# Patient Record
Sex: FEMALE | Race: WHITE | NOT HISPANIC OR LATINO | Employment: UNEMPLOYED | URBAN - METROPOLITAN AREA
[De-identification: names, ages, dates, MRNs, and addresses within clinical notes are randomized per-mention and may not be internally consistent; named-entity substitution may affect disease eponyms.]

---

## 2017-01-14 ENCOUNTER — HOSPITAL ENCOUNTER (EMERGENCY)
Facility: HOSPITAL | Age: 37
Discharge: HOME | End: 2017-01-14
Payer: OTHER GOVERNMENT

## 2017-01-14 VITALS
TEMPERATURE: 98 F | HEART RATE: 60 BPM | HEIGHT: 63 IN | BODY MASS INDEX: 21.26 KG/M2 | WEIGHT: 120 LBS | DIASTOLIC BLOOD PRESSURE: 60 MMHG | SYSTOLIC BLOOD PRESSURE: 92 MMHG | RESPIRATION RATE: 16 BRPM | OXYGEN SATURATION: 100 %

## 2017-01-14 DIAGNOSIS — R10.2 PELVIC PAIN: Primary | ICD-10-CM

## 2017-01-14 LAB
ALBUMIN SERPL BCP-MCNC: 4.2 G/DL
ALP SERPL-CCNC: 38 U/L
ALT SERPL W/O P-5'-P-CCNC: 10 U/L
ANION GAP SERPL CALC-SCNC: 8 MMOL/L
ANISOCYTOSIS BLD QL SMEAR: SLIGHT
AST SERPL-CCNC: 17 U/L
B-HCG UR QL: NEGATIVE
BACTERIA #/AREA URNS HPF: NORMAL /HPF
BASOPHILS # BLD AUTO: 0.04 K/UL
BASOPHILS NFR BLD: 1 %
BILIRUB SERPL-MCNC: 0.7 MG/DL
BILIRUB UR QL STRIP: NEGATIVE
BUN SERPL-MCNC: 9 MG/DL
CALCIUM SERPL-MCNC: 9.1 MG/DL
CHLORIDE SERPL-SCNC: 107 MMOL/L
CLARITY UR: ABNORMAL
CO2 SERPL-SCNC: 23 MMOL/L
COLOR UR: YELLOW
CREAT SERPL-MCNC: 0.8 MG/DL
CTP QC/QA: YES
DIFFERENTIAL METHOD: ABNORMAL
EOSINOPHIL # BLD AUTO: 0.1 K/UL
EOSINOPHIL NFR BLD: 2.1 %
ERYTHROCYTE [DISTWIDTH] IN BLOOD BY AUTOMATED COUNT: 16.8 %
EST. GFR  (AFRICAN AMERICAN): >60 ML/MIN/1.73 M^2
EST. GFR  (NON AFRICAN AMERICAN): >60 ML/MIN/1.73 M^2
GLUCOSE SERPL-MCNC: 83 MG/DL
GLUCOSE UR QL STRIP: NEGATIVE
HCT VFR BLD AUTO: 32.4 %
HGB BLD-MCNC: 10.1 G/DL
HGB UR QL STRIP: NEGATIVE
KETONES UR QL STRIP: NEGATIVE
LEUKOCYTE ESTERASE UR QL STRIP: ABNORMAL
LIPASE SERPL-CCNC: 46 U/L
LYMPHOCYTES # BLD AUTO: 1.1 K/UL
LYMPHOCYTES NFR BLD: 27.8 %
MCH RBC QN AUTO: 23.8 PG
MCHC RBC AUTO-ENTMCNC: 31.2 %
MCV RBC AUTO: 76 FL
MICROSCOPIC COMMENT: NORMAL
MONOCYTES # BLD AUTO: 0.3 K/UL
MONOCYTES NFR BLD: 7.2 %
NEUTROPHILS # BLD AUTO: 2.4 K/UL
NEUTROPHILS NFR BLD: 61.9 %
NITRITE UR QL STRIP: NEGATIVE
PH UR STRIP: 5 [PH] (ref 5–8)
PLATELET # BLD AUTO: 204 K/UL
PMV BLD AUTO: 10.3 FL
POTASSIUM SERPL-SCNC: 4.2 MMOL/L
PROT SERPL-MCNC: 7.1 G/DL
PROT UR QL STRIP: NEGATIVE
RBC # BLD AUTO: 4.25 M/UL
SODIUM SERPL-SCNC: 138 MMOL/L
SP GR UR STRIP: 1.02 (ref 1–1.03)
SQUAMOUS #/AREA URNS HPF: 30 /HPF
URN SPEC COLLECT METH UR: ABNORMAL
UROBILINOGEN UR STRIP-ACNC: NEGATIVE EU/DL
WBC # BLD AUTO: 3.89 K/UL
WBC #/AREA URNS HPF: 4 /HPF (ref 0–5)

## 2017-01-14 PROCEDURE — 80053 COMPREHEN METABOLIC PANEL: CPT

## 2017-01-14 PROCEDURE — 81025 URINE PREGNANCY TEST: CPT | Performed by: PHYSICIAN ASSISTANT

## 2017-01-14 PROCEDURE — 25000003 PHARM REV CODE 250: Performed by: PHYSICIAN ASSISTANT

## 2017-01-14 PROCEDURE — 99283 EMERGENCY DEPT VISIT LOW MDM: CPT

## 2017-01-14 PROCEDURE — 87591 N.GONORRHOEAE DNA AMP PROB: CPT

## 2017-01-14 PROCEDURE — 81000 URINALYSIS NONAUTO W/SCOPE: CPT

## 2017-01-14 PROCEDURE — 83690 ASSAY OF LIPASE: CPT

## 2017-01-14 PROCEDURE — 85025 COMPLETE CBC W/AUTO DIFF WBC: CPT

## 2017-01-14 RX ORDER — AMOXICILLIN 500 MG
2 CAPSULE ORAL DAILY
COMMUNITY
End: 2017-03-29

## 2017-01-14 RX ORDER — IBUPROFEN 600 MG/1
600 TABLET ORAL EVERY 6 HOURS PRN
Qty: 20 TABLET | Refills: 0 | Status: SHIPPED | OUTPATIENT
Start: 2017-01-14 | End: 2017-03-29

## 2017-01-14 RX ORDER — DICYCLOMINE HYDROCHLORIDE 10 MG/1
10 CAPSULE ORAL
Status: COMPLETED | OUTPATIENT
Start: 2017-01-14 | End: 2017-01-14

## 2017-01-14 RX ADMIN — DICYCLOMINE HYDROCHLORIDE 10 MG: 10 CAPSULE ORAL at 09:01

## 2017-01-14 NOTE — ED AVS SNAPSHOT
[facility]  [place] [address]               Lydia Thomas   2017  9:02 AM   ED    Description:  Female : 1980   Department:  [facility]           Your Care was Coordinated By:     Provider Role From To    Lobito Leiva MD Attending Provider 17 --    Dawson Arroyo PA-C Physician Assistant 17 --      Reason for Visit     Abdominal Pain           Diagnoses this Visit        Comments    Pelvic pain    -  Primary       ED Disposition     None           To Do List           Follow-up Information     Go to [facility].    Specialty:  Emergency Medicine    Why:  If symptoms worsen    Contact information:    [place] [address] [phone]        Follow up with Your OB/GYN. Schedule an appointment as soon as possible for a visit in 3 days.    Why:  For further evaluation       These Medications        Disp Refills Start End    ibuprofen (ADVIL,MOTRIN) 600 MG tablet 20 tablet 0 2017     Take 1 tablet (600 mg total) by mouth every 6 (six) hours as needed for Pain. - Oral    Pharmacy: [facility]/pharmacy #[id number] - [place] - [address] Ph #: [phone]         [facility]     [facility] -  Assistance  Registered nurses in the [facility] provide clinical advisement, health education, appointment booking, and other advisory services.  Call for this free service at [phone].             Medications           Message regarding Medications     Verify the changes and/or additions to your medication regime listed below are the same as discussed with your clinician today.  If any of these changes or additions are incorrect, please notify your healthcare provider.        START taking these NEW medications        Refills    ibuprofen (ADVIL,MOTRIN) 600 MG tablet 0    Sig: Take 1 tablet (600 mg total) by mouth  "every 6 (six) hours as needed for Pain.    Class: Print    Route: Oral      These medications were administered today        Dose Freq    dicyclomine capsule 10 mg 10 mg ED 1 Time    Sig: Take 1 capsule (10 mg total) by mouth ED 1 Time.    Class: Normal    Route: Oral    Cosign for Ordering: Accepted by Lobito Leiva MD on 1/14/2017 10:18 AM      STOP taking these medications     acetaminophen (TYLENOL) 325 MG tablet Take 325 mg by mouth every 6 (six) hours as needed for Pain.    ondansetron (ZOFRAN-ODT) 8 MG TbDL Take 1 tablet (8 mg total) by mouth every 12 (twelve) hours as needed.    PNV cmb#21-iron-folic acid (PRENATAL COMPLETE)  mg-mcg Tab Take 1 tablet by mouth once daily.           Verify that the below list of medications is an accurate representation of the medications you are currently taking.  If none reported, the list may be blank. If incorrect, please contact your healthcare provider. Carry this list with you in case of emergency.           Current Medications     fish oil-omega-3 fatty acids 300-1,000 mg capsule Take 2 g by mouth once daily.    ibuprofen (ADVIL,MOTRIN) 600 MG tablet Take 1 tablet (600 mg total) by mouth every 6 (six) hours as needed for Pain.    sumatriptan (IMITREX STATDOSE) 6 mg/0.5 mL kit Inject 6 mg into the skin every 2 (two) hours as needed for Migraine.           Clinical Reference Information           Your Vitals Were     BP Pulse Temp Resp Height Weight    105/68 82 98.2 °F (36.8 °C) 16 5' 2.5" (1.588 m) 54.4 kg (120 lb)    Last Period SpO2 BMI          01/10/2017 100% 21.6 kg/m2        Allergies as of 1/14/2017        Reactions    Benadryl [Diphenhydramine Hcl] Itching    "with fast injection"    Iodine And Iodide Containing Products       Immunizations Administered on Date of Encounter - 1/14/2017     None      ED Micro, Lab, POCT     Start Ordered       Status Ordering Provider    01/14/17 0936 01/14/17 0935  CBC auto differential  STAT      Final result     " 01/14/17 0936 01/14/17 0935  Comprehensive metabolic panel  STAT      Final result     01/14/17 0936 01/14/17 0935  Lipase  STAT      Final result     01/14/17 0934 01/14/17 0935  C. trachomatis/N. gonorrhoeae by AMP DNA Urine  STAT      In process     01/14/17 0925 01/14/17 0925  Urinalysis Clean Catch  STAT      Final result     01/14/17 0925 01/14/17 0925    STAT,   Status:  Canceled      Canceled     01/14/17 0925 01/14/17 0925    STAT,   Status:  Canceled      Canceled     01/14/17 0925 01/14/17 0925  POCT urine pregnancy  Once      Final result     01/14/17 0925 01/14/17 0925  Urinalysis Microscopic  Once      Final result       ED Imaging Orders     None        Discharge Instructions       Please make appointment with OB/GYN for further evaluation, including pelvic exam, as soon as possible.  You may take ibuprofen every 6 hours as needed for pelvic pain.  Please return to the emergency department if you have new or worsening symptoms.    Discharge References/Attachments     PELVIC PAIN, UNKNOWN CAUSE (ENGLISH)      [facility] Sign-Up     Activating your [facility] account is as easy as 1-2-3!     1) Visit [email or web address], select Sign Up Now, enter this activation code and your date of birth, then select Next.  [email or web address]  Expires: 2/5/2017 10:28 PM      2) Create a username and password to use when you visit [facility] in the future and select a security question in case you lose your password and select Next.    3) Enter your e-mail address and click Sign Up!    Additional Information  If you have questions, please e-mail [email or web address] or call [phone] to talk to our [facility] staff. Remember, [facility] is NOT to be used for urgent needs. For medical emergencies, dial 911.          [facility] complies with applicable Federal civil rights laws and does not discriminate on the basis of race, color, national origin, age, disability, or sex.        Language Assistance Services      ATTENTION: Language assistance services are available, free of charge. Please call [phone].      ATENCIÓN: Si habla español, tiene a cordova disposición servicios gratuitos de asistencia lingüística. Llame al [phone].     CHÚ Ý: N?u b?n nói Ti?ng Vi?t, có các d?ch v? h? tr? ngôn ng? mi?n phí dành cho b?n. G?i s? [phone].

## 2017-01-14 NOTE — ED PROVIDER NOTES
"Encounter Date: 1/14/2017    SCRIBE #1 NOTE: I, Bola Delarosa, am scribing for, and in the presence of,  Dawson Arroyo PA-C. I have scribed the following portions of the note - Other sections scribed: HPI/ROS.       History     Chief Complaint   Patient presents with    Abdominal Pain     RUQ X 1 week. Denies N/V/D.      Review of patient's allergies indicates:   Allergen Reactions    Benadryl [diphenhydramine hcl] Itching     "with fast injection"    Iodine and iodide containing products      HPI Comments: CC: Abdominal Pain    HPI: This 36 y.o. female with medical history of anemia and ovarian cyst presents to the ED c/o acute, constant, severe (pain rating 7/10), non radiating, lower abdominal pain. The pt also complains of frequent urinating. The pain started 5 days ago and has been getting worse ever since. The pt has had 2 miscarriages at 1 and 2 months. The pt also stated that she did not lose her virginity until age 34. The pt is not taking any medications for the pain. The pt denies fever, diarrhea, constipation, vaginal discharge, dysuria, vomiting, cough, or rhinorrhea.        The history is provided by the patient and the spouse. No  was used.     Past Medical History   Diagnosis Date    Anemia     Anemia     Asthma      exercise asthma    Migraine headache     Ovarian cyst      No past medical history pertinent negatives.  Past Surgical History   Procedure Laterality Date    Breast lumpectomy Right     Breast lumpectomy       right     Family History   Problem Relation Age of Onset    Asthma Mother     Miscarriages / Stillbirths Mother     Arthritis Father     COPD Father     Asthma Brother     Cancer Maternal Aunt     Cancer Maternal Uncle     Diabetes Maternal Uncle     Hypertension Maternal Uncle     Hearing loss Paternal Aunt     Hearing loss Paternal Uncle     Cancer Maternal Grandmother     Cancer Maternal Grandfather     Hearing loss Paternal Grandmother  "    Hearing loss Paternal Grandfather      Social History   Substance Use Topics    Smoking status: Never Smoker    Smokeless tobacco: Never Used    Alcohol use No     Review of Systems   Constitutional: Negative for fever.   HENT: Negative for rhinorrhea and sore throat.    Respiratory: Negative for cough and shortness of breath.    Cardiovascular: Negative for chest pain.   Gastrointestinal: Positive for abdominal pain (lower left and right). Negative for constipation, diarrhea, nausea and vomiting.   Genitourinary: Positive for difficulty urinating. Negative for dysuria and vaginal discharge.   Musculoskeletal: Negative for back pain.   Skin: Negative for rash.   Neurological: Negative for weakness.       Physical Exam   Initial Vitals   BP Pulse Resp Temp SpO2   01/14/17 0900 01/14/17 0900 01/14/17 0900 01/14/17 0900 01/14/17 0900   105/68 82 16 98.2 °F (36.8 °C) 100 %     Physical Exam    ED Course   Procedures  Labs Reviewed   C. TRACHOMATIS/N. GONORRHOEAE BY AMP DNA   URINALYSIS   VAGINAL SCREEN   POCT URINE PREGNANCY                        Scribe Attestation:   Scribe #1: I performed the above scribed service and the documentation accurately describes the services I performed. I attest to the accuracy of the note.    Attending Attestation:           Physician Attestation for Scribe:  Physician Attestation Statement for Scribe #1: I, Dawson Arroyo PA-C, reviewed documentation, as scribed by Bola Delarosa in my presence, and it is both accurate and complete.                 ED Course     Clinical Impression:   There were no encounter diagnoses.

## 2017-01-14 NOTE — ED PROVIDER NOTES
"Encounter Date: 1/14/2017    SCRIBE #1 NOTE: IBola, am scribing for, and in the presence of,  Dawson Arroyo PA-C. I have scribed the following portions of the note - Other sections scribed: HPI/ROS.   SCRIBE #2 NOTE: ICoreen Emmanuel, am scribing for, and in the presence of,  Dawson Arroyo PA-C. I have scribed the following portions of the note - Other sections scribed: HPI.     History     Chief Complaint   Patient presents with    Abdominal Pain     RUQ X 1 week. Denies N/V/D.      Review of patient's allergies indicates:   Allergen Reactions    Benadryl [diphenhydramine hcl] Itching     "with fast injection"    Iodine and iodide containing products      HPI Comments: CC: Abdominal Pain    HPI: This 36 y.o. female with medical history of anemia and ovarian cyst presents to the ED c/o acute, constant, severe (pain rating 7/10) non radiating lower abdominal pain. The pt also complains of frequent urination. The pain started 5 days ago and has been getting worse ever since. Pt notes that she has never experienced these symptoms before. She reports that she experienced her last menstrual cycle 4 days ago. The pt has had 2 miscarriages in the past. The pt is not taking any medications for the pain. The pt denies fever, diarrhea, constipation, vaginal discharge, vaginal bleeding, dysuria, hematuria, blood in stool, nausea, vomiting, cough, or rhinorrhea.         The history is provided by the patient.     Past Medical History   Diagnosis Date    Anemia     Anemia     Asthma      exercise asthma    Migraine headache     Ovarian cyst      No past medical history pertinent negatives.  Past Surgical History   Procedure Laterality Date    Breast lumpectomy Right     Breast lumpectomy       right     Family History   Problem Relation Age of Onset    Asthma Mother     Miscarriages / Stillbirths Mother     Arthritis Father     COPD Father     Asthma Brother     Cancer Maternal Aunt     Cancer Maternal " Uncle     Diabetes Maternal Uncle     Hypertension Maternal Uncle     Hearing loss Paternal Aunt     Hearing loss Paternal Uncle     Cancer Maternal Grandmother     Cancer Maternal Grandfather     Hearing loss Paternal Grandmother     Hearing loss Paternal Grandfather      Social History   Substance Use Topics    Smoking status: Never Smoker    Smokeless tobacco: Never Used    Alcohol use No     Review of Systems   Constitutional: Negative for fever.   HENT: Negative for rhinorrhea and sore throat.    Respiratory: Negative for shortness of breath.    Cardiovascular: Negative for chest pain.   Gastrointestinal: Positive for abdominal pain (lower left and right). Negative for constipation, diarrhea, nausea and vomiting.   Genitourinary: Positive for difficulty urinating. Negative for dysuria and vaginal discharge.   Musculoskeletal: Negative for back pain.   Skin: Negative for rash.   Neurological: Negative for weakness.       Physical Exam   Initial Vitals   BP Pulse Resp Temp SpO2   01/14/17 0900 01/14/17 0900 01/14/17 0900 01/14/17 0900 01/14/17 0900   105/68 82 16 98.2 °F (36.8 °C) 100 %     Physical Exam    Vitals reviewed.  Constitutional: She appears well-developed and well-nourished. She is not diaphoretic. No distress.   HENT:   Head: Normocephalic and atraumatic.   Right Ear: External ear normal.   Left Ear: External ear normal.   Nose: Nose normal.   Eyes: Conjunctivae are normal. No scleral icterus.   Neck: Normal range of motion. Neck supple.   Cardiovascular: Normal rate, regular rhythm, normal heart sounds and intact distal pulses.   Pulmonary/Chest: Breath sounds normal. No respiratory distress. She has no wheezes. She has no rhonchi. She has no rales. She exhibits no tenderness.   Abdominal: Soft. She exhibits no distension and no mass. There is tenderness (mild left and right pelvic). There is no rebound and no guarding.   Negative Abbott sign.  No tenderness at McBurney's point.  No  peritoneal signs.   Musculoskeletal: Normal range of motion.   Lymphadenopathy:     She has no cervical adenopathy.   Neurological: She is alert and oriented to person, place, and time.   Skin: Skin is warm and dry. No rash noted.         ED Course   Procedures  Labs Reviewed   URINALYSIS - Abnormal; Notable for the following:        Result Value    Appearance, UA Cloudy (*)     Leukocytes, UA Trace (*)     All other components within normal limits   CBC W/ AUTO DIFFERENTIAL - Abnormal; Notable for the following:     WBC 3.89 (*)     Hemoglobin 10.1 (*)     Hematocrit 32.4 (*)     MCV 76 (*)     MCH 23.8 (*)     MCHC 31.2 (*)     RDW 16.8 (*)     All other components within normal limits   COMPREHENSIVE METABOLIC PANEL - Abnormal; Notable for the following:     Alkaline Phosphatase 38 (*)     All other components within normal limits   C. TRACHOMATIS/N. GONORRHOEAE BY AMP DNA   LIPASE   URINALYSIS MICROSCOPIC   POCT URINE PREGNANCY             Medical Decision Making:   History:   Old Medical Records: I decided to obtain old medical records.    This is an emergent evaluation of a 36-year-old female complaining of pelvic pain ×5 days.  She denies fever, nausea vomiting, urinary symptoms, changes in bowel habits, vaginal discharge.  She presents well-appearing in no distress with stable vital signs.  Abdomen soft with mild tenderness to the left and right pelvic regions.  No peritoneal signs.  Labs and urinalysis unremarkable.  Patient refused pelvic exam due to no female provider available and explained she will have pelvic exam at OB/GYN.  Patient explained risks of incomplete workup and advised to seek OB/GYN evaluation as soon as possible.  Patient has history of small ovarian cyst and this may be etiology of her symptomatology today.  Given her presentation and exam findings I do not suspect serious pathology such as torsion, TOA, PID, appendicitis, diverticulitis.  UPT negative.  Ectopic pregnancy unlikely.   Patient discharged with return precautions and prescription for ibuprofen.  Case discussed with Dr. Leiva who agrees with assessment plan.          Scribe Attestation:   Scribe #1: I performed the above scribed service and the documentation accurately describes the services I performed. I attest to the accuracy of the note.    Attending Attestation:     Physician Attestation Statement for NP/PA:   I have conducted a face to face encounter with this patient in addition to the NP/PA, due to Medical Complexity    Other NP/PA Attestation Additions:    History of Present Illness: I discussed with the patient and her partner that she is a superior labs reviewed ER.  I discussed that she had lower abdominal tenderness which required a pelvic exam.  She states her last intercourse was a month ago.  Both she and her partner did not want a pelvic exam without a female provider for which we do not have one at present.  They defer and state they will go to the OB/GYN to have a pelvic exam.         Physician Attestation for Scribe:  Physician Attestation Statement for Scribe #1: I, Dawsno Arroyo PA-C, reviewed documentation, as scribed by Bola Delarosa in my presence, and it is both accurate and complete.   Physician Attestation Statement for Scribe #2: IDawson PA-C, reviewed documentation, as scribed by Coreen Benitez in my presence, and it is both accurate and complete. I also acknowledge and confirm the content of the note done by Cricketibe #1.              ED Course     Clinical Impression:   The encounter diagnosis was Pelvic pain.          Dawson Arroyo PA-C  01/14/17 0780

## 2017-01-14 NOTE — DISCHARGE INSTRUCTIONS
Please make appointment with OB/GYN for further evaluation, including pelvic exam, as soon as possible.  You may take ibuprofen every 6 hours as needed for pelvic pain.  Please return to the emergency department if you have new or worsening symptoms.

## 2017-01-17 LAB
C TRACH DNA SPEC QL NAA+PROBE: NEGATIVE
N GONORRHOEA DNA SPEC QL NAA+PROBE: NEGATIVE

## 2017-03-29 ENCOUNTER — HOSPITAL ENCOUNTER (EMERGENCY)
Facility: HOSPITAL | Age: 37
Discharge: HOME | End: 2017-03-29
Attending: EMERGENCY MEDICINE
Payer: OTHER GOVERNMENT

## 2017-03-29 VITALS
SYSTOLIC BLOOD PRESSURE: 100 MMHG | DIASTOLIC BLOOD PRESSURE: 56 MMHG | HEART RATE: 60 BPM | WEIGHT: 126 LBS | RESPIRATION RATE: 20 BRPM | BODY MASS INDEX: 22.32 KG/M2 | TEMPERATURE: 98 F | OXYGEN SATURATION: 100 % | HEIGHT: 63 IN

## 2017-03-29 DIAGNOSIS — R11.2 NAUSEA AND VOMITING, INTRACTABILITY OF VOMITING NOT SPECIFIED, UNSPECIFIED VOMITING TYPE: Primary | ICD-10-CM

## 2017-03-29 LAB
ALBUMIN SERPL BCP-MCNC: 4.4 G/DL
ALP SERPL-CCNC: 36 U/L
ALT SERPL W/O P-5'-P-CCNC: 10 U/L
ANION GAP SERPL CALC-SCNC: 9 MMOL/L
AST SERPL-CCNC: 16 U/L
B-HCG UR QL: NEGATIVE
BACTERIA #/AREA URNS HPF: ABNORMAL /HPF
BASOPHILS # BLD AUTO: 0.1 K/UL
BASOPHILS NFR BLD: 2.6 %
BILIRUB SERPL-MCNC: 1.3 MG/DL
BILIRUB UR QL STRIP: NEGATIVE
BUN SERPL-MCNC: 5 MG/DL
CALCIUM SERPL-MCNC: 9.5 MG/DL
CHLORIDE SERPL-SCNC: 107 MMOL/L
CLARITY UR: ABNORMAL
CO2 SERPL-SCNC: 26 MMOL/L
COLOR UR: YELLOW
CREAT SERPL-MCNC: 0.8 MG/DL
CTP QC/QA: YES
DIFFERENTIAL METHOD: ABNORMAL
EOSINOPHIL # BLD AUTO: 0.1 K/UL
EOSINOPHIL NFR BLD: 1.6 %
ERYTHROCYTE [DISTWIDTH] IN BLOOD BY AUTOMATED COUNT: 16.2 %
EST. GFR  (AFRICAN AMERICAN): >60 ML/MIN/1.73 M^2
EST. GFR  (NON AFRICAN AMERICAN): >60 ML/MIN/1.73 M^2
GLUCOSE SERPL-MCNC: 93 MG/DL
GLUCOSE UR QL STRIP: NEGATIVE
HCT VFR BLD AUTO: 32.4 %
HGB BLD-MCNC: 10.1 G/DL
HGB UR QL STRIP: NEGATIVE
KETONES UR QL STRIP: NEGATIVE
LEUKOCYTE ESTERASE UR QL STRIP: NEGATIVE
LIPASE SERPL-CCNC: 24 U/L
LYMPHOCYTES # BLD AUTO: 1.3 K/UL
LYMPHOCYTES NFR BLD: 33.3 %
MCH RBC QN AUTO: 24.1 PG
MCHC RBC AUTO-ENTMCNC: 31.2 %
MCV RBC AUTO: 77 FL
MICROSCOPIC COMMENT: ABNORMAL
MONOCYTES # BLD AUTO: 0.4 K/UL
MONOCYTES NFR BLD: 9.6 %
NEUTROPHILS # BLD AUTO: 2.1 K/UL
NEUTROPHILS NFR BLD: 52.9 %
NITRITE UR QL STRIP: NEGATIVE
PH UR STRIP: 8 [PH] (ref 5–8)
PLATELET # BLD AUTO: 238 K/UL
PMV BLD AUTO: 11.4 FL
POTASSIUM SERPL-SCNC: 4 MMOL/L
PROT SERPL-MCNC: 7.7 G/DL
PROT UR QL STRIP: NEGATIVE
RBC # BLD AUTO: 4.19 M/UL
RBC #/AREA URNS HPF: 0 /HPF (ref 0–4)
SODIUM SERPL-SCNC: 142 MMOL/L
SP GR UR STRIP: 1.02 (ref 1–1.03)
SQUAMOUS #/AREA URNS HPF: 35 /HPF
URN SPEC COLLECT METH UR: ABNORMAL
UROBILINOGEN UR STRIP-ACNC: NEGATIVE EU/DL
WBC # BLD AUTO: 3.87 K/UL
WBC #/AREA URNS HPF: 2 /HPF (ref 0–5)

## 2017-03-29 PROCEDURE — 25000003 PHARM REV CODE 250: Performed by: EMERGENCY MEDICINE

## 2017-03-29 PROCEDURE — 81025 URINE PREGNANCY TEST: CPT | Performed by: EMERGENCY MEDICINE

## 2017-03-29 PROCEDURE — 96374 THER/PROPH/DIAG INJ IV PUSH: CPT

## 2017-03-29 PROCEDURE — 96361 HYDRATE IV INFUSION ADD-ON: CPT

## 2017-03-29 PROCEDURE — 63600175 PHARM REV CODE 636 W HCPCS: Performed by: EMERGENCY MEDICINE

## 2017-03-29 PROCEDURE — 83690 ASSAY OF LIPASE: CPT

## 2017-03-29 PROCEDURE — 85025 COMPLETE CBC W/AUTO DIFF WBC: CPT

## 2017-03-29 PROCEDURE — 99283 EMERGENCY DEPT VISIT LOW MDM: CPT | Mod: 25

## 2017-03-29 PROCEDURE — 80053 COMPREHEN METABOLIC PANEL: CPT

## 2017-03-29 PROCEDURE — 81000 URINALYSIS NONAUTO W/SCOPE: CPT

## 2017-03-29 RX ORDER — OMEPRAZOLE 40 MG/1
40 CAPSULE, DELAYED RELEASE ORAL DAILY
Qty: 30 CAPSULE | Refills: 11 | Status: SHIPPED | OUTPATIENT
Start: 2017-03-29 | End: 2020-08-19

## 2017-03-29 RX ORDER — ONDANSETRON 4 MG/1
4 TABLET, FILM COATED ORAL EVERY 8 HOURS PRN
Qty: 12 TABLET | Refills: 0 | OUTPATIENT
Start: 2017-03-29 | End: 2020-08-16

## 2017-03-29 RX ORDER — ONDANSETRON 2 MG/ML
8 INJECTION INTRAMUSCULAR; INTRAVENOUS
Status: COMPLETED | OUTPATIENT
Start: 2017-03-29 | End: 2017-03-29

## 2017-03-29 RX ADMIN — SODIUM CHLORIDE 1000 ML: 0.9 INJECTION, SOLUTION INTRAVENOUS at 12:03

## 2017-03-29 RX ADMIN — ONDANSETRON 8 MG: 2 INJECTION INTRAMUSCULAR; INTRAVENOUS at 12:03

## 2017-03-29 NOTE — DISCHARGE INSTRUCTIONS
"  How to Control Nausea and Vomiting     Taken before meals, medicines can help ease nausea.    Nausea is feeling that you need to throw up. Throwing up occurs when your body forces food that is in your stomach out through your mouth. Nausea and vomiting are symptoms that are caused by many things. They can happen when a condition or disease, medicine, medical treatment, or a poisonous substance affects the area in your brain that controls vomiting. Some conditions or diseases can cause nausea, abdominal pain or cramps, and vomiting. The symptoms can be mild and go away by themselves. Other symptoms can be serious. You will need to see your healthcare provider for these.  Nausea and vomiting are common. They can be caused by many things. These include:  · "Stomach flu" (gastroenteritis)  · Food poisoning  · Stomach pain (gastritis)  · Blockages  They can also be caused by a head injury, an infection in the brain or inside the ear, or migraines. Other common causes of nausea and vomiting include:  · Brain tumor  · Brain bruise  · Motion sickness  · Drugs. These include alcohol, pain medicines such as morphine, and cancer medicines.  · Toxins. These are poisonous things like plants or liquids that are swallowed by accident.  · Advanced types of cancer  · Movement problems (psychogenic problems)  · Extra pressure in the fluid that surrounds the brain and spinal cord (elevated intracranial pressure)     Nausea and vomiting are also common side effects of chemotherapy and radiation therapy. Side effects happen when treatment changes some normal cells as well as cancer cells. In this case, the cells lining your stomach and the part of your brain that controls vomiting are affected. Other more serious causes of vomiting may be hard to find early in the illness.     When to seek medical advice  Call your healthcare provider right away if you have the following:  · Nausea or vomiting that lasts 24 hours or more  · Trouble " keeping fluids down   Medicines can help  Nausea or vomiting can often be prevented or controlled with medicines (antiemetics). Your doctor may give you antiemetics before or after treatment if you are getting chemotherapy or other medical treatments that cause nausea or vomiting.  Eating tips  · If you have medicines to control nausea, take them before meals as directed.  · Avoid fatty or greasy foods while nauseated.  · Eat small meals slowly throughout the day.  · Ask someone to sit with you while you eat to keep you from thinking about feeling nauseated.  · Eat foods at room temperature or colder to avoid strong smells.  · Eat dry foods, such as toast, crackers, or pretzels. Also eat cool, light foods, such as applesauce, and bland foods, such as oatmeal or skinned chicken.   Other ways to feel better  · Get a little fresh air. Take a short walk.  · Talk to a friend, listen to music, or watch TV.  · Take a few deep, slow breaths.  · Eat by candlelight or in surroundings that you find relaxing.  · Use a technique, such as guided imagery, to help you relax. Imagine yourself in a beautiful, restful scene. Or daydream about the place youd most like to be.  Date Last Reviewed: 1/6/2016  © 5109-7695 [employer]. [address] [place] [address], [place] [address]. All rights reserved. This information is not intended as a substitute for professional medical care. Always follow your healthcare professional's instructions.

## 2017-03-29 NOTE — ED PROVIDER NOTES
"Encounter Date: 3/29/2017    SCRIBE #1 NOTE: I, Tawny Slater, am scribing for, and in the presence of,  Liam Elliott MD. I have scribed the following portions of the note - Other sections scribed: HPI and ROS.       History     Chief Complaint   Patient presents with    Abdominal Pain     Pt. c/o vomiting and abdominal pain after eating entire bag of black licorice. Pt. reports she vomited blood and needs to be checked. Pt. appears in NAD.     Vomiting     Review of patient's allergies indicates:   Allergen Reactions    Benadryl [diphenhydramine hcl] Itching     "with fast injection"    Fish containing products     Iodine and iodide containing products      HPI Comments: CC: Abdominal Pain        HPI: This 37 y.o female pt with a medical hx of migraine headache, ovarian cyst, asthma, and anemia presents to the ED with c/o sudden onset of abdominal pain with associated vomiting that began yesterday after eating a bag of black licorice. Pt complains of multiple episodes of emesis with minimal blood in her emesis. She denies diarrhea, fever, chills, sweats, and headache. There are no alleviating factors. No Tx PTA. symptoms are acute and severe(10/10).     The history is provided by the patient. No  was used.     Past Medical History:   Diagnosis Date    Anemia     Anemia     Asthma     exercise asthma    Migraine headache     Ovarian cyst      Past Surgical History:   Procedure Laterality Date    BREAST LUMPECTOMY Right     BREAST LUMPECTOMY      right     Family History   Problem Relation Age of Onset    Asthma Mother     Miscarriages / Stillbirths Mother     Arthritis Father     COPD Father     Asthma Brother     Cancer Maternal Aunt     Cancer Maternal Uncle     Diabetes Maternal Uncle     Hypertension Maternal Uncle     Hearing loss Paternal Aunt     Hearing loss Paternal Uncle     Cancer Maternal Grandmother     Cancer Maternal Grandfather     Hearing loss " Paternal Grandmother     Hearing loss Paternal Grandfather      Social History   Substance Use Topics    Smoking status: Never Smoker    Smokeless tobacco: Never Used    Alcohol use No     Review of Systems   Constitutional: Negative for chills, diaphoresis and fever.   HENT: Negative for sore throat.    Eyes: Negative for pain.   Respiratory: Negative for shortness of breath.    Cardiovascular: Negative for chest pain.   Gastrointestinal: Positive for abdominal pain, nausea and vomiting. Negative for diarrhea.   Genitourinary: Negative for dysuria and frequency.   Musculoskeletal: Negative for back pain.   Skin: Negative for rash.   Neurological: Negative for weakness and headaches.   Hematological: Does not bruise/bleed easily.       Physical Exam   Initial Vitals   BP Pulse Resp Temp SpO2   03/29/17 1116 03/29/17 1116 03/29/17 1116 03/29/17 1116 03/29/17 1116   106/70 96 16 99.7 °F (37.6 °C) 100 %     Physical Exam    Nursing note and vitals reviewed.  Constitutional: She appears well-developed and well-nourished.   HENT:   Head: Normocephalic and atraumatic.   Eyes: EOM are normal. Pupils are equal, round, and reactive to light.   Cardiovascular: Normal rate, regular rhythm, normal heart sounds and intact distal pulses.   Pulmonary/Chest: Breath sounds normal. No respiratory distress. She has no wheezes. She has no rhonchi. She has no rales.   Abdominal: Soft. Bowel sounds are normal. She exhibits no distension. There is no tenderness. There is no rebound and no guarding.   Musculoskeletal: Normal range of motion. She exhibits no edema or tenderness.   Neurological: She is alert and oriented to person, place, and time. She has normal strength.   Skin: Skin is warm and dry.   Psychiatric: She has a normal mood and affect.         ED Course   Procedures  Labs Reviewed   CBC W/ AUTO DIFFERENTIAL - Abnormal; Notable for the following:        Result Value    WBC 3.87 (*)     Hemoglobin 10.1 (*)     Hematocrit  32.4 (*)     MCV 77 (*)     MCH 24.1 (*)     MCHC 31.2 (*)     RDW 16.2 (*)     Basophil% 2.6 (*)     All other components within normal limits   COMPREHENSIVE METABOLIC PANEL - Abnormal; Notable for the following:     BUN, Bld 5 (*)     Total Bilirubin 1.3 (*)     Alkaline Phosphatase 36 (*)     All other components within normal limits   URINALYSIS - Abnormal; Notable for the following:     Appearance, UA Hazy (*)     All other components within normal limits   URINALYSIS MICROSCOPIC - Abnormal; Notable for the following:     Bacteria, UA Moderate (*)     All other components within normal limits   LIPASE   POCT URINE PREGNANCY        MEDICAL DECISION MAKING    This is an emergent evaluation of the patient's symptoms.  Differential diagnoses includes: Pregnancy, enteritis, dehydration, pancreatitis. Room air pulse oximetry interpreted by me as normal. A decision was made to obtain the patient's old medical records.  If they were available, these records were reviewed.  Significant findings include prior evaluation for pelvic pain.  No leukocytosis or evidence of anemia.  Unremarkable metabolic panel.  No evidence of pancreatitis, cholecystitis, or acute infectious process.  Symptoms improved with treatment.  Discharge with PPI and Zofran.  Outpatient follow-up.                 Scribe Attestation:   Scribe #1: I performed the above scribed service and the documentation accurately describes the services I performed. I attest to the accuracy of the note.    Attending Attestation:           Physician Attestation for Scribe:  Physician Attestation Statement for Scribe #1: I, Liam Elliott MD, reviewed documentation, as scribed by Tawny Slater in my presence, and it is both accurate and complete.                 ED Course     Clinical Impression:   The encounter diagnosis was Nausea and vomiting, intractability of vomiting not specified, unspecified vomiting type.          Liam Elliott MD  03/29/17 7972

## 2017-03-29 NOTE — ED AVS SNAPSHOT
[facility]  [address] [place] [address]               Lydia Thomas   3/29/2017 11:42 AM   ED    Description:  Female : 1980   Department:  [facility]           Your Care was Coordinated By:     Provider Role From To    Liam Elliott MD Attending Provider 17 1146 --      Reason for Visit     Abdominal Pain     Vomiting           Diagnoses this Visit        Comments    Nausea and vomiting, intractability of vomiting not specified, unspecified vomiting type    -  Primary       ED Disposition     None           To Do List           Follow-up Information     Follow up with Destiney Hull MD. Schedule an appointment as soon as possible for a visit in 1 week.    Specialty:  Family Medicine    Why:  As needed    Contact information:    [address] [address] [phone]         These Medications        Disp Refills Start End    ondansetron (ZOFRAN) 4 MG tablet 12 tablet 0 3/29/2017     Take 1 tablet (4 mg total) by mouth every 8 (eight) hours as needed. - Oral    Pharmacy: [facility]/pharmacy #[id number] - [place] - [address] [facility] Ph #: [phone]       omeprazole (PRILOSEC) 40 MG capsule 30 capsule 11 3/29/2017 3/29/2018    Take 1 capsule (40 mg total) by mouth once daily. - Oral    Pharmacy: [facility]/pharmacy #[id number] - [place], [place] - [address] [facility] Ph #: [phone]         [facility]     [facility] -  Assistance  Registered nurses in the [facility] provide clinical advisement, health education, appointment booking, and other advisory services.  Call for this free service at [phone].             Medications           Message regarding Medications     Verify the changes and/or additions to your medication regime listed below are the same as discussed with your clinician today.  If any of these changes or additions are incorrect,  "please notify your healthcare provider.        START taking these NEW medications        Refills    ondansetron (ZOFRAN) 4 MG tablet 0    Sig: Take 1 tablet (4 mg total) by mouth every 8 (eight) hours as needed.    Class: Print    Route: Oral    omeprazole (PRILOSEC) 40 MG capsule 11    Sig: Take 1 capsule (40 mg total) by mouth once daily.    Class: Print    Route: Oral      These medications were administered today        Dose Freq    ondansetron injection 8 mg 8 mg ED 1 Time    Sig: Inject 8 mg into the vein ED 1 Time.    Class: Normal    Route: Intravenous    sodium chloride 0.9% bolus 1,000 mL 1,000 mL ED 1 Time    Sig: Inject 1,000 mLs into the vein ED 1 Time.    Class: Normal    Route: Intravenous      STOP taking these medications     sumatriptan (IMITREX STATDOSE) 6 mg/0.5 mL kit Inject 6 mg into the skin every 2 (two) hours as needed for Migraine.    ibuprofen (ADVIL,MOTRIN) 600 MG tablet Take 1 tablet (600 mg total) by mouth every 6 (six) hours as needed for Pain.    fish oil-omega-3 fatty acids 300-1,000 mg capsule Take 2 g by mouth once daily.           Verify that the below list of medications is an accurate representation of the medications you are currently taking.  If none reported, the list may be blank. If incorrect, please contact your healthcare provider. Carry this list with you in case of emergency.           Current Medications     omeprazole (PRILOSEC) 40 MG capsule Take 1 capsule (40 mg total) by mouth once daily.    ondansetron (ZOFRAN) 4 MG tablet Take 1 tablet (4 mg total) by mouth every 8 (eight) hours as needed.           Clinical Reference Information           Your Vitals Were     BP Pulse Temp Resp Height Weight    97/56 (BP Location: Right arm, Patient Position: Sitting, BP Method: Automatic) 55 98.2 °F (36.8 °C) (Oral) 18 5' 3" (1.6 m) 57.2 kg (126 lb)    Last Period SpO2 BMI          03/24/2017 100% 22.32 kg/m2        Allergies as of 3/29/2017        Reactions    Benadryl " "[Diphenhydramine Hcl] Itching    "with fast injection"    Fish Containing Products     Iodine And Iodide Containing Products       Immunizations Administered on Date of Encounter - 3/29/2017     None      ED Micro, Lab, POCT     Start Ordered       Status Ordering Provider    03/29/17 1152 03/29/17 1151  CBC auto differential  STAT      Final result     03/29/17 1152 03/29/17 1151  Comprehensive metabolic panel  STAT      Final result     03/29/17 1152 03/29/17 1151  Lipase  STAT      Final result     03/29/17 1152 03/29/17 1151  Urinalysis  STAT      Final result     03/29/17 1152 03/29/17 1151  POCT urine pregnancy  Once      Edited Result - FINAL     03/29/17 1151 03/29/17 1151  Urinalysis Microscopic  Once      Final result       ED Imaging Orders     None        Discharge Instructions         How to Control Nausea and Vomiting     Taken before meals, medicines can help ease nausea.    Nausea is feeling that you need to throw up. Throwing up occurs when your body forces food that is in your stomach out through your mouth. Nausea and vomiting are symptoms that are caused by many things. They can happen when a condition or disease, medicine, medical treatment, or a poisonous substance affects the area in your brain that controls vomiting. Some conditions or diseases can cause nausea, abdominal pain or cramps, and vomiting. The symptoms can be mild and go away by themselves. Other symptoms can be serious. You will need to see your healthcare provider for these.  Nausea and vomiting are common. They can be caused by many things. These include:  · "Stomach flu" (gastroenteritis)  · Food poisoning  · Stomach pain (gastritis)  · Blockages  They can also be caused by a head injury, an infection in the brain or inside the ear, or migraines. Other common causes of nausea and vomiting include:  · Brain tumor  · Brain bruise  · Motion sickness  · Drugs. These include alcohol, pain medicines such as morphine, and cancer " medicines.  · Toxins. These are poisonous things like plants or liquids that are swallowed by accident.  · Advanced types of cancer  · Movement problems (psychogenic problems)  · Extra pressure in the fluid that surrounds the brain and spinal cord (elevated intracranial pressure)     Nausea and vomiting are also common side effects of chemotherapy and radiation therapy. Side effects happen when treatment changes some normal cells as well as cancer cells. In this case, the cells lining your stomach and the part of your brain that controls vomiting are affected. Other more serious causes of vomiting may be hard to find early in the illness.     When to seek medical advice  Call your healthcare provider right away if you have the following:  · Nausea or vomiting that lasts 24 hours or more  · Trouble keeping fluids down   Medicines can help  Nausea or vomiting can often be prevented or controlled with medicines (antiemetics). Your doctor may give you antiemetics before or after treatment if you are getting chemotherapy or other medical treatments that cause nausea or vomiting.  Eating tips  · If you have medicines to control nausea, take them before meals as directed.  · Avoid fatty or greasy foods while nauseated.  · Eat small meals slowly throughout the day.  · Ask someone to sit with you while you eat to keep you from thinking about feeling nauseated.  · Eat foods at room temperature or colder to avoid strong smells.  · Eat dry foods, such as toast, crackers, or pretzels. Also eat cool, light foods, such as applesauce, and bland foods, such as oatmeal or skinned chicken.   Other ways to feel better  · Get a little fresh air. Take a short walk.  · Talk to a friend, listen to music, or watch TV.  · Take a few deep, slow breaths.  · Eat by candlelight or in surroundings that you find relaxing.  · Use a technique, such as guided imagery, to help you relax. Imagine yourself in a beautiful, restful scene. Or daydream  about the place youd most like to be.  Date Last Reviewed: 1/6/2016  © 2472-3479 [employer], Number 100. [address] [place] [address], [place] [address]. All rights reserved. This information is not intended as a substitute for professional medical care. Always follow your healthcare professional's instructions.           [facility] complies with applicable Federal civil rights laws and does not discriminate on the basis of race, color, national origin, age, disability, or sex.        Language Assistance Services     ATTENTION: Language assistance services are available, free of charge. Please call [phone].      ATENCIÓN: Si habla español, tiene a cordova disposición servicios gratuitos de asistencia lingüística. Llame al [phone].     CHÚ Ý: N?u b?n nói Ti?ng Vi?t, có các d?ch v? h? tr? ngôn ng? mi?n phí dành cho b?n. G?i s? [phone].

## 2017-06-05 ENCOUNTER — HOSPITAL ENCOUNTER (EMERGENCY)
Facility: HOSPITAL | Age: 37
Discharge: HOME | End: 2017-06-05
Attending: EMERGENCY MEDICINE
Payer: OTHER GOVERNMENT

## 2017-06-05 VITALS
WEIGHT: 127 LBS | HEART RATE: 81 BPM | TEMPERATURE: 98 F | BODY MASS INDEX: 23.37 KG/M2 | HEIGHT: 62 IN | RESPIRATION RATE: 18 BRPM | OXYGEN SATURATION: 100 % | DIASTOLIC BLOOD PRESSURE: 56 MMHG | SYSTOLIC BLOOD PRESSURE: 105 MMHG

## 2017-06-05 DIAGNOSIS — L08.9 SKIN INFECTION: ICD-10-CM

## 2017-06-05 DIAGNOSIS — L02.91 CELLULITIS AND ABSCESS OF UNSPECIFIED SITE: Primary | ICD-10-CM

## 2017-06-05 DIAGNOSIS — L03.90 CELLULITIS AND ABSCESS OF UNSPECIFIED SITE: Primary | ICD-10-CM

## 2017-06-05 LAB
B-HCG UR QL: NEGATIVE
CTP QC/QA: YES

## 2017-06-05 PROCEDURE — 81025 URINE PREGNANCY TEST: CPT | Performed by: EMERGENCY MEDICINE

## 2017-06-05 PROCEDURE — 99283 EMERGENCY DEPT VISIT LOW MDM: CPT

## 2017-06-05 PROCEDURE — 25000003 PHARM REV CODE 250: Performed by: NURSE PRACTITIONER

## 2017-06-05 RX ORDER — SULFAMETHOXAZOLE AND TRIMETHOPRIM 800; 160 MG/1; MG/1
1 TABLET ORAL
Status: COMPLETED | OUTPATIENT
Start: 2017-06-05 | End: 2017-06-05

## 2017-06-05 RX ORDER — SULFAMETHOXAZOLE AND TRIMETHOPRIM 800; 160 MG/1; MG/1
1 TABLET ORAL 2 TIMES DAILY
Qty: 14 TABLET | Refills: 0 | Status: SHIPPED | OUTPATIENT
Start: 2017-06-05 | End: 2017-06-05

## 2017-06-05 RX ORDER — SULFAMETHOXAZOLE AND TRIMETHOPRIM 800; 160 MG/1; MG/1
1 TABLET ORAL 2 TIMES DAILY
Qty: 14 TABLET | Refills: 0 | Status: SHIPPED | OUTPATIENT
Start: 2017-06-05 | End: 2017-06-12

## 2017-06-05 RX ADMIN — SULFAMETHOXAZOLE AND TRIMETHOPRIM 1 TABLET: 800; 160 TABLET ORAL at 01:06

## 2017-06-05 NOTE — ED PROVIDER NOTES
"Encounter Date: 6/5/2017    SCRIBE #1 NOTE: I, Amy White, am scribing for, and in the presence of,  DOTTY Lyn. I have scribed the following portions of the note - Other sections scribed: HPI/ROS.       History     Chief Complaint   Patient presents with    Abscess     States something bit her 2 days ago and now she has red, itchy areas to buttocks and L upper thigh. States there has been drainage     Review of patient's allergies indicates:   Allergen Reactions    Iodine and iodide containing products Anaphylaxis    Benadryl [diphenhydramine hcl] Itching     "with fast injection"    Fish containing products      CC: Abscess    HPI: This 37 y.o. female with no pertinent PMHx presents to the ED c/o wounds.  The patient reports being bitten on her foot two days ago.  When she woke the next morning she had an itchy red bite to her left buttock and left upper thigh.  It is still itchy, red and now accompanied by drainage, so she decided to come in for evaluation.  No reported treatment was attempted.  The patient denies fever, emesis or any other associated symptoms.         The history is provided by the patient.     Past Medical History:   Diagnosis Date    Anemia     Anemia     Asthma     exercise asthma    Migraine headache     Ovarian cyst      Past Surgical History:   Procedure Laterality Date    BREAST LUMPECTOMY Right     BREAST LUMPECTOMY      right     Family History   Problem Relation Age of Onset    Asthma Mother     Miscarriages / Stillbirths Mother     Arthritis Father     COPD Father     Asthma Brother     Cancer Maternal Aunt     Cancer Maternal Uncle     Diabetes Maternal Uncle     Hypertension Maternal Uncle     Hearing loss Paternal Aunt     Hearing loss Paternal Uncle     Cancer Maternal Grandmother     Cancer Maternal Grandfather     Hearing loss Paternal Grandmother     Hearing loss Paternal Grandfather      Social History   Substance Use Topics    Smoking " status: Never Smoker    Smokeless tobacco: Never Used    Alcohol use No     Review of Systems   Constitutional: Negative for chills and fever.   Eyes: Negative for visual disturbance.   Respiratory: Negative for shortness of breath.    Cardiovascular: Negative for leg swelling.   Gastrointestinal: Negative for nausea and vomiting.   Skin: Positive for wound. Negative for rash.   Psychiatric/Behavioral: Negative for confusion.       Physical Exam     Initial Vitals [06/05/17 1213]   BP Pulse Resp Temp SpO2   114/78 103 18 98.4 °F (36.9 °C) 99 %     Physical Exam    Nursing note and vitals reviewed.  Constitutional: She appears well-developed and well-nourished. She is not diaphoretic. She is cooperative.  Non-toxic appearance. No distress.   HENT:   Head: Normocephalic and atraumatic.   Right Ear: External ear normal.   Left Ear: External ear normal.   Eyes: Conjunctivae and EOM are normal.   Neck: Normal range of motion. No tracheal deviation present.   Cardiovascular: Normal rate.   Pulses:       Dorsalis pedis pulses are 2+ on the left side.        Posterior tibial pulses are 2+ on the left side.   No lower extremity swelling.   Pulmonary/Chest: Effort normal. No stridor. No tachypnea and no bradypnea. No respiratory distress.   Musculoskeletal: Normal range of motion.   Neurological: She is alert and oriented to person, place, and time. She has normal strength.   Skin: Skin is warm, dry and intact. Capillary refill takes less than 2 seconds. Ecchymosis and abscess noted. No petechiae, no purpura and no rash noted. Rash is not nodular, not pustular, not vesicular and not urticarial. No cyanosis or erythema. Nails show no clubbing.        Exam chaperoned by: Maribel Reyes   Psychiatric: She has a normal mood and affect. Her behavior is normal. Judgment and thought content normal.         ED Course   Procedures  Labs Reviewed   POCT URINE PREGNANCY                   APC / Resident Notes:   This is an evaluation  of a 37 y.o. female that presents to the Emergency Department for 2 wounds to the left leg. Physical Exam shows a non-toxic, afebrile, and well appearing female.  Wound #1: There is a 0.5 cm x 0.5 cm wound to the left posterior buttock with no central area of fluctuance or induration and a small area of surrounding erythema with no active drainage present at this time. Wound #2: There is a 0.25 x 0.25 cm wound to the left posterior medial thigh with no central area of fluctuance or induration and a small area surrounding erythema with no active drainage present at this time.  There is a small area of ecchymosis over the dorsal aspect of the right foot between the first and second toes.  There is no induration, increased warmth, or active drainage present in this area.  Legs are symmetrical in size with no lower extremity swelling.  There is no increased warmth over the aspect of the leg.  Calf compartments are soft.  Vital Signs Are Reassuring.  With the lack of induration, active drainage, or fluctuance, do not feel I&D is warranted at this time.     My overall impression is Abscess and Cellulitis. I considered, but at this time, do not suspect an extensive cellulitis, sepsis, bacteremia to warrant admission, or DVT.    ED Course: Bactrim. D/C Meds: Bactrim. Additional D/C Information: warm compresses 10-15 minutes, 4-5 times a day to help increase wound drainage and decrease swelling. The diagnosis, treatment plan, instructions for follow-up and reevaluation with her PCP or the ED in 2 - 3 days for wound recheck as well as ED return precautions were discussed and understanding was verbalized. All questions or concerns have been addressed. This case was discussed with Dr. Bahena who is in agreement with my assessment and plan. BHARAT Mary, FNP-C          Scribe Attestation:   Scribe #1: I performed the above scribed service and the documentation accurately describes the services I performed. I attest to the  accuracy of the note.    Attending Attestation:     Physician Attestation Statement for NP/PA:   I discussed this assessment and plan of this patient with the NP/PA, but I did not personally examine the patient. The face to face encounter was performed by the NP/PA.    Other NP/PA Attestation Additions:      Medical Decision Makin yo F presents c/o being bitten by an insect to the L foot yesterday. Pt reporting wounds to L buttock and L thigh. On exam, wounds well-appearing with small surrounding erythema. No evidence of abscess, sepsis, necrosis, or other concerning emergent pathology. Agree with above assessment and plan.          Physician Attestation for Scribe:  Physician Attestation Statement for Scribe #1: I, DOTTY Lyn, reviewed documentation, as scribed by Amy White in my presence, and it is both accurate and complete.                 ED Course     Clinical Impression:   The primary encounter diagnosis was Cellulitis and abscess of unspecified site. A diagnosis of Skin infection was also pertinent to this visit.    Disposition:   Disposition: Discharged  Condition: Stable       DOTTY Vee  [phone]

## 2017-06-05 NOTE — DISCHARGE INSTRUCTIONS
Please make sure to maintain good hygiene, if the abscess is in an area you shave then change your razor and do not shave the area until the wound is healed, do not share towels or other personal items, and use warm compresses 10-15 minutes, 4-5 times a day to help increase wound drainage and decrease swelling, and take your antibiotic medication as prescribed.     Please return to the Emergency Department for any new or worsening problems including: worsening of your abscess, increasing redness or redness extending further up your body, or temperature of greater than 100.4F.     Please follow up with your Primary Care Doctor or Return to the ED in 2-3 days for a wound check, or sooner if you wound gets worse. Your packing needs to be removed in 2 - 3 days.     Please take antibiotics as prescribed.  Tylenol or ibuprofen as needed for pain.

## 2017-06-05 NOTE — ED TRIAGE NOTES
Pt reports bite on left foot entire leg began itching, upon waking yesterday noted lump to left buttock and left side, inflamed and pus noted.

## 2017-06-27 ENCOUNTER — HOSPITAL ENCOUNTER (EMERGENCY)
Facility: HOSPITAL | Age: 37
Discharge: LEFT BEFORE DISCHARGE | End: 2017-06-28
Payer: OTHER GOVERNMENT

## 2017-06-27 VITALS
HEIGHT: 62 IN | RESPIRATION RATE: 18 BRPM | BODY MASS INDEX: 24.84 KG/M2 | HEART RATE: 72 BPM | WEIGHT: 135 LBS | TEMPERATURE: 98 F | DIASTOLIC BLOOD PRESSURE: 64 MMHG | OXYGEN SATURATION: 100 % | SYSTOLIC BLOOD PRESSURE: 101 MMHG

## 2017-06-27 LAB
B-HCG UR QL: NEGATIVE
BILIRUB UR QL STRIP: NEGATIVE
CLARITY UR: CLEAR
COLOR UR: COLORLESS
CTP QC/QA: YES
GLUCOSE UR QL STRIP: NEGATIVE
HGB UR QL STRIP: NEGATIVE
KETONES UR QL STRIP: NEGATIVE
LEUKOCYTE ESTERASE UR QL STRIP: NEGATIVE
NITRITE UR QL STRIP: NEGATIVE
PH UR STRIP: 7 [PH] (ref 5–8)
PROT UR QL STRIP: NEGATIVE
SP GR UR STRIP: 1 (ref 1–1.03)
URN SPEC COLLECT METH UR: ABNORMAL
UROBILINOGEN UR STRIP-ACNC: NEGATIVE EU/DL

## 2017-06-27 PROCEDURE — 81003 URINALYSIS AUTO W/O SCOPE: CPT

## 2017-06-27 PROCEDURE — 99900041 HC LEFT WITHOUT BEING SEEN- EMERGENCY

## 2017-06-27 PROCEDURE — 81025 URINE PREGNANCY TEST: CPT | Performed by: EMERGENCY MEDICINE

## 2017-08-10 ENCOUNTER — HOSPITAL ENCOUNTER (EMERGENCY)
Age: 37
Discharge: HOME | End: 2017-08-10
Attending: EMERGENCY MEDICINE
Payer: OTHER GOVERNMENT

## 2017-08-10 ENCOUNTER — APPOINTMENT (OUTPATIENT)
Dept: CT IMAGING | Age: 37
End: 2017-08-10
Attending: NURSE PRACTITIONER
Payer: OTHER GOVERNMENT

## 2017-08-10 ENCOUNTER — APPOINTMENT (OUTPATIENT)
Dept: ULTRASOUND IMAGING | Age: 37
End: 2017-08-10
Attending: NURSE PRACTITIONER
Payer: OTHER GOVERNMENT

## 2017-08-10 VITALS
TEMPERATURE: 98.1 F | SYSTOLIC BLOOD PRESSURE: 110 MMHG | WEIGHT: 135 LBS | DIASTOLIC BLOOD PRESSURE: 63 MMHG | BODY MASS INDEX: 23.92 KG/M2 | OXYGEN SATURATION: 100 % | RESPIRATION RATE: 18 BRPM | HEIGHT: 63 IN | HEART RATE: 86 BPM

## 2017-08-10 DIAGNOSIS — N83.201 RIGHT OVARIAN CYST: Primary | ICD-10-CM

## 2017-08-10 DIAGNOSIS — R11.2 NAUSEA AND VOMITING IN ADULT: ICD-10-CM

## 2017-08-10 LAB
ALBUMIN SERPL BCP-MCNC: 4 G/DL (ref 3.4–5)
ALBUMIN/GLOB SERPL: 1.3 {RATIO} (ref 0.8–1.7)
ALP SERPL-CCNC: 39 U/L (ref 45–117)
ALT SERPL-CCNC: 17 U/L (ref 13–56)
ANION GAP BLD CALC-SCNC: 9 MMOL/L (ref 3–18)
APPEARANCE UR: CLEAR
AST SERPL W P-5'-P-CCNC: 14 U/L (ref 15–37)
BASOPHILS # BLD AUTO: 0.1 K/UL (ref 0–0.06)
BASOPHILS # BLD: 1 % (ref 0–2)
BILIRUB SERPL-MCNC: 1.1 MG/DL (ref 0.2–1)
BILIRUB UR QL: NEGATIVE
BUN SERPL-MCNC: 6 MG/DL (ref 7–18)
BUN/CREAT SERPL: 7 (ref 12–20)
CALCIUM SERPL-MCNC: 9.2 MG/DL (ref 8.5–10.1)
CHLORIDE SERPL-SCNC: 108 MMOL/L (ref 100–108)
CO2 SERPL-SCNC: 22 MMOL/L (ref 21–32)
COLOR UR: YELLOW
CREAT SERPL-MCNC: 0.83 MG/DL (ref 0.6–1.3)
DIFFERENTIAL METHOD BLD: ABNORMAL
EOSINOPHIL # BLD: 0.1 K/UL (ref 0–0.4)
EOSINOPHIL NFR BLD: 1 % (ref 0–5)
ERYTHROCYTE [DISTWIDTH] IN BLOOD BY AUTOMATED COUNT: 15.4 % (ref 11.6–14.5)
GLOBULIN SER CALC-MCNC: 3 G/DL (ref 2–4)
GLUCOSE SERPL-MCNC: 88 MG/DL (ref 74–99)
GLUCOSE UR STRIP.AUTO-MCNC: NEGATIVE MG/DL
HCG SERPL QL: NEGATIVE
HCT VFR BLD AUTO: 33 % (ref 35–45)
HGB BLD-MCNC: 10.4 G/DL (ref 12–16)
HGB UR QL STRIP: NEGATIVE
KETONES UR QL STRIP.AUTO: 15 MG/DL
LEUKOCYTE ESTERASE UR QL STRIP.AUTO: NEGATIVE
LIPASE SERPL-CCNC: 198 U/L (ref 73–393)
LYMPHOCYTES # BLD AUTO: 32 % (ref 21–52)
LYMPHOCYTES # BLD: 1.7 K/UL (ref 0.9–3.6)
MCH RBC QN AUTO: 24.8 PG (ref 24–34)
MCHC RBC AUTO-ENTMCNC: 31.5 G/DL (ref 31–37)
MCV RBC AUTO: 78.8 FL (ref 74–97)
MONOCYTES # BLD: 0.5 K/UL (ref 0.05–1.2)
MONOCYTES NFR BLD AUTO: 10 % (ref 3–10)
NEUTS SEG # BLD: 3 K/UL (ref 1.8–8)
NEUTS SEG NFR BLD AUTO: 56 % (ref 40–73)
NITRITE UR QL STRIP.AUTO: NEGATIVE
PH UR STRIP: 7 [PH] (ref 5–8)
PLATELET # BLD AUTO: 209 K/UL (ref 135–420)
PMV BLD AUTO: 10.5 FL (ref 9.2–11.8)
POTASSIUM SERPL-SCNC: 4 MMOL/L (ref 3.5–5.5)
PROT SERPL-MCNC: 7 G/DL (ref 6.4–8.2)
PROT UR STRIP-MCNC: NEGATIVE MG/DL
RBC # BLD AUTO: 4.19 M/UL (ref 4.2–5.3)
SERVICE CMNT-IMP: NORMAL
SODIUM SERPL-SCNC: 139 MMOL/L (ref 136–145)
SP GR UR REFRACTOMETRY: 1.01 (ref 1–1.03)
UROBILINOGEN UR QL STRIP.AUTO: 0.2 EU/DL (ref 0.2–1)
WBC # BLD AUTO: 5.2 K/UL (ref 4.6–13.2)
WET PREP GENITAL: NORMAL

## 2017-08-10 PROCEDURE — 96374 THER/PROPH/DIAG INJ IV PUSH: CPT

## 2017-08-10 PROCEDURE — 87491 CHLMYD TRACH DNA AMP PROBE: CPT | Performed by: NURSE PRACTITIONER

## 2017-08-10 PROCEDURE — 96375 TX/PRO/DX INJ NEW DRUG ADDON: CPT

## 2017-08-10 PROCEDURE — 83690 ASSAY OF LIPASE: CPT | Performed by: NURSE PRACTITIONER

## 2017-08-10 PROCEDURE — 84703 CHORIONIC GONADOTROPIN ASSAY: CPT | Performed by: NURSE PRACTITIONER

## 2017-08-10 PROCEDURE — 85025 COMPLETE CBC W/AUTO DIFF WBC: CPT | Performed by: NURSE PRACTITIONER

## 2017-08-10 PROCEDURE — 74011250636 HC RX REV CODE- 250/636: Performed by: NURSE PRACTITIONER

## 2017-08-10 PROCEDURE — 81003 URINALYSIS AUTO W/O SCOPE: CPT | Performed by: NURSE PRACTITIONER

## 2017-08-10 PROCEDURE — 76830 TRANSVAGINAL US NON-OB: CPT

## 2017-08-10 PROCEDURE — 80053 COMPREHEN METABOLIC PANEL: CPT | Performed by: NURSE PRACTITIONER

## 2017-08-10 PROCEDURE — 96361 HYDRATE IV INFUSION ADD-ON: CPT

## 2017-08-10 PROCEDURE — 99284 EMERGENCY DEPT VISIT MOD MDM: CPT

## 2017-08-10 PROCEDURE — 87210 SMEAR WET MOUNT SALINE/INK: CPT | Performed by: NURSE PRACTITIONER

## 2017-08-10 PROCEDURE — 74176 CT ABD & PELVIS W/O CONTRAST: CPT

## 2017-08-10 RX ORDER — METOCLOPRAMIDE HYDROCHLORIDE 5 MG/ML
5 INJECTION INTRAMUSCULAR; INTRAVENOUS
Status: COMPLETED | OUTPATIENT
Start: 2017-08-10 | End: 2017-08-10

## 2017-08-10 RX ORDER — IBUPROFEN 600 MG/1
600 TABLET ORAL
Qty: 20 TAB | Refills: 0 | Status: SHIPPED | OUTPATIENT
Start: 2017-08-10 | End: 2017-09-19

## 2017-08-10 RX ORDER — ONDANSETRON 2 MG/ML
4 INJECTION INTRAMUSCULAR; INTRAVENOUS
Status: COMPLETED | OUTPATIENT
Start: 2017-08-10 | End: 2017-08-10

## 2017-08-10 RX ORDER — HYDROCODONE BITARTRATE AND ACETAMINOPHEN 5; 325 MG/1; MG/1
1 TABLET ORAL
Qty: 12 TAB | Refills: 0 | Status: SHIPPED | OUTPATIENT
Start: 2017-08-10 | End: 2017-09-19

## 2017-08-10 RX ORDER — ONDANSETRON 4 MG/1
4 TABLET, ORALLY DISINTEGRATING ORAL
Qty: 10 TAB | Refills: 0 | Status: SHIPPED | OUTPATIENT
Start: 2017-08-10 | End: 2017-09-19

## 2017-08-10 RX ORDER — HYDROMORPHONE HYDROCHLORIDE 1 MG/ML
1 INJECTION, SOLUTION INTRAMUSCULAR; INTRAVENOUS; SUBCUTANEOUS
Status: COMPLETED | OUTPATIENT
Start: 2017-08-10 | End: 2017-08-10

## 2017-08-10 RX ADMIN — SODIUM CHLORIDE 1000 ML: 900 INJECTION, SOLUTION INTRAVENOUS at 14:55

## 2017-08-10 RX ADMIN — ONDANSETRON 4 MG: 2 SOLUTION INTRAMUSCULAR; INTRAVENOUS at 14:52

## 2017-08-10 RX ADMIN — HYDROMORPHONE HYDROCHLORIDE 1 MG: 1 INJECTION, SOLUTION INTRAMUSCULAR; INTRAVENOUS; SUBCUTANEOUS at 13:35

## 2017-08-10 RX ADMIN — METOCLOPRAMIDE 5 MG: 5 INJECTION, SOLUTION INTRAMUSCULAR; INTRAVENOUS at 16:35

## 2017-08-10 NOTE — DISCHARGE INSTRUCTIONS
Nausea and Vomiting: Care Instructions  Your Care Instructions    When you are nauseated, you may feel weak and sweaty and notice a lot of saliva in your mouth. Nausea often leads to vomiting. Most of the time you do not need to worry about nausea and vomiting, but they can be signs of other illnesses. Two common causes of nausea and vomiting are stomach flu and food poisoning. Nausea and vomiting from viral stomach flu will usually start to improve within 24 hours. Nausea and vomiting from food poisoning may last from 12 to 48 hours. The doctor has checked you carefully, but problems can develop later. If you notice any problems or new symptoms, get medical treatment right away. Follow-up care is a key part of your treatment and safety. Be sure to make and go to all appointments, and call your doctor if you are having problems. It's also a good idea to know your test results and keep a list of the medicines you take. How can you care for yourself at home? · To prevent dehydration, drink plenty of fluids, enough so that your urine is light yellow or clear like water. Choose water and other caffeine-free clear liquids until you feel better. If you have kidney, heart, or liver disease and have to limit fluids, talk with your doctor before you increase the amount of fluids you drink. · Rest in bed until you feel better. · When you are able to eat, try clear soups, mild foods, and liquids until all symptoms are gone for 12 to 48 hours. Other good choices include dry toast, crackers, cooked cereal, and gelatin dessert, such as Jell-O. When should you call for help? Call 911 anytime you think you may need emergency care. For example, call if:  · You passed out (lost consciousness). Call your doctor now or seek immediate medical care if:  · You have symptoms of dehydration, such as:  ¨ Dry eyes and a dry mouth. ¨ Passing only a little dark urine.   ¨ Feeling thirstier than usual.  · You have new or worsening belly pain. · You have a new or higher fever. · You vomit blood or what looks like coffee grounds. Watch closely for changes in your health, and be sure to contact your doctor if:  · You have ongoing nausea and vomiting. · Your vomiting is getting worse. · Your vomiting lasts longer than 2 days. · You are not getting better as expected. Where can you learn more? Go to http://gretel-zane.info/. Enter 25 [id number] in the search box to learn more about \"Nausea and Vomiting: Care Instructions. \"  Current as of: March 20, 2017  Content Version: 11.3  © 5462-4089 [employer]. Care instructions adapted under license by [employer] (which disclaims liability or warranty for this information). If you have questions about a medical condition or this instruction, always ask your healthcare professional. [address] any warranty or liability for your use of this information.

## 2017-08-10 NOTE — ED PROVIDER NOTES
HPI Comments: 1:10 PM  30 y.o. female presents to ED C/O RLQ abdominal pain. Patient reports HX of Migraines. Patient reports sudden onset of RLQ abdominal pain at 7am, associated with nausea and 2-3 episodes of vomiting. Pain is sharp and comes in waves. Patient denies vaginal discharge, concern for pregnancy, dysuria, fever. LMP 07/14. Patient is a nonsmoker. Patient denies any other symptoms or complaints. The history is provided by the patient. History limited by: No language barrier. Past Medical History:   Diagnosis Date    Migraines        History reviewed. No pertinent surgical history. History reviewed. No pertinent family history. Social History     Social History    Marital status:      Spouse name: N/A    Number of children: N/A    Years of education: N/A     Occupational History    Not on file. Social History Main Topics    Smoking status: Never Smoker    Smokeless tobacco: Not on file    Alcohol use No    Drug use: No    Sexual activity: Not on file     Other Topics Concern    Not on file     Social History Narrative         ALLERGIES: Iodine and Benadr [diphenhydramine hcl]    Review of Systems   Constitutional: Negative for appetite change and fever. HENT: Negative for congestion, rhinorrhea and sore throat. Respiratory: Negative for cough, shortness of breath and wheezing. Cardiovascular: Negative for chest pain and leg swelling. Gastrointestinal: Positive for abdominal pain, nausea and vomiting. Negative for constipation and diarrhea. Genitourinary: Negative for dysuria. Musculoskeletal: Negative for arthralgias and back pain. Neurological: Negative for dizziness, syncope and headaches. Vitals:    08/10/17 1304   BP: 110/63   Pulse: 86   Resp: 18   Temp: 98.1 °F (36.7 °C)   SpO2: 100%   Weight: 61.2 kg (135 lb)   Height: 5' 2.5\" (1.588 m)            Physical Exam   Constitutional: She is oriented to person, place, and time.  She appears well-developed and well-nourished. Patient crying in fetal position on stretcher    HENT:   Head: Normocephalic and atraumatic. Right Ear: External ear normal.   Left Ear: External ear normal.   Mouth/Throat: Oropharynx is clear and moist.   Cardiovascular: Normal rate, regular rhythm and normal heart sounds. Pulmonary/Chest: Effort normal and breath sounds normal. No respiratory distress. She has no wheezes. She has no rales. Abdominal: Soft. Normal appearance and bowel sounds are normal. There is tenderness in the right lower quadrant. There is guarding and tenderness at McBurney's point. There is no rebound and no CVA tenderness. Genitourinary:   Genitourinary Comments: Please see pelvic exam   Musculoskeletal: Normal range of motion. Neurological: She is alert and oriented to person, place, and time. She exhibits normal muscle tone. Coordination normal.   Skin: Skin is warm and dry. No rash noted. She is not diaphoretic. No erythema. No pallor. Nursing note and vitals reviewed. MDM  Number of Diagnoses or Management Options  Nausea and vomiting in adult:   Right ovarian cyst:   Diagnosis management comments: Differential Diagnosis: appendicitis, ectopic pregnancy, normal pregnancy, TOA, PID, ovarian cyst, endometriosis, endometritis, uterine fibroids, constipation, gastroenteritis, IBS, IBD, celiac disease, diverticulitis, nephrolithiasis, pyelonephritis, cystitis, mesenteric ischemia, mesenteric adenitis, ruptured AAA, SBO, LBO    Plan: CBC, CMP, UA, HCG, CT abd pelvis - high concern for appendicitis  Progress - no evidence of UTI, no significant abnormal findings noted on wet prep, CMP is essentially normal, HCG negative, no elevated WBC. -CT abd pelvis - IMPRESSION:     1. No evidence of renal or ureteral calculi. No hydronephrosis or evidence of  an obstructive uropathy. 2. Normal appendix.  No acute abnormality otherwise.  -due to no abnormal CT abd pelvis findings, will get pelvic ultrasound  -Pelvic ultrasound - Impression:     Complex right ovarian, probable hemorrhagic corpus luteum. No ovarian torsion. 5:29 PM Patient informed of results. Given a copy of results. Patient reports feeling better, denies questions. Patient referred to [facility] for further evaluation. Patient educated to return to the ED for any new or worsening symptoms. Patient tolerating PO, afebrile, improved symptoms, appropriate for discharge home. Questions denied. Amount and/or Complexity of Data Reviewed  Clinical lab tests: reviewed and ordered  Tests in the radiology section of CPT®: ordered and reviewed      ED Course       Pelvic Exam  Date/Time: 8/10/2017 3:01 PM  Performed by: MERT  Chaperone: April EDT. Type of exam performed: bimanual and speculum. External genitalia appearance: normal.    Vaginal exam:  normal.    Cervical exam:  inadequately visualized and no cervical motion tenderness. Specimen(s) collected:  chlamydia, GC and vaginal culture. Bimanual exam:  right adenexal tenderness. Patient tolerance: Patient tolerated the procedure well with no immediate complications                   RESULTS:    US TRANSVAGINAL   Final Result      CT ABD PELV WO CONT   Final Result          Labs Reviewed   METABOLIC PANEL, COMPREHENSIVE - Abnormal; Notable for the following:        Result Value    BUN 6 (*)     BUN/Creatinine ratio 7 (*)     Bilirubin, total 1.1 (*)     AST (SGOT) 14 (*)     Alk. phosphatase 39 (*)     All other components within normal limits   CBC WITH AUTOMATED DIFF - Abnormal; Notable for the following:     RBC 4.19 (*)     HGB 10.4 (*)     HCT 33.0 (*)     RDW 15.4 (*)     ABS.  BASOPHILS 0.1 (*)     All other components within normal limits   URINALYSIS W/ RFLX MICROSCOPIC - Abnormal; Notable for the following:     Ketone 15 (*)     All other components within normal limits   WET PREP   HCG QL SERUM   LIPASE   CHLAMYDIA/NEISSERIA AMPLIFICATION       Recent Results (from the past 12 hour(s))   METABOLIC PANEL, COMPREHENSIVE    Collection Time: 08/10/17  1:06 PM   Result Value Ref Range    Sodium 139 136 - 145 mmol/L    Potassium 4.0 3.5 - 5.5 mmol/L    Chloride 108 100 - 108 mmol/L    CO2 22 21 - 32 mmol/L    Anion gap 9 3.0 - 18 mmol/L    Glucose 88 74 - 99 mg/dL    BUN 6 (L) 7.0 - 18 MG/DL    Creatinine 0.83 0.6 - 1.3 MG/DL    BUN/Creatinine ratio 7 (L) 12 - 20      GFR est AA >60 >60 ml/min/1.73m2    GFR est non-AA >60 >60 ml/min/1.73m2    Calcium 9.2 8.5 - 10.1 MG/DL    Bilirubin, total 1.1 (H) 0.2 - 1.0 MG/DL    ALT (SGPT) 17 13 - 56 U/L    AST (SGOT) 14 (L) 15 - 37 U/L    Alk. phosphatase 39 (L) 45 - 117 U/L    Protein, total 7.0 6.4 - 8.2 g/dL    Albumin 4.0 3.4 - 5.0 g/dL    Globulin 3.0 2.0 - 4.0 g/dL    A-G Ratio 1.3 0.8 - 1.7     HCG QL SERUM    Collection Time: 08/10/17  1:06 PM   Result Value Ref Range    HCG, Ql. NEGATIVE  NEG     LIPASE    Collection Time: 08/10/17  1:06 PM   Result Value Ref Range    Lipase 198 73 - 393 U/L   CBC WITH AUTOMATED DIFF    Collection Time: 08/10/17  1:06 PM   Result Value Ref Range    WBC 5.2 4.6 - 13.2 K/uL    RBC 4.19 (L) 4.20 - 5.30 M/uL    HGB 10.4 (L) 12.0 - 16.0 g/dL    HCT 33.0 (L) 35.0 - 45.0 %    MCV 78.8 74.0 - 97.0 FL    MCH 24.8 24.0 - 34.0 PG    MCHC 31.5 31.0 - 37.0 g/dL    RDW 15.4 (H) 11.6 - 14.5 %    PLATELET 812 994 - 091 K/uL    MPV 10.5 9.2 - 11.8 FL    NEUTROPHILS 56 40 - 73 %    LYMPHOCYTES 32 21 - 52 %    MONOCYTES 10 3 - 10 %    EOSINOPHILS 1 0 - 5 %    BASOPHILS 1 0 - 2 %    ABS. NEUTROPHILS 3.0 1.8 - 8.0 K/UL    ABS. LYMPHOCYTES 1.7 0.9 - 3.6 K/UL    ABS. MONOCYTES 0.5 0.05 - 1.2 K/UL    ABS. EOSINOPHILS 0.1 0.0 - 0.4 K/UL    ABS.  BASOPHILS 0.1 (H) 0.0 - 0.06 K/UL    DF AUTOMATED     WET PREP    Collection Time: 08/10/17  2:32 PM   Result Value Ref Range    Special Requests: NO SPECIAL REQUESTS      Wet prep NO TRICHOMONAS SEEN      Wet prep NO FUNGAL ELEMENTS SEEN      Wet prep RARE  CLUE CELLS PRESENT URINALYSIS W/ RFLX MICROSCOPIC    Collection Time: 08/10/17  2:41 PM   Result Value Ref Range    Color YELLOW      Appearance CLEAR      Specific gravity 1.014 1.005 - 1.030      pH (UA) 7.0 5.0 - 8.0      Protein NEGATIVE  NEG mg/dL    Glucose NEGATIVE  NEG mg/dL    Ketone 15 (A) NEG mg/dL    Bilirubin NEGATIVE  NEG      Blood NEGATIVE  NEG      Urobilinogen 0.2 0.2 - 1.0 EU/dL    Nitrites NEGATIVE  NEG      Leukocyte Esterase NEGATIVE  NEG         PROGRESS NOTE:   1:15 PM   Initial assessment completed. Written by Marcha Severe NP-C    DISCHARGE NOTE:  5:32 PM   Roz Rendon's  results have been reviewed with her. She has been counseled regarding her diagnosis, treatment, and plan. She verbally conveys understanding and agreement of the signs, symptoms, diagnosis, treatment and prognosis and additionally agrees to follow up as discussed. She also agrees with the care-plan and conveys that all of her questions have been answered. I have also provided discharge instructions for her that include: educational information regarding their diagnosis and treatment, and list of reasons why they would want to return to the ED prior to their follow-up appointment, should her condition change. CLINICAL IMPRESSION:    1. Right ovarian cyst    2. Nausea and vomiting in adult        AFTER VISIT PLAN:    Current Discharge Medication List      START taking these medications    Details   HYDROcodone-acetaminophen (NORCO) 5-325 mg per tablet Take 1 Tab by mouth every six (6) hours as needed for Pain. Max Daily Amount: 4 Tabs. Qty: 12 Tab, Refills: 0      ibuprofen (MOTRIN) 600 mg tablet Take 1 Tab by mouth every six (6) hours as needed for Pain. Qty: 20 Tab, Refills: 0      ondansetron (ZOFRAN ODT) 4 mg disintegrating tablet Take 1 Tab by mouth every eight (8) hours as needed for Nausea.   Qty: 10 Tab, Refills: 0              Follow-up Information     Follow up With Details [address] Mone Do MD Schedule an appointment as soon as possible for a visit in 3 days Further evaluation [place] [address]  [phone]      Molly Keith DO Schedule an appointment as soon as possible for a visit in 3 days Further evaluation [place] [address] [place] [address]  [phone]             Written by Jonathan HDZC

## 2017-08-10 NOTE — ED TRIAGE NOTES
Patient c/o RLQ pain that has progressively gotten worse since 7am. Patient denies any N/V or urinary symptoms. Patient on stretcher crying and writhing in pain.

## 2017-08-11 LAB
C TRACH RRNA SPEC QL NAA+PROBE: NEGATIVE
N GONORRHOEA RRNA SPEC QL NAA+PROBE: NEGATIVE
SPECIMEN SOURCE: NORMAL

## 2017-09-19 ENCOUNTER — HOSPITAL ENCOUNTER (EMERGENCY)
Age: 37
Discharge: HOME | End: 2017-09-19
Attending: EMERGENCY MEDICINE
Payer: OTHER GOVERNMENT

## 2017-09-19 VITALS
TEMPERATURE: 98.3 F | DIASTOLIC BLOOD PRESSURE: 63 MMHG | RESPIRATION RATE: 16 BRPM | OXYGEN SATURATION: 98 % | SYSTOLIC BLOOD PRESSURE: 113 MMHG | BODY MASS INDEX: 24.3 KG/M2 | WEIGHT: 135 LBS | HEART RATE: 81 BPM

## 2017-09-19 DIAGNOSIS — R51.9 NONINTRACTABLE HEADACHE, UNSPECIFIED CHRONICITY PATTERN, UNSPECIFIED HEADACHE TYPE: Primary | ICD-10-CM

## 2017-09-19 PROCEDURE — 74011250636 HC RX REV CODE- 250/636: Performed by: EMERGENCY MEDICINE

## 2017-09-19 PROCEDURE — 96375 TX/PRO/DX INJ NEW DRUG ADDON: CPT

## 2017-09-19 PROCEDURE — 99282 EMERGENCY DEPT VISIT SF MDM: CPT

## 2017-09-19 PROCEDURE — 96374 THER/PROPH/DIAG INJ IV PUSH: CPT

## 2017-09-19 RX ORDER — METOCLOPRAMIDE HYDROCHLORIDE 5 MG/ML
10 INJECTION INTRAMUSCULAR; INTRAVENOUS EVERY 6 HOURS
Status: DISCONTINUED | OUTPATIENT
Start: 2017-09-19 | End: 2017-09-19 | Stop reason: HOSPADM

## 2017-09-19 RX ORDER — KETOROLAC TROMETHAMINE 30 MG/ML
30 INJECTION, SOLUTION INTRAMUSCULAR; INTRAVENOUS
Status: COMPLETED | OUTPATIENT
Start: 2017-09-19 | End: 2017-09-19

## 2017-09-19 RX ORDER — ONDANSETRON 4 MG/1
4 TABLET, ORALLY DISINTEGRATING ORAL
Status: DISCONTINUED | OUTPATIENT
Start: 2017-09-19 | End: 2017-09-19

## 2017-09-19 RX ORDER — KETOROLAC TROMETHAMINE 30 MG/ML
60 INJECTION, SOLUTION INTRAMUSCULAR; INTRAVENOUS ONCE
Status: DISCONTINUED | OUTPATIENT
Start: 2017-09-19 | End: 2017-09-19 | Stop reason: SDUPTHER

## 2017-09-19 RX ORDER — METOCLOPRAMIDE 10 MG/1
10 TABLET ORAL
Qty: 12 TAB | Refills: 0 | Status: SHIPPED | OUTPATIENT
Start: 2017-09-19 | End: 2017-09-29

## 2017-09-19 RX ORDER — METOCLOPRAMIDE HYDROCHLORIDE 5 MG/ML
INJECTION INTRAMUSCULAR; INTRAVENOUS
Status: DISCONTINUED
Start: 2017-09-19 | End: 2017-09-19 | Stop reason: HOSPADM

## 2017-09-19 RX ORDER — ONDANSETRON 2 MG/ML
4 INJECTION INTRAMUSCULAR; INTRAVENOUS ONCE
Status: COMPLETED | OUTPATIENT
Start: 2017-09-19 | End: 2017-09-19

## 2017-09-19 RX ORDER — METOCLOPRAMIDE HYDROCHLORIDE 5 MG/ML
10 INJECTION INTRAMUSCULAR; INTRAVENOUS EVERY 6 HOURS
Status: DISCONTINUED | OUTPATIENT
Start: 2017-09-19 | End: 2017-09-19

## 2017-09-19 RX ADMIN — KETOROLAC TROMETHAMINE 30 MG: 30 INJECTION, SOLUTION INTRAMUSCULAR at 12:52

## 2017-09-19 RX ADMIN — METOCLOPRAMIDE 10 MG: 5 INJECTION, SOLUTION INTRAMUSCULAR; INTRAVENOUS at 12:53

## 2017-09-19 RX ADMIN — ONDANSETRON 4 MG: 2 INJECTION INTRAMUSCULAR; INTRAVENOUS at 12:52

## 2017-09-19 NOTE — DISCHARGE INSTRUCTIONS

## 2017-09-19 NOTE — ED PROVIDER NOTES
HPI Comments: 11:45 AM  Roz Rendon is a 30 y.o. female presents to ED c/o constant migraines since 2 days ago. Associated sxs include, facial pain, eye pain, and photophobia. Pain is 10/10 in severity. PMHx include migraines. She has used a sumatriptan shot, which usually improves her migraine, but currently it did not improve pain. She only has 1 sumatriptan shot remaining. Pt notes that Toradol and Reglan improved her migraine before. Fioricet does not improve her migraines. Allergies include benadryl and iodine. LNMP 1 month ago. Pt denies weakness, nausea, vomiting, and any other sxs or complaints. The history is provided by the patient. No  was used. Past Medical History:   Diagnosis Date    Migraines        History reviewed. No pertinent surgical history. History reviewed. No pertinent family history. Social History     Social History    Marital status:      Spouse name: N/A    Number of children: N/A    Years of education: N/A     Occupational History    Not on file. Social History Main Topics    Smoking status: Never Smoker    Smokeless tobacco: Never Used    Alcohol use No    Drug use: No    Sexual activity: Not on file     Other Topics Concern    Not on file     Social History Narrative         ALLERGIES: Iodine and Benadr [diphenhydramine hcl]    Review of Systems   Eyes: Positive for photophobia and pain. Gastrointestinal: Negative for nausea and vomiting. Neurological: Positive for headaches. Negative for weakness. All other systems reviewed and are negative. Vitals:    09/19/17 1116   BP: 113/63   Pulse: 81   Resp: 16   Temp: 98.3 °F (36.8 °C)   SpO2: 98%   Weight: 61.2 kg (135 lb)            Physical Exam   Constitutional: She appears well-developed and well-nourished. No distress. HENT:   Head: Normocephalic and atraumatic. Mouth/Throat: Oropharynx is clear and moist. No oropharyngeal exudate.    Eyes: Pupils are equal, round, and reactive to light. Cardiovascular: Normal rate and regular rhythm. Pulmonary/Chest: Effort normal and breath sounds normal. No stridor. Musculoskeletal: Normal range of motion. Neurological: She is alert. No cranial nerve deficit. She exhibits normal muscle tone. Coordination normal.   Skin: Skin is warm. She is not diaphoretic. Psychiatric: She has a normal mood and affect. Nursing note and vitals reviewed. MDM  Number of Diagnoses or Management Options  Nonintractable headache, unspecified chronicity pattern, unspecified headache type:   Diagnosis management comments: Hx of migraines not followed by anyone - will treat in EMD - no acute neuro findings    Pt sleeping - discharged    ED Course       Procedures    Vitals:  Patient Vitals for the past 12 hrs:   Temp Pulse Resp BP SpO2   09/19/17 1116 98.3 °F (36.8 °C) 81 16 113/63 98 %       Medications Ordered:  Medications   metoclopramide HCl (REGLAN) injection 10 mg (10 mg IntraVENous Given 9/19/17 1253)   metoclopramide HCl (REGLAN) 5 mg/mL injection (not administered)   ondansetron (ZOFRAN) injection 4 mg (4 mg IntraVENous Given 9/19/17 1252)   ketorolac (TORADOL) injection 30 mg (30 mg IntraVENous Given 9/19/17 1252)       Progress notes, consult notes, or additional procedure notes:      Reevaluation of the patient:      Diagnosis:   1. Nonintractable headache, unspecified chronicity pattern, unspecified headache type        Disposition: DC    Follow-up Information     Follow up With Details Comments Contact Info    Your primary care doctor Schedule an appointment as soon as possible for a visit ED visit follow up     [facility] EMERGENCY DEPT Go to As needed, If symptoms worsen [address]  [phone]           Patient's Medications   Start Taking    METOCLOPRAMIDE HCL (REGLAN) 10 MG TABLET    Take 1 Tab by mouth every six (6) hours as needed for Nausea for up to 10 days.    Continue Taking    No medications on file   These Medications have changed    No medications on file   Stop Taking    HYDROCODONE-ACETAMINOPHEN (NORCO) 5-325 MG PER TABLET    Take 1 Tab by mouth every six (6) hours as needed for Pain. Max Daily Amount: 4 Tabs. IBUPROFEN (MOTRIN) 600 MG TABLET    Take 1 Tab by mouth every six (6) hours as needed for Pain. ONDANSETRON (ZOFRAN ODT) 4 MG DISINTEGRATING TABLET    Take 1 Tab by mouth every eight (8) hours as needed for Nausea. Scribe Attestation      Joanna Torres acting as a scribe for and in the presence of Mihai Cisneros MD  September 19, 2017 at 11:41 AM       Provider Attestation:      I personally performed the services described in the documentation, reviewed the documentation, as recorded by the scribe in my presence, and it accurately and completely records my words and actions.  September 19, 2017 at 11:41 AM - Mihai Cisneros MD

## 2017-09-19 NOTE — ED NOTES
Patient stated understanding of discharge instructions. Patient was ambulatory upon discharge. Patient received one prescription.     Patient armband removed and shredded

## 2018-03-11 ENCOUNTER — APPOINTMENT (OUTPATIENT)
Dept: GENERAL RADIOLOGY | Age: 38
End: 2018-03-11
Attending: PHYSICIAN ASSISTANT
Payer: OTHER GOVERNMENT

## 2018-03-11 ENCOUNTER — HOSPITAL ENCOUNTER (EMERGENCY)
Age: 38
Discharge: HOME | End: 2018-03-11
Attending: EMERGENCY MEDICINE
Payer: OTHER GOVERNMENT

## 2018-03-11 VITALS
SYSTOLIC BLOOD PRESSURE: 125 MMHG | OXYGEN SATURATION: 100 % | HEART RATE: 78 BPM | TEMPERATURE: 98 F | RESPIRATION RATE: 20 BRPM | DIASTOLIC BLOOD PRESSURE: 75 MMHG

## 2018-03-11 DIAGNOSIS — R07.9 CHEST PAIN, UNSPECIFIED TYPE: Primary | ICD-10-CM

## 2018-03-11 DIAGNOSIS — R42 LIGHT HEADEDNESS: ICD-10-CM

## 2018-03-11 LAB
ANION GAP SERPL CALC-SCNC: 5 MMOL/L (ref 3–18)
APPEARANCE UR: CLEAR
BASOPHILS # BLD: 0.1 K/UL (ref 0–0.06)
BASOPHILS NFR BLD: 1 % (ref 0–2)
BILIRUB UR QL: NEGATIVE
BUN SERPL-MCNC: 6 MG/DL (ref 7–18)
BUN/CREAT SERPL: 8 (ref 12–20)
CALCIUM SERPL-MCNC: 8.8 MG/DL (ref 8.5–10.1)
CHLORIDE SERPL-SCNC: 106 MMOL/L (ref 100–108)
CK MB CFR SERPL CALC: NORMAL % (ref 0–4)
CK MB SERPL-MCNC: <1 NG/ML (ref 5–25)
CK SERPL-CCNC: 93 U/L (ref 26–192)
CO2 SERPL-SCNC: 29 MMOL/L (ref 21–32)
COLOR UR: NORMAL
CREAT SERPL-MCNC: 0.77 MG/DL (ref 0.6–1.3)
DIFFERENTIAL METHOD BLD: ABNORMAL
EOSINOPHIL # BLD: 0.2 K/UL (ref 0–0.4)
EOSINOPHIL NFR BLD: 4 % (ref 0–5)
ERYTHROCYTE [DISTWIDTH] IN BLOOD BY AUTOMATED COUNT: 15.5 % (ref 11.6–14.5)
GLUCOSE SERPL-MCNC: 86 MG/DL (ref 74–99)
GLUCOSE UR STRIP.AUTO-MCNC: NEGATIVE MG/DL
HCG UR QL: NEGATIVE
HCT VFR BLD AUTO: 33.1 % (ref 35–45)
HGB BLD-MCNC: 10.3 G/DL (ref 12–16)
HGB UR QL STRIP: NEGATIVE
KETONES UR QL STRIP.AUTO: NEGATIVE MG/DL
LEUKOCYTE ESTERASE UR QL STRIP.AUTO: NEGATIVE
LYMPHOCYTES # BLD: 1.9 K/UL (ref 0.9–3.6)
LYMPHOCYTES NFR BLD: 32 % (ref 21–52)
MCH RBC QN AUTO: 24.6 PG (ref 24–34)
MCHC RBC AUTO-ENTMCNC: 31.1 G/DL (ref 31–37)
MCV RBC AUTO: 79.2 FL (ref 74–97)
MONOCYTES # BLD: 0.5 K/UL (ref 0.05–1.2)
MONOCYTES NFR BLD: 8 % (ref 3–10)
NEUTS SEG # BLD: 3.2 K/UL (ref 1.8–8)
NEUTS SEG NFR BLD: 55 % (ref 40–73)
NITRITE UR QL STRIP.AUTO: NEGATIVE
PH UR STRIP: 7 [PH] (ref 5–8)
PLATELET # BLD AUTO: 265 K/UL (ref 135–420)
PMV BLD AUTO: 11.3 FL (ref 9.2–11.8)
POTASSIUM SERPL-SCNC: 3.4 MMOL/L (ref 3.5–5.5)
PROT UR STRIP-MCNC: NEGATIVE MG/DL
RBC # BLD AUTO: 4.18 M/UL (ref 4.2–5.3)
SODIUM SERPL-SCNC: 140 MMOL/L (ref 136–145)
SP GR UR REFRACTOMETRY: 1.01 (ref 1–1.03)
TROPONIN I SERPL-MCNC: <0.02 NG/ML (ref 0–0.04)
UROBILINOGEN UR QL STRIP.AUTO: 0.2 EU/DL (ref 0.2–1)
WBC # BLD AUTO: 5.9 K/UL (ref 4.6–13.2)

## 2018-03-11 PROCEDURE — 71046 X-RAY EXAM CHEST 2 VIEWS: CPT

## 2018-03-11 PROCEDURE — 82553 CREATINE MB FRACTION: CPT

## 2018-03-11 PROCEDURE — 74011250636 HC RX REV CODE- 250/636: Performed by: EMERGENCY MEDICINE

## 2018-03-11 PROCEDURE — 81003 URINALYSIS AUTO W/O SCOPE: CPT | Performed by: EMERGENCY MEDICINE

## 2018-03-11 PROCEDURE — 85025 COMPLETE CBC W/AUTO DIFF WBC: CPT

## 2018-03-11 PROCEDURE — 99284 EMERGENCY DEPT VISIT MOD MDM: CPT

## 2018-03-11 PROCEDURE — 80048 BASIC METABOLIC PNL TOTAL CA: CPT

## 2018-03-11 PROCEDURE — 96361 HYDRATE IV INFUSION ADD-ON: CPT

## 2018-03-11 PROCEDURE — 96360 HYDRATION IV INFUSION INIT: CPT

## 2018-03-11 PROCEDURE — 93005 ELECTROCARDIOGRAM TRACING: CPT

## 2018-03-11 PROCEDURE — 81025 URINE PREGNANCY TEST: CPT | Performed by: EMERGENCY MEDICINE

## 2018-03-11 RX ADMIN — SODIUM CHLORIDE 1000 ML: 900 INJECTION, SOLUTION INTRAVENOUS at 19:33

## 2018-03-11 NOTE — ED PROVIDER NOTES
EMERGENCY DEPARTMENT HISTORY AND PHYSICAL EXAM    7:15 PM      Date: 3/11/2018  Patient Name: Kalia Kent    History of Presenting Illness     Chief Complaint   Patient presents with    Chest Pain         History Provided By: Patient    Chief Complaint: Chest pain  Duration: This morning  Timing:  Constant  Location: Left sided  Quality: Stabbing  Severity: Moderate  Modifying Factors: No exacerbating factors  Associated Symptoms: lightheadedness, SOB      Additional History (Context): 7:16 PM Roz Rendon is a 41 y.o. female with h/o Migraines and Anemia who presents to ED complaining of constant moderate stabbing left sided chest pain associated with lightheadedness and exertional SOB onset this morning. Patient states that yesterday she began feeling very lightheaded that was exacerbated with going from sitting to standing position. She states the lightheadedness occurred every time she stood up. Patient states that she has also been feeling short of breath when she walks even up a small flight of stairs. States that she has had a benign lump removed from her right breast many years ago but denies any other surgeries. Denies N/V/D, Fever, cough, leg swelling, and leg pain. No other concerns or symptoms at this time. PCP: Dominga Shannon MD          Past History     Past Medical History:  Past Medical History:   Diagnosis Date    Migraines        Past Surgical History:  History reviewed. No pertinent surgical history. Family History:  History reviewed. No pertinent family history. Social History:  Social History   Substance Use Topics    Smoking status: Never Smoker    Smokeless tobacco: Never Used    Alcohol use No       Allergies: Allergies   Allergen Reactions    Iodine Anaphylaxis    Shellfish Derived Anaphylaxis    Benadr [Diphenhydramine Hcl] Other (comments)     Redness/flushing         Review of Systems     Review of Systems   Constitutional: Negative for chills and fever.    HENT: Negative for trouble swallowing. Respiratory: Positive for shortness of breath. Negative for cough. Cardiovascular: Positive for chest pain. Negative for leg swelling. Gastrointestinal: Negative for abdominal pain, diarrhea, nausea and vomiting. Genitourinary: Negative for difficulty urinating. Musculoskeletal: Negative for back pain and neck pain. Skin: Negative for wound. Neurological: Positive for light-headedness. Negative for dizziness (no vertigo) and syncope. Psychiatric/Behavioral: Negative for behavioral problems. All other systems reviewed and are negative. Physical Exam     Visit Vitals    BP 93/57 (BP 1 Location: Left arm, BP Patient Position: At rest;Sitting)    Pulse 77    Temp 98 °F (36.7 °C)    Resp 21    SpO2 100%       Physical Exam   Constitutional: She is oriented to person, place, and time. She appears well-developed. No distress. Well-appearing, nad   HENT:   Head: Normocephalic and atraumatic. Eyes: EOM are normal.   Neck: Normal range of motion. Cardiovascular: Normal rate. Pulmonary/Chest: Effort normal and breath sounds normal. No respiratory distress. Abdominal: Soft. There is no tenderness. Musculoskeletal: Normal range of motion. She exhibits no edema. Mechanically stable, no calf tenderness   Neurological: She is alert and oriented to person, place, and time. No focal deficits noted   Skin: Skin is warm. Psychiatric: Her behavior is normal.   Nursing note and vitals reviewed.         Diagnostic Study Results     Labs -  Recent Results (from the past 12 hour(s))   EKG, 12 LEAD, INITIAL    Collection Time: 03/11/18  6:41 PM   Result Value Ref Range    Ventricular Rate 73 BPM    Atrial Rate 73 BPM    P-R Interval 148 ms    QRS Duration 82 ms    Q-T Interval 384 ms    QTC Calculation (Bezet) 423 ms    Calculated P Axis 30 degrees    Calculated R Axis -3 degrees    Calculated T Axis 22 degrees    Diagnosis       Normal sinus rhythm  Normal ECG  No previous ECGs available     CBC WITH AUTOMATED DIFF    Collection Time: 03/11/18  7:00 PM   Result Value Ref Range    WBC 5.9 4.6 - 13.2 K/uL    RBC 4.18 (L) 4.20 - 5.30 M/uL    HGB 10.3 (L) 12.0 - 16.0 g/dL    HCT 33.1 (L) 35.0 - 45.0 %    MCV 79.2 74.0 - 97.0 FL    MCH 24.6 24.0 - 34.0 PG    MCHC 31.1 31.0 - 37.0 g/dL    RDW 15.5 (H) 11.6 - 14.5 %    PLATELET 519 088 - 786 K/uL    MPV 11.3 9.2 - 11.8 FL    NEUTROPHILS 55 40 - 73 %    LYMPHOCYTES 32 21 - 52 %    MONOCYTES 8 3 - 10 %    EOSINOPHILS 4 0 - 5 %    BASOPHILS 1 0 - 2 %    ABS. NEUTROPHILS 3.2 1.8 - 8.0 K/UL    ABS. LYMPHOCYTES 1.9 0.9 - 3.6 K/UL    ABS. MONOCYTES 0.5 0.05 - 1.2 K/UL    ABS. EOSINOPHILS 0.2 0.0 - 0.4 K/UL    ABS.  BASOPHILS 0.1 (H) 0.0 - 0.06 K/UL    DF AUTOMATED     METABOLIC PANEL, BASIC    Collection Time: 03/11/18  7:00 PM   Result Value Ref Range    Sodium 140 136 - 145 mmol/L    Potassium 3.4 (L) 3.5 - 5.5 mmol/L    Chloride 106 100 - 108 mmol/L    CO2 29 21 - 32 mmol/L    Anion gap 5 3.0 - 18 mmol/L    Glucose 86 74 - 99 mg/dL    BUN 6 (L) 7.0 - 18 MG/DL    Creatinine 0.77 0.6 - 1.3 MG/DL    BUN/Creatinine ratio 8 (L) 12 - 20      GFR est AA >60 >60 ml/min/1.73m2    GFR est non-AA >60 >60 ml/min/1.73m2    Calcium 8.8 8.5 - 10.1 MG/DL   CARDIAC PANEL,(CK, CKMB & TROPONIN)    Collection Time: 03/11/18  7:00 PM   Result Value Ref Range    CK 93 26 - 192 U/L    CK - MB <1.0 <3.6 ng/ml    CK-MB Index  0.0 - 4.0 %     CALCULATION NOT PERFORMED WHEN RESULT IS BELOW LINEAR LIMIT    Troponin-I, Qt. <0.02 0.0 - 0.045 NG/ML   HCG URINE, QL    Collection Time: 03/11/18  7:15 PM   Result Value Ref Range    HCG urine, QL NEGATIVE  NEG     URINALYSIS W/ RFLX MICROSCOPIC    Collection Time: 03/11/18  7:30 PM   Result Value Ref Range    Color DARK YELLOW      Appearance CLEAR      Specific gravity 1.008 1.005 - 1.030      pH (UA) 7.0 5.0 - 8.0      Protein NEGATIVE  NEG mg/dL    Glucose NEGATIVE  NEG mg/dL    Ketone NEGATIVE  NEG mg/dL Bilirubin NEGATIVE  NEG      Blood NEGATIVE  NEG      Urobilinogen 0.2 0.2 - 1.0 EU/dL    Nitrites NEGATIVE  NEG      Leukocyte Esterase NEGATIVE  NEG         Radiologic Studies -   XR CHEST PA LAT    (Results Pending)         Medical Decision Making   I am the first provider for this patient. I reviewed the vital signs, available nursing notes, past medical history, past surgical history, family history and social history. Vital Signs-Reviewed the patient's vital signs. Pulse Oximetry Analysis -  100% on room air, normal    Records Reviewed: Nursing Notes and Old Medical Records (Time of Review: 7:15 PM)    Provider Notes (Medical Decision Making):   MDM  Number of Diagnoses or Management Options  Chest pain, unspecified type:   Light headedness:   Diagnosis management comments: 40 yo F with PMHx anemia presents with 1-day h/o intermittent atypical chest pain and some light-headedness. No fevers, no vomiting. Examination unremarkable with ctab and no increased wob. Suspect likely non-specific, will evaluate for acute process. 8:32 PM  Work-up unremarkable, pt doing well, asymptomatic in ED. Heart score 0, PERC negative, do not suspect emergent medical condition clinically. Discussed results and poc for dc home, symptom management, follow-up, return precautions.        Amount and/or Complexity of Data Reviewed  Clinical lab tests: ordered and reviewed  Tests in the radiology section of CPT®: ordered and reviewed (cxr no acute process to my read)  Obtain history from someone other than the patient: yes  Review and summarize past medical records: yes  Independent visualization of images, tracings, or specimens: yes    Patient Progress  Patient progress: stable      Procedures:   EKG  Date/Time: 3/11/2018 7:27 PM  Performed by: Anupam Olmedo by: Fernando      ECG reviewed by ED Physician in the absence of a cardiologist: yes    Previous ECG:     Previous ECG:  Unavailable  Rate: ECG rate:  73    ECG rate assessment: normal    Rhythm:     Rhythm: sinus rhythm    Ectopy:     Ectopy: none    QRS:     QRS axis:  Normal    QRS intervals:  Normal  Conduction:     Conduction: normal    ST segments:     ST segments:  Normal  T waves:     T waves: normal          Diagnosis     Clinical Impression:   1. Chest pain, unspecified type    2. Light headedness        Disposition: DC    Follow-up Information     Follow up With Details Comments Contact Info    [facility] EMERGENCY DEPT Go to As needed, If symptoms worsen [address]  [phone]    PCP Schedule an appointment as soon as possible for a visit in 2 days             Patient's Medications    No medications on file     _______________________________    Attestations:  Scribe Attestation     David Rubio acting as a scribe for and in the presence of Betsy Unger MD      March 11, 2018 at 7:15 PM       Provider Attestation:      I personally performed the services described in the documentation, reviewed the documentation, as recorded by the scribe in my presence, and it accurately and completely records my words and actions.  March 11, 2018 at 7:15 PM - Betsy Unger MD    _______________________________

## 2018-03-12 LAB
ATRIAL RATE: 73 BPM
CALCULATED P AXIS, ECG09: 30 DEGREES
CALCULATED R AXIS, ECG10: -3 DEGREES
CALCULATED T AXIS, ECG11: 22 DEGREES
DIAGNOSIS, 93000: NORMAL
P-R INTERVAL, ECG05: 148 MS
Q-T INTERVAL, ECG07: 384 MS
QRS DURATION, ECG06: 82 MS
QTC CALCULATION (BEZET), ECG08: 423 MS
VENTRICULAR RATE, ECG03: 73 BPM

## 2018-03-12 NOTE — ED NOTES
I have reviewed discharge instructions with the patient. The patient verbalized understanding. Pt in nad ambulatory to ED lobby accompanied by friend, pt agreeable to dc plan.

## 2018-03-12 NOTE — DISCHARGE INSTRUCTIONS
Chest Pain: Care Instructions  Your Care Instructions    There are many things that can cause chest pain. Some are not serious and will get better on their own in a few days. But some kinds of chest pain need more testing and treatment. Your doctor may have recommended a follow-up visit in the next 8 to 12 hours. If you are not getting better, you may need more tests or treatment. Even though your doctor has released you, you still need to watch for any problems. The doctor carefully checked you, but sometimes problems can develop later. If you have new symptoms or if your symptoms do not get better, get medical care right away. If you have worse or different chest pain or pressure that lasts more than 5 minutes or you passed out (lost consciousness), call 911 or seek other emergency help right away. A medical visit is only one step in your treatment. Even if you feel better, you still need to do what your doctor recommends, such as going to all suggested follow-up appointments and taking medicines exactly as directed. This will help you recover and help prevent future problems. How can you care for yourself at home? · Rest until you feel better. · Take your medicine exactly as prescribed. Call your doctor if you think you are having a problem with your medicine. · Do not drive after taking a prescription pain medicine. When should you call for help? Call 911 if:  ? · You passed out (lost consciousness). ? · You have severe difficulty breathing. ? · You have symptoms of a heart attack. These may include:  ¨ Chest pain or pressure, or a strange feeling in your chest.  ¨ Sweating. ¨ Shortness of breath. ¨ Nausea or vomiting. ¨ Pain, pressure, or a strange feeling in your back, neck, jaw, or upper belly or in one or both shoulders or arms. ¨ Lightheadedness or sudden weakness. ¨ A fast or irregular heartbeat.   After you call 911, the  may tell you to chew 1 adult-strength or 2 to 4 low-dose aspirin. Wait for an ambulance. Do not try to drive yourself. ?Call your doctor today if:  ? · You have any trouble breathing. ? · Your chest pain gets worse. ? · You are dizzy or lightheaded, or you feel like you may faint. ? · You are not getting better as expected. ? · You are having new or different chest pain. Where can you learn more? Go to http://gretel-zane.info/. Enter A120 in the search box to learn more about \"Chest Pain: Care Instructions. \"  Current as of: March 20, 2017  Content Version: 11.4  © 0157-1836 [employer]. Care instructions adapted under license by [employer] (which disclaims liability or warranty for this information). If you have questions about a medical condition or this instruction, always ask your healthcare professional. [place] [address] any warranty or liability for your use of this information. Lightheadedness or Faintness: Care Instructions  Your Care Instructions  Lightheadedness is a feeling that you are about to faint or \"pass out. \" You do not feel as if you or your surroundings are moving. It is different from vertigo, which is the feeling that you or things around you are spinning or tilting. Lightheadedness usually goes away or gets better when you lie down. If lightheadedness gets worse, it can lead to a fainting spell. It is common to feel lightheaded from time to time. Lightheadedness usually is not caused by a serious problem. It often is caused by a short-lasting drop in blood pressure and blood flow to your head that occurs when you get up too quickly from a seated or lying position. Follow-up care is a key part of your treatment and safety. Be sure to make and go to all appointments, and call your doctor if you are having problems. It's also a good idea to know your test results and keep a list of the medicines you take. How can you care for yourself at home?   · Lie down for 1 or 2 minutes when you feel lightheaded. After lying down, sit up slowly and remain sitting for 1 to 2 minutes before slowly standing up. · Avoid movements, positions, or activities that have made you lightheaded in the past.  · Get plenty of rest, especially if you have a cold or flu, which can cause lightheadedness. · Make sure you drink plenty of fluids, especially if you have a fever or have been sweating. · Do not drive or put yourself and others in danger while you feel lightheaded. When should you call for help? Call 911 anytime you think you may need emergency care. For example, call if:  ? · You have symptoms of a stroke. These may include:  ¨ Sudden numbness, tingling, weakness, or loss of movement in your face, arm, or leg, especially on only one side of your body. ¨ Sudden vision changes. ¨ Sudden trouble speaking. ¨ Sudden confusion or trouble understanding simple statements. ¨ Sudden problems with walking or balance. ¨ A sudden, severe headache that is different from past headaches. ? · You have symptoms of a heart attack. These may include:  ¨ Chest pain or pressure, or a strange feeling in the chest.  ¨ Sweating. ¨ Shortness of breath. ¨ Nausea or vomiting. ¨ Pain, pressure, or a strange feeling in the back, neck, jaw, or upper belly or in one or both shoulders or arms. ¨ Lightheadedness or sudden weakness. ¨ A fast or irregular heartbeat. After you call 911, the  may tell you to chew 1 adult-strength or 2 to 4 low-dose aspirin. Wait for an ambulance. Do not try to drive yourself. ? Watch closely for changes in your health, and be sure to contact your doctor if:  ? · Your lightheadedness gets worse or does not get better with home care. Where can you learn more? Go to http://gretel-zane.info/. Enter B518 in the search box to learn more about \"Lightheadedness or Faintness: Care Instructions. \"  Current as of: March 20, 2017  Content Version: 11.4  © 0849-1650 [employer]. Care instructions adapted under license by [employer] (which disclaims liability or warranty for this information). If you have questions about a medical condition or this instruction, always ask your healthcare professional. [address] any warranty or liability for your use of this information.

## 2018-05-24 ENCOUNTER — APPOINTMENT (OUTPATIENT)
Dept: CT IMAGING | Age: 38
End: 2018-05-24
Attending: EMERGENCY MEDICINE
Payer: OTHER GOVERNMENT

## 2018-05-24 ENCOUNTER — HOSPITAL ENCOUNTER (EMERGENCY)
Age: 38
Discharge: HOME | End: 2018-05-24
Attending: EMERGENCY MEDICINE
Payer: OTHER GOVERNMENT

## 2018-05-24 VITALS
OXYGEN SATURATION: 100 % | HEART RATE: 66 BPM | SYSTOLIC BLOOD PRESSURE: 111 MMHG | DIASTOLIC BLOOD PRESSURE: 65 MMHG | RESPIRATION RATE: 18 BRPM

## 2018-05-24 DIAGNOSIS — R55 SYNCOPE AND COLLAPSE: Primary | ICD-10-CM

## 2018-05-24 DIAGNOSIS — D64.9 ANEMIA, UNSPECIFIED TYPE: ICD-10-CM

## 2018-05-24 LAB
ALBUMIN SERPL-MCNC: 4.4 G/DL (ref 3.4–5)
ALBUMIN/GLOB SERPL: 1.5 {RATIO} (ref 0.8–1.7)
ALP SERPL-CCNC: 39 U/L (ref 45–117)
ALT SERPL-CCNC: 18 U/L (ref 13–56)
ANION GAP SERPL CALC-SCNC: 18 MMOL/L (ref 3–18)
AST SERPL-CCNC: 16 U/L (ref 15–37)
BASOPHILS # BLD: 0.1 K/UL (ref 0–0.06)
BASOPHILS NFR BLD: 2 % (ref 0–2)
BILIRUB SERPL-MCNC: 0.6 MG/DL (ref 0.2–1)
BUN SERPL-MCNC: 8 MG/DL (ref 7–18)
BUN/CREAT SERPL: 11 (ref 12–20)
CALCIUM SERPL-MCNC: 9.1 MG/DL (ref 8.5–10.1)
CHLORIDE SERPL-SCNC: 102 MMOL/L (ref 100–108)
CK MB CFR SERPL CALC: NORMAL % (ref 0–4)
CK MB SERPL-MCNC: <1 NG/ML (ref 5–25)
CK SERPL-CCNC: 54 U/L (ref 26–192)
CO2 SERPL-SCNC: 23 MMOL/L (ref 21–32)
CREAT SERPL-MCNC: 0.74 MG/DL (ref 0.6–1.3)
DIFFERENTIAL METHOD BLD: ABNORMAL
EOSINOPHIL # BLD: 0.1 K/UL (ref 0–0.4)
EOSINOPHIL NFR BLD: 1 % (ref 0–5)
ERYTHROCYTE [DISTWIDTH] IN BLOOD BY AUTOMATED COUNT: 15.9 % (ref 11.6–14.5)
GLOBULIN SER CALC-MCNC: 2.9 G/DL (ref 2–4)
GLUCOSE SERPL-MCNC: 91 MG/DL (ref 74–99)
HCG SERPL QL: NEGATIVE
HCT VFR BLD AUTO: 34.5 % (ref 35–45)
HGB BLD-MCNC: 11.2 G/DL (ref 12–16)
LYMPHOCYTES # BLD: 1.3 K/UL (ref 0.9–3.6)
LYMPHOCYTES NFR BLD: 26 % (ref 21–52)
MCH RBC QN AUTO: 25 PG (ref 24–34)
MCHC RBC AUTO-ENTMCNC: 32.5 G/DL (ref 31–37)
MCV RBC AUTO: 77 FL (ref 74–97)
MONOCYTES # BLD: 0.4 K/UL (ref 0.05–1.2)
MONOCYTES NFR BLD: 8 % (ref 3–10)
NEUTS SEG # BLD: 3.1 K/UL (ref 1.8–8)
NEUTS SEG NFR BLD: 63 % (ref 40–73)
PLATELET # BLD AUTO: 206 K/UL (ref 135–420)
PMV BLD AUTO: 10.9 FL (ref 9.2–11.8)
POTASSIUM SERPL-SCNC: 4 MMOL/L (ref 3.5–5.5)
PROT SERPL-MCNC: 7.3 G/DL (ref 6.4–8.2)
RBC # BLD AUTO: 4.48 M/UL (ref 4.2–5.3)
SODIUM SERPL-SCNC: 143 MMOL/L (ref 136–145)
TROPONIN I SERPL-MCNC: <0.02 NG/ML (ref 0–0.04)
WBC # BLD AUTO: 5 K/UL (ref 4.6–13.2)

## 2018-05-24 PROCEDURE — 70450 CT HEAD/BRAIN W/O DYE: CPT

## 2018-05-24 PROCEDURE — 96360 HYDRATION IV INFUSION INIT: CPT

## 2018-05-24 PROCEDURE — 99285 EMERGENCY DEPT VISIT HI MDM: CPT

## 2018-05-24 PROCEDURE — 82553 CREATINE MB FRACTION: CPT

## 2018-05-24 PROCEDURE — 72125 CT NECK SPINE W/O DYE: CPT

## 2018-05-24 PROCEDURE — 96361 HYDRATE IV INFUSION ADD-ON: CPT

## 2018-05-24 PROCEDURE — 80053 COMPREHEN METABOLIC PANEL: CPT

## 2018-05-24 PROCEDURE — 72131 CT LUMBAR SPINE W/O DYE: CPT

## 2018-05-24 PROCEDURE — 84703 CHORIONIC GONADOTROPIN ASSAY: CPT

## 2018-05-24 PROCEDURE — 85025 COMPLETE CBC W/AUTO DIFF WBC: CPT

## 2018-05-24 PROCEDURE — 93005 ELECTROCARDIOGRAM TRACING: CPT

## 2018-05-24 PROCEDURE — 74011250636 HC RX REV CODE- 250/636: Performed by: EMERGENCY MEDICINE

## 2018-05-24 RX ADMIN — SODIUM CHLORIDE 1000 ML: 900 INJECTION, SOLUTION INTRAVENOUS at 16:10

## 2018-05-24 NOTE — ED PROVIDER NOTES
EMERGENCY DEPARTMENT HISTORY AND PHYSICAL EXAM    3:28 PM      Date: 5/24/2018  Patient Name: Tena Barksdale    History of Presenting Illness     Chief Complaint   Patient presents with    Syncope         History Provided By: Patient    Chief Complaint: syncope   Timing:  While doing squats PTA  Location: head  Modifying Factors: while doing squats near a coffee table PTA, fasting for Ramadan  Associated Symptoms: neck pain, low back pain      Additional History (Context): Tena Barksdale, a 41 y.o. Female, nonsmoker, non-alcohol drinker with h/o migraines, is presented to the ED via EMS for a syncopal episode that occurred just PTA. Pt reports that while doing squats, she lost consciousness close to a coffee table. She has associated neck pain, lower back pain but no cp or sob. Pt is Christianity and has been fasting since the start of Ramadan but today was the first day that she exercised while fasting. She has had no PO intake since 5AM.     PCP: Dominga Shannon MD    Past History     Past Medical History:  Past Medical History:   Diagnosis Date    Migraines    Anemia     Past Surgical History:  History reviewed. No pertinent surgical history. Family History:  History reviewed. No pertinent family history. Social History:  Social History   Substance Use Topics    Smoking status: Never Smoker    Smokeless tobacco: Never Used    Alcohol use No       Allergies: Allergies   Allergen Reactions    Iodine Anaphylaxis    Shellfish Derived Anaphylaxis    Benadr [Diphenhydramine Hcl] Other (comments)     Redness/flushing         Review of Systems       Review of Systems   Constitutional: Negative for chills and fever. HENT: Negative for ear pain and sore throat. Eyes: Negative for pain and visual disturbance. Respiratory: Negative for cough and shortness of breath. Cardiovascular: Negative for chest pain and palpitations. Gastrointestinal: Negative for abdominal pain, diarrhea, nausea and vomiting. Genitourinary: Negative for flank pain. Musculoskeletal: Positive for back pain (lower) and neck pain. Neurological: Positive for dizziness and syncope. Negative for headaches. Psychiatric/Behavioral: Negative for agitation. The patient is not nervous/anxious. All other systems reviewed and are negative. Physical Exam     Visit Vitals    /65 (BP 1 Location: Right arm, BP Patient Position: At rest)    Pulse 77    Resp 16    SpO2 100%       Physical Exam    General Exam: Patient is well developed and well nourished in no distress. Patient does not appear acutely ill or toxic. Eye Exam: Lids and conjunctiva are normal  ENT Exam: The general head and facial exam is normal other than small superficial abrasion to midscalp. No facial bony tenderness or swelling. No significant adenopathy. C-Spine collar. Pulmonary Exam: No respiratory distress. The respiratory rate is normal.  No stridor. The breath sounds are equal bilaterally. There are no wheezes, rales, or rhonchi noted. Cardiac Exam: The cardiac rate and rhythm are normal.  No significant murmurs, rubs, or gallops. The peripheral pulses are normal.  Abdominal Exam: Abdomen is soft and non-distended. No pulsatile masses. There is no local tenderness. There is no rebound or guarding noted. Skin and Soft Tissue: The skin is warm and dry, without significant abnormality. Good color. Musculoskeletal Exam: There is no clubbing or cyanosis. There is no peripheral edema. Mild C-Spine tenderness to palpation. Mild midline low lumbar tenderness with no palpable step offs. no midline vertebral tenderness. No deformities on palpations of bilateral upper or lower extermeities. Neurologic: Pt is alert and appropriate. Normal speech. CN 2-12 intact. Normal symmetric muscle strength and tone in all extremities. Normal gait. Psychiatric: Normal adult with appropriate demeanor and interpersonal interaction.   Is oriented to person, place, and time. Diagnostic Study Results     Labs -  Recent Results (from the past 12 hour(s))   EKG, 12 LEAD, INITIAL    Collection Time: 05/24/18  3:46 PM   Result Value Ref Range    Ventricular Rate 65 BPM    Atrial Rate 65 BPM    P-R Interval 162 ms    QRS Duration 86 ms    Q-T Interval 396 ms    QTC Calculation (Bezet) 411 ms    Calculated P Axis 18 degrees    Calculated R Axis 7 degrees    Calculated T Axis 11 degrees    Diagnosis       Normal sinus rhythm with sinus arrhythmia  Normal ECG  When compared with ECG of 11-MAR-2018 18:41,  No significant change was found     CBC WITH AUTOMATED DIFF    Collection Time: 05/24/18  4:00 PM   Result Value Ref Range    WBC 5.0 4.6 - 13.2 K/uL    RBC 4.48 4.20 - 5.30 M/uL    HGB 11.2 (L) 12.0 - 16.0 g/dL    HCT 34.5 (L) 35.0 - 45.0 %    MCV 77.0 74.0 - 97.0 FL    MCH 25.0 24.0 - 34.0 PG    MCHC 32.5 31.0 - 37.0 g/dL    RDW 15.9 (H) 11.6 - 14.5 %    PLATELET 506 409 - 093 K/uL    MPV 10.9 9.2 - 11.8 FL    NEUTROPHILS 63 40 - 73 %    LYMPHOCYTES 26 21 - 52 %    MONOCYTES 8 3 - 10 %    EOSINOPHILS 1 0 - 5 %    BASOPHILS 2 0 - 2 %    ABS. NEUTROPHILS 3.1 1.8 - 8.0 K/UL    ABS. LYMPHOCYTES 1.3 0.9 - 3.6 K/UL    ABS. MONOCYTES 0.4 0.05 - 1.2 K/UL    ABS. EOSINOPHILS 0.1 0.0 - 0.4 K/UL    ABS.  BASOPHILS 0.1 (H) 0.0 - 0.06 K/UL    DF AUTOMATED     HCG QL SERUM    Collection Time: 05/24/18  4:00 PM   Result Value Ref Range    HCG, Ql. NEGATIVE  NEG     METABOLIC PANEL, COMPREHENSIVE    Collection Time: 05/24/18  4:00 PM   Result Value Ref Range    Sodium 143 136 - 145 mmol/L    Potassium 4.0 3.5 - 5.5 mmol/L    Chloride 102 100 - 108 mmol/L    CO2 23 21 - 32 mmol/L    Anion gap 18 3.0 - 18 mmol/L    Glucose 91 74 - 99 mg/dL    BUN 8 7.0 - 18 MG/DL    Creatinine 0.74 0.6 - 1.3 MG/DL    BUN/Creatinine ratio 11 (L) 12 - 20      GFR est AA >60 >60 ml/min/1.73m2    GFR est non-AA >60 >60 ml/min/1.73m2    Calcium 9.1 8.5 - 10.1 MG/DL    Bilirubin, total 0.6 0.2 - 1.0 MG/DL    ALT (SGPT) 18 13 - 56 U/L    AST (SGOT) 16 15 - 37 U/L    Alk. phosphatase 39 (L) 45 - 117 U/L    Protein, total 7.3 6.4 - 8.2 g/dL    Albumin 4.4 3.4 - 5.0 g/dL    Globulin 2.9 2.0 - 4.0 g/dL    A-G Ratio 1.5 0.8 - 1.7     CARDIAC PANEL,(CK, CKMB & TROPONIN)    Collection Time: 05/24/18  4:00 PM   Result Value Ref Range    CK 54 26 - 192 U/L    CK - MB <1.0 <3.6 ng/ml    CK-MB Index  0.0 - 4.0 %     CALCULATION NOT PERFORMED WHEN RESULT IS BELOW LINEAR LIMIT    Troponin-I, Qt. <0.02 0.0 - 0.045 NG/ML       Radiologic Studies -   CT SPINE LUMB WO CONT   Final Result      CT SPINE CERV WO CONT   Final Result      CT HEAD WO CONT   Final Result        Ct Head Wo Cont  IMPRESSION: 1. No acute intracranial pathology. Ct Spine Cerv Wo Cont  IMPRESSION: No acute osseous abnormality. There is slight reversal of normal lordosis possibly positional in nature or related to muscle spasm but otherwise normally aligned. Ct Spine Lumb Wo Cont  IMPRESSION: No osseous abnormality identified. Normal alignment. Medical Decision Making   I am the first provider for this patient. I reviewed the vital signs, available nursing notes, past medical history, past surgical history, family history and social history. Vital Signs-Reviewed the patient's vital signs. Temp not documented but reported within normal limits per primary RN    Pulse Oximetry Analysis -  100% on room air (Interpretation) Normal     Cardiac Monitor:  Rate: 77  Rhythm:  Normal Sinus Rhythm     EKG: Interpreted by the EP. Time Interpreted: 15:46   Rate: 65   Rhythm: Normal Sinus Rhythm    Interpretation: No STEMI    Records Reviewed: Nursing Notes and Old Medical Records (Time of Review: 3:28 PM)    ED Course: Progress Notes, Reevaluation, and Consults:    Provider Notes (Medical Decision Making): Pt who is fasting for Jew reasons presenting after syncope while exercising at home. Brought in by EMS on backboard with C collar in place.  Given syncope, c collar left on. CT head, C spine and L spine obtained after exam. No appreciable injuries noted on exam or on CTs. C collar removed and pt cleared for ligamentous injury. Labs reviewed. Pt feeling better after IV fluids. Ambulatory without needing medications. Doubt syncope caused by [facility], ACS, CVA, PE, arrhythmia. Likely related to volume depletion in setting of fasting and orthostasis given activity. Pt reports feeling dizzy anyways when doing squats on days she was not fasting. Advised on not exercising while fasting particularly in doing exercises requiring quick up and down such as squats. Pt has PCP through [facility]. Advised on close follow up.  at bedside. Agreeable with plan for discharge, aware of strict return precautions. 5:36 PM Pt is feeling much better and ready to be discharged. Diagnosis     Clinical Impression:   1. Syncope and collapse    2. Anemia, unspecified type        Disposition: Discharged    Follow-up Information     Follow up With Details Comments [facility] Schedule an appointment as soon as possible for a visit  [address]    [facility] EMERGENCY DEPT  If symptoms worsen [address]  [phone]           Patient's Medications    No medications on file     _______________________________    Attestations:  Scribe Attestation     Rashmi Schreiber acting as a scribe for and in the presence of Chang Tobar MD      May 24, 2018 at 3:28 PM       Provider Attestation:      I personally performed the services described in the documentation, reviewed the documentation, as recorded by the scribe in my presence, and it accurately and completely records my words and actions.  May 24, 2018 at 3:28 PM - Chang Tobar MD    _______________________________

## 2018-05-24 NOTE — DISCHARGE INSTRUCTIONS
DO NOT EXERCISE WHILE FASTING. TODAY PLEASE CONTINUE WITH HYDRATING YOURSELF AND EATING A GOOD MEAL. Anemia: Care Instructions  Your Care Instructions    Anemia is a low level of red blood cells, which carry oxygen throughout your body. Many things can cause anemia. Lack of iron is one of the most common causes. Your body needs iron to make hemoglobin, a substance in red blood cells that carries oxygen from the lungs to your body's cells. Without enough iron, the body produces fewer and smaller red blood cells. As a result, your body's cells do not get enough oxygen, and you feel tired and weak. And you may have trouble concentrating. Bleeding is the most common cause of a lack of iron. You may have heavy menstrual bleeding or bleeding caused by conditions such as ulcers, hemorrhoids, or cancer. Regular use of aspirin or other anti-inflammatory medicines (such as ibuprofen) also can cause bleeding in some people. A lack of iron in your diet also can cause anemia, especially at times when the body needs more iron, such as during pregnancy, infancy, and the teen years. Your doctor may have prescribed iron pills. It may take several months of treatment for your iron levels to return to normal. Your doctor also may suggest that you eat foods that are rich in iron, such as meat and beans. There are many other causes of anemia. It is not always due to a lack of iron. Finding the specific cause of your anemia will help your doctor find the right treatment for you. Follow-up care is a key part of your treatment and safety. Be sure to make and go to all appointments, and call your doctor if you are having problems. It's also a good idea to know your test results and keep a list of the medicines you take. How can you care for yourself at home? · Take your medicines exactly as prescribed. Call your doctor if you think you are having a problem with your medicine.   · If your doctor recommends iron pills, take them as directed:  ¨ Try to take the pills on an empty stomach about 1 hour before or 2 hours after meals. But you may need to take iron with food to avoid an upset stomach. ¨ Do not take antacids or drink milk or caffeine drinks (such as coffee, tea, or cola) at the same time or within 2 hours of the time that you take your iron. They can make it hard for your body to absorb the iron. ¨ Vitamin C (from food or supplements) helps your body absorb iron. Try taking iron pills with a glass of orange juice or some other food that is high in vitamin C, such as citrus fruits. ¨ Iron pills may cause stomach problems, such as heartburn, nausea, diarrhea, constipation, and cramps. Be sure to drink plenty of fluids, and include fruits, vegetables, and fiber in your diet each day. Iron pills often make your bowel movements dark or green. ¨ If you forget to take an iron pill, do not take a double dose of iron the next time you take a pill. ¨ Keep iron pills out of the reach of small children. An overdose of iron can be very dangerous. · Follow your doctor's advice about eating iron-rich foods. These include red meat, shellfish, poultry, eggs, beans, raisins, whole-grain bread, and leafy green vegetables. · Steam vegetables to help them keep their iron content. When should you call for help? Call 911 anytime you think you may need emergency care. For example, call if:  ? · You have symptoms of a heart attack. These may include:  ¨ Chest pain or pressure, or a strange feeling in the chest.  ¨ Sweating. ¨ Shortness of breath. ¨ Nausea or vomiting. ¨ Pain, pressure, or a strange feeling in the back, neck, jaw, or upper belly or in one or both shoulders or arms. ¨ Lightheadedness or sudden weakness. ¨ A fast or irregular heartbeat. After you call 911, the  may tell you to chew 1 adult-strength or 2 to 4 low-dose aspirin. Wait for an ambulance. Do not try to drive yourself. ? · You passed out (lost consciousness). ?Call your doctor now or seek immediate medical care if:  ? · You have new or increased shortness of breath. ? · You are dizzy or lightheaded, or you feel like you may faint. ? · Your fatigue and weakness continue or get worse. ? · You have any abnormal bleeding, such as:  ¨ Nosebleeds. ¨ Vaginal bleeding that is different (heavier, more frequent, at a different time of the month) than what you are used to. ¨ Bloody or black stools, or rectal bleeding. ¨ Bloody or pink urine. ? Watch closely for changes in your health, and be sure to contact your doctor if:  ? · You do not get better as expected. Where can you learn more? Go to http://gretel-zane.info/. Enter R301 in the search box to learn more about \"Anemia: Care Instructions. \"  Current as of: October 13, 2016  Content Version: 11.4  © 0054-1048 [employer]. Care instructions adapted under license by [employer] (which disclaims liability or warranty for this information). If you have questions about a medical condition or this instruction, always ask your healthcare professional. [place] [address] any warranty or liability for your use of this information.

## 2018-05-24 NOTE — ED TRIAGE NOTES
Pt arrived via rescue with c/o neck and back pain s/p syncopal episode today. Pt is fasting d/t Ramadan and decided to workout today. Pt blood sugar 88 per EMS.

## 2018-05-25 LAB
ATRIAL RATE: 65 BPM
CALCULATED P AXIS, ECG09: 18 DEGREES
CALCULATED R AXIS, ECG10: 7 DEGREES
CALCULATED T AXIS, ECG11: 11 DEGREES
DIAGNOSIS, 93000: NORMAL
P-R INTERVAL, ECG05: 162 MS
Q-T INTERVAL, ECG07: 396 MS
QRS DURATION, ECG06: 86 MS
QTC CALCULATION (BEZET), ECG08: 411 MS
VENTRICULAR RATE, ECG03: 65 BPM

## 2018-06-12 ENCOUNTER — HOSPITAL ENCOUNTER (EMERGENCY)
Age: 38
Discharge: HOME | End: 2018-06-12
Attending: EMERGENCY MEDICINE
Payer: OTHER GOVERNMENT

## 2018-06-12 VITALS
RESPIRATION RATE: 18 BRPM | DIASTOLIC BLOOD PRESSURE: 70 MMHG | SYSTOLIC BLOOD PRESSURE: 113 MMHG | WEIGHT: 135 LBS | OXYGEN SATURATION: 100 % | BODY MASS INDEX: 24.3 KG/M2 | HEART RATE: 102 BPM | TEMPERATURE: 98.2 F

## 2018-06-12 DIAGNOSIS — N76.0 BV (BACTERIAL VAGINOSIS): Primary | ICD-10-CM

## 2018-06-12 DIAGNOSIS — B96.89 BV (BACTERIAL VAGINOSIS): Primary | ICD-10-CM

## 2018-06-12 LAB
APPEARANCE UR: CLEAR
BILIRUB UR QL: NEGATIVE
COLOR UR: YELLOW
GLUCOSE UR STRIP.AUTO-MCNC: NEGATIVE MG/DL
HCG UR QL: NEGATIVE
HGB UR QL STRIP: NEGATIVE
KETONES UR QL STRIP.AUTO: NEGATIVE MG/DL
LEUKOCYTE ESTERASE UR QL STRIP.AUTO: NEGATIVE
NITRITE UR QL STRIP.AUTO: NEGATIVE
PH UR STRIP: 7.5 [PH] (ref 5–8)
PROT UR STRIP-MCNC: NEGATIVE MG/DL
SERVICE CMNT-IMP: NORMAL
SP GR UR REFRACTOMETRY: 1.01 (ref 1–1.03)
UROBILINOGEN UR QL STRIP.AUTO: 0.2 EU/DL (ref 0.2–1)
WET PREP GENITAL: NORMAL

## 2018-06-12 PROCEDURE — 81003 URINALYSIS AUTO W/O SCOPE: CPT | Performed by: PHYSICIAN ASSISTANT

## 2018-06-12 PROCEDURE — 87491 CHLMYD TRACH DNA AMP PROBE: CPT | Performed by: PHYSICIAN ASSISTANT

## 2018-06-12 PROCEDURE — 81025 URINE PREGNANCY TEST: CPT | Performed by: PHYSICIAN ASSISTANT

## 2018-06-12 PROCEDURE — 99284 EMERGENCY DEPT VISIT MOD MDM: CPT

## 2018-06-12 PROCEDURE — 87210 SMEAR WET MOUNT SALINE/INK: CPT | Performed by: PHYSICIAN ASSISTANT

## 2018-06-12 RX ORDER — METRONIDAZOLE 500 MG/1
500 TABLET ORAL 2 TIMES DAILY
Qty: 14 TAB | Refills: 0 | Status: SHIPPED | OUTPATIENT
Start: 2018-06-12 | End: 2018-06-19

## 2018-06-12 RX ORDER — METRONIDAZOLE 500 MG/1
500 TABLET ORAL 2 TIMES DAILY
Qty: 14 TAB | Refills: 0 | Status: SHIPPED | OUTPATIENT
Start: 2018-06-12 | End: 2018-06-12

## 2018-06-12 NOTE — ED PROVIDER NOTES
EMERGENCY DEPARTMENT HISTORY AND PHYSICAL EXAM    4:30 PM      Date: 2018  Patient Name: Arleen King    History of Presenting Illness     Chief Complaint   Patient presents with    Abdominal Pain    Urinary Pain         History Provided By: Patient    Chief Complaint: Abdominal pain  Duration:  2 weeks  Timing:  N/A  Location: Lower abdomen  Quality: N/A  Severity: N/A  Modifying Factors: The patient does not report taking any medications for her symptoms. Associated Symptoms: Vaginal discharge, dysuria, mild back pain, fever, and loss of appetite. Denies vomiting. Additional History (Context): Arleen King is a 41 y.o. female who presents with lower abdominal pain that began 2 weeks ago. The patient has associated symptoms of yellow vaginal discharge, dysuria, mild back pain, fever, and loss of appetite. The patient denies vomiting. The patient does not report taking any medications for her symptoms. Last menstrual period was the end of last month.  2. Para 2. The patient has never been treated for an STD in the past. Denies abdominal surgeries. PCP: Dominga Shannon MD        Past History     Past Medical History:  Past Medical History:   Diagnosis Date    Migraines        Past Surgical History:  History reviewed. No pertinent surgical history. Family History:  History reviewed. No pertinent family history. Social History:  Social History   Substance Use Topics    Smoking status: Never Smoker    Smokeless tobacco: Never Used    Alcohol use No       Allergies: Allergies   Allergen Reactions    Iodine Anaphylaxis    Shellfish Derived Anaphylaxis    Benadr [Diphenhydramine Hcl] Other (comments)     Redness/flushing         Review of Systems       Review of Systems   Constitutional: Positive for appetite change and fever. Gastrointestinal: Positive for abdominal pain. Negative for vomiting. Genitourinary: Positive for dysuria and vaginal discharge.    Musculoskeletal: Positive for back pain. All other systems reviewed and are negative. Physical Exam     Visit Vitals    /70 (BP Patient Position: At rest)    Pulse (!) 102    Temp 98.2 °F (36.8 °C)    Resp 18    Wt 61.2 kg (135 lb)    LMP 05/28/2018 (Approximate)    SpO2 100%    BMI 24.3 kg/m2       Physical Exam   Constitutional: She is oriented to person, place, and time. She appears well-developed and well-nourished. HENT:   Head: Normocephalic and atraumatic. Neck: Neck supple. No JVD present. Cardiovascular: Normal rate and regular rhythm. Pulmonary/Chest: Effort normal and breath sounds normal. No respiratory distress. Abdominal: Soft. She exhibits no distension. There is no tenderness. There is no rebound and no guarding. No CVA tenderness   Musculoskeletal: She exhibits no edema. Neurological: She is alert and oriented to person, place, and time. Skin: Skin is warm and dry. No erythema.    Psychiatric: Judgment normal.         Diagnostic Study Results     Labs -  Recent Results (from the past 12 hour(s))   URINALYSIS W/ RFLX MICROSCOPIC    Collection Time: 06/12/18  4:16 PM   Result Value Ref Range    Color YELLOW      Appearance CLEAR      Specific gravity 1.012 1.005 - 1.030      pH (UA) 7.5 5.0 - 8.0      Protein NEGATIVE  NEG mg/dL    Glucose NEGATIVE  NEG mg/dL    Ketone NEGATIVE  NEG mg/dL    Bilirubin NEGATIVE  NEG      Blood NEGATIVE  NEG      Urobilinogen 0.2 0.2 - 1.0 EU/dL    Nitrites NEGATIVE  NEG      Leukocyte Esterase NEGATIVE  NEG     HCG URINE, QL    Collection Time: 06/12/18  4:16 PM   Result Value Ref Range    HCG urine, QL NEGATIVE  NEG     WET PREP    Collection Time: 06/12/18  4:34 PM   Result Value Ref Range    Special Requests: NO SPECIAL REQUESTS      Wet prep FEW  CLUE CELLS PRESENT        Wet prep NO YEAST SEEN      Wet prep NO TRICHOMONAS SEEN         Radiologic Studies -   No orders to display         Medical Decision Making   I am the first provider for this patient. I reviewed the vital signs, available nursing notes, past medical history, past surgical history, family history and social history. Vital Signs-Reviewed the patient's vital signs. Pulse Oximetry Analysis -  100 on room air (Interpretation)nl       Records Reviewed: Nursing Notes (Time of Review: 4:30 PM)    ED Course: Progress Notes, Reevaluation, and Consults:    Provider Notes (Medical Decision Making): 40 y/o female presents with suprapubic pain and vag discharge. Check pelvic, ua. No indication for imaging. abd soft, non-surgical. Pt well appearing, reports sxs x2 weeks, doubt torsion. Doubt TOA. Discussed results with pt, noted BV. Stable for dc home. Given GYN follow up. Procedures:   Pelvic Exam  Date/Time: 6/12/2018 4:36 PM  Performed by: attending  Procedure duration:  2 minutes. Exam assisted by:  Jerry Sutton RN. Type of exam performed: bimanual and speculum. External genitalia appearance: normal.    Vaginal exam:  discharge. The amount of discharge was:  mild. The discharge was yellow. Cervical exam:  normal and no cervical motion tenderness. Specimen(s) collected:  chlamydia, GC and vaginal culture. Bimanual exam:  normal.    Patient tolerance: Patient tolerated the procedure well with no immediate complications              Diagnosis     Clinical Impression:   1. BV (bacterial vaginosis)        Disposition: discharged    Follow-up Information     Follow up With Details Comments Contact Info    [facility] EMERGENCY DEPT Go to If symptoms worsen [address] [place] [address]  [phone]    Karen Mcgill DO Call in 2 days [facility]             Patient's Medications   Start Taking    METRONIDAZOLE (FLAGYL) 500 MG TABLET    Take 1 Tab by mouth two (2) times a day for 7 days.    Continue Taking    No medications on file   These Medications have changed    No medications on file   Stop Taking No medications on file     _______________________________    Attestations:  [place] [id number] Patricia Rivero acting as a scribe for and in the presence of Charmayne Mettle, DO      June 12, 2018 at 5:11 PM       Provider Attestation:      I personally performed the services described in the documentation, reviewed the documentation, as recorded by the scribe in my presence, and it accurately and completely records my words and actions.  June 12, 2018 at 5:11 PM - Charmayne Mettle, DO    _______________________________

## 2018-06-12 NOTE — DISCHARGE INSTRUCTIONS
Bacterial Vaginosis: Care Instructions  Your Care Instructions    Bacterial vaginosis is a type of vaginal infection. It is caused by excess growth of certain bacteria that are normally found in the vagina. Symptoms can include itching, swelling, pain when you urinate or have sex, and a gray or yellow discharge with a \"fishy\" odor. It is not considered an infection that is spread through sexual contact. Although symptoms can be annoying and uncomfortable, bacterial vaginosis does not usually cause other health problems. However, if you have it while you are pregnant, it can cause complications. While the infection may go away on its own, most doctors use antibiotics to treat it. You may have been prescribed pills or vaginal cream. With treatment, bacterial vaginosis usually clears up in 5 to 7 days. Follow-up care is a key part of your treatment and safety. Be sure to make and go to all appointments, and call your doctor if you are having problems. It's also a good idea to know your test results and keep a list of the medicines you take. How can you care for yourself at home? · Take your antibiotics as directed. Do not stop taking them just because you feel better. You need to take the full course of antibiotics. · Do not eat or drink anything that contains alcohol if you are taking metronidazole (Flagyl). · Keep using your medicine if you start your period. Use pads instead of tampons while using a vaginal cream or suppository. Tampons can absorb the medicine. · Wear loose cotton clothing. Do not wear nylon and other materials that hold body heat and moisture close to the skin. · Do not scratch. Relieve itching with a cold pack or a cool bath. · Do not wash your vaginal area more than once a day. Use plain water or a mild, unscented soap. Do not douche. When should you call for help?   Watch closely for changes in your health, and be sure to contact your doctor if:  ? · You have unexpected vaginal bleeding. ? · You have a fever. ? · You have new or increased pain in your vagina or pelvis. ? · You are not getting better after 1 week. ? · Your symptoms return after you finish the course of your medicine. Where can you learn more? Go to http://gretel-zane.info/. Isidro Mendes in the search box to learn more about \"Bacterial Vaginosis: Care Instructions. \"  Current as of: October 13, 2016  Content Version: 11.4  © 4657-5806 [employer]. Care instructions adapted under license by [employer] (which disclaims liability or warranty for this information). If you have questions about a medical condition or this instruction, always ask your healthcare professional. [address] any warranty or liability for your use of this information.

## 2018-06-12 NOTE — ED NOTES
Urinary symptoms as well as abnormal vaginal discharge. Needs pelvic exam.      I performed a brief evaluation, including history and physical, of the patient here in triage and I have determined that pt will need further treatment and evaluation from the main side ER physician. I have placed initial orders to help in expediting patients care.      June 12, 2018 at 4:19 PM - Nabil Quiroz PA-C        Visit Vitals    /70 (BP Patient Position: At rest)    Pulse (!) 102    Temp 98.2 °F (36.8 °C)    Resp 18    Wt 61.2 kg (135 lb)    SpO2 100%    BMI 24.3 kg/m2

## 2018-09-17 ENCOUNTER — HOSPITAL ENCOUNTER (EMERGENCY)
Age: 38
Discharge: HOME | End: 2018-09-18
Attending: EMERGENCY MEDICINE
Payer: OTHER GOVERNMENT

## 2018-09-17 VITALS
OXYGEN SATURATION: 100 % | HEART RATE: 73 BPM | BODY MASS INDEX: 24.27 KG/M2 | DIASTOLIC BLOOD PRESSURE: 65 MMHG | WEIGHT: 137 LBS | RESPIRATION RATE: 16 BRPM | SYSTOLIC BLOOD PRESSURE: 106 MMHG | TEMPERATURE: 98.1 F | HEIGHT: 63 IN

## 2018-09-17 DIAGNOSIS — M54.2 NECK PAIN: Primary | ICD-10-CM

## 2018-09-17 PROCEDURE — 99283 EMERGENCY DEPT VISIT LOW MDM: CPT

## 2018-09-17 RX ORDER — KETOROLAC TROMETHAMINE 30 MG/ML
30 INJECTION, SOLUTION INTRAMUSCULAR; INTRAVENOUS
Status: COMPLETED | OUTPATIENT
Start: 2018-09-17 | End: 2018-09-18

## 2018-09-17 RX ORDER — METOCLOPRAMIDE HYDROCHLORIDE 5 MG/ML
10 INJECTION INTRAMUSCULAR; INTRAVENOUS
Status: COMPLETED | OUTPATIENT
Start: 2018-09-17 | End: 2018-09-18

## 2018-09-18 ENCOUNTER — APPOINTMENT (OUTPATIENT)
Dept: CT IMAGING | Age: 38
End: 2018-09-18
Attending: EMERGENCY MEDICINE
Payer: OTHER GOVERNMENT

## 2018-09-18 PROCEDURE — 96375 TX/PRO/DX INJ NEW DRUG ADDON: CPT

## 2018-09-18 PROCEDURE — 74011250637 HC RX REV CODE- 250/637: Performed by: EMERGENCY MEDICINE

## 2018-09-18 PROCEDURE — 74011250636 HC RX REV CODE- 250/636: Performed by: EMERGENCY MEDICINE

## 2018-09-18 PROCEDURE — 96361 HYDRATE IV INFUSION ADD-ON: CPT

## 2018-09-18 PROCEDURE — 72125 CT NECK SPINE W/O DYE: CPT

## 2018-09-18 PROCEDURE — 96374 THER/PROPH/DIAG INJ IV PUSH: CPT

## 2018-09-18 RX ORDER — METAXALONE 800 MG/1
800 TABLET ORAL 4 TIMES DAILY
Qty: 20 TAB | Refills: 0 | Status: SHIPPED | OUTPATIENT
Start: 2018-09-18 | End: 2018-09-23

## 2018-09-18 RX ORDER — DIAZEPAM 5 MG/1
10 TABLET ORAL
Status: COMPLETED | OUTPATIENT
Start: 2018-09-18 | End: 2018-09-18

## 2018-09-18 RX ADMIN — SODIUM CHLORIDE 1000 ML: 900 INJECTION, SOLUTION INTRAVENOUS at 00:14

## 2018-09-18 RX ADMIN — DIAZEPAM 10 MG: 5 TABLET ORAL at 00:14

## 2018-09-18 RX ADMIN — KETOROLAC TROMETHAMINE 30 MG: 30 INJECTION, SOLUTION INTRAMUSCULAR at 00:14

## 2018-09-18 RX ADMIN — METOCLOPRAMIDE 10 MG: 5 INJECTION, SOLUTION INTRAMUSCULAR; INTRAVENOUS at 00:14

## 2018-09-18 NOTE — ED TRIAGE NOTES
Left sided neck pain x several months. Pain worsening over past few days. Radiates from neck to back of head around to left eye.

## 2018-09-18 NOTE — ED NOTES
2:08 AM 
09/18/18 Discharge instructions given to patient (name) with verbalization of understanding. Patient accompanied by spouse. Patient discharged with the following prescriptions Skelaxin. Patient discharged to home (destination).   
  
Keri Kumar RN

## 2018-09-18 NOTE — ED PROVIDER NOTES
EMERGENCY DEPARTMENT HISTORY AND PHYSICAL EXAM 
 
Date: 9/17/2018 Patient Name: Berenice Ibanez History of Presenting Illness Chief Complaint Patient presents with  Neck Pain History Provided By: Patient and Patient's  Chief Complaint: neck pain Duration: 2 Months Timing:  Gradual and Intermittent Location: left upper neck Quality: Aching Severity: Moderate Modifying Factors: no trauma Associated Symptoms: pain shoots from top of left to left occipital base then to frontal area Additional History (Context): Berenice Ibanez is a 41 y.o. female with h/o migraines who presents with left sided neck pain x at least two months. Typically she 'unkink's her neck, feels a popping sensation, and then pain resolves. Lately this week though, she's been unable to improve her pain and it' snow causing headache to begin in her left occiput radiating to frontal area. Denies vomiting, fever, h/o neck trauma. LNMP 6d ago. Has  but currently doesn't have a PCP. PCP: Gilberto Osborne MD 
 
Current Outpatient Prescriptions Medication Sig Dispense Refill  metaxalone (SKELAXIN) 800 mg tablet Take 1 Tab by mouth four (4) times daily for 5 days. 20 Tab 0 Past History Past Medical History: 
Past Medical History:  
Diagnosis Date  Migraines Past Surgical History: 
Past Surgical History:  
Procedure Laterality Date  HX BREAST BIOPSY Family History: 
History reviewed. No pertinent family history. Social History: 
Social History Substance Use Topics  Smoking status: Never Smoker  Smokeless tobacco: Never Used  Alcohol use No  
 
 
Allergies: Allergies Allergen Reactions  Iodine Anaphylaxis  Shellfish Derived Anaphylaxis  Benadr [Diphenhydramine Hcl] Other (comments) Redness/flushing Review of Systems Review of Systems Constitutional: Negative. Negative for fever. HENT: Negative. Eyes: Negative. Negative for visual disturbance. Respiratory: Negative. Cardiovascular: Negative. Gastrointestinal: Negative. Negative for nausea and vomiting. Endocrine: Negative. Genitourinary: Negative. Musculoskeletal: Positive for neck pain and neck stiffness. Skin: Negative for wound. Allergic/Immunologic: Negative. Neurological: Positive for headaches. Negative for dizziness, tremors, seizures, syncope, speech difficulty, weakness, light-headedness and numbness. Hematological: Negative. Psychiatric/Behavioral: Negative. Negative for confusion. All other systems reviewed and are negative. All Other Systems Negative Physical Exam  
 
Vitals:  
 09/17/18 2337 BP: 106/65 Pulse: 73 Resp: 16 Temp: 98.1 °F (36.7 °C) SpO2: 100% Weight: 62.1 kg (137 lb) Height: 5' 2.5\" (1.588 m) Physical Exam  
Constitutional: She is oriented to person, place, and time. She appears well-developed. HENT:  
Head: Normocephalic and atraumatic. Eyes: EOM are normal. Pupils are equal, round, and reactive to light. No nystagmus Neck: No JVD present. No tracheal deviation present. No thyromegaly present. Midline superior c-spine TTP with +radiculopathy axial load testing.  (pain lateralizes to left with head turn to both right and left w/additional compression.) Cardiovascular: Normal rate, regular rhythm and normal heart sounds. Exam reveals no gallop and no friction rub. No murmur heard. Pulmonary/Chest: Effort normal and breath sounds normal. No stridor. No respiratory distress. She has no wheezes. She has no rales. She exhibits no tenderness. Abdominal: Soft. She exhibits no distension and no mass. There is no tenderness. There is no rebound and no guarding. Musculoskeletal: She exhibits no edema or tenderness. Lymphadenopathy:  
  She has no cervical adenopathy. Neurological: She is alert and oriented to person, place, and time.  No cranial nerve deficit. Coordination normal.  
Negative Romberg. No dysdiadochokinesis, past pointing, tremor. Normal heel-shin. Skin: Skin is warm and dry. No rash noted. No erythema. No pallor. Psychiatric: She has a normal mood and affect. Her behavior is normal. Thought content normal.  
Nursing note and vitals reviewed. Diagnostic Study Results Labs - No results found for this or any previous visit (from the past 12 hour(s)). Radiologic Studies -  
CT SPINE CERV WO CONT    (Results Pending) CT Results  (Last 48 hours) None CXR Results  (Last 48 hours) None Medical Decision Making I am the first provider for this patient. I reviewed the vital signs, available nursing notes, past medical history, past surgical history, family history and social history. Vital Signs-Reviewed the patient's vital signs. Records Reviewed: Nursing Notes Procedures: 
Procedures Provider Notes (Medical Decision Making): scan neck. Treat HA pain and soreness. Her neck discomfort resolved with IVF and Valium. Cervical CT showed no acute abnormality. D/c home. F/up with . MED RECONCILIATION: 
No current facility-administered medications for this encounter. Current Outpatient Prescriptions Medication Sig  
 metaxalone (SKELAXIN) 800 mg tablet Take 1 Tab by mouth four (4) times daily for 5 days. Disposition: 
home DISCHARGE NOTE:  
1:45 AM 
 
Pt has been reexamined. Patient has no new complaints, changes, or physical findings. Care plan outlined and precautions discussed. Results of labs, CT scan were reviewed with the patient. All medications were reviewed with the patient; will d/c home with skelaxin. All of pt's questions and concerns were addressed. Patient was instructed and agrees to follow up with PCP, as well as to return to the ED upon further deterioration. Patient is ready to go home. Follow-up Information Follow up With Details Comments Contact Info  
 your  provider Schedule an appointment as soon as possible for a visit today [facility] EMERGENCY DEPT  If symptoms worsen return immediately [address] 
[phone] Current Discharge Medication List  
  
START taking these medications Details  
metaxalone (SKELAXIN) 800 mg tablet Take 1 Tab by mouth four (4) times daily for 5 days. Qty: 20 Tab, Refills: 0 Diagnosis Clinical Impression: 1. Neck pain

## 2018-10-16 ENCOUNTER — HOSPITAL ENCOUNTER (EMERGENCY)
Age: 38
Discharge: HOME | End: 2018-10-16
Attending: EMERGENCY MEDICINE
Payer: OTHER GOVERNMENT

## 2018-10-16 VITALS
SYSTOLIC BLOOD PRESSURE: 114 MMHG | BODY MASS INDEX: 25.21 KG/M2 | HEART RATE: 100 BPM | DIASTOLIC BLOOD PRESSURE: 68 MMHG | HEIGHT: 62 IN | RESPIRATION RATE: 17 BRPM | WEIGHT: 137 LBS | OXYGEN SATURATION: 99 % | TEMPERATURE: 98.3 F

## 2018-10-16 DIAGNOSIS — R10.2 PELVIC PAIN: ICD-10-CM

## 2018-10-16 DIAGNOSIS — R30.0 DYSURIA: Primary | ICD-10-CM

## 2018-10-16 DIAGNOSIS — N89.8 VAGINAL DISCHARGE: ICD-10-CM

## 2018-10-16 LAB
APPEARANCE UR: CLEAR
BILIRUB UR QL: NEGATIVE
COLOR UR: YELLOW
GLUCOSE UR STRIP.AUTO-MCNC: NEGATIVE MG/DL
HCG UR QL: NEGATIVE
HGB UR QL STRIP: NEGATIVE
KETONES UR QL STRIP.AUTO: NEGATIVE MG/DL
LEUKOCYTE ESTERASE UR QL STRIP.AUTO: NEGATIVE
NITRITE UR QL STRIP.AUTO: NEGATIVE
PH UR STRIP: 7.5 [PH] (ref 5–8)
PROT UR STRIP-MCNC: NEGATIVE MG/DL
SERVICE CMNT-IMP: NORMAL
SP GR UR REFRACTOMETRY: <1.005 (ref 1–1.03)
UROBILINOGEN UR QL STRIP.AUTO: 0.2 EU/DL (ref 0.2–1)
WET PREP GENITAL: NORMAL

## 2018-10-16 PROCEDURE — 87086 URINE CULTURE/COLONY COUNT: CPT | Performed by: PHYSICIAN ASSISTANT

## 2018-10-16 PROCEDURE — 99284 EMERGENCY DEPT VISIT MOD MDM: CPT

## 2018-10-16 PROCEDURE — 87491 CHLMYD TRACH DNA AMP PROBE: CPT | Performed by: PHYSICIAN ASSISTANT

## 2018-10-16 PROCEDURE — 81025 URINE PREGNANCY TEST: CPT | Performed by: PHYSICIAN ASSISTANT

## 2018-10-16 PROCEDURE — 87210 SMEAR WET MOUNT SALINE/INK: CPT | Performed by: PHYSICIAN ASSISTANT

## 2018-10-16 PROCEDURE — 81003 URINALYSIS AUTO W/O SCOPE: CPT | Performed by: PHYSICIAN ASSISTANT

## 2018-10-16 PROCEDURE — 74011250637 HC RX REV CODE- 250/637: Performed by: PHYSICIAN ASSISTANT

## 2018-10-16 RX ORDER — PHENAZOPYRIDINE HYDROCHLORIDE 200 MG/1
200 TABLET, FILM COATED ORAL 3 TIMES DAILY
Qty: 6 TAB | Refills: 0 | Status: SHIPPED | OUTPATIENT
Start: 2018-10-16 | End: 2018-10-18

## 2018-10-16 RX ORDER — FLUCONAZOLE 150 MG/1
150 TABLET ORAL ONCE
Qty: 1 TAB | Refills: 0 | Status: SHIPPED | OUTPATIENT
Start: 2018-10-16 | End: 2018-10-16

## 2018-10-16 RX ORDER — NITROFURANTOIN 25; 75 MG/1; MG/1
100 CAPSULE ORAL 2 TIMES DAILY
Qty: 14 CAP | Refills: 0 | Status: SHIPPED | OUTPATIENT
Start: 2018-10-16 | End: 2018-10-23

## 2018-10-16 RX ORDER — PHENAZOPYRIDINE HYDROCHLORIDE 100 MG/1
200 TABLET, FILM COATED ORAL
Status: COMPLETED | OUTPATIENT
Start: 2018-10-16 | End: 2018-10-16

## 2018-10-16 RX ADMIN — PHENAZOPYRIDINE HYDROCHLORIDE 200 MG: 100 TABLET ORAL at 14:33

## 2018-10-16 NOTE — DISCHARGE INSTRUCTIONS
Painful Urination (Dysuria): Care Instructions  Your Care Instructions  Burning pain with urination (dysuria) is a common symptom of a urinary tract infection or other urinary problems. The bladder may become inflamed. This can cause pain when the bladder fills and empties. You may also feel pain if the tube that carries urine from the bladder to the outside of the body (urethra) gets irritated or infected. Sexually transmitted infections (STIs) also may cause pain when you urinate. Sometimes the pain can be caused by things other than an infection. The urethra can be irritated by soaps, perfumes, or foreign objects in the urethra. Kidney stones can cause pain when they pass through the urethra. The cause may be hard to find. You may need tests. Treatment for painful urination depends on the cause. Follow-up care is a key part of your treatment and safety. Be sure to make and go to all appointments, and call your doctor if you are having problems. It's also a good idea to know your test results and keep a list of the medicines you take. How can you care for yourself at home? · Drink extra water for the next day or two. This will help make the urine less concentrated. (If you have kidney, heart, or liver disease and have to limit fluids, talk with your doctor before you increase the amount of fluids you drink.)  · Avoid drinks that are carbonated or have caffeine. They can irritate the bladder. · Urinate often. Try to empty your bladder each time. For women:  · Urinate right after you have sex. · After going to the bathroom, wipe from front to back. · Avoid douches, bubble baths, and feminine hygiene sprays. And avoid other feminine hygiene products that have deodorants. When should you call for help? Call your doctor now or seek immediate medical care if:    · You have new symptoms, such as fever, nausea, or vomiting.     · You have new or worse symptoms of a urinary problem.  For example:  [place] have blood or pus in your urine. ¨ You have chills or body aches. ¨ It hurts worse to urinate. ¨ You have groin or belly pain. ¨ You have pain in your back just below your rib cage (the flank area).    Watch closely for changes in your health, and be sure to contact your doctor if you have any problems. Where can you learn more? Go to http://gretel-zane.info/. Enter N216 in the search box to learn more about \"Painful Urination (Dysuria): Care Instructions. \"  Current as of: March 21, 2018  Content Version: 11.8  © 8765-7737 [employer] Care instructions adapted under license by [employer] (which disclaims liability or warranty for this information). If you have questions about a medical condition or this instruction, always ask your healthcare professional. [address] any warranty or liability for your use of this information. Pelvic Pain: Care Instructions  Your Care Instructions    Pelvic pain, or pain in the lower belly, can have many causes. Often pelvic pain is not serious and gets better in a few days. If your pain continues or gets worse, you may need tests and treatment. Tell your doctor about any new symptoms. These may be signs of a serious problem. Follow-up care is a key part of your treatment and safety. Be sure to make and go to all appointments, and call your doctor if you are having problems. It's also a good idea to know your test results and keep a list of the medicines you take. How can you care for yourself at home? · Rest until you feel better. Lie down, and raise your legs by placing a pillow under your knees. · Drink plenty of fluids. You may find that small, frequent sips are easier on your stomach than if you drink a lot at once. Avoid drinks with carbonation or caffeine, such as soda pop, tea, or coffee. · Try eating several small meals instead of 2 or 3 large ones.  Eat mild foods, such as rice, dry toast or crackers, bananas, and applesauce. Avoid fatty and spicy foods, other fruits, and alcohol until 48 hours after your symptoms have gone away. · Take an over-the-counter pain medicine, such as acetaminophen (Tylenol), ibuprofen (Advil, Motrin), or naproxen (Aleve). Read and follow all instructions on the label. · Do not take two or more pain medicines at the same time unless the doctor told you to. Many pain medicines have acetaminophen, which is Tylenol. Too much acetaminophen (Tylenol) can be harmful. · You can put a heating pad, a warm cloth, or moist heat on your belly to relieve pain. When should you call for help? Call your doctor now or seek immediate medical care if:    · You have a new or higher fever.     · You have unusual vaginal bleeding.     · You have new or worse belly or pelvic pain.     · You have vaginal discharge that has increased in amount or smells bad.    Watch closely for changes in your health, and be sure to contact your doctor if:    · You do not get better as expected. Where can you learn more? Go to http://gretel-zane.info/. Enter 580-453-600 in the search box to learn more about \"Pelvic Pain: Care Instructions. \"  Current as of: May 15, 2018  Content Version: 11.8  © 8220-5758 [employer]. Care instructions adapted under license by [employer] (which disclaims liability or warranty for this information). If you have questions about a medical condition or this instruction, always ask your healthcare professional. [place] [address] any warranty or liability for your use of this information.

## 2018-10-16 NOTE — ED PROVIDER NOTES
EMERGENCY DEPARTMENT HISTORY AND PHYSICAL EXAM 
 
Date: 10/16/2018 Patient Name: Brandee Jackson History of Presenting Illness Chief Complaint Patient presents with  Urinary Pain History Provided By: Patient Chief Complaint: Dysuria Duration: 2 Weeks Timing:  Constant Location: NA 
Quality: Aching Severity: 7 out of 10 Modifying Factors: Bactrim, no relief of Sx  
Associated Symptoms: frequent urination, fever (at night), vaginal discharge and lower abdominal pain Additional History (Context): Brandee Jackson is a 41 y.o. female with PMHx of migraines and frequent UTI's who presents with c/o 7 out of 10 constant aching dysuria that started 2 weeks ago with associated Sx of frequent urination, fever (at night), and lower abdominal pain. Pt states when her Sx started she was evaluated by her PCP and prescribed Bactrim. Pt had no relief of Sx. Pt's LMP was 4 days ago. Pt denies nausea, vomiting, and flank pain. Denies any further complaints or symptoms at the moment. PCP: Emiliana Mcgee MD 
 
Current Outpatient Prescriptions Medication Sig Dispense Refill  nitrofurantoin, macrocrystal-monohydrate, (MACROBID) 100 mg capsule Take 1 Cap by mouth two (2) times a day for 7 days. 14 Cap 0  phenazopyridine (PYRIDIUM) 200 mg tablet Take 1 Tab by mouth three (3) times daily for 2 days. 6 Tab 0  
 fluconazole (DIFLUCAN) 150 mg tablet Take 1 Tab by mouth once for 1 dose. Take after completing yeast for prevention of yeast 1 Tab 0 Past History Past Medical History: 
Past Medical History:  
Diagnosis Date  Migraines Past Surgical History: 
Past Surgical History:  
Procedure Laterality Date  HX BREAST BIOPSY Family History: 
History reviewed. No pertinent family history. Social History: 
Social History Substance Use Topics  Smoking status: Never Smoker  Smokeless tobacco: Never Used  Alcohol use No  
 
 
Allergies: Allergies Allergen Reactions  Iodine Anaphylaxis  Shellfish Derived Anaphylaxis  Benadr [Diphenhydramine Hcl] Other (comments) Redness/flushing Review of Systems Review of Systems Constitutional: Positive for fever. Gastrointestinal: Positive for abdominal pain. Negative for nausea and vomiting. Genitourinary: Positive for dysuria, frequency and vaginal discharge. Negative for flank pain. All other systems reviewed and are negative. Physical Exam  
 
Vitals:  
 10/16/18 1406 BP: 114/68 Pulse: 100 Resp: 17 Temp: 98.3 °F (36.8 °C) SpO2: 99% Weight: 62.1 kg (137 lb) Height: 5' 2\" (1.575 m) Physical Exam  
Constitutional: She is oriented to person, place, and time. She appears well-developed and well-nourished. No distress. HENT:  
Head: Normocephalic and atraumatic. Eyes: EOM are normal. Pupils are equal, round, and reactive to light. Neck: Neck supple. Cardiovascular: Normal rate and regular rhythm. Exam reveals no gallop and no friction rub. No murmur heard. Pulmonary/Chest: Effort normal and breath sounds normal. No respiratory distress. She has no wheezes. She has no rales. Abdominal: Soft. Bowel sounds are normal. She exhibits no distension and no mass. There is tenderness. There is no rebound and no guarding.  
+ mild TTP over the suprapubic abd. No CVAT Musculoskeletal: Normal range of motion. She exhibits no tenderness or deformity. Lymphadenopathy:  
  She has no cervical adenopathy. Neurological: She is alert and oriented to person, place, and time. No cranial nerve deficit. Skin: Skin is warm and dry. No rash noted. She is not diaphoretic. Psychiatric: She has a normal mood and affect. Her behavior is normal.  
Nursing note and vitals reviewed. Diagnostic Study Results Labs - Recent Results (from the past 12 hour(s)) URINALYSIS W/ RFLX MICROSCOPIC Collection Time: 10/16/18  2:30 PM  
Result Value Ref Range Color YELLOW Appearance CLEAR Specific gravity <1.005 (L) 1.005 - 1.030  
 pH (UA) 7.5 5.0 - 8.0 Protein NEGATIVE  NEG mg/dL Glucose NEGATIVE  NEG mg/dL Ketone NEGATIVE  NEG mg/dL Bilirubin NEGATIVE  NEG Blood NEGATIVE  NEG Urobilinogen 0.2 0.2 - 1.0 EU/dL Nitrites NEGATIVE  NEG Leukocyte Esterase NEGATIVE  NEG    
HCG URINE, QL Collection Time: 10/16/18  2:30 PM  
Result Value Ref Range HCG urine, QL NEGATIVE  NEG    
WET PREP Collection Time: 10/16/18  2:45 PM  
Result Value Ref Range Special Requests: NO SPECIAL REQUESTS Wet prep NO YEAST,TRICHOMONAS OR CLUE CELLS NOTED Radiologic Studies - No orders to display CT Results  (Last 48 hours) None CXR Results  (Last 48 hours) None Medical Decision Making I am the first provider for this patient. I reviewed the vital signs, available nursing notes, past medical history, past surgical history, family history and social history. Vital Signs-Reviewed the patient's vital signs. Pulse Oximetry Analysis - 99% on RA Records Reviewed: Nursing Notes Procedures: 
Pelvic Exam 
Date/Time: 10/16/2018 2:15 PM 
Performed by: PA 
Procedure duration:  10 minutes. Exam assisted by:  Kalpana Roberson. Type of exam performed: bimanual and speculum. External genitalia appearance: normal.   
Vaginal exam:  discharge. The amount of discharge was:  mild. The discharge was yellow and thick. Cervical exam:  normal.   
Specimen(s) collected:  chlamydia, GC and vaginal culture. Bimanual exam:  normal.   
Patient tolerance: Patient tolerated the procedure well with no immediate complications Provider Notes (Medical Decision Making): Impression:  Dysuria, pelvic pain. Will send patient home with macrobid as well as pyridium. Will await urine culture. Plan to send for Urology follow up for recurrent UTI, PCP. Patient and  agree.   Advised pelvic rest.   RE-exam shows non tender abd which is soft. MICHAEL Helms 
 
MED RECONCILIATION: 
No current facility-administered medications for this encounter. Current Outpatient Prescriptions Medication Sig  
 nitrofurantoin, macrocrystal-monohydrate, (MACROBID) 100 mg capsule Take 1 Cap by mouth two (2) times a day for 7 days.  phenazopyridine (PYRIDIUM) 200 mg tablet Take 1 Tab by mouth three (3) times daily for 2 days.  fluconazole (DIFLUCAN) 150 mg tablet Take 1 Tab by mouth once for 1 dose. Take after completing yeast for prevention of yeast  
 
 
Disposition: 
discharged DISCHARGE NOTE:  
 
 Patient has no new complaints, changes, or physical findings. Care plan outlined and precautions discussed. Results of labs were reviewed with the patient. All medications were reviewed with the patient; will d/c home with macrobid, pyridium, and diflucan to prevent yeasr. All of pt's questions and concerns were addressed. Patient was instructed and agrees to follow up with urology for recurrent UTI and PCP, as well as to return to the ED upon further deterioration. Patient is ready to go home. Follow-up Information Follow up With Details Comments Contact Info [facility] EMERGENCY DEPT Go to If symptoms worsen [address] 
[phone] Helene Kim MD In 1 week For follow-up [id number] [place] [id number] [phone] Brionna Lorenzo MD Schedule an appointment as soon as possible for a visit in 1 week For follow-up [address] [place] [address] 
[phone] Current Discharge Medication List  
  
START taking these medications Details  
nitrofurantoin, macrocrystal-monohydrate, (MACROBID) 100 mg capsule Take 1 Cap by mouth two (2) times a day for 7 days. Qty: 14 Cap, Refills: 0 phenazopyridine (PYRIDIUM) 200 mg tablet Take 1 Tab by mouth three (3) times daily for 2 days. Qty: 6 Tab, Refills: 0  
  
fluconazole (DIFLUCAN) 150 mg tablet Take 1 Tab by mouth once for 1 dose. Take after completing yeast for prevention of yeast 
Qty: 1 Tab, Refills: 0 Diagnosis Clinical Impression: 1. Dysuria 2. Pelvic pain 3. Vaginal discharge Scribe Attestation Aishwarya Gaffney acting as a scribe for and in the presence of Chase Goldsmith, Dario Ambrose October 16, 2018 at 2:13 PM 
    
Provider Attestation:     
I personally performed the services described in the documentation, reviewed the documentation, as recorded by the scribe in my presence, and it accurately and completely records my words and actions.  October 16, 2018 at 2:13 PM - Dario Powers

## 2018-10-18 LAB
BACTERIA SPEC CULT: NORMAL
SERVICE CMNT-IMP: NORMAL

## 2019-03-12 ENCOUNTER — HOSPITAL ENCOUNTER (EMERGENCY)
Age: 39
Discharge: HOME | End: 2019-03-12
Attending: EMERGENCY MEDICINE
Payer: OTHER GOVERNMENT

## 2019-03-12 ENCOUNTER — APPOINTMENT (OUTPATIENT)
Dept: GENERAL RADIOLOGY | Age: 39
End: 2019-03-12
Attending: PHYSICIAN ASSISTANT
Payer: OTHER GOVERNMENT

## 2019-03-12 VITALS
HEART RATE: 87 BPM | OXYGEN SATURATION: 100 % | BODY MASS INDEX: 23.92 KG/M2 | TEMPERATURE: 98 F | WEIGHT: 135 LBS | DIASTOLIC BLOOD PRESSURE: 74 MMHG | RESPIRATION RATE: 17 BRPM | HEIGHT: 63 IN | SYSTOLIC BLOOD PRESSURE: 124 MMHG

## 2019-03-12 DIAGNOSIS — M25.561 ACUTE PAIN OF RIGHT KNEE: Primary | ICD-10-CM

## 2019-03-12 PROCEDURE — 73560 X-RAY EXAM OF KNEE 1 OR 2: CPT

## 2019-03-12 PROCEDURE — 99282 EMERGENCY DEPT VISIT SF MDM: CPT

## 2019-03-12 RX ORDER — IBUPROFEN 800 MG/1
800 TABLET ORAL
Qty: 20 TAB | Refills: 0 | Status: SHIPPED | OUTPATIENT
Start: 2019-03-12 | End: 2019-03-19

## 2019-03-12 NOTE — DISCHARGE INSTRUCTIONS
Patient Education        Knee Pain or Injury: Care Instructions  Your Care Instructions    Injuries are a common cause of knee problems. Sudden (acute) injuries may be caused by a direct blow to the knee. They can also be caused by abnormal twisting, bending, or falling on the knee. Pain, bruising, or swelling may be severe, and may start within minutes of the injury. Overuse is another cause of knee pain. Other causes are climbing stairs, kneeling, and other activities that use the knee. Everyday wear and tear, especially as you get older, also can cause knee pain. Rest, along with home treatment, often relieves pain and allows your knee to heal. If you have a serious knee injury, you may need tests and treatment. Follow-up care is a key part of your treatment and safety. Be sure to make and go to all appointments, and call your doctor if you are having problems. It's also a good idea to know your test results and keep a list of the medicines you take. How can you care for yourself at home? · Be safe with medicines. Read and follow all instructions on the label. ? If the doctor gave you a prescription medicine for pain, take it as prescribed. ? If you are not taking a prescription pain medicine, ask your doctor if you can take an over-the-counter medicine. · Rest and protect your knee. Take a break from any activity that may cause pain. · Put ice or a cold pack on your knee for 10 to 20 minutes at a time. Put a thin cloth between the ice and your skin. · Prop up a sore knee on a pillow when you ice it or anytime you sit or lie down for the next 3 days. Try to keep it above the level of your heart. This will help reduce swelling. · If your knee is not swollen, you can put moist heat, a heating pad, or a warm cloth on your knee. · If your doctor recommends an elastic bandage, sleeve, or other type of support for your knee, wear it as directed.   · Follow your doctor's instructions about how much weight you can put on your leg. Use a cane, crutches, or a walker as instructed. · Follow your doctor's instructions about activity during your healing process. If you can do mild exercise, slowly increase your activity. · Reach and stay at a healthy weight. Extra weight can strain the joints, especially the knees and hips, and make the pain worse. Losing even a few pounds may help. When should you call for help? Call 911 anytime you think you may need emergency care. For example, call if:    · You have symptoms of a blood clot in your lung (called a pulmonary embolism). These may include:  ? Sudden chest pain. ? Trouble breathing. ? Coughing up blood.    Call your doctor now or seek immediate medical care if:    · You have severe or increasing pain.     · Your leg or foot turns cold or changes color.     · You cannot stand or put weight on your knee.     · Your knee looks twisted or bent out of shape.     · You cannot move your knee.     · You have signs of infection, such as:  ? Increased pain, swelling, warmth, or redness. ? Red streaks leading from the knee. ? Pus draining from a place on your knee. ? A fever.     · You have signs of a blood clot in your leg (called a deep vein thrombosis), such as:  ? Pain in your calf, back of the knee, thigh, or groin. ? Redness and swelling in your leg or groin.    Watch closely for changes in your health, and be sure to contact your doctor if:    · You have tingling, weakness, or numbness in your knee.     · You have any new symptoms, such as swelling.     · You have bruises from a knee injury that last longer than 2 weeks.     · You do not get better as expected. Where can you learn more? Go to http://gretel-zane.info/. Enter K195 in the search box to learn more about \"Knee Pain or Injury: Care Instructions. \"  Current as of: September 23, 2018  Content Version: 11.9  © 0973-5720 [employer].  Care instructions adapted under license by [employer] (which disclaims liability or warranty for this information). If you have questions about a medical condition or this instruction, always ask your healthcare professional. [address] any warranty or liability for your use of this information. [employer] Activation    Thank you for requesting access to [employer]. Please follow the instructions below to securely access and download your online medical record. [employer] allows you to send messages to your doctor, view your test results, renew your prescriptions, schedule appointments, and more. How Do I Sign Up? 1. In your internet browser, go to [email or web address]  2. Click on the First Time User? Click Here link in the Sign In box. You will be redirect to the New Member Sign Up page. 3. Enter your [employer] Access Code exactly as it appears below. You will not need to use this code after youve completed the sign-up process. If you do not sign up before the expiration date, you must request a new code. [employer] Access Code: [email or web address]  Expires: 2019  4:04 PM (This is the date your [employer] access code will )    4. Enter the last four digits of your Social Security Number (xxxx) and Date of Birth (mm/dd/yyyy) as indicated and click Submit. You will be taken to the next sign-up page. 5. Create a [employer] ID. This will be your [employer] login ID and cannot be changed, so think of one that is secure and easy to remember. 6. Create a [employer] password. You can change your password at any time. 7. Enter your Password Reset Question and Answer. This can be used at a later time if you forget your password. 8. Enter your e-mail address. You will receive e-mail notification when new information is available in [address] 9. Click Sign Up. You can now view and download portions of your medical record.   10. Click the Download Summary menu link to download a portable copy of your medical information. Additional Information    If you have questions, please visit the Frequently Asked Questions section of the [employer] website at [email or web address] Remember, [employer] is NOT to be used for urgent needs. For medical emergencies, dial 911. Complete all medications as prescribed. Follow-up with primary care doctor in 1 week. Return to the ED immediately for any new or worsening symptoms.

## 2019-03-12 NOTE — ED PROVIDER NOTES
EMERGENCY DEPARTMENT HISTORY AND PHYSICAL EXAM    4:06 PM      Date: 3/12/2019  Patient Name: Steffi Monteiro    History of Presenting Illness     Chief Complaint   Patient presents with    Knee Pain         History Provided By: Patient      Additional History (Context): Steffi Monteiro is a 42 y.o. female with No significant past medical history who presents with constant right knee pain. Pt states that 2 weeks ago she fell in the shower and hit her knee. Her pain is exacerbated with movement, such as walking up the steps, and she states she has not taken any medications for the pain. No other concerns or symptoms at this time. PCP: Dominga Shannon MD    Chief Complaint: Knee pain  Duration: 2 Weeks  Timing:  Constant  Location: Right knee  Quality: N/A  Severity: N/A  Modifying Factors: Exacerbated with movement  Associated Symptoms: denies any other associated signs or symptoms        Past History     Past Medical History:  Past Medical History:   Diagnosis Date    Migraines        Past Surgical History:  Past Surgical History:   Procedure Laterality Date    HX BREAST BIOPSY         Family History:  History reviewed. No pertinent family history. Social History:  Social History     Tobacco Use    Smoking status: Never Smoker    Smokeless tobacco: Never Used   Substance Use Topics    Alcohol use: No    Drug use: No       Allergies: Allergies   Allergen Reactions    Iodine Anaphylaxis    Shellfish Derived Anaphylaxis    Benadr [Diphenhydramine Hcl] Other (comments)     Redness/flushing         Review of Systems       Review of Systems   Constitutional: Negative for activity change, fatigue and fever. HENT: Negative for congestion and rhinorrhea. Eyes: Negative for visual disturbance. Respiratory: Negative for shortness of breath. Cardiovascular: Negative for chest pain and palpitations. Gastrointestinal: Negative for abdominal pain, diarrhea, nausea and vomiting.    Genitourinary: Negative for dysuria and hematuria. Musculoskeletal: Positive for arthralgias (right knee). Negative for back pain. Skin: Negative for rash. Neurological: Negative for dizziness, weakness and light-headedness. All other systems reviewed and are negative. Physical Exam     Visit Vitals  /74 (BP 1 Location: Right arm, BP Patient Position: At rest)   Pulse 87   Temp 98 °F (36.7 °C)   Resp 17   Ht 5' 3\" (1.6 m)   Wt 61.2 kg (135 lb)   LMP 03/12/2019   SpO2 100%   BMI 23.91 kg/m²         Physical Exam   Constitutional: She is oriented to person, place, and time. She appears well-developed and well-nourished. No distress. HENT:   Head: Normocephalic and atraumatic. Eyes: Conjunctivae are normal. Right eye exhibits no discharge. Left eye exhibits no discharge. No scleral icterus. Neck: Normal range of motion. Neck supple. Cardiovascular: Normal rate. Pulmonary/Chest: Effort normal. No stridor. No respiratory distress. Musculoskeletal: Normal range of motion. She exhibits tenderness. She exhibits no edema or deformity. TTP noted to dorsum of the R patella and to the R popliteal region. No LE edema or deformity noted. ROM of all joints intact bilaterally. Neurological: She is alert and oriented to person, place, and time. Coordination normal.   Gait is steady. Able to ambulate without difficulty. Skin: Skin is warm and dry. No rash noted. She is not diaphoretic. No erythema. Psychiatric: She has a normal mood and affect. Her behavior is normal. Thought content normal.   Nursing note and vitals reviewed. Diagnostic Study Results     Labs -  No results found for this or any previous visit (from the past 12 hour(s)). Radiologic Studies -   XR KNEE RT MAX 2 VWS    (Results Pending)   no acute process or fx noted      Medical Decision Making     It should be noted that Dewey PETERSON April J, PA-C  will be the provider of record for this patient.     I reviewed the vital signs, available nursing notes, past medical history, past surgical history, family history and social history. Vital Signs-Reviewed the patient's vital signs. Pulse Oximetry Analysis -  100% on room air, normal    Cardiac Monitor:  Rate: 87 BPM    Records Reviewed: Nursing Notes and Old Medical Records (Time of Review: 4:06 PM)    ED Course: Progress Notes, Reevaluation, and Consults:      Provider Notes (Medical Decision Making): Impression:  Fall, knee pain    No fx noted on x-ray, will d/c with nsaids and pcp/ortho follow-up. Pt agrees. Sinai Palomo PA-C       Diagnosis     Clinical Impression:   1. Acute pain of right knee        Disposition: d/c    Follow-up Information     Follow up With Specialties Details Why Contact Info    Adrian Madrigal MD Orthopedic Surgery Schedule an appointment as soon as possible for a visit in 1 week  [address]  [address] [facility] EMERGENCY DEPT Emergency Medicine  As needed, If symptoms worsen [address]  [phone]              Medication List      START taking these medications    ibuprofen 800 mg tablet  Commonly known as:  MOTRIN  Take 1 Tab by mouth every six (6) hours as needed for Pain for up to 7 days. Where to Get Your Medications      Information about where to get these medications is not yet available    Ask your nurse or doctor about these medications  · ibuprofen 800 mg tablet       _______________________________       Homar Aguilar acting as a scribe for and in the presence of Sinai Palomo PA-C       March 12, 2019 at 5:47 PM       Provider Attestation:      I personally performed the services described in the documentation, reviewed the documentation, as recorded by the scribe in my presence, and it accurately and completely records my words and actions.  March 12, 2019 at 5:47 PM - Sinai Ch [place] _______________________________

## 2019-04-14 ENCOUNTER — APPOINTMENT (OUTPATIENT)
Dept: CT IMAGING | Age: 39
End: 2019-04-14
Attending: PHYSICIAN ASSISTANT
Payer: OTHER GOVERNMENT

## 2019-04-14 ENCOUNTER — HOSPITAL ENCOUNTER (EMERGENCY)
Age: 39
Discharge: HOME | End: 2019-04-14
Attending: EMERGENCY MEDICINE
Payer: OTHER GOVERNMENT

## 2019-04-14 VITALS
SYSTOLIC BLOOD PRESSURE: 100 MMHG | BODY MASS INDEX: 26.22 KG/M2 | OXYGEN SATURATION: 100 % | HEART RATE: 77 BPM | WEIGHT: 148 LBS | DIASTOLIC BLOOD PRESSURE: 68 MMHG | RESPIRATION RATE: 16 BRPM | TEMPERATURE: 98 F | HEIGHT: 63 IN

## 2019-04-14 DIAGNOSIS — R10.84 ABDOMINAL PAIN, GENERALIZED: Primary | ICD-10-CM

## 2019-04-14 LAB
ALBUMIN SERPL-MCNC: 4.1 G/DL (ref 3.4–5)
ALBUMIN/GLOB SERPL: 1.1 {RATIO} (ref 0.8–1.7)
ALP SERPL-CCNC: 51 U/L (ref 45–117)
ALT SERPL-CCNC: 16 U/L (ref 13–56)
ANION GAP SERPL CALC-SCNC: 7 MMOL/L (ref 3–18)
APPEARANCE UR: CLEAR
AST SERPL-CCNC: 11 U/L (ref 15–37)
BASOPHILS # BLD: 0.1 K/UL (ref 0–0.1)
BASOPHILS NFR BLD: 2 % (ref 0–2)
BILIRUB SERPL-MCNC: 0.6 MG/DL (ref 0.2–1)
BILIRUB UR QL: NEGATIVE
BUN SERPL-MCNC: 5 MG/DL (ref 7–18)
BUN/CREAT SERPL: 7 (ref 12–20)
CALCIUM SERPL-MCNC: 8.4 MG/DL (ref 8.5–10.1)
CHLORIDE SERPL-SCNC: 103 MMOL/L (ref 100–108)
CO2 SERPL-SCNC: 27 MMOL/L (ref 21–32)
COLOR UR: YELLOW
CREAT SERPL-MCNC: 0.67 MG/DL (ref 0.6–1.3)
DIFFERENTIAL METHOD BLD: NORMAL
EOSINOPHIL # BLD: 0.3 K/UL (ref 0–0.4)
EOSINOPHIL NFR BLD: 5 % (ref 0–5)
ERYTHROCYTE [DISTWIDTH] IN BLOOD BY AUTOMATED COUNT: 14 % (ref 11.6–14.5)
GLOBULIN SER CALC-MCNC: 3.6 G/DL (ref 2–4)
GLUCOSE SERPL-MCNC: 85 MG/DL (ref 74–99)
GLUCOSE UR STRIP.AUTO-MCNC: NEGATIVE MG/DL
HCG UR QL: NEGATIVE
HCT VFR BLD AUTO: 38.2 % (ref 35–45)
HGB BLD-MCNC: 12.6 G/DL (ref 12–16)
HGB UR QL STRIP: NEGATIVE
KETONES UR QL STRIP.AUTO: NEGATIVE MG/DL
LEUKOCYTE ESTERASE UR QL STRIP.AUTO: NEGATIVE
LIPASE SERPL-CCNC: 224 U/L (ref 73–393)
LYMPHOCYTES # BLD: 2.2 K/UL (ref 0.9–3.6)
LYMPHOCYTES NFR BLD: 37 % (ref 21–52)
MCH RBC QN AUTO: 28 PG (ref 24–34)
MCHC RBC AUTO-ENTMCNC: 33 G/DL (ref 31–37)
MCV RBC AUTO: 84.9 FL (ref 74–97)
MONOCYTES # BLD: 0.5 K/UL (ref 0.05–1.2)
MONOCYTES NFR BLD: 8 % (ref 3–10)
NEUTS SEG # BLD: 2.8 K/UL (ref 1.8–8)
NEUTS SEG NFR BLD: 48 % (ref 40–73)
NITRITE UR QL STRIP.AUTO: NEGATIVE
PH UR STRIP: 7.5 [PH] (ref 5–8)
PLATELET # BLD AUTO: 352 K/UL (ref 135–420)
PMV BLD AUTO: 11 FL (ref 9.2–11.8)
POTASSIUM SERPL-SCNC: 3.7 MMOL/L (ref 3.5–5.5)
PROT SERPL-MCNC: 7.7 G/DL (ref 6.4–8.2)
PROT UR STRIP-MCNC: NEGATIVE MG/DL
RBC # BLD AUTO: 4.5 M/UL (ref 4.2–5.3)
SODIUM SERPL-SCNC: 137 MMOL/L (ref 136–145)
SP GR UR REFRACTOMETRY: <1.005 (ref 1–1.03)
UROBILINOGEN UR QL STRIP.AUTO: 0.2 EU/DL (ref 0.2–1)
WBC # BLD AUTO: 5.9 K/UL (ref 4.6–13.2)

## 2019-04-14 PROCEDURE — 83690 ASSAY OF LIPASE: CPT

## 2019-04-14 PROCEDURE — 74011636320 HC RX REV CODE- 636/320: Performed by: EMERGENCY MEDICINE

## 2019-04-14 PROCEDURE — 85025 COMPLETE CBC W/AUTO DIFF WBC: CPT

## 2019-04-14 PROCEDURE — 74177 CT ABD & PELVIS W/CONTRAST: CPT

## 2019-04-14 PROCEDURE — 81003 URINALYSIS AUTO W/O SCOPE: CPT

## 2019-04-14 PROCEDURE — 81025 URINE PREGNANCY TEST: CPT

## 2019-04-14 PROCEDURE — 99283 EMERGENCY DEPT VISIT LOW MDM: CPT

## 2019-04-14 PROCEDURE — 80053 COMPREHEN METABOLIC PANEL: CPT

## 2019-04-14 RX ADMIN — IOPAMIDOL 100 ML: 612 INJECTION, SOLUTION INTRAVENOUS at 19:27

## 2019-04-14 NOTE — ED NOTES
Patient's allergy updated to show only shellfish allergy. Previous iodine listed as anaphylactic reaction, however patient states she does not have a reaction to iodine. This was confirmed as she received a CT of her abdomen and pelvis with contrast approximately 2 weeks prior with no reaction.  
Yassine Frost PA-C

## 2019-04-15 NOTE — DISCHARGE INSTRUCTIONS

## 2019-04-15 NOTE — ED NOTES
I have reviewed discharge instructions with the patient. The patient verbalized understanding. Patient armband removed and shredded. No prescriptions given. Pt ambulated out of the ED.

## 2019-04-15 NOTE — ED PROVIDER NOTES
EMERGENCY DEPARTMENT HISTORY AND PHYSICAL EXAM 
 
Date: 4/14/2019 Patient Name: Concepción Flores History of Presenting Illness Chief Complaint Patient presents with  Abdominal Pain History Provided By: Patient Chief Complaint: lower abd pain Duration: 2 Days Timing:  Acute Location: right lower quadrant Quality: Pressure and Tightness Severity: Moderate Modifying Factors: recent appendectomy 3 weeks prior Associated Symptoms: occasional nausea and fever HPI: Concepción Flores is a 42 y.o. female with a PMH of migraines who presents to the ER complaining of lower abdominal pain. Patient states her pain began yesterday and continues to worsen. She reports feeling like she needed to pass gas at that time, try to push and then felt a pain in her right lower quadrant. Patient reports intermittent nausea and low-grade fever as well. She recently had an appendectomy at [facility] about 3 weeks prior. She just finished her menses 2 days ago denied any concern for pregnancy. She has been having normal bowel movements. She denied any recent shortness of breath, chest pain, palpitations, vomiting, dysuria, bloody stools and has no other symptoms or complaints. PCP: Dominga Shannon MD 
 
Current Outpatient Medications Medication Sig Dispense Refill  SUMAtriptan succinate 6 mg/0.5 mL kit Past History Past Medical History: 
Past Medical History:  
Diagnosis Date  Migraines Past Surgical History: 
Past Surgical History:  
Procedure Laterality Date  APPENDECTOMY  HX BREAST BIOPSY Family History: 
History reviewed. No pertinent family history. Social History: 
Social History Tobacco Use  Smoking status: Never Smoker  Smokeless tobacco: Never Used Substance Use Topics  Alcohol use: No  
 Drug use: No  
 
 
Allergies: Allergies Allergen Reactions  Shellfish Derived Anaphylaxis  Benadr [Diphenhydramine Hcl] Other (comments) Redness/flushing Review of Systems Review of Systems Constitutional: Negative for chills, fatigue and fever. HENT: Negative. Negative for sore throat. Eyes: Negative. Respiratory: Negative for cough and shortness of breath. Cardiovascular: Negative for chest pain and palpitations. Gastrointestinal: Positive for abdominal pain and nausea. Negative for constipation, diarrhea and vomiting. Genitourinary: Negative for dysuria. Musculoskeletal: Negative. Skin: Negative. Neurological: Negative for dizziness, weakness, light-headedness and headaches. Psychiatric/Behavioral: Negative. All other systems reviewed and are negative. Physical Exam  
 
Vitals:  
 04/14/19 1646 04/14/19 2041 BP: 111/72 100/68 Pulse: 76 77 Resp: 18 16 Temp: 98 °F (36.7 °C) SpO2: 100% 100% Weight: 67.1 kg (148 lb) Height: 5' 3\" (1.6 m) Physical Exam  
Constitutional: She is oriented to person, place, and time. She appears well-developed and well-nourished. No distress. HENT:  
Head: Normocephalic and atraumatic. Mouth/Throat: Oropharynx is clear and moist.  
Eyes: Conjunctivae are normal. No scleral icterus. Neck: Neck supple. No JVD present. No tracheal deviation present. Cardiovascular: Normal rate, regular rhythm and normal heart sounds. No murmur heard. Pulmonary/Chest: Effort normal and breath sounds normal. No respiratory distress. She has no wheezes. She has no rales. Abdominal: Soft. Bowel sounds are normal. She exhibits no distension and no mass. There is tenderness in the right lower quadrant, periumbilical area, suprapubic area and left lower quadrant. There is no rigidity, no rebound, no guarding, no CVA tenderness, no tenderness at McBurney's point and negative Post's sign. No peritoneal signs; well healing incisions from recent appendectomy noted on exam; no signs of cellulitis or infection Musculoskeletal: Normal range of motion. Neurological: She is alert and oriented to person, place, and time. She has normal strength. Gait normal. GCS eye subscore is 4. GCS verbal subscore is 5. GCS motor subscore is 6. Skin: Skin is warm and dry. She is not diaphoretic. Psychiatric: She has a normal mood and affect. Nursing note and vitals reviewed. Diagnostic Study Results Labs - Recent Results (from the past 12 hour(s)) URINALYSIS W/ RFLX MICROSCOPIC Collection Time: 04/14/19  4:51 PM  
Result Value Ref Range Color YELLOW Appearance CLEAR Specific gravity <1.005 (L) 1.005 - 1.030  
 pH (UA) 7.5 5.0 - 8.0 Protein NEGATIVE  NEG mg/dL Glucose NEGATIVE  NEG mg/dL Ketone NEGATIVE  NEG mg/dL Bilirubin NEGATIVE  NEG Blood NEGATIVE  NEG Urobilinogen 0.2 0.2 - 1.0 EU/dL Nitrites NEGATIVE  NEG Leukocyte Esterase NEGATIVE  NEG    
HCG URINE, QL Collection Time: 04/14/19  4:51 PM  
Result Value Ref Range HCG urine, QL NEGATIVE  NEG    
CBC WITH AUTOMATED DIFF Collection Time: 04/14/19  5:15 PM  
Result Value Ref Range WBC 5.9 4.6 - 13.2 K/uL  
 RBC 4.50 4. 20 - 5.30 M/uL  
 HGB 12.6 12.0 - 16.0 g/dL HCT 38.2 35.0 - 45.0 % MCV 84.9 74.0 - 97.0 FL  
 MCH 28.0 24.0 - 34.0 PG  
 MCHC 33.0 31.0 - 37.0 g/dL  
 RDW 14.0 11.6 - 14.5 % PLATELET 522 474 - 030 K/uL MPV 11.0 9.2 - 11.8 FL  
 NEUTROPHILS 48 40 - 73 % LYMPHOCYTES 37 21 - 52 % MONOCYTES 8 3 - 10 % EOSINOPHILS 5 0 - 5 % BASOPHILS 2 0 - 2 %  
 ABS. NEUTROPHILS 2.8 1.8 - 8.0 K/UL  
 ABS. LYMPHOCYTES 2.2 0.9 - 3.6 K/UL  
 ABS. MONOCYTES 0.5 0.05 - 1.2 K/UL  
 ABS. EOSINOPHILS 0.3 0.0 - 0.4 K/UL  
 ABS. BASOPHILS 0.1 0.0 - 0.1 K/UL  
 DF AUTOMATED METABOLIC PANEL, COMPREHENSIVE Collection Time: 04/14/19  5:15 PM  
Result Value Ref Range Sodium 137 136 - 145 mmol/L Potassium 3.7 3.5 - 5.5 mmol/L  Chloride 103 100 - 108 mmol/L  
 CO2 27 21 - 32 mmol/L  
 Anion gap 7 3.0 - 18 mmol/L Glucose 85 74 - 99 mg/dL BUN 5 (L) 7.0 - 18 MG/DL Creatinine 0.67 0.6 - 1.3 MG/DL  
 BUN/Creatinine ratio 7 (L) 12 - 20 GFR est AA >60 >60 ml/min/1.73m2 GFR est non-AA >60 >60 ml/min/1.73m2 Calcium 8.4 (L) 8.5 - 10.1 MG/DL Bilirubin, total 0.6 0.2 - 1.0 MG/DL  
 ALT (SGPT) 16 13 - 56 U/L  
 AST (SGOT) 11 (L) 15 - 37 U/L Alk. phosphatase 51 45 - 117 U/L Protein, total 7.7 6.4 - 8.2 g/dL Albumin 4.1 3.4 - 5.0 g/dL Globulin 3.6 2.0 - 4.0 g/dL A-G Ratio 1.1 0.8 - 1.7 LIPASE Collection Time: 04/14/19  5:15 PM  
Result Value Ref Range Lipase 224 73 - 393 U/L Radiologic Studies -  
CT ABD PELV W CONT    (Results Pending) CT Results  (Last 48 hours) None CXR Results  (Last 48 hours) None Medical Decision Making I am the first provider for this patient. I reviewed the vital signs, available nursing notes, past medical history, past surgical history, family history and social history. Vital Signs-Reviewed the patient's vital signs. Records Reviewed: Nursing Notes, Old Medical Records, Previous Radiology Studies and Previous Laboratory Studies 614 PM 
17-year-old female presents the ER complaining of right lower quadrant abdominal pain onset yesterday and worsening today. Patient states she felt like she had to pass gas, tried to push and then felt a sharp pain in her right lower quadrant that has persisted. Patient expressed concern as she just had an appendectomy at [facility] on 3/25 with no further complications. She also reports intermittent fevers and occasional nausea that has been controlled with medications. Her last menstrual cycle just finished 2 days prior. She denies any concern for pregnancy. Mild tenderness noted on exam with no peritoneal signs. No signs of urinary tract infection based on urinalysis. Labs unremarkable. We will plan on imaging of abdomen to rule out infection or other source. Nguyễn Akins PA-C 
  
9:07 PM 
Labs and urinalysis reviewed and noted to be reassuring with no acute process. CT abdomen pelvis wet read findings showed no acute intra-abdominal pathology. The nonspecific nondilated fluid-filled small loops in the right lower quadrant and pelvis. Discussed results with patient and family member bedside. No clinical indication for further intervention at this time. Will have patient follow-up with her general surgeon at [facility] this [place] if pain persists. Discussed strict return precautions. All questions answered and patient in agreement with plan of care. Will plan for discharge. Nguyễn Akins PA-C Disposition: 
discharged DISCHARGE NOTE:  
 
  Care plan outlined and precautions discussed. Patient has no new complaints, changes, or physical findings. Results of labs, imaging were reviewed with the patient. All medications were reviewed with the patient; will d/c home with n/a. All of pt's questions and concerns were addressed. Patient was instructed and agrees to follow up with pcp and general surgery, as well as to return to the ED upon further deterioration. Patient is ready to go home. Follow-up Information Follow up With Specialties Details Why Contact [facility] EMERGENCY DEPT Emergency Medicine  If symptoms worsen [address] [facility] 
[phone] Deyanira Price MD Surgery Call in 2 days general surgery follow up for abdominal pain [facility]  [address] [facility] [id number] [phone] Current Discharge Medication List  
  
CONTINUE these medications which have NOT CHANGED Details SUMAtriptan succinate 6 mg/0.5 mL kit Provider Notes (Medical Decision Making):  
 
Procedures: 
Procedures Diagnosis Clinical Impression: 1. Abdominal pain, generalized

## 2019-09-15 ENCOUNTER — HOSPITAL ENCOUNTER (EMERGENCY)
Facility: HOSPITAL | Age: 39
Discharge: HOME | End: 2019-09-15
Attending: EMERGENCY MEDICINE
Payer: OTHER GOVERNMENT

## 2019-09-15 VITALS
TEMPERATURE: 99 F | HEART RATE: 77 BPM | HEIGHT: 66 IN | OXYGEN SATURATION: 99 % | SYSTOLIC BLOOD PRESSURE: 120 MMHG | RESPIRATION RATE: 18 BRPM | BODY MASS INDEX: 21.58 KG/M2 | WEIGHT: 134.31 LBS | DIASTOLIC BLOOD PRESSURE: 72 MMHG

## 2019-09-15 DIAGNOSIS — L24.3 IRRITANT CONTACT DERMATITIS DUE TO COSMETICS: Primary | ICD-10-CM

## 2019-09-15 LAB
B-HCG UR QL: NEGATIVE
CTP QC/QA: YES

## 2019-09-15 PROCEDURE — 99283 EMERGENCY DEPT VISIT LOW MDM: CPT

## 2019-09-15 PROCEDURE — 81025 URINE PREGNANCY TEST: CPT | Performed by: PHYSICIAN ASSISTANT

## 2019-09-15 RX ORDER — HYDROCORTISONE 25 MG/G
CREAM TOPICAL 2 TIMES DAILY
Qty: 1 TUBE | Refills: 0 | Status: SHIPPED | OUTPATIENT
Start: 2019-09-15 | End: 2020-08-19

## 2019-09-16 NOTE — ED PROVIDER NOTES
"Encounter Date: 9/15/2019    SCRIBE #1 NOTE: IElena, am scribing for, and in the presence of,  Raji Person PA-C. I have scribed the following portions of the note - Other sections scribed: HPI,ROS,PE.       History     Chief Complaint   Patient presents with    Allergic Reaction     pt c/o allergic reaction after using retinol skin creme for the last wk. pt reports burning sensation on the both sides of her nose x 2 days     CC: Allergic reaction    HPI: This is a 39 y.o.female patient, with a PMHx of asthma , presenting to the ED with a complaint of an allergic reaction, that occurred x2 days ago. Patient reports using a Retinol skin creme for the past week. She reports 6/10 facial burning, facial swelling, eye heaviness, and eye pressure. She states seeing "floaters". She states attempting prior Tx with Aloe vera and Cocoa butter. No photophobia or throat swelling. Patient denies any fever, chills, shortness of breath, chest pain, neck pain, back pain, abdominal pain, rash, headaches, congestion, rhinorrhea, cough, sore throat, ear pain, eye pain, blurred vision, nausea, vomiting, diarrhea, dysuria, or any other associated symptoms. No prior Tx. No alleviating or aggravating factors. No known drug allergies.      The history is provided by the patient and the spouse.     Review of patient's allergies indicates:   Allergen Reactions    Iodine and iodide containing products Anaphylaxis    Benadryl [diphenhydramine hcl] Itching     "with fast injection"    Fish containing products      Past Medical History:   Diagnosis Date    Anemia     Anemia     Asthma     exercise asthma    Migraine headache     Ovarian cyst      Past Surgical History:   Procedure Laterality Date    BREAST LUMPECTOMY Right     BREAST LUMPECTOMY      right     Family History   Problem Relation Age of Onset    Asthma Mother     Miscarriages / Stillbirths Mother     Arthritis Father     COPD Father     Asthma Brother  "    Cancer Maternal Aunt     Cancer Maternal Uncle     Diabetes Maternal Uncle     Hypertension Maternal Uncle     Hearing loss Paternal Aunt     Hearing loss Paternal Uncle     Cancer Maternal Grandmother     Cancer Maternal Grandfather     Hearing loss Paternal Grandmother     Hearing loss Paternal Grandfather      Social History     Tobacco Use    Smoking status: Never Smoker    Smokeless tobacco: Never Used   Substance Use Topics    Alcohol use: No    Drug use: No     Review of Systems   Constitutional: Negative for chills and fever.   HENT: Positive for facial swelling. Negative for congestion, ear pain, rhinorrhea and sore throat.         +facial burning  -throat swelling   Eyes: Negative for photophobia, pain and visual disturbance.        +eye heaviness  +eye pressure   Respiratory: Negative for cough and shortness of breath.    Cardiovascular: Negative for chest pain.   Gastrointestinal: Negative for abdominal pain, diarrhea, nausea and vomiting.   Genitourinary: Negative for dysuria.   Musculoskeletal: Negative for back pain and neck pain.   Skin: Negative for rash.   Neurological: Negative for headaches.       Physical Exam     Initial Vitals [09/15/19 1942]   BP Pulse Resp Temp SpO2   132/61 96 18 98.3 °F (36.8 °C) 99 %      MAP       --         Physical Exam    Constitutional: She appears well-developed and well-nourished. No distress.   HENT:   Head: Normocephalic and atraumatic.   Nose: Nose normal.   Mouth/Throat: Oropharynx is clear and moist. No oropharyngeal exudate, posterior oropharyngeal edema, posterior oropharyngeal erythema or tonsillar abscesses.   Eyes: EOM are normal. Pupils are equal, round, and reactive to light.   Mild bilateral periorbital swelling.  No tenderness or erythema.   Neck: Normal range of motion. Neck supple.   Cardiovascular: Normal rate, regular rhythm and normal heart sounds. Exam reveals no gallop and no friction rub.    No murmur heard.  Pulmonary/Chest:  Breath sounds normal. No respiratory distress. She has no wheezes. She has no rhonchi. She has no rales.   Abdominal: Soft. Bowel sounds are normal. There is no tenderness. There is no rebound and no guarding.   Musculoskeletal: Normal range of motion.   Neurological: She is alert and oriented to person, place, and time. She has normal strength. No cranial nerve deficit or sensory deficit.   Skin: Skin is warm and dry. Capillary refill takes less than 2 seconds.   Psychiatric: She has a normal mood and affect.         ED Course   Procedures  Labs Reviewed   POCT URINE PREGNANCY          Imaging Results    None          Medical Decision Making:   History:   Old Medical Records: I decided to obtain old medical records.  ED Management:  Presentation consistent with a contact dermatitis.  No cellulitis, anaphylaxis, or angioedema.  Advised she discontinue use of cosmetic cream.  Will offer hydrocortisone cream for possible symptom control.  Advising follow-up with PCP.  Strict return precautions discussed with patient.  Patient agreeable plan.                      Clinical Impression:       ICD-10-CM ICD-9-CM   1. Irritant contact dermatitis due to cosmetics L24.3 692.81             Scribe attestation: I, Raji Person PA-C, personally performed the services described in this documentation. All medical record entries made by the scribe were at my direction and in my presence.  I have reviewed the chart and agree that the record reflects my personal performance and is accurate and complete.                     Raji Person PA-C  09/15/19 [id number]

## 2019-09-17 DIAGNOSIS — G43.109 MIGRAINE WITH AURA: Primary | ICD-10-CM

## 2019-09-24 ENCOUNTER — TELEPHONE (OUTPATIENT)
Dept: RADIOLOGY | Facility: HOSPITAL | Age: 39
End: 2019-09-24

## 2019-12-22 ENCOUNTER — HOSPITAL ENCOUNTER (EMERGENCY)
Facility: HOSPITAL | Age: 39
Discharge: HOME | End: 2019-12-22
Attending: EMERGENCY MEDICINE
Payer: OTHER GOVERNMENT

## 2019-12-22 VITALS
WEIGHT: 135 LBS | OXYGEN SATURATION: 98 % | RESPIRATION RATE: 18 BRPM | SYSTOLIC BLOOD PRESSURE: 102 MMHG | DIASTOLIC BLOOD PRESSURE: 68 MMHG | TEMPERATURE: 98 F | BODY MASS INDEX: 23.92 KG/M2 | HEIGHT: 63 IN | HEART RATE: 71 BPM

## 2019-12-22 DIAGNOSIS — M25.569 KNEE PAIN: ICD-10-CM

## 2019-12-22 LAB
B-HCG UR QL: NEGATIVE
CTP QC/QA: YES

## 2019-12-22 PROCEDURE — 81025 URINE PREGNANCY TEST: CPT | Performed by: PHYSICIAN ASSISTANT

## 2019-12-22 PROCEDURE — 99284 EMERGENCY DEPT VISIT MOD MDM: CPT | Mod: 25

## 2019-12-22 PROCEDURE — 29505 APPLICATION LONG LEG SPLINT: CPT | Mod: LT

## 2019-12-22 RX ORDER — FERROUS GLUCONATE 324(38)MG
TABLET ORAL
COMMUNITY
End: 2019-12-22 | Stop reason: CLARIF

## 2019-12-22 RX ORDER — IBUPROFEN 600 MG/1
600 TABLET ORAL EVERY 6 HOURS PRN
Qty: 20 TABLET | Refills: 0 | Status: SHIPPED | OUTPATIENT
Start: 2019-12-22 | End: 2019-12-27

## 2019-12-22 RX ORDER — LIDOCAINE 50 MG/G
1 PATCH TOPICAL DAILY
Qty: 15 PATCH | Refills: 0 | Status: SHIPPED | OUTPATIENT
Start: 2019-12-22 | End: 2020-01-06

## 2019-12-22 RX ORDER — ACETAMINOPHEN 500 MG
500 TABLET ORAL EVERY 4 HOURS PRN
Qty: 20 TABLET | Refills: 0 | Status: SHIPPED | OUTPATIENT
Start: 2019-12-22 | End: 2019-12-27

## 2019-12-22 RX ORDER — SUMATRIPTAN 6 MG/.5ML
INJECTION, SOLUTION SUBCUTANEOUS
COMMUNITY
End: 2021-03-31 | Stop reason: SDUPTHER

## 2019-12-22 NOTE — DISCHARGE INSTRUCTIONS
Take medications as prescribed. Follow up with orthopedics in 1-2 days for further evaluation and management. Return to ER for worsening symptoms or as needed

## 2019-12-22 NOTE — ED PROVIDER NOTES
"Encounter Date: 12/22/2019    SCRIBE #1 NOTE: I, Codi Rodriguez, am scribing for, and in the presence of,  Tami Pena PA-C. I have scribed the following portions of the note - Other sections scribed: HPI, ROS.       History     Chief Complaint   Patient presents with    Leg Pain     x1 dy left leg becameweak and gave out  while standing at work, pt cought herself,  denies fall, n/n     CC: Knee pain    HPI:  This is a 39 y.o. female with a PMHx of Anemia and Migraine who presents to the Emergency Department complaining of 7/10 left knee pain that began yesterday. 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 She denies fall or trauma to the knee. Pt reports an associated symptom of weakness. She denies back pain, numbness, fever, chills, nausea, or vomiting. Knee pain worsens with movement. Pt has not taken medications to treat pain. Pt has no history of knee surgeries or injuries.    The history is provided by the patient.     Review of patient's allergies indicates:   Allergen Reactions    Iodine and iodide containing products Anaphylaxis    Benadryl [diphenhydramine hcl] Itching     "with fast injection"    Fish containing products      Past Medical History:   Diagnosis Date    Anemia     Anemia     Asthma     exercise asthma    Migraine headache     Ovarian cyst      Past Surgical History:   Procedure Laterality Date    BREAST LUMPECTOMY Right     BREAST LUMPECTOMY      right     Family History   Problem Relation Age of Onset    Asthma Mother     Miscarriages / Stillbirths Mother     Arthritis Father     COPD Father     Asthma Brother     Cancer Maternal Aunt     Cancer Maternal Uncle     Diabetes Maternal Uncle     Hypertension Maternal Uncle     Hearing loss Paternal Aunt     Hearing loss Paternal Uncle     Cancer Maternal Grandmother     Cancer Maternal Grandfather     Hearing loss Paternal Grandmother     Hearing loss Paternal Grandfather      Social " History     Tobacco Use    Smoking status: Never Smoker    Smokeless tobacco: Never Used   Substance Use Topics    Alcohol use: No    Drug use: No     Review of Systems   Constitutional: Negative for chills and fever.   HENT: Negative for congestion, rhinorrhea and sore throat.    Eyes: Negative for redness.   Respiratory: Negative for shortness of breath.    Cardiovascular: Negative for chest pain.   Gastrointestinal: Negative for nausea and vomiting.   Genitourinary: Negative for dysuria, frequency, hematuria and urgency.   Musculoskeletal: Positive for myalgias (left knee). Negative for back pain and neck pain.   Skin: Negative for rash and wound.   Neurological: Positive for weakness. Negative for numbness.       Physical Exam     Initial Vitals [12/22/19 1411]   BP Pulse Resp Temp SpO2   104/61 80 18 99.2 °F (37.3 °C) 100 %      MAP       --         Physical Exam    Nursing note and vitals reviewed.  Constitutional: She appears well-developed and well-nourished. No distress.   HENT:   Head: Normocephalic.   Right Ear: External ear normal.   Left Ear: External ear normal.   Eyes: Conjunctivae are normal.   Cardiovascular:   Pulses:       Dorsalis pedis pulses are 2+ on the right side, and 2+ on the left side.   Musculoskeletal:   TTP over medial L knee. + crepitus. No overlying erythema. Pt able to flex and extend knee. Pain worse with application of pressure to L knee. Negative valgus and varus stress test and anterior and posterior drawer tests    Neurological: She is alert. No sensory deficit.   Skin: Skin is warm and dry. No rash and no abscess noted. No erythema.   Psychiatric: She has a normal mood and affect.         ED Course   Procedures  Labs Reviewed   POCT URINE PREGNANCY          Imaging Results          X-Ray Knee 3 View Left (Final result)  Result time 12/22/19 14:55:55    Final result by Rey Tamez MD (12/22/19 14:55:55)                 Impression:      No acute displaced  fracture-dislocation identified.      Electronically signed by: Rey Tamez MD  Date:    12/22/2019  Time:    14:55             Narrative:    EXAMINATION:  XR KNEE 3 VIEW LEFT    CLINICAL HISTORY:  Pain in unspecified knee    TECHNIQUE:  AP, lateral, and Merchant views of the left knee were performed.    COMPARISON:  None    FINDINGS:  Bones are well mineralized. Overall alignment is within normal limits. No displaced fracture, dislocation or destructive osseous process.  No large suprapatellar joint effusion.  Joint spaces appear relatively maintained. No subcutaneous emphysema or radiodense retained foreign body.                                 Medical Decision Making:   Initial Assessment:   39-year-old female presenting for evaluation of atraumatic left knee pain after her left knee gave out.  Denies any fall or direct trauma, weakness, paresthesias, fever, chills, fracture wound.  Exam above.  No neurovascular deficits.  No lower extremity swelling. Considered but doubt septic joint.  X-ray negative for fracture, dislocation or arthritic changes. Likely knee sprain. Will apply knee immobilizer and have follow up with ortho in 1-2 days. Return to ER for worsening symptoms or as needed.             Scribe Attestation:   Scribe #1: I performed the above scribed service and the documentation accurately describes the services I performed. I attest to the accuracy of the note.                          Clinical Impression:       ICD-10-CM ICD-9-CM   1. Knee pain M25.569 719.46               Scribe attestation: I, Tami Pena PA-C personally performed the services described in this documentation. All medical record entries made by the scribe were at my direction and in my presence.  I have reviewed the chart and agree that the record reflects my personal performance and is accurate and complete.               Tami Pena PA-C  12/22/19 2645

## 2020-01-12 ENCOUNTER — HOSPITAL ENCOUNTER (EMERGENCY)
Facility: HOSPITAL | Age: 40
Discharge: HOME | End: 2020-01-12
Attending: EMERGENCY MEDICINE
Payer: OTHER GOVERNMENT

## 2020-01-12 VITALS
OXYGEN SATURATION: 97 % | BODY MASS INDEX: 23.82 KG/M2 | HEIGHT: 64 IN | HEART RATE: 73 BPM | SYSTOLIC BLOOD PRESSURE: 100 MMHG | RESPIRATION RATE: 18 BRPM | WEIGHT: 139.56 LBS | DIASTOLIC BLOOD PRESSURE: 57 MMHG | TEMPERATURE: 98 F

## 2020-01-12 DIAGNOSIS — N39.0 ACUTE UTI: Primary | ICD-10-CM

## 2020-01-12 LAB
B-HCG UR QL: NEGATIVE
BACTERIA #/AREA URNS HPF: ABNORMAL /HPF
BILIRUB UR QL STRIP: NEGATIVE
CLARITY UR: CLEAR
COLOR UR: ABNORMAL
CTP QC/QA: YES
GLUCOSE UR QL STRIP: NEGATIVE
HGB UR QL STRIP: NEGATIVE
KETONES UR QL STRIP: NEGATIVE
LEUKOCYTE ESTERASE UR QL STRIP: NEGATIVE
MICROSCOPIC COMMENT: ABNORMAL
NITRITE UR QL STRIP: NEGATIVE
PH UR STRIP: 6 [PH] (ref 5–8)
PROT UR QL STRIP: NEGATIVE
RBC #/AREA URNS HPF: 0 /HPF (ref 0–4)
SP GR UR STRIP: 1 (ref 1–1.03)
SQUAMOUS #/AREA URNS HPF: 2 /HPF
URN SPEC COLLECT METH UR: ABNORMAL
UROBILINOGEN UR STRIP-ACNC: NEGATIVE EU/DL
WBC #/AREA URNS HPF: 2 /HPF (ref 0–5)

## 2020-01-12 PROCEDURE — 81025 URINE PREGNANCY TEST: CPT | Performed by: PHYSICIAN ASSISTANT

## 2020-01-12 PROCEDURE — 81000 URINALYSIS NONAUTO W/SCOPE: CPT

## 2020-01-12 PROCEDURE — 99284 EMERGENCY DEPT VISIT MOD MDM: CPT

## 2020-01-12 PROCEDURE — 25000003 PHARM REV CODE 250: Performed by: PHYSICIAN ASSISTANT

## 2020-01-12 RX ORDER — PHENAZOPYRIDINE HYDROCHLORIDE 200 MG/1
200 TABLET, FILM COATED ORAL
Qty: 12 TABLET | Refills: 0 | Status: SHIPPED | OUTPATIENT
Start: 2020-01-12 | End: 2020-01-16

## 2020-01-12 RX ORDER — CEPHALEXIN 500 MG/1
500 CAPSULE ORAL EVERY 12 HOURS
Qty: 20 CAPSULE | Refills: 0 | Status: SHIPPED | OUTPATIENT
Start: 2020-01-12 | End: 2020-01-22

## 2020-01-12 RX ORDER — ACETAMINOPHEN 500 MG
1000 TABLET ORAL
Status: COMPLETED | OUTPATIENT
Start: 2020-01-12 | End: 2020-01-12

## 2020-01-12 RX ORDER — CEPHALEXIN 500 MG/1
500 CAPSULE ORAL
Status: COMPLETED | OUTPATIENT
Start: 2020-01-12 | End: 2020-01-12

## 2020-01-12 RX ORDER — IBUPROFEN 600 MG/1
600 TABLET ORAL
Status: COMPLETED | OUTPATIENT
Start: 2020-01-12 | End: 2020-01-12

## 2020-01-12 RX ADMIN — IBUPROFEN 600 MG: 600 TABLET, FILM COATED ORAL at 06:01

## 2020-01-12 RX ADMIN — ACETAMINOPHEN 1000 MG: 500 TABLET ORAL at 06:01

## 2020-01-12 RX ADMIN — CEPHALEXIN 500 MG: 500 CAPSULE ORAL at 07:01

## 2020-01-13 ENCOUNTER — HOSPITAL ENCOUNTER (EMERGENCY)
Facility: HOSPITAL | Age: 40
Discharge: HOME | End: 2020-01-13
Attending: EMERGENCY MEDICINE
Payer: OTHER GOVERNMENT

## 2020-01-13 VITALS
OXYGEN SATURATION: 95 % | SYSTOLIC BLOOD PRESSURE: 108 MMHG | BODY MASS INDEX: 24.59 KG/M2 | HEART RATE: 83 BPM | TEMPERATURE: 98 F | DIASTOLIC BLOOD PRESSURE: 72 MMHG | WEIGHT: 138.81 LBS | RESPIRATION RATE: 18 BRPM | HEIGHT: 63 IN

## 2020-01-13 DIAGNOSIS — R10.2 SUPRAPUBIC PAIN: Primary | ICD-10-CM

## 2020-01-13 DIAGNOSIS — R18.8 PELVIC FLUID COLLECTION: ICD-10-CM

## 2020-01-13 LAB
ALBUMIN SERPL BCP-MCNC: 4.1 G/DL (ref 3.5–5.2)
ALP SERPL-CCNC: 42 U/L (ref 55–135)
ALT SERPL W/O P-5'-P-CCNC: 112 U/L (ref 10–44)
ANION GAP SERPL CALC-SCNC: 6 MMOL/L (ref 8–16)
AST SERPL-CCNC: 127 U/L (ref 10–40)
B-HCG UR QL: NEGATIVE
BACTERIA #/AREA URNS HPF: ABNORMAL /HPF
BACTERIA GENITAL QL WET PREP: ABNORMAL
BASOPHILS # BLD AUTO: 0.06 K/UL (ref 0–0.2)
BASOPHILS NFR BLD: 1 % (ref 0–1.9)
BILIRUB SERPL-MCNC: 1 MG/DL (ref 0.1–1)
BILIRUB UR QL STRIP: NEGATIVE
BUN SERPL-MCNC: 4 MG/DL (ref 6–20)
CALCIUM SERPL-MCNC: 9.1 MG/DL (ref 8.7–10.5)
CHLORIDE SERPL-SCNC: 106 MMOL/L (ref 95–110)
CLARITY UR: CLEAR
CLUE CELLS VAG QL WET PREP: ABNORMAL
CO2 SERPL-SCNC: 26 MMOL/L (ref 23–29)
COLOR UR: YELLOW
CREAT SERPL-MCNC: 0.7 MG/DL (ref 0.5–1.4)
CTP QC/QA: YES
DIFFERENTIAL METHOD: NORMAL
EOSINOPHIL # BLD AUTO: 0.1 K/UL (ref 0–0.5)
EOSINOPHIL NFR BLD: 1.5 % (ref 0–8)
ERYTHROCYTE [DISTWIDTH] IN BLOOD BY AUTOMATED COUNT: 13.4 % (ref 11.5–14.5)
EST. GFR  (AFRICAN AMERICAN): >60 ML/MIN/1.73 M^2
EST. GFR  (NON AFRICAN AMERICAN): >60 ML/MIN/1.73 M^2
FILAMENT FUNGI VAG WET PREP-#/AREA: ABNORMAL
GLUCOSE SERPL-MCNC: 101 MG/DL (ref 70–110)
GLUCOSE UR QL STRIP: NEGATIVE
HCT VFR BLD AUTO: 40.1 % (ref 37–48.5)
HGB BLD-MCNC: 13.4 G/DL (ref 12–16)
HGB UR QL STRIP: ABNORMAL
IMM GRANULOCYTES # BLD AUTO: 0.02 K/UL (ref 0–0.04)
IMM GRANULOCYTES NFR BLD AUTO: 0.3 % (ref 0–0.5)
KETONES UR QL STRIP: NEGATIVE
LEUKOCYTE ESTERASE UR QL STRIP: NEGATIVE
LIPASE SERPL-CCNC: 17 U/L (ref 4–60)
LYMPHOCYTES # BLD AUTO: 1.6 K/UL (ref 1–4.8)
LYMPHOCYTES NFR BLD: 26.7 % (ref 18–48)
MCH RBC QN AUTO: 29.9 PG (ref 27–31)
MCHC RBC AUTO-ENTMCNC: 33.4 G/DL (ref 32–36)
MCV RBC AUTO: 90 FL (ref 82–98)
MICROSCOPIC COMMENT: ABNORMAL
MONOCYTES # BLD AUTO: 0.3 K/UL (ref 0.3–1)
MONOCYTES NFR BLD: 5.5 % (ref 4–15)
NEUTROPHILS # BLD AUTO: 3.9 K/UL (ref 1.8–7.7)
NEUTROPHILS NFR BLD: 65 % (ref 38–73)
NITRITE UR QL STRIP: NEGATIVE
NRBC BLD-RTO: 0 /100 WBC
PH UR STRIP: 6 [PH] (ref 5–8)
PLATELET # BLD AUTO: 229 K/UL (ref 150–350)
PMV BLD AUTO: 11.3 FL (ref 9.2–12.9)
POTASSIUM SERPL-SCNC: 3.4 MMOL/L (ref 3.5–5.1)
PROT SERPL-MCNC: 7.2 G/DL (ref 6–8.4)
PROT UR QL STRIP: NEGATIVE
RBC # BLD AUTO: 4.48 M/UL (ref 4–5.4)
RBC #/AREA URNS HPF: 1 /HPF (ref 0–4)
SODIUM SERPL-SCNC: 138 MMOL/L (ref 136–145)
SP GR UR STRIP: 1.01 (ref 1–1.03)
SPECIMEN SOURCE: ABNORMAL
SQUAMOUS #/AREA URNS HPF: 4 /HPF
T VAGINALIS GENITAL QL WET PREP: ABNORMAL
URN SPEC COLLECT METH UR: ABNORMAL
UROBILINOGEN UR STRIP-ACNC: NEGATIVE EU/DL
WBC # BLD AUTO: 6 K/UL (ref 3.9–12.7)
WBC #/AREA URNS HPF: 2 /HPF (ref 0–5)
WBC #/AREA VAG WET PREP: ABNORMAL
YEAST GENITAL QL WET PREP: ABNORMAL

## 2020-01-13 PROCEDURE — 80053 COMPREHEN METABOLIC PANEL: CPT

## 2020-01-13 PROCEDURE — 87591 N.GONORRHOEAE DNA AMP PROB: CPT

## 2020-01-13 PROCEDURE — 87210 SMEAR WET MOUNT SALINE/INK: CPT

## 2020-01-13 PROCEDURE — 83690 ASSAY OF LIPASE: CPT

## 2020-01-13 PROCEDURE — 85025 COMPLETE CBC W/AUTO DIFF WBC: CPT

## 2020-01-13 PROCEDURE — 99284 EMERGENCY DEPT VISIT MOD MDM: CPT | Mod: 25

## 2020-01-13 PROCEDURE — 25500020 PHARM REV CODE 255: Performed by: EMERGENCY MEDICINE

## 2020-01-13 PROCEDURE — 81025 URINE PREGNANCY TEST: CPT | Performed by: NURSE PRACTITIONER

## 2020-01-13 PROCEDURE — 81000 URINALYSIS NONAUTO W/SCOPE: CPT

## 2020-01-13 PROCEDURE — 96374 THER/PROPH/DIAG INJ IV PUSH: CPT

## 2020-01-13 PROCEDURE — 80074 ACUTE HEPATITIS PANEL: CPT

## 2020-01-13 PROCEDURE — 96375 TX/PRO/DX INJ NEW DRUG ADDON: CPT | Mod: 59

## 2020-01-13 PROCEDURE — 63600175 PHARM REV CODE 636 W HCPCS: Performed by: NURSE PRACTITIONER

## 2020-01-13 RX ORDER — HYDROMORPHONE HYDROCHLORIDE 2 MG/ML
1 INJECTION, SOLUTION INTRAMUSCULAR; INTRAVENOUS; SUBCUTANEOUS
Status: COMPLETED | OUTPATIENT
Start: 2020-01-13 | End: 2020-01-13

## 2020-01-13 RX ORDER — ONDANSETRON 4 MG/1
4 TABLET, FILM COATED ORAL EVERY 8 HOURS PRN
Qty: 12 TABLET | Refills: 0 | OUTPATIENT
Start: 2020-01-13 | End: 2020-08-16

## 2020-01-13 RX ORDER — HYDROCODONE BITARTRATE AND ACETAMINOPHEN 5; 325 MG/1; MG/1
1 TABLET ORAL EVERY 6 HOURS PRN
Qty: 12 TABLET | Refills: 0 | OUTPATIENT
Start: 2020-01-13 | End: 2020-08-16

## 2020-01-13 RX ORDER — ONDANSETRON 2 MG/ML
4 INJECTION INTRAMUSCULAR; INTRAVENOUS
Status: COMPLETED | OUTPATIENT
Start: 2020-01-13 | End: 2020-01-13

## 2020-01-13 RX ADMIN — IOHEXOL 75 ML: 350 INJECTION, SOLUTION INTRAVENOUS at 06:01

## 2020-01-13 RX ADMIN — HYDROMORPHONE HYDROCHLORIDE 1 MG: 2 INJECTION, SOLUTION INTRAMUSCULAR; INTRAVENOUS; SUBCUTANEOUS at 08:01

## 2020-01-13 RX ADMIN — ONDANSETRON HYDROCHLORIDE 4 MG: 2 SOLUTION INTRAMUSCULAR; INTRAVENOUS at 08:01

## 2020-01-13 NOTE — ED TRIAGE NOTES
Patient reports lower abdominal pressure that she noticed when she woke up this morning. Also reports left side flank pain. Seen here on yesterday, dx with UTI, treated with abx. Denies dysuria, urinary frequency, or hematuria.

## 2020-01-13 NOTE — ED PROVIDER NOTES
"Encounter Date: 1/12/2020    SCRIBE #1 NOTE: I, Jo Verdugo, am scribing for, and in the presence of,  Raji Person PA-C. I have scribed the following portions of the note - Other sections scribed: HPI/ROS.       History     Chief Complaint   Patient presents with    Abdominal Pain     The patient reports lower abdominal pains  since yesterday. Denies n/v/d/constipation. The patient also reports increased urinary frequency. Denies dysuria. Denies checking temp at home. Denies taking medication pta.     This 39 y.o. female with a medical history of ovarian cyst presents to the ED for an emergent evaluation of lower abd pain x 1 day. Pt reports when she is holding her urine and voids at night, she feels "feverish." Pt states she "feels pregnant" and "like something is ripping me apart." Pt notes a hx of frequent UTIs. LMP ended on 12/27/19. No prior at home tx today. No changes in usual vaginal discharge. No alleviating factors. Otherwise, pt denies chills, dysuria, hematuria, back pain, flank pain, diarrhea, constipation, and any other associated symptoms.    The history is provided by the patient. No  was used.     Review of patient's allergies indicates:   Allergen Reactions    Iodine and iodide containing products Anaphylaxis    Benadryl [diphenhydramine hcl] Itching     "with fast injection"    Fish containing products      Past Medical History:   Diagnosis Date    Anemia     Anemia     Asthma     exercise asthma    Migraine headache     Ovarian cyst      Past Surgical History:   Procedure Laterality Date    APPENDECTOMY      4/2019    BREAST LUMPECTOMY Right     BREAST LUMPECTOMY      right     Family History   Problem Relation Age of Onset    Asthma Mother     Miscarriages / Stillbirths Mother     Arthritis Father     COPD Father     Asthma Brother     Cancer Maternal Aunt     Cancer Maternal Uncle     Diabetes Maternal Uncle     Hypertension Maternal Uncle     " "Hearing loss Paternal Aunt     Hearing loss Paternal Uncle     Cancer Maternal Grandmother     Cancer Maternal Grandfather     Hearing loss Paternal Grandmother     Hearing loss Paternal Grandfather      Social History     Tobacco Use    Smoking status: Never Smoker    Smokeless tobacco: Never Used   Substance Use Topics    Alcohol use: Yes     Comment: occass    Drug use: No     Review of Systems   Constitutional: Negative for chills and fever.   HENT: Negative for congestion, rhinorrhea and sore throat.    Eyes: Negative for visual disturbance.   Respiratory: Negative for cough and shortness of breath.    Cardiovascular: Negative for chest pain.   Gastrointestinal: Positive for abdominal pain. Negative for constipation, diarrhea, nausea and vomiting.   Genitourinary: Positive for frequency. Negative for dysuria, hematuria, pelvic pain and vaginal discharge.        (+) "pressure" when urinating   Musculoskeletal: Negative for back pain.   Skin: Negative for rash.   Neurological: Negative for headaches.       Physical Exam     Initial Vitals [01/12/20 1808]   BP Pulse Resp Temp SpO2   (!) 114/58 89 16 98.7 °F (37.1 °C) 100 %      MAP       --         Physical Exam    Nursing note and vitals reviewed.  Constitutional: She appears well-developed and well-nourished. She is not diaphoretic. No distress.   HENT:   Head: Normocephalic and atraumatic.   Nose: Nose normal.   Eyes: Conjunctivae and EOM are normal. Right eye exhibits no discharge. Left eye exhibits no discharge.   Neck: Normal range of motion. No tracheal deviation present. No JVD present.   Cardiovascular: Normal rate, regular rhythm and normal heart sounds. Exam reveals no friction rub.    No murmur heard.  Pulmonary/Chest: Breath sounds normal. No stridor. No respiratory distress. She has no wheezes. She has no rhonchi. She has no rales. She exhibits no tenderness.   Abdominal: Soft. She exhibits no distension. There is tenderness (suprapubic " without lateralization). There is no rigidity, no rebound, no guarding, no CVA tenderness, no tenderness at McBurney's point and negative Abbott's sign.   Musculoskeletal: Normal range of motion.   Neurological: She is alert and oriented to person, place, and time.   Skin: Skin is warm and dry. No rash and no abscess noted. No erythema. No pallor.         ED Course   Procedures  Labs Reviewed   URINALYSIS, REFLEX TO URINE CULTURE - Abnormal; Notable for the following components:       Result Value    Specific Gravity, UA 1.000 (*)     All other components within normal limits    Narrative:     Preferred Collection Type->Urine, Clean Catch   URINALYSIS MICROSCOPIC - Abnormal; Notable for the following components:    Bacteria Moderate (*)     All other components within normal limits    Narrative:     Preferred Collection Type->Urine, Clean Catch   POCT URINE PREGNANCY          Imaging Results    None          Medical Decision Making:   History:   Old Medical Records: I decided to obtain old medical records.  Clinical Tests:   Lab Tests: Ordered and Reviewed    This is an emergent evaluation of a 39 y.o. female presenting to the ED for urinary symptoms. Afebrile. Patient is non-toxic appearing and in no acute distress. Presentation consistent with acute cystitis. No clinical evidence to suggest acute pyelonephritis at this time. No urosepsis. Lower suspicion for infected renal stone at this time. Does not endorse Hx or risk factors concerning for pelvic etiology at this time, including for PID and risk of TOA. I carefully consider, but doubt acute appendicitis.     Sent home with supportive care and antibiotics while culture is pending. Advising PCP follow up. Strict return precautions discussed. Agreeable to plan.     I discussed with the patient the diagnosis, treatment plan, indications for return to the emergency department, and for expected follow-up. The patient verbalized an understanding. The patient is asked  if there are any questions or concerns. We discuss the case, until all issues are addressed to the patients satisfaction. Patient understands and is agreeable to the plan.           Scribe Attestation:   Scribe #1: I performed the above scribed service and the documentation accurately describes the services I performed. I attest to the accuracy of the note.                       Scribe attestation: I, Raji Person PA-C, personally performed the services described in this documentation.  All medical record entries made by the scribe were at my direction and in my presence.  I have reviewed the chart and agree that the record reflects my personal performance and is accurate and complete.    Clinical Impression:       ICD-10-CM ICD-9-CM   1. Acute UTI N39.0 599.0                             Raji Person PA-C  01/12/20 [id number]

## 2020-01-13 NOTE — ED PROVIDER NOTES
"Encounter Date: 1/13/2020    SCRIBE #1 NOTE: I, Jm Mckeon, am scribing for, and in the presence of,  Joseph Fernandes DNP. I have scribed the following portions of the note - Other sections scribed: HPI, ROS.       History     Chief Complaint   Patient presents with    Abdominal Pain     "there is so much water retention, I can barely walk anymore." pt states "my bladder is just expanding and you can see it".      Time Last Evaluated by Provider - 17:30     This is a 39 y.o. female with a PMHx of Ovarian Cyst who presents to the Emergency Department with a cc of constant, non-radiating lower abdominal pain (8/10) that has been ongoing for one day.  She states that it is hard to walk because it feels as if "there is a lot of water inside of me".  Pt reports associated urinary retention, gait problem, and nausea.  Pt denies vaginal bleeding, vaginal discharge, diarrhea, and vomiting.  She notes that sitting in different positions and palpitations are worsening factors.  No alleviating factors were noted.  Pt states that her menstruation cycle ended 18 days ago.  Patient was seen in this emergency department yesterday and diagnosed with the urinary tract infection, she is currently taking Keflex for this problem.  She has not yet started Pyridium.         The history is provided by the patient, the spouse and medical records. No  was used.     Review of patient's allergies indicates:   Allergen Reactions    Iodine and iodide containing products Anaphylaxis    Benadryl [diphenhydramine hcl] Itching     "with fast injection"    Fish containing products      Past Medical History:   Diagnosis Date    Anemia     Anemia     Asthma     exercise asthma    Migraine headache     Ovarian cyst      Past Surgical History:   Procedure Laterality Date    APPENDECTOMY      4/2019    BREAST LUMPECTOMY Right     BREAST LUMPECTOMY      right     Family History   Problem Relation Age of Onset    " Asthma Mother     Miscarriages / Stillbirths Mother     Arthritis Father     COPD Father     Asthma Brother     Cancer Maternal Aunt     Cancer Maternal Uncle     Diabetes Maternal Uncle     Hypertension Maternal Uncle     Hearing loss Paternal Aunt     Hearing loss Paternal Uncle     Cancer Maternal Grandmother     Cancer Maternal Grandfather     Hearing loss Paternal Grandmother     Hearing loss Paternal Grandfather      Social History     Tobacco Use    Smoking status: Never Smoker    Smokeless tobacco: Never Used   Substance Use Topics    Alcohol use: Yes     Comment: occass    Drug use: No     Review of Systems   Constitutional: Negative for chills, diaphoresis and fever.   HENT: Negative for congestion and rhinorrhea.    Eyes: Negative for pain.   Respiratory: Negative for cough and shortness of breath.    Cardiovascular: Negative for chest pain.   Gastrointestinal: Positive for abdominal pain and nausea. Negative for diarrhea and vomiting.   Genitourinary: Positive for decreased urine volume. Negative for dysuria, vaginal bleeding and vaginal discharge.   Musculoskeletal: Positive for gait problem. Negative for back pain.   Skin: Negative for rash.   Neurological: Negative for headaches.   Psychiatric/Behavioral: Negative for confusion.       Physical Exam     Initial Vitals [01/13/20 1717]   BP Pulse Resp Temp SpO2   126/75 102 18 99.5 °F (37.5 °C) 98 %      MAP       --         Physical Exam    Nursing note and vitals reviewed.  Constitutional: She appears well-developed and well-nourished.   HENT:   Head: Normocephalic and atraumatic.   Right Ear: External ear normal.   Left Ear: External ear normal.   Nose: Nose normal.   Eyes: Conjunctivae and EOM are normal. Pupils are equal, round, and reactive to light. Right eye exhibits no discharge. Left eye exhibits no discharge.   Neck: Normal range of motion.   Abdominal: Soft. Normal appearance and bowel sounds are normal. She exhibits no  distension. There is tenderness in the suprapubic area. Hernia confirmed negative in the right inguinal area and confirmed negative in the left inguinal area.   Genitourinary: Vagina normal and uterus normal. No labial fusion. There is no rash, tenderness, lesion or injury on the right labia. There is no rash, tenderness, lesion or injury on the left labia. Uterus is not deviated, not enlarged, not fixed and not tender. Cervix exhibits no motion tenderness, no discharge and no friability. Right adnexum displays no mass, no tenderness and no fullness. Left adnexum displays no mass, no tenderness and no fullness. No erythema, tenderness or bleeding in the vagina. No foreign body in the vagina. No signs of injury around the vagina. No vaginal discharge found.   Musculoskeletal: Normal range of motion.   Lymphadenopathy:        Right: No inguinal adenopathy present.        Left: No inguinal adenopathy present.   Neurological: She is alert and oriented to person, place, and time.   Skin: Skin is dry. Capillary refill takes less than 2 seconds.         ED Course   Procedures  Labs Reviewed   COMPREHENSIVE METABOLIC PANEL - Abnormal; Notable for the following components:       Result Value    Potassium 3.4 (*)     BUN, Bld 4 (*)     Alkaline Phosphatase 42 (*)      (*)      (*)     Anion Gap 6 (*)     All other components within normal limits   URINALYSIS, REFLEX TO URINE CULTURE - Abnormal; Notable for the following components:    Occult Blood UA 1+ (*)     All other components within normal limits    Narrative:     Preferred Collection Type->Urine, Clean Catch   VAGINAL SCREEN - Abnormal; Notable for the following components:    WBC - Vaginal Screen Rare (*)     Bacteria - Vaginal Screen Moderate (*)     All other components within normal limits   URINALYSIS MICROSCOPIC - Abnormal; Notable for the following components:    Bacteria Few (*)     All other components within normal limits    Narrative:      Preferred Collection Type->Urine, Clean Catch   C. TRACHOMATIS/N. GONORRHOEAE BY AMP DNA   CBC W/ AUTO DIFFERENTIAL   LIPASE   HEPATITIS PANEL, ACUTE   POCT URINE PREGNANCY          Imaging Results          US Pelvis Comp with Transvag NON-OB (xpd) (Final result)  Result time 01/13/20 19:54:24   Procedure changed from US Pelvis Complete Non OB     Final result by Starr Hutson MD (01/13/20 19:54:24)                 Impression:      Normal appearance of the uterus and ovaries.    Small amount of free fluid in the posterior cul-de-sac and bilateral adnexa region.      Electronically signed by: Starr Hutson MD  Date:    01/13/2020  Time:    19:54             Narrative:    EXAMINATION:  US PELVIS COMP WITH TRANSVAG NON-OB (XPD)    CLINICAL HISTORY:  suprapubic pain and bloating, tenderness;    TECHNIQUE:  Transabdominal sonography of the pelvis was performed, followed by transvaginal sonography to better evaluate the uterus and ovaries.    COMPARISON:  Correlation with CT 01/13/2020    FINDINGS:  Uterus:    Size: 7.3 x 3.4 x 3.6 cm    Masses: None    Endometrium: Normal in this pre menopausal patient, measuring 9 mm.    Small nabothian cysts.    Right ovary:    Size: 3.0 x 1.2 x 2.8 cm    Appearance: Follicles seen.    Vascular flow: Normal.    Left ovary:    Size: 2.7 x 2.4 x 2.6 cm    Appearance: Follicles seen.    Vascular Flow: Normal.    Free Fluid:    Small amount of free fluid in the cul de sac and adnexa region bilaterally.                               CT Abdomen Pelvis With Contrast (Final result)  Result time 01/13/20 19:32:56    Final result by Ruth Roman MD (01/13/20 19:32:56)                 Impression:      Significant free pelvic fluid.  Dominant left ovarian follicle.  Infiltration of the subcutaneous fat of the pelvis and mon pubis or anasarca.    Mild splenomegaly.      Electronically signed by: Ruth Roman  Date:    01/13/2020  Time:    19:32             Narrative:     EXAMINATION:  CT OF ABDOMEN PELVIS WITH    CLINICAL HISTORY:  Abd pain, fever, abscess suspected;    TECHNIQUE:  5 mm enhanced axial images were obtained from the lung bases through the greater trochanters.  Seventy-five mL of Omnipaque 350 was injected.    COMPARISON:  10/22/2016    FINDINGS:  The liver, pancreas, kidneys, and adrenal glands are unremarkable. The gallbladder contains no calcified gallstones.    Mild splenomegaly is present.    There is no definite evidence for abdominal adenopathy or ascites.    There is infiltration of the subcutaneous fat of the pelvis and mons pubis.  There are no pelvic masses or adenopathy.  A dominant left ovarian follicle measuring 1.8.5 cm is present.    There is significant free fluid in the pelvis.    There is mild bibasilar atelectasis.                                 Medical Decision Making:   Initial Assessment:   39-year-old female who was seen in this emergency department started on antibiotics for urinary tract infection returns today with suprapubic swelling that she states feels like fluid and is tender to the touch.  She denies fever, chills but endorses nausea and vomiting, denies vaginal bleeding, dysuria  Differential Diagnosis:   UTI, STD, ovarian torsion, tubo-ovarian abscess  ED Management:  Initial orders include CBC, chemistry, lipase, urinalysis, UPT, GC chlamydia, vaginal screen, setup pelvic tray, IV start, Zofran 4 mg, Dilaudid 1 mg            Scribe Attestation:   Scribe #1: I performed the above scribed service and the documentation accurately describes the services I performed. I attest to the accuracy of the note.            ED Course as of Jan 13 2320   Mon Jan 13, 2020   1754 Preg Test, Ur: Negative [VC]   1831 CBC: leukocyte count was normal, the H&H was normal. The platelet count was normal.        CBC W/ AUTO DIFFERENTIAL [VC]   1909 Cmp indicates mild hypokalemia, elevated ast/alt will await radiology results.   Comp. Metabolic Panel(!) [VC]    1918 Unlikely uti or infection.   Urinalysis, Reflex to Urine Culture Urine, Clean Catch(!) [VC]   1937 Significant free pelvic fluid.  Dominant left ovarian follicle.  Infiltration of the subcutaneous fat of the pelvis and mon pubis or anasarca.    Mild splenomegaly.   CT Abdomen Pelvis With Contrast [VC]   2003 Gyn exam performed with nurse chaperone, Kiki.    [VC]   2004 Pending at time of disposition.   C. trachomatis/N. gonorrhoeae by AMP DNA [facility]; Vagina [VC]   2004 Normal appearance of the uterus and ovaries.    Small amount of free fluid in the posterior cul-de-sac and bilateral adnexa region.     US Pelvis Comp with Transvag NON-OB (xpd) [VC]   2046 Vaginal screen is negative for tricomonas, clue cells (indicating no bv), yeast, or excess bacteria     Vaginal Screen Vagina(!) [VC]      ED Course User Index  [VC] Joseph Fernandes DNP     Patient was seen and evaluated by Dr. Díaz who agrees with me that the patient is ready for discharge.  Prescriptions are provided for Zofran and Norco and the patient should follow up with OBGYN.  Dr. Díaz believes that the pelvic fluid indicated above is secondary to a ruptured ovarian cyst.  The patient's transaminitis may be secondary to a recent binge drinking episode, use of pre workout supplements, a maternal history of hepatitis C and will be examined outpatient by the patient's hepatology referral provided.  Patient should return for any worsening or changes in condition otherwise follow up as directed.  Symptomatic therapies and return precautions on AVS.   Medication choices were made after reviewing allergies, medications, history, available laboratories.            Clinical Impression:       ICD-10-CM ICD-9-CM   1. Suprapubic pain R10.2 789.09   2. Pelvic fluid collection R18.8 789.59         Disposition:   Disposition: Discharged  Condition: Stable    Scribe attestation: ROCK HENRIQUEZ DNP ACNP-BC FNP-C, personally performed the  services described in this documentation. All medical record entries made by the scribe were at my direction and in my presence.  I have reviewed the chart and agree that the record reflects my personal performance and is accurate and complete.                 Joseph Fernandes, JOHNNY  01/13/20 [id number]

## 2020-01-14 LAB
C TRACH DNA SPEC QL NAA+PROBE: NOT DETECTED
HAV IGM SERPL QL IA: NEGATIVE
HBV CORE IGM SERPL QL IA: NEGATIVE
HBV SURFACE AG SERPL QL IA: NEGATIVE
HCV AB SERPL QL IA: NEGATIVE
N GONORRHOEA DNA SPEC QL NAA+PROBE: NOT DETECTED

## 2020-01-14 NOTE — DISCHARGE INSTRUCTIONS
You have been given a medication that may cause drowsiness, do not drive or operate heavy machinery with this medication. Zofran for nausea.  Return to the Emergency department for any worsening or failure to improve, otherwise follow up with your primary care provider.

## 2020-01-14 NOTE — ED NOTES
"Patient stated, "I don't want the phenergan anymore, I just want to go home." ; Provided notified  "

## 2020-05-28 ENCOUNTER — HOSPITAL ENCOUNTER (EMERGENCY)
Facility: HOSPITAL | Age: 40
Discharge: HOME | End: 2020-05-28
Attending: EMERGENCY MEDICINE
Payer: OTHER GOVERNMENT

## 2020-05-28 VITALS
HEART RATE: 61 BPM | DIASTOLIC BLOOD PRESSURE: 60 MMHG | TEMPERATURE: 99 F | RESPIRATION RATE: 20 BRPM | SYSTOLIC BLOOD PRESSURE: 98 MMHG | WEIGHT: 131 LBS | BODY MASS INDEX: 23.21 KG/M2 | OXYGEN SATURATION: 100 % | HEIGHT: 63 IN

## 2020-05-28 DIAGNOSIS — J06.9 VIRAL URI WITH COUGH: Primary | ICD-10-CM

## 2020-05-28 DIAGNOSIS — R05.9 COUGH: ICD-10-CM

## 2020-05-28 LAB — SARS-COV-2 RDRP RESP QL NAA+PROBE: NEGATIVE

## 2020-05-28 PROCEDURE — 99284 EMERGENCY DEPT VISIT MOD MDM: CPT | Mod: 25

## 2020-05-28 PROCEDURE — 25000003 PHARM REV CODE 250: Performed by: EMERGENCY MEDICINE

## 2020-05-28 PROCEDURE — U0002 COVID-19 LAB TEST NON-CDC: HCPCS

## 2020-05-28 RX ORDER — BENZONATATE 100 MG/1
100 CAPSULE ORAL 3 TIMES DAILY PRN
Qty: 20 CAPSULE | Refills: 0 | Status: SHIPPED | OUTPATIENT
Start: 2020-05-28 | End: 2020-06-07

## 2020-05-28 RX ORDER — FLUTICASONE PROPIONATE 44 UG/1
1 AEROSOL, METERED RESPIRATORY (INHALATION) 2 TIMES DAILY
Qty: 10.6 G | Refills: 0 | Status: SHIPPED | OUTPATIENT
Start: 2020-05-28 | End: 2020-08-19

## 2020-05-28 RX ORDER — IBUPROFEN 400 MG/1
800 TABLET ORAL
Status: COMPLETED | OUTPATIENT
Start: 2020-05-28 | End: 2020-05-28

## 2020-05-28 RX ADMIN — IBUPROFEN 800 MG: 400 TABLET, FILM COATED ORAL at 08:05

## 2020-05-29 NOTE — ED PROVIDER NOTES
"Encounter Date: 5/28/2020    SCRIBE #1 NOTE: I, Rey Tolliver, am scribing for, and in the presence of,  Quinn Sapp MD. I have scribed the following portions of the note - Other sections scribed: HPI, ROS, PE.       History     Chief Complaint   Patient presents with    Cough     Reports coughing (yellow to green mucous), sneezing, dizziness, nausea, migraine and not feeling well for the past 2 days. Denies fevers or vomiting. Tried OTC meds without relief.     COVID-19 Concerns     Time seen by provider: 7:44 PM on 05/28/2020  This is a 40 y.o. female who presents to the ED with c/o COVID-like symptoms for the past 2 days. She reports productive cough (yellow to green mucous) and migraine. Symptoms are constant and moderate in severity. No mitigating or exacerbating factors reported. She took Nyquil with no relief. Associated sxs include sneezing, nasal congestion, sore throat, nausea, general malaise. Patient denies any fever, emesis, SOB, and all other sxs at this time.         The history is provided by the patient and medical records.     Review of patient's allergies indicates:   Allergen Reactions    Iodine and iodide containing products Anaphylaxis    Benadryl [diphenhydramine hcl] Itching     "with fast injection"    Fish containing products      Past Medical History:   Diagnosis Date    Anemia     Anemia     Asthma     exercise asthma    Migraine headache     Ovarian cyst      Past Surgical History:   Procedure Laterality Date    APPENDECTOMY      4/2019    BREAST LUMPECTOMY Right     BREAST LUMPECTOMY      right     Family History   Problem Relation Age of Onset    Asthma Mother     Miscarriages / Stillbirths Mother     Arthritis Father     COPD Father     Asthma Brother     Cancer Maternal Aunt     Cancer Maternal Uncle     Diabetes Maternal Uncle     Hypertension Maternal Uncle     Hearing loss Paternal Aunt     Hearing loss Paternal Uncle     Cancer Maternal Grandmother "     Cancer Maternal Grandfather     Hearing loss Paternal Grandmother     Hearing loss Paternal Grandfather      Social History     Tobacco Use    Smoking status: Never Smoker    Smokeless tobacco: Never Used   Substance Use Topics    Alcohol use: Yes     Comment: occass    Drug use: No     Review of Systems   Constitutional: Negative for chills and fever.   HENT: Positive for congestion, sneezing and sore throat.    Respiratory: Positive for cough. Negative for shortness of breath.    Cardiovascular: Negative for chest pain.   Gastrointestinal: Positive for nausea. Negative for abdominal pain and vomiting.   Genitourinary: Negative for dysuria.   Musculoskeletal: Positive for myalgias. Negative for back pain.   Skin: Negative for rash.   Neurological: Positive for headaches. Negative for dizziness, syncope and light-headedness.   Psychiatric/Behavioral: Negative for confusion.   All other systems reviewed and are negative.      Physical Exam     Initial Vitals [05/28/20 1916]   BP Pulse Resp Temp SpO2   118/87 67 20 98.3 °F (36.8 °C) 99 %      MAP       --         Physical Exam    Nursing note and vitals reviewed.  Constitutional: She appears well-developed and well-nourished. She is not diaphoretic. No distress.   HENT:   Head: Normocephalic and atraumatic.   Nose: Nose normal.   Mouth/Throat: Oropharynx is clear and moist.   Eyes: Conjunctivae and EOM are normal. Pupils are equal, round, and reactive to light. Right eye exhibits no discharge. Left eye exhibits no discharge.   Neck: Normal range of motion. Neck supple. No thyromegaly present.   Cardiovascular: Normal rate, regular rhythm and normal heart sounds.   No murmur heard.  Pulmonary/Chest: Breath sounds normal. No stridor. No respiratory distress. She has no wheezes. She has no rhonchi. She has no rales. She exhibits no tenderness.   Abdominal: Soft. She exhibits no distension and no mass. There is no tenderness. There is no rebound and no  guarding.   Musculoskeletal: Normal range of motion. She exhibits no edema or tenderness.   Neurological: She is alert and oriented to person, place, and time. She has normal strength. No cranial nerve deficit.   Skin: Skin is warm and dry. No rash noted. No erythema.   Psychiatric: She has a normal mood and affect. Her behavior is normal. Judgment and thought content normal.         ED Course   Procedures  Labs Reviewed   SARS-COV-2 RNA AMPLIFICATION, QUAL          Imaging Results    None          Medical Decision Making:   History:   Old Medical Records: I decided to obtain old medical records.  Initial Assessment:   40-year-old female presenting with cough, body aches, upper respiratory symptoms.  Front of her  was positive for COVID 19.  On exam well appearing no acute distress.  Vitals normal.  Lung exam normal.  No tachypnea, retractions, wheezing.  History of mild asthma.  Chest x-ray negative.  COVID test negative.  Suspect viral illness.  Doubt pneumonia, COVID-19, or other severe infection.  Believe she is safe for discharge home.  Given prescription for inhaled steroids and Tessalon Perles.            Scribe Attestation:   Scribe #1: I performed the above scribed service and the documentation accurately describes the services I performed. I attest to the accuracy of the note.                          Clinical Impression:       ICD-10-CM ICD-9-CM   1. Cough R05 786.2         Disposition:   Disposition: Discharged  Condition: Stable          I, Quinn Sapp, personally performed the services described in this documentation. All medical record entries made by the scribe were at my direction and in my presence.  I have reviewed the chart and agree that the record reflects my personal performance and is accurate and complete                  Quinn Sapp MD  05/28/20 2053

## 2020-05-29 NOTE — DISCHARGE INSTRUCTIONS
You were seen in the emergency department for a sore throat, aches, cough, stuffy nose. Your COVID swab is negative. Exam is otherwise unremarkable.  You do not have a fever here.  We gave you some anti-inflammatory medication here and you felt better.  We think you're safe to go home.  Please follow up with your primary care provider.  You should start to feel better in a few days.  Please return for any new or worsening symptoms, dizziness, chest pain, difficulty breathing, inability to swallow, or you become concerned in any other way.

## 2020-05-30 ENCOUNTER — HOSPITAL ENCOUNTER (EMERGENCY)
Facility: HOSPITAL | Age: 40
Discharge: HOME | End: 2020-05-31
Attending: EMERGENCY MEDICINE
Payer: OTHER GOVERNMENT

## 2020-05-30 DIAGNOSIS — H10.501 BLEPHAROCONJUNCTIVITIS OF RIGHT EYE, UNSPECIFIED BLEPHAROCONJUNCTIVITIS TYPE: Primary | ICD-10-CM

## 2020-05-30 LAB
B-HCG UR QL: NEGATIVE
CTP QC/QA: YES

## 2020-05-30 PROCEDURE — 81025 URINE PREGNANCY TEST: CPT | Performed by: EMERGENCY MEDICINE

## 2020-05-30 PROCEDURE — 99283 EMERGENCY DEPT VISIT LOW MDM: CPT | Mod: 25

## 2020-05-31 VITALS
BODY MASS INDEX: 23.92 KG/M2 | SYSTOLIC BLOOD PRESSURE: 124 MMHG | HEART RATE: 80 BPM | HEIGHT: 63 IN | TEMPERATURE: 98 F | WEIGHT: 135 LBS | DIASTOLIC BLOOD PRESSURE: 72 MMHG | RESPIRATION RATE: 16 BRPM | OXYGEN SATURATION: 98 %

## 2020-05-31 PROCEDURE — 25000003 PHARM REV CODE 250: Performed by: PHYSICIAN ASSISTANT

## 2020-05-31 RX ORDER — TETRACAINE HYDROCHLORIDE 5 MG/ML
2 SOLUTION OPHTHALMIC
Status: COMPLETED | OUTPATIENT
Start: 2020-05-31 | End: 2020-05-31

## 2020-05-31 RX ORDER — ERYTHROMYCIN 5 MG/G
OINTMENT OPHTHALMIC
Qty: 1 TUBE | Refills: 0 | OUTPATIENT
Start: 2020-05-31 | End: 2020-08-16

## 2020-05-31 RX ORDER — ERYTHROMYCIN 5 MG/G
OINTMENT OPHTHALMIC
Status: COMPLETED | OUTPATIENT
Start: 2020-05-31 | End: 2020-05-31

## 2020-05-31 RX ADMIN — TETRACAINE HYDROCHLORIDE 2 DROP: 5 SOLUTION OPHTHALMIC at 12:05

## 2020-05-31 RX ADMIN — FLUORESCEIN SODIUM 1 EACH: 1 STRIP OPHTHALMIC at 12:05

## 2020-05-31 RX ADMIN — ERYTHROMYCIN 1 INCH: 5 OINTMENT OPHTHALMIC at 12:05

## 2020-05-31 NOTE — ED NOTES
Pt c/o R eye pain/irritation x 1 hrs. Pt states she was watching TV w/ her  and suddenly he eye started itching. Pt states she ran to the bathroom to find it swollen, they her  put some eye ointment in it. Pt denies trauma. Pt is A & O x 3, denies SOB, fever, chills, cough and N/V/D. Skin is warm, dry and pink. GRAYSON x 3mm. BBS- CTA. Abd- SNT. PSM x 4 exts. Bed is locked and in the low position w/ the side rails down per pt request. Call bell @ the BS. Will continue to monitor closely.

## 2020-05-31 NOTE — ED PROVIDER NOTES
"Encounter Date: 5/30/2020       History     Chief Complaint   Patient presents with    Eye Problem     Patient c/o right eye itching, tearing, and draining x 1 hour.  Reports being dx URI here in the ED and continues to have cough and congestion.     40-year-old female complains of right eye irritation and swelling since this evening.  She had URI symptoms including rhinorrhea, sore throat and cough and was evaluated recently in the ED. She was COVID-19 negative.  The symptoms are improving, and then she reports having right eye irritation and itching with watering that started this evening.  She denies significant pain.  She reports possible blurry vision of right eye.  She does not wear contact lenses.  No known foreign bodies.        Review of patient's allergies indicates:   Allergen Reactions    Iodine and iodide containing products Anaphylaxis    Shellfish derived Anaphylaxis    Benadryl [diphenhydramine hcl] Itching     "with fast injection"    Fish containing products      Past Medical History:   Diagnosis Date    Anemia     Anemia     Asthma     exercise asthma    Migraine headache     Ovarian cyst      Past Surgical History:   Procedure Laterality Date    APPENDECTOMY      4/2019    BREAST LUMPECTOMY Right     BREAST LUMPECTOMY      right     Family History   Problem Relation Age of Onset    Asthma Mother     Miscarriages / Stillbirths Mother     Arthritis Father     COPD Father     Asthma Brother     Cancer Maternal Aunt     Cancer Maternal Uncle     Diabetes Maternal Uncle     Hypertension Maternal Uncle     Hearing loss Paternal Aunt     Hearing loss Paternal Uncle     Cancer Maternal Grandmother     Cancer Maternal Grandfather     Hearing loss Paternal Grandmother     Hearing loss Paternal Grandfather      Social History     Tobacco Use    Smoking status: Never Smoker    Smokeless tobacco: Never Used   Substance Use Topics    Alcohol use: Yes     Comment: occass    Drug " use: No     Review of Systems   Constitutional: Negative for fever.   HENT: Negative for sore throat.    Eyes: Positive for discharge, redness and itching. Negative for photophobia, pain and visual disturbance.   Respiratory: Negative for shortness of breath.    Cardiovascular: Negative for chest pain.   Gastrointestinal: Negative for nausea.   Musculoskeletal: Negative for back pain.   Skin: Negative for rash.   Neurological: Negative for weakness.   Hematological: Does not bruise/bleed easily.       Physical Exam     Initial Vitals [05/30/20 2336]   BP Pulse Resp Temp SpO2   128/83 88 20 98.3 °F (36.8 °C) 100 %      MAP       --         Physical Exam    Vitals reviewed.  Constitutional: She appears well-developed and well-nourished. She is not diaphoretic. No distress.   HENT:   Head: Normocephalic and atraumatic.   Right Ear: External ear normal.   Left Ear: External ear normal.   Nose: Nose normal.   Eyes: EOM are normal. Pupils are equal, round, and reactive to light. Lids are everted and swept, no foreign bodies found. Right eye exhibits chemosis and discharge (Watery). Right eye exhibits no exudate and no hordeolum. No foreign body present in the right eye. Right conjunctiva is injected. Right conjunctiva has no hemorrhage. No scleral icterus.   Slit lamp exam:       The right eye shows no corneal abrasion, no corneal flare, no corneal ulcer, no foreign body, no hyphema, no hypopyon and no fluorescein uptake.   Visual acuity:  OD 20/70, OS 20/25, OU 20/40   Neck: Normal range of motion. Neck supple.   Cardiovascular: Normal rate and regular rhythm.   Pulmonary/Chest: No respiratory distress.   Musculoskeletal: Normal range of motion.   Lymphadenopathy:     She has no cervical adenopathy.   Neurological: She is alert and oriented to person, place, and time.   Skin: Skin is warm and dry.         ED Course   Procedures  Labs Reviewed   POCT URINE PREGNANCY          Imaging Results    None          Medical  Decision Making:   ED Management:  40 year old presenting with eye redness and irritation most likely conjunctivitis with generalized scleral injection.   No evidence of foreign body or traumatic iritis.  No evidence of corneal ulcer or abrasion.    Patient will be discharged home with erythromycin ointment and resources for ophthalmology follow-up. Return precautions given, patient understands and agrees with plan. All questions answered.                                   Clinical Impression:       ICD-10-CM ICD-9-CM   1. Blepharoconjunctivitis of right eye, unspecified blepharoconjunctivitis type H10.501 372.20             ED Disposition Condition    Discharge Stable        ED Prescriptions     Medication Sig Dispense Start Date End Date Auth. Provider    erythromycin (ROMYCIN) ophthalmic ointment Place a 1/2 inch ribbon of ointment into the right lower eyelid 3x daily for 1 week. 1 Tube 5/31/2020  Dawson Arroyo PA-C        Follow-up Information     Follow up With Specialties Details Why Contact Info    Sd Dickey MD Ophthalmology Schedule an appointment as soon as possible for a visit in 2 days For further evaluation if no improvement [address] [facility]  [place] [address]  [phone]                                       Dawson Arroyo PA-C  05/31/20 0107

## 2020-08-16 ENCOUNTER — HOSPITAL ENCOUNTER (EMERGENCY)
Facility: HOSPITAL | Age: 40
Discharge: HOME | End: 2020-08-16
Attending: EMERGENCY MEDICINE
Payer: OTHER GOVERNMENT

## 2020-08-16 VITALS
SYSTOLIC BLOOD PRESSURE: 105 MMHG | DIASTOLIC BLOOD PRESSURE: 57 MMHG | OXYGEN SATURATION: 100 % | HEART RATE: 78 BPM | HEIGHT: 63 IN | WEIGHT: 134 LBS | BODY MASS INDEX: 23.74 KG/M2 | TEMPERATURE: 98 F | RESPIRATION RATE: 18 BRPM

## 2020-08-16 DIAGNOSIS — M25.551 RIGHT HIP PAIN: ICD-10-CM

## 2020-08-16 DIAGNOSIS — S76.911A MUSCLE STRAIN OF RIGHT THIGH, INITIAL ENCOUNTER: Primary | ICD-10-CM

## 2020-08-16 LAB
B-HCG UR QL: NEGATIVE
CTP QC/QA: YES

## 2020-08-16 PROCEDURE — 81025 URINE PREGNANCY TEST: CPT | Performed by: NURSE PRACTITIONER

## 2020-08-16 PROCEDURE — 96372 THER/PROPH/DIAG INJ SC/IM: CPT

## 2020-08-16 PROCEDURE — 63600175 PHARM REV CODE 636 W HCPCS: Performed by: NURSE PRACTITIONER

## 2020-08-16 PROCEDURE — 99284 EMERGENCY DEPT VISIT MOD MDM: CPT | Mod: 25

## 2020-08-16 RX ORDER — NAPROXEN 500 MG/1
500 TABLET ORAL EVERY 12 HOURS PRN
Qty: 20 TABLET | Refills: 0 | Status: SHIPPED | OUTPATIENT
Start: 2020-08-16 | End: 2020-11-18 | Stop reason: HOSPADM

## 2020-08-16 RX ORDER — DIAZEPAM 10 MG/2ML
5 INJECTION INTRAMUSCULAR
Status: COMPLETED | OUTPATIENT
Start: 2020-08-16 | End: 2020-08-16

## 2020-08-16 RX ORDER — KETOROLAC TROMETHAMINE 30 MG/ML
30 INJECTION, SOLUTION INTRAMUSCULAR; INTRAVENOUS
Status: COMPLETED | OUTPATIENT
Start: 2020-08-16 | End: 2020-08-16

## 2020-08-16 RX ORDER — TIZANIDINE 2 MG/1
2 TABLET ORAL EVERY 8 HOURS PRN
Qty: 15 TABLET | Refills: 0 | Status: SHIPPED | OUTPATIENT
Start: 2020-08-16 | End: 2020-08-19

## 2020-08-16 RX ADMIN — KETOROLAC TROMETHAMINE 30 MG: 30 INJECTION, SOLUTION INTRAMUSCULAR at 11:08

## 2020-08-16 RX ADMIN — DIAZEPAM 5 MG: 5 INJECTION, SOLUTION INTRAMUSCULAR; INTRAVENOUS at 11:08

## 2020-08-16 NOTE — ED TRIAGE NOTES
"Patient presents to the ED with c/o R hip pain after skipping up the steps yesterday and feeling sudden pain. Patient states she got up to ambulate to the bathroom around 0330 and couldn't bear weight on the R hip. States "it feels like a screw is out of place". Denies numbness, tingling, bruising, or limb shorter than other. Reports taking Midol PTA with no relief. Rates pain 10/10. NAD noted.   "

## 2020-08-16 NOTE — DISCHARGE INSTRUCTIONS
Take pain medications as needed only as prescribed.  Do not drive or drink alcohol when taking muscle relaxers because they will make you drowsy.      Follow-up with your regular doctor or orthopedics if symptoms persist.  Return to the emergency department for any new or worsening symptoms.    Thank you for coming to our Emergency Department today. It is important to remember that some problems are difficult to diagnose and may not be found during your first visit. Be sure to follow up with your primary care doctor.  If you do not have one, you may contact the one listed on your discharge paperwork or you may also call the [facility] Appointment Desk at [phone] to schedule an appointment with one.     Return to the ER with any questions/concerns, new/concerning symptoms, worsening or failure to improve. Do not drive or make any important decisions for 24 hours if you have received any pain medications, sedatives or mood altering drugs during your ER visit.

## 2020-08-16 NOTE — Clinical Note
Lydia Thomas was seen and treated in our emergency department on 8/16/2020.  She may return to work on 08/19/2020.       If you have any questions or concerns, please don't hesitate to call.      Alesha Starkey RN

## 2020-08-16 NOTE — ED PROVIDER NOTES
"Encounter Date: 8/16/2020    SCRIBE #1 NOTE: I, Jo Verdugo, am scribing for, and in the presence of,  Paul Winslow NP. I have scribed the following portions of the note - Other sections scribed: HPI/ROS/PE.       History     Chief Complaint   Patient presents with    Hip Pain     Pt c/o RIGHT hip pain. Pt reports she was skipping up steps yesterday when she felt a pain in her RIGHT hip; states she woke up around 0325 this AM and realized she is now unable to ambulate on the leg. Pain is 10/10. Pt took midol this AM to no relief     Pt seen by provider at 1100    This 40 y.o. female with no pertinent medical history presents to the ED for an emergent evaluation of R, lateral hip pain and R thigh pain. Pt reports she skipped stairs yesterday as she was going up them. Pt reports when she got to the last step, she felt some R hip discomfort. She initially thought she may have bruised the hip. She was ambulatory and could bear weight onto the RLE after the incident. However, pt reports around 03:24 this AM, she couldn't get out of the bed or bear weight onto the RLE d/t pain. Pt states that her  had to help her get to the restroom and back to bed. No hx of prior injuries to the RLE. No hx of DVTs or PEs. No hormonal medical use at this time. Pt has only treated pain with Midol. No alleviating factors. Otherwise, pt denies fever, chills, back pain, n/v, wound, and any other associated symptoms.    The history is provided by the patient. No  was used.     Review of patient's allergies indicates:   Allergen Reactions    Iodine and iodide containing products Anaphylaxis    Shellfish derived Anaphylaxis    Benadryl [diphenhydramine hcl] Itching     "with fast injection"    Fish containing products      Past Medical History:   Diagnosis Date    Anemia     Anemia     Asthma     exercise asthma    Migraine headache     Ovarian cyst      Past Surgical History:   Procedure Laterality " Date    APPENDECTOMY      4/2019    BREAST LUMPECTOMY Right     BREAST LUMPECTOMY      right     Family History   Problem Relation Age of Onset    Asthma Mother     Miscarriages / Stillbirths Mother     Arthritis Father     COPD Father     Asthma Brother     Cancer Maternal Aunt     Cancer Maternal Uncle     Diabetes Maternal Uncle     Hypertension Maternal Uncle     Hearing loss Paternal Aunt     Hearing loss Paternal Uncle     Cancer Maternal Grandmother     Cancer Maternal Grandfather     Hearing loss Paternal Grandmother     Hearing loss Paternal Grandfather      Social History     Tobacco Use    Smoking status: Never Smoker    Smokeless tobacco: Never Used   Substance Use Topics    Alcohol use: Yes     Comment: occass    Drug use: No     Review of Systems   Constitutional: Negative for chills and fever.   HENT: Negative for congestion, rhinorrhea and sore throat.    Eyes: Negative for pain and visual disturbance.   Respiratory: Negative for cough and shortness of breath.    Cardiovascular: Negative for chest pain.   Gastrointestinal: Negative for abdominal pain, diarrhea, nausea and vomiting.   Genitourinary: Negative for difficulty urinating.   Musculoskeletal: Positive for arthralgias and myalgias. Negative for neck stiffness.   Skin: Negative for rash.   Neurological: Negative for weakness, numbness and headaches.       Physical Exam     Initial Vitals [08/16/20 1030]   BP Pulse Resp Temp SpO2   (!) 101/57 75 18 98.4 °F (36.9 °C) 100 %      MAP       --         Physical Exam    Nursing note and vitals reviewed.  Constitutional: She appears well-developed and well-nourished. She is not diaphoretic.  Non-toxic appearance. No distress.   HENT:   Head: Normocephalic and atraumatic.   Mouth/Throat: Oropharynx is clear and moist.   Eyes: EOM are normal. Pupils are equal, round, and reactive to light.   Neck: Normal range of motion. Neck supple.   Cardiovascular: Normal rate and regular  rhythm.   Pulmonary/Chest: Breath sounds normal. No respiratory distress.   Abdominal: Soft. There is no abdominal tenderness.   Musculoskeletal: No edema.      Right hip: She exhibits decreased range of motion (Secondary to pain) and tenderness. She exhibits no bony tenderness, no swelling, no deformity and no laceration.      Right upper leg: She exhibits tenderness. She exhibits no bony tenderness, no swelling, no edema and no deformity.      Comments: Generalized mild tenderness to the lateral R hip and posterior R thigh without swelling, erythema, induration, gross deformities, or bruising.  Pain is exacerbated by ambulation and weight-bearing.  Peripheral pulses are normal.  No lumbar pain or tenderness.  Active range of motion of the hip is intact but painful.   Neurological: She is alert and oriented to person, place, and time.   Skin: Skin is warm and dry. No rash noted. No erythema.   Psychiatric: She has a normal mood and affect.         ED Course   Procedures  Labs Reviewed   POCT URINE PREGNANCY          Imaging Results          X-Ray Hip 2 View Right (Final result)  Result time 08/16/20 12:09:56    Final result by Trace Casey MD (08/16/20 12:09:56)                 Impression:      No acute fracture or dislocation.      Electronically signed by: Trace Casey  Date:    08/16/2020  Time:    12:09             Narrative:    EXAMINATION:  XR HIP 2 VIEW RIGHT    CLINICAL HISTORY:  Pain in right hip    TECHNIQUE:  AP view of the pelvis and frog leg lateral view of the right hip were performed.    COMPARISON:  None    FINDINGS:  No fracture or dislocation.  No abnormal periosteal reaction.  Soft tissue structures are unremarkable.  Visualized abdominal contents considered to be within normal limits.                                 Medical Decision Making:   History:   Old Medical Records: I decided to obtain old medical records.  Differential Diagnosis:   Fracture, dislocation, myofascial strain,  spasm, avascular necrosis, others  Clinical Tests:   Radiological Study: Ordered and Reviewed  ED Management:  HPI and physical exam as above.    40-year-old female with the late onset right hip pain following a mild injury that occurred last night.  Patient states that she was jumping up steps and hurt her right hip last night.  She states that initially the pain was not severe, however she awoke this morning around 3:30 a.m. to go to the restroom and the pain was much worse.  Physical exam as above.  X-ray shows no evidence of fracture, dislocation, or other acute abnormality.  No concerning findings on physical exam.  Symptoms are clinically consistent with a muscle strain or sprain.  Will treat with NSAIDs and muscle relaxers. Advised patient to follow up with Orthopedics for re-evaluation and further management.  ED return precautions given. All questions regarding diagnosis and plan were answered to the patient's fullest possible satisfaction. Patient expressed understanding of diagnosis, discharge instructions, and return precautions.            Patient note was created using Robin voice dictation software.  Any errors in syntax or information may not have been identified and edited prior to signing this note.              Scribe Attestation:   Scribe #1: I performed the above scribed service and the documentation accurately describes the services I performed. I attest to the accuracy of the note.                          Clinical Impression:       ICD-10-CM ICD-9-CM   1. Muscle strain of right thigh, initial encounter  S76.911A 843.9   2. Right hip pain  M25.551 719.45         Disposition:   Disposition: Discharged  Condition: Stable     Scribe Attestation: I, Paul Winslow NP, personally performed the services described in this documentation. All medical record entries made by the scribe were at my direction and in my presence. I have reviewed the chart and agree that the record reflects my personal  performance and is accurate and complete.   ED Disposition Condition    Discharge Stable        ED Prescriptions     Medication Sig Dispense Start Date End Date Auth. Provider    naproxen (NAPROSYN) 500 MG tablet Take 1 tablet (500 mg total) by mouth every 12 (twelve) hours as needed (Pain). 20 tablet 8/16/2020  Paul Winslow NP    tiZANidine (ZANAFLEX) 2 MG tablet Take 1 tablet (2 mg total) by mouth every 8 (eight) hours as needed (Muscle spasms). 15 tablet 8/16/2020  Paul Winslow NP        Follow-up Information     Follow up With Specialties Details Why Contact Info    Destiney Hull MD Family Medicine Schedule an appointment as soon as possible for a visit in 1 week For further evaluation [address] [place] [address]  [phone]      [facility] Orthopedic Surgery, Physical Therapy Schedule an appointment as soon as possible for a visit in 1 week For further evaluation [address] [place] [address]  [phone]      [facility] Go to  If symptoms worsen, As needed [address] [place] [id number] [phone]                                     Paul Winslow NP  08/16/20 6258

## 2020-08-19 ENCOUNTER — OFFICE VISIT (OUTPATIENT)
Dept: FAMILY MEDICINE | Facility: CLINIC | Age: 40
End: 2020-08-19
Payer: OTHER GOVERNMENT

## 2020-08-19 ENCOUNTER — HOSPITAL ENCOUNTER (OUTPATIENT)
Dept: RADIOLOGY | Facility: HOSPITAL | Age: 40
Discharge: HOME | End: 2020-08-19
Attending: NURSE PRACTITIONER
Payer: OTHER GOVERNMENT

## 2020-08-19 ENCOUNTER — TELEPHONE (OUTPATIENT)
Dept: OBSTETRICS AND GYNECOLOGY | Facility: CLINIC | Age: 40
End: 2020-08-19

## 2020-08-19 ENCOUNTER — OFFICE VISIT (OUTPATIENT)
Dept: ORTHOPEDICS | Facility: CLINIC | Age: 40
End: 2020-08-19
Payer: OTHER GOVERNMENT

## 2020-08-19 VITALS
WEIGHT: 132 LBS | HEIGHT: 63 IN | DIASTOLIC BLOOD PRESSURE: 60 MMHG | HEART RATE: 72 BPM | BODY MASS INDEX: 23.39 KG/M2 | OXYGEN SATURATION: 97 % | TEMPERATURE: 98 F | SYSTOLIC BLOOD PRESSURE: 110 MMHG | RESPIRATION RATE: 16 BRPM

## 2020-08-19 VITALS
BODY MASS INDEX: 24.06 KG/M2 | DIASTOLIC BLOOD PRESSURE: 68 MMHG | HEART RATE: 86 BPM | WEIGHT: 135.81 LBS | SYSTOLIC BLOOD PRESSURE: 104 MMHG | HEIGHT: 63 IN

## 2020-08-19 DIAGNOSIS — S76.111D QUADRICEPS MUSCLE STRAIN, RIGHT, SUBSEQUENT ENCOUNTER: Primary | ICD-10-CM

## 2020-08-19 DIAGNOSIS — S76.111D QUADRICEPS MUSCLE STRAIN, RIGHT, SUBSEQUENT ENCOUNTER: ICD-10-CM

## 2020-08-19 DIAGNOSIS — M89.8X5 PAIN OF RIGHT FEMUR: ICD-10-CM

## 2020-08-19 DIAGNOSIS — Z80.3 FAMILY HISTORY OF BREAST CANCER: ICD-10-CM

## 2020-08-19 DIAGNOSIS — M89.8X5 PAIN OF RIGHT FEMUR: Primary | ICD-10-CM

## 2020-08-19 PROCEDURE — 99999 PR PBB SHADOW E&M-EST. PATIENT-LVL III: CPT | Mod: PBBFAC,,, | Performed by: NURSE PRACTITIONER

## 2020-08-19 PROCEDURE — 73562 XR KNEE ORTHO RIGHT WITH FLEXION: ICD-10-PCS | Mod: 26,LT,, | Performed by: RADIOLOGY

## 2020-08-19 PROCEDURE — 99204 PR OFFICE/OUTPT VISIT, NEW, LEVL IV, 45-59 MIN: ICD-10-PCS | Mod: S$PBB,,, | Performed by: INTERNAL MEDICINE

## 2020-08-19 PROCEDURE — 99999 PR PBB SHADOW E&M-EST. PATIENT-LVL IV: CPT | Mod: PBBFAC,,, | Performed by: INTERNAL MEDICINE

## 2020-08-19 PROCEDURE — 99213 OFFICE O/P EST LOW 20 MIN: CPT | Mod: PBBFAC,25,27 | Performed by: NURSE PRACTITIONER

## 2020-08-19 PROCEDURE — 99204 PR OFFICE/OUTPT VISIT, NEW, LEVL IV, 45-59 MIN: ICD-10-PCS | Mod: S$PBB,,, | Performed by: NURSE PRACTITIONER

## 2020-08-19 PROCEDURE — 99999 PR PBB SHADOW E&M-EST. PATIENT-LVL III: ICD-10-PCS | Mod: PBBFAC,,, | Performed by: NURSE PRACTITIONER

## 2020-08-19 PROCEDURE — 73552 X-RAY EXAM OF FEMUR 2/>: CPT | Mod: TC,RT

## 2020-08-19 PROCEDURE — 99214 OFFICE O/P EST MOD 30 MIN: CPT | Mod: PBBFAC,PO | Performed by: INTERNAL MEDICINE

## 2020-08-19 PROCEDURE — 73552 XR FEMUR 2 VIEW RIGHT: ICD-10-PCS | Mod: 26,RT,, | Performed by: RADIOLOGY

## 2020-08-19 PROCEDURE — 99204 OFFICE O/P NEW MOD 45 MIN: CPT | Mod: S$PBB,,, | Performed by: INTERNAL MEDICINE

## 2020-08-19 PROCEDURE — 99999 PR PBB SHADOW E&M-EST. PATIENT-LVL IV: ICD-10-PCS | Mod: PBBFAC,,, | Performed by: INTERNAL MEDICINE

## 2020-08-19 PROCEDURE — 73562 X-RAY EXAM OF KNEE 3: CPT | Mod: 26,LT,, | Performed by: RADIOLOGY

## 2020-08-19 PROCEDURE — 73562 X-RAY EXAM OF KNEE 3: CPT | Mod: TC,LT,59

## 2020-08-19 PROCEDURE — 73564 XR KNEE ORTHO RIGHT WITH FLEXION: ICD-10-PCS | Mod: 26,RT,, | Performed by: RADIOLOGY

## 2020-08-19 PROCEDURE — 99204 OFFICE O/P NEW MOD 45 MIN: CPT | Mod: S$PBB,,, | Performed by: NURSE PRACTITIONER

## 2020-08-19 PROCEDURE — 73552 X-RAY EXAM OF FEMUR 2/>: CPT | Mod: 26,RT,, | Performed by: RADIOLOGY

## 2020-08-19 PROCEDURE — 73564 X-RAY EXAM KNEE 4 OR MORE: CPT | Mod: 26,RT,, | Performed by: RADIOLOGY

## 2020-08-19 NOTE — PROGRESS NOTES
Health Maintenance Due   Topic Date Due    Lipid Panel      HIV Screening      Pap Smear

## 2020-08-19 NOTE — LETTER
August 19, 2020      [facility]  [address]  [place] [address]  Phone: [phone]  Fax: [phone]       Patient: Lydia Thomas   YOB: 1980  Date of Visit: 08/19/2020    To Whom It May Concern:    Alexx Thomas  was at [facility] on 08/19/2020. She may return to work on 08/24/2020 with no restrictions. If you have any questions or concerns, or if I can be of further assistance, please do not hesitate to contact me.    Sincerely,    Katy Vergara MD

## 2020-08-19 NOTE — PROGRESS NOTES
Subjective:      Patient ID: Lydia Thomas is a 40 y.o. female.    Chief Complaint: No chief complaint on file.    Patient is a 39 y/o female who presents to clinic after referral from the ED and her PCP, Dr. Vergara for evaluation of her right leg pain.  Patient states she was jumping up steps on 8/16/2020, skipping every other step, and when she reached the top she felt pain to her right upper leg/knee.  She reports she was able to get into her home but woke up in the middle of the night unable to bear weight on the right leg.  She went to the ED, X-ray of her right hip was performed, no fractures seen.  She was diagnosed with a quadricept strain and told to follow up with PCP and Ortho.  She reports she went to establish care with Dr. Vergara earlier today and was told to follow up with Ortho.    Currently patient states she is unable to bear weight on her right leg.  She has pain at the knee that extends to mid thigh.  She denies fever, chills or additional injuries.  She reports she has used Tylenol PRN with minimal relief.  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      Review of Systems   Musculoskeletal: Positive for muscle weakness.   All other systems reviewed and are negative.        Objective:            General    Vitals reviewed.  Constitutional: She is oriented to person, place, and time. She appears well-developed and well-nourished. She appears distressed.   HENT:   Head: Normocephalic.   Eyes: Conjunctivae are normal. Pupils are equal, round, and reactive to light.   Neck: Neck supple.   Cardiovascular: Intact distal pulses.    Pulmonary/Chest: Effort normal.   Neurological: She is alert and oriented to person, place, and time.   Psychiatric: She has a normal mood and affect. Her behavior is normal. Judgment and thought content normal.           Right Knee Exam     Inspection   Swelling: absent  Effusion: absent  Deformity: absent    Tenderness   The  patient is tender to palpation of the medial hamstring and patellar tendon.    Range of Motion   Extension: abnormal   Flexion: abnormal     Other   Sensation: normal    Comments:  Patient is unable to hold leg out in full extension.    Right Hip Exam     Inspection   No deformity of hip.    Range of Motion   Extension: abnormal   Flexion: abnormal   External rotation: abnormal   Internal rotation: normal     Other   Sensation: normal      Muscle Strength   Right Lower Extremity   Hip Abduction: 4/5   Quadriceps:  3/5   Hamstring:  3/5     Vascular Exam     Right Pulses  Dorsalis Pedis:      2+  Posterior Tibial:      2+          RADS:  X-ray of right femur and knee obtained today and personally reviewed by me.  No acute fractures seen.  X-ray of right hip dated 8/16/2020 was personally reviewed by me, no acute fractures seen.        Assessment:       Encounter Diagnosis   Name Primary?    Quadriceps muscle strain, right, subsequent encounter Yes          Plan:       Diagnoses and all orders for this visit:    Quadriceps muscle strain, right, subsequent encounter  -     Ambulatory referral/consult to Orthopedics  -     X-ray Knee Ortho Right with Flexion; Future  -     MRI Knee Without Contrast Right; Future      -Patient presents with 3 day history of worsening pain to her right thigh/knee s/p jumping up every other step.  -X-ray negative for acute fractures.  -Patient with significant pain with extension and flexion of leg and with internal rotation of hip.    -NVI.  -Suspect quad tendon disruption versus strain.  -Will place her into a hinged knee brace locked in extension and give her crutches.  Avoid weight bearing activities for now.  -Will do MRI to rule out muscle tear, if negative, refer to therapy.  If tear is seen will speak with surgeon to discuss surgical options.  -Will call patient with further recommendations when MRI results obtained.  -Can continue to alternate Tylenol and/or NSAIDs PRN.  -Call  for questions or concerns.

## 2020-08-19 NOTE — LETTER
August 19, 2020      [facility]  [address]  [place] [address]  Phone: [phone]  Fax: [phone]       Patient: Lydia Thomas   YOB: 1980  Date of Visit: 08/19/2020    To Whom It May Concern:    Alexx Thomas  was at [facility] on 08/19/2020. He may return to work on 08/24/2020 with no restrictions. If you have any questions or concerns, or if I can be of further assistance, please do not hesitate to contact me.    Sincerely,    Katy Vergara MD

## 2020-08-19 NOTE — LETTER
August 19, 2020      Katy Vergara MD  [address] [place] [address]           [facility] 5th Fl  [address]  [facility] [address]  Phone: [phone]          Patient: Lydia Thomas   MR Number: [id number]   YOB: 1980   Date of Visit: 8/19/2020       Dear Dr. Katy Vergara:    Thank you for referring Lydia Thomas to me for evaluation. Attached you will find relevant portions of my assessment and plan of care.    If you have questions, please do not hesitate to call me. I look forward to following Lydia Thomas along with you.    Sincerely,    Don Joshua NP    Enclosure  CC:  No Recipients    If you would like to receive this communication electronically, please contact [email or web address] or [phone] to request more information on [employer] Link access.    For providers and/or their staff who would like to refer a patient to [facility], please contact us through our one-stop-shop provider referral line, [facility], at [phone].    If you feel you have received this communication in error or would no longer like to receive these types of communications, please e-mail [email or web address]

## 2020-08-19 NOTE — TELEPHONE ENCOUNTER
Spoke with pt appt scheduled       ----- Message from Katy Vergara MD sent at 8/19/2020 10:26 AM CDT -----  Hi, this nice pt would like to be assessed by female ob-gyn (she is [employer]). She has a very heavy family history of breast cancer but states she was told she does not need genetic testing. She would like to be assessed to establish care with ob-gyn and discuss about above. Just FYI. Thanks!

## 2020-08-19 NOTE — PROGRESS NOTES
"Subjective:       Patient ID: Lydia Thomas is a pleasant 40 y.o. White female patient    Chief Complaint: Muscle Pain and Establish Care      Patient is a new pt to me and to our practice.  She comes today after an ED visit and also to establish care.    HPI    She went to the ED on the 16th of August after tripping upstairs. Did not fall. She fell a strain in her anterior quadriceps and fell as if something "popped" there. The pain started to be very bad during the night, when she had to go the the restroom, she struggled to place weight on this leg. X-ray showed no evidence of fracture, dislocation, or other acute abnormality.    She comes today reporting that she is still in pain. She struggles to put weight on this leg. She rates her pain at 7/10 while was 10/10 at the beginning. She takes APAP and applies ice.  She is in a wheelchair in the exam room.  She otherwise is a healthy patient, main issue is a Hx of breast cancer in several family members. She is interested in genetic testing.  She has occasional migraines controlled by sumatriptan.      Patient Active Problem List   Diagnosis    Acetaminophen overdose    Suicide attempt by acetaminophen overdose    Migraine    Anemia of chronic disease    Adjustment disorder with mixed anxiety and depressed mood    Tylenol overdose    Migraine without status migrainosus, not intractable    Occipital headache          ACTIVE MEDICAL ISSUES:  Documented in Problem List     PAST MEDICAL HISTORY  Documented     PAST SURGICAL HISTORY:  Documented     SOCIAL HISTORY:  Documented     FAMILY HISTORY:  Documented     ALLERGIES AND MEDICATIONS: updated and reviewed.  Documented    Review of Systems   Musculoskeletal:        R quadriceps pain       Objective:      Physical Exam  Vitals signs and nursing note reviewed.   Constitutional:       Appearance: Normal appearance. She is normal weight.   Cardiovascular:      Rate and Rhythm: Normal rate and regular rhythm. " "     Pulses: Normal pulses.      Heart sounds: Normal heart sounds.   Pulmonary:      Effort: Pulmonary effort is normal.      Breath sounds: Normal breath sounds.   Musculoskeletal:      Comments: Pt in a wheelchair as pain when walking. Pain located anterior part of the R thigh, pain to palpation of second third of quadriceps, no bruising, can contract quadriceps  but painful. Mild assymetry of quadriceps, R>L.   Neurological:      Mental Status: She is alert.         Vitals:    08/19/20 0801   BP: 110/60   BP Location: Right arm   Patient Position: Sitting   BP Method: Large (Manual)   Pulse: 72   Resp: 16   Temp: 98.3 °F (36.8 °C)   TempSrc: Oral   SpO2: 97%   Weight: 59.9 kg (132 lb)   Height: 5' 3" (1.6 m)     Body mass index is 23.38 kg/m².    RESULTS: Reviewed X-Rays done at the ED.    Assessment:       1. Quadriceps muscle strain, right, subsequent encounter    2. Family history of breast cancer        Plan:   Lydia was seen today for muscle pain and establish care.    Diagnoses and all orders for this visit:    Quadriceps muscle strain, right, subsequent encounter  -     Ambulatory referral/consult to Orthopedics; Future    See HPI and PE. Strain with possible tear of part of the muscle. Still pain rated 7/10. Advised re: NSAIDs, applying ice, absence of work until 8/24/2020 (works at the [place], has to walk a lot), and referral to Orthopedics. Provided her with written documentation.    Family history of breast cancer    Referred to Dr. Sanabria, pt is [employer] and needs a female ob-gyn. She may need genetic testing as several family members with breast cancer, including mother.  She was as per her report denied it in the past?    She will call back to schedule an annual physical at her convenience.    Follow up if symptoms worsen or fail to improve, for annual physical.    This note was created by combination of typed  and M-Modal dictation.  Transcription errors may be present.  If there are any " questions, please contact me.

## 2020-08-20 ENCOUNTER — HOSPITAL ENCOUNTER (OUTPATIENT)
Dept: RADIOLOGY | Facility: HOSPITAL | Age: 40
Discharge: HOME | End: 2020-08-20
Attending: NURSE PRACTITIONER
Payer: OTHER GOVERNMENT

## 2020-08-20 ENCOUNTER — TELEPHONE (OUTPATIENT)
Dept: ORTHOPEDICS | Facility: CLINIC | Age: 40
End: 2020-08-20

## 2020-08-20 DIAGNOSIS — S76.111D QUADRICEPS MUSCLE STRAIN, RIGHT, SUBSEQUENT ENCOUNTER: ICD-10-CM

## 2020-08-20 PROCEDURE — 73721 MRI JNT OF LWR EXTRE W/O DYE: CPT | Mod: 26,RT,, | Performed by: RADIOLOGY

## 2020-08-20 PROCEDURE — 73721 MRI JNT OF LWR EXTRE W/O DYE: CPT | Mod: TC,RT

## 2020-08-20 PROCEDURE — 73721 MRI KNEE WITHOUT CONTRAST RIGHT: ICD-10-PCS | Mod: 26,RT,, | Performed by: RADIOLOGY

## 2020-08-20 NOTE — TELEPHONE ENCOUNTER
Called patient with MRI results, no tear in quadricept muscle.  Appears to have an quadricept tendon impingement versus inflammation.  Advised her to use the Naproxen 500 mg bid as directed and rest leg.  She can come out of brace and do ROM at the knee as tolerates.  I will see her back in clinic in 2 weeks.  No work until clinic visit, letter written.  Patient verbalized understanding.

## 2020-08-21 ENCOUNTER — TELEPHONE (OUTPATIENT)
Dept: ORTHOPEDICS | Facility: CLINIC | Age: 40
End: 2020-08-21

## 2020-08-21 NOTE — TELEPHONE ENCOUNTER
Spoke with pt.  Advised that work letter was emailed to her as requested   Pt verified that she has received the email

## 2020-08-21 NOTE — TELEPHONE ENCOUNTER
----- Message from Henny Corado sent at 8/21/2020 11:30 AM CDT -----  Contact: pt  Please call pt at [phone]    Patient is requesting an excuse work letter     The letter is needed today     Please send to [email or web address] (before 2 pm)    Thank you

## 2020-09-09 ENCOUNTER — OFFICE VISIT (OUTPATIENT)
Dept: ORTHOPEDICS | Facility: CLINIC | Age: 40
End: 2020-09-09
Payer: OTHER GOVERNMENT

## 2020-09-09 VITALS
HEART RATE: 77 BPM | DIASTOLIC BLOOD PRESSURE: 68 MMHG | HEIGHT: 63 IN | SYSTOLIC BLOOD PRESSURE: 104 MMHG | WEIGHT: 137.38 LBS | BODY MASS INDEX: 24.34 KG/M2

## 2020-09-09 DIAGNOSIS — S76.111D QUADRICEPS MUSCLE STRAIN, RIGHT, SUBSEQUENT ENCOUNTER: Primary | ICD-10-CM

## 2020-09-09 PROCEDURE — 99213 OFFICE O/P EST LOW 20 MIN: CPT | Mod: S$PBB,,, | Performed by: NURSE PRACTITIONER

## 2020-09-09 PROCEDURE — 99999 PR PBB SHADOW E&M-EST. PATIENT-LVL III: CPT | Mod: PBBFAC,,, | Performed by: NURSE PRACTITIONER

## 2020-09-09 PROCEDURE — 99213 OFFICE O/P EST LOW 20 MIN: CPT | Mod: PBBFAC | Performed by: NURSE PRACTITIONER

## 2020-09-09 PROCEDURE — 99999 PR PBB SHADOW E&M-EST. PATIENT-LVL III: ICD-10-PCS | Mod: PBBFAC,,, | Performed by: NURSE PRACTITIONER

## 2020-09-09 PROCEDURE — 99213 PR OFFICE/OUTPT VISIT, EST, LEVL III, 20-29 MIN: ICD-10-PCS | Mod: S$PBB,,, | Performed by: NURSE PRACTITIONER

## 2020-09-09 NOTE — PROGRESS NOTES
Subjective:      Patient ID: Lydia Thomas is a 40 y.o. female.    Chief Complaint: Pain of the Right Knee    Patient is a 41 y/o female who presents to clinic for follow up for her quad strain.  She was last seen by me in August.  MRI was done and confirmed there was no tear in her quad tendon.  She was placed into a hinged knee brace and given Naproxen.  She reports above treatment helped resolve the pain.  She denies injuries since her last visit.  She no longer requires the knee brace and has no pain.  She is able to do her normal activities without any issues.      Review of Systems   Musculoskeletal: Negative for muscle weakness.   All other systems reviewed and are negative.        Objective:        General    Vitals reviewed.  Constitutional: She is oriented to person, place, and time. She appears well-developed and well-nourished. No distress.   HENT:   Head: Normocephalic.   Eyes: Conjunctivae are normal. Pupils are equal, round, and reactive to light.   Neck: Neck supple.   Cardiovascular: Intact distal pulses.    Pulmonary/Chest: Effort normal.   Neurological: She is alert and oriented to person, place, and time.   Psychiatric: She has a normal mood and affect. Her behavior is normal. Judgment and thought content normal.     General Musculoskeletal Exam   Gait: normal       Right Knee Exam   Right knee exam is normal.    Range of Motion   The patient has normal right knee ROM.    Other   Sensation: normal    Right Hip Exam     Inspection   No deformity of hip.    Range of Motion   Extension: abnormal   Flexion: abnormal   External rotation: abnormal   Internal rotation: normal     Other   Sensation: normal      Muscle Strength   Right Lower Extremity   Hip Abduction: 5/5   Quadriceps:  5/5   Hamstrin/5     Vascular Exam     Right Pulses  Dorsalis Pedis:      2+  Posterior Tibial:      2+          RADS:  None today        Assessment:       Encounter Diagnosis   Name Primary?    Quadriceps muscle  strain, right, subsequent encounter Yes          Plan:       Lydia was seen today for pain.    Diagnoses and all orders for this visit:    Quadriceps muscle strain, right, subsequent encounter      -Patient here for follow up for her right quadriceps muscle strain sustained in August 2020.  -She currently has complete resolution of her symptoms and back to her baseline.  -Will discharge her, follow up as needed.  No further workup needed.

## 2020-09-11 ENCOUNTER — TELEPHONE (OUTPATIENT)
Dept: ORTHOPEDICS | Facility: CLINIC | Age: 40
End: 2020-09-11

## 2020-09-11 NOTE — TELEPHONE ENCOUNTER
Called and spoke with pt in regards to pt needing a letter to return to work. Informed pt that I will type her return to work letter and e-mail her. Pt verbally understood and was satisfied.

## 2020-09-11 NOTE — TELEPHONE ENCOUNTER
----- Message from Tanesha Martinez sent at 9/11/2020  9:50 AM CDT -----  Type: Patient Call Back    Who called: Self     What is the request in detail: patient states she need a work letter that states she was seen and release to start work on 9/13.. Please call     Can the clinic reply by [facility]? No     Would the patient rather a call back or a response via [facility]? Call     Best call back number:[phone]

## 2020-09-19 NOTE — PROGRESS NOTES
Subjective:       Patient ID: Lydia Thomas is a pleasant 40 y.o. White female patient    Chief Complaint: Follow-up      Patient is a pt I saw last on 08/19/2020, see my last notes and list of problems below.    HPI     I saw her last time due to R quadriceps muscle strain.  I referred her to Orthopedics, with last visit on the 9th of September.  She comes today stating that she finally recovered from above and is back to normal.  She wants an annual physical done today and also address some issues.   1. She would like a pregnancy test as her last period was on the 13th of August.  She has some breast tenderness as well as nausea.  She would be happy to be expecting.  She has not been taking an OTC pregnancy test yet. Her periods are usually very regular.  2. She had Botox done by her usual Neurologist but also added some by , lower, lateral side of the eyes, 1 month ago, with some initial bruising on the right side first, then feeling that the area remains swollen, and she has some difficulty to open her mouth.  She would like to be reassessed by a neurologist at [facility] for 2nd opinion.  3. She also would like for me to look at a darker area on her left forearm that is persisting after her having a severe bruise last year.      Patient Active Problem List   Diagnosis    Acetaminophen overdose    Suicide attempt by acetaminophen overdose    Migraine    Anemia of chronic disease    Adjustment disorder with mixed anxiety and depressed mood    Tylenol overdose    Migraine without status migrainosus, not intractable    Occipital headache          ACTIVE MEDICAL ISSUES:  Documented in Problem List     PAST MEDICAL HISTORY  Documented     PAST SURGICAL HISTORY:  Documented     SOCIAL HISTORY:  Documented     FAMILY HISTORY:  Documented     ALLERGIES AND MEDICATIONS: updated and reviewed.  Documented    Review of Systems   Skin: Positive for color change.   Neurological: Positive for headaches.  "      Objective:      Physical Exam  Vitals signs and nursing note reviewed.   Constitutional:       Appearance: Normal appearance. She is normal weight.   HENT:      Right Ear: Tympanic membrane normal.      Left Ear: Tympanic membrane normal.   Cardiovascular:      Rate and Rhythm: Normal rate and regular rhythm.      Pulses: Normal pulses.      Heart sounds: Normal heart sounds.   Pulmonary:      Effort: Pulmonary effort is normal.      Breath sounds: Normal breath sounds.   Abdominal:      General: Abdomen is flat. Bowel sounds are normal.      Palpations: Abdomen is soft.   Skin:     General: Skin is warm and dry.      Comments: Mild swelling R temporal area, 3 cm from eye.  Very mildly darker area in L forearm, no induration.   Neurological:      Mental Status: She is alert.         Vitals:    09/21/20 0814   BP: 100/60   BP Location: Right arm   Patient Position: Sitting   BP Method: Large (Manual)   Pulse: 73   Resp: 16   Temp: 98.5 °F (36.9 °C)   TempSrc: Oral   SpO2: 99%   Weight: 62.1 kg (136 lb 14.5 oz)   Height: 5' 3" (1.6 m)     Body mass index is 24.25 kg/m².    RESULTS:  Will review present blood work.    Assessment:       1. Annual physical exam    2. Amenorrhea    3. Other migraine without status migrainosus, not intractable    4. Discoloration of skin    5. Screening for HIV (human immunodeficiency virus)        Plan:   Lydia was seen today for follow-up.    Diagnoses and all orders for this visit:    Annual physical exam  -     CBC auto differential; Future  -     Comprehensive metabolic panel; Future  -     Hemoglobin A1C; Future  -     TSH; Future  -     Lipid Panel; Future  -     Ambulatory referral/consult to Obstetrics / Gynecology; Future    Patient  Will do usual blood work, referral to OBGYN.  Flu shot.    Amenorrhea  -     Pregnancy Test Screening, Serum; Future    See HPI.  Will do a pregnancy test.    Other migraine without status migrainosus, not intractable  -     Ambulatory " referral/consult to Neurology; Future    See HPI.  Patient had complication from Botox.  Would like to have a 2nd opinion by [facility] Neurologist.    Discoloration of skin    See HPI and PE.  Reinsurance given.    Screening for HIV (human immunodeficiency virus)  -     HIV 1/2 Ag/Ab (4th Gen); Future    The discussion with the patient.    Follow up if symptoms worsen or fail to improve.    This note was created by combination of typed  and M-Modal dictation.  Transcription errors may be present.  If there are any questions, please contact me.

## 2020-09-21 ENCOUNTER — OFFICE VISIT (OUTPATIENT)
Dept: FAMILY MEDICINE | Facility: CLINIC | Age: 40
End: 2020-09-21
Payer: OTHER GOVERNMENT

## 2020-09-21 VITALS
WEIGHT: 136.88 LBS | DIASTOLIC BLOOD PRESSURE: 60 MMHG | OXYGEN SATURATION: 99 % | HEART RATE: 73 BPM | HEIGHT: 63 IN | BODY MASS INDEX: 24.25 KG/M2 | TEMPERATURE: 99 F | SYSTOLIC BLOOD PRESSURE: 100 MMHG | RESPIRATION RATE: 16 BRPM

## 2020-09-21 DIAGNOSIS — Z11.4 SCREENING FOR HIV (HUMAN IMMUNODEFICIENCY VIRUS): ICD-10-CM

## 2020-09-21 DIAGNOSIS — G43.809 OTHER MIGRAINE WITHOUT STATUS MIGRAINOSUS, NOT INTRACTABLE: ICD-10-CM

## 2020-09-21 DIAGNOSIS — L81.9 DISCOLORATION OF SKIN: ICD-10-CM

## 2020-09-21 DIAGNOSIS — N91.2 AMENORRHEA: ICD-10-CM

## 2020-09-21 DIAGNOSIS — Z00.00 ANNUAL PHYSICAL EXAM: Primary | ICD-10-CM

## 2020-09-21 PROCEDURE — 99395 PR PREVENTIVE VISIT,EST,18-39: ICD-10-PCS | Mod: S$PBB,,, | Performed by: INTERNAL MEDICINE

## 2020-09-21 PROCEDURE — 99395 PREV VISIT EST AGE 18-39: CPT | Mod: S$PBB,,, | Performed by: INTERNAL MEDICINE

## 2020-09-21 PROCEDURE — 99999 PR PBB SHADOW E&M-EST. PATIENT-LVL IV: ICD-10-PCS | Mod: PBBFAC,,, | Performed by: INTERNAL MEDICINE

## 2020-09-21 PROCEDURE — 99999 PR PBB SHADOW E&M-EST. PATIENT-LVL IV: CPT | Mod: PBBFAC,,, | Performed by: INTERNAL MEDICINE

## 2020-09-21 PROCEDURE — 99214 OFFICE O/P EST MOD 30 MIN: CPT | Mod: PBBFAC,PO | Performed by: INTERNAL MEDICINE

## 2020-09-21 NOTE — PROGRESS NOTES
Health Maintenance Due   Topic Date Due    Lipid Panel      HIV Screening      Pap Smear      Influenza Vaccine (1) decline

## 2020-10-05 ENCOUNTER — TELEPHONE (OUTPATIENT)
Dept: SLEEP MEDICINE | Facility: CLINIC | Age: 40
End: 2020-10-05

## 2020-10-05 NOTE — TELEPHONE ENCOUNTER
----- Message from Krunal Martinez sent at 10/5/2020 12:20 PM CDT -----  Name of Who is Calling:SHADE DE LEON [[id number]]      What is the request in detail: Patient would like a call back regarding a sooner appointment first available...Extreme headaches .. Please contact to further discuss and advise       Can the clinic reply by [facility]: no       What Number to Call Back if not in [facility]: [phone] (home) [phone] (work)

## 2020-11-16 ENCOUNTER — PATIENT OUTREACH (OUTPATIENT)
Dept: ADMINISTRATIVE | Facility: OTHER | Age: 40
End: 2020-11-16

## 2020-11-17 NOTE — PROGRESS NOTES
Health Maintenance Due   Topic Date Due    Lipid Panel  1982    HIV Screening  02/03/1997    Pap Smear  12/27/2018    Influenza Vaccine (1) 08/01/2020     Updates were requested from care everywhere.  Chart was reviewed for overdue Proactive [facility] Encounters (AGUSTIN) topics (CRS, Breast Cancer Screening, Eye exam)  Health Maintenance has been updated.  LINKS immunization registry triggered.  Immunizations were reconciled.

## 2020-11-18 ENCOUNTER — HOSPITAL ENCOUNTER (EMERGENCY)
Facility: HOSPITAL | Age: 40
Discharge: HOME | End: 2020-11-18
Attending: EMERGENCY MEDICINE
Payer: OTHER GOVERNMENT

## 2020-11-18 VITALS
OXYGEN SATURATION: 100 % | RESPIRATION RATE: 16 BRPM | WEIGHT: 138.63 LBS | SYSTOLIC BLOOD PRESSURE: 109 MMHG | HEIGHT: 63 IN | TEMPERATURE: 99 F | DIASTOLIC BLOOD PRESSURE: 62 MMHG | HEART RATE: 72 BPM | BODY MASS INDEX: 24.56 KG/M2

## 2020-11-18 DIAGNOSIS — M26.621 ARTHRALGIA OF RIGHT TEMPOROMANDIBULAR JOINT: ICD-10-CM

## 2020-11-18 DIAGNOSIS — R51.9 TEMPORAL PAIN: Primary | ICD-10-CM

## 2020-11-18 LAB
ALBUMIN SERPL BCP-MCNC: 4.3 G/DL (ref 3.5–5.2)
ALP SERPL-CCNC: 41 U/L (ref 55–135)
ALT SERPL W/O P-5'-P-CCNC: 9 U/L (ref 10–44)
ANION GAP SERPL CALC-SCNC: 8 MMOL/L (ref 8–16)
AST SERPL-CCNC: 14 U/L (ref 10–40)
B-HCG UR QL: NEGATIVE
B-HCG UR QL: NEGATIVE
BASOPHILS # BLD AUTO: 0.09 K/UL (ref 0–0.2)
BASOPHILS NFR BLD: 1.4 % (ref 0–1.9)
BILIRUB SERPL-MCNC: 0.7 MG/DL (ref 0.1–1)
BILIRUB UR QL STRIP: NEGATIVE
BUN SERPL-MCNC: 8 MG/DL (ref 6–20)
CALCIUM SERPL-MCNC: 9 MG/DL (ref 8.7–10.5)
CHLORIDE SERPL-SCNC: 105 MMOL/L (ref 95–110)
CLARITY UR: CLEAR
CO2 SERPL-SCNC: 25 MMOL/L (ref 23–29)
COLOR UR: COLORLESS
CREAT SERPL-MCNC: 0.8 MG/DL (ref 0.5–1.4)
CRP SERPL-MCNC: 0.3 MG/L (ref 0–8.2)
CTP QC/QA: YES
CTP QC/QA: YES
DIFFERENTIAL METHOD: ABNORMAL
EOSINOPHIL # BLD AUTO: 0.2 K/UL (ref 0–0.5)
EOSINOPHIL NFR BLD: 2.6 % (ref 0–8)
ERYTHROCYTE [DISTWIDTH] IN BLOOD BY AUTOMATED COUNT: 12.3 % (ref 11.5–14.5)
ERYTHROCYTE [SEDIMENTATION RATE] IN BLOOD BY WESTERGREN METHOD: 25 MM/HR (ref 0–20)
EST. GFR  (AFRICAN AMERICAN): >60 ML/MIN/1.73 M^2
EST. GFR  (NON AFRICAN AMERICAN): >60 ML/MIN/1.73 M^2
GLUCOSE SERPL-MCNC: 91 MG/DL (ref 70–110)
GLUCOSE UR QL STRIP: NEGATIVE
HCT VFR BLD AUTO: 40.1 % (ref 37–48.5)
HGB BLD-MCNC: 13.6 G/DL (ref 12–16)
HGB UR QL STRIP: NEGATIVE
IMM GRANULOCYTES # BLD AUTO: 0.02 K/UL (ref 0–0.04)
IMM GRANULOCYTES NFR BLD AUTO: 0.3 % (ref 0–0.5)
KETONES UR QL STRIP: NEGATIVE
LEUKOCYTE ESTERASE UR QL STRIP: NEGATIVE
LYMPHOCYTES # BLD AUTO: 2.3 K/UL (ref 1–4.8)
LYMPHOCYTES NFR BLD: 34.1 % (ref 18–48)
MCH RBC QN AUTO: 31.6 PG (ref 27–31)
MCHC RBC AUTO-ENTMCNC: 33.9 G/DL (ref 32–36)
MCV RBC AUTO: 93 FL (ref 82–98)
MONOCYTES # BLD AUTO: 0.5 K/UL (ref 0.3–1)
MONOCYTES NFR BLD: 6.9 % (ref 4–15)
NEUTROPHILS # BLD AUTO: 3.7 K/UL (ref 1.8–7.7)
NEUTROPHILS NFR BLD: 54.7 % (ref 38–73)
NITRITE UR QL STRIP: NEGATIVE
NRBC BLD-RTO: 0 /100 WBC
PH UR STRIP: 7 [PH] (ref 5–8)
PLATELET # BLD AUTO: 243 K/UL (ref 150–350)
PMV BLD AUTO: 10.5 FL (ref 9.2–12.9)
POTASSIUM SERPL-SCNC: 3.7 MMOL/L (ref 3.5–5.1)
PROT SERPL-MCNC: 7.3 G/DL (ref 6–8.4)
PROT UR QL STRIP: NEGATIVE
RBC # BLD AUTO: 4.31 M/UL (ref 4–5.4)
SODIUM SERPL-SCNC: 138 MMOL/L (ref 136–145)
SP GR UR STRIP: 1 (ref 1–1.03)
URN SPEC COLLECT METH UR: ABNORMAL
UROBILINOGEN UR STRIP-ACNC: NEGATIVE EU/DL
WBC # BLD AUTO: 6.66 K/UL (ref 3.9–12.7)

## 2020-11-18 PROCEDURE — 86140 C-REACTIVE PROTEIN: CPT

## 2020-11-18 PROCEDURE — 81025 URINE PREGNANCY TEST: CPT | Performed by: EMERGENCY MEDICINE

## 2020-11-18 PROCEDURE — 85025 COMPLETE CBC W/AUTO DIFF WBC: CPT

## 2020-11-18 PROCEDURE — 80053 COMPREHEN METABOLIC PANEL: CPT

## 2020-11-18 PROCEDURE — 85652 RBC SED RATE AUTOMATED: CPT

## 2020-11-18 PROCEDURE — 99284 EMERGENCY DEPT VISIT MOD MDM: CPT | Mod: 25

## 2020-11-18 PROCEDURE — 81003 URINALYSIS AUTO W/O SCOPE: CPT

## 2020-11-18 PROCEDURE — 81025 URINE PREGNANCY TEST: CPT | Performed by: PHYSICIAN ASSISTANT

## 2020-11-18 RX ORDER — IBUPROFEN 600 MG/1
600 TABLET ORAL EVERY 6 HOURS PRN
Qty: 20 TABLET | Refills: 0 | Status: SHIPPED | OUTPATIENT
Start: 2020-11-18 | End: 2020-11-23

## 2020-11-18 RX ORDER — CYCLOBENZAPRINE HCL 10 MG
10 TABLET ORAL 3 TIMES DAILY PRN
Qty: 20 TABLET | Refills: 0 | Status: SHIPPED | OUTPATIENT
Start: 2020-11-18 | End: 2020-11-18 | Stop reason: SDUPTHER

## 2020-11-18 RX ORDER — CYCLOBENZAPRINE HCL 10 MG
10 TABLET ORAL 3 TIMES DAILY PRN
Qty: 20 TABLET | Refills: 0 | Status: SHIPPED | OUTPATIENT
Start: 2020-11-18 | End: 2020-11-25

## 2020-11-18 NOTE — ED TRIAGE NOTES
"Pt. Reports she has a "lump" on the right side of her forehead that has been present for the past 1 month. Pt. Reports she has an appt. With neuro on tomorrow however she is in pain and cannot wait. Pt. Reports she has a hx of migraines   "

## 2020-11-18 NOTE — FIRST PROVIDER EVALUATION
" Emergency Department TeleTriage Encounter Note      CHIEF COMPLAINT    Chief Complaint   Patient presents with    forehead pain     right side temple pain x 1 month ,appt with neurology tomorrow.       VITAL SIGNS   Initial Vitals [11/18/20 1639]   BP Pulse Resp Temp SpO2   (!) 118/58 94 20 99.3 °F (37.4 °C) 100 %      MAP       --            ALLERGIES    Review of patient's allergies indicates:   Allergen Reactions    Iodine and iodide containing products Anaphylaxis    Shellfish derived Anaphylaxis    Benadryl [diphenhydramine hcl] Itching     "with fast injection"    Fish containing products        PROVIDER TRIAGE NOTE  This is a teletriage evaluation of a 40 y.o. female presenting to the ED with c/o right temporal pain for a month. Reports swelling to the area. Initial orders will be placed and care will be transferred to an alternate provider when patient is roomed for a full evaluation. Any additional orders and the final disposition will be determined by that provider.         ORDERS  Labs Reviewed   POCT URINE PREGNANCY       ED Orders (720h ago, onward)    Start Ordered     Status Ordering Provider    11/18/20 1645 11/18/20 1644  POCT urine pregnancy  Once  Completed    Final result DONALD PEREYRA            Virtual Visit Note: The provider triage portion of this emergency department evaluation and documentation was performed via [employer], a HIPAA-compliant telemedicine application, in concert with a tele-presenter in the room. A face to face patient evaluation with one of my colleagues will occur once the patient is placed in an emergency department room.      DISCLAIMER: This note was prepared with [employer] voice recognition transcription software. Garbled syntax, mangled pronouns, and other bizarre constructions may be attributed to that software system.  "

## 2020-11-18 NOTE — ED PROVIDER NOTES
"Encounter Date: 11/18/2020       History     Chief Complaint   Patient presents with    forehead pain     right side temple pain x 1 month ,appt with neurology tomorrow.     CC: Headaches    HPI:   41 y/o female with history of migraine HAs presenting for evaluation of 1 month history of "dull, numbing pain" to R temporal area with some swelling. Has history of migraine HAs and received botox for migraine HAs. She states her symptoms began in August after receiving cosmetelogic botox. She developed some bruising and green discoloration following this and some pain. She reports pain is worse with opening her mouth. Denies fever, chills, nausea, vomiting. 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 She has chronic headaches and visual disturbances from that. She states she usually experiences "dizziness slurred speech and drunken feeling light and hearing sensitivity and heightened senses and feels incoherent." There is no worsening of her chronic symptoms since onset of R temporal pain. Denies paralysis, weakness, paresthesias, visual disturbance         Review of patient's allergies indicates:   Allergen Reactions    Iodine and iodide containing products Anaphylaxis    Shellfish derived Anaphylaxis    Benadryl [diphenhydramine hcl] Itching     "with fast injection"    Fish containing products      Past Medical History:   Diagnosis Date    Anemia     Anemia     Asthma     exercise asthma    Migraine headache     Ovarian cyst      Past Surgical History:   Procedure Laterality Date    APPENDECTOMY      4/2019    BREAST LUMPECTOMY Right     BREAST LUMPECTOMY      right     Family History   Problem Relation Age of Onset    Asthma Mother     Miscarriages / Stillbirths Mother     Breast cancer Mother         60    Arthritis Father     COPD Father     Asthma Brother     Breast cancer Maternal Aunt         late 40    Diabetes Maternal Uncle     Hypertension Maternal " Uncle     Prostate cancer Maternal Uncle     Hearing loss Paternal Aunt     Hearing loss Paternal Uncle     Breast cancer Maternal Grandmother         60    Cancer Maternal Grandfather     Hearing loss Paternal Grandmother     Hearing loss Paternal Grandfather     Breast cancer Maternal Aunt         late 40     Social History     Tobacco Use    Smoking status: Never Smoker    Smokeless tobacco: Never Used   Substance Use Topics    Alcohol use: Yes     Comment: occass    Drug use: No     Review of Systems   Constitutional: Negative for chills and fever.   HENT: Positive for facial swelling. Negative for congestion, ear pain, rhinorrhea, sore throat and trouble swallowing.    Eyes: Positive for photophobia and visual disturbance. Negative for redness.   Respiratory: Negative for cough, shortness of breath and stridor.    Cardiovascular: Negative for chest pain.   Gastrointestinal: Negative for abdominal pain, diarrhea, nausea and vomiting.   Genitourinary: Negative for dysuria, frequency, hematuria and urgency.   Musculoskeletal: Negative for back pain and neck pain.   Neurological: Positive for headaches. Negative for dizziness, speech difficulty, weakness, light-headedness and numbness.   Psychiatric/Behavioral: Negative for confusion.       Physical Exam     Initial Vitals [11/18/20 1639]   BP Pulse Resp Temp SpO2   (!) 118/58 94 20 99.3 °F (37.4 °C) 100 %      MAP       --         Physical Exam    Nursing note and vitals reviewed.  Constitutional: She appears well-developed and well-nourished.   HENT:   Head: Normocephalic.   Right Ear: Hearing, tympanic membrane, external ear and ear canal normal.   Left Ear: Hearing, tympanic membrane, external ear and ear canal normal.   Nose: Nose normal.   Mild swelling and tenderness over R temporal area. TTP over the R TMJ. No crepitus.   No overlying erythema or fluctuance      Eyes: Conjunctivae are normal.   Cardiovascular: Normal rate and regular rhythm.  Exam reveals no gallop and no friction rub.    No murmur heard.  Pulmonary/Chest: Breath sounds normal. She has no wheezes. She has no rhonchi. She has no rales.   Abdominal: Soft. Bowel sounds are normal. She exhibits no distension. There is no abdominal tenderness. There is no rebound, no guarding, no tenderness at McBurney's point and negative Abbott's sign.   Musculoskeletal: Normal range of motion.   Lymphadenopathy:     She has no cervical adenopathy.   Neurological: She is alert. She has normal strength. No cranial nerve deficit or sensory deficit. Coordination and gait normal.   Skin: Skin is warm and dry.   No erythema or fluctuance      Psychiatric: She has a normal mood and affect.         ED Course   Procedures  Labs Reviewed   CBC W/ AUTO DIFFERENTIAL - Abnormal; Notable for the following components:       Result Value    MCH 31.6 (*)     All other components within normal limits   COMPREHENSIVE METABOLIC PANEL - Abnormal; Notable for the following components:    Alkaline Phosphatase 41 (*)     ALT 9 (*)     All other components within normal limits   SEDIMENTATION RATE - Abnormal; Notable for the following components:    Sed Rate 25 (*)     All other components within normal limits   URINALYSIS, REFLEX TO URINE CULTURE - Abnormal; Notable for the following components:    Color, UA Colorless (*)     All other components within normal limits    Narrative:     Specimen Source->Urine   C-REACTIVE PROTEIN   POCT URINE PREGNANCY   POCT URINE PREGNANCY          Imaging Results          CT Head Without Contrast (Final result)  Result time 11/18/20 18:36:36    Final result by Sara Richardson MD (11/18/20 18:36:36)                 Impression:      No acute intracranial abnormalities identified.      Electronically signed by: Sara Richardson MD  Date:    11/18/2020  Time:    18:36             Narrative:    EXAMINATION:  CT HEAD WITHOUT CONTRAST    CLINICAL HISTORY:  Headache, acute, normal neuro  exam;    TECHNIQUE:  Low dose axial images were obtained through the head.  Coronal and sagittal reformations were also performed. Contrast was not administered.    COMPARISON:  CT head from July 2016.    FINDINGS:  The brain is normally formed and exhibits normal density throughout with no indication of acute/recent major vascular distribution cerebral infarction, intraparenchymal hemorrhage, or intra-axial space occupying lesion. The ventricular system is normal in size and configuration with no evidence of hydrocephalus. No effacement of the skull-base cisterns. No abnormal extra-axial fluid collections or blood products. Visualized paranasal sinuses and mastoid air cells are clear. The calvarium shows no significant abnormality.                                 Medical Decision Making:   Initial Assessment:   32-year-old female presenting for evaluation of right temporal pain and swelling after receiving Botox injections in the area 3 months ago.  She has chronic headaches, dizziness and visual disturbance that is not worse since onset of her temporal pain and symptoms.  Pain is worse with opening her mouth.  Exam above.  Labs without significant abnormality CBC with no leukocytosis. CRP normal. Mild elevation of ESR at 25. Low suspicion for temporal arteritis at this time.  CT head is negative for intracranial abnormality or bleed.  No evidence of abscess or infectious etiology of patient's symptoms. Doubt botulism.  Think this is likely due to TMJ. Will give pt muscle relaxers and NSAIDs to help with symptoms and have her follow up with PCP and ENT. Return to ER for worsneing symptoms or as needed. Discussed with Dr. Stanley who agrees with assessment and plan.                              Clinical Impression:     ICD-10-CM ICD-9-CM   1. Temporal pain  R51.9 784.0   2. Arthralgia of right temporomandibular joint  M26.621 524.62                          ED Disposition Condition    Discharge Stable        ED  Prescriptions     Medication Sig Dispense Start Date End Date Auth. Provider    ibuprofen (ADVIL,MOTRIN) 600 MG tablet Take 1 tablet (600 mg total) by mouth every 6 (six) hours as needed for Pain. 20 tablet 11/18/2020 11/23/2020 Tami Pena PA-C    cyclobenzaprine (FLEXERIL) 10 MG tablet  (Status: Discontinued) Take 1 tablet (10 mg total) by mouth 3 (three) times daily as needed for Muscle spasms. MAY CAUSE DROWSINESS 20 tablet 11/18/2020 11/18/2020 Tami Pena PA-C    cyclobenzaprine (FLEXERIL) 10 MG tablet Take 1 tablet (10 mg total) by mouth 3 (three) times daily as needed for Muscle spasms. MAY CAUSE DROWSINESS 20 tablet 11/18/2020 11/25/2020 Tami Pena PA-C        Follow-up Information     Follow up With Specialties Details Why Contact Info    Katy Vergara MD Family Medicine, Internal Medicine Schedule an appointment as soon as possible for a visit in 2 days for follow up [address] [place] [address]  [phone]      Curly Andersen MD Neurology Schedule an appointment as soon as possible for a visit in 2 days for follow up with Neurology [address] [facility] [address]  [facility] [address]  [phone]      Approved Provider By Your Insurance Company  Schedule an appointment as soon as possible for a visit in 2 days Call number on your insurance card to receive a full list of providers in your area     [facility] Emergency Medicine Go to  As needed, If symptoms worsen [address] [place]  [facility] [address] [phone]                                       Tami Pena PA-C  11/19/20 8000

## 2020-11-19 ENCOUNTER — TELEPHONE (OUTPATIENT)
Dept: FAMILY MEDICINE | Facility: CLINIC | Age: 40
End: 2020-11-19

## 2020-11-19 NOTE — DISCHARGE INSTRUCTIONS
Take medication as prescribed and follow up with ENT. Return to ER for worsening symptoms or as needed

## 2020-11-19 NOTE — TELEPHONE ENCOUNTER
----- Message from Earline Norton sent at 11/19/2020  2:16 PM CST -----  Type:  Patient Requesting Referral    Who Called:Self    Referral to What Specialty: ENT    Reason for Referral: Lump in face side of right temp    Does the patient want the referral with a specific physician?: no     Is the specialist an [facility]?:no    Would the patient rather a call back or a response via [facility]? Call    Best Call Back Number:.[phone]     Additional Information: patient was seen at ED on 11/18/2020

## 2020-11-20 ENCOUNTER — TELEPHONE (OUTPATIENT)
Dept: FAMILY MEDICINE | Facility: CLINIC | Age: 40
End: 2020-11-20

## 2020-11-20 ENCOUNTER — OFFICE VISIT (OUTPATIENT)
Dept: FAMILY MEDICINE | Facility: CLINIC | Age: 40
End: 2020-11-20
Payer: OTHER GOVERNMENT

## 2020-11-20 VITALS
TEMPERATURE: 99 F | DIASTOLIC BLOOD PRESSURE: 78 MMHG | HEART RATE: 72 BPM | OXYGEN SATURATION: 98 % | RESPIRATION RATE: 16 BRPM | HEIGHT: 63 IN | WEIGHT: 137.38 LBS | SYSTOLIC BLOOD PRESSURE: 108 MMHG | BODY MASS INDEX: 24.34 KG/M2

## 2020-11-20 DIAGNOSIS — Z23 FLU VACCINE NEED: ICD-10-CM

## 2020-11-20 DIAGNOSIS — R51.9 CHRONIC NONINTRACTABLE HEADACHE, UNSPECIFIED HEADACHE TYPE: ICD-10-CM

## 2020-11-20 DIAGNOSIS — M26.621 ARTHRALGIA OF RIGHT TEMPOROMANDIBULAR JOINT: ICD-10-CM

## 2020-11-20 DIAGNOSIS — G89.29 CHRONIC NONINTRACTABLE HEADACHE, UNSPECIFIED HEADACHE TYPE: ICD-10-CM

## 2020-11-20 PROCEDURE — 99213 OFFICE O/P EST LOW 20 MIN: CPT | Mod: PBBFAC,PO | Performed by: INTERNAL MEDICINE

## 2020-11-20 PROCEDURE — 99214 OFFICE O/P EST MOD 30 MIN: CPT | Mod: S$PBB,,, | Performed by: INTERNAL MEDICINE

## 2020-11-20 PROCEDURE — 99214 PR OFFICE/OUTPT VISIT, EST, LEVL IV, 30-39 MIN: ICD-10-PCS | Mod: S$PBB,,, | Performed by: INTERNAL MEDICINE

## 2020-11-20 PROCEDURE — 99999 PR PBB SHADOW E&M-EST. PATIENT-LVL III: ICD-10-PCS | Mod: PBBFAC,,, | Performed by: INTERNAL MEDICINE

## 2020-11-20 PROCEDURE — 99999 PR PBB SHADOW E&M-EST. PATIENT-LVL III: CPT | Mod: PBBFAC,,, | Performed by: INTERNAL MEDICINE

## 2020-11-20 NOTE — PROGRESS NOTES
Subjective:       Patient ID: Lydia Thomas is a pleasant 40 y.o. White female patient    Chief Complaint: Hospital Follow Up      Patient is a pt I saw last on 09/21/2020. She comes today for an ED visit f-up.    HPI     She went to the ED on 11/28/2020 for issue of a 1 month dull, numbing pain to R temporal area with some swelling in a pt with H/O migraine with treatment of Botox. PE with no relevant findings. Labs normal except ESR 25.  CT head negative.  Dx of possible TMJ. Was given muscle relaxant and NSAIDs. Referred to ENT.  She comes today stating that she still struggles to open her mouth wide due to similar issues.  She did start a muscle relaxant for fear of sleepiness.  She has no other complaint.     Patient Active Problem List   Diagnosis    Acetaminophen overdose    Suicide attempt by acetaminophen overdose    Migraine    Anemia of chronic disease    Adjustment disorder with mixed anxiety and depressed mood    Tylenol overdose    Migraine without status migrainosus, not intractable    Occipital headache        ACTIVE MEDICAL ISSUES:  Documented in Problem List     PAST MEDICAL HISTORY  Documented     PAST SURGICAL HISTORY:  Documented     SOCIAL HISTORY:  Documented     FAMILY HISTORY:  Documented     ALLERGIES AND MEDICATIONS: updated and reviewed.  Documented    Review of Systems   Constitutional: Negative.    HENT: Negative.         Pain in R temporal area, in front of ear, when opening the mouth   Respiratory: Negative.    Cardiovascular: Negative.    Gastrointestinal: Negative.        Objective:      Physical Exam  Vitals signs and nursing note reviewed.   Constitutional:       Appearance: Normal appearance. She is normal weight.   HENT:      Right Ear: Tympanic membrane normal.      Left Ear: Tympanic membrane normal.   Cardiovascular:      Rate and Rhythm: Normal rate and regular rhythm.      Pulses: Normal pulses.      Heart sounds: Normal heart sounds.   Pulmonary:      Effort:  "Pulmonary effort is normal.      Breath sounds: Normal breath sounds.   Abdominal:      General: Abdomen is flat. Bowel sounds are normal.      Palpations: Abdomen is soft.   Musculoskeletal: Normal range of motion.   Skin:     General: Skin is warm and dry.   Neurological:      General: No focal deficit present.      Mental Status: She is alert and oriented to person, place, and time.         Vitals:    11/20/20 0802   BP: 108/78   BP Location: Right arm   Patient Position: Sitting   BP Method: Small (Manual)   Pulse: 72   Resp: 16   Temp: 98.7 °F (37.1 °C)   TempSrc: Oral   SpO2: 98%   Weight: 62.3 kg (137 lb 5.6 oz)   Height: 5' 3" (1.6 m)     Body mass index is 24.33 kg/m².    RESULTS: Reviewed labs from last 12 months    Last Lab Results:     Lab Results   Component Value Date    WBC 6.66 11/18/2020    HGB 13.6 11/18/2020    HCT 40.1 11/18/2020     11/18/2020     11/18/2020    K 3.7 11/18/2020     11/18/2020    CO2 25 11/18/2020    BUN 8 11/18/2020    CREATININE 0.8 11/18/2020    CALCIUM 9.0 11/18/2020    ALBUMIN 4.3 11/18/2020    AST 14 11/18/2020    ALT 9 (L) 11/18/2020    TSH 1.461 03/10/2016       CT HEAD WITHOUT CONTRAST 11/18/20     CLINICAL HISTORY:  Headache, acute, normal neuro exam;     TECHNIQUE:  Low dose axial images were obtained through the head.  Coronal and sagittal reformations were also performed. Contrast was not administered.     COMPARISON:  CT head from July 2016.     FINDINGS:  The brain is normally formed and exhibits normal density throughout with no indication of acute/recent major vascular distribution cerebral infarction, intraparenchymal hemorrhage, or intra-axial space occupying lesion. The ventricular system is normal in size and configuration with no evidence of hydrocephalus. No effacement of the skull-base cisterns. No abnormal extra-axial fluid collections or blood products. Visualized     Assessment:       1. Arthralgia of right temporomandibular joint  "   2. Chronic nonintractable headache, unspecified headache type    3. Flu vaccine need        Plan:   Lydia was seen today for hospital follow up.    Diagnoses and all orders for this visit:    Arthralgia of right temporomandibular joint  -     Ambulatory referral/consult to ENT; Future    See HPI and PE.  Agree with my colleague from the ED.  No element of concern today.  Advised patient to take Flexeril as directed but only in the evening and to go on taking NSAIDs.  Patient referred to ENT.  Blood work from ED reviewed with the patient as well as results of the CT.  Copy of above provided to the patient as she states she did obtain it on her Portal.     Chronic nonintractable headache, unspecified headache type  -     Ambulatory referral/consult to ENT; Future    Has a neurologist who treats her with Botox, she requested a 2nd opinion and has an appointment scheduled with neurologist at [facility] end of January.    Flu vaccine need  -     Influenza - Quadrivalent *Preferred* (6 months+) (PF)    Administered today.        Follow up in about 1 month (around 12/23/2020) for annual physical.    This note was created by combination of typed  and M-Modal dictation.  Transcription errors may be present.  If there are any questions, please contact me.

## 2020-11-20 NOTE — TELEPHONE ENCOUNTER
Pt was seen this morning, states she went to outside location after she left this morning to get tested for covid; she is wanting to know if you can write a letter for her job explaining that she got tested and needs to wait for results; if you could please [facility]

## 2020-11-25 ENCOUNTER — OFFICE VISIT (OUTPATIENT)
Dept: OTOLARYNGOLOGY | Facility: CLINIC | Age: 40
End: 2020-11-25
Payer: OTHER GOVERNMENT

## 2020-11-25 VITALS
HEIGHT: 63 IN | RESPIRATION RATE: 16 BRPM | WEIGHT: 139.69 LBS | TEMPERATURE: 98 F | DIASTOLIC BLOOD PRESSURE: 60 MMHG | SYSTOLIC BLOOD PRESSURE: 100 MMHG | BODY MASS INDEX: 24.75 KG/M2

## 2020-11-25 DIAGNOSIS — M26.621 ARTHRALGIA OF RIGHT TEMPOROMANDIBULAR JOINT: ICD-10-CM

## 2020-11-25 DIAGNOSIS — G89.29 CHRONIC NONINTRACTABLE HEADACHE, UNSPECIFIED HEADACHE TYPE: ICD-10-CM

## 2020-11-25 DIAGNOSIS — R51.9 CHRONIC NONINTRACTABLE HEADACHE, UNSPECIFIED HEADACHE TYPE: ICD-10-CM

## 2020-11-25 PROCEDURE — 99203 PR OFFICE/OUTPT VISIT, NEW, LEVL III, 30-44 MIN: ICD-10-PCS | Mod: S$GLB,,, | Performed by: OTOLARYNGOLOGY

## 2020-11-25 PROCEDURE — 99203 OFFICE O/P NEW LOW 30 MIN: CPT | Mod: S$GLB,,, | Performed by: OTOLARYNGOLOGY

## 2020-11-25 NOTE — LETTER
November 25, 2020      Katy Vergara MD  [address] [place] [address]           [facility]  [address]   [facility] [place] [address]  Phone: [phone]          Patient: Lydia Thomas   MR Number: [id number]   YOB: 1980   Date of Visit: 11/25/2020       Dear Dr. Katy Vergara:    Thank you for referring Lydia Thomas to me for evaluation. Attached you will find relevant portions of my assessment and plan of care.    If you have questions, please do not hesitate to call me. I look forward to following Lydia Thomas along with you.    Sincerely,    Kerry Anna MD    Enclosure  CC:  No Recipients    If you would like to receive this communication electronically, please contact [email or web address] or [phone] to request more information on [employer] access.    For providers and/or their staff who would like to refer a patient to [facility], please contact us through our one-stop-shop provider referral line, [facility], at [phone].    If you feel you have received this communication in error or would no longer like to receive these types of communications, please e-mail [email or web address]

## 2020-11-25 NOTE — PROGRESS NOTES
Chief Complaint   Patient presents with    Mass         40 y.o. female presents with right temporal swelling and pain. She reports right TMJ pain and mild trismus for the last 6 months. She has chronic migraines which is controlled with Botox and Imitrex. She feels that recent stress might also be a factor.      Review of Systems     Constitutional: Negative for fatigue and unexpected weight change.   HENT: per HPI.  Eyes: Negative for visual disturbance.   Respiratory: Negative for shortness of breath, hemoptysis  Cardiovascular: Negative for chest pain and palpitations.   Genitourinary: Negative for dysuria and difficulty urinating.   Musculoskeletal: Negative for decreased ROM, back pain.   Skin: Negative for rash, sunburn, itching.   Neurological: Negative for dizziness and seizures.   Hematological: Negative for adenopathy. Does not bruise/bleed easily.   Psychiatric/Behavioral: Negative for agitation. The patient is not nervous/anxious.   Endocrine: Negative for rapid weight loss/weight gain, heat/cold intolerance.     Past Medical History:   Diagnosis Date    Anemia     Anemia     Asthma     exercise asthma    Migraine headache     Ovarian cyst        Past Surgical History:   Procedure Laterality Date    APPENDECTOMY      4/2019    BREAST LUMPECTOMY Right     BREAST LUMPECTOMY      right       family history includes Arthritis in her father; Asthma in her brother and mother; Breast cancer in her maternal aunt, maternal aunt, maternal grandmother, and mother; COPD in her father; Cancer in her maternal grandfather; Diabetes in her maternal uncle; Hearing loss in her paternal aunt, paternal grandfather, paternal grandmother, and paternal uncle; Hypertension in her maternal uncle; Miscarriages / Stillbirths in her mother; Prostate cancer in her maternal uncle.    Pt  reports that she has never smoked. She has never used smokeless tobacco. She reports current alcohol use. She reports that she does not use  "drugs.    Review of patient's allergies indicates:   Allergen Reactions    Iodine and iodide containing products Anaphylaxis    Shellfish derived Anaphylaxis    Benadryl [diphenhydramine hcl] Itching     "with fast injection"    Fish containing products         Physical Exam    Vitals:    11/25/20 0901   BP: 100/60   Resp: 16   Temp: 97.6 °F (36.4 °C)     Body mass index is 24.74 kg/m².    Physical Exam  Vitals signs and nursing note reviewed.   Constitutional:       General: She is not in acute distress.     Appearance: She is well-developed. She is not diaphoretic.   HENT:      Head: Normocephalic and atraumatic.      Jaw: Tenderness (crepitus) present.        Right Ear: Hearing, tympanic membrane, ear canal and external ear normal. No decreased hearing noted. No middle ear effusion. Tympanic membrane has normal mobility.      Left Ear: Hearing, tympanic membrane, ear canal and external ear normal. No decreased hearing noted.  No middle ear effusion. Tympanic membrane has normal mobility.      Nose: Nose normal.      Mouth/Throat:      Lips: Pink.      Mouth: Mucous membranes are moist.      Tongue: No lesions.      Palate: No mass.      Pharynx: Oropharynx is clear.   Eyes:      General: Lids are normal.         Right eye: No discharge.         Left eye: No discharge.      Conjunctiva/sclera: Conjunctivae normal.      Pupils: Pupils are equal, round, and reactive to light.   Neck:      Musculoskeletal: Normal range of motion and neck supple. No edema or erythema.      Thyroid: No thyroid mass or thyromegaly.      Trachea: Trachea and phonation normal. No tracheal tenderness or tracheal deviation.   Cardiovascular:      Heart sounds: Normal heart sounds.   Pulmonary:      Breath sounds: Normal breath sounds. No stridor.   Abdominal:      Palpations: Abdomen is soft.   Lymphadenopathy:      Cervical: No cervical adenopathy.   Skin:     General: Skin is warm and dry.      Coloration: Skin is not pale.      " Findings: No erythema or rash.   Neurological:      Mental Status: She is alert and oriented to person, place, and time.       Studies reviewed:  CT head 11/18/20: WNL, no mass in right temporal region    Assessment     1. Arthralgia of right temporomandibular joint    2. Chronic nonintractable headache, unspecified headache type          Plan  In summary, Ms. Thomas is a 40 year old female with right TMJ and temporalis pain. Recommend NSAIDs, soft diet, warm compresses and massage as tolerated. As well as dental evaluation for possible mouth guard. No masses on exam, reassurance given. All questions were answered and she is in agreement with the plan.

## 2020-12-29 ENCOUNTER — OFFICE VISIT (OUTPATIENT)
Dept: FAMILY MEDICINE | Facility: CLINIC | Age: 40
End: 2020-12-29
Payer: OTHER GOVERNMENT

## 2020-12-29 VITALS
TEMPERATURE: 98 F | OXYGEN SATURATION: 95 % | WEIGHT: 137.81 LBS | BODY MASS INDEX: 24.42 KG/M2 | SYSTOLIC BLOOD PRESSURE: 100 MMHG | RESPIRATION RATE: 16 BRPM | HEART RATE: 87 BPM | HEIGHT: 63 IN | DIASTOLIC BLOOD PRESSURE: 60 MMHG

## 2020-12-29 DIAGNOSIS — Z00.00 ENCOUNTER FOR ANNUAL PHYSICAL EXAM: ICD-10-CM

## 2020-12-29 DIAGNOSIS — R30.0 DYSURIA: Primary | ICD-10-CM

## 2020-12-29 LAB
BILIRUB SERPL-MCNC: NEGATIVE MG/DL
BLOOD URINE, POC: NORMAL
CLARITY, POC UA: CLEAR
COLOR, POC UA: YELLOW
GLUCOSE UR QL STRIP: NORMAL
KETONES UR QL STRIP: NEGATIVE
LEUKOCYTE ESTERASE URINE, POC: NEGATIVE
NITRITE, POC UA: NEGATIVE
PH, POC UA: 9
PROTEIN, POC: NEGATIVE
SPECIFIC GRAVITY, POC UA: 1
UROBILINOGEN, POC UA: NORMAL

## 2020-12-29 PROCEDURE — 99999 PR PBB SHADOW E&M-EST. PATIENT-LVL IV: CPT | Mod: PBBFAC,,, | Performed by: INTERNAL MEDICINE

## 2020-12-29 PROCEDURE — 99999 PR PBB SHADOW E&M-EST. PATIENT-LVL IV: ICD-10-PCS | Mod: PBBFAC,,, | Performed by: INTERNAL MEDICINE

## 2020-12-29 PROCEDURE — 81003 URINALYSIS AUTO W/O SCOPE: CPT

## 2020-12-29 PROCEDURE — 99213 PR OFFICE/OUTPT VISIT, EST, LEVL III, 20-29 MIN: ICD-10-PCS | Mod: S$PBB,,, | Performed by: INTERNAL MEDICINE

## 2020-12-29 PROCEDURE — 99213 OFFICE O/P EST LOW 20 MIN: CPT | Mod: S$PBB,,, | Performed by: INTERNAL MEDICINE

## 2020-12-29 PROCEDURE — 99214 OFFICE O/P EST MOD 30 MIN: CPT | Mod: PBBFAC,PO | Performed by: INTERNAL MEDICINE

## 2020-12-29 PROCEDURE — 81002 URINALYSIS NONAUTO W/O SCOPE: CPT | Mod: PBBFAC,PO | Performed by: INTERNAL MEDICINE

## 2020-12-29 RX ORDER — SULFAMETHOXAZOLE AND TRIMETHOPRIM 800; 160 MG/1; MG/1
1 TABLET ORAL 2 TIMES DAILY
Qty: 6 TABLET | Refills: 0 | Status: SHIPPED | OUTPATIENT
Start: 2020-12-29 | End: 2020-12-30 | Stop reason: SDUPTHER

## 2020-12-29 NOTE — PROGRESS NOTES
.  Health Maintenance Due   Topic Date Due    Lipid Panel      HIV Screening      Pap Smear      Influenza Vaccine (1)

## 2020-12-29 NOTE — PROGRESS NOTES
Subjective:       Patient ID: Lydia Thomas is a pleasant 40 y.o. White female patient    Chief Complaint: Hematuria      Patient is a pt I saw last on 11/19/2020, see my last notes and list of problems below.    HPI    In the interval:  - 11/25/2020 ENT for arthralgia of right TMJ.  She reports that on Saturday, at the end of her period, she started to feel some dysuria as well and found to have blood on the toilet paper.  She reports some darker urine than usually.  She states that she had a fever up to 100.2 yesterday.  She took a Tylenol.  She had UTIs in the past.  She denies any back pain except for few hours yesterday, bilateral.  She would like a new referral to OBGYN.  She would like me to check in her mouth and she feels that she has a lump on her palate for a few days.    Patient Active Problem List   Diagnosis    Acetaminophen overdose    Suicide attempt by acetaminophen overdose    Migraine    Anemia of chronic disease    Adjustment disorder with mixed anxiety and depressed mood    Tylenol overdose    Migraine without status migrainosus, not intractable    Occipital headache          ACTIVE MEDICAL ISSUES:  Documented in Problem List     PAST MEDICAL HISTORY  Documented     PAST SURGICAL HISTORY:  Documented     SOCIAL HISTORY:  Documented     FAMILY HISTORY:  Documented     ALLERGIES AND MEDICATIONS: updated and reviewed.  Documented    Review of Systems   Constitutional: Negative.    HENT: Negative.    Respiratory: Negative.    Cardiovascular: Negative.    Genitourinary: Positive for hematuria.   All other systems reviewed and are negative.      Objective:      Physical Exam  Vitals signs and nursing note reviewed.   Constitutional:       Appearance: Normal appearance.   HENT:      Head: Normocephalic and atraumatic.      Right Ear: Tympanic membrane normal.      Left Ear: Tympanic membrane normal.      Mouth/Throat:      Mouth: Mucous membranes are dry.     Cardiovascular:      Rate and  "Rhythm: Normal rate and regular rhythm.      Pulses: Normal pulses.      Heart sounds: Normal heart sounds.   Pulmonary:      Breath sounds: Normal breath sounds.   Neurological:      Mental Status: She is alert.         Vitals:    12/29/20 1602   BP: 100/60   BP Location: Left arm   Patient Position: Sitting   BP Method: Small (Manual)   Pulse: 87   Resp: 16   Temp: 97.9 °F (36.6 °C)   TempSrc: Oral   SpO2: 95%   Weight: 62.5 kg (137 lb 12.6 oz)   Height: 5' 3" (1.6 m)     Body mass index is 24.41 kg/m².    RESULTS: Reviewed labs from last 12 months    Assessment:       1. Dysuria    2. Encounter for annual physical exam        Plan:   Lydia was seen today for hematuria.    Diagnoses and all orders for this visit:    Dysuria  -     POCT URINE DIPSTICK WITHOUT MICROSCOPE  -     Urinalysis, Reflex to Urine Culture Urine, Clean Catch  -     sulfamethoxazole-trimethoprim 800-160mg (BACTRIM DS) 800-160 mg Tab; Take 1 tablet by mouth 2 (two) times daily. for 3 days    Point of care urine showing only a little bit of blood.  Will do UA and culture.  As patient is symptomatic, will give her Bactrim for 3 days.  Advised her to take NSAIDs.  Patient requests an absence of work for 1 week that I declined.  I am fine with giving her 3 days of absence of work.  Advised to seek for medical attention if worsening symptoms that I detailed for her.    Encounter for annual physical exam  -     Ambulatory referral/consult to Obstetrics / Gynecology; Future    Referral placed.    No follow-ups on file.    This note was created by combination of typed  and M-Modal dictation.  Transcription errors may be present.  If there are any questions, please contact me.  "

## 2020-12-29 NOTE — LETTER
[address]  ? [place], [address] ? Phone [phone] ? Fax [phone]           Return to Work    Patient: Lydia Thomas  YOB: 1980   Date: 12/29/2020      To Whom It May Concern:     Lydia Thomas was seen in my office on 12/29/2020. She will not be able to work until 12/31/2020 included. She will go back to work on 01/01/2021.   If you have any questions or concerns, or if I can be of further assistance, please do not hesitate to contact me.     Sincerely,    Katy Vergara MD

## 2020-12-30 ENCOUNTER — TELEPHONE (OUTPATIENT)
Dept: FAMILY MEDICINE | Facility: CLINIC | Age: 40
End: 2020-12-30

## 2020-12-30 DIAGNOSIS — R30.0 DYSURIA: ICD-10-CM

## 2020-12-30 LAB
BILIRUB UR QL STRIP: NEGATIVE
CLARITY UR REFRACT.AUTO: CLEAR
COLOR UR AUTO: YELLOW
GLUCOSE UR QL STRIP: NEGATIVE
HGB UR QL STRIP: NEGATIVE
KETONES UR QL STRIP: NEGATIVE
LEUKOCYTE ESTERASE UR QL STRIP: NEGATIVE
NITRITE UR QL STRIP: NEGATIVE
PH UR STRIP: >8 [PH] (ref 5–8)
PROT UR QL STRIP: NEGATIVE
SP GR UR STRIP: 1.01 (ref 1–1.03)
URN SPEC COLLECT METH UR: ABNORMAL

## 2020-12-30 RX ORDER — SULFAMETHOXAZOLE AND TRIMETHOPRIM 800; 160 MG/1; MG/1
1 TABLET ORAL 2 TIMES DAILY
Qty: 6 TABLET | Refills: 0 | Status: SHIPPED | OUTPATIENT
Start: 2020-12-30 | End: 2021-01-02

## 2020-12-30 NOTE — TELEPHONE ENCOUNTER
----- Message from Bandarhermelinda Henderson sent at 12/29/2020  4:54 PM CST -----  Regarding: self  Type: Patient Call Back    Who called: Self    What is the request in detail: sulfamethoxazole-trimethoprim 800-160mg (BACTRIM DS) 800-160 mg Tab    Pt lost her ID please send to [facility] [address] [place] [address] [address] [phone] (Phone)  [phone] (Fax)        Can the clinic reply by [facility]? no    Would the patient rather a call back or a response via [facility]? Call   Best call back number: [phone]

## 2020-12-30 NOTE — TELEPHONE ENCOUNTER
----- Message from Roshni Ramirez sent at 12/30/2020  4:05 PM CST -----  Contact: Patient [phone]  Type: Patient Call Back    Who called: Patient     What is the request in detail: Patient is being told by [facility] that they didn't receive the Rx for sulfamethoxazole-trimethoprim 800-160mg (BACTRIM DS) 800-160 mg Tab. Need the nurse to refax the medication to [facility], and call patient once this is done.   .  [facility]/pharmacy #[id number] - [place] - [address]  [address] [address]  Phone: [phone] Fax: [phone]    Would the patient rather a call back or a response via [facility]? Call back    Best call back number: [phone]

## 2020-12-30 NOTE — TELEPHONE ENCOUNTER
Dr donato sent in antibiotic for this pt. Pt called stating she lost her id and is requesting it be resent to [facility] pharmacy. Please send as dr donato is out of the office

## 2021-01-11 ENCOUNTER — PATIENT OUTREACH (OUTPATIENT)
Dept: ADMINISTRATIVE | Facility: OTHER | Age: 41
End: 2021-01-11

## 2021-01-12 ENCOUNTER — OFFICE VISIT (OUTPATIENT)
Dept: OBSTETRICS AND GYNECOLOGY | Facility: CLINIC | Age: 41
End: 2021-01-12
Payer: OTHER GOVERNMENT

## 2021-01-12 ENCOUNTER — HOSPITAL ENCOUNTER (OUTPATIENT)
Dept: RADIOLOGY | Facility: HOSPITAL | Age: 41
Discharge: HOME | End: 2021-01-12
Attending: STUDENT IN AN ORGANIZED HEALTH CARE EDUCATION/TRAINING PROGRAM
Payer: OTHER GOVERNMENT

## 2021-01-12 VITALS
BODY MASS INDEX: 24.61 KG/M2 | SYSTOLIC BLOOD PRESSURE: 120 MMHG | DIASTOLIC BLOOD PRESSURE: 60 MMHG | HEIGHT: 63 IN | WEIGHT: 138.88 LBS

## 2021-01-12 DIAGNOSIS — N94.11 INTROITAL DYSPAREUNIA: ICD-10-CM

## 2021-01-12 DIAGNOSIS — Z80.3 FAMILY HISTORY OF BREAST CANCER: ICD-10-CM

## 2021-01-12 DIAGNOSIS — N63.0 PALPABLE MASS OF BREAST: ICD-10-CM

## 2021-01-12 DIAGNOSIS — Z00.00 ENCOUNTER FOR ANNUAL PHYSICAL EXAM: ICD-10-CM

## 2021-01-12 DIAGNOSIS — Z01.419 WELL WOMAN EXAM WITH ROUTINE GYNECOLOGICAL EXAM: Primary | ICD-10-CM

## 2021-01-12 PROCEDURE — 77066 DX MAMMO INCL CAD BI: CPT | Mod: 26,,, | Performed by: RADIOLOGY

## 2021-01-12 PROCEDURE — 76642 ULTRASOUND BREAST LIMITED: CPT | Mod: 26,50,, | Performed by: RADIOLOGY

## 2021-01-12 PROCEDURE — 99385 PR PREVENTIVE VISIT,NEW,18-39: ICD-10-PCS | Mod: S$PBB,,, | Performed by: STUDENT IN AN ORGANIZED HEALTH CARE EDUCATION/TRAINING PROGRAM

## 2021-01-12 PROCEDURE — 77062 BREAST TOMOSYNTHESIS BI: CPT | Mod: 26,,, | Performed by: RADIOLOGY

## 2021-01-12 PROCEDURE — 99385 PREV VISIT NEW AGE 18-39: CPT | Mod: S$PBB,,, | Performed by: STUDENT IN AN ORGANIZED HEALTH CARE EDUCATION/TRAINING PROGRAM

## 2021-01-12 PROCEDURE — 76642 US BREAST BILATERAL LIMITED: ICD-10-PCS | Mod: 26,50,, | Performed by: RADIOLOGY

## 2021-01-12 PROCEDURE — 77066 MAMMO DIGITAL DIAGNOSTIC BILAT WITH TOMO: ICD-10-PCS | Mod: 26,,, | Performed by: RADIOLOGY

## 2021-01-12 PROCEDURE — 99999 PR PBB SHADOW E&M-EST. PATIENT-LVL III: ICD-10-PCS | Mod: PBBFAC,,, | Performed by: STUDENT IN AN ORGANIZED HEALTH CARE EDUCATION/TRAINING PROGRAM

## 2021-01-12 PROCEDURE — 87624 HPV HI-RISK TYP POOLED RSLT: CPT

## 2021-01-12 PROCEDURE — 77066 DX MAMMO INCL CAD BI: CPT | Mod: TC

## 2021-01-12 PROCEDURE — 99999 PR PBB SHADOW E&M-EST. PATIENT-LVL III: CPT | Mod: PBBFAC,,, | Performed by: STUDENT IN AN ORGANIZED HEALTH CARE EDUCATION/TRAINING PROGRAM

## 2021-01-12 PROCEDURE — 77062 MAMMO DIGITAL DIAGNOSTIC BILAT WITH TOMO: ICD-10-PCS | Mod: 26,,, | Performed by: RADIOLOGY

## 2021-01-12 PROCEDURE — 88175 CYTOPATH C/V AUTO FLUID REDO: CPT

## 2021-01-12 PROCEDURE — 76642 ULTRASOUND BREAST LIMITED: CPT | Mod: TC,50

## 2021-01-12 PROCEDURE — 99213 OFFICE O/P EST LOW 20 MIN: CPT | Mod: PBBFAC,25 | Performed by: STUDENT IN AN ORGANIZED HEALTH CARE EDUCATION/TRAINING PROGRAM

## 2021-01-22 LAB
HPV HR 12 DNA SPEC QL NAA+PROBE: NEGATIVE
HPV16 AG SPEC QL: NEGATIVE
HPV18 DNA SPEC QL NAA+PROBE: NEGATIVE

## 2021-02-07 LAB
FINAL PATHOLOGIC DIAGNOSIS: NORMAL
Lab: NORMAL

## 2021-02-24 ENCOUNTER — TELEPHONE (OUTPATIENT)
Dept: FAMILY MEDICINE | Facility: CLINIC | Age: 41
End: 2021-02-24

## 2021-02-24 DIAGNOSIS — R51.9 CHRONIC NONINTRACTABLE HEADACHE, UNSPECIFIED HEADACHE TYPE: Primary | ICD-10-CM

## 2021-02-24 DIAGNOSIS — G89.29 CHRONIC NONINTRACTABLE HEADACHE, UNSPECIFIED HEADACHE TYPE: Primary | ICD-10-CM

## 2021-03-28 ENCOUNTER — HOSPITAL ENCOUNTER (EMERGENCY)
Facility: HOSPITAL | Age: 41
Discharge: HOME | End: 2021-03-28
Attending: EMERGENCY MEDICINE
Payer: OTHER GOVERNMENT

## 2021-03-28 VITALS
TEMPERATURE: 99 F | DIASTOLIC BLOOD PRESSURE: 69 MMHG | RESPIRATION RATE: 20 BRPM | WEIGHT: 135 LBS | HEIGHT: 63 IN | SYSTOLIC BLOOD PRESSURE: 107 MMHG | BODY MASS INDEX: 23.92 KG/M2 | HEART RATE: 93 BPM | OXYGEN SATURATION: 98 %

## 2021-03-28 DIAGNOSIS — R55 VASOVAGAL SYNCOPE: Primary | ICD-10-CM

## 2021-03-28 DIAGNOSIS — R56.9 SEIZURE-LIKE ACTIVITY: ICD-10-CM

## 2021-03-28 LAB
ALBUMIN SERPL BCP-MCNC: 4.1 G/DL (ref 3.5–5.2)
ALP SERPL-CCNC: 36 U/L (ref 55–135)
ALT SERPL W/O P-5'-P-CCNC: 17 U/L (ref 10–44)
ANION GAP SERPL CALC-SCNC: 10 MMOL/L (ref 8–16)
AST SERPL-CCNC: 19 U/L (ref 10–40)
B-HCG UR QL: NEGATIVE
BASOPHILS # BLD AUTO: 0.09 K/UL (ref 0–0.2)
BASOPHILS NFR BLD: 1.2 % (ref 0–1.9)
BILIRUB SERPL-MCNC: 0.8 MG/DL (ref 0.1–1)
BILIRUB UR QL STRIP: NEGATIVE
BUN SERPL-MCNC: 5 MG/DL (ref 6–20)
CALCIUM SERPL-MCNC: 8.9 MG/DL (ref 8.7–10.5)
CHLORIDE SERPL-SCNC: 106 MMOL/L (ref 95–110)
CK SERPL-CCNC: 291 U/L (ref 20–180)
CLARITY UR: CLEAR
CO2 SERPL-SCNC: 22 MMOL/L (ref 23–29)
COLOR UR: COLORLESS
CREAT SERPL-MCNC: 0.8 MG/DL (ref 0.5–1.4)
CTP QC/QA: YES
DIFFERENTIAL METHOD: ABNORMAL
EOSINOPHIL # BLD AUTO: 0.1 K/UL (ref 0–0.5)
EOSINOPHIL NFR BLD: 1.7 % (ref 0–8)
ERYTHROCYTE [DISTWIDTH] IN BLOOD BY AUTOMATED COUNT: 12.4 % (ref 11.5–14.5)
EST. GFR  (AFRICAN AMERICAN): >60 ML/MIN/1.73 M^2
EST. GFR  (NON AFRICAN AMERICAN): >60 ML/MIN/1.73 M^2
ETHANOL SERPL-MCNC: <10 MG/DL
GLUCOSE SERPL-MCNC: 100 MG/DL (ref 70–110)
GLUCOSE UR QL STRIP: NEGATIVE
HCT VFR BLD AUTO: 36.6 % (ref 37–48.5)
HGB BLD-MCNC: 12.5 G/DL (ref 12–16)
HGB UR QL STRIP: NEGATIVE
IMM GRANULOCYTES # BLD AUTO: 0.02 K/UL (ref 0–0.04)
IMM GRANULOCYTES NFR BLD AUTO: 0.3 % (ref 0–0.5)
KETONES UR QL STRIP: NEGATIVE
LEUKOCYTE ESTERASE UR QL STRIP: NEGATIVE
LYMPHOCYTES # BLD AUTO: 2.2 K/UL (ref 1–4.8)
LYMPHOCYTES NFR BLD: 28.1 % (ref 18–48)
MAGNESIUM SERPL-MCNC: 1.9 MG/DL (ref 1.6–2.6)
MCH RBC QN AUTO: 31.6 PG (ref 27–31)
MCHC RBC AUTO-ENTMCNC: 34.2 G/DL (ref 32–36)
MCV RBC AUTO: 92 FL (ref 82–98)
MONOCYTES # BLD AUTO: 0.7 K/UL (ref 0.3–1)
MONOCYTES NFR BLD: 8.4 % (ref 4–15)
NEUTROPHILS # BLD AUTO: 4.7 K/UL (ref 1.8–7.7)
NEUTROPHILS NFR BLD: 60.3 % (ref 38–73)
NITRITE UR QL STRIP: NEGATIVE
NRBC BLD-RTO: 0 /100 WBC
PH UR STRIP: 6 [PH] (ref 5–8)
PLATELET # BLD AUTO: 248 K/UL (ref 150–350)
PMV BLD AUTO: 11 FL (ref 9.2–12.9)
POTASSIUM SERPL-SCNC: 3.4 MMOL/L (ref 3.5–5.1)
PROT SERPL-MCNC: 6.6 G/DL (ref 6–8.4)
PROT UR QL STRIP: NEGATIVE
RBC # BLD AUTO: 3.96 M/UL (ref 4–5.4)
SODIUM SERPL-SCNC: 138 MMOL/L (ref 136–145)
SP GR UR STRIP: 1 (ref 1–1.03)
TROPONIN I SERPL DL<=0.01 NG/ML-MCNC: <0.006 NG/ML (ref 0–0.03)
TSH SERPL DL<=0.005 MIU/L-ACNC: 1.99 UIU/ML (ref 0.4–4)
URN SPEC COLLECT METH UR: ABNORMAL
UROBILINOGEN UR STRIP-ACNC: NEGATIVE EU/DL
WBC # BLD AUTO: 7.76 K/UL (ref 3.9–12.7)

## 2021-03-28 PROCEDURE — 93010 EKG 12-LEAD: ICD-10-PCS | Mod: ,,, | Performed by: INTERNAL MEDICINE

## 2021-03-28 PROCEDURE — 84484 ASSAY OF TROPONIN QUANT: CPT | Performed by: EMERGENCY MEDICINE

## 2021-03-28 PROCEDURE — 80053 COMPREHEN METABOLIC PANEL: CPT | Performed by: EMERGENCY MEDICINE

## 2021-03-28 PROCEDURE — 83735 ASSAY OF MAGNESIUM: CPT | Performed by: EMERGENCY MEDICINE

## 2021-03-28 PROCEDURE — 93005 ELECTROCARDIOGRAM TRACING: CPT

## 2021-03-28 PROCEDURE — 82550 ASSAY OF CK (CPK): CPT | Performed by: EMERGENCY MEDICINE

## 2021-03-28 PROCEDURE — 80307 DRUG TEST PRSMV CHEM ANLYZR: CPT | Performed by: EMERGENCY MEDICINE

## 2021-03-28 PROCEDURE — 81025 URINE PREGNANCY TEST: CPT | Performed by: EMERGENCY MEDICINE

## 2021-03-28 PROCEDURE — 93010 ELECTROCARDIOGRAM REPORT: CPT | Mod: ,,, | Performed by: INTERNAL MEDICINE

## 2021-03-28 PROCEDURE — 84443 ASSAY THYROID STIM HORMONE: CPT | Performed by: EMERGENCY MEDICINE

## 2021-03-28 PROCEDURE — 99284 EMERGENCY DEPT VISIT MOD MDM: CPT | Mod: 25

## 2021-03-28 PROCEDURE — 85025 COMPLETE CBC W/AUTO DIFF WBC: CPT | Performed by: EMERGENCY MEDICINE

## 2021-03-28 PROCEDURE — 82077 ASSAY SPEC XCP UR&BREATH IA: CPT | Performed by: EMERGENCY MEDICINE

## 2021-03-28 PROCEDURE — 81003 URINALYSIS AUTO W/O SCOPE: CPT | Mod: 59 | Performed by: EMERGENCY MEDICINE

## 2021-03-28 RX ORDER — LORAZEPAM 2 MG/ML
INJECTION INTRAMUSCULAR
Status: DISCONTINUED
Start: 2021-03-28 | End: 2021-03-28 | Stop reason: WASHOUT

## 2021-03-29 ENCOUNTER — TELEPHONE (OUTPATIENT)
Dept: FAMILY MEDICINE | Facility: CLINIC | Age: 41
End: 2021-03-29

## 2021-03-29 LAB
AMPHET+METHAMPHET UR QL: NEGATIVE
BARBITURATES UR QL SCN>200 NG/ML: NEGATIVE
BENZODIAZ UR QL SCN>200 NG/ML: NORMAL
BZE UR QL SCN: NEGATIVE
CANNABINOIDS UR QL SCN: NEGATIVE
CREAT UR-MCNC: 44 MG/DL (ref 15–325)
METHADONE UR QL SCN>300 NG/ML: NEGATIVE
OPIATES UR QL SCN: NEGATIVE
PCP UR QL SCN>25 NG/ML: NEGATIVE
TOXICOLOGY INFORMATION: NORMAL

## 2021-03-31 ENCOUNTER — OFFICE VISIT (OUTPATIENT)
Dept: FAMILY MEDICINE | Facility: CLINIC | Age: 41
End: 2021-03-31
Payer: OTHER GOVERNMENT

## 2021-03-31 VITALS
OXYGEN SATURATION: 99 % | DIASTOLIC BLOOD PRESSURE: 70 MMHG | HEART RATE: 78 BPM | SYSTOLIC BLOOD PRESSURE: 110 MMHG | TEMPERATURE: 99 F | BODY MASS INDEX: 24.34 KG/M2 | RESPIRATION RATE: 16 BRPM | WEIGHT: 137.38 LBS | HEIGHT: 63 IN

## 2021-03-31 DIAGNOSIS — R55 VASOVAGAL SYNCOPE: Primary | ICD-10-CM

## 2021-03-31 DIAGNOSIS — G43.809 OTHER MIGRAINE WITHOUT STATUS MIGRAINOSUS, NOT INTRACTABLE: ICD-10-CM

## 2021-03-31 DIAGNOSIS — G25.3 MYOCLONIC JERKING: ICD-10-CM

## 2021-03-31 PROCEDURE — 99214 OFFICE O/P EST MOD 30 MIN: CPT | Mod: S$PBB,,, | Performed by: INTERNAL MEDICINE

## 2021-03-31 PROCEDURE — 99999 PR PBB SHADOW E&M-EST. PATIENT-LVL III: CPT | Mod: PBBFAC,,, | Performed by: INTERNAL MEDICINE

## 2021-03-31 PROCEDURE — 99214 PR OFFICE/OUTPT VISIT, EST, LEVL IV, 30-39 MIN: ICD-10-PCS | Mod: S$PBB,,, | Performed by: INTERNAL MEDICINE

## 2021-03-31 PROCEDURE — 99213 OFFICE O/P EST LOW 20 MIN: CPT | Mod: PBBFAC,PO | Performed by: INTERNAL MEDICINE

## 2021-03-31 PROCEDURE — 99999 PR PBB SHADOW E&M-EST. PATIENT-LVL III: ICD-10-PCS | Mod: PBBFAC,,, | Performed by: INTERNAL MEDICINE

## 2021-03-31 RX ORDER — SUMATRIPTAN 6 MG/.5ML
INJECTION, SOLUTION SUBCUTANEOUS
Qty: 1 VIAL | Refills: 1 | Status: SHIPPED | OUTPATIENT
Start: 2021-03-31 | End: 2021-10-08 | Stop reason: SDUPTHER

## 2021-04-14 ENCOUNTER — OFFICE VISIT (OUTPATIENT)
Dept: FAMILY MEDICINE | Facility: CLINIC | Age: 41
End: 2021-04-14
Payer: OTHER GOVERNMENT

## 2021-04-14 ENCOUNTER — LAB VISIT (OUTPATIENT)
Dept: LAB | Facility: HOSPITAL | Age: 41
End: 2021-04-14
Attending: INTERNAL MEDICINE
Payer: OTHER GOVERNMENT

## 2021-04-14 VITALS
WEIGHT: 139.31 LBS | HEART RATE: 82 BPM | DIASTOLIC BLOOD PRESSURE: 60 MMHG | SYSTOLIC BLOOD PRESSURE: 100 MMHG | OXYGEN SATURATION: 95 % | TEMPERATURE: 98 F | HEIGHT: 63 IN | BODY MASS INDEX: 24.68 KG/M2 | RESPIRATION RATE: 16 BRPM

## 2021-04-14 DIAGNOSIS — R53.83 FATIGUE, UNSPECIFIED TYPE: ICD-10-CM

## 2021-04-14 DIAGNOSIS — R00.0 TACHYCARDIA: ICD-10-CM

## 2021-04-14 DIAGNOSIS — Z13.220 ENCOUNTER FOR LIPID SCREENING FOR CARDIOVASCULAR DISEASE: ICD-10-CM

## 2021-04-14 DIAGNOSIS — R74.8 ELEVATED CK: ICD-10-CM

## 2021-04-14 DIAGNOSIS — Z13.6 ENCOUNTER FOR LIPID SCREENING FOR CARDIOVASCULAR DISEASE: ICD-10-CM

## 2021-04-14 DIAGNOSIS — H53.9 VISION DISTURBANCE: Primary | ICD-10-CM

## 2021-04-14 DIAGNOSIS — M54.2 NECK PAIN: ICD-10-CM

## 2021-04-14 LAB
ALBUMIN SERPL BCP-MCNC: 3.7 G/DL (ref 3.5–5.2)
ALP SERPL-CCNC: 38 U/L (ref 55–135)
ALT SERPL W/O P-5'-P-CCNC: 10 U/L (ref 10–44)
ANION GAP SERPL CALC-SCNC: 6 MMOL/L (ref 8–16)
AST SERPL-CCNC: 13 U/L (ref 10–40)
BASOPHILS # BLD AUTO: 0.08 K/UL (ref 0–0.2)
BASOPHILS NFR BLD: 1.4 % (ref 0–1.9)
BILIRUB SERPL-MCNC: 0.6 MG/DL (ref 0.1–1)
BUN SERPL-MCNC: 9 MG/DL (ref 6–20)
CALCIUM SERPL-MCNC: 8.8 MG/DL (ref 8.7–10.5)
CHLORIDE SERPL-SCNC: 110 MMOL/L (ref 95–110)
CHOLEST SERPL-MCNC: 198 MG/DL (ref 120–199)
CHOLEST/HDLC SERPL: 3.8 {RATIO} (ref 2–5)
CK SERPL-CCNC: 43 U/L (ref 20–180)
CO2 SERPL-SCNC: 24 MMOL/L (ref 23–29)
CREAT SERPL-MCNC: 0.8 MG/DL (ref 0.5–1.4)
DIFFERENTIAL METHOD: ABNORMAL
EOSINOPHIL # BLD AUTO: 0.1 K/UL (ref 0–0.5)
EOSINOPHIL NFR BLD: 1.8 % (ref 0–8)
ERYTHROCYTE [DISTWIDTH] IN BLOOD BY AUTOMATED COUNT: 12.3 % (ref 11.5–14.5)
EST. GFR  (AFRICAN AMERICAN): >60 ML/MIN/1.73 M^2
EST. GFR  (NON AFRICAN AMERICAN): >60 ML/MIN/1.73 M^2
ESTIMATED AVG GLUCOSE: 94 MG/DL (ref 68–131)
FERRITIN SERPL-MCNC: 10 NG/ML (ref 20–300)
GLUCOSE SERPL-MCNC: 87 MG/DL (ref 70–110)
HBA1C MFR BLD: 4.9 % (ref 4–5.6)
HCT VFR BLD AUTO: 39.5 % (ref 37–48.5)
HDLC SERPL-MCNC: 52 MG/DL (ref 40–75)
HDLC SERPL: 26.3 % (ref 20–50)
HGB BLD-MCNC: 13.3 G/DL (ref 12–16)
IMM GRANULOCYTES # BLD AUTO: 0.01 K/UL (ref 0–0.04)
IMM GRANULOCYTES NFR BLD AUTO: 0.2 % (ref 0–0.5)
IRON SERPL-MCNC: 37 UG/DL (ref 30–160)
LDLC SERPL CALC-MCNC: 131.6 MG/DL (ref 63–159)
LYMPHOCYTES # BLD AUTO: 1.7 K/UL (ref 1–4.8)
LYMPHOCYTES NFR BLD: 29.6 % (ref 18–48)
MCH RBC QN AUTO: 31.1 PG (ref 27–31)
MCHC RBC AUTO-ENTMCNC: 33.7 G/DL (ref 32–36)
MCV RBC AUTO: 93 FL (ref 82–98)
MONOCYTES # BLD AUTO: 0.5 K/UL (ref 0.3–1)
MONOCYTES NFR BLD: 8.2 % (ref 4–15)
NEUTROPHILS # BLD AUTO: 3.3 K/UL (ref 1.8–7.7)
NEUTROPHILS NFR BLD: 58.8 % (ref 38–73)
NONHDLC SERPL-MCNC: 146 MG/DL
NRBC BLD-RTO: 0 /100 WBC
PLATELET # BLD AUTO: 231 K/UL (ref 150–450)
PMV BLD AUTO: 11.3 FL (ref 9.2–12.9)
POTASSIUM SERPL-SCNC: 4.7 MMOL/L (ref 3.5–5.1)
PROT SERPL-MCNC: 6.8 G/DL (ref 6–8.4)
RBC # BLD AUTO: 4.27 M/UL (ref 4–5.4)
SATURATED IRON: 10 % (ref 20–50)
SODIUM SERPL-SCNC: 140 MMOL/L (ref 136–145)
TOTAL IRON BINDING CAPACITY: 376 UG/DL (ref 250–450)
TRANSFERRIN SERPL-MCNC: 254 MG/DL (ref 200–375)
TRIGL SERPL-MCNC: 72 MG/DL (ref 30–150)
TSH SERPL DL<=0.005 MIU/L-ACNC: 0.96 UIU/ML (ref 0.4–4)
WBC # BLD AUTO: 5.6 K/UL (ref 3.9–12.7)

## 2021-04-14 PROCEDURE — 82728 ASSAY OF FERRITIN: CPT | Performed by: INTERNAL MEDICINE

## 2021-04-14 PROCEDURE — 85025 COMPLETE CBC W/AUTO DIFF WBC: CPT | Performed by: INTERNAL MEDICINE

## 2021-04-14 PROCEDURE — 99214 OFFICE O/P EST MOD 30 MIN: CPT | Mod: S$PBB,,, | Performed by: INTERNAL MEDICINE

## 2021-04-14 PROCEDURE — 83036 HEMOGLOBIN GLYCOSYLATED A1C: CPT | Performed by: INTERNAL MEDICINE

## 2021-04-14 PROCEDURE — 36415 COLL VENOUS BLD VENIPUNCTURE: CPT | Mod: PO | Performed by: INTERNAL MEDICINE

## 2021-04-14 PROCEDURE — 99214 PR OFFICE/OUTPT VISIT, EST, LEVL IV, 30-39 MIN: ICD-10-PCS | Mod: S$PBB,,, | Performed by: INTERNAL MEDICINE

## 2021-04-14 PROCEDURE — 80053 COMPREHEN METABOLIC PANEL: CPT | Performed by: INTERNAL MEDICINE

## 2021-04-14 PROCEDURE — 82550 ASSAY OF CK (CPK): CPT | Performed by: INTERNAL MEDICINE

## 2021-04-14 PROCEDURE — 99215 OFFICE O/P EST HI 40 MIN: CPT | Mod: PBBFAC,PO | Performed by: INTERNAL MEDICINE

## 2021-04-14 PROCEDURE — 83540 ASSAY OF IRON: CPT | Performed by: INTERNAL MEDICINE

## 2021-04-14 PROCEDURE — 80061 LIPID PANEL: CPT | Performed by: INTERNAL MEDICINE

## 2021-04-14 PROCEDURE — 99999 PR PBB SHADOW E&M-EST. PATIENT-LVL V: CPT | Mod: PBBFAC,,, | Performed by: INTERNAL MEDICINE

## 2021-04-14 PROCEDURE — 84443 ASSAY THYROID STIM HORMONE: CPT | Performed by: INTERNAL MEDICINE

## 2021-04-14 PROCEDURE — 99999 PR PBB SHADOW E&M-EST. PATIENT-LVL V: ICD-10-PCS | Mod: PBBFAC,,, | Performed by: INTERNAL MEDICINE

## 2021-04-23 ENCOUNTER — PATIENT OUTREACH (OUTPATIENT)
Dept: ADMINISTRATIVE | Facility: OTHER | Age: 41
End: 2021-04-23

## 2021-04-27 ENCOUNTER — TELEPHONE (OUTPATIENT)
Dept: OPTOMETRY | Facility: CLINIC | Age: 41
End: 2021-04-27

## 2021-05-11 ENCOUNTER — TELEPHONE (OUTPATIENT)
Dept: OPTOMETRY | Facility: CLINIC | Age: 41
End: 2021-05-11

## 2021-06-15 ENCOUNTER — TELEPHONE (OUTPATIENT)
Dept: FAMILY MEDICINE | Facility: CLINIC | Age: 41
End: 2021-06-15

## 2021-06-15 DIAGNOSIS — F32.A DEPRESSION, UNSPECIFIED DEPRESSION TYPE: Primary | ICD-10-CM

## 2021-06-28 ENCOUNTER — OFFICE VISIT (OUTPATIENT)
Dept: OPTOMETRY | Facility: CLINIC | Age: 41
End: 2021-06-28
Payer: OTHER GOVERNMENT

## 2021-06-28 ENCOUNTER — PATIENT MESSAGE (OUTPATIENT)
Dept: OPTOMETRY | Facility: CLINIC | Age: 41
End: 2021-06-28

## 2021-06-28 DIAGNOSIS — H53.50 IMPAIRED COLOR VISION: ICD-10-CM

## 2021-06-28 DIAGNOSIS — H52.4 ACCOMMODATIVE INSUFFICIENCY: ICD-10-CM

## 2021-06-28 DIAGNOSIS — H57.13 OCULAR PAIN, BILATERAL: ICD-10-CM

## 2021-06-28 DIAGNOSIS — S06.9X0S TRAUMATIC BRAIN INJURY WITHOUT LOSS OF CONSCIOUSNESS, SEQUELA: Primary | ICD-10-CM

## 2021-06-28 DIAGNOSIS — H53.143 PHOTOPHOBIA OF BOTH EYES: ICD-10-CM

## 2021-06-28 DIAGNOSIS — H52.223 MYOPIA OF BOTH EYES WITH REGULAR ASTIGMATISM: ICD-10-CM

## 2021-06-28 DIAGNOSIS — H52.13 MYOPIA OF BOTH EYES WITH REGULAR ASTIGMATISM: ICD-10-CM

## 2021-06-28 PROBLEM — S06.9X0A TRAUMATIC BRAIN INJURY WITHOUT LOSS OF CONSCIOUSNESS: Status: ACTIVE | Noted: 2021-06-28

## 2021-06-28 PROCEDURE — 92004 PR EYE EXAM, NEW PATIENT,COMPREHESV: ICD-10-PCS | Mod: S$PBB,,, | Performed by: OPTOMETRIST

## 2021-06-28 PROCEDURE — 99999 PR PBB SHADOW E&M-EST. PATIENT-LVL III: ICD-10-PCS | Mod: PBBFAC,,, | Performed by: OPTOMETRIST

## 2021-06-28 PROCEDURE — 99213 OFFICE O/P EST LOW 20 MIN: CPT | Mod: PBBFAC,PO | Performed by: OPTOMETRIST

## 2021-06-28 PROCEDURE — 92015 PR REFRACTION: ICD-10-PCS | Mod: ,,, | Performed by: OPTOMETRIST

## 2021-06-28 PROCEDURE — 92015 DETERMINE REFRACTIVE STATE: CPT | Mod: ,,, | Performed by: OPTOMETRIST

## 2021-06-28 PROCEDURE — 99999 PR PBB SHADOW E&M-EST. PATIENT-LVL III: CPT | Mod: PBBFAC,,, | Performed by: OPTOMETRIST

## 2021-06-28 PROCEDURE — 92004 COMPRE OPH EXAM NEW PT 1/>: CPT | Mod: S$PBB,,, | Performed by: OPTOMETRIST

## 2021-06-30 ENCOUNTER — TELEPHONE (OUTPATIENT)
Dept: FAMILY MEDICINE | Facility: CLINIC | Age: 41
End: 2021-06-30

## 2021-07-05 ENCOUNTER — PATIENT MESSAGE (OUTPATIENT)
Dept: FAMILY MEDICINE | Facility: CLINIC | Age: 41
End: 2021-07-05

## 2021-07-06 ENCOUNTER — LAB VISIT (OUTPATIENT)
Dept: LAB | Facility: HOSPITAL | Age: 41
End: 2021-07-06
Attending: INTERNAL MEDICINE
Payer: OTHER GOVERNMENT

## 2021-07-06 ENCOUNTER — OFFICE VISIT (OUTPATIENT)
Dept: FAMILY MEDICINE | Facility: CLINIC | Age: 41
End: 2021-07-06
Payer: OTHER GOVERNMENT

## 2021-07-06 VITALS
SYSTOLIC BLOOD PRESSURE: 98 MMHG | BODY MASS INDEX: 24.53 KG/M2 | DIASTOLIC BLOOD PRESSURE: 64 MMHG | HEART RATE: 72 BPM | WEIGHT: 138.44 LBS | RESPIRATION RATE: 16 BRPM | OXYGEN SATURATION: 98 % | HEIGHT: 63 IN | TEMPERATURE: 99 F

## 2021-07-06 DIAGNOSIS — H53.9 VISION DISTURBANCE: ICD-10-CM

## 2021-07-06 DIAGNOSIS — Z01.818 PREOPERATIVE CLEARANCE: Primary | ICD-10-CM

## 2021-07-06 DIAGNOSIS — Z28.21 COVID-19 VIRUS VACCINATION REFUSED: ICD-10-CM

## 2021-07-06 DIAGNOSIS — R53.83 FATIGUE, UNSPECIFIED TYPE: ICD-10-CM

## 2021-07-06 DIAGNOSIS — Z01.810 PREOP CARDIOVASCULAR EXAM: ICD-10-CM

## 2021-07-06 LAB
ALBUMIN SERPL BCP-MCNC: 3.8 G/DL (ref 3.5–5.2)
ALP SERPL-CCNC: 34 U/L (ref 55–135)
ALT SERPL W/O P-5'-P-CCNC: 40 U/L (ref 10–44)
ANION GAP SERPL CALC-SCNC: 7 MMOL/L (ref 8–16)
AST SERPL-CCNC: 35 U/L (ref 10–40)
BASOPHILS # BLD AUTO: 0.08 K/UL (ref 0–0.2)
BASOPHILS NFR BLD: 1.6 % (ref 0–1.9)
BILIRUB SERPL-MCNC: 0.8 MG/DL (ref 0.1–1)
BUN SERPL-MCNC: 5 MG/DL (ref 6–20)
CALCIUM SERPL-MCNC: 9.1 MG/DL (ref 8.7–10.5)
CHLORIDE SERPL-SCNC: 107 MMOL/L (ref 95–110)
CO2 SERPL-SCNC: 25 MMOL/L (ref 23–29)
CREAT SERPL-MCNC: 0.8 MG/DL (ref 0.5–1.4)
DIFFERENTIAL METHOD: ABNORMAL
EOSINOPHIL # BLD AUTO: 0.4 K/UL (ref 0–0.5)
EOSINOPHIL NFR BLD: 7.6 % (ref 0–8)
ERYTHROCYTE [DISTWIDTH] IN BLOOD BY AUTOMATED COUNT: 13 % (ref 11.5–14.5)
EST. GFR  (AFRICAN AMERICAN): >60 ML/MIN/1.73 M^2
EST. GFR  (NON AFRICAN AMERICAN): >60 ML/MIN/1.73 M^2
GLUCOSE SERPL-MCNC: 92 MG/DL (ref 70–110)
HCT VFR BLD AUTO: 37.3 % (ref 37–48.5)
HGB BLD-MCNC: 12.6 G/DL (ref 12–16)
IMM GRANULOCYTES # BLD AUTO: 0.01 K/UL (ref 0–0.04)
IMM GRANULOCYTES NFR BLD AUTO: 0.2 % (ref 0–0.5)
LYMPHOCYTES # BLD AUTO: 1.3 K/UL (ref 1–4.8)
LYMPHOCYTES NFR BLD: 26.2 % (ref 18–48)
MCH RBC QN AUTO: 31.9 PG (ref 27–31)
MCHC RBC AUTO-ENTMCNC: 33.8 G/DL (ref 32–36)
MCV RBC AUTO: 94 FL (ref 82–98)
MONOCYTES # BLD AUTO: 0.5 K/UL (ref 0.3–1)
MONOCYTES NFR BLD: 9.7 % (ref 4–15)
NEUTROPHILS # BLD AUTO: 2.7 K/UL (ref 1.8–7.7)
NEUTROPHILS NFR BLD: 54.7 % (ref 38–73)
NRBC BLD-RTO: 0 /100 WBC
PLATELET # BLD AUTO: 208 K/UL (ref 150–450)
PMV BLD AUTO: 11.4 FL (ref 9.2–12.9)
POTASSIUM SERPL-SCNC: 3.9 MMOL/L (ref 3.5–5.1)
PROT SERPL-MCNC: 6.5 G/DL (ref 6–8.4)
RBC # BLD AUTO: 3.95 M/UL (ref 4–5.4)
SODIUM SERPL-SCNC: 139 MMOL/L (ref 136–145)
WBC # BLD AUTO: 4.85 K/UL (ref 3.9–12.7)

## 2021-07-06 PROCEDURE — 99999 PR PBB SHADOW E&M-EST. PATIENT-LVL III: ICD-10-PCS | Mod: PBBFAC,,, | Performed by: INTERNAL MEDICINE

## 2021-07-06 PROCEDURE — 93010 EKG 12-LEAD: ICD-10-PCS | Mod: S$PBB,,, | Performed by: INTERNAL MEDICINE

## 2021-07-06 PROCEDURE — 99214 OFFICE O/P EST MOD 30 MIN: CPT | Mod: S$PBB,,, | Performed by: INTERNAL MEDICINE

## 2021-07-06 PROCEDURE — 93005 ELECTROCARDIOGRAM TRACING: CPT | Mod: PBBFAC,PO | Performed by: INTERNAL MEDICINE

## 2021-07-06 PROCEDURE — 36415 COLL VENOUS BLD VENIPUNCTURE: CPT | Mod: PO | Performed by: INTERNAL MEDICINE

## 2021-07-06 PROCEDURE — 99999 PR PBB SHADOW E&M-EST. PATIENT-LVL III: CPT | Mod: PBBFAC,,, | Performed by: INTERNAL MEDICINE

## 2021-07-06 PROCEDURE — 85025 COMPLETE CBC W/AUTO DIFF WBC: CPT | Performed by: INTERNAL MEDICINE

## 2021-07-06 PROCEDURE — 99213 OFFICE O/P EST LOW 20 MIN: CPT | Mod: PBBFAC,PO | Performed by: INTERNAL MEDICINE

## 2021-07-06 PROCEDURE — 99214 PR OFFICE/OUTPT VISIT, EST, LEVL IV, 30-39 MIN: ICD-10-PCS | Mod: S$PBB,,, | Performed by: INTERNAL MEDICINE

## 2021-07-06 PROCEDURE — 93010 ELECTROCARDIOGRAM REPORT: CPT | Mod: S$PBB,,, | Performed by: INTERNAL MEDICINE

## 2021-07-06 PROCEDURE — 80053 COMPREHEN METABOLIC PANEL: CPT | Performed by: INTERNAL MEDICINE

## 2021-08-10 ENCOUNTER — PATIENT OUTREACH (OUTPATIENT)
Dept: ADMINISTRATIVE | Facility: OTHER | Age: 41
End: 2021-08-10

## 2021-08-11 ENCOUNTER — TELEPHONE (OUTPATIENT)
Dept: OPHTHALMOLOGY | Facility: CLINIC | Age: 41
End: 2021-08-11

## 2021-08-24 ENCOUNTER — HOSPITAL ENCOUNTER (EMERGENCY)
Facility: HOSPITAL | Age: 41
Discharge: HOME | End: 2021-08-24
Attending: EMERGENCY MEDICINE
Payer: OTHER GOVERNMENT

## 2021-08-24 VITALS
SYSTOLIC BLOOD PRESSURE: 108 MMHG | BODY MASS INDEX: 23.92 KG/M2 | HEART RATE: 89 BPM | OXYGEN SATURATION: 95 % | HEIGHT: 63 IN | DIASTOLIC BLOOD PRESSURE: 64 MMHG | RESPIRATION RATE: 18 BRPM | TEMPERATURE: 99 F | WEIGHT: 135 LBS

## 2021-08-24 DIAGNOSIS — N64.4 BREAST PAIN, LEFT: ICD-10-CM

## 2021-08-24 DIAGNOSIS — G89.18 POST-OP PAIN: ICD-10-CM

## 2021-08-24 DIAGNOSIS — T81.31XA POSTOPERATIVE WOUND DEHISCENCE, INITIAL ENCOUNTER: Primary | ICD-10-CM

## 2021-08-24 PROCEDURE — 25000003 PHARM REV CODE 250: Performed by: PHYSICIAN ASSISTANT

## 2021-08-24 PROCEDURE — 99283 EMERGENCY DEPT VISIT LOW MDM: CPT

## 2021-08-24 RX ORDER — CEPHALEXIN 500 MG/1
500 CAPSULE ORAL
Status: COMPLETED | OUTPATIENT
Start: 2021-08-24 | End: 2021-08-24

## 2021-08-24 RX ORDER — CEPHALEXIN 500 MG/1
500 CAPSULE ORAL EVERY 12 HOURS
Qty: 20 CAPSULE | Refills: 0 | Status: SHIPPED | OUTPATIENT
Start: 2021-08-24 | End: 2021-08-24 | Stop reason: CLARIF

## 2021-08-24 RX ORDER — SULFAMETHOXAZOLE AND TRIMETHOPRIM 800; 160 MG/1; MG/1
1 TABLET ORAL 2 TIMES DAILY
Qty: 20 TABLET | Refills: 0 | Status: SHIPPED | OUTPATIENT
Start: 2021-08-24 | End: 2021-09-03

## 2021-08-24 RX ADMIN — CEPHALEXIN 500 MG: 500 CAPSULE ORAL at 07:08

## 2021-09-14 ENCOUNTER — TELEPHONE (OUTPATIENT)
Dept: OPHTHALMOLOGY | Facility: CLINIC | Age: 41
End: 2021-09-14

## 2021-10-08 DIAGNOSIS — G43.809 OTHER MIGRAINE WITHOUT STATUS MIGRAINOSUS, NOT INTRACTABLE: ICD-10-CM

## 2021-10-08 RX ORDER — SUMATRIPTAN 6 MG/.5ML
INJECTION, SOLUTION SUBCUTANEOUS
Qty: 1 VIAL | Refills: 1 | Status: SHIPPED | OUTPATIENT
Start: 2021-10-08 | End: 2021-10-08 | Stop reason: SDUPTHER

## 2021-10-09 ENCOUNTER — IMMUNIZATION (OUTPATIENT)
Dept: PRIMARY CARE CLINIC | Facility: CLINIC | Age: 41
End: 2021-10-09
Payer: OTHER GOVERNMENT

## 2021-10-09 DIAGNOSIS — Z23 NEED FOR VACCINATION: Primary | ICD-10-CM

## 2021-10-09 PROCEDURE — 0001A COVID-19, MRNA, LNP-S, PF, 30 MCG/0.3 ML DOSE VACCINE: CPT | Mod: CV19,PBBFAC | Performed by: INTERNAL MEDICINE

## 2021-10-09 RX ORDER — SUMATRIPTAN 6 MG/.5ML
INJECTION, SOLUTION SUBCUTANEOUS
Qty: 1 VIAL | Refills: 1 | Status: SHIPPED | OUTPATIENT
Start: 2021-10-09 | End: 2021-10-14 | Stop reason: SDUPTHER

## 2021-10-11 DIAGNOSIS — H53.50 IMPAIRED COLOR VISION: Primary | ICD-10-CM

## 2021-10-12 ENCOUNTER — PATIENT OUTREACH (OUTPATIENT)
Dept: ADMINISTRATIVE | Facility: OTHER | Age: 41
End: 2021-10-12

## 2021-10-12 ENCOUNTER — OFFICE VISIT (OUTPATIENT)
Dept: OPHTHALMOLOGY | Facility: CLINIC | Age: 41
End: 2021-10-12
Payer: OTHER GOVERNMENT

## 2021-10-12 DIAGNOSIS — H53.50 IMPAIRED COLOR VISION: Primary | ICD-10-CM

## 2021-10-12 DIAGNOSIS — Q07.8 ANOMALOUS OPTIC NERVE: ICD-10-CM

## 2021-10-12 DIAGNOSIS — G43.809 OTHER MIGRAINE WITHOUT STATUS MIGRAINOSUS, NOT INTRACTABLE: Chronic | ICD-10-CM

## 2021-10-12 DIAGNOSIS — S06.9X0S TRAUMATIC BRAIN INJURY WITHOUT LOSS OF CONSCIOUSNESS, SEQUELA: ICD-10-CM

## 2021-10-12 PROCEDURE — 99212 OFFICE O/P EST SF 10 MIN: CPT | Mod: PBBFAC | Performed by: STUDENT IN AN ORGANIZED HEALTH CARE EDUCATION/TRAINING PROGRAM

## 2021-10-12 PROCEDURE — 99999 PR PBB SHADOW E&M-EST. PATIENT-LVL II: CPT | Mod: PBBFAC,,, | Performed by: STUDENT IN AN ORGANIZED HEALTH CARE EDUCATION/TRAINING PROGRAM

## 2021-10-12 PROCEDURE — 99999 PR PBB SHADOW E&M-EST. PATIENT-LVL II: ICD-10-PCS | Mod: PBBFAC,,, | Performed by: STUDENT IN AN ORGANIZED HEALTH CARE EDUCATION/TRAINING PROGRAM

## 2021-10-12 PROCEDURE — 92133 CPTRZD OPH DX IMG PST SGM ON: CPT | Mod: PBBFAC | Performed by: STUDENT IN AN ORGANIZED HEALTH CARE EDUCATION/TRAINING PROGRAM

## 2021-10-12 PROCEDURE — 92133 POSTERIOR SEGMENT OCT OPTIC NERVE(OCULAR COHERENCE TOMOGRAPHY) - OU - BOTH EYES: ICD-10-PCS | Mod: 26,S$PBB,, | Performed by: STUDENT IN AN ORGANIZED HEALTH CARE EDUCATION/TRAINING PROGRAM

## 2021-10-12 PROCEDURE — 99205 PR OFFICE/OUTPT VISIT, NEW, LEVL V, 60-74 MIN: ICD-10-PCS | Mod: S$PBB,,, | Performed by: STUDENT IN AN ORGANIZED HEALTH CARE EDUCATION/TRAINING PROGRAM

## 2021-10-12 PROCEDURE — 99205 OFFICE O/P NEW HI 60 MIN: CPT | Mod: S$PBB,,, | Performed by: STUDENT IN AN ORGANIZED HEALTH CARE EDUCATION/TRAINING PROGRAM

## 2021-10-13 ENCOUNTER — TELEPHONE (OUTPATIENT)
Dept: FAMILY MEDICINE | Facility: CLINIC | Age: 41
End: 2021-10-13

## 2021-10-13 ENCOUNTER — PATIENT MESSAGE (OUTPATIENT)
Dept: FAMILY MEDICINE | Facility: CLINIC | Age: 41
End: 2021-10-13
Payer: OTHER GOVERNMENT

## 2021-10-14 ENCOUNTER — TELEPHONE (OUTPATIENT)
Dept: FAMILY MEDICINE | Facility: CLINIC | Age: 41
End: 2021-10-14

## 2021-10-14 DIAGNOSIS — G43.809 OTHER MIGRAINE WITHOUT STATUS MIGRAINOSUS, NOT INTRACTABLE: ICD-10-CM

## 2021-10-14 RX ORDER — CEFUROXIME AXETIL 250 MG/1
TABLET ORAL
Qty: 3 KIT | Refills: 0 | Status: SHIPPED | OUTPATIENT
Start: 2021-10-14 | End: 2021-10-29 | Stop reason: SDUPTHER

## 2021-10-14 RX ORDER — SUMATRIPTAN 6 MG/.5ML
INJECTION, SOLUTION SUBCUTANEOUS
Qty: 1 VIAL | Refills: 1 | Status: SHIPPED | OUTPATIENT
Start: 2021-10-14 | End: 2021-10-14

## 2021-10-22 ENCOUNTER — TELEPHONE (OUTPATIENT)
Dept: OPHTHALMOLOGY | Facility: CLINIC | Age: 41
End: 2021-10-22

## 2021-10-28 ENCOUNTER — TELEPHONE (OUTPATIENT)
Dept: OPHTHALMOLOGY | Facility: CLINIC | Age: 41
End: 2021-10-28
Payer: OTHER GOVERNMENT

## 2021-10-29 RX ORDER — CEFUROXIME AXETIL 250 MG/1
TABLET ORAL
Qty: 3 KIT | Refills: 0 | Status: SHIPPED | OUTPATIENT
Start: 2021-10-29 | End: 2022-04-08 | Stop reason: SDUPTHER

## 2021-11-01 ENCOUNTER — IMMUNIZATION (OUTPATIENT)
Dept: OBSTETRICS AND GYNECOLOGY | Facility: CLINIC | Age: 41
End: 2021-11-01
Payer: OTHER GOVERNMENT

## 2021-11-01 DIAGNOSIS — Z23 NEED FOR VACCINATION: Primary | ICD-10-CM

## 2021-11-01 PROCEDURE — 0002A COVID-19, MRNA, LNP-S, PF, 30 MCG/0.3 ML DOSE VACCINE: CPT | Mod: PBBFAC

## 2021-11-01 PROCEDURE — 91300 COVID-19, MRNA, LNP-S, PF, 30 MCG/0.3 ML DOSE VACCINE: CPT | Mod: PBBFAC

## 2021-11-05 ENCOUNTER — TELEPHONE (OUTPATIENT)
Dept: OPHTHALMOLOGY | Facility: CLINIC | Age: 41
End: 2021-11-05
Payer: OTHER GOVERNMENT

## 2021-12-23 ENCOUNTER — HOSPITAL ENCOUNTER (EMERGENCY)
Facility: HOSPITAL | Age: 41
Discharge: HOME | End: 2021-12-23
Attending: EMERGENCY MEDICINE
Payer: OTHER GOVERNMENT

## 2021-12-23 VITALS
WEIGHT: 132 LBS | OXYGEN SATURATION: 100 % | HEIGHT: 63 IN | RESPIRATION RATE: 16 BRPM | TEMPERATURE: 98 F | BODY MASS INDEX: 23.39 KG/M2 | DIASTOLIC BLOOD PRESSURE: 69 MMHG | SYSTOLIC BLOOD PRESSURE: 108 MMHG | HEART RATE: 82 BPM

## 2021-12-23 DIAGNOSIS — J04.0 LARYNGITIS: ICD-10-CM

## 2021-12-23 DIAGNOSIS — J06.9 VIRAL URI WITH COUGH: Primary | ICD-10-CM

## 2021-12-23 PROBLEM — F32.A DEPRESSION: Status: ACTIVE | Noted: 2021-12-23

## 2021-12-23 PROBLEM — R42 DIZZINESS AND GIDDINESS: Status: ACTIVE | Noted: 2021-12-23

## 2021-12-23 LAB
B-HCG UR QL: NEGATIVE
CTP QC/QA: YES
POC MOLECULAR INFLUENZA A AGN: NEGATIVE
POC MOLECULAR INFLUENZA B AGN: NEGATIVE
SARS-COV-2 RDRP RESP QL NAA+PROBE: NEGATIVE

## 2021-12-23 PROCEDURE — 81025 URINE PREGNANCY TEST: CPT | Performed by: PHYSICIAN ASSISTANT

## 2021-12-23 PROCEDURE — U0002 COVID-19 LAB TEST NON-CDC: HCPCS | Performed by: PHYSICIAN ASSISTANT

## 2021-12-23 PROCEDURE — 25000003 PHARM REV CODE 250: Performed by: PHYSICIAN ASSISTANT

## 2021-12-23 PROCEDURE — 87502 INFLUENZA DNA AMP PROBE: CPT

## 2021-12-23 PROCEDURE — 99284 EMERGENCY DEPT VISIT MOD MDM: CPT | Mod: 25

## 2021-12-23 RX ORDER — IBUPROFEN 600 MG/1
600 TABLET ORAL
Status: COMPLETED | OUTPATIENT
Start: 2021-12-23 | End: 2021-12-23

## 2021-12-23 RX ORDER — PREDNISONE 20 MG/1
60 TABLET ORAL DAILY
Qty: 9 TABLET | Refills: 0 | Status: SHIPPED | OUTPATIENT
Start: 2021-12-23 | End: 2021-12-26

## 2021-12-23 RX ORDER — BENZONATATE 100 MG/1
100 CAPSULE ORAL 3 TIMES DAILY PRN
Qty: 20 CAPSULE | Refills: 0 | Status: SHIPPED | OUTPATIENT
Start: 2021-12-23 | End: 2022-01-02

## 2021-12-23 RX ORDER — ONDANSETRON 8 MG/1
8 TABLET, ORALLY DISINTEGRATING ORAL
Status: COMPLETED | OUTPATIENT
Start: 2021-12-23 | End: 2021-12-23

## 2021-12-23 RX ADMIN — IBUPROFEN 600 MG: 600 TABLET, FILM COATED ORAL at 07:12

## 2021-12-23 RX ADMIN — ONDANSETRON 8 MG: 8 TABLET, ORALLY DISINTEGRATING ORAL at 07:12

## 2021-12-23 NOTE — DISCHARGE INSTRUCTIONS

## 2021-12-23 NOTE — ED PROVIDER NOTES
"Encounter Date: 12/23/2021    SCRIBE #1 NOTE: I, Quentin Medellin, am scribing for, and in the presence of,  Raji Person PA-C. I have scribed the following portions of the note - Other sections scribed: HPI, ROS.       History     Chief Complaint   Patient presents with    Fever    Cough    Sore Throat     40 yo female to triage for cough, sore throat, and subjective fevers. Says she awakened to a fever of 102 orally, temp is 98.2 currently in triage. Pt has been around her boyfriend who is recovering from the flu as well. VSS, NAD, AAOx4.     CC: Cough    HPI: This is a 41 y.o. F who has Anemia, and Asthma who presents to the ED for emergent evaluation of acute cough that began yesterday. Pt has associated runny nose, and scratchy throat. She reports an episode of dizziness and vomiting yesterday. Pt states that she had to sit down on the floor while at work yesterday due to room spinning sensation. No OTC treatment attempted PTA. The pt's  was recently evaluated and treated at this ED, in which he was diagnosed with the flu and Bronchitis at that time. Pt does not have a Hx of DM. Pt denies abdominal pain.    The history is provided by the patient. No  was used.     Review of patient's allergies indicates:   Allergen Reactions    Iodine and iodide containing products Anaphylaxis    Shellfish derived Anaphylaxis    Benadryl [diphenhydramine hcl] Itching     "with fast injection"    Fish containing products      Past Medical History:   Diagnosis Date    Anemia     Anemia     Asthma     exercise asthma    Migraine headache     Ovarian cyst      Past Surgical History:   Procedure Laterality Date    APPENDECTOMY      4/2019    BREAST BIOPSY Right     Excisional bx, benign     Family History   Problem Relation Age of Onset    Asthma Mother     Miscarriages / Stillbirths Mother     Breast cancer Mother         60    Arthritis Father     COPD Father     Asthma Brother     " Diabetes Maternal Uncle     Hypertension Maternal Uncle     Prostate cancer Maternal Uncle     Hearing loss Paternal Aunt     Hearing loss Paternal Uncle     Breast cancer Maternal Grandmother         60    Cancer Maternal Grandfather     Hearing loss Paternal Grandmother     Hearing loss Paternal Grandfather     Amblyopia Neg Hx     Blindness Neg Hx     Cataracts Neg Hx     Glaucoma Neg Hx     Macular degeneration Neg Hx     Retinal detachment Neg Hx     Strabismus Neg Hx      Social History     Tobacco Use    Smoking status: Never Smoker    Smokeless tobacco: Never Used   Substance Use Topics    Alcohol use: Yes     Comment: occass    Drug use: No     Review of Systems   Constitutional: Negative for chills and fever.   HENT: Positive for rhinorrhea.         (+) Scratchy throat   Respiratory: Positive for cough. Negative for shortness of breath.    Cardiovascular: Negative for chest pain.   Gastrointestinal: Positive for vomiting (resolved). Negative for abdominal pain, diarrhea and nausea.   Genitourinary: Negative for dysuria.   Musculoskeletal: Negative for back pain.   Skin: Negative for rash.   Neurological: Positive for dizziness (resolved). Negative for weakness.   Hematological: Does not bruise/bleed easily.       Physical Exam     Initial Vitals   BP Pulse Resp Temp SpO2   12/23/21 0729 12/23/21 0721 12/23/21 0721 12/23/21 0721 12/23/21 0721   108/69 82 16 98.2 °F (36.8 °C) 100 %      MAP       --                Physical Exam    Nursing note and vitals reviewed.  Constitutional: She appears well-developed and well-nourished. She is not diaphoretic. No distress.   HENT:   Head: Normocephalic and atraumatic.   Nose: Nose normal.   Mouth/Throat: Oropharynx is clear and moist.   Hoarse voice but no hot potato voice.  Speaking in complete sentences.  No trismus or drooling.   Eyes: Conjunctivae and EOM are normal. Right eye exhibits no discharge. Left eye exhibits no discharge.   Neck: No  tracheal deviation present. No JVD present.   Normal range of motion.  Cardiovascular: Normal rate, regular rhythm and normal heart sounds. Exam reveals no friction rub.    No murmur heard.  Pulmonary/Chest: Breath sounds normal. No stridor. No respiratory distress. She has no wheezes. She has no rhonchi. She has no rales. She exhibits no tenderness.   Musculoskeletal:         General: No tenderness or edema. Normal range of motion.      Cervical back: Normal range of motion.     Neurological: She is alert and oriented to person, place, and time.   Skin: Skin is warm and dry. No rash and no abscess noted. No erythema. No pallor.         ED Course   Procedures  Labs Reviewed   SARS-COV-2 RDRP GENE    Narrative:     This test utilizes isothermal nucleic acid amplification   technology to detect the SARS-CoV-2 RdRp nucleic acid segment.   The analytical sensitivity (limit of detection) is 125 genome   equivalents/mL.   A POSITIVE result implies infection with the SARS-CoV-2 virus;   the patient is presumed to be contagious.     A NEGATIVE result means that SARS-CoV-2 nucleic acids are not   present above the limit of detection. A NEGATIVE result should be   treated as presumptive. It does not rule out the possibility of   COVID-19 and should not be the sole basis for treatment decisions.   If COVID-19 is strongly suspected based on clinical and exposure   history, re-testing using an alternate molecular assay should be   considered.   This test is only for use under the Food and Drug   Administration s Emergency Use Authorization (EUA).   Commercial kits are provided by [employer].   Performance characteristics of the EUA have been independently   verified by [facility] Department of   Pathology and Laboratory Medicine.   _________________________________________________________________   The authorized Fact Sheet for Healthcare Providers and the authorized Fact   Sheet for Patients of the ID NOW  COVID-19 are available on the FDA   website:     https://www.fda.gov/media/751103/download  https://www.fda.gov/media/432193/download       POCT URINE PREGNANCY   POCT INFLUENZA A/B MOLECULAR          Imaging Results    None          Medications   ondansetron disintegrating tablet 8 mg (8 mg Oral Given 12/23/21 0741)   ibuprofen tablet 600 mg (600 mg Oral Given 12/23/21 0741)     Medical Decision Making:   History:   Old Medical Records: I decided to obtain old medical records.  ED Management:  Presentation consistent with viral URI.  Significant other recently diagnosed with influenza.  Flu test negative here but I remain suspicious for influenza given close contact to influenza patient.  COVID-19 negative.  No hypoxia or respiratory distress.  Low suspicion for bacterial pneumonia.  Declines rapid strep.  No peritonsillar abscess or evidence of deep space infection.  Does appear to have mild laryngitis.  No systemic symptoms.  Advising follow-up with PCP. Strict return precautions discussed.  Agreeable to plan.          Scribe Attestation:   Scribe #1: I performed the above scribed service and the documentation accurately describes the services I performed. I attest to the accuracy of the note.               I, Raji Person PA-C, personally performed the services described in this documentation. All medical record entries made by the scribe were at my direction and in my presence. I have reviewed the chart and agree that the record reflects my personal performance and is accurate and complete.  Clinical Impression:   Final diagnoses:  [J06.9] Viral URI with cough (Primary)  [J04.0] Laryngitis          ED Disposition Condition    Discharge Stable        ED Prescriptions     Medication Sig Dispense Start Date End Date Auth. Provider    benzocaine-menthoL 15-20 mg Lozg Follows directions on packaging 16 lozenge 12/23/2021  Raji Person PA-C    benzonatate (TESSALON) 100 MG capsule Take 1 capsule (100 mg total)  by mouth 3 (three) times daily as needed for Cough. 20 capsule 12/23/2021 1/2/2022 Raji Person PA-C    predniSONE (DELTASONE) 20 MG tablet Take 3 tablets (60 mg total) by mouth once daily. for 3 days 9 tablet 12/23/2021 12/26/2021 Raji Person PA-C        Follow-up Information     Follow up With Specialties Details Why Contact Info    Katy Vergara MD Family Medicine, Internal Medicine Schedule an appointment as soon as possible for a visit in 1 day For re-evaluation [address] [place] [address]  [phone]      [facility] Emergency Medicine Go to  If symptoms worsen [address] [facility] [address] [phone]           Raji Person PA-C  12/23/21 7955

## 2021-12-23 NOTE — ED TRIAGE NOTES
Pt. Complains of fever, body aches, sore throat, and a headache that started on yesterday. Pt. States that it is very painful to swallow. Pt. Rates her pain at a 7/10 on the pain scale.

## 2021-12-28 ENCOUNTER — TELEPHONE (OUTPATIENT)
Dept: FAMILY MEDICINE | Facility: CLINIC | Age: 41
End: 2021-12-28
Payer: OTHER GOVERNMENT

## 2021-12-28 ENCOUNTER — OFFICE VISIT (OUTPATIENT)
Dept: FAMILY MEDICINE | Facility: CLINIC | Age: 41
End: 2021-12-28
Payer: OTHER GOVERNMENT

## 2021-12-28 DIAGNOSIS — J40 BRONCHITIS: Primary | ICD-10-CM

## 2021-12-28 PROCEDURE — 99202 OFFICE O/P NEW SF 15 MIN: CPT | Mod: 95,,, | Performed by: PHYSICIAN ASSISTANT

## 2021-12-28 PROCEDURE — 99202 PR OFFICE/OUTPT VISIT, NEW, LEVL II, 15-29 MIN: ICD-10-PCS | Mod: 95,,, | Performed by: PHYSICIAN ASSISTANT

## 2021-12-28 RX ORDER — AZITHROMYCIN 250 MG/1
TABLET, FILM COATED ORAL
Qty: 6 TABLET | Refills: 0 | Status: SHIPPED | OUTPATIENT
Start: 2021-12-28 | End: 2022-01-14

## 2021-12-28 RX ORDER — PROMETHAZINE HYDROCHLORIDE AND DEXTROMETHORPHAN HYDROBROMIDE 6.25; 15 MG/5ML; MG/5ML
5 SYRUP ORAL 3 TIMES DAILY PRN
Qty: 240 ML | Refills: 0 | Status: SHIPPED | OUTPATIENT
Start: 2021-12-28 | End: 2022-01-07

## 2022-01-08 ENCOUNTER — PATIENT MESSAGE (OUTPATIENT)
Dept: OPHTHALMOLOGY | Facility: CLINIC | Age: 42
End: 2022-01-08
Payer: OTHER GOVERNMENT

## 2022-01-11 ENCOUNTER — TELEPHONE (OUTPATIENT)
Dept: OPHTHALMOLOGY | Facility: CLINIC | Age: 42
End: 2022-01-11
Payer: OTHER GOVERNMENT

## 2022-01-11 NOTE — TELEPHONE ENCOUNTER
Left message on 1/11/2022 at 10:32 AM. Dr. Hough do not schedule virtual visit pt will need to come in for appt.   ----- Message from Viji Noriega sent at 1/11/2022  9:29 AM CST -----  Contact: Lydia Thomas  Patient is wanting to know if she could do her appt virtual instead of coming in. Patient contact number is [phone]. Please contact this patient.

## 2022-01-14 ENCOUNTER — HOSPITAL ENCOUNTER (EMERGENCY)
Facility: HOSPITAL | Age: 42
Discharge: HOME | End: 2022-01-14
Attending: EMERGENCY MEDICINE
Payer: OTHER GOVERNMENT

## 2022-01-14 VITALS
HEIGHT: 63 IN | SYSTOLIC BLOOD PRESSURE: 96 MMHG | WEIGHT: 135 LBS | RESPIRATION RATE: 20 BRPM | TEMPERATURE: 98 F | BODY MASS INDEX: 23.92 KG/M2 | DIASTOLIC BLOOD PRESSURE: 61 MMHG | HEART RATE: 73 BPM | OXYGEN SATURATION: 100 %

## 2022-01-14 DIAGNOSIS — R10.32 LLQ ABDOMINAL PAIN: Primary | ICD-10-CM

## 2022-01-14 DIAGNOSIS — K59.00 CONSTIPATION, UNSPECIFIED CONSTIPATION TYPE: ICD-10-CM

## 2022-01-14 LAB
ALBUMIN SERPL BCP-MCNC: 4 G/DL (ref 3.5–5.2)
ALP SERPL-CCNC: 46 U/L (ref 55–135)
ALT SERPL W/O P-5'-P-CCNC: 16 U/L (ref 10–44)
ANION GAP SERPL CALC-SCNC: 4 MMOL/L (ref 8–16)
AST SERPL-CCNC: 18 U/L (ref 10–40)
B-HCG UR QL: NEGATIVE
BASOPHILS # BLD AUTO: 0.07 K/UL (ref 0–0.2)
BASOPHILS NFR BLD: 1 % (ref 0–1.9)
BILIRUB SERPL-MCNC: 0.5 MG/DL (ref 0.1–1)
BILIRUB UR QL STRIP: NEGATIVE
BUN SERPL-MCNC: 7 MG/DL (ref 6–20)
CALCIUM SERPL-MCNC: 8.9 MG/DL (ref 8.7–10.5)
CHLORIDE SERPL-SCNC: 104 MMOL/L (ref 95–110)
CLARITY UR: CLEAR
CO2 SERPL-SCNC: 30 MMOL/L (ref 23–29)
COLOR UR: COLORLESS
CREAT SERPL-MCNC: 0.7 MG/DL (ref 0.5–1.4)
CTP QC/QA: YES
DIFFERENTIAL METHOD: ABNORMAL
EOSINOPHIL # BLD AUTO: 0.1 K/UL (ref 0–0.5)
EOSINOPHIL NFR BLD: 2 % (ref 0–8)
ERYTHROCYTE [DISTWIDTH] IN BLOOD BY AUTOMATED COUNT: 12 % (ref 11.5–14.5)
EST. GFR  (AFRICAN AMERICAN): >60 ML/MIN/1.73 M^2
EST. GFR  (NON AFRICAN AMERICAN): >60 ML/MIN/1.73 M^2
GLUCOSE SERPL-MCNC: 95 MG/DL (ref 70–110)
GLUCOSE UR QL STRIP: NEGATIVE
HCT VFR BLD AUTO: 38.8 % (ref 37–48.5)
HGB BLD-MCNC: 13 G/DL (ref 12–16)
HGB UR QL STRIP: NEGATIVE
IMM GRANULOCYTES # BLD AUTO: 0.02 K/UL (ref 0–0.04)
IMM GRANULOCYTES NFR BLD AUTO: 0.3 % (ref 0–0.5)
KETONES UR QL STRIP: NEGATIVE
LEUKOCYTE ESTERASE UR QL STRIP: NEGATIVE
LIPASE SERPL-CCNC: 45 U/L (ref 4–60)
LYMPHOCYTES # BLD AUTO: 1.7 K/UL (ref 1–4.8)
LYMPHOCYTES NFR BLD: 23.8 % (ref 18–48)
MCH RBC QN AUTO: 32.3 PG (ref 27–31)
MCHC RBC AUTO-ENTMCNC: 33.5 G/DL (ref 32–36)
MCV RBC AUTO: 96 FL (ref 82–98)
MONOCYTES # BLD AUTO: 0.6 K/UL (ref 0.3–1)
MONOCYTES NFR BLD: 8.5 % (ref 4–15)
NEUTROPHILS # BLD AUTO: 4.5 K/UL (ref 1.8–7.7)
NEUTROPHILS NFR BLD: 64.4 % (ref 38–73)
NITRITE UR QL STRIP: NEGATIVE
NRBC BLD-RTO: 0 /100 WBC
PH UR STRIP: 7 [PH] (ref 5–8)
PLATELET # BLD AUTO: 262 K/UL (ref 150–450)
PMV BLD AUTO: 10.8 FL (ref 9.2–12.9)
POTASSIUM SERPL-SCNC: 3.6 MMOL/L (ref 3.5–5.1)
PROT SERPL-MCNC: 7.2 G/DL (ref 6–8.4)
PROT UR QL STRIP: NEGATIVE
RBC # BLD AUTO: 4.03 M/UL (ref 4–5.4)
SODIUM SERPL-SCNC: 138 MMOL/L (ref 136–145)
SP GR UR STRIP: 1 (ref 1–1.03)
URN SPEC COLLECT METH UR: ABNORMAL
UROBILINOGEN UR STRIP-ACNC: NEGATIVE EU/DL
WBC # BLD AUTO: 7.05 K/UL (ref 3.9–12.7)

## 2022-01-14 PROCEDURE — 81025 URINE PREGNANCY TEST: CPT | Performed by: EMERGENCY MEDICINE

## 2022-01-14 PROCEDURE — 96375 TX/PRO/DX INJ NEW DRUG ADDON: CPT

## 2022-01-14 PROCEDURE — 25000003 PHARM REV CODE 250: Performed by: NURSE PRACTITIONER

## 2022-01-14 PROCEDURE — 96361 HYDRATE IV INFUSION ADD-ON: CPT

## 2022-01-14 PROCEDURE — 63600175 PHARM REV CODE 636 W HCPCS: Performed by: NURSE PRACTITIONER

## 2022-01-14 PROCEDURE — 99285 EMERGENCY DEPT VISIT HI MDM: CPT | Mod: 25

## 2022-01-14 PROCEDURE — 96374 THER/PROPH/DIAG INJ IV PUSH: CPT

## 2022-01-14 PROCEDURE — 85025 COMPLETE CBC W/AUTO DIFF WBC: CPT | Performed by: NURSE PRACTITIONER

## 2022-01-14 PROCEDURE — 80053 COMPREHEN METABOLIC PANEL: CPT | Performed by: NURSE PRACTITIONER

## 2022-01-14 PROCEDURE — 81003 URINALYSIS AUTO W/O SCOPE: CPT | Performed by: EMERGENCY MEDICINE

## 2022-01-14 PROCEDURE — 83690 ASSAY OF LIPASE: CPT | Performed by: NURSE PRACTITIONER

## 2022-01-14 RX ORDER — GLYCERIN 1 G/1
1 SUPPOSITORY RECTAL
Qty: 12 SUPPOSITORY | Refills: 0 | Status: SHIPPED | OUTPATIENT
Start: 2022-01-14 | End: 2022-08-12

## 2022-01-14 RX ORDER — POLYETHYLENE GLYCOL 3350 17 G/17G
17 POWDER, FOR SOLUTION ORAL DAILY
Qty: 595 G | Refills: 0 | Status: SHIPPED | OUTPATIENT
Start: 2022-01-14 | End: 2022-08-12

## 2022-01-14 RX ORDER — ONDANSETRON 2 MG/ML
8 INJECTION INTRAMUSCULAR; INTRAVENOUS
Status: COMPLETED | OUTPATIENT
Start: 2022-01-14 | End: 2022-01-14

## 2022-01-14 RX ORDER — KETOROLAC TROMETHAMINE 30 MG/ML
15 INJECTION, SOLUTION INTRAMUSCULAR; INTRAVENOUS
Status: COMPLETED | OUTPATIENT
Start: 2022-01-14 | End: 2022-01-14

## 2022-01-14 RX ADMIN — SODIUM CHLORIDE 1000 ML: 0.9 INJECTION, SOLUTION INTRAVENOUS at 08:01

## 2022-01-14 RX ADMIN — ONDANSETRON 8 MG: 2 INJECTION INTRAMUSCULAR; INTRAVENOUS at 08:01

## 2022-01-14 RX ADMIN — KETOROLAC TROMETHAMINE 15 MG: 30 INJECTION, SOLUTION INTRAMUSCULAR at 08:01

## 2022-01-14 NOTE — ED TRIAGE NOTES
"Pt complaining of LLQ, severe, began at midnight.  She is endorsing a sensation of a "clorox" scent in her throat and nose.  Bronchitis two weeks ago.  Endorses nausea and vomiting.  Denies chest pain, constipation, dysuria.    LOC: Patient name and date of birth verified. The patient is awake, alert and aware of environment with an appropriate affect, the patient is oriented x 3 and speaking appropriately.   APPEARANCE: Patient resting comfortably, patient is clean and well groomed, patient's clothing is properly fastened.  SKIN: The skin is warm and dry, color consistent with ethnicity, patient has normal skin turgor and moist mucus membranes, skin intact, no breakdown or bruising noted.  MUSCULOSKELETAL: Patient moving all extremities well, no obvious swelling or deformities noted.   RESPIRATORY: Respirations are spontaneous, patient has a normal effort and rate, no accessory muscle use noted.  CARDIAC: Patient has a normal rate and rhythm, no periphreal edema noted, capillary refill < 3 seconds.  ABDOMEN: Tender on palpation to LLQ.  Soft and no distention noted. Bowel sounds present in all four quadrants.  NEUROLOGIC: Eyes open spontaneously, behavior appropriate to situation, follows commands, facial expression symmetrical, bilateral hand grasp equal and even, purposeful motor response noted, normal sensation in all extremities when touched with a finger.      "

## 2022-01-14 NOTE — ED PROVIDER NOTES
"Encounter Date: 1/14/2022    SCRIBE #1 NOTE: I, Coreen Benitez, am scribing for, and in the presence of,  Paul Winslow NP. I have scribed the following portions of the note - Other sections scribed: HPI, ROS.       History     Chief Complaint   Patient presents with    Abdominal Pain     Pt to Er with c/o LLQ abd pain since midnight. + N/V     This 41 y.o female, with a medical history of Anemia, Asthma, Migraine headache, and Ovarian cyst, presents to the ED c/o acute, constant, severe (9/10) left lower quadrant abdominal pain that began at 12:00 am this morning. Pt reports associated nausea, emesis and dizziness. She notes taking Tylenol PM for treatment without any improvement. Additionally, she states that she has been experiencing a change in her taste in smell, noting that she has been "tasting and smelling Clorox." Pt notes being diagnosed with bronchitis 2 weeks ago. No previous episodes of similar symptoms. No concern for STD's. She denies chest pain, diarrhea, constipation, dysuria or hematuria. No other associated symptoms.    The history is provided by the patient.     Review of patient's allergies indicates:   Allergen Reactions    Iodine and iodide containing products Anaphylaxis    Shellfish derived Anaphylaxis    Benadryl [diphenhydramine hcl] Itching     "with fast injection"    Fish containing products      Past Medical History:   Diagnosis Date    Anemia     Anemia     Asthma     exercise asthma    Migraine headache     Ovarian cyst      Past Surgical History:   Procedure Laterality Date    APPENDECTOMY      4/2019    BREAST BIOPSY Right     Excisional bx, benign     Family History   Problem Relation Age of Onset    Asthma Mother     Miscarriages / Stillbirths Mother     Breast cancer Mother         60    Arthritis Father     COPD Father     Asthma Brother     Diabetes Maternal Uncle     Hypertension Maternal Uncle     Prostate cancer Maternal Uncle     Hearing loss " "Paternal Aunt     Hearing loss Paternal Uncle     Breast cancer Maternal Grandmother         60    Cancer Maternal Grandfather     Hearing loss Paternal Grandmother     Hearing loss Paternal Grandfather     Amblyopia Neg Hx     Blindness Neg Hx     Cataracts Neg Hx     Glaucoma Neg Hx     Macular degeneration Neg Hx     Retinal detachment Neg Hx     Strabismus Neg Hx      Social History     Tobacco Use    Smoking status: Never Smoker    Smokeless tobacco: Never Used   Substance Use Topics    Alcohol use: Yes     Comment: occass    Drug use: No     Review of Systems   Constitutional: Negative for fever.   HENT: Negative for sore throat.         (+) change in taste and smell ("tasting and smelling Clorox")   Respiratory: Negative for shortness of breath.    Cardiovascular: Negative for chest pain.   Gastrointestinal: Positive for abdominal pain (left lower quadrant), nausea and vomiting. Negative for constipation and diarrhea.   Genitourinary: Negative for dysuria and hematuria.   Musculoskeletal: Negative for back pain.   Skin: Negative for rash.   Neurological: Positive for dizziness. Negative for weakness.       Physical Exam     Initial Vitals [01/14/22 0705]   BP Pulse Resp Temp SpO2   (!) 109/58 88 20 98.4 °F (36.9 °C) 100 %      MAP       --         Physical Exam    Nursing note and vitals reviewed.  Constitutional: She appears well-developed and well-nourished. She is not diaphoretic. No distress.   HENT:   Head: Normocephalic and atraumatic.   Right Ear: External ear normal.   Left Ear: External ear normal.   Nose: Nose normal.   Eyes: Conjunctivae and EOM are normal. Right eye exhibits no discharge. Left eye exhibits no discharge.   Neck: Neck supple. No tracheal deviation present.   Normal range of motion.  Cardiovascular: Normal rate.   Pulmonary/Chest: No stridor. No respiratory distress.   Abdominal: Abdomen is soft. She exhibits no distension. There is abdominal tenderness in the left " lower quadrant.   Musculoskeletal:         General: No tenderness. Normal range of motion.      Cervical back: Normal range of motion and neck supple.     Neurological: She is alert and oriented to person, place, and time. She has normal strength. No cranial nerve deficit or sensory deficit.   Skin: Skin is warm and dry.   Psychiatric: She has a normal mood and affect. Her behavior is normal. Judgment and thought content normal.         ED Course   Procedures  Labs Reviewed   URINALYSIS, REFLEX TO URINE CULTURE - Abnormal; Notable for the following components:       Result Value    Color, UA Colorless (*)     All other components within normal limits    Narrative:     Specimen Source->Urine   CBC W/ AUTO DIFFERENTIAL - Abnormal; Notable for the following components:    MCH 32.3 (*)     All other components within normal limits   COMPREHENSIVE METABOLIC PANEL - Abnormal; Notable for the following components:    CO2 30 (*)     Alkaline Phosphatase 46 (*)     Anion Gap 4 (*)     All other components within normal limits   LIPASE   POCT URINE PREGNANCY          Imaging Results          US Pelvis Comp with Transvag NON-OB (xpd (Final result)  Result time 01/14/22 10:32:59    Final result by Yamil Brandon MD (01/14/22 10:32:59)                 Impression:      1.1 cm submucosal fibroid in the posterior uterus.    No other findings to explain patient's left lower quadrant pain.      Electronically signed by: Yamil Brandon MD  Date:    01/14/2022  Time:    10:32             Narrative:    EXAMINATION:  US PELVIS COMP WITH TRANSVAG NON-OB (XPD)    CLINICAL HISTORY:  left adnexal pain and TTP;    TECHNIQUE:  Transabdominal sonography of the pelvis was performed, followed by transvaginal sonography to better evaluate the uterus and ovaries.    COMPARISON:  01/13/2020    FINDINGS:  Uterus:    Size: 5.8 x 3.1 x 4.2 cm    Masses: 1.1 cm posterior submucosal fibroid.    Endometrium: Normal in this pre menopausal patient,  measuring 7 mm.    Right ovary:    Size: 3.2 x 1.6 x 3.0 cm    Appearance: Normal    Vascular flow: Normal.    Left ovary:    Size: 2.6 x 2.0 x 2.2 cm    Appearance: Normal    Vascular Flow: Normal.    Free Fluid:    None.                               CT Abdomen Pelvis  Without Contrast (Final result)  Result time 01/14/22 08:19:25    Final result by Vaibhav Asher DO (01/14/22 08:19:25)                 Impression:      No acute abnormality of the abdomen or pelvis.    Moderate amount of stool, correlate for evidence of constipation.      Electronically signed by: Vaibhav Asher  Date:    01/14/2022  Time:    08:19             Narrative:    EXAMINATION:  CT ABDOMEN PELVIS WITHOUT CONTRAST    CLINICAL HISTORY:  LLQ abdominal pain;Nausea/vomiting;    TECHNIQUE:  Multiplanar images were obtained of the abdomen and pelvis from the hemidiaphragms through the symphysis pubis without intravenous contrast.    COMPARISON:  CT abdomen and pelvis 01/13/2020.    FINDINGS:  Lung Bases: Clear.    Heart: Heart size is normal.  No pericardial effusion.    Chest wall: Partially imaged portions of the chest wall demonstrate bilateral breast implants.    Liver: Normal in size and attenuation without focal hepatic lesion.    Biliary tract: No intrahepatic or extrahepatic biliary ductal dilatation.    Gallbladder: No radiodense gallstone. No wall thickening or pericholecystic fluid.    Pancreas: Normal. No pancreatic ductal dilatation.    Spleen: Normal size without focal lesion.    Adrenals: Normal.    Kidneys and urinary collecting systems: Normal.  No hydronephrosis or urolithiasis.    Lymph nodes: None enlarged.    Stomach and bowel: The stomach is normal.  Loops of small and large bowel are normal in caliber without evidence for inflammation or obstruction.  There is a moderate amount of stool, correlate for constipation.  The appendix is not definitively seen however there is no evidence of right lower quadrant  inflammation.    Peritoneum and mesentery: No ascites or free intraperitoneal air. No abdominal fluid collection.    Vasculature: Normal.    Urinary bladder: Normal.    Reproductive organs: The uterus and adnexae are unremarkable.    Body wall: There is a small fat containing umbilical hernia.    Musculoskeletal: No aggressive osseous lesion.                                 Medications   sodium chloride 0.9% bolus 1,000 mL (0 mLs Intravenous Stopped 1/14/22 0920)   ketorolac injection 15 mg (15 mg Intravenous Given 1/14/22 0827)   ondansetron injection 8 mg (8 mg Intravenous Given 1/14/22 0827)     Medical Decision Making:   History:   Old Medical Records: I decided to obtain old medical records.  Differential Diagnosis:   Diverticulitis, colitis, enteritis, ovarian cyst, ovarian torsion, TOA, UTI, ureterolithiasis, others  Clinical Tests:   Lab Tests: Ordered and Reviewed  Radiological Study: Ordered and Reviewed  ED Management:  HPI and physical exam as above.    Physical exam with left lower quadrant abdominal tenderness.  Otherwise unremarkable.  No rigidity or guarding.  Labs and imaging without concerning acute emergent pathology.  There is evidence of constipation which is likely the etiology of the patient's pain given absence of other findings.  Will prescribe medications for treatment.  No evidence of emergent pathology at this time. Advised patient to follow up with her PCP for re-evaluation and further management.  ED return precautions given. All questions regarding diagnosis and plan were answered to the patient's fullest possible satisfaction. Patient expressed understanding of diagnosis, discharge instructions, and return precautions.            Patient note was created using [employer] voice dictation software.  Any errors in syntax or information may not have been identified and edited prior to signing this note.            Scribe Attestation:   Scribe #1: I performed the above scribed service and the  documentation accurately describes the services I performed. I attest to the accuracy of the note.                 Clinical Impression:   Final diagnoses:  [R10.32] LLQ abdominal pain (Primary)  [K59.00] Constipation, unspecified constipation type          ED Disposition Condition    Discharge Stable        ED Prescriptions     Medication Sig Dispense Start Date End Date Auth. Provider    glycerin adult suppository Place 1 suppository rectally as needed for Constipation. 12 suppository 1/14/2022  Paul Winslow NP    polyethylene glycol (GLYCOLAX) 17 gram/dose powder Take 17 g by mouth once daily. 595 g 1/14/2022  Paul Winslow NP        Follow-up Information     Follow up With Specialties Details Why Contact Info    Katy Vergara MD Family Medicine, Internal Medicine Schedule an appointment as soon as possible for a visit  For further evaluation [address] [place] [address]  [phone]      [facility] Emergency Dept Emergency Medicine Go to  If symptoms worsen, As needed [address] [facility] [id number] [phone]           I, Paul Winslow NP, personally performed the services described in this documentation. All medical record entries made by the scribe were at my direction and in my presence. I have reviewed the chart and agree that the record reflects my personal performance and is accurate and complete.     Paul Winslow NP  01/14/22 8519

## 2022-01-14 NOTE — DISCHARGE INSTRUCTIONS

## 2022-01-23 ENCOUNTER — HOSPITAL ENCOUNTER (EMERGENCY)
Facility: HOSPITAL | Age: 42
Discharge: HOME | End: 2022-01-23
Attending: EMERGENCY MEDICINE
Payer: OTHER GOVERNMENT

## 2022-01-23 VITALS
TEMPERATURE: 99 F | SYSTOLIC BLOOD PRESSURE: 112 MMHG | HEART RATE: 104 BPM | HEIGHT: 63 IN | BODY MASS INDEX: 23.39 KG/M2 | DIASTOLIC BLOOD PRESSURE: 69 MMHG | OXYGEN SATURATION: 100 % | RESPIRATION RATE: 16 BRPM | WEIGHT: 132 LBS

## 2022-01-23 DIAGNOSIS — Z20.822 SUSPECTED COVID-19 VIRUS INFECTION: Primary | ICD-10-CM

## 2022-01-23 LAB
B-HCG UR QL: NEGATIVE
CTP QC/QA: YES
MOLECULAR STREP A: NEGATIVE
POC MOLECULAR INFLUENZA A AGN: NEGATIVE
POC MOLECULAR INFLUENZA B AGN: NEGATIVE

## 2022-01-23 PROCEDURE — 87502 INFLUENZA DNA AMP PROBE: CPT

## 2022-01-23 PROCEDURE — 99284 EMERGENCY DEPT VISIT MOD MDM: CPT | Mod: 25

## 2022-01-23 PROCEDURE — U0003 INFECTIOUS AGENT DETECTION BY NUCLEIC ACID (DNA OR RNA); SEVERE ACUTE RESPIRATORY SYNDROME CORONAVIRUS 2 (SARS-COV-2) (CORONAVIRUS DISEASE [COVID-19]), AMPLIFIED PROBE TECHNIQUE, MAKING USE OF HIGH THROUGHPUT TECHNOLOGIES AS DESCRIBED BY CMS-2020-01-R: HCPCS | Performed by: EMERGENCY MEDICINE

## 2022-01-23 PROCEDURE — U0005 INFEC AGEN DETEC AMPLI PROBE: HCPCS | Performed by: EMERGENCY MEDICINE

## 2022-01-23 PROCEDURE — 81025 URINE PREGNANCY TEST: CPT | Performed by: EMERGENCY MEDICINE

## 2022-01-23 PROCEDURE — 63600175 PHARM REV CODE 636 W HCPCS: Performed by: PHYSICIAN ASSISTANT

## 2022-01-23 PROCEDURE — 96374 THER/PROPH/DIAG INJ IV PUSH: CPT

## 2022-01-23 PROCEDURE — 96375 TX/PRO/DX INJ NEW DRUG ADDON: CPT

## 2022-01-23 RX ORDER — ACETAMINOPHEN 500 MG
1000 TABLET ORAL
Status: DISCONTINUED | OUTPATIENT
Start: 2022-01-23 | End: 2022-01-23

## 2022-01-23 RX ORDER — KETOROLAC TROMETHAMINE 30 MG/ML
15 INJECTION, SOLUTION INTRAMUSCULAR; INTRAVENOUS
Status: COMPLETED | OUTPATIENT
Start: 2022-01-23 | End: 2022-01-23

## 2022-01-23 RX ORDER — PROMETHAZINE HYDROCHLORIDE AND DEXTROMETHORPHAN HYDROBROMIDE 6.25; 15 MG/5ML; MG/5ML
5 SYRUP ORAL NIGHTLY PRN
Qty: 118 ML | Refills: 0 | Status: SHIPPED | OUTPATIENT
Start: 2022-01-23 | End: 2022-02-02

## 2022-01-23 RX ORDER — METOCLOPRAMIDE HYDROCHLORIDE 5 MG/ML
10 INJECTION INTRAMUSCULAR; INTRAVENOUS
Status: COMPLETED | OUTPATIENT
Start: 2022-01-23 | End: 2022-01-23

## 2022-01-23 RX ORDER — ACETAMINOPHEN 500 MG
500 TABLET ORAL EVERY 6 HOURS PRN
Qty: 30 TABLET | Refills: 0 | Status: SHIPPED | OUTPATIENT
Start: 2022-01-23 | End: 2022-08-12

## 2022-01-23 RX ORDER — IBUPROFEN 600 MG/1
600 TABLET ORAL EVERY 6 HOURS PRN
Qty: 30 TABLET | Refills: 0 | Status: SHIPPED | OUTPATIENT
Start: 2022-01-23 | End: 2022-04-13

## 2022-01-23 RX ORDER — IBUPROFEN 400 MG/1
800 TABLET ORAL
Status: DISCONTINUED | OUTPATIENT
Start: 2022-01-23 | End: 2022-01-23

## 2022-01-23 RX ADMIN — KETOROLAC TROMETHAMINE 15 MG: 30 INJECTION, SOLUTION INTRAMUSCULAR at 09:01

## 2022-01-23 RX ADMIN — METOCLOPRAMIDE 10 MG: 5 INJECTION, SOLUTION INTRAMUSCULAR; INTRAVENOUS at 09:01

## 2022-01-23 NOTE — Clinical Note
"Lydia Thomas (Maryam) was seen and treated in our emergency department on 1/23/2022.     COVID-19 is present in our communities across the state. There is limited testing for COVID at this time, so not all patients can be tested. In this situation, your employee meets the following criteria:    Lydia Thomas has met the criteria for COVID-19 testing based upon symptoms, travel, and/or potential exposure. The test has been completed and is pending results at this time. During this time the employee is not able to work and should be quarantined per the Centers for Disease Control timelines.     If you have any questions or concerns, or if I can be of further assistance, please do not hesitate to contact me.    Sincerely,             Thad Hunt PA-C"

## 2022-01-24 NOTE — ED TRIAGE NOTES
Pt. Reports she has a hx of migraines, pt. reports on yesterday she started to have mirgraines, no alleviated by her prescribed meds. Pt. Reports she also a fever, neck pain and gen body aches. Pt. reports she has been taking tylenol for her symptoms.

## 2022-01-24 NOTE — DISCHARGE INSTRUCTIONS
Take ibuprofen and Tylenol for fever and pain.  Alternate treatment with ibuprofen and Tylenol every 3-4 hours.  Drink plenty of liquids.  Return to the ER for new or worsening symptoms including shortness of breath.  You must quarantine for 5 days starting from the day when your symptoms began.

## 2022-01-24 NOTE — ED PROVIDER NOTES
"Encounter Date: 1/23/2022       History     Chief Complaint   Patient presents with    Headache    Generalized Body Aches    Cough    Nasal Congestion     Pt presents to ED c/o gen body weakness, ha, congestion, productive cough x 1 day.  Temp at home 100.0.  Pt reports taking 4 Tylenol around 1100 today with no relief.  Hx of migraines.  Pain 10/10.      Chief Complaint:  COVID 19 concern  History of  Present Illness: History obtained from patient. This 41 y.o. female who has past medical history of anemia, migraine headaches and asthma presents to the ED complaining of COVID 19 concern with symptoms including cough, congestion rhinorrhea, sore throat, fever, chills, nausea, diarrhea, headache, generalized body aches that began upon awakening this morning.  Patient attempted treatment with Tylenol with minimal relief.  Pain is 10/10.  Denies chest pain, shortness of breath, vomiting, urinary symptoms.  No known sick contacts.        Review of patient's allergies indicates:   Allergen Reactions    Iodine and iodide containing products Anaphylaxis    Shellfish derived Anaphylaxis    Benadryl [diphenhydramine hcl] Itching     "with fast injection"    Fish containing products      Past Medical History:   Diagnosis Date    Anemia     Anemia     Asthma     exercise asthma    Migraine headache     Ovarian cyst      Past Surgical History:   Procedure Laterality Date    APPENDECTOMY      4/2019    BREAST BIOPSY Right     Excisional bx, benign     Family History   Problem Relation Age of Onset    Asthma Mother     Miscarriages / Stillbirths Mother     Breast cancer Mother         60    Arthritis Father     COPD Father     Asthma Brother     Diabetes Maternal Uncle     Hypertension Maternal Uncle     Prostate cancer Maternal Uncle     Hearing loss Paternal Aunt     Hearing loss Paternal Uncle     Breast cancer Maternal Grandmother         60    Cancer Maternal Grandfather     Hearing loss Paternal " Grandmother     Hearing loss Paternal Grandfather     Amblyopia Neg Hx     Blindness Neg Hx     Cataracts Neg Hx     Glaucoma Neg Hx     Macular degeneration Neg Hx     Retinal detachment Neg Hx     Strabismus Neg Hx      Social History     Tobacco Use    Smoking status: Never Smoker    Smokeless tobacco: Never Used   Substance Use Topics    Alcohol use: Yes     Comment: occass    Drug use: No     Review of Systems   Constitutional: Positive for chills and fever.   HENT: Positive for congestion, rhinorrhea and sore throat.    Eyes: Negative for visual disturbance.   Respiratory: Positive for cough. Negative for shortness of breath.    Cardiovascular: Negative for chest pain.   Gastrointestinal: Positive for diarrhea and nausea. Negative for abdominal pain and vomiting.   Genitourinary: Negative for dysuria, frequency and hematuria.   Musculoskeletal: Positive for myalgias. Negative for back pain.   Skin: Negative for rash.   Neurological: Positive for headaches. Negative for dizziness and weakness.       Physical Exam     Initial Vitals [01/23/22 2019]   BP Pulse Resp Temp SpO2   105/67 106 19 (!) 100.5 °F (38.1 °C) 100 %      MAP       --         Physical Exam    Nursing note and vitals reviewed.  Constitutional: She appears well-developed and well-nourished. No distress.   HENT:   Head: Normocephalic and atraumatic.   Right Ear: Tympanic membrane normal.   Left Ear: Tympanic membrane normal.   Nose: Nose normal.   Mouth/Throat: Uvula is midline, oropharynx is clear and moist and mucous membranes are normal.   Eyes: EOM are normal. Pupils are equal, round, and reactive to light.   Neck: Trachea normal and phonation normal. Neck supple. No stridor present.   Normal range of motion.   Full passive range of motion without pain.     Cardiovascular: Normal rate, regular rhythm and normal heart sounds. Exam reveals no gallop and no friction rub.    No murmur heard.  Pulmonary/Chest: Effort normal and breath  sounds normal. No respiratory distress. She has no wheezes. She has no rhonchi. She has no rales.   Abdominal: Abdomen is soft. Bowel sounds are normal. There is no abdominal tenderness. There is no rebound and no guarding.   Musculoskeletal:         General: Normal range of motion.      Cervical back: Full passive range of motion without pain, normal range of motion and neck supple. No rigidity. No spinous process tenderness or muscular tenderness. Normal range of motion.     Neurological: She is alert and oriented to person, place, and time. She has normal strength. No cranial nerve deficit or sensory deficit.   Skin: Skin is warm and dry. Capillary refill takes less than 2 seconds.   Psychiatric: She has a normal mood and affect.         ED Course   Procedures  Labs Reviewed   SARS-COV-2 (COVID-19) QUALITATIVE PCR   POCT URINE PREGNANCY   POCT INFLUENZA A/B MOLECULAR   POCT STREP A MOLECULAR          Imaging Results    None          Medications   sodium chloride 0.9% bolus 1,000 mL (has no administration in time range)   ketorolac injection 15 mg (15 mg Intravenous Given 1/23/22 2143)   metoclopramide HCl injection 10 mg (10 mg Intravenous Given 1/23/22 2144)     Medical Decision Making:   ED Management:  This is an emergent evaluation of a 41 y.o. female presenting to the ED for symptoms most c/w viral etiology with Covid/flu considerations.  PCR COVID19 pending; enrolled in [facility] MyChart. No hypoxia or respiratory distress. Low suspicion for bacterial PNA. Remains well appearing while in ED.     Patient treated the ED with Toradol and Reglan and IV fluids with significant improvement of symptoms.  Patient afebrile at discharge.    We discuss home quarantine. Not a candidate for monoclonal antibody infusion per current guidelines. Advising follow-up with PCP. Strict return precautions discussed; agreeable to plan.    I discussed with the patient the diagnosis, treatment plan, indications for return to the  emergency department, and for expected follow-up. The patient verbalized an understanding. The patient is asked if there are any questions or concerns. We discuss the case, until all issues are addressed to the patients satisfaction. Patient understands and is agreeable to the plan.                         Clinical Impression:   Final diagnoses:  [Z20.822] Suspected COVID-19 virus infection (Primary)          ED Disposition Condition    Discharge Stable        ED Prescriptions     Medication Sig Dispense Start Date End Date Auth. Provider    ibuprofen (ADVIL,MOTRIN) 600 MG tablet Take 1 tablet (600 mg total) by mouth every 6 (six) hours as needed for Pain. 30 tablet 1/23/2022  Thad Hunt PA-C    acetaminophen (TYLENOL) 500 MG tablet Take 1 tablet (500 mg total) by mouth every 6 (six) hours as needed for Pain. 30 tablet 1/23/2022  Thad Hunt PA-C    promethazine-dextromethorphan (PROMETHAZINE-DM) 6.25-15 mg/5 mL Syrp Take 5 mLs by mouth nightly as needed (Cough). 118 mL 1/23/2022 2/2/2022 Thad Hunt PA-C        Follow-up Information     Follow up With Specialties Details Why Contact Info    Katy Vergara MD Family Medicine, Internal Medicine   [address] [place] [address]  [phone]      [facility] Emergency Dept Emergency Medicine Go in 1 day If symptoms worsen [address]  [facility] [id number] [phone]           Thad Hunt PA-C  01/23/22 1685

## 2022-01-25 LAB
SARS-COV-2 RNA RESP QL NAA+PROBE: DETECTED
SARS-COV-2- CYCLE NUMBER: 18

## 2022-02-24 ENCOUNTER — HOSPITAL ENCOUNTER (EMERGENCY)
Facility: HOSPITAL | Age: 42
Discharge: HOME | End: 2022-02-25
Attending: EMERGENCY MEDICINE
Payer: OTHER GOVERNMENT

## 2022-02-24 VITALS
HEIGHT: 63 IN | BODY MASS INDEX: 24.27 KG/M2 | TEMPERATURE: 99 F | RESPIRATION RATE: 16 BRPM | HEART RATE: 84 BPM | DIASTOLIC BLOOD PRESSURE: 55 MMHG | OXYGEN SATURATION: 98 % | WEIGHT: 137 LBS | SYSTOLIC BLOOD PRESSURE: 111 MMHG

## 2022-02-24 DIAGNOSIS — S83.91XA SPRAIN OF RIGHT KNEE, UNSPECIFIED LIGAMENT, INITIAL ENCOUNTER: Primary | ICD-10-CM

## 2022-02-24 LAB
B-HCG UR QL: NEGATIVE
CTP QC/QA: YES

## 2022-02-24 PROCEDURE — 81025 URINE PREGNANCY TEST: CPT | Performed by: PHYSICIAN ASSISTANT

## 2022-02-24 PROCEDURE — 99283 EMERGENCY DEPT VISIT LOW MDM: CPT | Mod: 25

## 2022-02-24 NOTE — Clinical Note
"Lydia "Sue Thomas was seen and treated in our emergency department on 2/24/2022.  She may return to work on 02/28/2022.       If you have any questions or concerns, please don't hesitate to call.      Thad Hunt PA-C"

## 2022-02-25 NOTE — ED PROVIDER NOTES
"Encounter Date: 2/24/2022       History     Chief Complaint   Patient presents with    Knee Pain     Pt c/o right knee pain. Pt states she was working out earlier today at 16:00 and injured her right knee. Pt reports pain and swelling.     Chief Complaint: Knee pain  History of  Present Illness: History obtained from patient. This 42 y.o. female who has past medical history of anemia, asthma and migraine headaches presents to the ED complaining of right knee pain that began while performing exercises earlier today.  Patient states she felt a pop in her right knee.  Patient reports increased pain with weight-bearing.  Denies numbness, tingling, weakness.          Review of patient's allergies indicates:   Allergen Reactions    Iodine and iodide containing products Anaphylaxis    Shellfish derived Anaphylaxis    Benadryl [diphenhydramine hcl] Itching     "with fast injection"    Fish containing products      Past Medical History:   Diagnosis Date    Anemia     Anemia     Asthma     exercise asthma    Migraine headache     Ovarian cyst      Past Surgical History:   Procedure Laterality Date    APPENDECTOMY      4/2019    BREAST BIOPSY Right     Excisional bx, benign     Family History   Problem Relation Age of Onset    Asthma Mother     Miscarriages / Stillbirths Mother     Breast cancer Mother         60    Arthritis Father     COPD Father     Asthma Brother     Diabetes Maternal Uncle     Hypertension Maternal Uncle     Prostate cancer Maternal Uncle     Hearing loss Paternal Aunt     Hearing loss Paternal Uncle     Breast cancer Maternal Grandmother         60    Cancer Maternal Grandfather     Hearing loss Paternal Grandmother     Hearing loss Paternal Grandfather     Amblyopia Neg Hx     Blindness Neg Hx     Cataracts Neg Hx     Glaucoma Neg Hx     Macular degeneration Neg Hx     Retinal detachment Neg Hx     Strabismus Neg Hx      Social History     Tobacco Use    Smoking " status: Never Smoker    Smokeless tobacco: Never Used   Substance Use Topics    Alcohol use: Yes     Comment: occass    Drug use: No     Review of Systems   Constitutional: Negative for fever.   Gastrointestinal: Negative for nausea and vomiting.   Musculoskeletal: Positive for arthralgias.   Skin: Negative for wound.   Neurological: Negative for weakness and numbness.       Physical Exam     Initial Vitals [02/24/22 2226]   BP Pulse Resp Temp SpO2   (!) 111/55 84 16 99.3 °F (37.4 °C) 98 %      MAP       --         Physical Exam    Nursing note and vitals reviewed.  Constitutional: She appears well-developed and well-nourished. She is active.  Non-toxic appearance. She does not have a sickly appearance. She does not appear ill.   HENT:   Head: Normocephalic and atraumatic.   Nose: Nose normal.   Mouth/Throat: Uvula is midline, oropharynx is clear and moist and mucous membranes are normal.   Eyes: Conjunctivae, EOM and lids are normal. Pupils are equal, round, and reactive to light.   Neck: Neck supple.   Normal range of motion.   Full passive range of motion without pain.     Cardiovascular: Normal rate and regular rhythm.   Pulses:       Dorsalis pedis pulses are 2+ on the right side and 2+ on the left side.        Posterior tibial pulses are 2+ on the right side and 2+ on the left side.   Musculoskeletal:      Cervical back: Full passive range of motion without pain, normal range of motion and neck supple.      Right knee: Normal. No swelling, deformity, effusion, erythema, lacerations or bony tenderness. Normal range of motion. No tenderness. No LCL laxity, MCL laxity, ACL laxity or PCL laxity. Normal meniscus.      Instability Tests: Anterior drawer test negative.     Neurological: She is alert and oriented to person, place, and time.   Skin: Skin is warm. Capillary refill takes less than 2 seconds.         ED Course   Procedures  Labs Reviewed   POCT URINE PREGNANCY          Imaging Results          X-Ray  Knee 3 View Right (Final result)  Result time 02/25/22 00:02:24    Final result by Sara Richardson MD (02/25/22 00:02:24)                 Impression:      No acute osseous abnormality identified.      Electronically signed by: Sara Richardson MD  Date:    02/25/2022  Time:    00:02             Narrative:    EXAMINATION:  XR KNEE 3 VIEW RIGHT    CLINICAL HISTORY:  Unspecified injury of unspecified lower leg, initial encounter    TECHNIQUE:  AP, lateral, and Merchant views of the right knee were performed.    COMPARISON:  August 2020.    FINDINGS:  No evidence of acute displaced fracture, dislocation, or osseous destructive process.  Joint spaces are preserved.  No significant suprapatellar joint effusion.                                 Medications - No data to display  Medical Decision Making:   ED Management:  This is an evaluation of a 42 y.o. female who presents to the ED for right knee pain.  Vital signs are stable.   Afebrile.  Patient is nontoxic appearing and in no acute distress.  Neurovascularly intact.  There is full range of motion of the right lower extremity against resistance.  No ligamentous laxity.  Negative Zackary's test.  No open wounds.  No obvious deformities.  No significant edema.    X-ray of the right knee shows acute fracture dislocation.  Etiology likely secondary to knee sprain.  Ace wrap applied.    I doubt acute process given reassuring exam with negative x-ray.    Patient given return precautions and instructed to return to the emergency department for any new or worsening symptoms. Patient verbalized understanding and agreed with plan. Encouraged follow-up with PCP.                        Clinical Impression:   Final diagnoses:  [S83.91XA] Sprain of right knee, unspecified ligament, initial encounter (Primary)          ED Disposition Condition    Discharge Stable        ED Prescriptions     None        Follow-up Information     Follow up With Specialties Details Why Contact Info     Katy Vergara MD Family Medicine, Internal Medicine   [address] [place] [address]  [phone]      [facility] Emergency Dept Emergency Medicine Go in 1 day If symptoms worsen [address] [place]  [facility] [address]  [phone]           Thad Hunt PA-C  02/25/22 0019

## 2022-02-25 NOTE — ED NOTES
Pt states she has Pain in her R lower leg due to her gym ing around 430 this afternoon, pt states her R leg swelled up but she used some ice. Pt states pain is a 8 when standing or when she puts any form of weight on her R leg, pain is a  When she's not bearing weight on the leg.

## 2022-03-28 ENCOUNTER — TELEPHONE (OUTPATIENT)
Dept: FAMILY MEDICINE | Facility: CLINIC | Age: 42
End: 2022-03-28
Payer: OTHER GOVERNMENT

## 2022-03-28 NOTE — TELEPHONE ENCOUNTER
Left VM for patient to call clinic back to clarify information for her neurology dept. as it is not in the EPIC system.     ----- Message from Katy Vergara MD sent at 3/28/2022  3:47 PM CDT -----  Regarding: RE: referal  I don't see an upcoming rose in Neuro. Already scheduled? Outside of [facility]? What is the reason for the appointment?  Thanks    ----- Message -----  From: Do Alem MA  Sent: 3/28/2022   2:41 PM CDT  To: Katy Vergara MD  Subject: FW: referal                                      Pt would like a neurology referral  ----- Message -----  From: Trish Martinez  Sent: 3/28/2022   2:23 PM CDT  To: Ursula DELA CRUZ Staff  Subject: referal                                          Hello patient is asking for a referral for upcoming Neurology appointment..Contact information is [phone].

## 2022-03-29 ENCOUNTER — TELEPHONE (OUTPATIENT)
Dept: FAMILY MEDICINE | Facility: CLINIC | Age: 42
End: 2022-03-29
Payer: OTHER GOVERNMENT

## 2022-03-29 DIAGNOSIS — G43.809 OTHER MIGRAINE WITHOUT STATUS MIGRAINOSUS, NOT INTRACTABLE: Primary | ICD-10-CM

## 2022-03-31 ENCOUNTER — TELEPHONE (OUTPATIENT)
Dept: FAMILY MEDICINE | Facility: CLINIC | Age: 42
End: 2022-03-31
Payer: OTHER GOVERNMENT

## 2022-03-31 ENCOUNTER — TELEPHONE (OUTPATIENT)
Dept: FAMILY MEDICINE | Facility: CLINIC | Age: 42
End: 2022-03-31

## 2022-03-31 ENCOUNTER — OFFICE VISIT (OUTPATIENT)
Dept: FAMILY MEDICINE | Facility: CLINIC | Age: 42
End: 2022-03-31
Payer: OTHER GOVERNMENT

## 2022-03-31 ENCOUNTER — PATIENT MESSAGE (OUTPATIENT)
Dept: FAMILY MEDICINE | Facility: CLINIC | Age: 42
End: 2022-03-31

## 2022-03-31 DIAGNOSIS — L08.9 SKIN INFECTION: Primary | ICD-10-CM

## 2022-03-31 PROCEDURE — 99214 OFFICE O/P EST MOD 30 MIN: CPT | Mod: 95,,, | Performed by: FAMILY MEDICINE

## 2022-03-31 PROCEDURE — 99214 PR OFFICE/OUTPT VISIT, EST, LEVL IV, 30-39 MIN: ICD-10-PCS | Mod: 95,,, | Performed by: FAMILY MEDICINE

## 2022-03-31 RX ORDER — AMOXICILLIN AND CLAVULANATE POTASSIUM 875; 125 MG/1; MG/1
1 TABLET, FILM COATED ORAL EVERY 12 HOURS
Qty: 14 TABLET | Refills: 0 | Status: SHIPPED | OUTPATIENT
Start: 2022-03-31 | End: 2022-04-07

## 2022-03-31 NOTE — TELEPHONE ENCOUNTER
----- Message from Nabil Blas sent at 3/31/2022  2:05 PM CDT -----  Type:  Patient Returning Call    Who Called: Self    Who Left Message for Patient: Landon    Does the patient know what this is regarding?: Yes, pt states that she needs this appt because she believes she may have pop one of the stitches from surgery. Pt states she would like to keep her 4:40 audio appt.    Would the patient rather a call back or a response via [facility]? Call back    Best Call Back Number: [phone]

## 2022-03-31 NOTE — TELEPHONE ENCOUNTER
Patient have audio visit today for check up for surgery - unsure reason for visit. Left message for patient to return call. Will need to reschedule for in person visit for another day.

## 2022-03-31 NOTE — LETTER
March 31, 2022      [facility]  [address]  [place] [address]  Phone: [phone]  Fax: [phone]       Patient: Lydia Thomas   YOB: 1980  Date of Visit: 03/31/2022    To Whom It May Concern:    Alexx Thomas  was at [facility] on 03/31/2022. The patient may return to work/school on 4/5/2022 with restrictions. Patient would benefit from light duty (no more than 5 lbs) for the next 2 weeks. If you have any questions or concerns, or if I can be of further assistance, please do not hesitate to contact me.    Sincerely,        Agustin Bryson MD

## 2022-04-01 ENCOUNTER — TELEPHONE (OUTPATIENT)
Dept: FAMILY MEDICINE | Facility: CLINIC | Age: 42
End: 2022-04-01
Payer: OTHER GOVERNMENT

## 2022-04-01 NOTE — TELEPHONE ENCOUNTER
----- Message from Jany Rapp sent at 4/1/2022  8:43 AM CDT -----  Regarding: self .[phone]  .Type: Patient Call Back    Who called: self     What is the request in detail: Pt is requesting for her referral for neurology to be sent to [facility] for approval     Can the clinic reply by [facility]? Call back     Would the patient rather a call back or a response via [facility]?  Call back     Best call back number: .[phone]

## 2022-04-01 NOTE — PROGRESS NOTES
The patient location is: [place]  The chief complaint leading to consultation is: infection  Visit type: audiovisual  Total time spent with patient: 5 mins  Each patient to whom he or she provides medical services by telemedicine is:  (1) informed of the relationship between the physician and patient and the respective role of any other health care provider with respect to management of the patient; and (2) notified that he or she may decline to receive medical services by telemedicine and may withdraw from such care at any time.      Subjective:       Patient ID: Lydia Thomas is a 42 y.o. female.    Chief Complaint: No chief complaint on file.      HPI  43 yo female presents for possible infection. Had breast surgery and had a stitch that 'popped' states she has redness and pain around the site. Denies any f/c.  Review of Systems   Constitutional: Negative.  Negative for activity change and unexpected weight change.   HENT: Negative.  Negative for hearing loss, rhinorrhea and trouble swallowing.    Eyes: Negative for discharge and visual disturbance.   Respiratory: Negative.  Negative for chest tightness and wheezing.    Cardiovascular: Negative.  Negative for chest pain and palpitations.   Gastrointestinal: Negative.  Negative for blood in stool, constipation, diarrhea and vomiting.   Endocrine: Negative.  Negative for polydipsia and polyuria.   Genitourinary: Negative.  Negative for difficulty urinating, dysuria, hematuria and menstrual problem.   Musculoskeletal: Negative.  Negative for arthralgias, joint swelling and neck pain.   Neurological: Negative.  Negative for weakness and headaches.   Psychiatric/Behavioral: Negative.  Negative for confusion and dysphoric mood.          Past Medical History:   Diagnosis Date    Anemia     Anemia     Asthma     exercise asthma    Migraine headache     Ovarian cyst      Past Surgical History:   Procedure Laterality Date    APPENDECTOMY      4/2019    BREAST  BIOPSY Right     Excisional bx, benign     Family History   Problem Relation Age of Onset    Asthma Mother     Miscarriages / Stillbirths Mother     Breast cancer Mother         60    Arthritis Father     COPD Father     Asthma Brother     Diabetes Maternal Uncle     Hypertension Maternal Uncle     Prostate cancer Maternal Uncle     Hearing loss Paternal Aunt     Hearing loss Paternal Uncle     Breast cancer Maternal Grandmother         60    Cancer Maternal Grandfather     Hearing loss Paternal Grandmother     Hearing loss Paternal Grandfather     Amblyopia Neg Hx     Blindness Neg Hx     Cataracts Neg Hx     Glaucoma Neg Hx     Macular degeneration Neg Hx     Retinal detachment Neg Hx     Strabismus Neg Hx      Social History     Socioeconomic History    Marital status:    Tobacco Use    Smoking status: Never Smoker    Smokeless tobacco: Never Used   Substance and Sexual Activity    Alcohol use: Yes     Comment: occass    Drug use: No    Sexual activity: Yes     Partners: Male     Birth control/protection: None   Social History Narrative    , she is from [place] originally, but comes from [place], she is Yarsani, her  is  ([employer]), no children, 3 cats    Works at [employer]       Current Outpatient Medications:     acetaminophen (TYLENOL) 500 MG tablet, Take 1 tablet (500 mg total) by mouth every 6 (six) hours as needed for Pain., Disp: 30 tablet, Rfl: 0    diclofenac sodium (VOLTAREN) 1 % Gel, Apply 2 g topically 4 (four) times daily. for 10 days, Disp: 100 g, Rfl: 0    glycerin adult suppository, Place 1 suppository rectally as needed for Constipation., Disp: 12 suppository, Rfl: 0    naproxen (NAPROSYN) 500 MG tablet, Take 1 tablet (500 mg total) by mouth 2 (two) times daily as needed., Disp: 30 tablet, Rfl: 0    onabotulinumtoxinA (BOTOX INJ), Inject as directed., Disp: , Rfl:     polyethylene glycol (GLYCOLAX) 17 gram/dose powder, Take  17 g by mouth once daily., Disp: 595 g, Rfl: 0    sumatriptan (IMITREX STATDOSE) 6 mg/0.5 mL kit, To use max twice within 24 hours, 2 hours apart., Disp: 6 kit, Rfl: 0   Objective:      There were no vitals filed for this visit.    Physical Exam  Constitutional:       General: She is not in acute distress.     Appearance: She is not diaphoretic.   HENT:      Head: Normocephalic and atraumatic.   Eyes:      Conjunctiva/sclera: Conjunctivae normal.   Pulmonary:      Effort: Pulmonary effort is normal.   Musculoskeletal:      Cervical back: Neck supple.   Skin:     Findings: Erythema (around healing site) present. No rash.   Neurological:      Mental Status: She is alert and oriented to person, place, and time.   Psychiatric:         Behavior: Behavior normal.         Thought Content: Thought content normal.         Judgment: Judgment normal.            Assessment:       1. Skin infection        Plan:       Skin infection  -     amoxicillin-clavulanate 875-125mg (AUGMENTIN) 875-125 mg per tablet; Take 1 tablet by mouth every 12 (twelve) hours. for 7 days  Dispense: 14 tablet; Refill: 0    given augmentin. Advised pt to f/u w/ surgeon as well.  No follow-ups on file.            Agustin Bryson MD      Patient note was created using [employer].  Any errors in syntax or even information may not have been identified and edited on initial review prior to signing this note.

## 2022-04-05 ENCOUNTER — TELEPHONE (OUTPATIENT)
Dept: ORTHOPEDICS | Facility: CLINIC | Age: 42
End: 2022-04-05
Payer: OTHER GOVERNMENT

## 2022-04-06 ENCOUNTER — TELEPHONE (OUTPATIENT)
Dept: FAMILY MEDICINE | Facility: CLINIC | Age: 42
End: 2022-04-06
Payer: OTHER GOVERNMENT

## 2022-04-06 NOTE — TELEPHONE ENCOUNTER
Informed pt she will have to wait until her OV tomorrow in order for Dr Vergara to prescribe anything else; if migraine is severe she can be seen in urgent care or ER today; pt verbalized understanding

## 2022-04-06 NOTE — TELEPHONE ENCOUNTER
----- Message from Fernando Phillips sent at 4/6/2022  1:34 PM CDT -----  Regarding: Patient advice  Contact: Pt  Pt called in regards to having extreme Migraines , Pt stated that medication she is taking is not helping with any relief       Please advise , Pt can be reached at [phone]

## 2022-04-08 ENCOUNTER — TELEPHONE (OUTPATIENT)
Dept: FAMILY MEDICINE | Facility: CLINIC | Age: 42
End: 2022-04-08

## 2022-04-08 ENCOUNTER — TELEPHONE (OUTPATIENT)
Dept: FAMILY MEDICINE | Facility: CLINIC | Age: 42
End: 2022-04-08
Payer: OTHER GOVERNMENT

## 2022-04-08 ENCOUNTER — OFFICE VISIT (OUTPATIENT)
Dept: FAMILY MEDICINE | Facility: CLINIC | Age: 42
End: 2022-04-08
Payer: OTHER GOVERNMENT

## 2022-04-08 VITALS
TEMPERATURE: 98 F | OXYGEN SATURATION: 99 % | HEIGHT: 63 IN | HEART RATE: 91 BPM | WEIGHT: 141.75 LBS | RESPIRATION RATE: 16 BRPM | SYSTOLIC BLOOD PRESSURE: 108 MMHG | BODY MASS INDEX: 25.12 KG/M2 | DIASTOLIC BLOOD PRESSURE: 82 MMHG

## 2022-04-08 DIAGNOSIS — G89.29 CHRONIC NONINTRACTABLE HEADACHE, UNSPECIFIED HEADACHE TYPE: Primary | ICD-10-CM

## 2022-04-08 DIAGNOSIS — R51.9 CHRONIC NONINTRACTABLE HEADACHE, UNSPECIFIED HEADACHE TYPE: Primary | ICD-10-CM

## 2022-04-08 DIAGNOSIS — B07.9 WARTS OF FOOT: ICD-10-CM

## 2022-04-08 DIAGNOSIS — Z80.0 FAMILY HISTORY OF COLON CANCER: ICD-10-CM

## 2022-04-08 DIAGNOSIS — Z12.11 SCREENING FOR COLON CANCER: ICD-10-CM

## 2022-04-08 PROCEDURE — 99999 PR PBB SHADOW E&M-EST. PATIENT-LVL IV: ICD-10-PCS | Mod: PBBFAC,,, | Performed by: INTERNAL MEDICINE

## 2022-04-08 PROCEDURE — 99999 PR PBB SHADOW E&M-EST. PATIENT-LVL IV: CPT | Mod: PBBFAC,,, | Performed by: INTERNAL MEDICINE

## 2022-04-08 PROCEDURE — 99214 OFFICE O/P EST MOD 30 MIN: CPT | Mod: S$PBB,,, | Performed by: INTERNAL MEDICINE

## 2022-04-08 PROCEDURE — 99214 PR OFFICE/OUTPT VISIT, EST, LEVL IV, 30-39 MIN: ICD-10-PCS | Mod: S$PBB,,, | Performed by: INTERNAL MEDICINE

## 2022-04-08 PROCEDURE — 99214 OFFICE O/P EST MOD 30 MIN: CPT | Mod: PBBFAC,PO | Performed by: INTERNAL MEDICINE

## 2022-04-08 RX ORDER — CEFUROXIME AXETIL 250 MG/1
TABLET ORAL
Qty: 6 KIT | Refills: 0 | Status: SHIPPED | OUTPATIENT
Start: 2022-04-08 | End: 2022-09-22 | Stop reason: SDUPTHER

## 2022-04-08 NOTE — TELEPHONE ENCOUNTER
----- Message from Aure Rapp sent at 4/8/2022  3:39 PM CDT -----  Regarding: self  .Type:  Patient Returning Call    Who Called: self     Who Left Message for Patient: Luz Elena     Does the patient know what this is regarding?: ref     Would the patient rather a call back or a response via [facility]? Call     Best Call Back Number: .[phone]

## 2022-04-08 NOTE — PROGRESS NOTES
Subjective:       Patient ID: Lydia Thomas is a pleasant 42 y.o. White female patient    Chief Complaint: Medication Refill, Pain (Right foot), and Advice Only (Referrals )      Patient is a pt I saw last on 07/06/2021 for preop clearance before breast augmentation. See my last notes and the list of problems below.    HPI     She is 12 min late for her 20 min appt due to traffic, and time for her to be triaged, I can start the visit only 10 min after the end of the allocated 20 min.  She comes today for meds refill, for R foot pain, and to obtain referrals.  She is fasting for Ramadan.  She reports that she had to have a new cosmetic surgery beginning of March as her previous breast augmentation went well, but breasts were too large. She is pleased of the present results.  As per problems:  1. Migraines: has an appointment scheduled to see Dr. Baca, outside of [facility], beginning of May.  Need this referral to be sent to [facility] [place].  She needs refill for triptan injections.  2. She would like a referral for Fermatologist due to foot wart.  There is one that is under her right foot, 1st radius, painful when ambulating.  She likes to go bare feet.  3. She would like a referral for colonoscopy as she has a family history of colon cancer.  She reports that her mom was diagnosed with stage I, her brother was found to have several polyps while had colonoscopy done at age 35, she has 2 aunts with colon cancer and a grandmother too, some of them Dx in their 50s.  She never had a colonoscopy before.  She has no symptoms.      Patient Active Problem List   Diagnosis    Acetaminophen overdose    Suicide attempt by acetaminophen overdose    Migraine    Anemia of chronic disease    Adjustment disorder with mixed anxiety and depressed mood    Tylenol overdose    Migraine without status migrainosus, not intractable    Occipital headache    Vasovagal syncope    Myoclonic jerking    Traumatic brain injury without  "loss of consciousness    Dizziness and giddiness    Depression    Warts of foot    Screening for colon cancer    Family history of colon cancer          ACTIVE MEDICAL ISSUES:  Documented in Problem List     PAST MEDICAL HISTORY  Documented     PAST SURGICAL HISTORY:  Documented     SOCIAL HISTORY:  Documented     FAMILY HISTORY:  Documented     ALLERGIES AND MEDICATIONS: updated and reviewed.  Documented    Review of Systems   Constitutional: Negative.    HENT: Negative.    Respiratory: Negative.    Gastrointestinal: Negative.    Skin:        Wart under R foot   Neurological: Positive for headaches.   All other systems reviewed and are negative.      Objective:      Physical Exam  Vitals and nursing note reviewed.   Constitutional:       Appearance: Normal appearance.   HENT:      Right Ear: Tympanic membrane normal.      Left Ear: Tympanic membrane normal.   Cardiovascular:      Rate and Rhythm: Normal rate and regular rhythm.      Pulses: Normal pulses.      Heart sounds: Normal heart sounds.   Pulmonary:      Effort: Pulmonary effort is normal.      Breath sounds: Normal breath sounds.   Musculoskeletal:        Feet:    Skin:     General: Skin is warm and dry.      Findings: Lesion (wart under sole of R foot, anterior.) present.   Neurological:      Mental Status: She is alert.         Vitals:    04/08/22 0845   BP: 108/82   BP Location: Right arm   Patient Position: Sitting   BP Method: Pediatric (Manual)   Pulse: 91   Resp: 16   Temp: 97.9 °F (36.6 °C)   TempSrc: Oral   SpO2: 99%   Weight: 64.3 kg (141 lb 12.1 oz)   Height: 5' 3" (1.6 m)     Body mass index is 25.11 kg/m².    RESULTS: Reviewed labs from last 12 months    Last Lab Results:     Lab Results   Component Value Date    WBC 7.05 01/14/2022    HGB 13.0 01/14/2022    HCT 38.8 01/14/2022     01/14/2022     01/14/2022    K 3.6 01/14/2022     01/14/2022    CO2 30 (H) 01/14/2022    BUN 7 01/14/2022    CREATININE 0.7 01/14/2022    " CALCIUM 8.9 01/14/2022    ALBUMIN 4.0 01/14/2022    AST 18 01/14/2022    ALT 16 01/14/2022    CHOL 198 04/14/2021    TRIG 72 04/14/2021    HDL 52 04/14/2021    LDLCALC 131.6 04/14/2021    HGBA1C 4.9 04/14/2021    TSH 0.962 04/14/2021         Assessment:       1. Chronic nonintractable headache, unspecified headache type    2. Warts of foot    3. Screening for colon cancer    4. Family history of colon cancer        Plan:   Lydia was seen today for medication refill, pain and advice only.    Diagnoses and all orders for this visit:    Chronic nonintractable headache, unspecified headache type  -     sumatriptan (IMITREX STATDOSE) 6 mg/0.5 mL kit; To use max twice within 24 hours, 2 hours apart.  -     Ambulatory referral/consult to Neurology; Future    Refilled Imitrex injections, placed again referral for Neurologist, appt in May, so that it may be sent to her insurance as per her request.    Warts of foot  -     Ambulatory referral/consult to Dermatology; Future    See HPI and PE. Referral placed.    Screening for colon cancer  -     Case Request Endoscopy: COLONOSCOPY    See HPI, referral placed.    Family history of colon cancer  -     Case Request Endoscopy: COLONOSCOPY    See above.    No follow-ups on file.    This note was created by combination of typed  and M-Modal dictation.  Transcription errors may be present.  If there are any questions, please contact me.

## 2022-04-11 ENCOUNTER — PATIENT MESSAGE (OUTPATIENT)
Dept: FAMILY MEDICINE | Facility: CLINIC | Age: 42
End: 2022-04-11
Payer: OTHER GOVERNMENT

## 2022-04-11 ENCOUNTER — TELEPHONE (OUTPATIENT)
Dept: FAMILY MEDICINE | Facility: CLINIC | Age: 42
End: 2022-04-11
Payer: OTHER GOVERNMENT

## 2022-04-11 NOTE — TELEPHONE ENCOUNTER
----- Message from Kinjal Martinez sent at 4/11/2022  9:04 AM CDT -----  Good morning,    Attached patient is requesting a referral to GI. Thank you!

## 2022-04-13 ENCOUNTER — OFFICE VISIT (OUTPATIENT)
Dept: FAMILY MEDICINE | Facility: CLINIC | Age: 42
End: 2022-04-13
Payer: OTHER GOVERNMENT

## 2022-04-13 ENCOUNTER — PATIENT MESSAGE (OUTPATIENT)
Dept: ENDOSCOPY | Facility: HOSPITAL | Age: 42
End: 2022-04-13
Payer: OTHER GOVERNMENT

## 2022-04-13 DIAGNOSIS — S06.0X1A CONCUSSION WITH LOSS OF CONSCIOUSNESS OF 30 MINUTES OR LESS, INITIAL ENCOUNTER: Primary | ICD-10-CM

## 2022-04-13 DIAGNOSIS — Z12.11 SPECIAL SCREENING FOR MALIGNANT NEOPLASMS, COLON: Primary | ICD-10-CM
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
## 2022-04-13 PROCEDURE — 99214 PR OFFICE/OUTPT VISIT, EST, LEVL IV, 30-39 MIN: ICD-10-PCS | Mod: 95,,, | Performed by: FAMILY MEDICINE

## 2022-04-13 PROCEDURE — 99214 OFFICE O/P EST MOD 30 MIN: CPT | Mod: 95,,, | Performed by: FAMILY MEDICINE

## 2022-04-13 RX ORDER — TIZANIDINE 4 MG/1
4 TABLET ORAL EVERY 8 HOURS PRN
Qty: 30 TABLET | Refills: 0 | Status: SHIPPED | OUTPATIENT
Start: 2022-04-13 | End: 2022-04-23

## 2022-04-13 RX ORDER — DICLOFENAC SODIUM 10 MG/G
2 GEL TOPICAL 4 TIMES DAILY
Qty: 100 G | Refills: 0 | Status: SHIPPED | OUTPATIENT
Start: 2022-04-13 | End: 2022-11-03

## 2022-04-13 RX ORDER — POLYETHYLENE GLYCOL 3350, SODIUM SULFATE ANHYDROUS, SODIUM BICARBONATE, SODIUM CHLORIDE, POTASSIUM CHLORIDE 236; 22.74; 6.74; 5.86; 2.97 G/4L; G/4L; G/4L; G/4L; G/4L
4 POWDER, FOR SOLUTION ORAL ONCE
Qty: 4000 ML | Refills: 0 | Status: SHIPPED | OUTPATIENT
Start: 2022-04-13 | End: 2022-04-13

## 2022-04-13 RX ORDER — NAPROXEN 500 MG/1
500 TABLET ORAL 2 TIMES DAILY PRN
Qty: 30 TABLET | Refills: 0 | Status: SHIPPED | OUTPATIENT
Start: 2022-04-13 | End: 2022-08-12

## 2022-04-13 NOTE — PROGRESS NOTES
"[facility] Primary [facility]  Progress Note    SUBJECTIVE:     Chief Complaint   Patient presents with    Fall       The patient location is: Home  The chief complaint leading to consultation is: Fall    Visit type: Virtual visit with synchronous audio and video  Total time spent with patient: 25  Each patient to whom he or she provides medical services by telemedicine is:  (1) informed of the relationship between the physician and patient and the respective role of any other health care provider with respect to management of the patient; and (2) notified that he or she may decline to receive medical services by telemedicine and may withdraw from such care at any time.      HPI: Patient is a 42 y.o. female via virtual visit, after falling and hitting her head. Initial fall she landed on her back which caused severe pain. When she got up and slipped again, she then hit her head. There was witnesses that said she was out for about a minute. Went to [facility] and had CT head, neck, back, abd/pelvis which were within normal limits. She has associated headaches, some confusion. Lower back is bruised, but no fractures were detected anywhere.     Review of patient's allergies indicates:   Allergen Reactions    Iodine and iodide containing products Anaphylaxis    Shellfish derived Anaphylaxis    Benadryl [diphenhydramine hcl] Itching     "with fast injection"    Fish containing products        Past Medical History:   Diagnosis Date    Anemia     Anemia     Asthma     exercise asthma    Migraine headache     Ovarian cyst      Past Surgical History:   Procedure Laterality Date    APPENDECTOMY      4/2019    BREAST BIOPSY Right     Excisional bx, benign     Family History   Problem Relation Age of Onset    Asthma Mother     Miscarriages / Stillbirths Mother     Breast cancer Mother         60    Arthritis Father     COPD Father     Asthma Brother     Diabetes Maternal Uncle     Hypertension Maternal Uncle     Prostate " cancer Maternal Uncle     Hearing loss Paternal Aunt     Hearing loss Paternal Uncle     Breast cancer Maternal Grandmother         60    Cancer Maternal Grandfather     Hearing loss Paternal Grandmother     Hearing loss Paternal Grandfather     Amblyopia Neg Hx     Blindness Neg Hx     Cataracts Neg Hx     Glaucoma Neg Hx     Macular degeneration Neg Hx     Retinal detachment Neg Hx     Strabismus Neg Hx      Social History     Tobacco Use    Smoking status: Never Smoker    Smokeless tobacco: Never Used   Substance Use Topics    Alcohol use: Yes     Comment: occass    Drug use: No        Review of Systems   Constitutional: Negative for fever.   Cardiovascular: Negative for chest pain.   Gastrointestinal: Negative for abdominal pain.   Genitourinary: Negative for dysuria.   Musculoskeletal: Positive for back pain.   Neurological: Positive for tingling, weakness and headaches.   Psychiatric/Behavioral:        + some confusion     OBJECTIVE:   There were no vitals filed for this visit.  There is no height or weight on file to calculate BMI.    Physical Exam  Constitutional:       General: She is not in acute distress.     Appearance: She is not toxic-appearing or diaphoretic.   HENT:      Head: Normocephalic and atraumatic.   Eyes:      General:         Right eye: No foreign body.         Left eye: No foreign body.      Conjunctiva/sclera: Conjunctivae normal.      Right eye: Right conjunctiva is not injected. No exudate or hemorrhage.     Left eye: Left conjunctiva is not injected. No exudate or hemorrhage.  Pulmonary:      Effort: Pulmonary effort is normal. No respiratory distress.   Skin:     Comments: + visual bruising of lower back and R neck area   Neurological:      Mental Status: She is oriented to person, place, and time. She is not lethargic.   Psychiatric:         Mood and Affect: Affect is not labile, blunt or inappropriate.         Behavior: Behavior is not agitated.         Old records  were reviewed. Labs and/or images were independently reviewed.    ASSESSMENT     1. Concussion with loss of consciousness of 30 minutes or less, initial encounter    2. Fall, initial encounter        PLAN:     Concussion with loss of consciousness of 30 minutes or less, initial encounter  -     Ambulatory referral/consult to Concussion Management Program; Future; Expected date: 04/20/2022  -     Discussed resting, and trying to minimize brain activity. Refer to concussion team for further evaluation.    Fall, initial encounter  -     diclofenac sodium (VOLTAREN) 1 % Gel; Apply 2 g topically 4 (four) times daily. for 10 days  Dispense: 100 g; Refill: 0  -     naproxen (NAPROSYN) 500 MG tablet; Take 1 tablet (500 mg total) by mouth 2 (two) times daily as needed.  Dispense: 30 tablet; Refill: 0  -     tiZANidine (ZANAFLEX) 4 MG tablet; Take 1 tablet (4 mg total) by mouth every 8 (eight) hours as needed.  Dispense: 30 tablet; Refill: 0  -     ICE, rest, NSAIDs, muscle relaxers as discussed.      RTC PRJEROME Hunt MD  04/13/2022 12:42 PM

## 2022-04-14 ENCOUNTER — PATIENT MESSAGE (OUTPATIENT)
Dept: FAMILY MEDICINE | Facility: CLINIC | Age: 42
End: 2022-04-14
Payer: OTHER GOVERNMENT

## 2022-04-14 ENCOUNTER — TELEPHONE (OUTPATIENT)
Dept: NEUROLOGY | Facility: CLINIC | Age: 42
End: 2022-04-14
Payer: OTHER GOVERNMENT

## 2022-04-14 ENCOUNTER — TELEPHONE (OUTPATIENT)
Dept: FAMILY MEDICINE | Facility: CLINIC | Age: 42
End: 2022-04-14
Payer: OTHER GOVERNMENT

## 2022-04-14 NOTE — TELEPHONE ENCOUNTER
----- Message from Aure aRpp sent at 4/14/2022  1:06 PM CDT -----  Regarding: self  .Type: Patient Call Back    Who called: self   What is the request in detail: is following up on previous message regarding editing the date of return work. She needs to have sent to her job by this evening. Please call     Can the clinic reply by [facility]?    Would the patient rather a call back or a response via [facility]?  Call     Best call back number: .[phone]

## 2022-04-14 NOTE — TELEPHONE ENCOUNTER
----- Message from Antonio Szymanski sent at 4/14/2022  9:17 AM CDT -----  Contact: @ [phone]   Patient returning a missed call from renetta Hammer return call ( she wants to know why she needs to see Neurology )

## 2022-04-14 NOTE — TELEPHONE ENCOUNTER
----- Message from Natalee Mccabe sent at 4/14/2022  8:36 AM CDT -----  Regarding: Concussion Mgmt Referral  Good morning,    Patient has a referral for Concussion Management placed by Dr. THOR Hunt. Please assist patient with scheduling.      Thank you

## 2022-04-21 ENCOUNTER — OFFICE VISIT (OUTPATIENT)
Dept: FAMILY MEDICINE | Facility: CLINIC | Age: 42
End: 2022-04-21
Payer: OTHER GOVERNMENT

## 2022-04-21 VITALS
HEART RATE: 84 BPM | BODY MASS INDEX: 25.01 KG/M2 | OXYGEN SATURATION: 98 % | WEIGHT: 141.13 LBS | HEIGHT: 63 IN | DIASTOLIC BLOOD PRESSURE: 76 MMHG | TEMPERATURE: 99 F | SYSTOLIC BLOOD PRESSURE: 118 MMHG

## 2022-04-21 DIAGNOSIS — Z12.31 BREAST CANCER SCREENING BY MAMMOGRAM: ICD-10-CM

## 2022-04-21 DIAGNOSIS — R51.9 CHRONIC NONINTRACTABLE HEADACHE, UNSPECIFIED HEADACHE TYPE: ICD-10-CM
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
## 2022-04-21 DIAGNOSIS — S06.0X1A CONCUSSION WITH LOSS OF CONSCIOUSNESS OF 30 MINUTES OR LESS, INITIAL ENCOUNTER: Primary | ICD-10-CM

## 2022-04-21 DIAGNOSIS — G89.29 CHRONIC NONINTRACTABLE HEADACHE, UNSPECIFIED HEADACHE TYPE: ICD-10-CM

## 2022-04-21 PROCEDURE — 99999 PR PBB SHADOW E&M-EST. PATIENT-LVL IV: ICD-10-PCS | Mod: PBBFAC,,, | Performed by: INTERNAL MEDICINE

## 2022-04-21 PROCEDURE — 99999 PR PBB SHADOW E&M-EST. PATIENT-LVL IV: CPT | Mod: PBBFAC,,, | Performed by: INTERNAL MEDICINE

## 2022-04-21 PROCEDURE — 99214 PR OFFICE/OUTPT VISIT, EST, LEVL IV, 30-39 MIN: ICD-10-PCS | Mod: S$PBB,,, | Performed by: INTERNAL MEDICINE

## 2022-04-21 PROCEDURE — 99214 OFFICE O/P EST MOD 30 MIN: CPT | Mod: S$PBB,,, | Performed by: INTERNAL MEDICINE

## 2022-04-21 PROCEDURE — 99214 OFFICE O/P EST MOD 30 MIN: CPT | Mod: PBBFAC,PO | Performed by: INTERNAL MEDICINE

## 2022-04-21 NOTE — PROGRESS NOTES
Subjective:       Patient ID: Lydia Thoams is a pleasant 42 y.o. White female patient    Chief Complaint: ER Follow up/Fall, Neck Pain/ Concerns from fall, and Left Hip/Leg Pain (sharp pains)      Patient is established pt to me.     She comes today to f-up re: concussion.  She slipped on water in bathroom at work on 04/12/2022, fell on the back, then tried to get up and slipped again, hit her head, losing consciousness for about 1 minute, woke up to manager trying to wake her up. Went to [facility] ED, where CT head, neck, back, abdo/pelvis was normal. At the time her L leg was numb, now she has lower back pain with deep radiating down thigh to big toe. She had bruises on neck and back. She continues to have headache which feel like worsening of her migraines, mild confusion (for example, she has to think about individual movements of walking and eating), neck discomfort requiring her to crack her neck which she never did before, nausea and vomiting sometimes, spots in vision. Tylenol, Voltaren gel, naproxen, muscle relaxant, ice, warm compresses has not improved her pain.  This is her second fall and second concussion. Her first fall was due to heat exhaustion. Symptoms after her first concussion were spots in visual fields, worsening of migraines. It took months for symptoms to subside.  She has an appointment with concussion neurologist on 5/19/22 and migraine neurologist on 5/4/22.  She is also complaining of constipation and pain with urination. She think the pain with urination is due bruising from the fall.  Of note she is fasting for Ramadan.       Patient Active Problem List   Diagnosis    Acetaminophen overdose    Suicide attempt by acetaminophen overdose    Migraine    Anemia of chronic disease    Adjustment disorder with mixed anxiety and depressed mood    Tylenol overdose    Migraine without status migrainosus, not intractable    Occipital headache    Vasovagal syncope    Myoclonic jerking     Traumatic brain injury without loss of consciousness    Dizziness and giddiness    Depression    Warts of foot    Screening for colon cancer    Family history of colon cancer          ACTIVE MEDICAL ISSUES:  Documented in Problem List     PAST MEDICAL HISTORY  Documented     PAST SURGICAL HISTORY:  Documented     SOCIAL HISTORY:  Documented     FAMILY HISTORY:  Documented     ALLERGIES AND MEDICATIONS: updated and reviewed.  Documented    Review of Systems   Constitutional: Positive for activity change, appetite change and fatigue. Negative for chills and fever.   HENT: Negative.    Eyes: Positive for photophobia and visual disturbance. Negative for pain and redness.   Respiratory: Negative.    Cardiovascular: Negative.    Gastrointestinal: Positive for constipation, nausea and vomiting. Negative for abdominal distention, abdominal pain and diarrhea.   Genitourinary: Negative.    Musculoskeletal: Positive for back pain, neck pain and neck stiffness.   Skin: Negative.    Neurological: Positive for dizziness, weakness, light-headedness and headaches. Negative for numbness.   Psychiatric/Behavioral: Positive for confusion, decreased concentration and sleep disturbance. The patient is nervous/anxious.        Objective:      Physical Exam  Vitals and nursing note reviewed.   Constitutional:       Appearance: Normal appearance. She is normal weight.      Comments: Looking weak, prefers to lie on the exam table with lights off. Starts to cry at some point as states she is frightened by her present issues   HENT:      Head: Normocephalic and atraumatic.      Right Ear: Tympanic membrane normal.      Left Ear: Tympanic membrane normal.   Eyes:      Conjunctiva/sclera: Conjunctivae normal.   Cardiovascular:      Rate and Rhythm: Normal rate and regular rhythm.      Pulses: Normal pulses.      Heart sounds: Normal heart sounds.   Pulmonary:      Effort: Pulmonary effort is normal.      Breath sounds: Normal breath  "sounds.   Abdominal:      General: Abdomen is flat.      Palpations: Abdomen is soft.   Musculoskeletal:         General: No tenderness.   Skin:     General: Skin is warm and dry.      Capillary Refill: Capillary refill takes less than 2 seconds.   Neurological:      General: No focal deficit present.      Mental Status: She is alert and oriented to person, place, and time.      Cranial Nerves: No cranial nerve deficit.      Sensory: No sensory deficit.      Motor: No weakness.      Coordination: Coordination normal.   Psychiatric:         Behavior: Behavior normal.         Thought Content: Thought content normal.         Judgment: Judgment normal.         Vitals:    04/21/22 0928   BP: 118/76   Pulse: 84   Temp: 99 °F (37.2 °C)   TempSrc: Oral   SpO2: 98%   Weight: 64 kg (141 lb 1.5 oz)   Height: 5' 3" (1.6 m)     Body mass index is 24.99 kg/m².    RESULTS: Reviewed labs from last 12 months    Last Lab Results:     Lab Results   Component Value Date    WBC 7.05 01/14/2022    HGB 13.0 01/14/2022    HCT 38.8 01/14/2022     01/14/2022     01/14/2022    K 3.6 01/14/2022     01/14/2022    CO2 30 (H) 01/14/2022    BUN 7 01/14/2022    CREATININE 0.7 01/14/2022    CALCIUM 8.9 01/14/2022    ALBUMIN 4.0 01/14/2022    AST 18 01/14/2022    ALT 16 01/14/2022    CHOL 198 04/14/2021    TRIG 72 04/14/2021    HDL 52 04/14/2021    LDLCALC 131.6 04/14/2021    HGBA1C 4.9 04/14/2021    TSH 0.962 04/14/2021       Assessment:       1. Concussion with loss of consciousness of 30 minutes or less, initial encounter    2. Fall, subsequent encounter    3. Chronic nonintractable headache, unspecified headache type    4. Breast cancer screening by mammogram        Plan:   Lydia was seen today for er follow up/fall, neck pain/ concerns from fall and left hip/leg pain (sharp pains).    Diagnoses and all orders for this visit:    Concussion with loss of consciousness of 30 minutes or less, initial encounter    See HPI and PE. " Pt will be seen by concussion specialist on 05/19/2022, she was told she should not go back to work before this appt. Absence of work letter provided until this date. Told her to rest, to drink plenty of fluid. Discussed of the fact that fasting due to Ramadan may be too much for her and that she can elect to stop this, due to her medical condition. Her mother who is in [place] calls her each day, and her  who is in the  will come back tomorrow and stay until she feels better. Advised re: symptoms and signs of concern that may prompt her to seek for medical attention.    Fall, subsequent encounter    See above. Had full work-up done at [facility]. Some shooting pain in LLE, difficult to assess. Seems to be sciatic pain but I do not dare to give her gabapentin or muscle relaxant due to concussion. Discussed symptoms and signs of concern. May take APAP, gentle stretching.    Chronic nonintractable headache, unspecified headache type    Will see her specialist soon.    Breast cancer screening by mammogram  -     Mammo Digital Screening Bilat; Future      No follow-ups on file.     Health Maintenance       Date Due Completion Date    HIV Screening Never done ---    Cervical Cancer Screening 01/12/2022 1/12/2021    Mammogram 01/12/2022 1/12/2021    COVID-19 Vaccine (3 - Booster for Pfizer series) 04/01/2022 11/1/2021    Influenza Vaccine (1) 06/30/2022 (Originally 9/1/2021) ---    TETANUS VACCINE 05/27/2026 5/27/2016          Anh Arguello, MS4  [facility]     I hereby acknowledge that I am relying upon documentation authored by a medical student working under my supervision and further I hereby attest that I have verified the student documentation or findings by personally re-performing the physical exam and medical decision making activities of the Evaluation and Management service to be billed.    Katy Vergara    This note was created by combination of typed  and M-Modal dictation.   Transcription errors may be present.  If there are any questions, please contact me.

## 2022-04-21 NOTE — LETTER
[id number]  ? [place], [address] ? Phone [phone] ? Fax [phone]           Return to Work    Patient: Lydia Thomas  YOB: 1980   Date: 04/21/2022      To Whom It May Concern:     Lydia Thomas was iseen in my office on 04/21/2022. She will not be able to go back to work until 05/20/2022 for medical reasons.     If you have any questions or concerns, or if I can be of further assistance, please do not hesitate to contact me.     Sincerely,    Katy Vergara MD

## 2022-04-27 ENCOUNTER — TELEPHONE (OUTPATIENT)
Dept: FAMILY MEDICINE | Facility: CLINIC | Age: 42
End: 2022-04-27
Payer: OTHER GOVERNMENT

## 2022-04-27 NOTE — TELEPHONE ENCOUNTER
----- Message from Cherelle Roberson sent at 4/27/2022 12:34 PM CDT -----  Regarding: [employer] Workers comp [phone]  Type: Patient Call Back    Who called: Workers comp insurance: [employer]     What is the request in detail: Wanting to speak to office about the patients work status and appointment      Best call back number: [phone]

## 2022-04-27 NOTE — TELEPHONE ENCOUNTER
Pt advised.    This company does not affilate with coworkers compensation therefore no questions can be answer to her .

## 2022-05-02 ENCOUNTER — TELEPHONE (OUTPATIENT)
Dept: ENDOSCOPY | Facility: HOSPITAL | Age: 42
End: 2022-05-02
Payer: OTHER GOVERNMENT

## 2022-05-02 NOTE — TELEPHONE ENCOUNTER
----- Message from Ting Mckeon sent at 5/2/2022  9:10 AM CDT -----  Contact: [phone]  PT. Was to cancel do to accident she was in and she is in a lot of pain, pls acll

## 2022-05-10 ENCOUNTER — PATIENT MESSAGE (OUTPATIENT)
Dept: ENDOSCOPY | Facility: HOSPITAL | Age: 42
End: 2022-05-10
Payer: OTHER GOVERNMENT

## 2022-05-10 DIAGNOSIS — Z12.11 COLON CANCER SCREENING: Primary | ICD-10-CM

## 2022-05-10 RX ORDER — POLYETHYLENE GLYCOL 3350, SODIUM SULFATE ANHYDROUS, SODIUM BICARBONATE, SODIUM CHLORIDE, POTASSIUM CHLORIDE 236; 22.74; 6.74; 5.86; 2.97 G/4L; G/4L; G/4L; G/4L; G/4L
4 POWDER, FOR SOLUTION ORAL ONCE
Qty: 4000 ML | Refills: 0 | Status: SHIPPED | OUTPATIENT
Start: 2022-05-10 | End: 2022-05-10

## 2022-05-16 ENCOUNTER — PATIENT MESSAGE (OUTPATIENT)
Dept: NEUROLOGY | Facility: CLINIC | Age: 42
End: 2022-05-16
Payer: OTHER GOVERNMENT

## 2022-05-16 ENCOUNTER — TELEPHONE (OUTPATIENT)
Dept: NEUROLOGY | Facility: CLINIC | Age: 42
End: 2022-05-16
Payer: OTHER GOVERNMENT

## 2022-05-16 NOTE — TELEPHONE ENCOUNTER
----- Message from Sydni Light sent at 5/16/2022 10:27 AM CDT -----  Regarding: Call Back  Who Called: pt          What is the reason for the call: pt is calling in regards to stating workers comp isn't dealing with it, it is through her . Please contact. Asking to speak with Harman.          Can patient be contacted on [employer]: No          Call back number: [phone]

## 2022-05-17 ENCOUNTER — PATIENT MESSAGE (OUTPATIENT)
Dept: NEUROLOGY | Facility: CLINIC | Age: 42
End: 2022-05-17
Payer: OTHER GOVERNMENT

## 2022-05-17 DIAGNOSIS — Z12.11 SPECIAL SCREENING FOR MALIGNANT NEOPLASMS, COLON: Primary | ICD-10-CM

## 2022-05-17 DIAGNOSIS — M51.36 DDD (DEGENERATIVE DISC DISEASE), LUMBAR: Primary | ICD-10-CM

## 2022-05-17 RX ORDER — POLYETHYLENE GLYCOL 3350, SODIUM SULFATE ANHYDROUS, SODIUM BICARBONATE, SODIUM CHLORIDE, POTASSIUM CHLORIDE 236; 22.74; 6.74; 5.86; 2.97 G/4L; G/4L; G/4L; G/4L; G/4L
4 POWDER, FOR SOLUTION ORAL ONCE
Qty: 4000 ML | Refills: 0 | Status: ON HOLD | OUTPATIENT
Start: 2022-05-17 | End: 2022-05-18 | Stop reason: HOSPADM

## 2022-05-18 ENCOUNTER — ANESTHESIA (OUTPATIENT)
Dept: ENDOSCOPY | Facility: HOSPITAL | Age: 42
End: 2022-05-18
Payer: OTHER GOVERNMENT

## 2022-05-18 ENCOUNTER — HOSPITAL ENCOUNTER (OUTPATIENT)
Facility: HOSPITAL | Age: 42
Discharge: HOME | End: 2022-05-18
Attending: INTERNAL MEDICINE | Admitting: INTERNAL MEDICINE
Payer: OTHER GOVERNMENT

## 2022-05-18 ENCOUNTER — PATIENT MESSAGE (OUTPATIENT)
Dept: FAMILY MEDICINE | Facility: CLINIC | Age: 42
End: 2022-05-18
Payer: OTHER GOVERNMENT

## 2022-05-18 ENCOUNTER — ANESTHESIA EVENT (OUTPATIENT)
Dept: ENDOSCOPY | Facility: HOSPITAL | Age: 42
End: 2022-05-18
Payer: OTHER GOVERNMENT

## 2022-05-18 VITALS
RESPIRATION RATE: 19 BRPM | OXYGEN SATURATION: 99 % | TEMPERATURE: 98 F | DIASTOLIC BLOOD PRESSURE: 58 MMHG | SYSTOLIC BLOOD PRESSURE: 93 MMHG | HEART RATE: 69 BPM

## 2022-05-18 DIAGNOSIS — Z80.0 FAMILY HISTORY OF COLON CANCER: ICD-10-CM

## 2022-05-18 DIAGNOSIS — Z98.890 HX OF COLONOSCOPY: Primary | ICD-10-CM

## 2022-05-18 DIAGNOSIS — Z12.11 SCREENING FOR COLON CANCER: ICD-10-CM

## 2022-05-18 LAB
B-HCG UR QL: NEGATIVE
CTP QC/QA: YES

## 2022-05-18 PROCEDURE — D9220A PRA ANESTHESIA: ICD-10-PCS | Mod: CRNA,,, | Performed by: NURSE ANESTHETIST, CERTIFIED REGISTERED

## 2022-05-18 PROCEDURE — 63600175 PHARM REV CODE 636 W HCPCS: Performed by: NURSE ANESTHETIST, CERTIFIED REGISTERED

## 2022-05-18 PROCEDURE — D9220A PRA ANESTHESIA: Mod: CRNA,,, | Performed by: NURSE ANESTHETIST, CERTIFIED REGISTERED

## 2022-05-18 PROCEDURE — 25000003 PHARM REV CODE 250: Performed by: INTERNAL MEDICINE

## 2022-05-18 PROCEDURE — 37000008 HC ANESTHESIA 1ST 15 MINUTES: Performed by: INTERNAL MEDICINE

## 2022-05-18 PROCEDURE — G0121 COLON CA SCRN NOT HI RSK IND: HCPCS | Mod: ,,, | Performed by: INTERNAL MEDICINE

## 2022-05-18 PROCEDURE — 00812 ANES LWR INTST SCR COLSC: CPT | Performed by: INTERNAL MEDICINE

## 2022-05-18 PROCEDURE — G0121 COLON CA SCRN NOT HI RSK IND: HCPCS | Performed by: INTERNAL MEDICINE

## 2022-05-18 PROCEDURE — D9220A PRA ANESTHESIA: ICD-10-PCS | Mod: ANES,,, | Performed by: ANESTHESIOLOGY

## 2022-05-18 PROCEDURE — 63600175 PHARM REV CODE 636 W HCPCS: Performed by: ANESTHESIOLOGY

## 2022-05-18 PROCEDURE — 81025 URINE PREGNANCY TEST: CPT | Performed by: INTERNAL MEDICINE

## 2022-05-18 PROCEDURE — D9220A PRA ANESTHESIA: Mod: ANES,,, | Performed by: ANESTHESIOLOGY

## 2022-05-18 PROCEDURE — 25000003 PHARM REV CODE 250: Performed by: NURSE ANESTHETIST, CERTIFIED REGISTERED

## 2022-05-18 PROCEDURE — 37000009 HC ANESTHESIA EA ADD 15 MINS: Performed by: INTERNAL MEDICINE

## 2022-05-18 PROCEDURE — G0121 COLON CA SCRN NOT HI RSK IND: ICD-10-PCS | Mod: ,,, | Performed by: INTERNAL MEDICINE

## 2022-05-18 RX ORDER — MIDAZOLAM HYDROCHLORIDE 1 MG/ML
INJECTION INTRAMUSCULAR; INTRAVENOUS
Status: COMPLETED
Start: 2022-05-18 | End: 2022-05-18

## 2022-05-18 RX ORDER — PROPOFOL 10 MG/ML
INJECTION, EMULSION INTRAVENOUS
Status: DISCONTINUED
Start: 2022-05-18 | End: 2022-05-18 | Stop reason: HOSPADM

## 2022-05-18 RX ORDER — PROPOFOL 10 MG/ML
VIAL (ML) INTRAVENOUS
Status: DISCONTINUED | OUTPATIENT
Start: 2022-05-18 | End: 2022-05-18

## 2022-05-18 RX ORDER — SODIUM CHLORIDE 9 MG/ML
INJECTION, SOLUTION INTRAVENOUS CONTINUOUS
Status: DISCONTINUED | OUTPATIENT
Start: 2022-05-18 | End: 2022-05-18 | Stop reason: HOSPADM

## 2022-05-18 RX ORDER — LIDOCAINE HYDROCHLORIDE 20 MG/ML
INJECTION, SOLUTION EPIDURAL; INFILTRATION; INTRACAUDAL; PERINEURAL
Status: DISCONTINUED
Start: 2022-05-18 | End: 2022-05-18 | Stop reason: HOSPADM

## 2022-05-18 RX ORDER — MIDAZOLAM HYDROCHLORIDE 1 MG/ML
INJECTION, SOLUTION INTRAMUSCULAR; INTRAVENOUS
Status: DISCONTINUED | OUTPATIENT
Start: 2022-05-18 | End: 2022-05-18

## 2022-05-18 RX ORDER — LIDOCAINE HYDROCHLORIDE 20 MG/ML
INJECTION INTRAVENOUS
Status: DISCONTINUED | OUTPATIENT
Start: 2022-05-18 | End: 2022-05-18

## 2022-05-18 RX ADMIN — LIDOCAINE HYDROCHLORIDE 50 MG: 20 INJECTION, SOLUTION INTRAVENOUS at 09:05

## 2022-05-18 RX ADMIN — PROPOFOL 50 MG: 10 INJECTION, EMULSION INTRAVENOUS at 09:05

## 2022-05-18 RX ADMIN — MIDAZOLAM 2 MG: 1 INJECTION INTRAMUSCULAR; INTRAVENOUS at 08:05

## 2022-05-18 RX ADMIN — SODIUM CHLORIDE: 0.9 INJECTION, SOLUTION INTRAVENOUS at 09:05

## 2022-05-18 RX ADMIN — PROPOFOL 30 MG: 10 INJECTION, EMULSION INTRAVENOUS at 09:05

## 2022-05-18 NOTE — TRANSFER OF CARE
Anesthesia Transfer of Care Note    Patient: Lydia Thomas    Procedure(s) Performed: Procedure(s) (LRB):  COLONOSCOPY (N/A)    Patient location: PACU    Anesthesia Type: general    Transport from OR: Transported from OR on room air with adequate spontaneous ventilation    Post pain: adequate analgesia    Post assessment: no apparent anesthetic complications and tolerated procedure well    Post vital signs: stable    Level of consciousness: awake, alert and oriented    Nausea/Vomiting: no nausea/vomiting    Complications: none    Transfer of care protocol was followedComments: Pt awake & talking, fluids infusing      Last vitals:   Visit Vitals  BP (!) 88/53   Pulse 81   Temp 36.6 °C (97.9 °F)   Resp 16   LMP 05/18/2022   SpO2 99%   Breastfeeding No      yashira,lucian adrian

## 2022-05-18 NOTE — PROGRESS NOTES
Pt stated she had a previous fall in April. She denies any pain at the moment but states she has pain on her left side from her hip to her toe; her appetite has decreased and she sometimes have visual problems.

## 2022-05-18 NOTE — H&P
"    Short Stay Endoscopy History and Physical    PCP - Katy Vergara MD    Procedure - Colonoscopy  ASA - per anesthesia  Mallampati - per anesthesia  History of Anesthesia problems - no  Family history Anesthesia problems - no   Plan of anesthesia - General    HPI:  This is a 42 y.o. female here for evaluation of : family history of colon polyps, distant relatives with colon cancer      ROS:  Constitutional: No fevers, chills, No weight loss  CV: No chest pain  Pulm: No cough, No shortness of breath  GI: see HPI  Derm: No rash    Medical History:  has a past medical history of Anemia, Anemia, Asthma, Migraine headache, and Ovarian cyst.    Surgical History:  has a past surgical history that includes Appendectomy and Breast biopsy (Right).    Family History: family history includes Arthritis in her father; Asthma in her brother and mother; Breast cancer in her maternal grandmother and mother; COPD in her father; Cancer in her maternal grandfather; Colon cancer in her mother; Diabetes in her maternal uncle; Hearing loss in her paternal aunt, paternal grandfather, paternal grandmother, and paternal uncle; Hypertension in her maternal uncle; Miscarriages / Stillbirths in her mother; Prostate cancer in her maternal uncle.. Otherwise no colon cancer, inflammatory bowel disease, or GI malignancies.    Social History:  reports that she has never smoked. She has never used smokeless tobacco. She reports current alcohol use. She reports that she does not use drugs.    Review of patient's allergies indicates:   Allergen Reactions    Iodine and iodide containing products Anaphylaxis    Shellfish derived Anaphylaxis    Benadryl [diphenhydramine hcl] Itching     "with fast injection"    Fish containing products        Medications:   Medications Prior to Admission   Medication Sig Dispense Refill Last Dose    acetaminophen (TYLENOL) 500 MG tablet Take 1 tablet (500 mg total) by mouth every 6 (six) hours as needed for " Pain. 30 tablet 0 Past Week at Unknown time    glycerin adult suppository Place 1 suppository rectally as needed for Constipation. 12 suppository 0 2022 at Unknown time    onabotulinumtoxinA (BOTOX INJ) Inject as directed.   Past Week at Unknown time    sumatriptan (IMITREX STATDOSE) 6 mg/0.5 mL kit To use max twice within 24 hours, 2 hours apart. 6 kit 0 Past Week at Unknown time    diclofenac sodium (VOLTAREN) 1 % Gel Apply 2 g topically 4 (four) times daily. for 10 days 100 g 0     naproxen (NAPROSYN) 500 MG tablet Take 1 tablet (500 mg total) by mouth 2 (two) times daily as needed. 30 tablet 0 More than a month at Unknown time    polyethylene glycol (GLYCOLAX) 17 gram/dose powder Take 17 g by mouth once daily. 595 g 0     [] polyethylene glycol (GOLYTELY,NULYTELY) 236-22.74-6.74 -5.86 gram suspension Take 4,000 mLs (4 L total) by mouth once. for 1 dose 4000 mL 0          Physical Exam:    Vital Signs:   Vitals:    22 0847   BP: 121/73   Pulse: 80   Resp: 16       General Appearance: Well appearing in no acute distress  Eyes:    No scleral icterus  ENT: Neck supple, Lips, mucosa, and tongue normal; teeth and gums normal  Abdomen: Soft, non tender, non distended with positive bowel sounds. No hepatosplenomegaly, ascites, or mass.  Extremities: 2+ pulses, no clubbing, cyanosis or edema  Skin: No rash      Labs:  Lab Results   Component Value Date    WBC 7.05 2022    HGB 13.0 2022    HCT 38.8 2022     2022    CHOL 198 2021    TRIG 72 2021    HDL 52 2021    ALT 16 2022    AST 18 2022     2022    K 3.6 2022     2022    CREATININE 0.7 2022    BUN 7 2022    CO2 30 (H) 2022    TSH 0.962 2021    INR 1.0 09/15/2016    HGBA1C 4.9 2021       I have explained the risks and benefits of endoscopy procedures to the patient including but not limited to bleeding, perforation, infection,  and death.  The patient was asked if they understand and allowed to ask any further questions to their satisfaction.    Sabino Mcintosh MD

## 2022-05-18 NOTE — ANESTHESIA POSTPROCEDURE EVALUATION
Anesthesia Post Evaluation    Patient: Lydia Thomas    Procedure(s) Performed: Procedure(s) (LRB):  COLONOSCOPY (N/A)    Final Anesthesia Type: general      Patient location during evaluation: GI PACU  Patient participation: Yes- Able to Participate  Level of consciousness: awake and alert and oriented  Post-procedure vital signs: reviewed and stable  Pain management: adequate  Airway patency: patent    PONV status at discharge: No PONV  Anesthetic complications: no      Cardiovascular status: blood pressure returned to baseline and hemodynamically stable  Respiratory status: unassisted, spontaneous ventilation and room air  Hydration status: euvolemic  Follow-up not needed.          Vitals Value Taken Time   BP 94/33 05/18/22 0930   Temp 36.6 °C (97.9 °F) 05/18/22 0925   Pulse 71 05/18/22 0930   Resp 16 05/18/22 0930   SpO2 99 % 05/18/22 0930         No case tracking events are documented in the log.      Pain/Matt Score: Matt Score: 10 (5/18/2022  9:30 AM)         For information on Fall & Injury Prevention, visit: [email or web address] OR  [email or web address] OR  https://www.cdc.gov/steadi/patient.html

## 2022-05-18 NOTE — ANESTHESIA PREPROCEDURE EVALUATION
"                                                                                                             05/18/2022    Pre-operative evaluation for Procedure(s) (LRB):  COLONOSCOPY (N/A)    Lydia Thomas is a 42 y.o. female     Patient Active Problem List   Diagnosis    Acetaminophen overdose    Suicide attempt by acetaminophen overdose    Migraine    Anemia of chronic disease    Adjustment disorder with mixed anxiety and depressed mood    Tylenol overdose    Migraine without status migrainosus, not intractable    Occipital headache    Vasovagal syncope    Myoclonic jerking    Traumatic brain injury without loss of consciousness    Dizziness and giddiness    Depression    Warts of foot    Screening for colon cancer    Family history of colon cancer       Review of patient's allergies indicates:   Allergen Reactions    Iodine and iodide containing products Anaphylaxis    Shellfish derived Anaphylaxis    Benadryl [diphenhydramine hcl] Itching     "with fast injection"    Fish containing products        No current facility-administered medications on file prior to encounter.     Current Outpatient Medications on File Prior to Encounter   Medication Sig Dispense Refill    acetaminophen (TYLENOL) 500 MG tablet Take 1 tablet (500 mg total) by mouth every 6 (six) hours as needed for Pain. 30 tablet 0    glycerin adult suppository Place 1 suppository rectally as needed for Constipation. 12 suppository 0    onabotulinumtoxinA (BOTOX INJ) Inject as directed.      polyethylene glycol (GLYCOLAX) 17 gram/dose powder Take 17 g by mouth once daily. 595 g 0    sumatriptan (IMITREX STATDOSE) 6 mg/0.5 mL kit To use max twice within 24 hours, 2 hours apart. 6 kit 0       Past Surgical History:   Procedure Laterality Date    APPENDECTOMY      4/2019    BREAST BIOPSY Right     Excisional bx, benign       Pre-op Assessment    I have reviewed the Patient Summary Reports.     I have reviewed the " Nursing Notes. I have reviewed the NPO Status.   I have reviewed the Medications.     Review of Systems  Anesthesia Hx:  Hx of Anesthetic complications Urinary retention with GETA History of prior surgery of interest to airway management or planning: Denies Family Hx of Anesthesia complications.   Denies Personal Hx of Anesthesia complications.   Social:  Non-Smoker    Hematology/Oncology:     Oncology Normal    -- Anemia:   EENT/Dental:EENT/Dental Normal   Cardiovascular:   Exercise tolerance: good    Pulmonary:   Asthma    Renal/:  Renal/ Normal     Hepatic/GI:   Bowel Prep.    Neurological:   Headaches    Endocrine:  Endocrine Normal    Psych:   Psychiatric History depression          Physical Exam  General: Well nourished, Cooperative and Alert    Airway:  Mallampati: II   Mouth Opening: Normal  TM Distance: Normal  Tongue: Normal  Neck ROM: Normal ROM    Dental:  Intact    Chest/Lungs:  Normal Respiratory Rate    Heart:  Rate: Normal  Rhythm: Regular Rhythm  Sounds: Normal        Anesthesia Plan  Type of Anesthesia, risks & benefits discussed:    Anesthesia Type: Gen Natural Airway  Intra-op Monitoring Plan: Standard ASA Monitors  Post Op Pain Control Plan: multimodal analgesia  Induction:  IV  Airway Plan: Direct, Awake  Informed Consent: Informed consent signed with the Patient and all parties understand the risks and agree with anesthesia plan.  All questions answered.   ASA Score: 2    Ready For Surgery From Anesthesia Perspective.     .

## 2022-05-18 NOTE — PROVATION PATIENT INSTRUCTIONS
Discharge Summary/Instructions after an Endoscopic Procedure  Patient Name: Lydia Thomas  Patient MRN: [id number]  Patient YOB: 1980  Wednesday, May 18, 2022  Sabino Mcintosh MD  Dear patient,  As a result of recent federal legislation (The Federal Cures Act), you may   receive lab or pathology results from your procedure in your [facility]   account before your physician is able to contact you. Your physician or   their representative will relay the results to you with their   recommendations at their soonest availability.  Thank you,  RESTRICTIONS:  During your procedure today, you received medications for sedation.  These   medications may affect your judgment, balance and coordination.  Therefore,   for 24 hours, you have the following restrictions:   - DO NOT drive a car, operate machinery, make legal/financial decisions,   sign important papers or drink alcohol.    ACTIVITY:  Today: no heavy lifting, straining or running due to procedural   sedation/anesthesia.  The following day: return to full activity including work.  DIET:  Eat and drink normally unless instructed otherwise.     TREATMENT FOR COMMON SIDE EFFECTS:  - Mild abdominal pain, nausea, belching, bloating or excessive gas:  rest,   eat lightly and use a heating pad.  - Sore Throat: treat with throat lozenges and/or gargle with warm salt   water.  - Because air was used during the procedure, expelling large amounts of air   from your rectum or belching is normal.  - If a bowel prep was taken, you may not have a bowel movement for 1-3 days.    This is normal.  SYMPTOMS TO WATCH FOR AND REPORT TO YOUR PHYSICIAN:  1. Abdominal pain or bloating, other than gas cramps.  2. Chest pain.  3. Back pain.  4. Signs of infection such as: chills or fever occurring within 24 hours   after the procedure.  5. Rectal bleeding, which would show as bright red, maroon, or black stools.   (A tablespoon of blood from the rectum is not serious, especially if    hemorrhoids are present.)  6. Vomiting.  7. Weakness or dizziness.  GO DIRECTLY TO THE NEAREST EMERGENCY ROOM IF YOU HAVE ANY OF THE FOLLOWING:      Difficulty breathing              Chills and/or fever over 101 F   Persistent vomiting and/or vomiting blood   Severe abdominal pain   Severe chest pain   Black, tarry stools   Bleeding- more than one tablespoon   Any other symptom or condition that you feel may need urgent attention  Your doctor recommends these additional instructions:  If any biopsies were taken, your doctors clinic will contact you in 1 to 2   weeks with any results.  - Patient has a contact number available for emergencies.  The signs and   symptoms of potential delayed complications were discussed with the   patient.  Return to normal activities tomorrow.  Written discharge   instructions were provided to the patient.   - Discharge patient to home.   - Repeat colonoscopy in 5 years for screening purposes.   - The findings and recommendations were discussed with the designated   responsible adult.  For questions, problems or results please call your physician - Sabino Mcintosh MD at Work:  [phone].  [facility] can be reached at [phone]     IF A COMPLICATION OR EMERGENCY SITUATION ARISES AND YOU ARE UNABLE TO REACH   YOUR PHYSICIAN - GO DIRECTLY TO THE EMERGENCY ROOM.  Sabino Mcintosh MD  5/18/2022 9:25:23 AM  This report has been verified and signed electronically.  Dear patient,  As a result of recent federal legislation (The Federal Cures Act), you may   receive lab or pathology results from your procedure in your [facility]   account before your physician is able to contact you. Your physician or   their representative will relay the results to you with their   recommendations at their soonest availability.  Thank you,  PROVATION

## 2022-05-19 ENCOUNTER — TELEPHONE (OUTPATIENT)
Dept: OPHTHALMOLOGY | Facility: CLINIC | Age: 42
End: 2022-05-19
Payer: OTHER GOVERNMENT

## 2022-05-19 NOTE — TELEPHONE ENCOUNTER
Spoke with pt to schedule appt. Pt is requesting to schedule unde worker's comp. I advised that I will reply this to my supervisor, due to not sure if  accepts worker's comp.

## 2022-05-30 ENCOUNTER — PATIENT MESSAGE (OUTPATIENT)
Dept: ADMINISTRATIVE | Facility: HOSPITAL | Age: 42
End: 2022-05-30
Payer: OTHER GOVERNMENT

## 2022-06-13 ENCOUNTER — TELEPHONE (OUTPATIENT)
Dept: ORTHOPEDICS | Facility: CLINIC | Age: 42
End: 2022-06-13
Payer: OTHER GOVERNMENT

## 2022-06-14 ENCOUNTER — TELEPHONE (OUTPATIENT)
Dept: ORTHOPEDICS | Facility: CLINIC | Age: 42
End: 2022-06-14
Payer: OTHER GOVERNMENT

## 2022-06-17 ENCOUNTER — TELEPHONE (OUTPATIENT)
Dept: ORTHOPEDICS | Facility: CLINIC | Age: 42
End: 2022-06-17
Payer: OTHER GOVERNMENT

## 2022-06-17 NOTE — TELEPHONE ENCOUNTER
Called and rescheduled pt's appt with Dr. Gutierres for her back and neck pain on 06/23/2022 @ 9:30 am. Pt was satisfied with new appt date/time.

## 2022-07-01 DIAGNOSIS — M50.30 DDD (DEGENERATIVE DISC DISEASE), CERVICAL: Primary | ICD-10-CM

## 2022-07-07 ENCOUNTER — OFFICE VISIT (OUTPATIENT)
Dept: ORTHOPEDICS | Facility: CLINIC | Age: 42
End: 2022-07-07
Payer: OTHER GOVERNMENT

## 2022-07-07 ENCOUNTER — HOSPITAL ENCOUNTER (OUTPATIENT)
Dept: RADIOLOGY | Facility: HOSPITAL | Age: 42
Discharge: HOME | End: 2022-07-07
Attending: ORTHOPAEDIC SURGERY
Payer: OTHER GOVERNMENT

## 2022-07-07 VITALS — BODY MASS INDEX: 23.95 KG/M2 | HEIGHT: 64 IN | WEIGHT: 140.31 LBS

## 2022-07-07 DIAGNOSIS — M54.12 CERVICAL RADICULOPATHY: ICD-10-CM

## 2022-07-07 DIAGNOSIS — M50.30 DDD (DEGENERATIVE DISC DISEASE), CERVICAL: ICD-10-CM

## 2022-07-07 DIAGNOSIS — M51.36 DDD (DEGENERATIVE DISC DISEASE), LUMBAR: ICD-10-CM

## 2022-07-07 DIAGNOSIS — M54.16 LUMBAR RADICULOPATHY: Primary | ICD-10-CM

## 2022-07-07 PROCEDURE — 72110 XR LUMBAR SPINE AP AND LAT WITH FLEX/EXT: ICD-10-PCS | Mod: 26,,, | Performed by: RADIOLOGY

## 2022-07-07 PROCEDURE — 99999 PR PBB SHADOW E&M-EST. PATIENT-LVL III: ICD-10-PCS | Mod: PBBFAC,,, | Performed by: ORTHOPAEDIC SURGERY

## 2022-07-07 PROCEDURE — 99213 OFFICE O/P EST LOW 20 MIN: CPT | Mod: PBBFAC | Performed by: ORTHOPAEDIC SURGERY

## 2022-07-07 PROCEDURE — 99214 PR OFFICE/OUTPT VISIT, EST, LEVL IV, 30-39 MIN: ICD-10-PCS | Mod: S$PBB,,, | Performed by: ORTHOPAEDIC SURGERY

## 2022-07-07 PROCEDURE — 72050 XR CERVICAL SPINE AP LAT WITH FLEX EXTEN: ICD-10-PCS | Mod: 26,,, | Performed by: RADIOLOGY

## 2022-07-07 PROCEDURE — 72050 X-RAY EXAM NECK SPINE 4/5VWS: CPT | Mod: 26,,, | Performed by: RADIOLOGY

## 2022-07-07 PROCEDURE — 99214 OFFICE O/P EST MOD 30 MIN: CPT | Mod: S$PBB,,, | Performed by: ORTHOPAEDIC SURGERY

## 2022-07-07 PROCEDURE — 72050 X-RAY EXAM NECK SPINE 4/5VWS: CPT | Mod: TC

## 2022-07-07 PROCEDURE — 72110 X-RAY EXAM L-2 SPINE 4/>VWS: CPT | Mod: TC

## 2022-07-07 PROCEDURE — 72110 X-RAY EXAM L-2 SPINE 4/>VWS: CPT | Mod: 26,,, | Performed by: RADIOLOGY

## 2022-07-07 PROCEDURE — 99999 PR PBB SHADOW E&M-EST. PATIENT-LVL III: CPT | Mod: PBBFAC,,, | Performed by: ORTHOPAEDIC SURGERY

## 2022-07-07 NOTE — PROGRESS NOTES
DATE: 7/7/2022  PATIENT: Lydia Thomas    Attending Physician: Conrad Gutierres M.D.    CHIEF COMPLAINT: neck and LUE weakness/numbness; LBP and LLE weakness/pain    HISTORY:  Lydia Thomas is a 42 y.o. female here for initial evaluation of neck and left arm n/t (Neck - 10). The pain has been present since 4/12/22 when she sustained a slip and fall at work (due to puddle of water) and hit her head. She passed out after the fall and was [place] [facility] because she could not move her LLE. She was diagnosed with a concussion and was discharged. She has been seeing a neurologist for concussion. The patient describes the pain as dull but it does not go down her arms. The pain is worse with walking and improved by sitting. There is associated numbness and tingling in her left fingers. There is subjective weakness in the entire LUE. Prior treatments have included tylenol and naproxen, but no PT, SURJIT or surgery.     She also has LBP that radiates posteriorly down left buttock/thigh to the toes. She has n/t in the LLE and weakness.    The Patient denies myelopathic symptoms such as handwriting changes or difficulty with buttons/coins/keys. Denies perineal paresthesias, bowel/bladder dysfunction.    The patient does not smoke, have DM or endorse IVDU. The patient is not on any blood thinners and does not take chronic narcotics. She works at [employer].    PAST MEDICAL/SURGICAL HISTORY:  Past Medical History:   Diagnosis Date    Anemia     Anemia     Asthma     exercise asthma    Migraine headache     Ovarian cyst      Past Surgical History:   Procedure Laterality Date    APPENDECTOMY      4/2019    BREAST BIOPSY Right     Excisional bx, benign    COLONOSCOPY N/A 5/18/2022    Procedure: COLONOSCOPY;  Surgeon: Sabino Mcintosh MD;  Location: [facility];  Service: Endoscopy;  Laterality: N/A;  High risk for colon cancer (family)      fully vaccinated; instructions to portal-GT       Current Medications:    Current Outpatient Medications:     acetaminophen (TYLENOL) 500 MG tablet, Take 1 tablet (500 mg total) by mouth every 6 (six) hours as needed for Pain., Disp: 30 tablet, Rfl: 0    glycerin adult suppository, Place 1 suppository rectally as needed for Constipation., Disp: 12 suppository, Rfl: 0    naproxen (NAPROSYN) 500 MG tablet, Take 1 tablet (500 mg total) by mouth 2 (two) times daily as needed., Disp: 30 tablet, Rfl: 0    onabotulinumtoxinA (BOTOX INJ), Inject as directed., Disp: , Rfl:     polyethylene glycol (GLYCOLAX) 17 gram/dose powder, Take 17 g by mouth once daily., Disp: 595 g, Rfl: 0    sumatriptan (IMITREX STATDOSE) 6 mg/0.5 mL kit, To use max twice within 24 hours, 2 hours apart., Disp: 6 kit, Rfl: 0    diclofenac sodium (VOLTAREN) 1 % Gel, Apply 2 g topically 4 (four) times daily. for 10 days, Disp: 100 g, Rfl: 0    Social History:   Social History     Socioeconomic History    Marital status:    Tobacco Use    Smoking status: Never Smoker    Smokeless tobacco: Never Used   Substance and Sexual Activity    Alcohol use: Yes     Comment: occass    Drug use: No    Sexual activity: Yes     Partners: Male     Birth control/protection: None   Social History Narrative    , she is from [place] originally, but comes from [place], she is [employer], her  is  ([employer]), no children, 3 cats    Works at the [employer] ([employer]).       REVIEW OF SYSTEMS:  Constitution: Negative. Negative for chills, fever and night sweats.   Cardiovascular: Negative for chest pain and syncope.   Respiratory: Negative for cough and shortness of breath.   Gastrointestinal: See HPI. Negative for nausea/vomiting. Negative for abdominal pain.  Genitourinary: See HPI. Negative for discoloration or dysuria.  Skin: Negative for dry skin, itching and rash.   Hematologic/Lymphatic: negative for bleeding/clotting disorders.   Musculoskeletal: Negative for falls and muscle weakness.   Neurological: See HPI.  "no history of seizures. no history of cranial surgery or shunts.  Endocrine: Negative for polydipsia, polyphagia and polyuria.   Allergic/Immunologic: Negative for hives and persistent infections.  Psychiatric/Behavioral: Negative for depression and insomnia.         EXAM:  Ht 5' 3.5" (1.613 m)   Wt 63.6 kg (140 lb 5.2 oz)   BMI 24.47 kg/m²     General: The patient is a 42 y.o. female in no apparent distress, the patient is orientatied to person, place and time.  Psych: Normal mood and affect  HEENT: Vision grossly intact, hearing intact to the spoken word.  Lungs: Respirations unlabored.  Gait: Normal station and gait, no difficulty with toe or heel walk.   Skin: Cervical skin negative for rashes, lesions, hairy patches and surgical scars.  Range of motion: Cervical range of motion is acceptable. There is posterior cervical tenderness to palpation. TTP in lumbar spine.  Spinal Balance: Global saggital and coronal spinal balance acceptable, no significant for scoliosis and kyphosis.  Musculoskeletal: No pain with the range of motion of the bilateral shoulders and elbows. Normal bulk and contour of the bilateral hands.  Vascular: Bilateral hands warm and well perfused, Radial pulses 2+ bilaterally.  Neurological: Normal strength and tone in all major motor groups in the bilateral upper and lower extremities except for diffuse weakness 4/5 in all muscle groups in LUE and LLE. Normal sensation to light touch in the C5-T1 and L2-S1 dermatomes bilaterally.  Deep tendon reflexes symmetric 3+ in the bilateral upper and lower extremities.  + L Prajapati's    IMAGING:   Today I independently reviewed the following images and my interpretations are as follows:    AP, Lat and Flex/Ex  upright C-spine films demonstrate no fractures or listhesis.    Lumbar XRs showed no fractures or listhesis.    Through [facility] Everywhere, CT of entire spine (cervical, thoracic and lumbar) reports stated no fractures. CT head report stated no " acute head bleed.     Body mass index is 24.47 kg/m².  Hemoglobin A1C   Date Value Ref Range Status   04/14/2021 4.9 4.0 - 5.6 % Final     Comment:     ADA Screening Guidelines:  5.7-6.4%  Consistent with prediabetes  >or=6.5%  Consistent with diabetes    High levels of fetal hemoglobin interfere with the HbA1C  assay. Heterozygous hemoglobin variants (HbS, HgC, etc)do  not significantly interfere with this assay.   However, presence of multiple variants may affect accuracy.         ASSESSMENT/PLAN:  Lumbar radiculopathy  -     MRI Spine Cervical-Thoracic-Lumbar Without Contrast (XPD); Future; Expected date: 07/07/2022    Cervical radiculopathy  -     MRI Spine Cervical-Thoracic-Lumbar Without Contrast (XPD); Future; Expected date: 07/07/2022    DDD (degenerative disc disease), lumbar    DDD (degenerative disc disease), cervical      Follow up in about 2 weeks (around 7/21/2022).    Patient has LUE and LLE weakness and pain following a concussion on 4/12/22. She still has significant symptoms, so we will order entire spine MRI to evaluate stenosis. I discussed the natural history of their diagnoses as well as surgical and nonsurgical treatment options. I educated the patient on the importance of core/back strengthening, correct posture, bending/lifting ergonomics, and low-impact aerobic exercises (walking, elliptical, and aquatherapy). Continue medications. Patient will follow up in 2 weeks for MRI review.    Conrad Gutierres MD  Orthopaedic Spine Surgeon  Department of Orthopaedic Surgery  [phone]

## 2022-07-18 ENCOUNTER — TELEPHONE (OUTPATIENT)
Dept: RADIOLOGY | Facility: CLINIC | Age: 42
End: 2022-07-18

## 2022-07-18 NOTE — TELEPHONE ENCOUNTER
Spoke with w/c to advise that the patient has cancelled 2 appointments with Dr. Croft she is currently treating with another physician, after speaking with w/c we have decided that we will cancel her appt 7.20.22 with Dr. Croft for now

## 2022-07-22 ENCOUNTER — TELEPHONE (OUTPATIENT)
Dept: FAMILY MEDICINE | Facility: CLINIC | Age: 42
End: 2022-07-22
Payer: OTHER GOVERNMENT

## 2022-07-22 ENCOUNTER — TELEPHONE (OUTPATIENT)
Dept: ORTHOPEDICS | Facility: CLINIC | Age: 42
End: 2022-07-22
Payer: OTHER GOVERNMENT

## 2022-07-22 NOTE — TELEPHONE ENCOUNTER
Emily Mo sent to Conrad Gutierres MD; SHAMA Martines Staff; SHAMA DELA CRUZ Staff; Katy Vergara MD  Hi,     I am trying to obtain an authorization for upcoming MRI SPINE CERVICAL-THORACIC-LUMBAR but as per  pt needs a referral from Dr Vergara to be placed prior to approving this MRI.     Please see attached note from [facility] your assistance would be greatly appreciated.   MRI SPINE CERVICAL-THORACIC-LUMBAR   Per :     PATIENT IS  PRIME AND A REFERRAL FROM HIS/HER ASSIGNED PRIMARY CARE   MANAGER (GUERITA) TO THE ORDERING ([facility]) IS REQUIRED. HAVE PATIENT OBTAIN A   REFERRAL FROM THEIR PCM. IF PATIENT NEEDS TO UPDATE HIS/HER PCM INFORMATION,   HAVE THEM CALL [facility] @ [phone] AND SELECT ENROLLMENT OPTION.       Thanks,   Emily DELA CRUZ   [phone]       I pended referral

## 2022-07-22 NOTE — TELEPHONE ENCOUNTER
LVM informing patient she would have to obtain a referral from  in order for her MRI to be approved. Phone number to Carlo given if she has any questions.

## 2022-07-24 ENCOUNTER — PATIENT MESSAGE (OUTPATIENT)
Dept: FAMILY MEDICINE | Facility: CLINIC | Age: 42
End: 2022-07-24
Payer: OTHER GOVERNMENT

## 2022-07-25 ENCOUNTER — PATIENT MESSAGE (OUTPATIENT)
Dept: ADMINISTRATIVE | Facility: HOSPITAL | Age: 42
End: 2022-07-25
Payer: OTHER GOVERNMENT

## 2022-07-26 ENCOUNTER — TELEPHONE (OUTPATIENT)
Dept: FAMILY MEDICINE | Facility: CLINIC | Age: 42
End: 2022-07-26
Payer: OTHER GOVERNMENT

## 2022-07-26 NOTE — TELEPHONE ENCOUNTER
----- Message from Fatmata Rogel sent at 7/26/2022 10:04 AM CDT -----  Type: Patient Call Back    Who called: self     What is the request in detail: Patient states she was told by [facility] that she needs a referral from her PCP in order for her to have her MRI done today. Would like to speak with someone.     Can the clinic reply by [facility]? No     Would the patient rather a call back or a response via [facility]? Call back     Best call back number: [phone]

## 2022-07-26 NOTE — TELEPHONE ENCOUNTER
No answer; LM informing pt previous message was sent to Dr Vergara informing her of referral needed but no order placed yet

## 2022-08-02 ENCOUNTER — TELEPHONE (OUTPATIENT)
Dept: FAMILY MEDICINE | Facility: CLINIC | Age: 42
End: 2022-08-02
Payer: OTHER GOVERNMENT

## 2022-08-02 DIAGNOSIS — M54.12 CERVICAL RADICULOPATHY: ICD-10-CM

## 2022-08-02 DIAGNOSIS — M62.81 MUSCLE WEAKNESS: Primary | ICD-10-CM

## 2022-08-02 DIAGNOSIS — M54.16 LUMBAR RADICULOPATHY: ICD-10-CM

## 2022-08-09 ENCOUNTER — TELEPHONE (OUTPATIENT)
Dept: FAMILY MEDICINE | Facility: CLINIC | Age: 42
End: 2022-08-09
Payer: OTHER GOVERNMENT

## 2022-08-09 NOTE — TELEPHONE ENCOUNTER
Pt informed Dr Vergara doesn't have any appointments available this week, she was ok with seeing someone else; scheduled with Feli

## 2022-08-09 NOTE — TELEPHONE ENCOUNTER
----- Message from Nahed Kenney sent at 8/9/2022 10:50 AM CDT -----  Regarding: appt  Type:  Sooner Apoointment Request    Caller is requesting a sooner appointment.  Caller declined first available appointment listed below.  Caller will not accept being placed on the waitlist and is requesting a message be sent to doctor.  Name of Caller:patient     When is the first available appointment?November    Symptoms: surgery clearance    Would the patient rather a call back or a response via [facility]? Call back     Best Call Back Number:[phone]  Additional Information: n/a  '

## 2022-08-11 ENCOUNTER — HOSPITAL ENCOUNTER (OUTPATIENT)
Dept: RADIOLOGY | Facility: OTHER | Age: 42
Discharge: HOME | End: 2022-08-11
Attending: ORTHOPAEDIC SURGERY
Payer: OTHER GOVERNMENT

## 2022-08-11 DIAGNOSIS — M54.12 CERVICAL RADICULOPATHY: ICD-10-CM

## 2022-08-11 DIAGNOSIS — M62.81 MUSCLE WEAKNESS: ICD-10-CM

## 2022-08-11 DIAGNOSIS — M54.16 LUMBAR RADICULOPATHY: ICD-10-CM

## 2022-08-11 PROCEDURE — 72141 MRI SPINE CERVICAL-THORACIC-LUMBAR WITHOUT CONTRAST (XPD): ICD-10-PCS | Mod: 26,,, | Performed by: RADIOLOGY

## 2022-08-11 PROCEDURE — 72148 MRI LUMBAR SPINE W/O DYE: CPT | Mod: TC

## 2022-08-11 PROCEDURE — 72141 MRI NECK SPINE W/O DYE: CPT | Mod: TC

## 2022-08-11 PROCEDURE — 72141 MRI NECK SPINE W/O DYE: CPT | Mod: 26,,, | Performed by: RADIOLOGY

## 2022-08-11 PROCEDURE — 72146 MRI SPINE CERVICAL-THORACIC-LUMBAR WITHOUT CONTRAST (XPD): ICD-10-PCS | Mod: 26,,, | Performed by: RADIOLOGY

## 2022-08-11 PROCEDURE — 72148 MRI LUMBAR SPINE W/O DYE: CPT | Mod: 26,,, | Performed by: RADIOLOGY

## 2022-08-11 PROCEDURE — 72146 MRI CHEST SPINE W/O DYE: CPT | Mod: 26,,, | Performed by: RADIOLOGY

## 2022-08-11 PROCEDURE — 72148 MRI SPINE CERVICAL-THORACIC-LUMBAR WITHOUT CONTRAST (XPD): ICD-10-PCS | Mod: 26,,, | Performed by: RADIOLOGY

## 2022-08-12 ENCOUNTER — OFFICE VISIT (OUTPATIENT)
Dept: FAMILY MEDICINE | Facility: CLINIC | Age: 42
End: 2022-08-12
Payer: OTHER GOVERNMENT

## 2022-08-12 ENCOUNTER — LAB VISIT (OUTPATIENT)
Dept: LAB | Facility: HOSPITAL | Age: 42
End: 2022-08-12
Attending: PHYSICIAN ASSISTANT
Payer: OTHER GOVERNMENT

## 2022-08-12 VITALS
HEIGHT: 64 IN | TEMPERATURE: 99 F | OXYGEN SATURATION: 99 % | DIASTOLIC BLOOD PRESSURE: 68 MMHG | HEART RATE: 76 BPM | RESPIRATION RATE: 20 BRPM | SYSTOLIC BLOOD PRESSURE: 102 MMHG | WEIGHT: 127.88 LBS | BODY MASS INDEX: 21.83 KG/M2

## 2022-08-12 DIAGNOSIS — Z01.818 PREOP EXAMINATION: ICD-10-CM

## 2022-08-12 DIAGNOSIS — Z01.818 PREOP EXAMINATION: Primary | ICD-10-CM

## 2022-08-12 LAB
ALBUMIN SERPL BCP-MCNC: 4.1 G/DL (ref 3.5–5.2)
ALP SERPL-CCNC: 37 U/L (ref 55–135)
ALT SERPL W/O P-5'-P-CCNC: 10 U/L (ref 10–44)
ANION GAP SERPL CALC-SCNC: 10 MMOL/L (ref 8–16)
AST SERPL-CCNC: 12 U/L (ref 10–40)
B-HCG UR QL: NEGATIVE
BASOPHILS # BLD AUTO: 0.09 K/UL (ref 0–0.2)
BASOPHILS NFR BLD: 1.7 % (ref 0–1.9)
BILIRUB SERPL-MCNC: 1.2 MG/DL (ref 0.1–1)
BUN SERPL-MCNC: 6 MG/DL (ref 6–20)
CALCIUM SERPL-MCNC: 9.3 MG/DL (ref 8.7–10.5)
CHLORIDE SERPL-SCNC: 105 MMOL/L (ref 95–110)
CO2 SERPL-SCNC: 24 MMOL/L (ref 23–29)
CREAT SERPL-MCNC: 0.8 MG/DL (ref 0.5–1.4)
CTP QC/QA: YES
DIFFERENTIAL METHOD: ABNORMAL
EOSINOPHIL # BLD AUTO: 0.2 K/UL (ref 0–0.5)
EOSINOPHIL NFR BLD: 3.2 % (ref 0–8)
ERYTHROCYTE [DISTWIDTH] IN BLOOD BY AUTOMATED COUNT: 12.2 % (ref 11.5–14.5)
EST. GFR  (NO RACE VARIABLE): >60 ML/MIN/1.73 M^2
GLUCOSE SERPL-MCNC: 85 MG/DL (ref 70–110)
HCT VFR BLD AUTO: 41.1 % (ref 37–48.5)
HGB BLD-MCNC: 13.9 G/DL (ref 12–16)
IMM GRANULOCYTES # BLD AUTO: 0.01 K/UL (ref 0–0.04)
IMM GRANULOCYTES NFR BLD AUTO: 0.2 % (ref 0–0.5)
LYMPHOCYTES # BLD AUTO: 1.7 K/UL (ref 1–4.8)
LYMPHOCYTES NFR BLD: 32.5 % (ref 18–48)
MCH RBC QN AUTO: 32.6 PG (ref 27–31)
MCHC RBC AUTO-ENTMCNC: 33.8 G/DL (ref 32–36)
MCV RBC AUTO: 97 FL (ref 82–98)
MONOCYTES # BLD AUTO: 0.5 K/UL (ref 0.3–1)
MONOCYTES NFR BLD: 9.2 % (ref 4–15)
NEUTROPHILS # BLD AUTO: 2.8 K/UL (ref 1.8–7.7)
NEUTROPHILS NFR BLD: 53.2 % (ref 38–73)
NRBC BLD-RTO: 0 /100 WBC
PLATELET # BLD AUTO: 248 K/UL (ref 150–450)
PMV BLD AUTO: 11.6 FL (ref 9.2–12.9)
POTASSIUM SERPL-SCNC: 4.1 MMOL/L (ref 3.5–5.1)
PROT SERPL-MCNC: 6.8 G/DL (ref 6–8.4)
RBC # BLD AUTO: 4.26 M/UL (ref 4–5.4)
SODIUM SERPL-SCNC: 139 MMOL/L (ref 136–145)
WBC # BLD AUTO: 5.32 K/UL (ref 3.9–12.7)

## 2022-08-12 PROCEDURE — 93010 EKG 12-LEAD: ICD-10-PCS | Mod: S$PBB,,, | Performed by: INTERNAL MEDICINE

## 2022-08-12 PROCEDURE — 93005 ELECTROCARDIOGRAM TRACING: CPT | Mod: PBBFAC,PO | Performed by: INTERNAL MEDICINE

## 2022-08-12 PROCEDURE — 36415 COLL VENOUS BLD VENIPUNCTURE: CPT | Mod: PO | Performed by: PHYSICIAN ASSISTANT

## 2022-08-12 PROCEDURE — 99999 PR PBB SHADOW E&M-EST. PATIENT-LVL III: CPT | Mod: PBBFAC,,, | Performed by: PHYSICIAN ASSISTANT

## 2022-08-12 PROCEDURE — 99213 OFFICE O/P EST LOW 20 MIN: CPT | Mod: PBBFAC,PO | Performed by: PHYSICIAN ASSISTANT

## 2022-08-12 PROCEDURE — 99213 PR OFFICE/OUTPT VISIT, EST, LEVL III, 20-29 MIN: ICD-10-PCS | Mod: S$PBB,,, | Performed by: PHYSICIAN ASSISTANT

## 2022-08-12 PROCEDURE — 99213 OFFICE O/P EST LOW 20 MIN: CPT | Mod: S$PBB,,, | Performed by: PHYSICIAN ASSISTANT

## 2022-08-12 PROCEDURE — 99999 PR PBB SHADOW E&M-EST. PATIENT-LVL III: ICD-10-PCS | Mod: PBBFAC,,, | Performed by: PHYSICIAN ASSISTANT

## 2022-08-12 PROCEDURE — 80053 COMPREHEN METABOLIC PANEL: CPT | Performed by: PHYSICIAN ASSISTANT

## 2022-08-12 PROCEDURE — 81025 URINE PREGNANCY TEST: CPT | Mod: PBBFAC,PO | Performed by: PHYSICIAN ASSISTANT

## 2022-08-12 PROCEDURE — 85025 COMPLETE CBC W/AUTO DIFF WBC: CPT | Performed by: PHYSICIAN ASSISTANT

## 2022-08-12 PROCEDURE — 93010 ELECTROCARDIOGRAM REPORT: CPT | Mod: S$PBB,,, | Performed by: INTERNAL MEDICINE

## 2022-08-12 NOTE — PROGRESS NOTES
"Preop Note      SUBJECTIVE:     Lydia Thomas is a 42 y.o. female who presents to the office today for a preoperative consultation at the request of surgeon Dr. Torres who plans on performing bilateral breast cosmetic revision on August 23. This consultation is requested for the specific conditions prompting preoperative evaluation (i.e. because of potential affect on operative risk):     CARDIAC: none     PULMONARY: Asthma (no recent asthma sx or exacerbations)    NUTRITION: none     ANESTHESIA: difficulty voiding, nausea     SOCIAL: No alc, no tob, no recreational drugs     Can pt achieve 4 mets (climb flight of stairs, gardening, walking fast, biking)? Yes    Review of Systems:  Review of Systems   Constitutional: Negative for chills, fever and weight loss.   HENT: Positive for tinnitus (L). Negative for congestion, ear pain, sinus pain and sore throat.    Eyes: Positive for blurred vision (L) and pain (L).   Respiratory: Negative for cough, shortness of breath and wheezing.    Cardiovascular: Negative for chest pain, palpitations, claudication and leg swelling.   Gastrointestinal: Negative for abdominal pain, blood in stool, constipation, diarrhea, nausea and vomiting.   Genitourinary: Negative for dysuria.   Musculoskeletal: Positive for back pain, joint pain and neck pain. Negative for myalgias.   Skin: Negative for rash.   Neurological: Positive for tingling, sensory change, speech change, focal weakness and headaches. Negative for dizziness, tremors, seizures, loss of consciousness and weakness.   Psychiatric/Behavioral: Negative for depression. The patient is not nervous/anxious.          Review of patient's allergies indicates:   Allergen Reactions    Iodine and iodide containing products Anaphylaxis    Shellfish derived Anaphylaxis    Benadryl [diphenhydramine hcl] Itching     "with fast injection"    Fish containing products      Current Outpatient Medications on File Prior to Visit "   Medication Sig Dispense Refill    onabotulinumtoxinA (BOTOX INJ) Inject as directed.      sumatriptan (IMITREX STATDOSE) 6 mg/0.5 mL kit To use max twice within 24 hours, 2 hours apart. 6 kit 0    diclofenac sodium (VOLTAREN) 1 % Gel Apply 2 g topically 4 (four) times daily. for 10 days 100 g 0    [DISCONTINUED] acetaminophen (TYLENOL) 500 MG tablet Take 1 tablet (500 mg total) by mouth every 6 (six) hours as needed for Pain. (Patient not taking: Reported on 8/12/2022) 30 tablet 0    [DISCONTINUED] glycerin adult suppository Place 1 suppository rectally as needed for Constipation. (Patient not taking: Reported on 8/12/2022) 12 suppository 0    [DISCONTINUED] naproxen (NAPROSYN) 500 MG tablet Take 1 tablet (500 mg total) by mouth 2 (two) times daily as needed. (Patient not taking: Reported on 8/12/2022) 30 tablet 0    [DISCONTINUED] polyethylene glycol (GLYCOLAX) 17 gram/dose powder Take 17 g by mouth once daily. (Patient not taking: Reported on 8/12/2022) 595 g 0     No current facility-administered medications on file prior to visit.     Past Medical History:   Diagnosis Date    Anemia     Anemia     Asthma     exercise asthma    Migraine headache     Ovarian cyst      Past Surgical History:   Procedure Laterality Date    APPENDECTOMY      4/2019    BREAST BIOPSY Right     Excisional bx, benign    COLONOSCOPY N/A 5/18/2022    Procedure: COLONOSCOPY;  Surgeon: Sabino Mcintosh MD;  Location: [facility];  Service: Endoscopy;  Laterality: N/A;  High risk for colon cancer (family)      fully vaccinated; instructions to portal-GT     Family History   Problem Relation Age of Onset    Asthma Mother     Miscarriages / Stillbirths Mother     Breast cancer Mother         60    Arthritis Father     COPD Father     Asthma Brother     Colon polyps Brother     Breast cancer Maternal Grandmother         60    Cancer Maternal Grandfather     Hearing loss Paternal Grandmother     Hearing loss Paternal  Grandfather     Diabetes Maternal Uncle     Hypertension Maternal Uncle     Prostate cancer Maternal Uncle     Hearing loss Paternal Aunt     Hearing loss Paternal Uncle     Amblyopia Neg Hx     Blindness Neg Hx     Cataracts Neg Hx     Glaucoma Neg Hx     Macular degeneration Neg Hx     Retinal detachment Neg Hx     Strabismus Neg Hx       Social History     Socioeconomic History    Marital status:    Tobacco Use    Smoking status: Never Smoker    Smokeless tobacco: Never Used   Substance and Sexual Activity    Alcohol use: Yes     Comment: occass    Drug use: No    Sexual activity: Yes     Partners: Male     Birth control/protection: None   Social History Narrative    , she is from [place] originally, but comes from [place], she is Islam, her  is  ([employer]), no children, 3 cats    Works at [employer]         OBJECTIVE:     Vital Signs (Most Recent)  Temp: 99.3 °F (37.4 °C) (08/12/22 0712)  Pulse: 76 (08/12/22 0712)  Resp: 20 (08/12/22 0712)  BP: 102/68 (08/12/22 0712)  SpO2: 99 % (08/12/22 0712)      Physical Exam:  Physical Exam  Constitutional:       General: She is not in acute distress.     Appearance: Normal appearance. She is not ill-appearing.   HENT:      Head: Normocephalic.      Right Ear: Tympanic membrane, ear canal and external ear normal. There is no impacted cerumen.      Left Ear: Tympanic membrane, ear canal and external ear normal. There is no impacted cerumen.      Nose: Nose normal.      Mouth/Throat:      Mouth: Mucous membranes are moist.      Pharynx: Oropharynx is clear. No oropharyngeal exudate.   Eyes:      Extraocular Movements: Extraocular movements intact.      Conjunctiva/sclera: Conjunctivae normal.      Pupils: Pupils are equal, round, and reactive to light.   Cardiovascular:      Rate and Rhythm: Normal rate and regular rhythm.      Heart sounds: Normal heart sounds. No murmur heard.  Pulmonary:      Effort: Pulmonary  effort is normal. No respiratory distress.      Breath sounds: Normal breath sounds. No wheezing.   Chest:   Breasts:      Breasts are asymmetrical.      Right: No supraclavicular adenopathy.      Left: No supraclavicular adenopathy.        Comments: Bilateral breast implants   Abdominal:      General: Bowel sounds are normal. There is no distension.      Palpations: Abdomen is soft.      Tenderness: There is no abdominal tenderness.   Musculoskeletal:         General: Signs of injury present.      Left shoulder: Normal range of motion. Decreased strength.      Left hand: Decreased strength.      Cervical back: Normal range of motion.      Left hip: Decreased strength.      Comments: Pain and weakness with movement of LUE and LLE. Decreased  strength on L   Lymphadenopathy:      Cervical: No cervical adenopathy.      Upper Body:      Right upper body: No supraclavicular adenopathy.      Left upper body: No supraclavicular adenopathy.   Skin:     General: Skin is warm.   Neurological:      Mental Status: She is alert and oriented to person, place, and time.   Psychiatric:         Mood and Affect: Mood normal.         Behavior: Behavior normal.         Diagnostic Results:  Labs: Reviewed  ECG: Reviewed      ASSESSMENT/PLAN:     Preop examination  -     CBC W/ AUTO DIFFERENTIAL; Future; Expected date: 08/12/2022  -     COMPREHENSIVE METABOLIC PANEL; Future; Expected date: 08/12/2022  -     IN OFFICE EKG 12-LEAD (to Muse)  -     COVID-19 Routine Screening; Future; Expected date: 08/12/2022  -     POCT Urine Pregnancy        Dangelo Score      HISTORY  Age >70 years (5 points)  Myocardial infarction within 6 months (10 points).    CARDIAC EXAM  Signs of CHF: ventricular gallop or JVD (11 points)  Significant aortic stenosis (3 points)    EKG  Arrhythmia other than sinus or premature atrial contractions (7 points)  5 or more PVCs per minute (7 points)    GENERAL MED CONDITIONS  PO2 <60 mmHg; PCO2 >50 mmHg; K <3 mEq/L;  HCO3 <20 mEq/L; BUN >50 mg/dL; Creatinine >3 mg/dL; elevated SGOT; chronic liver disease; bedridden (3 points)    OPERATION  Emergency (4 points)  Intraperitoneal, intrathoracic or aortic (3 points)        0 to 5 Points: Class I 1% Complications   6 to 12 Points: Class II 7% Complications   13 to 25 Points: Class III 14% Complications   26 to 53 Points: Class IV 78% Complications     Dangelo Score: 0      Surgery-Related Predictors for Risk of Perioperative Cardiac Complications  High risk   Emergency surgery   Anticipated increased blood loss   Aortic or peripheral vascular surgery     Intermediate risk   Abdominal or thoracic surgery   Head and neck surgery   Carotid endarterectomy   Orthopedic surgery   Prostate surgery     Low risk   Breast surgery   Cataract surgery   Superficial surgery   Endoscopy         Summary of Recommended Preoperative Laboratory Tests Depending on the History and Physical Findings  Condition Indicated testing and other measures*   Healthy patient    ? 40 years Hemoglobin, urine screening for pregnancy in women of childbearing potential    > 40 years Add ECG and blood glucose (age ? 45 years)   Cardiovascular disease ECG, chest radiographs, hemoglobin, electrolytes, BUN, creatinine, glucose (age ? 45 years or history of diabetes)    Recent MI (?6 weeks), unstable angina, decompensated CHF, significant arrhythmias, severe valvular disease Cardiology consultation    Previous MI (> 6 weeks ago), mild stable angina, compensated CHF, diabetes mellitus Stress test if high-risk procedure or patient has low functional capacity; consider assessment of left ventricular function (i.e., echocardiography)    Rhythm other than normal sinus rhythm, abnormal ECG, history of stroke, advanced age, low functional capacity Stress test if high-risk procedure and patient has low functional capacity   Pulmonary disease Chest radiographs, hemoglobin, glucose (age ? 45 years), ECG (age > 40 years); provide  patient with instructions for incentive spirometry or deep-breathing exercises    Asthma Pulmonary function testing or peak flow rate to assess disease status    COPD Consider pulmonary function testing and arterial blood gas analysis for assessment of disease severity    Cough Evaluate for etiology    Dyspnea Evaluate for etiology    Smoking  patient to stop smoking 4 to 8 weeks before surgery   Obesity Provide patient with instructions for incentive spirometry or deep-breathing exercises   Abdominal or thoracic surgery Provide patient with instructions for incentive spirometry or deep-breathing exercises   Malnutrition Laboratory tests based on primary disease, plus albumin and lymphocyte count; if malnutrition is severe, consider postponing surgery and providing preoperative supplementation          Lydia Thomas is a 42 y.o. female who  has a past medical history of Anemia, Anemia, Asthma, Migraine headache, and Ovarian cyst.   who presented for pre op evaluation. The encounter diagnosis was Preop examination.    As with all procedures, there is inherent risk of multiple complications including those related to bleeding, infectious, cardiac, and pulmonary.    No chest pain or shortness of breath at rest or with exertion.  Normal exercise tolerance for age and can achieve 4 METS without chest pain or SOB.  Blood pressure is controlled and vital signs are within normal limits.  Patient is low risk for a low risk procedure.      Please stop Aspirin and NSAIDS one week prior to surgery.    Predisposing risk factors for pulmonary complications include cough, dyspnea, smoking, a history of lung disease, obesity and abdominal or thoracic surgery.    Any pts > 70 years old may be at risk for delirium or cardiac complications.  Furthermore, diabetic patients may be at risk for infection or prolonged healing.      Preop Evaluation      8/12/2022 Benji Hartman MD  7:22 AM

## 2022-08-15 ENCOUNTER — OFFICE VISIT (OUTPATIENT)
Dept: ORTHOPEDICS | Facility: CLINIC | Age: 42
End: 2022-08-15
Payer: OTHER GOVERNMENT

## 2022-08-15 DIAGNOSIS — R29.898 WEAKNESS OF LEFT UPPER EXTREMITY: ICD-10-CM

## 2022-08-15 DIAGNOSIS — R29.898 WEAKNESS OF LEFT LOWER EXTREMITY: Primary | ICD-10-CM

## 2022-08-15 PROCEDURE — 99213 OFFICE O/P EST LOW 20 MIN: CPT | Mod: PBBFAC | Performed by: ORTHOPAEDIC SURGERY

## 2022-08-15 PROCEDURE — 99214 PR OFFICE/OUTPT VISIT, EST, LEVL IV, 30-39 MIN: ICD-10-PCS | Mod: S$PBB,,, | Performed by: ORTHOPAEDIC SURGERY

## 2022-08-15 PROCEDURE — 99999 PR PBB SHADOW E&M-EST. PATIENT-LVL III: CPT | Mod: PBBFAC,,, | Performed by: ORTHOPAEDIC SURGERY

## 2022-08-15 PROCEDURE — 99214 OFFICE O/P EST MOD 30 MIN: CPT | Mod: S$PBB,,, | Performed by: ORTHOPAEDIC SURGERY

## 2022-08-15 PROCEDURE — 99999 PR PBB SHADOW E&M-EST. PATIENT-LVL III: ICD-10-PCS | Mod: PBBFAC,,, | Performed by: ORTHOPAEDIC SURGERY

## 2022-08-15 NOTE — PROGRESS NOTES
DATE: 8/15/2022  PATIENT: Lydia Thomas    Attending Physician: Conrad Gutierres M.D.    HISTORY:  Lydia Thomas is a 42 y.o. female who returns to me today for MRI review. Patient continues to have LUE and LLE n/t and weakness. Patient saw her Neurologist, who recommended to start physical therapy. She is scheduled to see a concussion specialist soon. She has not been able to work.    PMH/PSH/FamHx/SocHx:  Unchanged from prior visit    ROS:  Positive for LUE and LLE weakness and n/t  Denies myelopathic symptoms, perineal paresthesias, bowel or bladder incontinence    EXAM:  LMP 07/31/2022 (Within Days)     My physical examination was notable for the following findings: diffuse weakness 4/5 in all muscle groups in LUE and LLE. Normal sensation to light touch in the C5-T1 and L2-S1 dermatomes bilaterally. + L Prajapati's    IMAGING:  Today I independently reviewed the following images and my interpretations are as follows:    Previous AP and Lat upright C-spine films showed no fractures or listhesis.    Lumbar XRs showed no fractures or listhesis.    Through [facility] Everywhere, CT of entire spine (cervical, thoracic and lumbar) reports stated no fractures. CT head report stated no acute head bleed.     MRI C-T-L showed no significant central or foraminal stenosis.    ASSESSMENT/PLAN:  Patient has LUE and LLE weakness and pain following a concussion on 4/12/22. Her spine MRI was not very impressive. I discussed the natural history of their diagnoses as well as surgical and nonsurgical treatment options. I educated the patient on the importance of core/back strengthening, correct posture, bending/lifting ergonomics, and low-impact aerobic exercises (walking, elliptical, and aquatherapy). Continue medications. I referred patient to physical therapy for LUE and LLE strengthening. No acute ortho spine surgical intervention warranted. Patient will follow up PRN.    Conrad Gutierres MD  Orthopaedic Spine  Surgeon  Department of Orthopaedic Surgery  [phone]

## 2022-08-16 ENCOUNTER — TELEPHONE (OUTPATIENT)
Dept: ORTHOPEDICS | Facility: CLINIC | Age: 42
End: 2022-08-16
Payer: OTHER GOVERNMENT

## 2022-08-16 NOTE — TELEPHONE ENCOUNTER
----- Message from Delfina Tenzin sent at 8/16/2022  1:07 PM CDT -----  .Type: Patient Call Back    Who called: self    What is the request in detail: need note for work  for 8/15    Can the clinic reply by [facility]?no    Would the patient rather a call back or a response via [facility]? self    Best call back number: [phone]

## 2022-08-24 ENCOUNTER — PATIENT MESSAGE (OUTPATIENT)
Dept: FAMILY MEDICINE | Facility: CLINIC | Age: 42
End: 2022-08-24
Payer: OTHER GOVERNMENT

## 2022-09-02 NOTE — ED TRIAGE NOTES
"Pt presents to ED via personal transportation with c/o cough, and body aches 2 xdays. Pt states "It feels like I have the flu but a have also had the flu before and this feels a lot different. 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   " yes

## 2022-09-07 ENCOUNTER — PATIENT MESSAGE (OUTPATIENT)
Dept: ORTHOPEDICS | Facility: CLINIC | Age: 42
End: 2022-09-07
Payer: OTHER GOVERNMENT

## 2022-09-07 ENCOUNTER — TELEPHONE (OUTPATIENT)
Dept: FAMILY MEDICINE | Facility: CLINIC | Age: 42
End: 2022-09-07
Payer: OTHER GOVERNMENT

## 2022-09-07 NOTE — TELEPHONE ENCOUNTER
Patient requesting to be seen on 9/8/2022.  Would Dr. Vergara like to schedule patient for 9/8/2022@8:40 am?

## 2022-09-13 ENCOUNTER — CLINICAL SUPPORT (OUTPATIENT)
Dept: REHABILITATION | Facility: HOSPITAL | Age: 42
End: 2022-09-13
Attending: ORTHOPAEDIC SURGERY
Payer: OTHER MISCELLANEOUS

## 2022-09-13 DIAGNOSIS — R68.89 DECREASED STRENGTH, ENDURANCE, AND MOBILITY: ICD-10-CM

## 2022-09-13 DIAGNOSIS — R53.1 DECREASED STRENGTH, ENDURANCE, AND MOBILITY: ICD-10-CM

## 2022-09-13 DIAGNOSIS — R29.898 WEAKNESS OF LEFT UPPER EXTREMITY: ICD-10-CM

## 2022-09-13 DIAGNOSIS — R29.898 WEAKNESS OF LEFT LOWER EXTREMITY: ICD-10-CM

## 2022-09-13 DIAGNOSIS — Z74.09 DECREASED STRENGTH, ENDURANCE, AND MOBILITY: ICD-10-CM

## 2022-09-13 PROCEDURE — 97110 THERAPEUTIC EXERCISES: CPT | Mod: PN

## 2022-09-13 PROCEDURE — 97162 PT EVAL MOD COMPLEX 30 MIN: CPT | Mod: PN

## 2022-09-13 NOTE — PLAN OF CARE
[facility]  Physical Therapy Initial Evaluation    [facility]  [facility] Number: [id number]    Therapy Diagnosis:   Encounter Diagnoses   Name Primary?    Weakness of left lower extremity     Weakness of left upper extremity     Decreased strength, endurance, and mobility      Physician: Conrad Gutierres MD    Physician Orders: PT Eval and Treat   Medical Diagnosis from Referral:   R29.898 (ICD-10-CM) - Weakness of left lower extremity   R29.898 (ICD-10-CM) - Weakness of left upper extremity     Evaluation Date: 9/13/2022  Authorization Period Expiration: 08/15/2023  Plan of Care Expiration: 12/13/22  Progress Note Due: 10/13/22  Visit # / Visits authorized: 1/ 12    Time In: 0930a  Time Out: 1038a  Total Appointment Time (timed & untimed codes): 68 minutes    Precautions: Standard and anemia, exercise induced asthma    Subjective   Date of injury: 4/12/22  History of current condition - Lydia reports: There was a lot of boxes in front of the girls bathroom at work. She went into the men's restroom and slipped on water on the floor from the leaking sink.She tried to get up and fell into the wall and hit her right side of her head on the wall.She reports that she lost consciousness.She was a trauma patient because she could not feel her entire left side. She feels like she is getting worse and worse each day. She has been seeing a neurologist. She had an MRI 2-3 weeks ago but has not had the results yet. He is going to keep doing imaging to follow up. Currently the imaging has been looking good. This is her second concussion from work. She has some situational memory. She is seeing a therapist for her memory on October 5th.  She has had a recent fall last week when she was walking and her left side gave out. She feels out of breath when she walks. She reports that she has weakness on her entire left side. She has a sharp pain if she sit for too long or lays on the bed for  "too long. She has sharp pain that goes from her back all the way to her big toe. She would really like to get back to running. She  reports that she can feel at her shoulder and then as she goes down she does not feel any sensation. She is able to move her left arm but just does not have sensation. She reports that she is RHD. She does have a history of migraines from prior to surgery. She reports that her neck will get tight and she will have to keep turning her head to release the tension. She has to crack it or she will get a terrible migraines. She reports that she does get botox.      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    Medications:   Lydia has a current medication list which includes the following prescription(s): diclofenac sodium, onabotulinumtoxina, and sumatriptan.    Allergies:   Review of patient's allergies indicates:   Allergen Reactions    Iodine and iodide containing products Anaphylaxis    Shellfish derived Anaphylaxis    Benadryl [diphenhydramine hcl] Itching     "with fast injection"    Fish containing products         Imaging, : per MD note: Previous AP and Lat upright C-spine films showed no fractures or listhesis.     Lumbar XRs showed no fractures or listhesis.     Through Care Everywhere, CT of entire spine (cervical, thoracic and lumbar) reports stated no fractures. CT head report stated no acute head bleed.      MRI C-T-L showed no significant central or foraminal stenosis.       Social " "History: 2 story home with bedroom upstairs; railing on left side lives with their spouse (currently sleeping on couch)    Pain:  Current 8/10, worst 10/10, best 4/10   Location: back - lumbar and left leg(s)  Description: Aching, Throbbing, Grabbing, Sharp, Electric, and Shooting  Aggravating Factors: Sitting, Standing, Laying, Walking, and Getting out of bed/chair  Alleviating Factors: hot bath and rest    Pt's goals: Return to gainful employment         "I want to get back to running"    Objective     Posture Alignment: slouched posture; increase height of left iliac crest as compared to right in standing.     CERVICAL SPINE AROM:   Flexion: WFL pain in posterior neck    Extension: WFL    Left Sidebend: WFL pain on right UT   Right Sidebend: WFL   Left Rotation: WFL   Right Rotation: WFL     Shoulder AROM: WFL with pain on left side      UPPER EXTREMITY STRENGTH:   Left Right   Shoulder Flexion 4-/5 5/5   Shoulder Abduction 4-/5 5/5     Shoulder Internal Rotation 4/5 5/5   Shoulder External Rotation 4-/5 5/5   Elbow Flexion 4/5 5/5   Elbow Extension 3-/5 5/5   Wrist Flexion 3-/5 5/5   Wrist Extension 3-/5 5/5    75,70,70 lb 15,5,5 lb     Dermatomes: Sensation: Light Touch: Impaired: below left shoulder and LLE    Proprioception: Decreased proprioception of LUE  Coordination: able to complete alternating hands, toe taps, and heel to shin with slow movements as compared to right  Spasticity: Negative with quick ankle DF    LOWER EXTREMITY STRENGTH:   Left Right   Quadriceps 4-/5 4+/5   Hamstrings 3-/5 4+/5     Iliopsoas 3-/5 4/5   Glute Med NT NT   Hip IR 3+/5 4/5   Hip ER 3+/5 4/5   Hip Ext 4/5 4/5   Ankle DF 3/5 5/5   Ankle PF 3/5 5/5       DTR's:   Left Right   Patella Tendon 2+ 2+   Achilles Tendon NT NT       Special Tests:   Left Right   Slump - -   SLR - -   Crossed SLR - -   Sacral Thrust NT NT   NAHID - + pain on left hip   Quadrant Test NT NT   Prone Instability Test NT NT       Timed up and Go:  " 50.81 seconds with RW  5 time sit to stand: 52 seconds without use of hands      GAIT: Lydia ambulates with no assistive device with CGA.     GAIT DEVIATIONS: Lydia displays unsteady gait, decrease clearance of LLE, decrease knee/hip flexion LLE    Pt/family was provided educational information, including: role of PT, goals for PT, scheduling - pt verbalized understanding. Discussed insurance limitations with pt.       Functional Job Specific Testing:     Job Specific Task Job Demands Current Ability Deficit? (Yes or No)   1. standing Prolonged periods of time unable no   2. walking Prolonged periods of time Unable  no   3. Carrying/lifting Light to moderate Unable  no     Limitation/Restriction for FOTO Lower leg Survey    Therapist reviewed FOTO scores for Lydia Thomas on 9/13/2022.   FOTO documents entered into EPIC - see Media section.    Limitation Score: 76%       TREATMENT   Treatment Time In: 1028  Treatment Time Out: 1038  Total Treatment time (time-based codes) separate from Evaluation: 10 minutes    Lydia received therapeutic exercises to develop strength for 10 minutes including:    Seated marches x10  Seated heel raises 10x    Home Exercises and Patient Education Provided    Education provided:   - HEP  - POC    Written Home Exercises Provided: yes.  Exercises were reviewed and Lydia was able to demonstrate them prior to the end of the session.  Lydia demonstrated good  understanding of the education provided.     See EMR under Patient Instructions for exercises provided 9/13/2022.    Assessment   Lydia is a 42 y.o. female referred to outpatient Physical Therapy with a medical diagnosis of weakness of left lower extremity and weakness of left upper extremity. Pt presents with multiple deficits including impaired sensation, coordination, proprioception and global decrease in strength of left side of body. Pt arrived to session ambulating without AD. PT provided pt with RW to use during  session with decrease in left LE clearance noted. Pt often displayed hip hike to try and clear limb. Pt is at an increase risk for falls as seen with increase time required for Timed up and Go and 30 second sit to stand. . Pt also displayed decrease in endurance as seen by increase in fatigue while ambulating throughout clinic and throughout duration of initial evaluation. Therefore she would benefit from rolling walker for home and community ambulation. Pt will be seen by neuro team to best address deficits.   The patient's current job specific task deficits include the following: walking, standing, lifting, cleaning    Pt prognosis is Fair.     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    Plan of care discussed with patient: Yes  Pt's spiritual, cultural and educational needs considered and patient is agreeable to the plan of care and goals as stated below:     Anticipated Barriers for therapy: acuity of current injury and fear of re-injury    Medical Necessity is demonstrated by the following  History  Co-morbidities and personal factors that may impact the plan of care Co-morbidities:   Anemia    Anemia    Asthma    exercise asthma   Migraine headache    Ovarian cyst        Personal Factors:   age  coping style  lifestyle  character  attitudes     moderate   Examination  Body Structures and Functions, activity limitations and participation restrictions that may impact the plan of care Body Regions:   lower extremities  upper extremities    Body Systems:    gross symmetry  strength  gross coordinated movement  balance  gait  transfers  transitions  motor control  motor learning    Participation Restrictions:   Walking, standing, bed mobility, transfers,     Activity limitations:   Learning and applying knowledge  no deficits    General Tasks and Commands  no  deficits    Communication  no deficits    Mobility  lifting and carrying objects  fine hand use (grasping/picking up)  walking  using transportation (bus, train, plane, car)  driving (bike, car, motorcycle)    Self care  washing oneself (bathing, drying, washing hands)  dressing  looking after one's health    Domestic Life  shopping  cooking  doing house work (cleaning house, washing dishes, laundry)  assisting others    Interactions/Relationships  no deficits    Life Areas  employment    Community and Social Life  community life  recreation and leisure         moderate   Clinical Presentation evolving clinical presentation with changing clinical characteristics moderate   Decision Making/ Complexity Score: moderate     Goals:     Short Term Goals: 6 weeks   Patient demonstrates improve 5 time sit to stand time to 40 seconds to display improvements in BLE strength  Patient demonstrates improved Timed up and Go score to 30 Seconds or less with AD to improve mobility and decrease risk for falls  Patient demonstrates improved bed mobility to independent to improve tolerance to functional tasks.   Assess functional gait assessment, DGI as appropriate    Long Term Goals: 12 weeks   Patient demonstrates improve 5 time sit to stand time to 20 seconds to display improvements in BLE strength  Patient demonstrates improved Timed up and Go score to 15 Seconds or less with AD to improve mobility and decrease risk for falls  Goals relating to balance testing - Functional Gait Assessment, DGI   Pt will return to work full duty, part time.    Plan   Plan of care Certification: 9/13/2022 to 12/13/22.    Outpatient Physical Therapy 1-2 times weekly for 12 weeks to include the following interventions: Gait Training, Manual Therapy, Moist Heat/ Ice, Neuromuscular Re-ed, Patient Education, Therapeutic Activities, and Therapeutic Exercise. Patient will be transferred to neuro physical therapist to address issues identified at initial  evaluation.     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     Leslie Puckett, PT,DPT  9/13/2022

## 2022-09-19 DIAGNOSIS — R29.898 WEAKNESS OF LEFT LOWER EXTREMITY: Primary | ICD-10-CM

## 2022-09-20 ENCOUNTER — CLINICAL SUPPORT (OUTPATIENT)
Dept: REHABILITATION | Facility: HOSPITAL | Age: 42
End: 2022-09-20
Payer: OTHER MISCELLANEOUS

## 2022-09-20 DIAGNOSIS — R68.89 DECREASED STRENGTH, ENDURANCE, AND MOBILITY: Primary | ICD-10-CM

## 2022-09-20 DIAGNOSIS — R53.1 DECREASED STRENGTH, ENDURANCE, AND MOBILITY: Primary | ICD-10-CM

## 2022-09-20 DIAGNOSIS — Z74.09 DECREASED STRENGTH, ENDURANCE, AND MOBILITY: Primary | ICD-10-CM

## 2022-09-20 PROCEDURE — 97530 THERAPEUTIC ACTIVITIES: CPT | Mod: PN

## 2022-09-20 NOTE — PROGRESS NOTES
See updated Plan of Care.     Belia Puri, PT, DPT, NCS  Neurological Physical Therapist  09/20/2022

## 2022-09-22 ENCOUNTER — CLINICAL SUPPORT (OUTPATIENT)
Dept: REHABILITATION | Facility: HOSPITAL | Age: 42
End: 2022-09-22
Payer: OTHER MISCELLANEOUS

## 2022-09-22 ENCOUNTER — OFFICE VISIT (OUTPATIENT)
Dept: FAMILY MEDICINE | Facility: CLINIC | Age: 42
End: 2022-09-22
Payer: OTHER GOVERNMENT

## 2022-09-22 DIAGNOSIS — R68.89 DECREASED STRENGTH, ENDURANCE, AND MOBILITY: Primary | ICD-10-CM

## 2022-09-22 DIAGNOSIS — L02.419 AXILLARY ABSCESS: Primary | ICD-10-CM

## 2022-09-22 DIAGNOSIS — M54.12 CERVICAL RADICULOPATHY: ICD-10-CM

## 2022-09-22 DIAGNOSIS — G89.29 CHRONIC NONINTRACTABLE HEADACHE, UNSPECIFIED HEADACHE TYPE: ICD-10-CM

## 2022-09-22 DIAGNOSIS — M54.16 LUMBAR RADICULOPATHY: Primary | ICD-10-CM

## 2022-09-22 DIAGNOSIS — Z74.09 DECREASED STRENGTH, ENDURANCE, AND MOBILITY: Primary | ICD-10-CM

## 2022-09-22 DIAGNOSIS — R53.1 DECREASED STRENGTH, ENDURANCE, AND MOBILITY: Primary | ICD-10-CM

## 2022-09-22 DIAGNOSIS — R51.9 CHRONIC NONINTRACTABLE HEADACHE, UNSPECIFIED HEADACHE TYPE: ICD-10-CM

## 2022-09-22 PROCEDURE — 97110 THERAPEUTIC EXERCISES: CPT | Mod: PN

## 2022-09-22 PROCEDURE — 99214 PR OFFICE/OUTPT VISIT, EST, LEVL IV, 30-39 MIN: ICD-10-PCS | Mod: 95,,, | Performed by: PHYSICIAN ASSISTANT

## 2022-09-22 PROCEDURE — 97530 THERAPEUTIC ACTIVITIES: CPT | Mod: PN

## 2022-09-22 PROCEDURE — 99214 OFFICE O/P EST MOD 30 MIN: CPT | Mod: 95,,, | Performed by: PHYSICIAN ASSISTANT

## 2022-09-22 PROCEDURE — 97116 GAIT TRAINING THERAPY: CPT | Mod: PN

## 2022-09-22 RX ORDER — SULFAMETHOXAZOLE AND TRIMETHOPRIM 800; 160 MG/1; MG/1
1 TABLET ORAL 2 TIMES DAILY
Qty: 14 TABLET | Refills: 0 | Status: SHIPPED | OUTPATIENT
Start: 2022-09-22 | End: 2022-09-29

## 2022-09-22 RX ORDER — CEFUROXIME AXETIL 250 MG/1
TABLET ORAL
Qty: 6 KIT | Refills: 2 | Status: SHIPPED | OUTPATIENT
Start: 2022-09-22 | End: 2023-04-04 | Stop reason: SDUPTHER

## 2022-09-22 NOTE — PROGRESS NOTES
[facility]   Physical Therapy Treatment Note     [facility]  [facility] Number: [id number]    Therapy Diagnosis:   Encounter Diagnosis   Name Primary?    Decreased strength, endurance, and mobility Yes     Physician: Conrad Gutierres MD    Visit Date: 9/22/2022    Physician Orders: PT Eval and Treat   Medical Diagnosis from Referral:   R29.898 (ICD-10-CM) - Weakness of left lower extremity   R29.898 (ICD-10-CM) - Weakness of left upper extremity      Evaluation Date: 9/13/2022  Authorization Period Expiration: 08/15/2023  Plan of Care Expiration: 12/13/22  Progress Note Due: 10/13/22  Visit # / Visits authorized: 3/ 12    PTA Visit #: 0/5     Time In: 8:35  Time Out: 9:30   Total Billable Time: 55 minutes    Precautions: Standard and anemia, exercise induced asthma    SUBJECTIVE     Pt reports: doing okay, had a headache from reading with right eye after learning that she could read last session. Felt very tired for 1 day following last session. Has dizziness when she changes positions at home/    She was compliant with home exercise program.  Response to previous treatment: fine  Functional change: ongoing     Pain: 0/10  Location: none at the moment but soreness at the neck left side and shooting pain in the left hemibody at times      OBJECTIVE     Objective Measures updated at progress report unless specified.     Orthostatics performed:     Sitting   BP: 98/72  HR: 85 bpm     Standing   BP: 97/68  HR: 87 bpm       Treatment     Lydia received the treatments listed below:      therapeutic exercises to develop strength, endurance, ROM, posture, and core stabilization for 40 minutes including:    Home exercise program: #1  Seated marches x10  Seated heel raises 10x    Home exercise program: #2  Chin tucks 10x2 3s hold  Scapular retractions 10x2 3s hold  Shoulder rolls 10x2    - 3/10 faigue during first walk  Sit to stands 5x2    - 3/10 faigue during first walk   - 3/10  "faigue during first walk    Home exercise program: #3  Saving Energy ( Planning)    SELECT THE JOB to be improved. One that takes too long or makes you tired.  BREAK DOWN THE TASK.   Collect the items needed.   Decrease Body motions and look at positions.   What do we need to clean up and put away.  QUESTION THE JOB   Why is it necessary?   What is the purpose?   When should it be done?   Where should it be done?   How is the "best way" for me to do it?   Do I have to do it or can someone else do it?    DEVELOP THE METHOD   Eliminate unnecessary details   Combine motions and activities   Rearrange sequence of job   Simplify     APPLY NEW METHOD   Rearrange tools and equipment   Readjust working heights and areas   Set up Working areas: dishwashing, storage, cooking, rest/relaxation, mixing/chopping, sorting   Throw away things you do not use (unclutter).        gait training to improve functional mobility and safety for 15  minutes, including:    Rolling Walker (RW) with maxA for left upper extremity positioning   Figure 8 ACE wrap at left ankle/shoe for toe clearance, ease of forward advancement - dependent to mello    CGA/SBA throughout for balance  Shyam for verbal cues for step length, sequencing    40 feet x2 reps with one seated restbreak    - 3/10 faigue during first walk   - 5/10 fatigue at rest break in sitting    - 3/10 fatigue at end of walk       Patient Education and Home Exercises     Home Exercises Provided and Patient Education Provided     Education provided:   - home exercise program   - plan of care (POC)  - schedule      Written Home Exercises Provided: yes.  Exercises were reviewed and Lydia was able to demonstrate them prior to the end of the session.  Lydia demonstrated good  understanding of the education provided.      See EMR under Patient Instructions for exercises provided 9/13/2022.    ASSESSMENT   Lydia demonstrating improved positive outlook on therapy performance and utilization at " this time. Patient is much more open in discussion of functional mobility at home and is more willing to problem solve with therapist. Patient verbalizes that she feels comfortable in this therapy gym with staff and is excited about therapy services. Pt with improved gait mechanics in forward progression however would greatly benefit from Rolling Walker (RW) with platform and hardware to improve left upper extremity engagement while decreasing the strain at the right upper extremity. Pt is limited by BP regulation with position changes and report of spots and lightheadedness due to limited engagement of left lower extremities and left upper extremity at this time. Continue skilled PT services.       Lydia Is progressing well towards her goals.   Pt prognosis is Guarded.     Pt will continue to benefit from skilled outpatient physical therapy to address the deficits listed in the problem list box on initial evaluation, provide pt/family education and to maximize pt's level of independence in the home and community environment.     Pt's spiritual, cultural and educational needs considered and pt agreeable to plan of care and goals.     Anticipated barriers to physical therapy: acuity of current injury and fear of re-injury; complexity of case     Goals:   Short Term Goals: 6 weeks   Patient demonstrates improve 5 time sit to stand time to 40 seconds to display improvements in BLE strength  Patient demonstrates improved Timed up and Go score to 30 Seconds or less with AD to improve mobility and decrease risk for falls  Patient demonstrates improved bed mobility to independent to improve tolerance to functional tasks.   Assess functional gait assessment, DGI as appropriate     Long Term Goals: 12 weeks   Patient demonstrates improve 5 time sit to stand time to 20 seconds to display improvements in BLE strength  Patient demonstrates improved Timed up and Go score to 15 Seconds or less with AD to improve mobility and  decrease risk for falls  Goals relating to balance testing - Functional Gait Assessment, DGI   Pt will return to work full duty, part time.    PLAN   Plan of care Certification: 9/13/2022 to 12/13/22.     Outpatient Physical Therapy 2-3 times weekly for 12 weeks to include the following interventions: Gait Training, Manual Therapy, Moist Heat/ Ice, Neuromuscular Re-ed, Patient Education, Therapeutic Activities, and Therapeutic Exercise. Patient will be transferred to neuro physical therapist to address issues identified at initial evaluation.      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     Belia Puri, PT, DPT, NCS  09/22/2022

## 2022-09-23 NOTE — PLAN OF CARE
[facility]   Physical Therapy Treatment Note - UPDATED plan of care (POC)     Name: Lydia Thomas  Clinic Number: [id number]    Therapy Diagnosis:   Encounter Diagnosis   Name Primary?    Decreased strength, endurance, and mobility Yes     Physician: Conrad Gutierres MD    Visit Date: 9/20/2022    Physician Orders: PT Eval and Treat   Medical Diagnosis from Referral:   R29.898 (ICD-10-CM) - Weakness of left lower extremity   R29.898 (ICD-10-CM) - Weakness of left upper extremity      Evaluation Date: 9/13/2022  Authorization Period Expiration: 08/15/2023  Plan of Care Expiration: 12/13/22  Progress Note Due: 10/13/22  Visit # / Visits authorized: 2/ 12    PTA Visit #: 0/5     Time In: 5:00  Time Out: 6:30  Total Billable Time: 90 minutes    Precautions: Standard and anemia, exercise induced asthma    SUBJECTIVE     Pt reports: initial injury was in March/April 2022 in which patient hit the right side of her head with loss of consciousness and was then unable to feel the entire left side of her body. CT scans completed at the hospital without orthopedic indications. Is seeing a Neurologist (beginning this month) and Neuropsychologist (beginning this month). Patient reports getting very tired, is unable to complete ADL's, IADL's, and functional mobility without assistance from  (marine), neighbor, or friend.     She was compliant with home exercise program.  Response to previous treatment: fine  Functional change: ongoing     Pain: none in moment however electrical sometimes in the left side of body and pain in the neck (left side) that leads to migraines if she does not crack   Location: as above      OBJECTIVE     Objective Measures updated at progress report unless specified.     VITALS: Sitting   BP: 99/66  HR: 75 bpm   SpO2: 99%     VITALS: Sitting +2 minutes   BP: 102/78  HR: 79 bpm  SpO2: 98%     VESTIBULAR EXAM:  head tilt: neg  spontaneous nystagmus: neg  gaze evoked  nystagmus: neg - limited by visual field cut   near point of convergence: limited by left visual field   smooth pursuits: normal, symmetrical - limited by visual field cut   saccadic tracking: NT  cover-uncover: normal  alternate cover: normal    - Follows commands: single, simple   - Speech: challenge with expressive communication when fatigued (stuttering, word finding, memory, dry mouth)     Mental status: alert, oriented to person, place, and time, depressed mood, confused  Appearance: Casually dressed  Behavior:  calm, cooperative, and adequate rapport can be established  Attention Span and Concentration:  Decreased    Dominant hand:  right     Posture Alignment :slouched posture    Sensation:  Light Touch: Impaired: left hemibody and left facial            Proprioception:   Impaired: left hemibody     Tone: 1+ Slight increases in muscle tone, manifested by a catch, followed by minimal resistance throughout the remainder (less than half) of the ROM.  Limbs/muscles affected: left lower extremity, left upper extremity     Cranial Nerve Assessment:   CN II: Visual fields             Left visual field deficit: LEFT OPTIC NERVE INVOLVEMENT limited   CN III, IV, VI: Pupils are equal, round, and reactive to light. NO RIGHT IS LIGHT SENSITIVE - SIGNIFICANT                          Extraocular motions are normal.                        Right pupil: Shape: regular. Accommodation: LIMITED                        Left pupil: Shape: regular. Accommodation: intact.             Nystagmus: none              Nystagmus type: NA  CN V: Facial sensation - LIMITED ON LEFT  CN VII: Facial expression full, strong, symmetric.   CN VIII: hearing: LIMITED ON LEFT  CN IX, X: palate: symmetric   CN XI: Right sternocleidomastoid strength: normal               Left sternocleidomastoid strength: normal               Right trapezius strength: normal               Left trapezius strength: IMPAIRED  CN XII: Tongue deviation: none- normal  "    ROM:   CERVICAL SPINE - WFL (observation only)    TMJ: WFL (observation only)   - jaw pain at left TMJ post fall      Gait Assessment:   - AD used: none   - Assistance: SBA   - Distance: 50 ft into / out of clinic   - Ramp:  Min A    GAIT DEVIATIONS:  Gait component performance: decreased speed, step length, step clearance, step progression of the left lower extremity; patient was never given bracing or assistive device for safety with mobility and has a "step and drag" gait sequence which is significant and severe however patient has managed to continue this ambulatory pattern for months with mild compensatory strategies / limited " bad habits"     Endurance Deficit: SIGNIFICANT - SEVERE      Functional Mobility (Bed mobility, transfers)  Bed mobility: Mod A  Supine to sit: Min A  Sit to supine: Min A  Rolling: Min A  Transfers to bed: Min A  Transfers to toilet: Min A  Sit to stand:  Min A  Stand pivot:  Min A  Car transfers: Mod A  Wheelchair mobility: NA  Floor transfers: NA       Treatment     Lydia received the treatments listed below:      therapeutic exercises to develop strength and endurance for 00 minutes including:    Home exercise program #1:   Seated marches x10  Seated heel raises 10x    therapeutic activities to improve functional performance for 90 minutes, including:    Ambulation 50 ft - figure 8 ACE wrap + Rolling Walker (RW) + CGA/Shyam   - left lower extremity clearance   - left lower extremity advancement   - sequencing    - forward progression   - balance and safety    Sit to stand x5    - CGA     Discussion on home mobility and function:     Patient will require equipment:   - Rolling Walker (RW) with left platform and hardware for left upper extremity for upright mobility and safe ambulation  - Custom left AFO for left lower extremity for clearance, stability, dorsiflexion assist   - Compression stockings bilaterally to improve circulation and pressure control with decreased muscle pump " within bilateral lower extremities   - Tub transfer bench for safety in shower and with transfer into shower - patient is currently sitting on a chair and having spouse bird bathe   - Toilet Rail - Bathroom Safety Frame for upper extremity assistance on and off of the toilet - patient is currently relying on others and leaning against walls and counters for support and we need a noninvasive piece of equipment as patient and spouse live in  housing  - Custom wheelchair for long distance mobility - spouse / friend to assist patient into environments - increase in progression towards independent mobility    Patient will require orders:   - Occupational Therapy (Neuro) orders for ADL, IADL, left upper extremity training   - Speech Therapy (Neuro) orders for memory, speech production, stuttering, word finding, dry mouth   - Neuro-ophthalmology orders for vision loss and visual deficits relating to the left eye    Patient is also requiring assistance with transportation to and from medical appointments and compensation for transportation - patient is currently using lyft/uber to get to and from therapy appointments which is very expensive.       Patient Education and Home Exercises     Home Exercises Provided and Patient Education Provided     Education provided:   - HEP  - plan of care (POC)  - schedule  - understanding results  - recommendations for referrals and equipment      Written Home Exercises Provided: yes.  Exercises were reviewed and Lydia was able to demonstrate them prior to the end of the session.  Lydia demonstrated good  understanding of the education provided.      See EMR under Patient Instructions for exercises provided 9/13/2022.    ASSESSMENT   Lydia is demonstrating significant number of deficits post injury to right posterior region of head in March/April of 2022. Patient has not received therapeutic intervention following incident and is requiring several referrals and orders for  equipment to improve function and independence with function. Pt is very emotional and unaware of deficits including speech, sight, sensation, memory and safety with mobility. Pt is very grateful for this writers education, discussion of equipment and appropriate referrals moving forward. Pt is very willing to work with this writer and is so grateful to accept the ideas and treatment moving forward. Pt will greatly benefit from skilled PT services at this time and this will require lengthy rehabilitation process moving forward. Will email  (workers compensation) and last seen neurological provider ( issued information to this writer).       Lydia Is progressing well towards her goals.   Pt prognosis is Guarded.     Pt will continue to benefit from skilled outpatient physical therapy to address the deficits listed in the problem list box on initial evaluation, provide pt/family education and to maximize pt's level of independence in the home and community environment.     Pt's spiritual, cultural and educational needs considered and pt agreeable to plan of care and goals.     Anticipated barriers to physical therapy: acuity of current injury and fear of re-injury    Goals:   Short Term Goals: 6 weeks   Patient demonstrates improve 5 time sit to stand time to 40 seconds to display improvements in BLE strength  Patient demonstrates improved Timed up and Go score to 30 Seconds or less with AD to improve mobility and decrease risk for falls  Patient demonstrates improved bed mobility to independent to improve tolerance to functional tasks.   Assess functional gait assessment, DGI as appropriate     Long Term Goals: 12 weeks   Patient demonstrates improve 5 time sit to stand time to 20 seconds to display improvements in BLE strength  Patient demonstrates improved Timed up and Go score to 15 Seconds or less with AD to improve mobility and decrease risk for falls  Goals relating to balance testing -  Functional Gait Assessment, DGI   4. Pt will return to work full duty, part time.      PLAN   Plan of care Certification: 9/13/2022 to 12/13/22.     Outpatient Physical Therapy 1-2 times weekly for 12 weeks to include the following interventions: Gait Training, Manual Therapy, Moist Heat/ Ice, Neuromuscular Re-ed, Patient Education, Therapeutic Activities, and Therapeutic Exercise. Patient will be transferred to neuro physical therapist to address issues identified at initial evaluation.      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     Belia Puri, PT, DPT, NCS  09/20/2022      I CERTIFY THE NEED FOR THESE SERVICES FURNISHED UNDER THIS PLAN OF TREATMENT AND WHILE UNDER MY CARE    Physician's comments:        Physician's Signature: ___________________________________________________

## 2022-10-03 ENCOUNTER — PATIENT MESSAGE (OUTPATIENT)
Dept: FAMILY MEDICINE | Facility: CLINIC | Age: 42
End: 2022-10-03
Payer: OTHER GOVERNMENT

## 2022-10-03 DIAGNOSIS — R22.30 AXILLARY MASS, UNSPECIFIED LATERALITY: Primary | ICD-10-CM

## 2022-10-04 ENCOUNTER — CLINICAL SUPPORT (OUTPATIENT)
Dept: REHABILITATION | Facility: HOSPITAL | Age: 42
End: 2022-10-04
Payer: OTHER MISCELLANEOUS

## 2022-10-04 DIAGNOSIS — R53.1 DECREASED STRENGTH, ENDURANCE, AND MOBILITY: Primary | ICD-10-CM

## 2022-10-04 DIAGNOSIS — Z74.09 DECREASED STRENGTH, ENDURANCE, AND MOBILITY: Primary | ICD-10-CM

## 2022-10-04 DIAGNOSIS — R68.89 DECREASED STRENGTH, ENDURANCE, AND MOBILITY: Primary | ICD-10-CM

## 2022-10-04 PROCEDURE — 97116 GAIT TRAINING THERAPY: CPT | Mod: PN

## 2022-10-04 PROCEDURE — 97110 THERAPEUTIC EXERCISES: CPT | Mod: PN

## 2022-10-04 NOTE — PROGRESS NOTES
[facility]   Physical Therapy Treatment Note     Name: Lydia Thomas  Clinic Number: [id number]    Therapy Diagnosis:   Encounter Diagnosis   Name Primary?    Decreased strength, endurance, and mobility Yes       Physician: Conrad Gutierres MD    Visit Date: 10/4/2022    Physician Orders: PT Eval and Treat   Medical Diagnosis from Referral:   R29.898 (ICD-10-CM) - Weakness of left lower extremity   R29.898 (ICD-10-CM) - Weakness of left upper extremity      Evaluation Date: 9/13/2022  Authorization Period Expiration: 08/15/2023  Plan of Care Expiration: 12/13/22  Progress Note Due: 10/13/22  Visit # / Visits authorized: 3/ 12    PTA Visit #: 0/5     Time In: 7:20 AM  Time Out: 8:00 AM  Total Billable Time: 40 minutes    Precautions: Standard and anemia, exercise induced asthma    SUBJECTIVE     Pt reports: feels good right now as she just woke up, feels like energy levels are good    She was compliant with home exercise program.  Response to previous treatment: fine  Functional change: ongoing     Pain: 0/10  Location: cervical spine and R LE      OBJECTIVE     Objective Measures updated at progress report unless specified.     Orthostatics performed:     Sitting   BP: 98/72  HR: 85 bpm     Standing   BP: 97/68  HR: 87 bpm       Treatment     Lydia received the treatments listed below:      therapeutic exercises to develop strength, endurance, ROM, posture, and core stabilization for 20 minutes including:    Sit to stands from 18 in mat x 5   Sit to stands from 20 in mat 2x5    Supine AAROM flexion (R arm assisting L arm) x 8   Seated weight bearing on UE (at sides) x 10 ea direction   Seated scap retraction x10   Seated shoulder rolls x10     gait training to improve functional mobility and safety for 25 minutes, including:     L ankle wrapped into dorsiflexion/eversion with ACE wrap throughout    CGA/SBA throughout for balance  Shyam for verbal cues for step length,  sequencing    40 feet with RW x 2 reps with one seated restbreak     Patient Education and Home Exercises     Home Exercises Provided and Patient Education Provided     Education provided:   - home exercise program   - plan of care (POC)  - schedule      Written Home Exercises Provided: yes.  Exercises were reviewed and Lydia was able to demonstrate them prior to the end of the session.  Lydia demonstrated good  understanding of the education provided.      See EMR under Patient Instructions for exercises provided 9/13/2022.    ASSESSMENT   Lydia tolerated session well overall. Improved clearance of L LE noted entering clinic today compared to previous session. However, degradation of L LE clearance noted during gait with walker. Pt with significant reported fatigue throughout session, requiring seated rest breaks between ea activity. Pt noted to be very fearful of moving L UE, holding it into adduction throughout session unless cued from therapist. Pt also fearful of moving L UE in AAROM, pt able to complete activity with max encouragement from physical therapist. Pt remains appropriate for therapy at this time. Continue working on gait mechanics and tolerance to activity as appropriate.     Lydia Is progressing well towards her goals.   Pt prognosis is Guarded.     Pt will continue to benefit from skilled outpatient physical therapy to address the deficits listed in the problem list box on initial evaluation, provide pt/family education and to maximize pt's level of independence in the home and community environment.     Pt's spiritual, cultural and educational needs considered and pt agreeable to plan of care and goals.     Anticipated barriers to physical therapy: acuity of current injury and fear of re-injury; complexity of case     Goals:   Short Term Goals: 6 weeks   Patient demonstrates improve 5 time sit to stand time to 40 seconds to display improvements in BLE strength  Patient demonstrates improved  Timed up and Go score to 30 Seconds or less with AD to improve mobility and decrease risk for falls  Patient demonstrates improved bed mobility to independent to improve tolerance to functional tasks.   Assess functional gait assessment, DGI as appropriate     Long Term Goals: 12 weeks   Patient demonstrates improve 5 time sit to stand time to 20 seconds to display improvements in BLE strength  Patient demonstrates improved Timed up and Go score to 15 Seconds or less with AD to improve mobility and decrease risk for falls  Goals relating to balance testing - Functional Gait Assessment, DGI   Pt will return to work full duty, part time.    PLAN   Plan of care Certification: 9/13/2022 to 12/13/22.     Outpatient Physical Therapy 2-3 times weekly for 12 weeks to include the following interventions: Gait Training, Manual Therapy, Moist Heat/ Ice, Neuromuscular Re-ed, Patient Education, Therapeutic Activities, and Therapeutic Exercise. Patient will be transferred to neuro physical therapist to address issues identified at initial evaluation.      Continue improving gait mechanics and tolerance to activities as appropriate.     Tanisha Silva, PT, DPT  10/4/2022    10/10/2022

## 2022-10-05 NOTE — PROGRESS NOTES
[facility]   Physical Therapy Treatment Note     [facility]  Cl[facility] Number: [id number]    Therapy Diagnosis:   Encounter Diagnosis   Name Primary?    Decreased strength, endurance, and mobility Yes     Physician: Conrad Gutierres MD    Visit Date: 10/6/2022    Physician Orders: PT Eval and Treat   Medical Diagnosis from Referral:   R29.898 (ICD-10-CM) - Weakness of left lower extremity   R29.898 (ICD-10-CM) - Weakness of left upper extremity      Evaluation Date: 9/13/2022  Authorization Period Expiration: 08/15/2023  Plan of Care Expiration: 12/13/22  Progress Note Due: 10/13/22  Visit # / Visits authorized: 6/ 12    PTA Visit #: 0/5     Time In: 8:50  Time Out: 9:30  Total Billable Time: 40 minutes    Precautions: Standard and anemia, exercise induced asthma    SUBJECTIVE     Pt reports: doing okay, general soreness throughout the body. Feels is able to walk a little better and is able to lift her leg a little more. Pt has appointment with neurologist today for botox injections. Has been completing home exercise program regularly.     She was compliant with home exercise program.  Response to previous treatment: fine  Functional change: ongoing     Pain: 0/10  Location: NA    OBJECTIVE     Objective Measures updated at progress report unless specified.     Treatment     Lydia received the treatments listed below:      therapeutic exercises to develop strength, endurance, ROM, posture, and core stabilization for 25 minutes including:    Home exercise program: #1  Seated marches 10x2  Seated heel raises 10x2    Home exercise program: #2  Chin tucks 10x2 3s hold  Scapular retractions 10x2 3s hold  Shoulder rolls 10x2     Home exercise program: #3  Lateral weightshifting in sitting with elbow bend 20x2 - min/modA   AAROM LUE shoulder flexion 10x2     Sit to stands x5 - mod/maxA, pushing off with left upper extremity pushing off     Home exercise program: #3  [employer] (  Planning)    Bicep curls - bilateral 10x2   Bathroom assistance - Shyam for balance and stability       gait training to improve functional mobility and safety for 15 minutes, including:     Rolling Walker (RW) with maxA for left upper extremity positioning   Figure 8 ACE wrap at left ankle/shoe for toe clearance, ease of forward advancement - dependent to mello    SBA throughout for balance  Shyam for verbal cues for step length, sequencing    40 feet x2 reps with one seated restbreak    - 7/10 faigue during first walk   - 7/10 fatigue at end of second walk      Patient Education and Home Exercises     Home Exercises Provided and Patient Education Provided     Education provided:   - home exercise program   - plan of care (POC)  - schedule      Written Home Exercises Provided: yes.  Exercises were reviewed and Lydia was able to demonstrate them prior to the end of the session.  Ldyia demonstrated good  understanding of the education provided.      See EMR under Patient Instructions for exercises provided 9/13/2022.    ASSESSMENT   Lydia demonstrating improved positive energy and confidence in overall movement and functional performance this session. Pt does require significant amounts of encouragement to build confidence and increase muscular engagement. Increased hip flexion, elbow flexion and ankle plantarflexion AROM observed. Increased speed of ambulation with step length and increased dorsiflexion with figure 8 ace wrap at left foot. Increased weightbearing through the left upper extremity with assist of therapist. Pt is limited by confidence and fear of movement and engagement of the left hemibody. Will continue to promote movement, confidence and safe mobility. Continue skilled PT services.       Lydia Is progressing well towards her goals.   Pt prognosis is Guarded.     Pt will continue to benefit from skilled outpatient physical therapy to address the deficits listed in the problem list box on initial  evaluation, provide pt/family education and to maximize pt's level of independence in the home and community environment.     Pt's spiritual, cultural and educational needs considered and pt agreeable to plan of care and goals.     Anticipated barriers to physical therapy: acuity of current injury and fear of re-injury; complexity of case     Goals:   Short Term Goals: 6 weeks   Patient demonstrates improve 5 time sit to stand time to 40 seconds to display improvements in BLE strength  Patient demonstrates improved Timed up and Go score to 30 Seconds or less with AD to improve mobility and decrease risk for falls  Patient demonstrates improved bed mobility to independent to improve tolerance to functional tasks.   Assess functional gait assessment, DGI as appropriate     Long Term Goals: 12 weeks   Patient demonstrates improve 5 time sit to stand time to 20 seconds to display improvements in BLE strength  Patient demonstrates improved Timed up and Go score to 15 Seconds or less with AD to improve mobility and decrease risk for falls  Goals relating to balance testing - Functional Gait Assessment, DGI   Pt will return to work full duty, part time.    PLAN   Plan of care Certification: 9/13/2022 to 12/13/22.     Outpatient Physical Therapy 2-3 times weekly for 12 weeks to include the following interventions: Gait Training, Manual Therapy, Moist Heat/ Ice, Neuromuscular Re-ed, Patient Education, Therapeutic Activities, and Therapeutic Exercise. Patient will be transferred to neuro physical therapist to address issues identified at initial evaluation.      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     Will contact Neurology to further engage with medical team and encourage patient to be honest and open with all providers.     Belia Puri, PT, DPT, NCS  10/06/2022

## 2022-10-06 ENCOUNTER — OFFICE VISIT (OUTPATIENT)
Dept: SURGERY | Facility: CLINIC | Age: 42
End: 2022-10-06
Payer: OTHER GOVERNMENT

## 2022-10-06 ENCOUNTER — CLINICAL SUPPORT (OUTPATIENT)
Dept: REHABILITATION | Facility: HOSPITAL | Age: 42
End: 2022-10-06
Payer: OTHER MISCELLANEOUS

## 2022-10-06 VITALS
DIASTOLIC BLOOD PRESSURE: 60 MMHG | OXYGEN SATURATION: 100 % | SYSTOLIC BLOOD PRESSURE: 93 MMHG | BODY MASS INDEX: 24.4 KG/M2 | HEART RATE: 81 BPM | WEIGHT: 142.94 LBS | HEIGHT: 64 IN

## 2022-10-06 DIAGNOSIS — R68.89 DECREASED STRENGTH, ENDURANCE, AND MOBILITY: Primary | ICD-10-CM

## 2022-10-06 DIAGNOSIS — Z74.09 DECREASED STRENGTH, ENDURANCE, AND MOBILITY: Primary | ICD-10-CM

## 2022-10-06 DIAGNOSIS — R53.1 DECREASED STRENGTH, ENDURANCE, AND MOBILITY: Primary | ICD-10-CM

## 2022-10-06 DIAGNOSIS — R22.30 AXILLARY MASS, UNSPECIFIED LATERALITY: Primary | ICD-10-CM

## 2022-10-06 PROCEDURE — 97110 THERAPEUTIC EXERCISES: CPT | Mod: PN

## 2022-10-06 PROCEDURE — 99999 PR PBB SHADOW E&M-EST. PATIENT-LVL III: CPT | Mod: PBBFAC,,, | Performed by: SURGERY

## 2022-10-06 PROCEDURE — 97116 GAIT TRAINING THERAPY: CPT | Mod: PN

## 2022-10-06 PROCEDURE — 99203 PR OFFICE/OUTPT VISIT, NEW, LEVL III, 30-44 MIN: ICD-10-PCS | Mod: S$PBB,,, | Performed by: SURGERY

## 2022-10-06 PROCEDURE — 99999 PR PBB SHADOW E&M-EST. PATIENT-LVL III: ICD-10-PCS | Mod: PBBFAC,,, | Performed by: SURGERY

## 2022-10-06 PROCEDURE — 99213 OFFICE O/P EST LOW 20 MIN: CPT | Mod: PBBFAC | Performed by: SURGERY

## 2022-10-06 PROCEDURE — 99203 OFFICE O/P NEW LOW 30 MIN: CPT | Mod: S$PBB,,, | Performed by: SURGERY

## 2022-10-06 RX ORDER — DOXYCYCLINE 100 MG/1
100 CAPSULE ORAL EVERY 12 HOURS
Qty: 30 CAPSULE | Refills: 0 | Status: SHIPPED | OUTPATIENT
Start: 2022-10-06 | End: 2022-10-21

## 2022-10-06 RX ORDER — DIAZEPAM 10 MG/1
TABLET ORAL
COMMUNITY
Start: 2022-08-16 | End: 2022-11-03

## 2022-10-06 NOTE — PROGRESS NOTES
43 y/o woman with history of axillary swelling.  She began with swelling in the medial most aspect of her right axilla which has resolved but she now has a localized skin swelling in a more lateral position    PE: small palp nodules with punctate openings but without drainage    Impression: mild hydradenitis    Plan: will treat with doxy in lieu of bactrim and f/un in 2 weeks for a repeat check.

## 2022-10-10 ENCOUNTER — PATIENT MESSAGE (OUTPATIENT)
Dept: ADMINISTRATIVE | Facility: HOSPITAL | Age: 42
End: 2022-10-10
Payer: OTHER GOVERNMENT

## 2022-10-10 ENCOUNTER — CLINICAL SUPPORT (OUTPATIENT)
Dept: REHABILITATION | Facility: HOSPITAL | Age: 42
End: 2022-10-10
Payer: OTHER MISCELLANEOUS

## 2022-10-10 DIAGNOSIS — Z74.09 DECREASED STRENGTH, ENDURANCE, AND MOBILITY: Primary | ICD-10-CM

## 2022-10-10 DIAGNOSIS — R68.89 DECREASED STRENGTH, ENDURANCE, AND MOBILITY: Primary | ICD-10-CM

## 2022-10-10 DIAGNOSIS — R53.1 DECREASED STRENGTH, ENDURANCE, AND MOBILITY: Primary | ICD-10-CM

## 2022-10-10 PROCEDURE — 97112 NEUROMUSCULAR REEDUCATION: CPT | Mod: PN

## 2022-10-10 NOTE — PROGRESS NOTES
[facility]   Physical Therapy Treatment Note     [facility]  [facility] Number: [id number]    Therapy Diagnosis:   Encounter Diagnosis   Name Primary?    Decreased strength, endurance, and mobility Yes     Physician: Conrad Gutierres MD    Visit Date: 10/10/2022    Physician Orders: PT Eval and Treat   Medical Diagnosis from Referral:   R29.898 (ICD-10-CM) - Weakness of left lower extremity   R29.898 (ICD-10-CM) - Weakness of left upper extremity      Evaluation Date: 9/13/2022  Authorization Period Expiration: 08/15/2023  Plan of Care Expiration: 12/13/22  Progress Note Due: 10/13/22  Visit # / Visits authorized: 7/ 12    PTA Visit #: 0/5     Time In: 8:00  Time Out: 8:45  Total Billable Time: 45 minutes    Precautions: Standard and anemia, exercise induced asthma    SUBJECTIVE   Pt reports: fall in the bathroom this AM with some soreness at the right knee. Pt was happy with discussion with neurologist and does note needs to complete testing with neuropsychologist. Was called in regards to a walker and is unsure when this is to be delivered.       She was compliant with home exercise program.  Response to previous treatment: fatigue and headache after PT / Neurologist and home exercise program at home   Functional change: ongoing     Pain: 0/10  Location: NA    OBJECTIVE     Objective Measures updated at progress report unless specified.     Treatment     Lydia received the treatments listed below:      neuromuscular re-education activities to improve: Balance, Coordination, Kinesthetic, Sense, and Posture for 45 minutes. The following activities were included:    Mirror / visual feedback and bilateral performance for all:     Home exercise program: #1  Seated marches 10x2  Seated heel raises 10x2    Home exercise program: #2  Chin tucks 10x2 3s hold  Scapular retractions 10x2 3s hold  Shoulder rolls 10x2     Shoulder elevation 10x2     Home exercise program: #3  Lateral  weightshifting in sitting with elbow bend x15 - min/modA   SONDRA OLEA shoulder flexion 10x2     Home exercise program: #3  [employer] ( Planning)    Bicep curls - bilateral 10x2   With elbow bent (modA) shoulder flexion 10x2    with yellow foam block 10x2       gait training to improve functional mobility and safety for 00 minutes, including:     Rolling Walker (RW) with maxA for left upper extremity positioning   Figure 8 ACE wrap at left ankle/shoe for toe clearance, ease of forward advancement - dependent to mello    SBA throughout for balance  Shyam for verbal cues for step length, sequencing    40 feet x2 reps with one seated restbreak    - 7/10 faigue during first walk   - 7/10 fatigue at end of second walk      Patient Education and Home Exercises     Home Exercises Provided and Patient Education Provided     Education provided:   - home exercise program   - plan of care (POC)  - schedule      Written Home Exercises Provided: yes.  Exercises were reviewed and Lydia was able to demonstrate them prior to the end of the session.  Lydia demonstrated good  understanding of the education provided.      See EMR under Patient Instructions for exercises provided 9/13/2022.    ASSESSMENT   Lydia demonstrating mild improvement in proximal strength at the shoulder and hip with visual feedback through use of the mirror and through bilateral limb engagement. Education on pacing and rest post therapy sessions given this date. Will continue to promote movement, confidence and safe mobility. Continue skilled PT services.       Lydia Is progressing well towards her goals.   Pt prognosis is Guarded.     Pt will continue to benefit from skilled outpatient physical therapy to address the deficits listed in the problem list box on initial evaluation, provide pt/family education and to maximize pt's level of independence in the home and community environment.     Pt's spiritual, cultural and educational needs considered and  pt agreeable to plan of care and goals.     Anticipated barriers to physical therapy: acuity of current injury and fear of re-injury; complexity of case     Goals:   Short Term Goals: 6 weeks   Patient demonstrates improve 5 time sit to stand time to 40 seconds to display improvements in BLE strength  Patient demonstrates improved Timed up and Go score to 30 Seconds or less with AD to improve mobility and decrease risk for falls  Patient demonstrates improved bed mobility to independent to improve tolerance to functional tasks.   Assess functional gait assessment, DGI as appropriate     Long Term Goals: 12 weeks   Patient demonstrates improve 5 time sit to stand time to 20 seconds to display improvements in BLE strength  Patient demonstrates improved Timed up and Go score to 15 Seconds or less with AD to improve mobility and decrease risk for falls  Goals relating to balance testing - Functional Gait Assessment, DGI   Pt will return to work full duty, part time.    PLAN   Plan of care Certification: 9/13/2022 to 12/13/22.     Outpatient Physical Therapy 2-3 times weekly for 12 weeks to include the following interventions: Gait Training, Manual Therapy, Moist Heat/ Ice, Neuromuscular Re-ed, Patient Education, Therapeutic Activities, and Therapeutic Exercise. Patient will be transferred to neuro physical therapist to address issues identified at initial evaluation.      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     Floor transfers for assistance with goal of praying.     Belia Puri, PT, DPT, NCS  10/10/2022

## 2022-10-12 ENCOUNTER — PATIENT OUTREACH (OUTPATIENT)
Dept: ADMINISTRATIVE | Facility: HOSPITAL | Age: 42
End: 2022-10-12
Payer: OTHER GOVERNMENT

## 2022-10-12 ENCOUNTER — CLINICAL SUPPORT (OUTPATIENT)
Dept: REHABILITATION | Facility: HOSPITAL | Age: 42
End: 2022-10-12
Payer: OTHER MISCELLANEOUS

## 2022-10-12 DIAGNOSIS — R53.1 DECREASED STRENGTH, ENDURANCE, AND MOBILITY: Primary | ICD-10-CM

## 2022-10-12 DIAGNOSIS — Z74.09 DECREASED STRENGTH, ENDURANCE, AND MOBILITY: Primary | ICD-10-CM

## 2022-10-12 DIAGNOSIS — R68.89 DECREASED STRENGTH, ENDURANCE, AND MOBILITY: Primary | ICD-10-CM

## 2022-10-12 PROCEDURE — 97116 GAIT TRAINING THERAPY: CPT | Mod: PN

## 2022-10-12 PROCEDURE — 97112 NEUROMUSCULAR REEDUCATION: CPT | Mod: PN

## 2022-10-12 NOTE — PROGRESS NOTES
[facility]   Physical Therapy Treatment Note     [facility]  [facility] [id number]    Therapy Diagnosis:   Encounter Diagnosis   Name Primary?    Decreased strength, endurance, and mobility Yes       Physician: Conrad Gutierres MD    Visit Date: 10/12/2022    Physician Orders: PT Eval and Treat   Medical Diagnosis from Referral:   R29.898 (ICD-10-CM) - Weakness of left lower extremity   R29.898 (ICD-10-CM) - Weakness of left upper extremity      Evaluation Date: 9/13/2022  Authorization Period Expiration: 08/15/2023  Plan of Care Expiration: 12/13/22  Progress Note Due: 10/13/22  Visit # / Visits authorized: 8/ 12    PTA Visit #: 0/5     Time In: 9:30 AM  Time Out: 10:15 AM  Total Billable Time: 45 minutes    Precautions: Standard and anemia, exercise induced asthma    SUBJECTIVE   Pt reports: After leaving Monday, states she threw up for the rest of the day and next morning. States she has an instant oatmeal before session today.     She was compliant with home exercise program.  Response to previous treatment: fatigue and headache after PT / Neurologist and home exercise program at home   Functional change: ongoing     Pain: 0/10  Location: NA    OBJECTIVE     Objective Measures updated at progress report unless specified.     Treatment     Lydia received the treatments listed below:      neuromuscular re-education activities to improve: Balance, Coordination, Kinesthetic, Sense, and Posture for 25 minutes. The following activities were included:    Mirror / visual feedback and bilateral performance for all:     Shoulder elevation 10x2   Bicep curls - bilateral 10x2   With elbow bent (modA) shoulder flexion 10x2    with yellow foam block 10x2   Shoulder rolls 2x10 ea side  Scap Retraction x10    gait training to improve functional mobility and safety for 20 minutes, including:     Rolling Walker (RW) with maxA for left upper extremity positioning   Figure 8 ACE  "wrap at left ankle/shoe for toe clearance, ease of forward advancement - dependent to mello    SBA throughout for balance  Shyam for verbal cues for step length, sequencing    ~80 ft reps with one seated restbreak    - 8/10 fatigue during first walk   - 7/10 fatigue at end of second walk      Patient Education and Home Exercises     Home Exercises Provided and Patient Education Provided     Education provided:   - home exercise program   - plan of care (POC)  - schedule      Written Home Exercises Provided: yes.  Exercises were reviewed and Lydia was able to demonstrate them prior to the end of the session.  Lydia demonstrated good  understanding of the education provided.      See EMR under Patient Instructions for exercises provided 9/13/2022.    ASSESSMENT   Lydia tolerated session fair overall with no adverse response noted. Pt with significant difficulty engaging L UE, even with visual feedback of mirror. Requiring max A from PT for L UE exercises. Pt with appropriate challenge noted during gait this session, long rest break given in between ea set of ambulation. At end of session, pt reporting significant fatigue/"nausea" when ambulating to exit. Pt given w/c at this time and wheeled into lobby. Pt reporting feeling of nausea. Encouraged to rest in seated with focus on deep breathing strategies. Pt escorted to vehicle/uber by PT tech this date for safety. Plan to adjust next session based on response of nausea/fatigue. Will continue to promote movement, confidence and safe mobility. Continue skilled PT services.       Lydia Is progressing well towards her goals.   Pt prognosis is Guarded.     Pt will continue to benefit from skilled outpatient physical therapy to address the deficits listed in the problem list box on initial evaluation, provide pt/family education and to maximize pt's level of independence in the home and community environment.     Pt's spiritual, cultural and educational needs considered " and pt agreeable to plan of care and goals.     Anticipated barriers to physical therapy: acuity of current injury and fear of re-injury; complexity of case     Goals:   Short Term Goals: 6 weeks   Patient demonstrates improve 5 time sit to stand time to 40 seconds to display improvements in BLE strength  Patient demonstrates improved Timed up and Go score to 30 Seconds or less with AD to improve mobility and decrease risk for falls  Patient demonstrates improved bed mobility to independent to improve tolerance to functional tasks.   Assess functional gait assessment, DGI as appropriate     Long Term Goals: 12 weeks   Patient demonstrates improve 5 time sit to stand time to 20 seconds to display improvements in BLE strength  Patient demonstrates improved Timed up and Go score to 15 Seconds or less with AD to improve mobility and decrease risk for falls  Goals relating to balance testing - Functional Gait Assessment, DGI   Pt will return to work full duty, part time.    PLAN   Plan of care Certification: 9/13/2022 to 12/13/22.     Outpatient Physical Therapy 2-3 times weekly for 12 weeks to include the following interventions: Gait Training, Manual Therapy, Moist Heat/ Ice, Neuromuscular Re-ed, Patient Education, Therapeutic Activities, and Therapeutic Exercise. Patient will be transferred to neuro physical therapist to address issues identified at initial evaluation.      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     Tanisha Silva, PT, DPT  10/17/2022

## 2022-10-14 DIAGNOSIS — R29.898 WEAKNESS OF LEFT UPPER EXTREMITY: ICD-10-CM

## 2022-10-14 DIAGNOSIS — R29.898 WEAKNESS OF LEFT LOWER EXTREMITY: Primary | ICD-10-CM

## 2022-11-02 ENCOUNTER — HOSPITAL ENCOUNTER (OUTPATIENT)
Dept: RADIOLOGY | Facility: HOSPITAL | Age: 42
Discharge: HOME | End: 2022-11-02
Attending: INTERNAL MEDICINE
Payer: OTHER GOVERNMENT

## 2022-11-02 ENCOUNTER — CLINICAL SUPPORT (OUTPATIENT)
Dept: REHABILITATION | Facility: HOSPITAL | Age: 42
End: 2022-11-02
Payer: OTHER MISCELLANEOUS

## 2022-11-02 VITALS — WEIGHT: 142.88 LBS | BODY MASS INDEX: 24.39 KG/M2 | HEIGHT: 64 IN

## 2022-11-02 DIAGNOSIS — Z74.09 DECREASED STRENGTH, ENDURANCE, AND MOBILITY: Primary | ICD-10-CM

## 2022-11-02 DIAGNOSIS — Z12.31 BREAST CANCER SCREENING BY MAMMOGRAM: ICD-10-CM

## 2022-11-02 DIAGNOSIS — R68.89 DECREASED STRENGTH, ENDURANCE, AND MOBILITY: Primary | ICD-10-CM

## 2022-11-02 DIAGNOSIS — R53.1 DECREASED STRENGTH, ENDURANCE, AND MOBILITY: Primary | ICD-10-CM

## 2022-11-02 PROCEDURE — 77063 BREAST TOMOSYNTHESIS BI: CPT | Mod: TC

## 2022-11-02 PROCEDURE — 97112 NEUROMUSCULAR REEDUCATION: CPT | Mod: PN

## 2022-11-02 PROCEDURE — 77063 BREAST TOMOSYNTHESIS BI: CPT | Mod: 26,,, | Performed by: RADIOLOGY

## 2022-11-02 PROCEDURE — 77063 MAMMO DIGITAL SCREENING BILAT WITH TOMO: ICD-10-PCS | Mod: 26,,, | Performed by: RADIOLOGY

## 2022-11-02 PROCEDURE — 77067 SCR MAMMO BI INCL CAD: CPT | Mod: 26,,, | Performed by: RADIOLOGY

## 2022-11-02 PROCEDURE — 77067 MAMMO DIGITAL SCREENING BILAT WITH TOMO: ICD-10-PCS | Mod: 26,,, | Performed by: RADIOLOGY

## 2022-11-02 PROCEDURE — 77067 SCR MAMMO BI INCL CAD: CPT | Mod: TC

## 2022-11-02 NOTE — PROGRESS NOTES
[facility]   Physical Therapy Treatment Note     Name: Lydia Ramirez [facility]  Clinic Number: [id number]    Therapy Diagnosis:   Encounter Diagnosis   Name Primary?    Decreased strength, endurance, and mobility Yes         Physician: Conrad Gutierres MD    Visit Date: 11/2/2022    Physician Orders: PT Eval and Treat   Medical Diagnosis from Referral:   R29.898 (ICD-10-CM) - Weakness of left lower extremity   R29.898 (ICD-10-CM) - Weakness of left upper extremity      Evaluation Date: 9/13/2022  Authorization Period Expiration: 08/15/2023  Plan of Care Expiration: 12/13/22  Progress Note Due: 10/13/22  Visit # / Visits authorized: 9/ 12    PTA Visit #: 0/5     Time In: 806 AM (late)  Time Out: 859 AM  Total Billable Time:  53 minutes    Precautions: Standard and anemia, exercise induced asthma    SUBJECTIVE   Pt reports: On Monday, she states she tried sleeping in bed but was unable to get out of bed in the morning and that is why she was not able to attend therapy. She was stuck in bed until her  came back, reports that she did have an accident in bed. She states that she is compliant with home exercise program but has a lot of migraines after.     She was compliant with home exercise program.  Response to previous treatment: fatigue and headache after PT / Neurologist and home exercise program at home   Functional change: ongoing     Pain: 0/10  Location: NA    OBJECTIVE     Objective Measures updated at progress report unless specified.     Treatment     Lydia received the treatments listed below:      neuromuscular re-education activities to improve: Balance, Coordination, Kinesthetic, Sense, and Posture for 57 minutes. The following activities were included:    Mirror / visual feedback and bilateral performance for all:     Shoulder elevation 10x3    -verbal cues with breathing technique  Shoulder rolls 3x10 ea side  Ankle pumps 3x10  Knee marches 3x10  Bicep curls - bilateral  10x3  With elbow bent (modA) shoulder flexion 10x2     Sit to stands with bilateral weight shifts. 3x10    -patient education as listed below      Patient Education and Home Exercises     Home Exercises Provided and Patient Education Provided     Education provided:   - home exercise program   - plan of care with current condition(POC)  - scheduling adjustment for the future   -Proper breathing technique (Max verbal cues within session)  -Importance of not going to intense on home exercise program and taking appropriate breaks     Written Home Exercises Provided: yes.  Exercises were reviewed and Lydia was able to demonstrate them prior to the end of the session.  Lydia demonstrated good  understanding of the education provided.      See EMR under Patient Instructions for exercises provided 9/13/2022.    ASSESSMENT   Lydia tolerated session fair overall with no adverse response noted. Patient was able to work on left lower extremity and upper extremity engagement with visual feedback from mirror. Patient required max A for left upper extremity exercises with slight activation of wrist flexors and elbow flexors with tapping neuro facilitation technique. Patient required max verbal cues for deep breathing and calming strategies once fatigue/nausea episodes started. Patient benefited from rest breaks with guided deep breathing during those episodes in order to relieve symptoms. Pt escorted to vehicle/uber by PT tech this date for safety. Patient will continue to benefit from skilled Physical Therapy services to promote muscle movement, weight bearing on left lower extremity, confidence, and mobility safety.      Lydia Is progressing well towards her goals.   Pt prognosis is Guarded.     Pt will continue to benefit from skilled outpatient physical therapy to address the deficits listed in the problem list box on initial evaluation, provide pt/family education and to maximize pt's level of independence in the home  and community environment.     Pt's spiritual, cultural and educational needs considered and pt agreeable to plan of care and goals.     Anticipated barriers to physical therapy: acuity of current injury and fear of re-injury; complexity of case     Goals:   Short Term Goals: 6 weeks   Patient demonstrates improve 5 time sit to stand time to 40 seconds to display improvements in BLE strength  Patient demonstrates improved Timed up and Go score to 30 Seconds or less with AD to improve mobility and decrease risk for falls  Patient demonstrates improved bed mobility to independent to improve tolerance to functional tasks.   Assess functional gait assessment, DGI as appropriate     Long Term Goals: 12 weeks   Patient demonstrates improve 5 time sit to stand time to 20 seconds to display improvements in BLE strength  Patient demonstrates improved Timed up and Go score to 15 Seconds or less with AD to improve mobility and decrease risk for falls  Goals relating to balance testing - Functional Gait Assessment, DGI   Pt will return to work full duty, part time.    PLAN   Plan of care Certification: 9/13/2022 to 12/13/22.     Outpatient Physical Therapy 2-3 times weekly for 12 weeks to include the following interventions: Gait Training, Manual Therapy, Moist Heat/ Ice, Neuromuscular Re-ed, Patient Education, Therapeutic Activities, and Therapeutic Exercise. Patient will be transferred to neuro physical therapist to address issues identified at initial evaluation.      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     Tatiana Souza, PT, DPT  11/02/2022

## 2022-11-03 ENCOUNTER — PATIENT MESSAGE (OUTPATIENT)
Dept: FAMILY MEDICINE | Facility: CLINIC | Age: 42
End: 2022-11-03

## 2022-11-03 ENCOUNTER — PATIENT MESSAGE (OUTPATIENT)
Dept: FAMILY MEDICINE | Facility: CLINIC | Age: 42
End: 2022-11-03
Payer: OTHER GOVERNMENT

## 2022-11-03 ENCOUNTER — OFFICE VISIT (OUTPATIENT)
Dept: FAMILY MEDICINE | Facility: CLINIC | Age: 42
End: 2022-11-03
Payer: OTHER GOVERNMENT

## 2022-11-03 DIAGNOSIS — F43.10 PTSD (POST-TRAUMATIC STRESS DISORDER): Primary | ICD-10-CM

## 2022-11-03 PROCEDURE — 99213 PR OFFICE/OUTPT VISIT, EST, LEVL III, 20-29 MIN: ICD-10-PCS | Mod: 95,,, | Performed by: PHYSICIAN ASSISTANT

## 2022-11-03 PROCEDURE — 99213 OFFICE O/P EST LOW 20 MIN: CPT | Mod: 95,,, | Performed by: PHYSICIAN ASSISTANT

## 2022-11-03 NOTE — PROGRESS NOTES
Answers submitted by the patient for this visit:  Review of Systems Questionnaire (Submitted on 11/3/2022)  activity change: Yes  unexpected weight change: No  neck pain: Yes  hearing loss: Yes  rhinorrhea: No  trouble swallowing: No  eye discharge: No  visual disturbance: Yes  chest tightness: No  wheezing: No  chest pain: No  palpitations: No  blood in stool: No  constipation: No  vomiting: Yes  diarrhea: No  polydipsia: Yes  polyuria: No  difficulty urinating: No  hematuria: No  menstrual problem: No  dysuria: No  joint swelling: No  arthralgias: Yes  headaches: Yes  weakness: Yes  confusion: Yes  dysphoric mood: No

## 2022-11-03 NOTE — PROGRESS NOTES
[facility]   Physical Therapy Treatment Note - Progress Note    Name: Lydia Thomas  Clinic Number: [id number]    Therapy Diagnosis:   Encounter Diagnosis   Name Primary?    Decreased strength, endurance, and mobility Yes     Physician: Conrad Gutierres MD    Visit Date: 11/4/2022    Physician Orders: PT Eval and Treat   Medical Diagnosis from Referral:   R29.898 (ICD-10-CM) - Weakness of left lower extremity   R29.898 (ICD-10-CM) - Weakness of left upper extremity      Evaluation Date: 9/13/2022  Authorization Period Expiration: 08/15/2023  Plan of Care Expiration: 12/13/22  Progress Note Due: 10/13/22  Visit # / Visits authorized: 10/ 12   PN: 12/4/22    PTA Visit #: 0/5     Time In: 8:50   Time Out: 9:30  Total Billable Time:  40 minutes    Precautions: Standard and anemia, exercise induced asthma    SUBJECTIVE   Pt reports: has been throwing up due to headaches that are severe. Has seen neurology and he is recommending psychology alongside therapy services. Patient would like this writer to speak with mother this date via phone.     She was compliant with home exercise program.  Response to previous treatment: fatigue and headaches  Functional change: ongoing     Pain: 0/10  Location: NA    OBJECTIVE     Objective Measures updated at progress report unless specified.     Lower Extremity Strength   RLE LLE   Hip Flexion: 4/5 3-/5   Hip Extension:  NT NT   Hip Abduction: 4/5 2/5   Hip Adduction: 4/5 2/5   Knee Extension: 4/5 3-/5   Knee Flexion: 4/5 3-/5   Ankle Dorsiflexion: 4/5 3-/5     Abdominal Strength: 3+/5     Evaluation 11/4/22   Single Limb Stance R LE NT  (<10 sec = HIGH FALL RISK) NT   Single Limb Stance L LE NT  (<10 sec = HIGH FALL RISK) NT   5 times sit-stand 52 seconds    >12 sec= fall risk for general elderly  >16 sec= fall risk for Parkinson's disease  >10 sec= balance/vestibular dysfunction (<59 y/o)  >14.2 sec= balance/vestibular dysfunction (>59 y/o)  >12 sec=  "fall risk for CVA 42 seconds - right upper extremity    Curran/ FGA/ Tinetti NT NT     Gait Assessment:   - AD used: none  - Assistance: SBA/CGA  - Distance: 50+ ft     GAIT DEVIATIONS:  Gait component performance: decreased speed, step length (left), step clearance (left), improved hip flexion however progression of left lower extremity has a "step and drag" gait sequence which has continued, improvement with visual and verbal cues to elevate    Endurance Deficit: severe      Evaluation 11/4/22   Timed Up and Go 50.81 sec  < 20 sec safe for independent transfers,     < 30 sec assist required for transfers 43 seconds - right upper extremity    6 meter walk test NT 0.33 m/s   6 min walk test NT NT      Functional Mobility (Bed mobility, transfers)  Bed mobility: Mod A (tall bed limiting)   Supine to sit: Min A  Sit to supine: Min A  Rolling: Min A  Transfers to bed: Min A (tall bed limiting)  Transfers to toilet: Min A  Sit to stand:  Min A  --> SBA   Stand pivot:  Min A --> SBA   Car transfers: Mod A --> SBA   Wheelchair mobility: NA  Floor transfers: NA     Treatment     Lydia received the treatments listed below:      neuromuscular re-education activities to improve: Balance, Coordination, Kinesthetic, Sense, and Posture for 45 minutes. The following activities were included:    Objective as above     Mirror / visual feedback and bilateral performance for all:     Shoulder elevation 10x3   Shoulder rolls 10x3   Scapular retractions 10x3       NOT PERFORMED: TIME  Ankle pumps 3x10  Knee marches 3x10  Bicep curls - bilateral 10x3  With elbow bent (modA) shoulder flexion 10x2   Sit to stands with bilateral weight shifts. 3x10      Patient education as listed below      Patient Education and Home Exercises     Home Exercises Provided and Patient Education Provided     Education provided:   -Home exercise program   -Plan of care with current condition(POC)  -Scheduling adjustment for the future   -Proper breathing " technique (Max verbal cues within session)  -Importance of not going to intense on home exercise program and taking appropriate breaks     Written Home Exercises Provided: yes.  Exercises were reviewed and Lydia was able to demonstrate them prior to the end of the session.  Lydia demonstrated good  understanding of the education provided.      See EMR under Patient Instructions for exercises provided 9/13/2022.    ASSESSMENT   Lydia demonstrating continued mild improvement in active muscular engagement throughout the left hemibody. As said in initial note with this writer - patient would benefit greatly from referrals/orders to Neuro Occupational Therapy and Neuro Speech Therapy. This was discussed with  and workers comp would like to proceed with only Physical Therapy services moving forward at this time. Increased education given and increased feedback utilized through mirror and tactile cuing throughout session. Patient able to tolerate increased time in standing and within ambulatory tasks. Patient limited in bed mobility and in unable to sleep in bed at this time due to increased challenge rolling, managing sheets and completing mobility. Patient requires max verbal cues for deep breathing and seated restbreaks to control fatigue and headaches throughout session. Patient will continue to benefit from skilled Physical Therapy services to promote muscle movement, weight bearing on left lower extremity, confidence, and mobility safety.      Lydia Is progressing well towards her goals.   Pt prognosis is Guarded.     Pt will continue to benefit from skilled outpatient physical therapy to address the deficits listed in the problem list box on initial evaluation, provide pt/family education and to maximize pt's level of independence in the home and community environment.     Pt's spiritual, cultural and educational needs considered and pt agreeable to plan of care and goals.     Anticipated barriers to  physical therapy: acuity of current injury and fear of re-injury; complexity of case     Goals: as of 11/8/22  Short Term Goals: 6 weeks   Patient demonstrates improve 5 time sit to stand time to 40 seconds to display improvements in BLE strength. - improving  Patient demonstrates improved Timed up and Go score to 30 Seconds or less with AD to improve mobility and decrease risk for falls. - improving  Patient demonstrates improved bed mobility to independent to improve tolerance to functional tasks. - ongoing  Assess functional gait assessment, DGI as appropriate.      Long Term Goals: 12 weeks   Patient demonstrates improve 5 time sit to stand time to 20 seconds to display improvements in BLE strength. - improving  Patient demonstrates improved Timed up and Go score to 15 Seconds or less with AD to improve mobility and decrease risk for falls. - improving  Goals relating to balance testing - Functional Gait Assessment, DGI. - ongoing  4.   Pt will return to work full duty, part time. - ongoing    PLAN   Current Plan of care Certification: 9/13/2022 to 12/13/22.     Current plan of care (POC): Outpatient Physical Therapy 2-3 times weekly for 12 weeks to include the following interventions: Gait Training, Manual Therapy, Moist Heat/ Ice, Neuromuscular Re-ed, Patient Education, Therapeutic Activities, and Therapeutic Exercise. Patient will be transferred to neuro physical therapist to address issues identified at initial evaluation.      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     Belia Puri, PT, DPT, NCS  11/08/2022

## 2022-11-03 NOTE — LETTER
November 3, 2022    Lydia Thomas  [address] [facility] [address]             [place] [address] [facility] [place] [address]  Phone: [phone]  Fax: [phone] To whom it may concern:    Patient report falling at work which caused a post-tramatic stress disorder reaction. 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 I recommend she stay off work until cleared by psychology.

## 2022-11-03 NOTE — PROGRESS NOTES
Subjective:       Patient ID: Lydia Thomas is a 42 y.o. female.    Chief Complaint: Fall    The patient location is: [facility]  The chief complaint leading to consultation is: Seeking medical help    Visit type: audiovisual    Face to Face time with patient: 20 minutes of total time spent on the encounter, which includes face to face time and non-face to face time preparing to see the patient (eg, review of tests), Obtaining and/or reviewing separately obtained history, Documenting clinical information in the electronic or other health record, Independently interpreting results (not separately reported) and communicating results to the patient/family/caregiver, or Care coordination (not separately reported).         Each patient to whom he or she provides medical services by telemedicine is:  (1) informed of the relationship between the physician and patient and the respective role of any other health care provider with respect to management of the patient; and (2) notified that he or she may decline to receive medical services by telemedicine and may withdraw from such care at any time.    Notes: Patient is a 41 yo female who is following up with me in a urgent care setting. She apparently fell at work sustaining an head injury. She states that her primary care provider refused to see her due to it jazzy workman's comp, so she is seeing me for help with this matter. She reports being knocked unconscious during the fall. She report this left her her with left side facial, upper body and lower body partial paralysis and parenthesis. She has been followed by PT and neurology. Scans were performed which failed to show any anatomical cause. It was felt by neurology that it was a form of Post traumatic reaction and she needed to see Psychology.        Review of Systems   Constitutional:  Positive for activity change. Negative for unexpected weight change.   HENT:  Positive for hearing loss. Negative for rhinorrhea  and trouble swallowing.    Eyes:  Positive for visual disturbance. Negative for discharge.   Respiratory:  Negative for chest tightness and wheezing.    Cardiovascular:  Negative for chest pain and palpitations.   Gastrointestinal:  Negative for blood in stool, constipation and diarrhea.   Endocrine: Positive for polydipsia. Negative for polyuria.   Genitourinary:  Negative for difficulty urinating, dysuria and menstrual problem.   Musculoskeletal:  Positive for arthralgias and neck pain. Negative for joint swelling.   Neurological:  Positive for weakness.   Psychiatric/Behavioral:  Positive for confusion. Negative for dysphoric mood.        Objective:      Physical Exam  Constitutional:       General: She is not in acute distress.     Appearance: Normal appearance. She is not ill-appearing, toxic-appearing or diaphoretic.   Musculoskeletal:      Comments: Patient could not left up her left arm when asked. However exam threw a virtual visit is extremely limited.    Neurological:      Mental Status: She is alert.       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 Work excuse given.

## 2022-11-04 ENCOUNTER — CLINICAL SUPPORT (OUTPATIENT)
Dept: REHABILITATION | Facility: HOSPITAL | Age: 42
End: 2022-11-04
Payer: OTHER MISCELLANEOUS

## 2022-11-04 DIAGNOSIS — R53.1 DECREASED STRENGTH, ENDURANCE, AND MOBILITY: Primary | ICD-10-CM

## 2022-11-04 DIAGNOSIS — Z74.09 DECREASED STRENGTH, ENDURANCE, AND MOBILITY: Primary | ICD-10-CM

## 2022-11-04 DIAGNOSIS — R68.89 DECREASED STRENGTH, ENDURANCE, AND MOBILITY: Primary | ICD-10-CM

## 2022-11-04 PROCEDURE — 97112 NEUROMUSCULAR REEDUCATION: CPT | Mod: PN

## 2022-11-08 ENCOUNTER — CLINICAL SUPPORT (OUTPATIENT)
Dept: REHABILITATION | Facility: HOSPITAL | Age: 42
End: 2022-11-08
Payer: OTHER MISCELLANEOUS

## 2022-11-08 DIAGNOSIS — Z74.09 DECREASED STRENGTH, ENDURANCE, AND MOBILITY: Primary | ICD-10-CM

## 2022-11-08 DIAGNOSIS — R53.1 DECREASED STRENGTH, ENDURANCE, AND MOBILITY: Primary | ICD-10-CM

## 2022-11-08 DIAGNOSIS — R68.89 DECREASED STRENGTH, ENDURANCE, AND MOBILITY: Primary | ICD-10-CM

## 2022-11-08 PROCEDURE — 97112 NEUROMUSCULAR REEDUCATION: CPT | Mod: PN

## 2022-11-08 NOTE — PROGRESS NOTES
"[facility]   Physical Therapy Treatment Note     [facility]  [facility] Number: [id number]    Therapy Diagnosis:   Encounter Diagnosis   Name Primary?    Decreased strength, endurance, and mobility Yes       Physician: Conrad Gutierres MD    Visit Date: 11/8/2022    Physician Orders: PT Eval and Treat   Medical Diagnosis from Referral:   R29.898 (ICD-10-CM) - Weakness of left lower extremity   R29.898 (ICD-10-CM) - Weakness of left upper extremity      Evaluation Date: 9/13/2022  Authorization Period Expiration: 08/15/2023  Plan of Care Expiration: 12/13/22  Progress Note Due: 10/13/22  Visit # / Visits authorized: 11/ 12   PN: 12/4/22    Time In: 8:00 AM  Time Out: 8:45 AM  Total Billable Time:  45 minutes    Precautions: Standard and anemia, exercise induced asthma    SUBJECTIVE   Pt reports: Pt had a migraine last night and today she is feeling "foggy". Took a sumatriptan shot when migraine started. Now having L sided pain at eye/ headache. States she feels like she did too much at home.     She was compliant with home exercise program.  Response to previous treatment:fatigue/nausea   Functional change: ongoing     Pain: 6/10  Location: L sided eye pain/headache    OBJECTIVE     None taken today.     Treatment     Lydia received the treatments listed below:      neuromuscular re-education activities to improve: Balance, Coordination, Kinesthetic, Sense, and Posture for 45 minutes. The following activities were included:    Mirror / visual feedback and bilateral performance for all:     Shoulder elevation 15x2   Shoulder rolls 15x2   Scapular retractions 10x3, 3 sec holds    Ankle pumps 3x10  Knee marches 3x10    Mirror in between upper extremities- attempting mirror therapy  Bicep curls - bilateral 10x3 - mod A from therapist on L UE  With elbow bent (modA) shoulder flexion 10x2 - mod A from therapist on L UE    Gait in and out of clinic with mod vc for L UE movement as " able x ~80 ft      Patient Education and Home Exercises     Home Exercises Provided and Patient Education Provided     Education provided:   - home exercise program   - plan of care with current condition(POC)  -Proper breathing technique (Max verbal cues within session)  -Importance of not going to intense on home exercise program and taking appropriate breaks     Written Home Exercises Provided: yes.  Exercises were reviewed and Lydia was able to demonstrate them prior to the end of the session.  Lydia demonstrated good  understanding of the education provided.      See EMR under Patient Instructions for exercises provided 9/13/2022.    ASSESSMENT   Lydia tolerated session well overall with no adverse response noted. Moderate vc given throughout session to pace activity and focus on deep breathing techniques. Attempted shoulder flexion and bicep curls with mirror in between R and L UE this session to improve activation of L side. Mod A given for shoulder flexion and bicep curls from therapist in order to achieve full ROM. Pt progressing well with therapy at this time.     Lydia Is progressing well towards her goals.   Pt prognosis is Guarded.     Pt will continue to benefit from skilled outpatient physical therapy to address the deficits listed in the problem list box on initial evaluation, provide pt/family education and to maximize pt's level of independence in the home and community environment.     Pt's spiritual, cultural and educational needs considered and pt agreeable to plan of care and goals.     Anticipated barriers to physical therapy: acuity of current injury and fear of re-injury; complexity of case     Goals:   Short Term Goals: 6 weeks   Patient demonstrates improve 5 time sit to stand time to 40 seconds to display improvements in BLE strength  Patient demonstrates improved Timed up and Go score to 30 Seconds or less with AD to improve mobility and decrease risk for falls  Patient demonstrates  improved bed mobility to independent to improve tolerance to functional tasks.   Assess functional gait assessment, DGI as appropriate     Long Term Goals: 12 weeks   Patient demonstrates improve 5 time sit to stand time to 20 seconds to display improvements in BLE strength  Patient demonstrates improved Timed up and Go score to 15 Seconds or less with AD to improve mobility and decrease risk for falls  Goals relating to balance testing - Functional Gait Assessment, DGI   Pt will return to work full duty, part time.    PLAN   Plan of care Certification: 9/13/2022 to 12/13/22.     Outpatient Physical Therapy 2-3 times weekly for 12 weeks to include the following interventions: Gait Training, Manual Therapy, Moist Heat/ Ice, Neuromuscular Re-ed, Patient Education, Therapeutic Activities, and Therapeutic Exercise. Patient will be transferred to neuro physical therapist to address issues identified at initial evaluation.     Trial Nu-Step next session.    Tanisha Silva, PT, DPT  11/15/2022

## 2022-11-15 ENCOUNTER — CLINICAL SUPPORT (OUTPATIENT)
Dept: REHABILITATION | Facility: HOSPITAL | Age: 42
End: 2022-11-15
Payer: OTHER MISCELLANEOUS

## 2022-11-15 DIAGNOSIS — R68.89 DECREASED STRENGTH, ENDURANCE, AND MOBILITY: Primary | ICD-10-CM

## 2022-11-15 DIAGNOSIS — Z74.09 DECREASED STRENGTH, ENDURANCE, AND MOBILITY: Primary | ICD-10-CM

## 2022-11-15 DIAGNOSIS — R53.1 DECREASED STRENGTH, ENDURANCE, AND MOBILITY: Primary | ICD-10-CM

## 2022-11-15 PROCEDURE — 97112 NEUROMUSCULAR REEDUCATION: CPT | Mod: PN

## 2022-11-15 NOTE — PROGRESS NOTES
[facility]   Physical Therapy Treatment Note     Name: Lydia Waiteid  Clinic Number: [id number]    Therapy Diagnosis:   Encounter Diagnosis   Name Primary?    Decreased strength, endurance, and mobility Yes       Physician: Conrad Gutierres MD    Visit Date: 11/15/2022    Physician Orders: PT Eval and Treat   Medical Diagnosis from Referral:   R29.898 (ICD-10-CM) - Weakness of left lower extremity   R29.898 (ICD-10-CM) - Weakness of left upper extremity      Evaluation Date: 9/13/2022  Authorization Period Expiration: 08/15/2023  Plan of Care Expiration: 12/13/22  Progress Note Due: 10/13/22  Visit # / Visits authorized: 12/ 12   PN: 12/4/22    PTA Visit #: 0/5     Time In: 8:00 AM  Time Out: 8:45 AM  Total Billable Time:  45 minutes    Precautions: Standard and anemia, exercise induced asthma    SUBJECTIVE   Pt reports: Had a fall while walking when her mom was in town. States she also has migraine right now. Felt better last time when leaving compared to previous sessions.     She was compliant with home exercise program.  Response to previous treatment: no nausea, improved tolerance, some fatigue  Functional change: ongoing     Pain: 6/10  Location: L sided eye pain/headache    OBJECTIVE     None taken today.     Treatment     Lydia received the treatments listed below:      neuromuscular re-education activities to improve: Balance, Coordination, Kinesthetic, Sense, and Posture for 45 minutes. The following activities were included:    Nu-Step x 5 min - B UE/LE - intermittent vc to maintain L UE  on Nu-Step    Mirror / visual feedback and bilateral performance for all:     Shoulder elevation 15x2   Shoulder rolls 10x3   Scapular retractions 10x3, 3 sec holds    Ankle pumps 3x10  Knee marches 3x10    Seated UE weight shifts - alternating 2x10 ea     Sit to stands with bilateral weight shifts 3x10 - no UE support    Patient Education and Home Exercises     Home Exercises  Provided and Patient Education Provided     Education provided:   - home exercise program   - plan of care with current condition(POC)  -Proper breathing techniques (Max verbal cues within session)     Written Home Exercises Provided: yes.  Exercises were reviewed and Lydia was able to demonstrate them prior to the end of the session.  Lydia demonstrated good  understanding of the education provided.      See EMR under Patient Instructions for exercises provided 9/13/2022.    ASSESSMENT   Lydia tolerated session well today overall with no adverse response noted. No nausea reported at end of session today, some fatigue. Pt tolerated addition of Nu-Step today with good challenge noted. Pt requiring mod vc to maintain L UE  on handle. Pt requiring mod vc throughout session to focus on diaphragmatic breathing techniques. Improved use of L LE noted during sit to stands this session, less reliance on mat and R UE. Also improved natural arm swing of L UE noted following Nu-Step. Pt remains appropriate for therapy at this time.     Lydia Is progressing well towards her goals.   Pt prognosis is Guarded.     Pt will continue to benefit from skilled outpatient physical therapy to address the deficits listed in the problem list box on initial evaluation, provide pt/family education and to maximize pt's level of independence in the home and community environment.     Pt's spiritual, cultural and educational needs considered and pt agreeable to plan of care and goals.     Anticipated barriers to physical therapy: acuity of current injury and fear of re-injury; complexity of case     Goals:   Short Term Goals: 6 weeks   Patient demonstrates improve 5 time sit to stand time to 40 seconds to display improvements in BLE strength  Patient demonstrates improved Timed up and Go score to 30 Seconds or less with AD to improve mobility and decrease risk for falls  Patient demonstrates improved bed mobility to independent to improve  tolerance to functional tasks.   Assess functional gait assessment, DGI as appropriate     Long Term Goals: 12 weeks   Patient demonstrates improve 5 time sit to stand time to 20 seconds to display improvements in BLE strength  Patient demonstrates improved Timed up and Go score to 15 Seconds or less with AD to improve mobility and decrease risk for falls  Goals relating to balance testing - Functional Gait Assessment, DGI   Pt will return to work full duty, part time.    PLAN   Plan of care Certification: 9/13/2022 to 12/13/22.     Outpatient Physical Therapy 2-3 times weekly for 12 weeks to include the following interventions: Gait Training, Manual Therapy, Moist Heat/ Ice, Neuromuscular Re-ed, Patient Education, Therapeutic Activities, and Therapeutic Exercise. Patient will be transferred to neuro physical therapist to address issues identified at initial evaluation.      Continue Nu-Step next session, integrate supine exercises for L UE (possibly with dowel), trial standing exercises as able    Tanisha Silva, PT, DPT  11/15/2022

## 2022-12-19 ENCOUNTER — TELEPHONE (OUTPATIENT)
Dept: ORTHOPEDICS | Facility: CLINIC | Age: 42
End: 2022-12-19
Payer: OTHER GOVERNMENT

## 2023-01-09 ENCOUNTER — PATIENT MESSAGE (OUTPATIENT)
Dept: ADMINISTRATIVE | Facility: HOSPITAL | Age: 43
End: 2023-01-09
Payer: OTHER GOVERNMENT

## 2023-01-12 ENCOUNTER — OFFICE VISIT (OUTPATIENT)
Dept: FAMILY MEDICINE | Facility: CLINIC | Age: 43
End: 2023-01-12
Payer: OTHER GOVERNMENT

## 2023-01-12 DIAGNOSIS — M54.32 SCIATICA OF LEFT SIDE: Primary | ICD-10-CM

## 2023-01-12 PROCEDURE — 99213 PR OFFICE/OUTPT VISIT, EST, LEVL III, 20-29 MIN: ICD-10-PCS | Mod: 95,,, | Performed by: PHYSICIAN ASSISTANT

## 2023-01-12 PROCEDURE — 99213 OFFICE O/P EST LOW 20 MIN: CPT | Mod: 95,,, | Performed by: PHYSICIAN ASSISTANT

## 2023-01-12 RX ORDER — MELOXICAM 15 MG/1
15 TABLET ORAL DAILY
Qty: 90 TABLET | Refills: 0 | Status: SHIPPED | OUTPATIENT
Start: 2023-01-12 | End: 2023-06-08

## 2023-01-12 NOTE — PROGRESS NOTES
Answers submitted by the patient for this visit:  Back Pain Questionnaire (Submitted on 1/12/2023)  Chief Complaint: Back pain  Chronicity: recurrent  Onset: more than 1 year ago  Frequency: constantly  Progression since onset: rapidly worsening  Pain location: gluteal, lumbar spine, sacro-iliac  Pain quality: aching, burning, shooting, stabbing  Radiates to: left foot, left thigh  Pain - numeric: 9/10  Pain is: the same all the time  Aggravated by: bending, position, sitting, standing, twisting  Stiffness is present: all day  abdominal pain: No  bladder incontinence: No  bowel incontinence: No  chest pain: No  dysuria: No  fever: No  headaches: Yes  leg pain: Yes  numbness: Yes  paresis: No  paresthesias: Yes  pelvic pain: Yes  perianal numbness: No  tingling: No  weakness: Yes  weight loss: No  genital pain: No  hematuria: No  Pain severity: severe  Improvement on treatment: no relief

## 2023-01-12 NOTE — LETTER
January 12, 2023    Lydia Thomas  [address] [facility] [address]             [place] [address] [facility] [address]  Phone: [phone]  Fax: [phone] To whom it may concern:    Ms Thomas was seen in a virtual visit. The symptoms she described are consistent with sciatica. She states that this started after her fall at work last year. I recommend for her to be seen by the [facility]. I also prescribed Meloxicam to get started on. For any further questions, please feel free to contact my office.       Sincerely,      Lobito Tran PA-C

## 2023-01-12 NOTE — PROGRESS NOTES
Subjective:       Patient ID: Lydia Thomas is a 42 y.o. female.    Chief Complaint: No chief complaint on file.    The patient location is: [place]  The chief complaint leading to consultation is: SCIATICA    Visit type: audiovisual    Face to Face time with patient: 20 minutes of total time spent on the encounter, which includes face to face time and non-face to face time preparing to see the patient (eg, review of tests), Obtaining and/or reviewing separately obtained history, Documenting clinical information in the electronic or other health record, Independently interpreting results (not separately reported) and communicating results to the patient/family/caregiver, or Care coordination (not separately reported).         Each patient to whom he or she provides medical services by telemedicine is:  (1) informed of the relationship between the physician and patient and the respective role of any other health care provider with respect to management of the patient; and (2) notified that he or she may decline to receive medical services by telemedicine and may withdraw from such care at any time.    Notes:        Back Pain  This is a recurrent problem. The current episode started more than 1 year ago. The problem occurs constantly. The problem has been rapidly worsening since onset. The pain is present in the gluteal, lumbar spine and sacro-iliac. The quality of the pain is described as aching, burning, shooting and stabbing. The pain radiates to the left foot and left thigh. The pain is at a severity of 9/10. The pain is severe. The pain is The same all the time. The symptoms are aggravated by bending, position, sitting, standing and twisting. Stiffness is present All day. Associated symptoms include headaches, leg pain, numbness, paresthesias, pelvic pain and weakness. Pertinent negatives include no abdominal pain, bladder incontinence, bowel incontinence, chest pain, dysuria, fever, paresis, perianal  numbness, tingling or weight loss. The treatment provided no relief.   Review of Systems   Constitutional:  Negative for fever and weight loss.   Cardiovascular:  Negative for chest pain.   Gastrointestinal:  Negative for abdominal pain and bowel incontinence.   Genitourinary:  Positive for pelvic pain. Negative for bladder incontinence, dysuria and hematuria.   Musculoskeletal:  Positive for back pain and leg pain.   Neurological:  Positive for weakness, numbness, headaches and paresthesias. Negative for tingling.       Objective:      Physical Exam  Constitutional:       General: She is not in acute distress.     Appearance: Normal appearance. She is not ill-appearing, toxic-appearing or diaphoretic.   Neurological:      Mental Status: She is alert.       Assessment:       Problem List Items Addressed This Visit    None  Visit Diagnoses       Sciatica of left side    -  Primary    Relevant Orders    Ambulatory referral/consult to [facility]              Plan:       Sciatica of left side  -     Ambulatory referral/consult to [facility]; Future; Expected date: 01/19/2023    Other orders  -     meloxicam (MOBIC) 15 MG tablet; Take 1 tablet (15 mg total) by mouth once daily.  Dispense: 90 tablet; Refill: 0           Answers submitted by the patient for this visit:  Back Pain Questionnaire (Submitted on 1/12/2023)  Chief Complaint: Back pain  genital pain: No

## 2023-01-13 ENCOUNTER — TELEPHONE (OUTPATIENT)
Dept: FAMILY MEDICINE | Facility: CLINIC | Age: 43
End: 2023-01-13
Payer: OTHER GOVERNMENT

## 2023-01-13 ENCOUNTER — NURSE TRIAGE (OUTPATIENT)
Dept: ADMINISTRATIVE | Facility: CLINIC | Age: 43
End: 2023-01-13
Payer: OTHER GOVERNMENT

## 2023-01-13 ENCOUNTER — TELEPHONE (OUTPATIENT)
Dept: ORTHOPEDICS | Facility: CLINIC | Age: 43
End: 2023-01-13
Payer: OTHER GOVERNMENT

## 2023-01-13 NOTE — TELEPHONE ENCOUNTER
Reason for Disposition   [1] Numbness (new loss of sensation) of toe(s) AND [2] present now    Additional Information   Negative: Serious injury with multiple fractures   Negative: [1] Major bleeding (e.g., actively dripping or spurting) AND [2] can't be stopped   Negative: Amputation   Negative: Looks like a dislocated joint (very crooked or deformed)   Negative: Sounds like a life-threatening emergency to the triager   Negative: Wound looks infected   Negative: Caused by an animal bite   Negative: Caused by a human bite   Negative: Puncture wound of foot   Negative: Toe injury is main concern   Negative: Cast problems or questions   Negative: Bullet wound, stabbed by knife, or other serious penetrating wound   Negative: Skin is split open or gaping  (or length > 1/2 inch or 12 mm)   Negative: [1] Bleeding AND [2] won't stop after 10 minutes of direct pressure (using correct technique)   Negative: [1] Dirt in the wound AND [2] not removed with 15 minutes of scrubbing   Negative: Can't stand (bear weight) or walk    Protocols used: Foot and Ankle Injury-A-AH  Spouse called re pt with poss broken toe. sx started yest. Pt getting something out of fridge. glass jar fell on two toes. toes blood shot red and bruised. Pt able to very sl wiggle. painful. pain level= 8. 9 when standing. + numbness and tingling. cold toes. Rec ED/UC. Caller agrees.

## 2023-01-17 ENCOUNTER — PATIENT MESSAGE (OUTPATIENT)
Dept: ORTHOPEDICS | Facility: CLINIC | Age: 43
End: 2023-01-17
Payer: OTHER GOVERNMENT

## 2023-01-17 ENCOUNTER — PATIENT MESSAGE (OUTPATIENT)
Dept: FAMILY MEDICINE | Facility: CLINIC | Age: 43
End: 2023-01-17
Payer: OTHER GOVERNMENT

## 2023-01-17 DIAGNOSIS — M51.34 DDD (DEGENERATIVE DISC DISEASE), THORACIC: ICD-10-CM

## 2023-01-17 DIAGNOSIS — M51.36 DDD (DEGENERATIVE DISC DISEASE), LUMBAR: Primary | ICD-10-CM

## 2023-01-30 ENCOUNTER — HOSPITAL ENCOUNTER (OUTPATIENT)
Dept: RADIOLOGY | Facility: HOSPITAL | Age: 43
Discharge: HOME | End: 2023-01-30
Attending: ORTHOPAEDIC SURGERY
Payer: OTHER GOVERNMENT

## 2023-01-30 DIAGNOSIS — M51.36 DDD (DEGENERATIVE DISC DISEASE), LUMBAR: ICD-10-CM

## 2023-01-30 DIAGNOSIS — M51.34 DDD (DEGENERATIVE DISC DISEASE), THORACIC: ICD-10-CM

## 2023-01-30 PROCEDURE — 72070 XR THORACIC SPINE AP LATERAL: ICD-10-PCS | Mod: 26,,, | Performed by: RADIOLOGY

## 2023-01-30 PROCEDURE — 72070 X-RAY EXAM THORAC SPINE 2VWS: CPT | Mod: 26,,, | Performed by: RADIOLOGY

## 2023-01-30 PROCEDURE — 72110 X-RAY EXAM L-2 SPINE 4/>VWS: CPT | Mod: TC

## 2023-01-30 PROCEDURE — 72110 X-RAY EXAM L-2 SPINE 4/>VWS: CPT | Mod: 26,,, | Performed by: RADIOLOGY

## 2023-01-30 PROCEDURE — 72070 X-RAY EXAM THORAC SPINE 2VWS: CPT | Mod: TC

## 2023-01-30 PROCEDURE — 72110 XR LUMBAR SPINE AP AND LAT WITH FLEX/EXT: ICD-10-PCS | Mod: 26,,, | Performed by: RADIOLOGY

## 2023-02-13 ENCOUNTER — OFFICE VISIT (OUTPATIENT)
Dept: ORTHOPEDICS | Facility: CLINIC | Age: 43
End: 2023-02-13
Payer: OTHER GOVERNMENT

## 2023-02-13 VITALS — HEIGHT: 63 IN | BODY MASS INDEX: 25.31 KG/M2

## 2023-02-13 DIAGNOSIS — M51.36 DDD (DEGENERATIVE DISC DISEASE), LUMBAR: Primary | ICD-10-CM

## 2023-02-13 DIAGNOSIS — M54.16 LUMBAR RADICULOPATHY: ICD-10-CM

## 2023-02-13 DIAGNOSIS — R29.898 WEAKNESS OF LEFT LOWER EXTREMITY: ICD-10-CM

## 2023-02-13 PROCEDURE — 99999 PR PBB SHADOW E&M-EST. PATIENT-LVL III: CPT | Mod: PBBFAC,,, | Performed by: ORTHOPAEDIC SURGERY

## 2023-02-13 PROCEDURE — 99213 OFFICE O/P EST LOW 20 MIN: CPT | Mod: PBBFAC | Performed by: ORTHOPAEDIC SURGERY

## 2023-02-13 PROCEDURE — 99999 PR PBB SHADOW E&M-EST. PATIENT-LVL III: ICD-10-PCS | Mod: PBBFAC,,, | Performed by: ORTHOPAEDIC SURGERY

## 2023-02-13 PROCEDURE — 99214 OFFICE O/P EST MOD 30 MIN: CPT | Mod: S$PBB,,, | Performed by: ORTHOPAEDIC SURGERY

## 2023-02-13 PROCEDURE — 99214 PR OFFICE/OUTPT VISIT, EST, LEVL IV, 30-39 MIN: ICD-10-PCS | Mod: S$PBB,,, | Performed by: ORTHOPAEDIC SURGERY

## 2023-02-13 RX ORDER — GABAPENTIN 300 MG/1
300 CAPSULE ORAL 3 TIMES DAILY
Qty: 60 CAPSULE | Refills: 1 | Status: SHIPPED | OUTPATIENT
Start: 2023-02-13 | End: 2023-06-08

## 2023-02-13 NOTE — PROGRESS NOTES
"DATE: 2/13/2023  PATIENT: Lydia Thomas    Attending Physician: Conrad Gutierres M.D.    HISTORY:  Lydia Thomas is a 43 y.o. female who returns to me today for FU. She complained of worsening LBP that radiates posterolaterally down LLE to the top of the foot. Patient continues to have LLE n/t and weakness. PT helped her tremendously but she finished PT last year, and she had recurrence of pain. She has not been able to work.    The patient does not smoke, have DM or endorse IVDU. The patient is not on any blood thinners and does not take chronic narcotics. She works at [employer].    PMH/PSH/FamHx/SocHx:  Unchanged from prior visit    ROS:  Positive for LUE and LLE weakness and n/t  Denies myelopathic symptoms, perineal paresthesias, bowel or bladder incontinence    EXAM:  Ht 5' 3" (1.6 m)   BMI 25.31 kg/m²     My physical examination was notable for the following findings: diffuse weakness 4/5 in all muscle groups in LUE and LLE. Normal sensation to light touch in the C5-T1 and L2-S1 dermatomes bilaterally. + L Prajapati's    IMAGING:  Today I independently reviewed the following images and my interpretations are as follows:    Lumbar XRs showed no fractures or listhesis.    Through Care Everywhere, CT of entire spine (cervical, thoracic and lumbar) reports stated no fractures. CT head report stated no acute head bleed.     MRI C-T-L showed no significant central or foraminal stenosis.    ASSESSMENT/PLAN:  Patient has LLE weakness and pain following a concussion on 4/12/22. Her spine MRI was not very impressive. I discussed the natural history of their diagnoses as well as surgical and nonsurgical treatment options. I educated the patient on the importance of core/back strengthening, correct posture, bending/lifting ergonomics, and low-impact aerobic exercises (walking, elliptical, and aquatherapy). Continue medications. I referred patient to physical therapy for core/back strengthening. I ordered " EMG/NCV BLE to further elucidate her radiculopathy. No acute ortho spine surgical intervention warranted. Patient will follow up in 2 months to review EMG.    Conrad Gutierres MD  Orthopaedic Spine Surgeon  Department of Orthopaedic Surgery  [phone]

## 2023-02-20 ENCOUNTER — PATIENT MESSAGE (OUTPATIENT)
Dept: FAMILY MEDICINE | Facility: CLINIC | Age: 43
End: 2023-02-20
Payer: OTHER GOVERNMENT

## 2023-02-22 ENCOUNTER — PATIENT MESSAGE (OUTPATIENT)
Dept: FAMILY MEDICINE | Facility: CLINIC | Age: 43
End: 2023-02-22
Payer: OTHER GOVERNMENT

## 2023-02-22 ENCOUNTER — PATIENT MESSAGE (OUTPATIENT)
Dept: ORTHOPEDICS | Facility: CLINIC | Age: 43
End: 2023-02-22
Payer: OTHER GOVERNMENT

## 2023-02-23 NOTE — TELEPHONE ENCOUNTER
Good morning Rigoberto,     I was wondering can you see the referrals in the chart for the patient for PT and EMG and submit them to , or do we handle this on our end?     Thank you,   Nicole

## 2023-02-28 ENCOUNTER — OFFICE VISIT (OUTPATIENT)
Dept: FAMILY MEDICINE | Facility: CLINIC | Age: 43
End: 2023-02-28
Payer: OTHER GOVERNMENT

## 2023-02-28 DIAGNOSIS — M54.50 LOW BACK PAIN, UNSPECIFIED BACK PAIN LATERALITY, UNSPECIFIED CHRONICITY, UNSPECIFIED WHETHER SCIATICA PRESENT: Primary | ICD-10-CM

## 2023-02-28 PROCEDURE — 99213 OFFICE O/P EST LOW 20 MIN: CPT | Mod: 95,,, | Performed by: FAMILY MEDICINE

## 2023-02-28 PROCEDURE — 99213 PR OFFICE/OUTPT VISIT, EST, LEVL III, 20-29 MIN: ICD-10-PCS | Mod: 95,,, | Performed by: FAMILY MEDICINE

## 2023-02-28 NOTE — PROGRESS NOTES
"[facility]  Progress Note    SUBJECTIVE:     Chief Complaint   Patient presents with    Back Pain       The patient location is: Home  The chief complaint leading to consultation is: Back Pain    Visit type: Virtual visit with synchronous audio and video  Total time spent with patient: 25  Each patient to whom he or she provides medical services by telemedicine is:  (1) informed of the relationship between the physician and patient and the respective role of any other health care provider with respect to management of the patient; and (2) notified that he or she may decline to receive medical services by telemedicine and may withdraw from such care at any time.      HPI: Patient is a 43 y.o. female via virtual visit, here for c/o lower back pain. Has tried NSAIDs, muscle relaxers, and physical therapy without help. Still rates pain as moderate-severe. She is still out of work due to the pain. Patient has no other new complaints/problems at this time.      Review of patient's allergies indicates:   Allergen Reactions    Iodine and iodide containing products Anaphylaxis    Shellfish derived Anaphylaxis    Benadryl [diphenhydramine hcl] Itching     "with fast injection"    Fish containing products        Past Medical History:   Diagnosis Date    Anemia     Anemia     Asthma     exercise asthma    Migraine headache     Ovarian cyst      Past Surgical History:   Procedure Laterality Date    APPENDECTOMY      4/2019    AUGMENTATION OF BREAST Bilateral 03/2022    BREAST BIOPSY Right     Excisional bx, benign    COLONOSCOPY N/A 05/18/2022    Procedure: COLONOSCOPY;  Surgeon: Sabino Mcintosh MD;  Location: [facility];  Service: Endoscopy;  Laterality: N/A;  High risk for colon cancer (family)      fully vaccinated; instructions to portal-GT     Family History   Problem Relation Age of Onset    Asthma Mother     Miscarriages / Stillbirths Mother     Breast cancer Mother         60    Arthritis Father     COPD Father     " Asthma Brother     Colon polyps Brother     Breast cancer Maternal Grandmother         60    Cancer Maternal Grandfather     Hearing loss Paternal Grandmother     Hearing loss Paternal Grandfather     Diabetes Maternal Uncle     Hypertension Maternal Uncle     Prostate cancer Maternal Uncle     Hearing loss Paternal Aunt     Hearing loss Paternal Uncle     Amblyopia Neg Hx     Blindness Neg Hx     Cataracts Neg Hx     Glaucoma Neg Hx     Macular degeneration Neg Hx     Retinal detachment Neg Hx     Strabismus Neg Hx      Social History     Tobacco Use    Smoking status: Never    Smokeless tobacco: Never   Substance Use Topics    Alcohol use: Yes     Comment: occass    Drug use: No        Review of Systems   Constitutional:  Negative for fever and weight loss.   Cardiovascular:  Negative for chest pain.   Gastrointestinal:  Negative for abdominal pain.   Genitourinary:  Negative for dysuria and hematuria.   Musculoskeletal:  Positive for back pain.   Neurological:  Positive for headaches. Negative for tingling and weakness.   OBJECTIVE:   There were no vitals filed for this visit.  There is no height or weight on file to calculate BMI.    Physical Exam  Constitutional:       General: She is not in acute distress.     Appearance: She is not toxic-appearing or diaphoretic.   HENT:      Head: Normocephalic and atraumatic.   Eyes:      General:         Right eye: No foreign body.         Left eye: No foreign body.      Conjunctiva/sclera: Conjunctivae normal.      Right eye: Right conjunctiva is not injected. No exudate or hemorrhage.     Left eye: Left conjunctiva is not injected. No exudate or hemorrhage.  Pulmonary:      Effort: No respiratory distress.   Neurological:      Mental Status: She is oriented to person, place, and time. She is not lethargic.       Old records were reviewed. Labs and/or images were independently reviewed.    ASSESSMENT     1. Low back pain, unspecified back pain laterality, unspecified  chronicity, unspecified whether sciatica present        PLAN:     Low back pain, unspecified back pain laterality, unspecified chronicity, unspecified whether sciatica present  -     Ambulatory referral/consult to Pain Clinic; Future; Expected date: 03/07/2023  -     refer to pain management for evaluation. Consider MRI lumbar vs spinal injections as next step. Tried NSAIDs, muscle relaxers, and physical therapy without help.       RTC PRN    Zeus Hunt MD  02/28/2023 11:00 AM

## 2023-03-10 ENCOUNTER — CLINICAL SUPPORT (OUTPATIENT)
Dept: REHABILITATION | Facility: HOSPITAL | Age: 43
End: 2023-03-10
Attending: ORTHOPAEDIC SURGERY
Payer: OTHER MISCELLANEOUS

## 2023-03-10 ENCOUNTER — TELEPHONE (OUTPATIENT)
Dept: FAMILY MEDICINE | Facility: CLINIC | Age: 43
End: 2023-03-10
Payer: OTHER GOVERNMENT

## 2023-03-10 DIAGNOSIS — R29.898 WEAKNESS OF LOWER EXTREMITY, UNSPECIFIED LATERALITY: Primary | ICD-10-CM

## 2023-03-10 DIAGNOSIS — R53.1 DECREASED STRENGTH, ENDURANCE, AND MOBILITY: Primary | ICD-10-CM

## 2023-03-10 DIAGNOSIS — R68.89 DECREASED STRENGTH, ENDURANCE, AND MOBILITY: Primary | ICD-10-CM

## 2023-03-10 DIAGNOSIS — R26.89 FUNCTIONAL GAIT ABNORMALITY: ICD-10-CM

## 2023-03-10 DIAGNOSIS — Z74.09 DECREASED STRENGTH, ENDURANCE, AND MOBILITY: Primary | ICD-10-CM

## 2023-03-10 DIAGNOSIS — M54.16 LUMBAR RADICULOPATHY: ICD-10-CM

## 2023-03-10 PROCEDURE — 97162 PT EVAL MOD COMPLEX 30 MIN: CPT | Mod: PO

## 2023-03-10 PROCEDURE — 97110 THERAPEUTIC EXERCISES: CPT | Mod: PO

## 2023-03-10 NOTE — TELEPHONE ENCOUNTER
----- Message from Nando Hunt, PT sent at 3/10/2023 11:45 AM CST -----  Regarding: Evaluation for worker's comp  Good afternoon,    I have just finished my initial evaluation for Mrs. Thomas for her low back pain. Her presentation during our session in combination with her subjective history leads me to believe that her back pain is stemming from her L LE neglect, which is only exacerbated because of her disuse since the fall. 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    I think that she'd be best treated with a Neuro PT at this time to address her foot drop and leg weakness deficits. With that said, would you be able to send out an order for Neuro PT for her so she can continue to see therapy? I touched base with Jose E and she said it would take about 5-7 business days for  to have it approved.    Thank you all so much for your time.    Sincerely,   Nando Hunt, DPT, PT
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
## 2023-03-24 ENCOUNTER — TELEPHONE (OUTPATIENT)
Dept: FAMILY MEDICINE | Facility: CLINIC | Age: 43
End: 2023-03-24
Payer: OTHER GOVERNMENT

## 2023-03-27 ENCOUNTER — PATIENT MESSAGE (OUTPATIENT)
Dept: ORTHOPEDICS | Facility: CLINIC | Age: 43
End: 2023-03-27
Payer: OTHER GOVERNMENT

## 2023-03-27 DIAGNOSIS — R29.898 WEAKNESS OF LEFT LOWER EXTREMITY: Primary | ICD-10-CM

## 2023-03-27 NOTE — TELEPHONE ENCOUNTER
Good morning Mr. Franklin,     Wondering if this needs to get signed off through you first since this is WC.     Nicole

## 2023-03-27 NOTE — TELEPHONE ENCOUNTER
Spoke with patient and its called Neuro Therapy and she stated WC approved her visits, she spoke with her nurse. It is Outpatient on [facility].     Spoke with EMG they will give her a call.

## 2023-04-04 ENCOUNTER — PATIENT MESSAGE (OUTPATIENT)
Dept: FAMILY MEDICINE | Facility: CLINIC | Age: 43
End: 2023-04-04

## 2023-04-04 ENCOUNTER — TELEPHONE (OUTPATIENT)
Dept: FAMILY MEDICINE | Facility: CLINIC | Age: 43
End: 2023-04-04
Payer: OTHER GOVERNMENT

## 2023-04-04 ENCOUNTER — OFFICE VISIT (OUTPATIENT)
Dept: FAMILY MEDICINE | Facility: CLINIC | Age: 43
End: 2023-04-04
Payer: OTHER GOVERNMENT

## 2023-04-04 DIAGNOSIS — G89.29 CHRONIC NONINTRACTABLE HEADACHE, UNSPECIFIED HEADACHE TYPE: ICD-10-CM

## 2023-04-04 DIAGNOSIS — E55.9 VITAMIN D DEFICIENCY: ICD-10-CM

## 2023-04-04 DIAGNOSIS — Z00.00 BLOOD TESTS FOR ROUTINE GENERAL PHYSICAL EXAMINATION: ICD-10-CM

## 2023-04-04 DIAGNOSIS — R00.2 PALPITATIONS: ICD-10-CM

## 2023-04-04 DIAGNOSIS — R29.898 WEAKNESS OF LOWER EXTREMITY, UNSPECIFIED LATERALITY: ICD-10-CM

## 2023-04-04 DIAGNOSIS — M54.50 LOW BACK PAIN, UNSPECIFIED BACK PAIN LATERALITY, UNSPECIFIED CHRONICITY, UNSPECIFIED WHETHER SCIATICA PRESENT: Primary | ICD-10-CM

## 2023-04-04 DIAGNOSIS — G43.809 OTHER MIGRAINE WITHOUT STATUS MIGRAINOSUS, NOT INTRACTABLE: ICD-10-CM

## 2023-04-04 DIAGNOSIS — R51.9 CHRONIC NONINTRACTABLE HEADACHE, UNSPECIFIED HEADACHE TYPE: ICD-10-CM

## 2023-04-04 PROCEDURE — 99214 PR OFFICE/OUTPT VISIT, EST, LEVL IV, 30-39 MIN: ICD-10-PCS | Mod: 95,,, | Performed by: INTERNAL MEDICINE

## 2023-04-04 PROCEDURE — 99214 OFFICE O/P EST MOD 30 MIN: CPT | Mod: 95,,, | Performed by: INTERNAL MEDICINE

## 2023-04-04 RX ORDER — CEFUROXIME AXETIL 250 MG/1
TABLET ORAL
Qty: 6 KIT | Refills: 2 | Status: SHIPPED | OUTPATIENT
Start: 2023-04-04 | End: 2023-06-08 | Stop reason: SDUPTHER

## 2023-04-04 NOTE — TELEPHONE ENCOUNTER
----- Message from Katy Vergara MD sent at 4/4/2023 11:37 AM CDT -----  Regarding: EKG  Is it possible to do a RN visit for EKG in this pt? Can you kindly contact her to organize it?  Thanks!

## 2023-04-04 NOTE — PROGRESS NOTES
The patient location is: [place]  The chief complaint leading to consultation is: follow-up    Visit type: audiovisual    Face to Face time with patient: 10 min   30 minutes of total time spent on the encounter, which includes face to face time and non-face to face time preparing to see the patient (eg, review of tests), Obtaining and/or reviewing separately obtained history, Documenting clinical information in the electronic or other health record, Independently interpreting results (not separately reported) and communicating results to the patient/family/caregiver, or Care coordination (not separately reported).         Each patient to whom he or she provides medical services by telemedicine is:  (1) informed of the relationship between the physician and patient and the respective role of any other health care provider with respect to management of the patient; and (2) notified that he or she may decline to receive medical services by telemedicine and may withdraw from such care at any time.    Subjective:       Patient ID: Lydia Thomas is a pleasant 43 y.o. White female patient    Chief Complaint: Follow-up      Patient is a pt I saw last on 04/21/2022. See my last notes.    HPI     Pt with complicated PMH as per list of problems below coming today for a regular f-up visit.  From our last visit:  - colonoscopy  - 07/07/2022 Dr. Gutierres, Orthopedics for lumbar radiculopathy.  - 08/12/2023 TARA Cantu, for preop exam (bilateral breast cosmetic revision) scheduled on August 23  - Pt for LE weakness.  - 09/23/2022 TARA Adorno, for axillary abscess.  - 10/06/2023 Dr. Vences for axillary mass.  - 11/03/2023 Dr. Tran for PTSD  - 01/12/2023 Dr. Tran for sciatic L side. Referral to back and spine clinic.  - 02/023/2023 Dr. Ledbetter[facility]  - 02/28/2023 Dr. THOR Hunt, PCP, for back pain going in LE.   She reports still struggling re: neck pain and lower back pain, will have EMG and NCV then may  undergo PT.  She sees a Neurologist outside of [facility] re: possible concussion? She will provide me with his records. She would also like a referral for migraines as needing it to have Botox injections that help.  Of note, see Neuro and Orthopedics notes for issues of back pain.  She reports she would need a breast surgery as her implants moved when fell? She was to have this surgery last year, but her Neurologist prefers for her to wait until feeling better and having all the testing done.   After the visit, she sent me a message asking for an EKG due to palpitations, in addition to doing blood work.      Patient Active Problem List   Diagnosis    Acetaminophen overdose    Suicide attempt by acetaminophen overdose    Migraine    Anemia of chronic disease    Adjustment disorder with mixed anxiety and depressed mood    Tylenol overdose    Migraine without status migrainosus, not intractable    Occipital headache    Vasovagal syncope    Myoclonic jerking    Traumatic brain injury without loss of consciousness    Dizziness and giddiness    Depression    Warts of foot    Screening for colon cancer    Family history of colon cancer    Decreased strength, endurance, and mobility    Functional gait abnormality    Lumbar radiculopathy          ACTIVE MEDICAL ISSUES:  Documented in Problem List     PAST MEDICAL HISTORY  Documented     PAST SURGICAL HISTORY:  Documented     SOCIAL HISTORY:  Documented     FAMILY HISTORY:  Documented     ALLERGIES AND MEDICATIONS: updated and reviewed.  Documented    Review of Systems   Constitutional:  Positive for activity change. Negative for unexpected weight change.   HENT:  Negative for hearing loss, rhinorrhea and trouble swallowing.    Eyes:  Positive for visual disturbance. Negative for discharge.   Respiratory:  Negative for chest tightness and wheezing.    Cardiovascular:  Negative for chest pain and palpitations.   Gastrointestinal:  Negative for blood in stool, constipation,  diarrhea and vomiting.   Endocrine: Negative for polydipsia and polyuria.   Genitourinary:  Negative for difficulty urinating, dysuria, hematuria and menstrual problem.   Musculoskeletal:  Positive for arthralgias and neck pain. Negative for joint swelling.   Neurological:  Positive for weakness and headaches.   Psychiatric/Behavioral:  Negative for confusion and dysphoric mood.      Objective:      Physical Exam    There were no vitals filed for this visit.  There is no height or weight on file to calculate BMI.    RESULTS: Reviewed labs from last 12 months    Last Lab Results:     Lab Results   Component Value Date    WBC 5.32 08/12/2022    HGB 13.9 08/12/2022    HCT 41.1 08/12/2022     08/12/2022     08/12/2022    K 4.1 08/12/2022     08/12/2022    CO2 24 08/12/2022    BUN 6 08/12/2022    CREATININE 0.8 08/12/2022    CALCIUM 9.3 08/12/2022    ALBUMIN 4.1 08/12/2022    AST 12 08/12/2022    ALT 10 08/12/2022    CHOL 198 04/14/2021    TRIG 72 04/14/2021    HDL 52 04/14/2021    LDLCALC 131.6 04/14/2021    HGBA1C 4.9 04/14/2021    TSH 0.962 04/14/2021         Assessment:       1. Low back pain, unspecified back pain laterality, unspecified chronicity, unspecified whether sciatica present    2. Weakness of lower extremity, unspecified laterality    3. Other migraine without status migrainosus, not intractable    4. Chronic nonintractable headache, unspecified headache type    5. Palpitations    6. Vitamin D deficiency    7. Blood tests for routine general physical examination        Plan:   Diagnoses and all orders for this visit:    Low back pain, unspecified back pain laterality, unspecified chronicity, unspecified whether sciatica present    F-up Orthopedics and Neurology.    Weakness of lower extremity, unspecified laterality    As above.    Other migraine without status migrainosus, not intractable  -     Ambulatory referral/consult to Neurology; Future  -     sumatriptan (IMITREX STATDOSE) 6  mg/0.5 mL kit; To use max twice within 24 hours, 2 hours apart.    Will f-up with Neurology. Needs referral for Botox injections that help. Refill for Imitrex provided.    Palpitations    Reported in message after the visit. Pt would like EKG. Will be done through RN visit.    Vitamin D deficiency  -     Vitamin D; Future    Will check level.    Blood tests for routine general physical examination  -     CBC Auto Differential; Future  -     Comprehensive Metabolic Panel; Future  -     Hemoglobin A1C; Future  -     TSH; Future  -     Lipid Panel; Future    Will do usual blood work.     No follow-ups on file.    This note was created by combination of typed  and M-Modal dictation.  Transcription errors may be present.  If there are any questions, please contact me.

## 2023-04-05 ENCOUNTER — TELEPHONE (OUTPATIENT)
Dept: NEUROLOGY | Facility: CLINIC | Age: 43
End: 2023-04-05
Payer: OTHER GOVERNMENT

## 2023-04-05 ENCOUNTER — TELEPHONE (OUTPATIENT)
Dept: ORTHOPEDICS | Facility: CLINIC | Age: 43
End: 2023-04-05
Payer: OTHER GOVERNMENT

## 2023-04-05 NOTE — TELEPHONE ENCOUNTER
Riddhi Nails,  in regards to scheduling patient's EMG sooner than it is presently scheduled at the end of May. I informed her that I would forward her request to our other locations and perhaps they would be able to assist with a sooner availability.  The message was forwarded to Dr. Sheridan's techLeslie and Cari Collier at our [facility] location requesting assistance. I provided Jesu with my return contact information in case of any further questions or concerns.

## 2023-04-05 NOTE — TELEPHONE ENCOUNTER
Spoke with patient and informed her of EMG's phone number and also informed her of the information we would need if she would like us to do an external fax to her Neuro's office for an EMG. She stated an verbal understanding.

## 2023-04-05 NOTE — TELEPHONE ENCOUNTER
----- Message from Makenzie Tafoya sent at 4/5/2023  8:27 AM CDT -----  Regarding: EMG  Contact: Pt @ [phone]  Pt is calling because she is having a EMG scheduled for May. She can get it done elsewhere sooner, need to know what 2 extremities EMG will be for? Asking for an urgent call back

## 2023-04-05 NOTE — TELEPHONE ENCOUNTER
----- Message from Charisse Deng RN sent at 3/31/2023  1:42 PM CDT -----  Regarding: FW: Nurse  Jesu with [facility] called to seen if the pt can be given a sooner EMG Appt and she would like a call back today    ----- Message -----  From: Nathalie Vázquez  Sent: 3/31/2023   8:55 AM CDT  To: [facility] Neuro Clinical Support  Subject: Nurse  Jesu with [facility]    Appointment Access Request:    Nurse  Jesu with [facility] called to seen if the pt can be given a sooner EMG Appt and she would like a call back today    Nurse Nails can be reached at [phone]

## 2023-04-06 ENCOUNTER — LAB VISIT (OUTPATIENT)
Dept: LAB | Facility: HOSPITAL | Age: 43
End: 2023-04-06
Attending: INTERNAL MEDICINE
Payer: OTHER GOVERNMENT

## 2023-04-06 ENCOUNTER — TELEPHONE (OUTPATIENT)
Dept: FAMILY MEDICINE | Facility: CLINIC | Age: 43
End: 2023-04-06
Payer: OTHER GOVERNMENT

## 2023-04-06 DIAGNOSIS — E55.9 VITAMIN D DEFICIENCY: ICD-10-CM

## 2023-04-06 DIAGNOSIS — E55.9 VITAMIN D DEFICIENCY: Primary | ICD-10-CM

## 2023-04-06 DIAGNOSIS — Z00.00 BLOOD TESTS FOR ROUTINE GENERAL PHYSICAL EXAMINATION: ICD-10-CM

## 2023-04-06 LAB
25(OH)D3+25(OH)D2 SERPL-MCNC: 14 NG/ML (ref 30–96)
ALBUMIN SERPL BCP-MCNC: 3.9 G/DL (ref 3.5–5.2)
ALP SERPL-CCNC: 33 U/L (ref 55–135)
ALT SERPL W/O P-5'-P-CCNC: 12 U/L (ref 10–44)
ANION GAP SERPL CALC-SCNC: 8 MMOL/L (ref 8–16)
AST SERPL-CCNC: 16 U/L (ref 10–40)
BASOPHILS # BLD AUTO: 0.08 K/UL (ref 0–0.2)
BASOPHILS NFR BLD: 1.5 % (ref 0–1.9)
BILIRUB SERPL-MCNC: 0.5 MG/DL (ref 0.1–1)
BUN SERPL-MCNC: 9 MG/DL (ref 6–20)
CALCIUM SERPL-MCNC: 8.9 MG/DL (ref 8.7–10.5)
CHLORIDE SERPL-SCNC: 107 MMOL/L (ref 95–110)
CHOLEST SERPL-MCNC: 177 MG/DL (ref 120–199)
CHOLEST/HDLC SERPL: 3.8 {RATIO} (ref 2–5)
CO2 SERPL-SCNC: 23 MMOL/L (ref 23–29)
CREAT SERPL-MCNC: 0.8 MG/DL (ref 0.5–1.4)
DIFFERENTIAL METHOD: ABNORMAL
EOSINOPHIL # BLD AUTO: 0.2 K/UL (ref 0–0.5)
EOSINOPHIL NFR BLD: 3.8 % (ref 0–8)
ERYTHROCYTE [DISTWIDTH] IN BLOOD BY AUTOMATED COUNT: 12.4 % (ref 11.5–14.5)
EST. GFR  (NO RACE VARIABLE): >60 ML/MIN/1.73 M^2
ESTIMATED AVG GLUCOSE: 88 MG/DL (ref 68–131)
GLUCOSE SERPL-MCNC: 73 MG/DL (ref 70–110)
HBA1C MFR BLD: 4.7 % (ref 4–5.6)
HCT VFR BLD AUTO: 40.1 % (ref 37–48.5)
HDLC SERPL-MCNC: 46 MG/DL (ref 40–75)
HDLC SERPL: 26 % (ref 20–50)
HGB BLD-MCNC: 13.3 G/DL (ref 12–16)
IMM GRANULOCYTES # BLD AUTO: 0.01 K/UL (ref 0–0.04)
IMM GRANULOCYTES NFR BLD AUTO: 0.2 % (ref 0–0.5)
LDLC SERPL CALC-MCNC: 119 MG/DL (ref 63–159)
LYMPHOCYTES # BLD AUTO: 1.9 K/UL (ref 1–4.8)
LYMPHOCYTES NFR BLD: 34.7 % (ref 18–48)
MCH RBC QN AUTO: 31.6 PG (ref 27–31)
MCHC RBC AUTO-ENTMCNC: 33.2 G/DL (ref 32–36)
MCV RBC AUTO: 95 FL (ref 82–98)
MONOCYTES # BLD AUTO: 0.4 K/UL (ref 0.3–1)
MONOCYTES NFR BLD: 7.7 % (ref 4–15)
NEUTROPHILS # BLD AUTO: 2.8 K/UL (ref 1.8–7.7)
NEUTROPHILS NFR BLD: 52.1 % (ref 38–73)
NONHDLC SERPL-MCNC: 131 MG/DL
NRBC BLD-RTO: 0 /100 WBC
PLATELET # BLD AUTO: 255 K/UL (ref 150–450)
PMV BLD AUTO: 10.9 FL (ref 9.2–12.9)
POTASSIUM SERPL-SCNC: 3.9 MMOL/L (ref 3.5–5.1)
PROT SERPL-MCNC: 6.6 G/DL (ref 6–8.4)
RBC # BLD AUTO: 4.21 M/UL (ref 4–5.4)
SODIUM SERPL-SCNC: 138 MMOL/L (ref 136–145)
TRIGL SERPL-MCNC: 60 MG/DL (ref 30–150)
TSH SERPL DL<=0.005 MIU/L-ACNC: 1.56 UIU/ML (ref 0.4–4)
WBC # BLD AUTO: 5.33 K/UL (ref 3.9–12.7)

## 2023-04-06 PROCEDURE — 82306 VITAMIN D 25 HYDROXY: CPT | Performed by: INTERNAL MEDICINE

## 2023-04-06 PROCEDURE — 84443 ASSAY THYROID STIM HORMONE: CPT | Performed by: INTERNAL MEDICINE

## 2023-04-06 PROCEDURE — 36415 COLL VENOUS BLD VENIPUNCTURE: CPT | Mod: PO | Performed by: INTERNAL MEDICINE

## 2023-04-06 PROCEDURE — 93000 ELECTROCARDIOGRAM COMPLETE: CPT | Mod: 95,,, | Performed by: INTERNAL MEDICINE

## 2023-04-06 PROCEDURE — 80061 LIPID PANEL: CPT | Performed by: INTERNAL MEDICINE

## 2023-04-06 PROCEDURE — 93000 EKG 12-LEAD: ICD-10-PCS | Mod: 95,,, | Performed by: INTERNAL MEDICINE

## 2023-04-06 PROCEDURE — 80053 COMPREHEN METABOLIC PANEL: CPT | Performed by: INTERNAL MEDICINE

## 2023-04-06 PROCEDURE — 85025 COMPLETE CBC W/AUTO DIFF WBC: CPT | Performed by: INTERNAL MEDICINE

## 2023-04-06 PROCEDURE — 83036 HEMOGLOBIN GLYCOSYLATED A1C: CPT | Performed by: INTERNAL MEDICINE

## 2023-04-06 RX ORDER — ERGOCALCIFEROL 1.25 MG/1
50000 CAPSULE ORAL
Qty: 12 CAPSULE | Refills: 0 | Status: SHIPPED | OUTPATIENT
Start: 2023-04-06 | End: 2023-05-06

## 2023-04-22 ENCOUNTER — PATIENT MESSAGE (OUTPATIENT)
Dept: FAMILY MEDICINE | Facility: CLINIC | Age: 43
End: 2023-04-22
Payer: OTHER GOVERNMENT

## 2023-04-24 ENCOUNTER — PATIENT MESSAGE (OUTPATIENT)
Dept: FAMILY MEDICINE | Facility: CLINIC | Age: 43
End: 2023-04-24
Payer: OTHER GOVERNMENT

## 2023-05-16 ENCOUNTER — TELEPHONE (OUTPATIENT)
Dept: ORTHOPEDICS | Facility: CLINIC | Age: 43
End: 2023-05-16
Payer: OTHER GOVERNMENT

## 2023-05-17 ENCOUNTER — TELEPHONE (OUTPATIENT)
Dept: ORTHOPEDICS | Facility: CLINIC | Age: 43
End: 2023-05-17

## 2023-05-17 DIAGNOSIS — M51.369 DDD (DEGENERATIVE DISC DISEASE), LUMBAR: Primary | ICD-10-CM
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
## 2023-05-23 ENCOUNTER — PROCEDURE VISIT (OUTPATIENT)
Dept: NEUROLOGY | Facility: CLINIC | Age: 43
End: 2023-05-23
Payer: OTHER GOVERNMENT

## 2023-05-23 DIAGNOSIS — M54.16 LUMBAR RADICULOPATHY: ICD-10-CM

## 2023-05-23 PROCEDURE — 95886 MUSC TEST DONE W/N TEST COMP: CPT | Mod: PBBFAC | Performed by: PSYCHIATRY & NEUROLOGY

## 2023-05-23 PROCEDURE — 95910 NRV CNDJ TEST 7-8 STUDIES: CPT | Mod: PBBFAC | Performed by: PSYCHIATRY & NEUROLOGY

## 2023-05-23 PROCEDURE — 95910 NRV CNDJ TEST 7-8 STUDIES: CPT | Mod: 26,S$PBB,, | Performed by: PSYCHIATRY & NEUROLOGY

## 2023-05-23 PROCEDURE — 95886 MUSC TEST DONE W/N TEST COMP: CPT | Mod: 26,S$PBB,, | Performed by: PSYCHIATRY & NEUROLOGY

## 2023-05-23 PROCEDURE — 95886 PR EMG COMPLETE, W/ NERVE CONDUCTION STUDIES, 5+ MUSCLES: ICD-10-PCS | Mod: 26,S$PBB,, | Performed by: PSYCHIATRY & NEUROLOGY

## 2023-05-23 PROCEDURE — 95910 PR NERVE CONDUCTION STUDY; 7-8 STUDIES: ICD-10-PCS | Mod: 26,S$PBB,, | Performed by: PSYCHIATRY & NEUROLOGY
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
## 2023-05-23 NOTE — PROCEDURES
Procedures      Please see NCS/EMG procedure report    Ronny Gutierrez MD  [facility]

## 2023-05-25 ENCOUNTER — OFFICE VISIT (OUTPATIENT)
Dept: ORTHOPEDICS | Facility: CLINIC | Age: 43
End: 2023-05-25
Payer: OTHER GOVERNMENT

## 2023-05-25 DIAGNOSIS — M54.32 SCIATICA OF LEFT SIDE: ICD-10-CM

## 2023-05-25 DIAGNOSIS — R29.898 WEAKNESS OF LEFT LOWER EXTREMITY: Primary | ICD-10-CM

## 2023-05-25 PROCEDURE — 99999 PR PBB SHADOW E&M-EST. PATIENT-LVL III: ICD-10-PCS | Mod: PBBFAC,,, | Performed by: ORTHOPAEDIC SURGERY

## 2023-05-25 PROCEDURE — 99999 PR PBB SHADOW E&M-EST. PATIENT-LVL III: CPT | Mod: PBBFAC,,, | Performed by: ORTHOPAEDIC SURGERY

## 2023-05-25 PROCEDURE — 99214 PR OFFICE/OUTPT VISIT, EST, LEVL IV, 30-39 MIN: ICD-10-PCS | Mod: S$PBB,,, | Performed by: ORTHOPAEDIC SURGERY

## 2023-05-25 PROCEDURE — 99213 OFFICE O/P EST LOW 20 MIN: CPT | Mod: PBBFAC | Performed by: ORTHOPAEDIC SURGERY

## 2023-05-25 PROCEDURE — 99214 OFFICE O/P EST MOD 30 MIN: CPT | Mod: S$PBB,,, | Performed by: ORTHOPAEDIC SURGERY

## 2023-06-08 ENCOUNTER — OFFICE VISIT (OUTPATIENT)
Dept: FAMILY MEDICINE | Facility: CLINIC | Age: 43
End: 2023-06-08
Payer: OTHER GOVERNMENT

## 2023-06-08 VITALS
HEART RATE: 74 BPM | RESPIRATION RATE: 16 BRPM | SYSTOLIC BLOOD PRESSURE: 124 MMHG | HEIGHT: 63 IN | TEMPERATURE: 98 F | BODY MASS INDEX: 25.01 KG/M2 | DIASTOLIC BLOOD PRESSURE: 78 MMHG | WEIGHT: 141.13 LBS | OXYGEN SATURATION: 98 %

## 2023-06-08 DIAGNOSIS — E55.9 VITAMIN D DEFICIENCY: ICD-10-CM

## 2023-06-08 DIAGNOSIS — R29.898 WEAKNESS OF LOWER EXTREMITY, UNSPECIFIED LATERALITY: ICD-10-CM

## 2023-06-08 DIAGNOSIS — R51.9 CHRONIC NONINTRACTABLE HEADACHE, UNSPECIFIED HEADACHE TYPE: Primary | ICD-10-CM

## 2023-06-08 DIAGNOSIS — M54.50 LOW BACK PAIN, UNSPECIFIED BACK PAIN LATERALITY, UNSPECIFIED CHRONICITY, UNSPECIFIED WHETHER SCIATICA PRESENT: ICD-10-CM

## 2023-06-08 DIAGNOSIS — L98.8 SKIN LESION OF BREAST: ICD-10-CM

## 2023-06-08 DIAGNOSIS — Z00.00 ENCOUNTER FOR ANNUAL PHYSICAL EXAM: ICD-10-CM

## 2023-06-08 DIAGNOSIS — G89.29 CHRONIC NONINTRACTABLE HEADACHE, UNSPECIFIED HEADACHE TYPE: Primary | ICD-10-CM

## 2023-06-08 PROCEDURE — 99214 OFFICE O/P EST MOD 30 MIN: CPT | Mod: PBBFAC,PO | Performed by: INTERNAL MEDICINE

## 2023-06-08 PROCEDURE — 99999 PR PBB SHADOW E&M-EST. PATIENT-LVL IV: ICD-10-PCS | Mod: PBBFAC,,, | Performed by: INTERNAL MEDICINE

## 2023-06-08 PROCEDURE — 99999 PR PBB SHADOW E&M-EST. PATIENT-LVL IV: CPT | Mod: PBBFAC,,, | Performed by: INTERNAL MEDICINE

## 2023-06-08 PROCEDURE — 99214 OFFICE O/P EST MOD 30 MIN: CPT | Mod: S$PBB,,, | Performed by: INTERNAL MEDICINE

## 2023-06-08 PROCEDURE — 99214 PR OFFICE/OUTPT VISIT, EST, LEVL IV, 30-39 MIN: ICD-10-PCS | Mod: S$PBB,,, | Performed by: INTERNAL MEDICINE

## 2023-06-08 RX ORDER — ERGOCALCIFEROL 1.25 MG/1
50000 CAPSULE ORAL
Qty: 12 CAPSULE | Refills: 0 | Status: SHIPPED | OUTPATIENT
Start: 2023-06-08 | End: 2023-09-06

## 2023-06-08 RX ORDER — OXYCODONE AND ACETAMINOPHEN 5; 325 MG/1; MG/1
1 TABLET ORAL
COMMUNITY
Start: 2023-04-26 | End: 2023-06-08

## 2023-06-08 RX ORDER — CEPHALEXIN 500 MG/1
500 CAPSULE ORAL 2 TIMES DAILY
COMMUNITY
Start: 2023-04-26 | End: 2023-06-08

## 2023-06-08 RX ORDER — CEFUROXIME AXETIL 250 MG/1
TABLET ORAL
Qty: 6 KIT | Refills: 2 | Status: SHIPPED | OUTPATIENT
Start: 2023-06-08 | End: 2023-08-09

## 2023-06-08 RX ORDER — ONDANSETRON 4 MG/1
4 TABLET, FILM COATED ORAL
COMMUNITY
Start: 2023-04-26 | End: 2024-04-02

## 2023-06-08 RX ORDER — DIAZEPAM 10 MG/1
TABLET ORAL
COMMUNITY
End: 2023-06-08

## 2023-06-08 NOTE — PROGRESS NOTES
Subjective:       Patient ID: Lydia Thomas is a pleasant 43 y.o. White female patient    Chief Complaint: Breast Problem      Patient is a pt I saw last on 04/04/2023, see my last notes.    HPI     Patient with past medical history as per list of problems below coming today for a regular follow-up visit.  She reports feeling globally better, she underwent physical therapy for L upper extremity with good recovery, now is going to work on her left lower extremity.    She is still on Worker's Comp, she will resume PT on Monday.    See notes from specialists.  She underwent a small procedure regarding implant of L breast end of April, she noticed yesterday that she has a very mild opening under the L breast, relates thus to the friction of her bra.  She contacted her specialist who advised her to have me checked the area.  She would like a refill for triptan injection.    She did blood work in April, very low vitamin D level, that will substitute.    She will come in in 6 months to do her annual physical.    Patient Active Problem List   Diagnosis    Acetaminophen overdose    Suicide attempt by acetaminophen overdose    Migraine    Anemia of chronic disease    Adjustment disorder with mixed anxiety and depressed mood    Tylenol overdose    Migraine without status migrainosus, not intractable    Occipital headache    Vasovagal syncope    Myoclonic jerking    Traumatic brain injury without loss of consciousness    Dizziness and giddiness    Depression    Warts of foot    Screening for colon cancer    Family history of colon cancer    Decreased strength, endurance, and mobility    Functional gait abnormality    Lumbar radiculopathy          ACTIVE MEDICAL ISSUES:  Documented in Problem List     PAST MEDICAL HISTORY  Documented     PAST SURGICAL HISTORY:  Documented     SOCIAL HISTORY:  Documented     FAMILY HISTORY:  Documented     ALLERGIES AND MEDICATIONS: updated and reviewed.  Documented    Review of Systems  "  Constitutional:  Positive for activity change. Negative for unexpected weight change.   HENT:  Negative for hearing loss, rhinorrhea and trouble swallowing.    Eyes:  Negative for discharge and visual disturbance.   Respiratory:  Negative for chest tightness and wheezing.    Cardiovascular:  Negative for chest pain and palpitations.   Gastrointestinal:  Negative for blood in stool, constipation, diarrhea and vomiting.   Endocrine: Negative for polydipsia and polyuria.   Genitourinary:  Negative for difficulty urinating, dysuria, hematuria and menstrual problem.   Musculoskeletal:  Positive for neck pain. Negative for arthralgias and joint swelling.   Skin:  Positive for wound (under L breast).   Neurological:  Positive for weakness (LUE better, LLE) and headaches.   Psychiatric/Behavioral:  Negative for confusion and dysphoric mood.      Objective:      Physical Exam  Vitals and nursing note reviewed.   Constitutional:       Appearance: Normal appearance. She is normal weight.   HENT:      Right Ear: Tympanic membrane normal.      Left Ear: Tympanic membrane normal.   Eyes:      Conjunctiva/sclera: Conjunctivae normal.   Cardiovascular:      Rate and Rhythm: Normal rate and regular rhythm.      Pulses: Normal pulses.      Heart sounds: Normal heart sounds.   Pulmonary:      Effort: Pulmonary effort is normal.      Breath sounds: Normal breath sounds.   Chest:       Abdominal:      General: Bowel sounds are normal.   Neurological:      Mental Status: She is alert.       Vitals:    06/08/23 0810   BP: 124/78   BP Location: Right arm   Patient Position: Sitting   BP Method: Large (Manual)   Pulse: 74   Resp: 16   Temp: 98.2 °F (36.8 °C)   TempSrc: Oral   SpO2: 98%   Weight: 64 kg (141 lb 1.5 oz)   Height: 5' 3" (1.6 m)     Body mass index is 24.99 kg/m².    RESULTS: Reviewed labs from last 12 months    Last Lab Results:     Lab Results   Component Value Date    WBC 5.33 04/06/2023    HGB 13.3 04/06/2023    HCT 40.1 " 04/06/2023     04/06/2023     04/06/2023    K 3.9 04/06/2023     04/06/2023    CO2 23 04/06/2023    BUN 9 04/06/2023    CREATININE 0.8 04/06/2023    CALCIUM 8.9 04/06/2023    ALBUMIN 3.9 04/06/2023    AST 16 04/06/2023    ALT 12 04/06/2023    CHOL 177 04/06/2023    TRIG 60 04/06/2023    HDL 46 04/06/2023    LDLCALC 119.0 04/06/2023    HGBA1C 4.7 04/06/2023    TSH 1.558 04/06/2023     Assessment:       1. Chronic nonintractable headache, unspecified headache type    2. Low back pain, unspecified back pain laterality, unspecified chronicity, unspecified whether sciatica present    3. Weakness of lower extremity, unspecified laterality    4. Skin lesion of breast    5. Vitamin D deficiency    6. Encounter for annual physical exam        Plan:   Lydia was seen today for breast problem.    Diagnoses and all orders for this visit:    Chronic nonintractable headache, unspecified headache type  -     sumatriptan (IMITREX STATDOSE) 6 mg/0.5 mL kit; To use max twice within 24 hours, 2 hours apart.    Refill provided.    Low back pain, unspecified back pain laterality, unspecified chronicity, unspecified whether sciatica present    F-up specialist, [employer].    Weakness of lower extremity, unspecified laterality    See above. Will start PT tomorrow.    Skin lesion of breast    Very small, seems to be an abrasion, calm. Advised to let it dry during daytime, no bra until healed, can apply antibiotic ointment if nec. If redness, collection or other concern, needs to seek for medical attention.    Vitamin D deficiency  -     ergocalciferol (ERGOCALCIFEROL) 50,000 unit Cap; Take 1 capsule (50,000 Units total) by mouth every 7 days.    Will substitute.    Encounter for annual physical exam  -     CBC Auto Differential; Future  -     Comprehensive Metabolic Panel; Future  -     Hemoglobin A1C; Future  -     TSH; Future  -     Ambulatory referral/consult to Obstetrics / Gynecology; Future    Will do usual  blood work before the next visit, will refer for Ob-Gyn.    Follow up in about 5 months (around 11/14/2023) for annual after blood work.    This note was created by combination of typed  and M-Modal dictation.  Transcription errors may be present.  If there are any questions, please contact me.

## 2023-06-13 ENCOUNTER — HOSPITAL ENCOUNTER (OUTPATIENT)
Dept: RADIOLOGY | Facility: OTHER | Age: 43
Discharge: HOME | End: 2023-06-13
Attending: ORTHOPAEDIC SURGERY
Payer: OTHER GOVERNMENT

## 2023-06-13 DIAGNOSIS — M51.36 DDD (DEGENERATIVE DISC DISEASE), LUMBAR: ICD-10-CM

## 2023-06-13 PROCEDURE — 72110 XR LUMBAR SPINE AP AND LAT WITH FLEX/EXT: ICD-10-PCS | Mod: 26,,, | Performed by: RADIOLOGY

## 2023-06-13 PROCEDURE — 72110 X-RAY EXAM L-2 SPINE 4/>VWS: CPT | Mod: 26,,, | Performed by: RADIOLOGY

## 2023-06-13 PROCEDURE — 72110 X-RAY EXAM L-2 SPINE 4/>VWS: CPT | Mod: TC,FY

## 2023-06-14 ENCOUNTER — CLINICAL SUPPORT (OUTPATIENT)
Dept: REHABILITATION | Facility: HOSPITAL | Age: 43
End: 2023-06-14
Attending: ORTHOPAEDIC SURGERY
Payer: OTHER MISCELLANEOUS

## 2023-06-14 DIAGNOSIS — R53.1 DECREASED STRENGTH, ENDURANCE, AND MOBILITY: Primary | ICD-10-CM

## 2023-06-14 DIAGNOSIS — R26.89 FUNCTIONAL GAIT ABNORMALITY: ICD-10-CM

## 2023-06-14 DIAGNOSIS — Z74.09 DECREASED STRENGTH, ENDURANCE, AND MOBILITY: Primary | ICD-10-CM

## 2023-06-14 DIAGNOSIS — R68.89 DECREASED STRENGTH, ENDURANCE, AND MOBILITY: Primary | ICD-10-CM

## 2023-06-14 DIAGNOSIS — R26.89 BALANCE DISORDER: ICD-10-CM

## 2023-06-14 DIAGNOSIS — R29.898 WEAKNESS OF LEFT LOWER EXTREMITY: ICD-10-CM

## 2023-06-14 PROCEDURE — 97161 PT EVAL LOW COMPLEX 20 MIN: CPT

## 2023-06-18 PROBLEM — R29.898 WEAKNESS OF LEFT LOWER EXTREMITY: Status: ACTIVE | Noted: 2023-06-18

## 2023-06-18 PROBLEM — R26.89 BALANCE DISORDER: Status: ACTIVE | Noted: 2023-06-18

## 2023-06-19 DIAGNOSIS — R29.898 WEAKNESS OF LEFT LOWER EXTREMITY: Primary | ICD-10-CM
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
## 2023-06-30 ENCOUNTER — CLINICAL SUPPORT (OUTPATIENT)
Dept: REHABILITATION | Facility: HOSPITAL | Age: 43
End: 2023-06-30
Payer: OTHER MISCELLANEOUS

## 2023-06-30 ENCOUNTER — PATIENT MESSAGE (OUTPATIENT)
Dept: FAMILY MEDICINE | Facility: CLINIC | Age: 43
End: 2023-06-30
Payer: OTHER GOVERNMENT

## 2023-06-30 DIAGNOSIS — R26.89 BALANCE DISORDER: Primary | ICD-10-CM

## 2023-06-30 PROCEDURE — 97110 THERAPEUTIC EXERCISES: CPT

## 2023-06-30 NOTE — PROGRESS NOTES
"  [facility]   Physical Therapy Treatment Note     Name: Lydia Thomas  Clinic Number: [id number]    Therapy Diagnosis:   Encounter Diagnosis   Name Primary?    Balance disorder Yes     Physician: Conrad Gutierres MD    Visit Date: 6/30/2023    Physician Orders: PT Eval and Treat - Neuro Program  Medical Diagnosis from Referral: R29.898 (ICD-10-CM) - Weakness of left lower extremity  Evaluation Date: 6/14/2023  Authorization Period Expiration: 5/24/24  Plan of Care Expiration: 8/11/23  Visit # / Visits authorized: 2/12     Progress Note Due: 7/14/23    PTA Visit #: 0/5     Time In: 1105  Time Out: 1145  Total Billable Time: 40 minutes    SUBJECTIVE     Pt reports: She is feeling good this morning. States that she is eager to begin working with PT but has some pain in her right hip.    She was not provided with home exercise program.  Response to previous treatment: no treatment provided, eval only  Functional change: ongoing    Pain: not rated/10  Location: right hip      OBJECTIVE     Objective Measures updated at progress report unless specified.     Treatment     Lydia received the treatments listed below:      therapeutic exercises to develop strength, endurance, ROM, and core stabilization for 40 minutes including:    Over ground gait training with HHA x100 feet into gym  SciFit recumbent stepper level 1 x5 mins  Reported increased pain and tightness in BUE due to over reliance on UE vs LE  Gait training with rolling walker from SciFit to ballet bar  Sliding glider forward and back with left lower extremity x1  Standing diaphragmatic breathing x3 reps  VC for hand placement to feel stomach expanding  Felt light headed and needed quick seat     Controlled descent to chair  W/c to mat dependent x2  Supine rest break and vitals assessment  /76 mmHg  Stand left/right and A/P weight shifts x 2 min each  Sit to stand x2 with right lower extremity on 2" step  Reports of right " "sided head pain and light headedness on standing  Sit to stand x2 on level ground  Stand piv to w/c Min A     neuromuscular re-education activities to improve: Balance, Coordination, Sense, Proprioception, and Posture for 00 minutes. The following activities were included:    therapeutic activities to improve functional performance for 00  minutes, including:    gait training to improve functional mobility and safety for 00  minutes, including:      Patient Education and Home Exercises     Home Exercises Provided and Patient Education Provided     Education provided:   - Diaphragmatic breathing  - weight bearing evenly through BLE    Written Home Exercises Provided: none provided today    ASSESSMENT     Patient tolerated today's session fairly well. Throughout the session she demonstrated difficulty with weight bearing and active use of left lower extremity; continues to ambulate with left hip hiking and circumduction. She was able to initiate hip flexion/extension in standing using furniture slider however demonstrated sympathetic response following forced use; reported feeling lightheaded, eyes rolling back and required control descent to sitting then transfer to supine. She states that this used to happen in therapy when attempting to use her left upper extremity as well; reported similar symptoms with sit to stands when right lower extremity was on 2" step to encourage forced use of left lower extremity later during the session. Following supine rest break, she was able to tolerate limited standing activities and responded well to weight shifting and verbal cues to use BUE when standing and sitting. She continues to be appropriate for skilled physical therapy services to address her functional deficits and improve her tolerance/independence to functional mobility.    Lydia Is progressing well towards her goals.   Pt prognosis is Good.     Pt will continue to benefit from skilled outpatient physical therapy to " address the deficits listed in the problem list box on initial evaluation, provide pt/family education and to maximize pt's level of independence in the home and community environment.     Pt's spiritual, cultural and educational needs considered and pt agreeable to plan of care and goals.     Anticipated barriers to physical therapy: none at this time    Goals:  Short Term Goals: 4 weeks   Patient will report having less difficulty with walking around a room on her FOTO survey to show improvements in her self-perception of functional mobility  Pt will report pain at B hips at </= 4/10 entering clinic.  Pt will improve left LE hip and knee MMT scores to 4/5 for improved stability with stance and functional mobility.   PT/A will administer Timed Up and Go, 10 Meter Walk Test, MTCSIB, and 5 Time Sit to Stand and establish appropriate goals for functional mobility  Patient will receive an individualized home exercise program to address functional deficits.     Long Term Goals: 8 weeks   Patient will improve her FOTO score from 16%  to at least 43% for improved self perception of functional mobility.(score 0-100, high score indicates greater level of function)  Pt will be able to ambulate 200 feet with no assistive device without loss of balance, increases in pain, or standing rest break for increased independence in the home with mobility.   Patient will report no falls over PT POC to show improvements in her safety awareness.   Patient will be independent with home exercise program on >/= 4 days per week.     Plan   Plan of care Certification: 6/14/2023 to 8/11/23.     Outpatient Physical Therapy 2 times weekly for 8 weeks to include the following interventions: Gait Training, Manual Therapy, Moist Heat/ Ice, Neuromuscular Re-ed, Orthotic Management and Training, Patient Education, Therapeutic Activities, and Therapeutic Exercise.     Jazz Cody, PT

## 2023-07-11 ENCOUNTER — CLINICAL SUPPORT (OUTPATIENT)
Dept: REHABILITATION | Facility: HOSPITAL | Age: 43
End: 2023-07-11
Payer: OTHER MISCELLANEOUS

## 2023-07-11 DIAGNOSIS — R26.89 BALANCE DISORDER: Primary | ICD-10-CM

## 2023-07-11 PROCEDURE — 97110 THERAPEUTIC EXERCISES: CPT

## 2023-07-11 PROCEDURE — 97112 NEUROMUSCULAR REEDUCATION: CPT

## 2023-07-11 NOTE — PROGRESS NOTES
"  [facility]   Physical Therapy Treatment Note     Name: Lydia Thomas  Clinic Number: [id number]    Therapy Diagnosis:   Encounter Diagnosis   Name Primary?    Balance disorder Yes     Physician: Conrad Gutierres MD    Visit Date: 7/11/2023    Physician Orders: PT Eval and Treat - Neuro Program  Medical Diagnosis from Referral: R29.898 (ICD-10-CM) - Weakness of left lower extremity  Evaluation Date: 6/14/2023  Authorization Period Expiration: 5/24/24  Plan of Care Expiration: 8/11/23  Visit # / Visits authorized: 3/12     Progress Note Due: 7/14/23    PTA Visit #: 0/5     Time In: 1030  Time Out: 1115  Total Billable Time: 45 minutes    SUBJECTIVE     Pt reports: She is doing good today, states that her trip went well functionally with getting on and off the plane and navigating the airport as she was able to call ahead to arrange accommodations.      She was not provided with home exercise program.  Response to previous treatment: tolerated well  Functional change: ongoing    Pain: not rated/10  Location: right hip      OBJECTIVE     Objective Measures updated at progress report unless specified.     Treatment     Lydia received the treatments listed below:      therapeutic exercises to develop strength, endurance, ROM, and core stabilization for 11 minutes including:    Supine glut med stretch 3 x30"  Left knee bent, right leg crossed and PT providing overpressure at knee to assist with stretch  Supine diaphragmatic breathing x1 min  1 hand placed over stomach for TC to engage diaphragm  Seated piriformis stretch 3 x30"  Right leg crossed over left and patient pushing down on right knee  Seated right hamstring stretch 3 x30"  Right lower extremity extended and patient reaching for toes  Seated diaphragmatic breathing x1 min  Prone press ups x12  Diaphragmatic breathing in prone propped on elbows x1 minute    neuromuscular re-education activities to improve: Balance, " "Coordination, Sense, Proprioception, and Posture for 26 minutes. The following activities were included:    Standing weight shifts with diaphragmatic breathing:  Neutral AMRY, A/P shifting x3 mins with AAROM initially then decrease to AROM with CGA  Neutral MARY, left/right shifting x2 mins with B HHA using walker as TC to assist with grading weight shifts each direction  Staggered stance, A/P shifting with right foot forward x2 mins with AAROM initially then decrease to AROM with CGA  Prone to quadruped x2 min A at left knee to assist with flexion  Stopped due to left upper extremity fatigue  Quadruped to tall kneeling with hands on chair Mod A x2 to stabilize patient and assist with chair placement  Tall kneeling left/right weight shifts x1.5 mins  BUE support on chair throughout  Patient with episode of "mental fatigue" described in assessment     therapeutic activities to improve functional performance for 8  minutes, including:    Over ground gait training with rolling walker x150 feet from lobby to gym  Sit to supine on gym mat, close spv  Supine to sit Min A for left lower extremity  Sit to supine, close spv  Supine to prone Min A to assist with left lower extremity for rolling  Stand pivot mat to w/c with rolling walker CGA     gait training to improve functional mobility and safety for 00  minutes, including:      Patient Education and Home Exercises     Home Exercises Provided and Patient Education Provided     Education provided:   - Diaphragmatic breathing  - weight bearing evenly through BLE    Written Home Exercises Provided: none provided today    ASSESSMENT     Lydia tolerated today's session fairly well. She reported good response to stretching for her right hip at start of session. She performed well with standing weight shifts however began to report feeling fatigued following A/P shifts in staggered stance. She also performed well with prone press ups to improve left upper extremity weight bearing " "and stretch hip flexors. She was able to perform quadruped to tall kneeling with assist x2 and tolerated left/right weight shifts well for ~1 minute then had an episode of "mental fatigue" where she reported having right sided headache with temporary impaired vision in right eye. Required total assistance to transition to sitting on her heels and episode lasted ~1-2 minutes. She states that this used to happen to her when she was concentrating/working too hard trying to engage her left upper extremity with therapy in the past. She recovered to her baseline quickly and was able to leave the session with dependent assistance via w/c for safety. She continues to be appropriate for skilled physical therapy services to address her functional deficits and improve her tolerance/independence with functional mobility.    Lydia Is progressing well towards her goals.   Pt prognosis is Good.     Pt will continue to benefit from skilled outpatient physical therapy to address the deficits listed in the problem list box on initial evaluation, provide pt/family education and to maximize pt's level of independence in the home and community environment.     Pt's spiritual, cultural and educational needs considered and pt agreeable to plan of care and goals.     Anticipated barriers to physical therapy: none at this time    Goals:  Short Term Goals: 4 weeks   Patient will report having less difficulty with walking around a room on her FOTO survey to show improvements in her self-perception of functional mobility  Pt will report pain at B hips at </= 4/10 entering clinic.  Pt will improve left LE hip and knee MMT scores to 4/5 for improved stability with stance and functional mobility.   PT/A will administer Timed Up and Go, 10 Meter Walk Test, MTCSIB, and 5 Time Sit to Stand and establish appropriate goals for functional mobility  Patient will receive an individualized home exercise program to address functional deficits.     Long " Term Goals: 8 weeks   Patient will improve her FOTO score from 16%  to at least 43% for improved self perception of functional mobility.(score 0-100, high score indicates greater level of function)  Pt will be able to ambulate 200 feet with no assistive device without loss of balance, increases in pain, or standing rest break for increased independence in the home with mobility.   Patient will report no falls over PT POC to show improvements in her safety awareness.   Patient will be independent with home exercise program on >/= 4 days per week.     Plan   Plan of care Certification: 6/14/2023 to 8/11/23.     Outpatient Physical Therapy 2 times weekly for 8 weeks to include the following interventions: Gait Training, Manual Therapy, Moist Heat/ Ice, Neuromuscular Re-ed, Orthotic Management and Training, Patient Education, Therapeutic Activities, and Therapeutic Exercise.     Jazz Cody, PT

## 2023-07-13 ENCOUNTER — CLINICAL SUPPORT (OUTPATIENT)
Dept: REHABILITATION | Facility: HOSPITAL | Age: 43
End: 2023-07-13
Payer: OTHER MISCELLANEOUS

## 2023-07-13 DIAGNOSIS — R26.89 BALANCE DISORDER: Primary | ICD-10-CM

## 2023-07-13 PROCEDURE — 97110 THERAPEUTIC EXERCISES: CPT

## 2023-07-13 PROCEDURE — 97112 NEUROMUSCULAR REEDUCATION: CPT

## 2023-07-13 PROCEDURE — 97530 THERAPEUTIC ACTIVITIES: CPT

## 2023-07-13 NOTE — PROGRESS NOTES
"  [facility]   Physical Therapy Treatment Note     Name: Lydia Thomas  Clinic Number: [id number]    Therapy Diagnosis:   Encounter Diagnosis   Name Primary?    Balance disorder Yes     Physician: Conrad Gutierres MD    Visit Date: 7/13/2023    Physician Orders: PT Eval and Treat - Neuro Program  Medical Diagnosis from Referral: R29.898 (ICD-10-CM) - Weakness of left lower extremity  Evaluation Date: 6/14/2023  Authorization Period Expiration: 5/24/24  Plan of Care Expiration: 8/11/23  Visit # / Visits authorized: 4/12     Progress Note Due: 7/14/23    PTA Visit #: 0/5     Time In: 1147  Time Out: 1230  Total Billable Time: 43 minutes    SUBJECTIVE     Pt reports: She is feeling good this morning, states that following her last session she had a migraine for the remainder of the day gut was not limited in her daily activities. States that her right hip is hurting a lot this morning.    She was not provided with home exercise program.  Response to previous treatment: tolerated well  Functional change: ongoing    Pain: 7/10  Location: right hip      OBJECTIVE     Objective Measures updated at progress report unless specified.     Treatment     Lydia received the treatments listed below:      therapeutic exercises to develop strength, endurance, ROM, and core stabilization for 11 minutes including:    Supine glut med stretch 3 x30"  Left knee bent, right leg crossed and PT providing overpressure at knee to assist with stretch  Supine lower trunk rotations over swiss ball 2 x2 min  One set of supine diaphragmatic breathing x5  Seated piriformis stretch 3 x30"  Right leg crossed over left and patient pushing down on right knee  Seated right hamstring stretch 3 x30"  Right lower extremity extended and patient reaching for toes  Seated diaphragmatic breathing x5    neuromuscular re-education activities to improve: Balance, Coordination, Sense, Proprioception, and Posture for 20 minutes. " The following activities were included:    Standing weight shifts with diaphragmatic breathing and holding a 5# kb with BUE over her stomach to increase proprioceptive input and grounding:  Neutral MARY, A/P shifting x20 with CGA via gait belt  Neutral MARY, left/right shifting x20 using walker as TC to assist with grading weight shifts each direction    therapeutic activities to improve functional performance for 12  minutes, including:    Over ground gait training with rolling walker x150 feet from lobby to gym  Sit to supine on gym mat, close spv  Supine to sit Min A for left lower extremity  Over ground gait training with rolling walker x200 feet from gym to 1st floor lobby     gait training to improve functional mobility and safety for 00  minutes, including:      Patient Education and Home Exercises     Home Exercises Provided and Patient Education Provided     Education provided:   - Diaphragmatic breathing  - grounding techniques using weight near core  - importance of working to engage left side but within tolerance to prevent mental fatigue    Written Home Exercises Provided: none provided today    ASSESSMENT     Lydia tolerated today's session well with rest breaks provided throughout on today; reported some BLE fatigue following walk leaving session. She responded well to stretching for her right lower extremity and adding the kettle bell to standing activities on today for improved grounding. She was able to participate throughout the session without an increase in sympathetic symptoms or mental fatigue. She continues to benefit from skilled physical therapy services to address her functional deficits and improve her tolerance/independence with functional mobility.    Lydia Is progressing well towards her goals.   Pt prognosis is Good.     Pt will continue to benefit from skilled outpatient physical therapy to address the deficits listed in the problem list box on initial evaluation, provide pt/family  education and to maximize pt's level of independence in the home and community environment.     Pt's spiritual, cultural and educational needs considered and pt agreeable to plan of care and goals.     Anticipated barriers to physical therapy: none at this time    Goals:  Short Term Goals: 4 weeks   Patient will report having less difficulty with walking around a room on her FOTO survey to show improvements in her self-perception of functional mobility  Pt will report pain at B hips at </= 4/10 entering clinic.  Pt will improve left LE hip and knee MMT scores to 4/5 for improved stability with stance and functional mobility.   PT/A will administer Timed Up and Go, 10 Meter Walk Test, MTCSIB, and 5 Time Sit to Stand and establish appropriate goals for functional mobility  Patient will receive an individualized home exercise program to address functional deficits.     Long Term Goals: 8 weeks   Patient will improve her FOTO score from 16%  to at least 43% for improved self perception of functional mobility.(score 0-100, high score indicates greater level of function)  Pt will be able to ambulate 200 feet with no assistive device without loss of balance, increases in pain, or standing rest break for increased independence in the home with mobility.   Patient will report no falls over PT POC to show improvements in her safety awareness.   Patient will be independent with home exercise program on >/= 4 days per week.     Plan   Plan of care Certification: 6/14/2023 to 8/11/23.     Outpatient Physical Therapy 2 times weekly for 8 weeks to include the following interventions: Gait Training, Manual Therapy, Moist Heat/ Ice, Neuromuscular Re-ed, Orthotic Management and Training, Patient Education, Therapeutic Activities, and Therapeutic Exercise.     Jazz Cody, PT

## 2023-07-17 ENCOUNTER — CLINICAL SUPPORT (OUTPATIENT)
Dept: REHABILITATION | Facility: HOSPITAL | Age: 43
End: 2023-07-17
Payer: OTHER MISCELLANEOUS

## 2023-07-17 DIAGNOSIS — R26.89 BALANCE DISORDER: Primary | ICD-10-CM

## 2023-07-17 PROCEDURE — 97530 THERAPEUTIC ACTIVITIES: CPT | Mod: PO

## 2023-07-17 PROCEDURE — 97112 NEUROMUSCULAR REEDUCATION: CPT | Mod: PO

## 2023-07-17 NOTE — PROGRESS NOTES
[facility]   Physical Therapy Treatment Note     Name: Lydia Thomas  Clinic Number: [id number]    Therapy Diagnosis:   Encounter Diagnosis   Name Primary?    Balance disorder Yes     Physician: Conrad Gutierres MD    Visit Date: 7/17/2023    Physician Orders: PT Eval and Treat - Neuro Program  Medical Diagnosis from Referral: R29.898 (ICD-10-CM) - Weakness of left lower extremity  Evaluation Date: 6/14/2023  Authorization Period Expiration: 5/24/24  Plan of Care Expiration: 8/11/23  Visit # / Visits authorized: 5/12     Progress Note Due: 7/14/23    PTA Visit #: 0/5     Time In: 1115  Time Out: 1200  Total Billable Time: 45 minutes    SUBJECTIVE     Pt reports: She is doing pretty good this morning, denies any complaints or issues since her last visit    She was compliant with her home exercise program.  Response to previous treatment: tolerated well  Functional change: ongoing    Pain: 7/10  Location: right hip      OBJECTIVE     Objective Measures updated at progress report unless specified.     Strength:     Right Lower Extremity Strength Grade 7/17/23 Left Lower Extremity Strength Grade 7/17/23   Hip Flexion 5/5 5/5 Hip Flexion 2/5 trace   Hip Extension Grossly 3+/5 with functional mobility 4/5 Hip Extension Grossly 3/5 with functional mobility trace   Hip Abduction Not tested 4/5 Hip Abduction 2/5 trace   Hip Adduction Not tested NT Hip Adduction 2/5 trace   Knee Flexion 5/5 5/5 Knee Flexion 2-/5 trace   Knee Extension 5/5 5/5 Knee Extension 3+/5 with ambulation; 3-/5 when assessed in sidelying Trace (supine)   Ankle Dorsiflexion 5/5 5/5 Ankle Dorsiflexion trace trace   Ankle Plantarflexion 4+/5 5/5 Ankle Plantarflexion trace trace      CMS Impairment/Limitation/Restriction for FOTO NOC-Neuromuscular disorder Survey     Therapist reviewed FOTO scores for Lydia Thomas on 6/14/2023.   FOTO documents entered into [employer] - see Media section.    Intake Score: 37%        "    Treatment     Lydia received the treatments listed below:      therapeutic exercises to develop strength, endurance, ROM, and core stabilization for 5 minutes including:    Supine piriformis stretch 2 x30" on right and left  Manual muscle testing listed above    neuromuscular re-education activities to improve: Balance, Coordination, Sense, Proprioception, and Posture for 31 minutes. The following activities were included:    Standing weight shifts with diaphragmatic breathing and holding a 5# kb with BUE over her stomach to increase proprioceptive input and grounding:  Neutral MARY, A/P shifting x20 with CGA via gait belt  Neutral MARY, left/right shifting x20 using walker as TC to assist with grading weight shifts each direction  Standing weight shifts in parallel bars, staggered stance with BUE support x2 mins with each UE leading  Step taps with right lower extremity to 4" step, BUE supported, Min A - CGA to facilitate weight shift to left x10  Step taps with left lower extremity to 2" step, BUE supported x10  VC provided to maintain weightshift to right without excessively leaning on bars  PROM for left lower extremity advancement x5 reps then AAROM x5    therapeutic activities to improve functional performance for 9  minutes, including:    Over ground gait training with rolling walker x150 feet from lobby to gym  FOTO survey    gait training to improve functional mobility and safety for 00  minutes, including:      Patient Education and Home Exercises     Home Exercises Provided and Patient Education Provided     Education provided:   - Diaphragmatic breathing  - grounding techniques using weight near core  - importance of working to engage left side but within tolerance to prevent mental fatigue    Written Home Exercises Provided: none provided today    ASSESSMENT     Lydia tolerated today's session well with rest breaks provided throughout. She was able to participate in a reassessment on today showing " trace muscle activation in her left lower extremity due to difficulty with purposeful movement of her limb however with improved performance on her FOTO survey on today. Such significant improvement may be due in part to patient reporting having difficulty understanding questions on her first attempt and was able to more accurately answer the questions once explained. She was also able to participate in standing weight shifts on today progressing to staggered stance and step taps with BUE supported in the parallel bars without exacerbation of sympathetic symptoms or mental fatigue. VC provided throughout for diaphragmatic breathing to improve her tolerance to standing activities. She continues to benefit from skilled physical therapy services to address her functional deficits and improve her tolerance/independence with functional mobility.    Lydia Is progressing well towards her goals.   Pt prognosis is Good.     Pt will continue to benefit from skilled outpatient physical therapy to address the deficits listed in the problem list box on initial evaluation, provide pt/family education and to maximize pt's level of independence in the home and community environment.     Pt's spiritual, cultural and educational needs considered and pt agreeable to plan of care and goals.     Anticipated barriers to physical therapy: none at this time    Goals:  Short Term Goals: 4 weeks   Patient will report having less difficulty with walking around a room on her FOTO survey to show improvements in her self-perception of functional mobility  Pt will report pain at B hips at </= 4/10 entering clinic.  Pt will improve left LE hip and knee MMT scores to 4/5 for improved stability with stance and functional mobility.   PT/A will administer Timed Up and Go, 10 Meter Walk Test, MTCSIB, and 5 Time Sit to Stand and establish appropriate goals for functional mobility  Patient will receive an individualized home exercise program to address  functional deficits.     Long Term Goals: 8 weeks   Patient will improve her FOTO score from 16%  to at least 43% for improved self perception of functional mobility.(score 0-100, high score indicates greater level of function)  Pt will be able to ambulate 200 feet with no assistive device without loss of balance, increases in pain, or standing rest break for increased independence in the home with mobility.   Patient will report no falls over PT POC to show improvements in her safety awareness.   Patient will be independent with home exercise program on >/= 4 days per week.     Plan   Plan of care Certification: 6/14/2023 to 8/11/23.     Outpatient Physical Therapy 2 times weekly for 8 weeks to include the following interventions: Gait Training, Manual Therapy, Moist Heat/ Ice, Neuromuscular Re-ed, Orthotic Management and Training, Patient Education, Therapeutic Activities, and Therapeutic Exercise.     Jazz Cody, PT

## 2023-07-19 ENCOUNTER — CLINICAL SUPPORT (OUTPATIENT)
Dept: REHABILITATION | Facility: HOSPITAL | Age: 43
End: 2023-07-19
Payer: OTHER MISCELLANEOUS

## 2023-07-19 DIAGNOSIS — R26.89 BALANCE DISORDER: Primary | ICD-10-CM

## 2023-07-19 PROCEDURE — 97530 THERAPEUTIC ACTIVITIES: CPT

## 2023-07-19 PROCEDURE — 97112 NEUROMUSCULAR REEDUCATION: CPT

## 2023-07-19 NOTE — PROGRESS NOTES
"  [facility]   Physical Therapy Treatment Note     [facility]  [facility] Number: [id number]    Therapy Diagnosis:   Encounter Diagnosis   Name Primary?    Balance disorder Yes     Physician: Conrad Gutierres MD    Visit Date: 7/19/2023    Physician Orders: PT Eval and Treat - Neuro Program  Medical Diagnosis from Referral: R29.898 (ICD-10-CM) - Weakness of left lower extremity  Evaluation Date: 6/14/2023  Authorization Period Expiration: 5/24/24  Plan of Care Expiration: 8/11/23  Visit # / Visits authorized: 6/12     Progress Note Due: 8/11/23    PTA Visit #: 0/5     Time In: 1116  Time Out: 1203  Total Billable Time: 47 minutes    SUBJECTIVE     Pt reports: She is doing pretty good this morning, denies any complaints or issues since her last visit    She was compliant with her home exercise program.  Response to previous treatment: tolerated well  Functional change: ongoing    Pain: 5/10  Location: right hip      OBJECTIVE     Objective Measures updated at progress report unless specified.     Treatment     Lydia received the treatments listed below:      therapeutic exercises to develop strength, endurance, ROM, and core stabilization for 3 minutes including:    Seated piriformis stretch 2 x30" bilaterally  PT assisted with foot placement on left side    neuromuscular re-education activities to improve: Balance, Coordination, Sense, Proprioception, and Posture for 28 minutes. The following activities were included:    Standing weight shifts with diaphragmatic breathing and holding a 5# kb with BUE over her stomach to increase proprioceptive input and grounding:  Neutral MARY, A/P shifting x2 min with CGA via gait belt  Neutral MARY, left/right shifting x2 min using walker as TC to assist with grading weight shifts each direction  Staggered stance A/P shifting with with each foot forward x2 min  Gait training from parallel bars to mat with BUE supported x15 feet  Pt performed " the following activities in tall kneeling:  Hero squats x3 with Max A and VC to engage core and hips and decrease weight bearing through BUE's  Left/right weight shifting x1 min    therapeutic activities to improve functional performance for 16 minutes, including:    Over ground gait training with rolling walker x150 feet from lobby to gym  Sit to supine Min A for left lower extremity   Supine to prone Min A for left lower extremity   Prone to heel sitting Min A to bend left knee  Heel sit to tall kneeling x2 Min A x2 to place and stabilize trunk   Heel sit to prone, CGA  Prone to supine, Min A for left lower extremity  Supine to sit Min A for left lower extremity  Sit to stand x2  On 2nd attempt pt had episode of mental fatigue/functional seizure, PT assisted pt from sitting into right sidelying until symptoms subsided; pt with reports of headache and visual changes to right eye  Squat pivot transfer mat to w/c with Mod A    gait training to improve functional mobility and safety for 00  minutes, including:      Patient Education and Home Exercises     Home Exercises Provided and Patient Education Provided     Education provided:   - Diaphragmatic breathing  - grounding techniques using weight near core  - importance of working to engage left side but within tolerance to prevent mental fatigue    Written Home Exercises Provided: none provided today    ASSESSMENT     Lydia tolerated today's session fairly well with rest breaks provided throughout. She performed well with standing weight shifts; able to initiate trunk and hip movement without twisting to modify left weight bearing on today. She was also able to tolerate heel sitting and tall kneeling positions well on today showing improved weight acceptance to left upper extremity and left lower extremity. At the end of the session, when transitioning to stand to transfer to w/c she had an episode of mental fatigue/functional seizure and required total A to  transition into sidelying until symptoms subsided. She was able to leave the session via wheelchair at her baseline level of cognitive function and had assistance to transfer into her transportation when leaving. She continues to benefit from skilled physical therapy services to address her functional deficits and improve her tolerance/independence with functional mobility.    Lydia Is progressing well towards her goals.   Pt prognosis is Good.     Pt will continue to benefit from skilled outpatient physical therapy to address the deficits listed in the problem list box on initial evaluation, provide pt/family education and to maximize pt's level of independence in the home and community environment.     Pt's spiritual, cultural and educational needs considered and pt agreeable to plan of care and goals.     Anticipated barriers to physical therapy: none at this time    Goals:  Short Term Goals: 4 weeks   Patient will report having less difficulty with walking around a room on her FOTO survey to show improvements in her self-perception of functional mobility  Pt will report pain at B hips at </= 4/10 entering clinic.  Pt will improve left LE hip and knee MMT scores to 4/5 for improved stability with stance and functional mobility.   PT/A will administer Timed Up and Go, 10 Meter Walk Test, MTCSIB, and 5 Time Sit to Stand and establish appropriate goals for functional mobility  Patient will receive an individualized home exercise program to address functional deficits.     Long Term Goals: 8 weeks   Patient will improve her FOTO score from 16%  to at least 43% for improved self perception of functional mobility.(score 0-100, high score indicates greater level of function)  Pt will be able to ambulate 200 feet with no assistive device without loss of balance, increases in pain, or standing rest break for increased independence in the home with mobility.   Patient will report no falls over PT POC to show improvements  in her safety awareness.   Patient will be independent with home exercise program on >/= 4 days per week.     Plan   Plan of care Certification: 6/14/2023 to 8/11/23.     Outpatient Physical Therapy 2 times weekly for 8 weeks to include the following interventions: Gait Training, Manual Therapy, Moist Heat/ Ice, Neuromuscular Re-ed, Orthotic Management and Training, Patient Education, Therapeutic Activities, and Therapeutic Exercise.     Jazz Cody, PT

## 2023-07-24 ENCOUNTER — CLINICAL SUPPORT (OUTPATIENT)
Dept: REHABILITATION | Facility: HOSPITAL | Age: 43
End: 2023-07-24
Payer: OTHER MISCELLANEOUS

## 2023-07-24 DIAGNOSIS — R26.89 BALANCE DISORDER: Primary | ICD-10-CM

## 2023-07-24 PROCEDURE — 97112 NEUROMUSCULAR REEDUCATION: CPT | Mod: PO

## 2023-07-24 PROCEDURE — 97530 THERAPEUTIC ACTIVITIES: CPT | Mod: PO

## 2023-07-24 NOTE — PROGRESS NOTES
"  [facility]   Physical Therapy Treatment Note     [facility]  [facility] Number: [id number]    Therapy Diagnosis:   Encounter Diagnosis   Name Primary?    Balance disorder Yes       Physician: Conrad Gutierres MD    Visit Date: 7/24/2023    Physician Orders: PT Eval and Treat - Neuro Program  Medical Diagnosis from Referral: R29.898 (ICD-10-CM) - Weakness of left lower extremity  Evaluation Date: 6/14/2023  Authorization Period Expiration: 5/24/24  Plan of Care Expiration: 8/11/23  Visit # / Visits authorized: 7/12     Progress Note Due: 8/11/23    PTA Visit #: 0/5     Time In: 1116  Time Out: 1200  Total Billable Time: 44 minutes    SUBJECTIVE     Pt reports: She is doing pretty good this morning, states that she went out to her back yard over the weekend and had a fall outside. Her neighbor was outside and able to help her recover to standing with no injuries reported.     She was compliant with her home exercise program.  Response to previous treatment: tolerated well  Functional change: ongoing    Pain: 5/10  Location: right hip      OBJECTIVE     Objective Measures updated at progress report unless specified.     Treatment     Lydia received the treatments listed below:      therapeutic exercises to develop strength, endurance, ROM, and core stabilization for 3 minutes including:    Seated piriformis stretch 3 x30" bilaterally  PT assisted with foot placement on left side    neuromuscular re-education activities to improve: Balance, Coordination, Sense, Proprioception, and Posture for 33 minutes. The following activities were included:    Standing weight shifts with diaphragmatic breathing and holding a 5# kb with BUE over her stomach to increase proprioceptive input and grounding:  Neutral MARY, A/P shifting x2 min with CGA via gait belt  Neutral MARY, left/right shifting x2 min using walker as TC to assist with grading weight shifts each direction  Staggered stance A/P " shifting with with each foot forward x2 min  Forward/backward stepping with reji navigation for right lower extremity x5 then x10  VC provided to increase weight shift to left   Right upper extremity supported on parallel bars  Forward/backward sliding with left lower extremity to target, SONDRA 2 x5  VC provided to use bars as TC to maintain weight shift to right and to focus on target that she is trying to reach  Neuro Maryse skate used on left lower extremity to decrease friction    therapeutic activities to improve functional performance for 8 minutes, including:    Over ground gait training with rolling walker x150 feet from lobby to gym  Sit to stands to parallel bars with close spv x5 throughout session  Squat pivot transfer standard chair to w/c with Min A    gait training to improve functional mobility and safety for 00  minutes, including:      Patient Education and Home Exercises     Home Exercises Provided and Patient Education Provided     Education provided:   - Diaphragmatic breathing  - grounding techniques using weight near core  - importance of working to engage left side but within tolerance to prevent mental fatigue    Written Home Exercises Provided: none provided today    ASSESSMENT     Lydia tolerated today's session fairly well with rest breaks provided throughout. She was able to tolerate standing for the majority of the session with signs of functional intolerance or seizure noted on today. She responded well to weight shifting in multiple directions with SONDRA from PT initially with gradual decrease to CGA; able to maintain weight shifting with good trunk control and BLE alignment noted throughout. She was also able to perform forward and backward stepping with her right lower extremity to navigate a small reji showing good left lower extremity stability throughout the stance phase. Today she was also able to progress to left lower extremity sliding with Neuro Maryse skate and SONDRA  showing improved muscle activation in hip musculature in both directions. During these activities she began to become overwhelmed and required several cues to take breaks (standing or seated) and utilize diaphragmatic breathing to prevent overstimulation of CNS. She continues to benefit from skilled physical therapy services to address her functional deficits and improve her tolerance/independence with functional mobility.    Lydia Is progressing well towards her goals.   Pt prognosis is Good.     Pt will continue to benefit from skilled outpatient physical therapy to address the deficits listed in the problem list box on initial evaluation, provide pt/family education and to maximize pt's level of independence in the home and community environment.     Pt's spiritual, cultural and educational needs considered and pt agreeable to plan of care and goals.     Anticipated barriers to physical therapy: none at this time    Goals:  Short Term Goals: 4 weeks   Patient will report having less difficulty with walking around a room on her FOTO survey to show improvements in her self-perception of functional mobility  Pt will report pain at B hips at </= 4/10 entering clinic.  Pt will improve left LE hip and knee MMT scores to 4/5 for improved stability with stance and functional mobility.   PT/A will administer Timed Up and Go, 10 Meter Walk Test, MTCSIB, and 5 Time Sit to Stand and establish appropriate goals for functional mobility  Patient will receive an individualized home exercise program to address functional deficits.     Long Term Goals: 8 weeks   Patient will improve her FOTO score from 16%  to at least 43% for improved self perception of functional mobility.(score 0-100, high score indicates greater level of function)  Pt will be able to ambulate 200 feet with no assistive device without loss of balance, increases in pain, or standing rest break for increased independence in the home with mobility.   Patient will  report no falls over PT POC to show improvements in her safety awareness.   Patient will be independent with home exercise program on >/= 4 days per week.     Plan   Plan of care Certification: 6/14/2023 to 8/11/23.     Outpatient Physical Therapy 2 times weekly for 8 weeks to include the following interventions: Gait Training, Manual Therapy, Moist Heat/ Ice, Neuromuscular Re-ed, Orthotic Management and Training, Patient Education, Therapeutic Activities, and Therapeutic Exercise.     Jazz Cody, PT

## 2023-07-26 ENCOUNTER — TELEPHONE (OUTPATIENT)
Dept: ORTHOPEDICS | Facility: CLINIC | Age: 43
End: 2023-07-26
Payer: OTHER GOVERNMENT

## 2023-07-26 ENCOUNTER — CLINICAL SUPPORT (OUTPATIENT)
Dept: REHABILITATION | Facility: HOSPITAL | Age: 43
End: 2023-07-26
Payer: OTHER MISCELLANEOUS

## 2023-07-26 DIAGNOSIS — R26.89 BALANCE DISORDER: Primary | ICD-10-CM

## 2023-07-26 PROCEDURE — 97112 NEUROMUSCULAR REEDUCATION: CPT

## 2023-07-26 PROCEDURE — 97530 THERAPEUTIC ACTIVITIES: CPT

## 2023-07-26 NOTE — PROGRESS NOTES
"  [facility]   Physical Therapy Treatment Note     [facility]  [facility] [id number]    Therapy Diagnosis:   Encounter Diagnosis   Name Primary?    Balance disorder Yes     Physician: Conrad Gutierres MD    Visit Date: 7/26/2023    Physician Orders: PT Eval and Treat - Neuro Program  Medical Diagnosis from Referral: R29.898 (ICD-10-CM) - Weakness of left lower extremity  Evaluation Date: 6/14/2023  Authorization Period Expiration: 5/24/24  Plan of Care Expiration: 8/11/23  Visit # / Visits authorized: 8/12     Progress Note Due: 8/11/23    PTA Visit #: 0/5     Time In: 1118  Time Out: 1200  Total Billable Time: 42 minutes    SUBJECTIVE     Pt reports: She is doing good this morning. States that she felt nauseous following last session which persisted throughout the evening but she did not have any headaches or visual changes    She was compliant with her home exercise program.  Response to previous treatment: tolerated well  Functional change: ongoing    Pain: 5/10  Location: right hip      OBJECTIVE     Objective Measures updated at progress report unless specified.     Treatment     Lydia received the treatments listed below:      therapeutic exercises to develop strength, endurance, ROM, and core stabilization for 2 minutes including:    Seated piriformis stretch 3 x30" bilaterally  PT assisted with foot placement on left side    neuromuscular re-education activities to improve: Balance, Coordination, Sense, Proprioception, and Posture for 32 minutes. The following activities were included:    Standing weight shifts with diaphragmatic breathing and holding a 5# kb with BUE over her stomach to increase proprioceptive input and grounding:  Neutral MARY, left/right shifting x2 min using parallel bars as TC to assist with grading weight shifts each direction  Staggered stance A/P shifting with with each foot forward x2 min  Forward/backward stepping with reji " "navigation for right lower extremity 2 x10  VC provided to use gait belt and parallel bars as visual cue to weight shift to left prior to stepping  Right upper extremity supported on parallel bars  Forward/backward sliding with left lower extremity to target, AAROM 2 x10  Right upper extremity supported on parallel bars  VC provided to use bars as TC to maintain weight shift to right and to focus on target that she is trying to reach  Neuro Maryse skate used on left lower extremity to decrease friction  Left lower extremity step taps to 2" step, AAROM x7  Right upper extremity supported on parallel bars  VC provided to use bars as TC to maintain weight shift to right and to focus on target that she is trying to reach  Functional seizure noted with continued exertion    therapeutic activities to improve functional performance for 8 minutes, including:    Over ground gait training with rolling walker x150 feet from lobby to gym  Sit to stands to parallel bars with close spv x4 throughout session  Squat pivot transfer standard chair to w/c with Min A    gait training to improve functional mobility and safety for 00  minutes, including:      Patient Education and Home Exercises     Home Exercises Provided and Patient Education Provided     Education provided:   - Diaphragmatic breathing  - grounding techniques using weight near core  - importance of working to engage left side but within tolerance to prevent mental fatigue    Written Home Exercises Provided: none provided today    ASSESSMENT     Lydia tolerated today's session fairly well with rest breaks provided throughout. She was able to tolerate standing for the majority of the session again on today. Showed improved performance with weight shifting and showed good carryover to reji navigation with right lower extremity. She also showed improved muscle activation with left lower extremity foot sliding, able to perform with minimal assistance on today. She was " "also able to perform step taps to 2" with Mod A, showing improved activation of hamstrings and hip flexors. During step taps, she began to experience a functional seizure where she had a headache and became very nauseous and reported feeling like she needed to vomit however no emesis. She was able to return to her baseline quickly and be assisted via w/c to first floor lobby. She continues to benefit from skilled physical therapy services to address her functional deficits and improve her tolerance/independence with functional mobility.    Lydia Is progressing well towards her goals.   Pt prognosis is Good.     Pt will continue to benefit from skilled outpatient physical therapy to address the deficits listed in the problem list box on initial evaluation, provide pt/family education and to maximize pt's level of independence in the home and community environment.     Pt's spiritual, cultural and educational needs considered and pt agreeable to plan of care and goals.     Anticipated barriers to physical therapy: none at this time    Goals:  Short Term Goals: 4 weeks   Patient will report having less difficulty with walking around a room on her FOTO survey to show improvements in her self-perception of functional mobility  Pt will report pain at B hips at </= 4/10 entering clinic.  Pt will improve left LE hip and knee MMT scores to 4/5 for improved stability with stance and functional mobility.   PT/A will administer Timed Up and Go, 10 Meter Walk Test, MTCSIB, and 5 Time Sit to Stand and establish appropriate goals for functional mobility  Patient will receive an individualized home exercise program to address functional deficits.     Long Term Goals: 8 weeks   Patient will improve her FOTO score from 16%  to at least 43% for improved self perception of functional mobility.(score 0-100, high score indicates greater level of function)  Pt will be able to ambulate 200 feet with no assistive device without loss of " balance, increases in pain, or standing rest break for increased independence in the home with mobility.   Patient will report no falls over PT POC to show improvements in her safety awareness.   Patient will be independent with home exercise program on >/= 4 days per week.     Plan   Plan of care Certification: 6/14/2023 to 8/11/23.     Outpatient Physical Therapy 2 times weekly for 8 weeks to include the following interventions: Gait Training, Manual Therapy, Moist Heat/ Ice, Neuromuscular Re-ed, Orthotic Management and Training, Patient Education, Therapeutic Activities, and Therapeutic Exercise.     Jazz Cody, PT

## 2023-07-31 ENCOUNTER — CLINICAL SUPPORT (OUTPATIENT)
Dept: REHABILITATION | Facility: HOSPITAL | Age: 43
End: 2023-07-31
Payer: OTHER MISCELLANEOUS

## 2023-07-31 DIAGNOSIS — R26.89 BALANCE DISORDER: Primary | ICD-10-CM

## 2023-07-31 PROCEDURE — 97530 THERAPEUTIC ACTIVITIES: CPT

## 2023-07-31 PROCEDURE — 97110 THERAPEUTIC EXERCISES: CPT

## 2023-07-31 PROCEDURE — 97112 NEUROMUSCULAR REEDUCATION: CPT

## 2023-07-31 NOTE — PROGRESS NOTES
"  [facility]   Physical Therapy Treatment Note     [facility]  [facility] Number: [id number]    Therapy Diagnosis:   Encounter Diagnosis   Name Primary?    Balance disorder Yes   Physician: Conrad Gutierres MD    Visit Date: 7/31/2023    Physician Orders: PT Eval and Treat - Neuro Program  Medical Diagnosis from Referral: R29.898 (ICD-10-CM) - Weakness of left lower extremity  Evaluation Date: 6/14/2023  Authorization Period Expiration: 5/24/24  Plan of Care Expiration: 8/11/23  Visit # / Visits authorized: 9/12     Progress Note Due: 8/11/23    PTA Visit #: 0/5     Time In: 1118  Time Out: 1200  Total Billable Time: 42 minutes    SUBJECTIVE     Pt reports: She is doing good this morning. States that she was supposed to have an appointment with Dr. Gutierres last week but it had to be rescheduled for next week.    She was compliant with her home exercise program.  Response to previous treatment: tolerated well  Functional change: ongoing    Pain: 5/10  Location: right hip      OBJECTIVE     Objective Measures updated at progress report unless specified.     Treatment     Lydia received the treatments listed below:      therapeutic exercises to develop strength, endurance, ROM, and core stabilization for 10 minutes including:    Seated piriformis stretch 3 x30" bilaterally  PT assisted with foot placement on left side  Prone press ups x10 x2 second hold  SciFit recumbent stepper level 1 x5 minutes    neuromuscular re-education activities to improve: Balance, Coordination, Sense, Proprioception, and Posture for 14 minutes. The following activities were included:    Patient assumed tall kneeling x3 with Mod - Max A and performed the following activities with CGA throghout:  Balloon volley ball x1 minute  Alternating hand raises to target 2 x10 total  Forward/backward sliding with left lower extremity to target, AAROM 2 x5  Right upper extremity supported on parallel bars  VC provided to " "use bars as TC to maintain weight shift to right and to focus on target that she is trying to reach  Neuro Maryse skate used on left lower extremity to decrease friction      therapeutic activities to improve functional performance for 18 minutes, including:    Over ground gait training with rolling walker x150 feet from lobby to gym  Sit to supine, close spv  Supine to prone, close spv  Prone to tall kneeling, Mod A to bend left hip and knee  2nd helper utilized to place chair in front of patient   Heelsitting <> tall kneeling x2, Mod - Max A  Tall kneeling to prone, Mod A for hand placement and control of descent  Prone to supine to sitting close spv  Sit to stands to parallel bars with close spv x4 throughout session  Squat pivot transfer standard chair to w/c with Min A    gait training to improve functional mobility and safety for 00  minutes, including:      Patient Education and Home Exercises     Home Exercises Provided and Patient Education Provided     Education provided:   - Diaphragmatic breathing  - grounding techniques using weight near core  - importance of working to engage left side but within tolerance to prevent mental fatigue    Written Home Exercises Provided: none provided today    ASSESSMENT     Lydia tolerated today's session well with rest breaks provided throughout. No functional seizure observed on today however at end of session patient reported feeling that she had definitely worked out and was proud of her performance. She tolerated tall kneeling well and was able to perform activities which decreased her reliance on UEs for support with CGA. She was also able to tolerate performing the recumbent stepper well on today; reported decreased tolerance and feeling "off" when trying to watch her legs move however tolerated well when cued to look straight ahead and focus on right lower extremity. She continues to perform well with left lower extremity foot slides and would benefit from skilled " physical therapy services to address her functional deficits and improve her tolerance/independence with functional mobility.    Lydia Is progressing well towards her goals.   Pt prognosis is Good.     Pt will continue to benefit from skilled outpatient physical therapy to address the deficits listed in the problem list box on initial evaluation, provide pt/family education and to maximize pt's level of independence in the home and community environment.     Pt's spiritual, cultural and educational needs considered and pt agreeable to plan of care and goals.     Anticipated barriers to physical therapy: none at this time    Goals:  Short Term Goals: 4 weeks   Patient will report having less difficulty with walking around a room on her FOTO survey to show improvements in her self-perception of functional mobility  Pt will report pain at B hips at </= 4/10 entering clinic.  Pt will improve left LE hip and knee MMT scores to 4/5 for improved stability with stance and functional mobility.   PT/A will administer Timed Up and Go, 10 Meter Walk Test, MTCSIB, and 5 Time Sit to Stand and establish appropriate goals for functional mobility  Patient will receive an individualized home exercise program to address functional deficits.     Long Term Goals: 8 weeks   Patient will improve her FOTO score from 16%  to at least 43% for improved self perception of functional mobility.(score 0-100, high score indicates greater level of function)  Pt will be able to ambulate 200 feet with no assistive device without loss of balance, increases in pain, or standing rest break for increased independence in the home with mobility.   Patient will report no falls over PT POC to show improvements in her safety awareness.   Patient will be independent with home exercise program on >/= 4 days per week.     Plan   Plan of care Certification: 6/14/2023 to 8/11/23.     Outpatient Physical Therapy 2 times weekly for 8 weeks to include the following  interventions: Gait Training, Manual Therapy, Moist Heat/ Ice, Neuromuscular Re-ed, Orthotic Management and Training, Patient Education, Therapeutic Activities, and Therapeutic Exercise.     Jazz Cody, PT

## 2023-08-01 NOTE — PROGRESS NOTES
Please see Plan of Care section for updated objective measures and plan of Care.    Jazz Cody, PT, DPT      [facility]   Physical Therapy Treatment Note / Updated Plan of Care    Name: Lydia Thomas  Clinic Number: [id number]    Therapy Diagnosis:   Encounter Diagnosis   Name Primary?    Balance disorder Yes   Physician: Conrad Gutierres MD    Visit Date: 8/2/2023    Physician Orders: PT Eval and Treat - Neuro Program  Medical Diagnosis from Referral: R29.898 (ICD-10-CM) - Weakness of left lower extremity  Evaluation Date: 6/14/2023  Authorization Period Expiration: 5/24/24  Plan of Care Expiration: 10/6/23  Visit # / Visits authorized: 10/12     Progress Note Due: 9/2/23    PTA Visit #: 0/5     Time In: 1115  Time Out: 1200  Total Billable Time: 45 minutes    SUBJECTIVE     Pt reports: she is doing good this morning. States that she had a fall to her couch on yesterday when attempting to perform left lower extremity slides forward/backward at home; denies any injuries aside from right sided hip soreness.    She was compliant with her home exercise program.  Response to previous treatment: tolerated well  Functional change: ongoing    Pain: 5/10  Location: right hip      OBJECTIVE     Objective Measures updated at progress report unless specified.     Strength:     Right Lower Extremity Strength Grade 7/17/23 8/2/23 Left Lower Extremity Strength Grade 7/17/23 8/2/23   Hip Flexion 5/5 5/5 5/5 Hip Flexion 2/5 trace trace   Hip Extension Grossly 3+/5 with functional mobility 4/5 5 Hip Extension Grossly 3/5 with functional mobility trace trace   Hip Abduction Not tested 4/5 NT Hip Abduction 2/5 trace NT   Hip Adduction Not tested NT NT Hip Adduction 2/5 trace NT   Knee Flexion 5/5 5/5 5 Knee Flexion 2-/5 trace trace   Knee Extension 5/5 5/5 5 Knee Extension 3+/5 with ambulation; 3-/5 when assessed in sidelying Trace (supine) 2/5 (seated)   Ankle Dorsiflexion 5/5 5/5 5 Ankle  Dorsiflexion trace trace trace   Ankle Plantarflexion 4+/5 5/5 5 Ankle Plantarflexion trace trace trace      APTA Core Set Outcome Measures for Adults with Neurologic Conditions     Evaluation  8/2/23 Reference values    Timed Up and Go To be assessed at follow up visit    25.42 seconds (patient with improve left lower extremity gait mechanics showing less circumduction and improved knee flexion with swing) score >14 seconds indicates risk for falls in older stroke patients (Autumn et al, 2006)   10 Meter Walk Test  To be assessed at follow up visit --- 0-0.4 m/s = household walker  0.4-0.8 m/s = limited community ambulator   0.8-1.4 m/s = community ambultor  >1.4 m/s = can cross street safely    Functional Gait Assessment  To be assessed as appropriate --- <22/30 = identifies fall risk in community dwelling older adults   (MCID = 4)   Curran Balance Test To be assessed as appropriate    --- Fall risk cut-off for stroke= 45/56   MCTSIB   To be assessed at follow up visit --- 6 Minute Walk Test Distances: Means by Age and Gender     Age Gender Mean   60-69 Female  Male 572 meters (1876 feet)  538 meters (1765 feet)   70-79 Female  Male 527 meters (1729 feet)  471 meters (1545 feet)   80-89 Female  Male 417 meters (1368 feet)  392 meters ( 1286 feet)    LAUREN Benitez (2000)       5 times sit-stand    To be assessed at follow up visit    4 reps completed with BUE in 33.39 seconds then patient with early symptoms of functional seizure; activity stopped to allow recovery >12 sec= fall risk for general elderly  >10 sec= balance/vestibular dysfunction (<59 y/o)  >14.2 sec= balance/vestibular dysfunction (>59 y/o)  >12 sec= fall risk for stroke       CMS Impairment/Limitation/Restriction for FOTO NOC-Neuromuscular disorder Survey     Therapist reviewed FOTO scores for Lydia Thomas on 6/14/2023.   FOTO documents entered into [employer] - see Media section.    Intake Score: 32%           Treatment     Lydia received the  "treatments listed below:      therapeutic exercises to develop strength, endurance, ROM, and core stabilization for 8 minutes including:    SciFit recumbent stepper level 1 x7'30"      neuromuscular re-education activities to improve: Balance, Coordination, Sense, Proprioception, and Posture for 4 minutes. The following activities were included:    Forward/backward sliding with left lower extremity to target, AAROM/Min A x8  Right upper extremity supported on parallel bars  VC provided to use bars as TC to maintain weight shift to right and to focus on target that she is trying to reach  Neuro Maryse skate used on left lower extremity to decrease friction  Functional seizure noted requiring controlled descent to chair     therapeutic activities to improve functional performance for 33 minutes, including:    Over ground gait training with rolling walker x150 feet from lobby to gym  Objective measures listed above  Sit to stands with close spv multiple times throughout session throughout session  Squat pivot transfer standard chair to w/c with Min A  FOTO survey    gait training to improve functional mobility and safety for 00  minutes, including:      Patient Education and Home Exercises     Home Exercises Provided and Patient Education Provided     Education provided:   - Diaphragmatic breathing  - grounding techniques using weight near core  - importance of working to engage left side but within tolerance to prevent mental fatigue  - 8/2: PT advised patient not to attempt standing foot slides at home due to safety and assistance required    Written Home Exercises Provided: none provided today    ASSESSMENT     Lydia tolerated today's session well with rest breaks provided throughout. She participated well in her reassessment of progress towards goals on today and tolerated performing the Timed Up and Go and part of the 5 time Sit to Stand; latter test was not completed due to patient with onset of early signs of " intolerance to prevent functional seizure and PT provided extended seated rest break to recover. Her performance on both tests today indicate that she is at an increased risk of falling with functional transfers. She is showing improvements with her left lower extremity gait mechanics as she demonstrated less circumduction and more hip and knee flexion during the swing phase on today. She continues to perform well with assisted left lower extremity sliding however was limited today due to fatigue resulting in functional seizure due to overuse; she reported feeling nauseous and having slight pain behind her right eye. She would benefit from skilled physical therapy services for an additional 8 weeks at her current frequency to address her functional deficits and improve her tolerance/independence with functional mobility.    Lydia Is progressing well towards her goals.   Pt prognosis is Good.     Pt will continue to benefit from skilled outpatient physical therapy to address the deficits listed in the problem list box on initial evaluation, provide pt/family education and to maximize pt's level of independence in the home and community environment.     Pt's spiritual, cultural and educational needs considered and pt agreeable to plan of care and goals.     Anticipated barriers to physical therapy: none at this time    Goals:  Short Term Goals: 4 weeks   Patient will report having less difficulty with walking around a room on her FOTO survey to show improvements in her self-perception of functional mobility. ongoing  Pt will report pain at B hips at </= 4/10 entering clinic. ongoing  Pt will improve left LE hip and knee MMT scores to 4/5 for improved stability with stance and functional mobility. ongoing  PT/A will administer Timed Up and Go, 10 Meter Walk Test, MTCSIB, and 5 Time Sit to Stand and establish appropriate goals for functional mobility Partially met  Revised: PT/A will administer 10 Meter Walk Test and  MCTSIB as appropriate to assess balance and gait speed.  Patient will receive an individualized home exercise program to address functional deficits. ongoing     Long Term Goals: 8 weeks   Patient will improve her FOTO score from 16%  to at least 43% for improved self perception of functional mobility.(score 0-100, high score indicates greater level of function) ongoing  Pt will be able to ambulate 200 feet with no assistive device without loss of balance, increases in pain, or standing rest break for increased independence in the home with mobility. ongoing  Patient will report no falls over PT POC to show improvements in her safety awareness. ongoing  Patient will be independent with home exercise program on >/= 4 days per week. Ongoing  Pt will improve 5x chair rise score to </= 30s with BUE assist for improved muscular endurance. New 8/2/23  Pt will improve Timed Up and Go (TUG) score to less than 20s with Walker or least restrictive assistive device. New 8/2/23     Plan     Extend PT POC 2x /week for 8 additional weeks to address her functional deficits.     Jazz Cody, PT

## 2023-08-02 ENCOUNTER — CLINICAL SUPPORT (OUTPATIENT)
Dept: REHABILITATION | Facility: HOSPITAL | Age: 43
End: 2023-08-02
Payer: OTHER MISCELLANEOUS

## 2023-08-02 DIAGNOSIS — R26.89 BALANCE DISORDER: Primary | ICD-10-CM

## 2023-08-02 PROCEDURE — 97530 THERAPEUTIC ACTIVITIES: CPT

## 2023-08-02 PROCEDURE — 97110 THERAPEUTIC EXERCISES: CPT

## 2023-08-02 NOTE — PLAN OF CARE
[facility]   Physical Therapy Treatment Note / Updated Plan of Care    Name: Lydia Thomas  Clinic Number: [id number]    Therapy Diagnosis:   Encounter Diagnosis   Name Primary?    Balance disorder Yes   Physician: Conrad Gutierres MD    Visit Date: 8/2/2023    Physician Orders: PT Eval and Treat - Neuro Program  Medical Diagnosis from Referral: R29.898 (ICD-10-CM) - Weakness of left lower extremity  Evaluation Date: 6/14/2023  Authorization Period Expiration: 5/24/24  Plan of Care Expiration: 10/6/23  Visit # / Visits authorized: 10/12     Progress Note Due: 9/2/23    PTA Visit #: 0/5     Time In: 1115  Time Out: 1200  Total Billable Time: 45 minutes    SUBJECTIVE     Pt reports: she is doing good this morning. States that she had a fall to her couch on yesterday when attempting to perform left lower extremity slides forward/backward at home; denies any injuries aside from right sided hip soreness.    She was compliant with her home exercise program.  Response to previous treatment: tolerated well  Functional change: ongoing    Pain: 5/10  Location: right hip      OBJECTIVE     Objective Measures updated at progress report unless specified.     Strength:     Right Lower Extremity Strength Grade 7/17/23 8/2/23 Left Lower Extremity Strength Grade 7/17/23 8/2/23   Hip Flexion 5/5 5/5 5/5 Hip Flexion 2/5 trace trace   Hip Extension Grossly 3+/5 with functional mobility 4/5 5 Hip Extension Grossly 3/5 with functional mobility trace trace   Hip Abduction Not tested 4/5 NT Hip Abduction 2/5 trace NT   Hip Adduction Not tested NT NT Hip Adduction 2/5 trace NT   Knee Flexion 5/5 5/5 5 Knee Flexion 2-/5 trace trace   Knee Extension 5/5 5/5 5 Knee Extension 3+/5 with ambulation; 3-/5 when assessed in sidelying Trace (supine) 2/5 (seated)   Ankle Dorsiflexion 5/5 5/5 5 Ankle Dorsiflexion trace trace trace   Ankle Plantarflexion 4+/5 5/5 5 Ankle Plantarflexion trace trace trace      APTA Core  Set Outcome Measures for Adults with Neurologic Conditions     Evaluation  8/2/23 Reference values    Timed Up and Go To be assessed at follow up visit    25.42 seconds (patient with improve left lower extremity gait mechanics showing less circumduction and improved knee flexion with swing) score >14 seconds indicates risk for falls in older stroke patients (Autumn et al, 2006)   10 Meter Walk Test  To be assessed at follow up visit --- 0-0.4 m/s = household walker  0.4-0.8 m/s = limited community ambulator   0.8-1.4 m/s = community ambultor  >1.4 m/s = can cross street safely    Functional Gait Assessment  To be assessed as appropriate --- <22/30 = identifies fall risk in community dwelling older adults   (MCID = 4)   Curran Balance Test To be assessed as appropriate    --- Fall risk cut-off for stroke= 45/56   MCTSIB   To be assessed at follow up visit --- 6 Minute Walk Test Distances: Means by Age and Gender     Age Gender Mean   60-69 Female  Male 572 meters (1876 feet)  538 meters (1765 feet)   70-79 Female  Male 527 meters (1729 feet)  471 meters (1545 feet)   80-89 Female  Male 417 meters (1368 feet)  392 meters ( 1286 feet)    LAUREN Benitez (2000)       5 times sit-stand    To be assessed at follow up visit    4 reps completed with BUE in 33.39 seconds then patient with early symptoms of functional seizure; activity stopped to allow recovery >12 sec= fall risk for general elderly  >10 sec= balance/vestibular dysfunction (<59 y/o)  >14.2 sec= balance/vestibular dysfunction (>59 y/o)  >12 sec= fall risk for stroke       CMS Impairment/Limitation/Restriction for FOTO NOC-Neuromuscular disorder Survey     Therapist reviewed FOTO scores for Lydia Thomas on 6/14/2023.   FOTO documents entered into [employer] - see Media section.    Intake Score: 32%           Treatment     Lydia received the treatments listed below:      therapeutic exercises to develop strength, endurance, ROM, and core stabilization for 8  "minutes including:    SciFit recumbent stepper level 1 x7'30"      neuromuscular re-education activities to improve: Balance, Coordination, Sense, Proprioception, and Posture for 4 minutes. The following activities were included:    Forward/backward sliding with left lower extremity to target, AAROM/Min A x8  Right upper extremity supported on parallel bars  VC provided to use bars as TC to maintain weight shift to right and to focus on target that she is trying to reach  Neuro Maryse skate used on left lower extremity to decrease friction  Functional seizure noted requiring controlled descent to chair     therapeutic activities to improve functional performance for 33 minutes, including:    Over ground gait training with rolling walker x150 feet from lobby to gym  Objective measures listed above  Sit to stands with close spv multiple times throughout session throughout session  Squat pivot transfer standard chair to w/c with Min A  FOTO survey    gait training to improve functional mobility and safety for 00  minutes, including:      Patient Education and Home Exercises     Home Exercises Provided and Patient Education Provided     Education provided:   - Diaphragmatic breathing  - grounding techniques using weight near core  - importance of working to engage left side but within tolerance to prevent mental fatigue  - 8/2: PT advised patient not to attempt standing foot slides at home due to safety and assistance required    Written Home Exercises Provided: none provided today    ASSESSMENT     Lydia tolerated today's session well with rest breaks provided throughout. She participated well in her reassessment of progress towards goals on today and tolerated performing the Timed Up and Go and part of the 5 time Sit to Stand; latter test was not completed due to patient with onset of early signs of intolerance to prevent functional seizure and PT provided extended seated rest break to recover. Her performance on both " tests today indicate that she is at an increased risk of falling with functional transfers. She is showing improvements with her left lower extremity gait mechanics as she demonstrated less circumduction and more hip and knee flexion during the swing phase on today. She continues to perform well with assisted left lower extremity sliding however was limited today due to fatigue resulting in functional seizure due to overuse; she reported feeling nauseous and having slight pain behind her right eye. She would benefit from skilled physical therapy services for an additional 8 weeks at her current frequency to address her functional deficits and improve her tolerance/independence with functional mobility.    Lydia Is progressing well towards her goals.   Pt prognosis is Good.     Pt will continue to benefit from skilled outpatient physical therapy to address the deficits listed in the problem list box on initial evaluation, provide pt/family education and to maximize pt's level of independence in the home and community environment.     Pt's spiritual, cultural and educational needs considered and pt agreeable to plan of care and goals.     Anticipated barriers to physical therapy: none at this time    Goals:  Short Term Goals: 4 weeks   Patient will report having less difficulty with walking around a room on her FOTO survey to show improvements in her self-perception of functional mobility. ongoing  Pt will report pain at B hips at </= 4/10 entering clinic. ongoing  Pt will improve left LE hip and knee MMT scores to 4/5 for improved stability with stance and functional mobility. ongoing  PT/A will administer Timed Up and Go, 10 Meter Walk Test, MTCSIB, and 5 Time Sit to Stand and establish appropriate goals for functional mobility Partially met  Revised: PT/A will administer 10 Meter Walk Test and MCTSIB as appropriate to assess balance and gait speed.  Patient will receive an individualized home exercise program to  address functional deficits. ongoing     Long Term Goals: 8 weeks   Patient will improve her FOTO score from 16%  to at least 43% for improved self perception of functional mobility.(score 0-100, high score indicates greater level of function) ongoing  Pt will be able to ambulate 200 feet with no assistive device without loss of balance, increases in pain, or standing rest break for increased independence in the home with mobility. ongoing  Patient will report no falls over PT POC to show improvements in her safety awareness. ongoing  Patient will be independent with home exercise program on >/= 4 days per week. Ongoing  Pt will improve 5x chair rise score to </= 30s with BUE assist for improved muscular endurance. New 8/2/23  Pt will improve Timed Up and Go (TUG) score to less than 20s with Walker or least restrictive assistive device. New 8/2/23     Plan     Extend PT POC 2x /week for 8 additional weeks to address her functional deficits.     Jazz Cody, PT

## 2023-08-05 ENCOUNTER — HOSPITAL ENCOUNTER (EMERGENCY)
Facility: HOSPITAL | Age: 43
Discharge: HOME | End: 2023-08-06
Attending: EMERGENCY MEDICINE
Payer: OTHER GOVERNMENT

## 2023-08-05 DIAGNOSIS — R56.9 CONVULSIONS, UNSPECIFIED CONVULSION TYPE: ICD-10-CM

## 2023-08-05 DIAGNOSIS — R56.9 SEIZURE-LIKE ACTIVITY: Primary | ICD-10-CM

## 2023-08-05 LAB
ALBUMIN SERPL BCP-MCNC: 3.8 G/DL (ref 3.5–5.2)
ALP SERPL-CCNC: 38 U/L (ref 55–135)
ALT SERPL W/O P-5'-P-CCNC: 10 U/L (ref 10–44)
AMPHET+METHAMPHET UR QL: NEGATIVE
ANION GAP SERPL CALC-SCNC: 8 MMOL/L (ref 8–16)
AST SERPL-CCNC: 12 U/L (ref 10–40)
B-HCG UR QL: NEGATIVE
BARBITURATES UR QL SCN>200 NG/ML: NEGATIVE
BASOPHILS # BLD AUTO: 0.07 K/UL (ref 0–0.2)
BASOPHILS NFR BLD: 1.3 % (ref 0–1.9)
BENZODIAZ UR QL SCN>200 NG/ML: ABNORMAL
BILIRUB SERPL-MCNC: 0.6 MG/DL (ref 0.1–1)
BUN SERPL-MCNC: 5 MG/DL (ref 6–20)
BZE UR QL SCN: NEGATIVE
CALCIUM SERPL-MCNC: 8.6 MG/DL (ref 8.7–10.5)
CANNABINOIDS UR QL SCN: NEGATIVE
CHLORIDE SERPL-SCNC: 109 MMOL/L (ref 95–110)
CO2 SERPL-SCNC: 19 MMOL/L (ref 23–29)
CREAT SERPL-MCNC: 0.8 MG/DL (ref 0.5–1.4)
CREAT UR-MCNC: 36.9 MG/DL (ref 15–325)
CTP QC/QA: YES
DIFFERENTIAL METHOD: ABNORMAL
EOSINOPHIL # BLD AUTO: 0.1 K/UL (ref 0–0.5)
EOSINOPHIL NFR BLD: 1.6 % (ref 0–8)
ERYTHROCYTE [DISTWIDTH] IN BLOOD BY AUTOMATED COUNT: 12.1 % (ref 11.5–14.5)
EST. GFR  (NO RACE VARIABLE): >60 ML/MIN/1.73 M^2
ETHANOL SERPL-MCNC: 37 MG/DL
ETHANOL SERPL-MCNC: 37 MG/DL
GLUCOSE SERPL-MCNC: 82 MG/DL (ref 70–110)
HCT VFR BLD AUTO: 35.2 % (ref 37–48.5)
HGB BLD-MCNC: 12.2 G/DL (ref 12–16)
IMM GRANULOCYTES # BLD AUTO: 0.02 K/UL (ref 0–0.04)
IMM GRANULOCYTES NFR BLD AUTO: 0.4 % (ref 0–0.5)
LYMPHOCYTES # BLD AUTO: 1.5 K/UL (ref 1–4.8)
LYMPHOCYTES NFR BLD: 27.2 % (ref 18–48)
MAGNESIUM SERPL-MCNC: 1.5 MG/DL (ref 1.6–2.6)
MCH RBC QN AUTO: 31.5 PG (ref 27–31)
MCHC RBC AUTO-ENTMCNC: 34.7 G/DL (ref 32–36)
MCV RBC AUTO: 91 FL (ref 82–98)
METHADONE UR QL SCN>300 NG/ML: NEGATIVE
MONOCYTES # BLD AUTO: 0.4 K/UL (ref 0.3–1)
MONOCYTES NFR BLD: 7.1 % (ref 4–15)
NEUTROPHILS # BLD AUTO: 3.4 K/UL (ref 1.8–7.7)
NEUTROPHILS NFR BLD: 62.4 % (ref 38–73)
NRBC BLD-RTO: 0 /100 WBC
OPIATES UR QL SCN: NEGATIVE
PCP UR QL SCN>25 NG/ML: NEGATIVE
PLATELET # BLD AUTO: 209 K/UL (ref 150–450)
PMV BLD AUTO: 10.2 FL (ref 9.2–12.9)
POTASSIUM SERPL-SCNC: 3.2 MMOL/L (ref 3.5–5.1)
PROT SERPL-MCNC: 6.2 G/DL (ref 6–8.4)
RBC # BLD AUTO: 3.87 M/UL (ref 4–5.4)
SODIUM SERPL-SCNC: 136 MMOL/L (ref 136–145)
TOXICOLOGY INFORMATION: ABNORMAL
TSH SERPL DL<=0.005 MIU/L-ACNC: 2.21 UIU/ML (ref 0.4–4)
WBC # BLD AUTO: 5.47 K/UL (ref 3.9–12.7)

## 2023-08-05 PROCEDURE — 94760 N-INVAS EAR/PLS OXIMETRY 1: CPT

## 2023-08-05 PROCEDURE — 83735 ASSAY OF MAGNESIUM: CPT | Performed by: EMERGENCY MEDICINE

## 2023-08-05 PROCEDURE — 96374 THER/PROPH/DIAG INJ IV PUSH: CPT

## 2023-08-05 PROCEDURE — P9612 CATHETERIZE FOR URINE SPEC: HCPCS

## 2023-08-05 PROCEDURE — 85025 COMPLETE CBC W/AUTO DIFF WBC: CPT | Performed by: EMERGENCY MEDICINE

## 2023-08-05 PROCEDURE — 93005 ELECTROCARDIOGRAM TRACING: CPT

## 2023-08-05 PROCEDURE — 96361 HYDRATE IV INFUSION ADD-ON: CPT

## 2023-08-05 PROCEDURE — 63600175 PHARM REV CODE 636 W HCPCS

## 2023-08-05 PROCEDURE — 81025 URINE PREGNANCY TEST: CPT | Performed by: EMERGENCY MEDICINE

## 2023-08-05 PROCEDURE — 82077 ASSAY SPEC XCP UR&BREATH IA: CPT | Performed by: EMERGENCY MEDICINE

## 2023-08-05 PROCEDURE — 99285 EMERGENCY DEPT VISIT HI MDM: CPT | Mod: 25

## 2023-08-05 PROCEDURE — 25000003 PHARM REV CODE 250: Performed by: EMERGENCY MEDICINE

## 2023-08-05 PROCEDURE — 80307 DRUG TEST PRSMV CHEM ANLYZR: CPT | Performed by: EMERGENCY MEDICINE

## 2023-08-05 PROCEDURE — 84443 ASSAY THYROID STIM HORMONE: CPT | Performed by: EMERGENCY MEDICINE

## 2023-08-05 PROCEDURE — 93010 EKG 12-LEAD: ICD-10-PCS | Mod: ,,, | Performed by: INTERNAL MEDICINE

## 2023-08-05 PROCEDURE — 80053 COMPREHEN METABOLIC PANEL: CPT | Performed by: EMERGENCY MEDICINE

## 2023-08-05 PROCEDURE — 93010 ELECTROCARDIOGRAM REPORT: CPT | Mod: ,,, | Performed by: INTERNAL MEDICINE

## 2023-08-05 RX ORDER — LORAZEPAM 2 MG/ML
INJECTION INTRAMUSCULAR
Status: COMPLETED
Start: 2023-08-05 | End: 2023-08-05

## 2023-08-05 RX ORDER — LORAZEPAM 2 MG/ML
2 INJECTION INTRAMUSCULAR
Status: COMPLETED | OUTPATIENT
Start: 2023-08-05 | End: 2023-08-05

## 2023-08-05 RX ADMIN — LORAZEPAM 2 MG: 2 INJECTION INTRAMUSCULAR; INTRAVENOUS at 09:08

## 2023-08-05 RX ADMIN — LORAZEPAM 2 MG: 2 INJECTION INTRAMUSCULAR at 09:08

## 2023-08-05 RX ADMIN — SODIUM CHLORIDE 1000 ML: 9 INJECTION, SOLUTION INTRAVENOUS at 09:08

## 2023-08-06 ENCOUNTER — HOSPITAL ENCOUNTER (EMERGENCY)
Facility: HOSPITAL | Age: 43
Discharge: HOME | End: 2023-08-07
Attending: STUDENT IN AN ORGANIZED HEALTH CARE EDUCATION/TRAINING PROGRAM
Payer: OTHER GOVERNMENT

## 2023-08-06 VITALS
HEART RATE: 90 BPM | HEIGHT: 63 IN | DIASTOLIC BLOOD PRESSURE: 57 MMHG | OXYGEN SATURATION: 99 % | WEIGHT: 141 LBS | RESPIRATION RATE: 18 BRPM | BODY MASS INDEX: 24.98 KG/M2 | SYSTOLIC BLOOD PRESSURE: 99 MMHG | TEMPERATURE: 98 F

## 2023-08-06 DIAGNOSIS — R56.9 SEIZURE-LIKE ACTIVITY: Primary | ICD-10-CM

## 2023-08-06 DIAGNOSIS — Z00.8 MEDICAL CLEARANCE FOR PSYCHIATRIC ADMISSION: ICD-10-CM

## 2023-08-06 LAB
ALBUMIN SERPL BCP-MCNC: 4.1 G/DL (ref 3.5–5.2)
ALLENS TEST: ABNORMAL
ALP SERPL-CCNC: 39 U/L (ref 55–135)
ALT SERPL W/O P-5'-P-CCNC: 11 U/L (ref 10–44)
AMPHET+METHAMPHET UR QL: NEGATIVE
ANION GAP SERPL CALC-SCNC: 13 MMOL/L (ref 8–16)
APAP SERPL-MCNC: 8 UG/ML (ref 10–20)
AST SERPL-CCNC: 14 U/L (ref 10–40)
B-HCG UR QL: NEGATIVE
BARBITURATES UR QL SCN>200 NG/ML: NEGATIVE
BASOPHILS # BLD AUTO: 0.07 K/UL (ref 0–0.2)
BASOPHILS NFR BLD: 1.2 % (ref 0–1.9)
BENZODIAZ UR QL SCN>200 NG/ML: ABNORMAL
BILIRUB SERPL-MCNC: 1.4 MG/DL (ref 0.1–1)
BILIRUB UR QL STRIP: NEGATIVE
BUN SERPL-MCNC: 5 MG/DL (ref 6–20)
BZE UR QL SCN: NEGATIVE
CALCIUM SERPL-MCNC: 8.9 MG/DL (ref 8.7–10.5)
CANNABINOIDS UR QL SCN: NEGATIVE
CHLORIDE SERPL-SCNC: 108 MMOL/L (ref 95–110)
CK SERPL-CCNC: 191 U/L (ref 20–180)
CK SERPL-CCNC: 262 U/L (ref 20–180)
CLARITY UR: CLEAR
CO2 SERPL-SCNC: 17 MMOL/L (ref 23–29)
COLOR UR: COLORLESS
CREAT SERPL-MCNC: 0.8 MG/DL (ref 0.5–1.4)
CREAT UR-MCNC: 24.8 MG/DL (ref 15–325)
CTP QC/QA: YES
CTP QC/QA: YES
DELSYS: ABNORMAL
DIFFERENTIAL METHOD: ABNORMAL
EOSINOPHIL # BLD AUTO: 0 K/UL (ref 0–0.5)
EOSINOPHIL NFR BLD: 0.7 % (ref 0–8)
ERYTHROCYTE [DISTWIDTH] IN BLOOD BY AUTOMATED COUNT: 12.2 % (ref 11.5–14.5)
EST. GFR  (NO RACE VARIABLE): >60 ML/MIN/1.73 M^2
ETHANOL SERPL-MCNC: 33 MG/DL
GLUCOSE SERPL-MCNC: 79 MG/DL (ref 70–110)
GLUCOSE UR QL STRIP: NEGATIVE
HCO3 UR-SCNC: 20.9 MMOL/L (ref 24–28)
HCT VFR BLD AUTO: 36.1 % (ref 37–48.5)
HGB BLD-MCNC: 12.4 G/DL (ref 12–16)
HGB UR QL STRIP: NEGATIVE
IMM GRANULOCYTES # BLD AUTO: 0.01 K/UL (ref 0–0.04)
IMM GRANULOCYTES NFR BLD AUTO: 0.2 % (ref 0–0.5)
KETONES UR QL STRIP: NEGATIVE
LACTATE SERPL-SCNC: 1.1 MMOL/L (ref 0.5–2.2)
LACTATE SERPL-SCNC: 2.6 MMOL/L (ref 0.5–2.2)
LEUKOCYTE ESTERASE UR QL STRIP: NEGATIVE
LITHIUM SERPL-SCNC: <0.1 MMOL/L (ref 0.6–1.2)
LYMPHOCYTES # BLD AUTO: 1.5 K/UL (ref 1–4.8)
LYMPHOCYTES NFR BLD: 25.6 % (ref 18–48)
MAGNESIUM SERPL-MCNC: 1.7 MG/DL (ref 1.6–2.6)
MCH RBC QN AUTO: 31.2 PG (ref 27–31)
MCHC RBC AUTO-ENTMCNC: 34.3 G/DL (ref 32–36)
MCV RBC AUTO: 91 FL (ref 82–98)
METHADONE UR QL SCN>300 NG/ML: NEGATIVE
MODE: ABNORMAL
MONOCYTES # BLD AUTO: 0.5 K/UL (ref 0.3–1)
MONOCYTES NFR BLD: 7.7 % (ref 4–15)
NEUTROPHILS # BLD AUTO: 3.8 K/UL (ref 1.8–7.7)
NEUTROPHILS NFR BLD: 64.6 % (ref 38–73)
NITRITE UR QL STRIP: NEGATIVE
NRBC BLD-RTO: 0 /100 WBC
OPIATES UR QL SCN: NEGATIVE
PCO2 BLDA: 38.6 MMHG (ref 35–45)
PCP UR QL SCN>25 NG/ML: NEGATIVE
PH SMN: 7.34 [PH] (ref 7.35–7.45)
PH UR STRIP: 6 [PH] (ref 5–8)
PLATELET # BLD AUTO: 225 K/UL (ref 150–450)
PMV BLD AUTO: 10.3 FL (ref 9.2–12.9)
PO2 BLDA: 29 MMHG (ref 40–60)
POC BE: -5 MMOL/L
POC SATURATED O2: 52 % (ref 95–100)
POC TCO2: 22 MMOL/L (ref 24–29)
POCT GLUCOSE: 74 MG/DL (ref 70–110)
POTASSIUM SERPL-SCNC: 3.5 MMOL/L (ref 3.5–5.1)
PROT SERPL-MCNC: 6.7 G/DL (ref 6–8.4)
PROT UR QL STRIP: NEGATIVE
RBC # BLD AUTO: 3.97 M/UL (ref 4–5.4)
SALICYLATES SERPL-MCNC: <5 MG/DL (ref 15–30)
SAMPLE: ABNORMAL
SARS-COV-2 RDRP RESP QL NAA+PROBE: NEGATIVE
SITE: ABNORMAL
SODIUM SERPL-SCNC: 138 MMOL/L (ref 136–145)
SP GR UR STRIP: 1 (ref 1–1.03)
TOXICOLOGY INFORMATION: ABNORMAL
TSH SERPL DL<=0.005 MIU/L-ACNC: 1 UIU/ML (ref 0.4–4)
URN SPEC COLLECT METH UR: ABNORMAL
UROBILINOGEN UR STRIP-ACNC: NEGATIVE EU/DL
VALPROATE SERPL-MCNC: <12.5 UG/ML (ref 50–100)
WBC # BLD AUTO: 5.87 K/UL (ref 3.9–12.7)

## 2023-08-06 PROCEDURE — 99285 EMERGENCY DEPT VISIT HI MDM: CPT | Mod: 25

## 2023-08-06 PROCEDURE — 80179 DRUG ASSAY SALICYLATE: CPT | Performed by: STUDENT IN AN ORGANIZED HEALTH CARE EDUCATION/TRAINING PROGRAM

## 2023-08-06 PROCEDURE — 83735 ASSAY OF MAGNESIUM: CPT | Performed by: STUDENT IN AN ORGANIZED HEALTH CARE EDUCATION/TRAINING PROGRAM

## 2023-08-06 PROCEDURE — 99900035 HC TECH TIME PER 15 MIN (STAT)

## 2023-08-06 PROCEDURE — 93005 ELECTROCARDIOGRAM TRACING: CPT

## 2023-08-06 PROCEDURE — 63600175 PHARM REV CODE 636 W HCPCS: Performed by: STUDENT IN AN ORGANIZED HEALTH CARE EDUCATION/TRAINING PROGRAM

## 2023-08-06 PROCEDURE — 96367 TX/PROPH/DG ADDL SEQ IV INF: CPT

## 2023-08-06 PROCEDURE — 93010 ELECTROCARDIOGRAM REPORT: CPT | Mod: ,,, | Performed by: INTERNAL MEDICINE

## 2023-08-06 PROCEDURE — 25000003 PHARM REV CODE 250: Performed by: STUDENT IN AN ORGANIZED HEALTH CARE EDUCATION/TRAINING PROGRAM

## 2023-08-06 PROCEDURE — 80307 DRUG TEST PRSMV CHEM ANLYZR: CPT | Performed by: STUDENT IN AN ORGANIZED HEALTH CARE EDUCATION/TRAINING PROGRAM

## 2023-08-06 PROCEDURE — 96361 HYDRATE IV INFUSION ADD-ON: CPT

## 2023-08-06 PROCEDURE — 82077 ASSAY SPEC XCP UR&BREATH IA: CPT | Performed by: STUDENT IN AN ORGANIZED HEALTH CARE EDUCATION/TRAINING PROGRAM

## 2023-08-06 PROCEDURE — 80164 ASSAY DIPROPYLACETIC ACD TOT: CPT | Performed by: STUDENT IN AN ORGANIZED HEALTH CARE EDUCATION/TRAINING PROGRAM

## 2023-08-06 PROCEDURE — 96375 TX/PRO/DX INJ NEW DRUG ADDON: CPT

## 2023-08-06 PROCEDURE — 82550 ASSAY OF CK (CPK): CPT | Performed by: STUDENT IN AN ORGANIZED HEALTH CARE EDUCATION/TRAINING PROGRAM

## 2023-08-06 PROCEDURE — 81003 URINALYSIS AUTO W/O SCOPE: CPT | Mod: 59 | Performed by: STUDENT IN AN ORGANIZED HEALTH CARE EDUCATION/TRAINING PROGRAM

## 2023-08-06 PROCEDURE — 82962 GLUCOSE BLOOD TEST: CPT

## 2023-08-06 PROCEDURE — 80053 COMPREHEN METABOLIC PANEL: CPT | Performed by: STUDENT IN AN ORGANIZED HEALTH CARE EDUCATION/TRAINING PROGRAM

## 2023-08-06 PROCEDURE — 80048 BASIC METABOLIC PNL TOTAL CA: CPT | Mod: XB

## 2023-08-06 PROCEDURE — 80337 TRICYCLIC & CYCLICALS 6/MORE: CPT | Performed by: STUDENT IN AN ORGANIZED HEALTH CARE EDUCATION/TRAINING PROGRAM

## 2023-08-06 PROCEDURE — 96376 TX/PRO/DX INJ SAME DRUG ADON: CPT

## 2023-08-06 PROCEDURE — 87635 SARS-COV-2 COVID-19 AMP PRB: CPT | Performed by: STUDENT IN AN ORGANIZED HEALTH CARE EDUCATION/TRAINING PROGRAM

## 2023-08-06 PROCEDURE — 25000003 PHARM REV CODE 250: Performed by: EMERGENCY MEDICINE

## 2023-08-06 PROCEDURE — 96365 THER/PROPH/DIAG IV INF INIT: CPT

## 2023-08-06 PROCEDURE — 81025 URINE PREGNANCY TEST: CPT | Performed by: STUDENT IN AN ORGANIZED HEALTH CARE EDUCATION/TRAINING PROGRAM

## 2023-08-06 PROCEDURE — 85025 COMPLETE CBC W/AUTO DIFF WBC: CPT | Performed by: STUDENT IN AN ORGANIZED HEALTH CARE EDUCATION/TRAINING PROGRAM

## 2023-08-06 PROCEDURE — 93010 EKG 12-LEAD: ICD-10-PCS | Mod: ,,, | Performed by: INTERNAL MEDICINE

## 2023-08-06 PROCEDURE — 63600175 PHARM REV CODE 636 W HCPCS: Performed by: EMERGENCY MEDICINE

## 2023-08-06 PROCEDURE — 82803 BLOOD GASES ANY COMBINATION: CPT

## 2023-08-06 PROCEDURE — 80178 ASSAY OF LITHIUM: CPT | Performed by: STUDENT IN AN ORGANIZED HEALTH CARE EDUCATION/TRAINING PROGRAM

## 2023-08-06 PROCEDURE — 80143 DRUG ASSAY ACETAMINOPHEN: CPT | Performed by: STUDENT IN AN ORGANIZED HEALTH CARE EDUCATION/TRAINING PROGRAM

## 2023-08-06 PROCEDURE — 83605 ASSAY OF LACTIC ACID: CPT | Performed by: STUDENT IN AN ORGANIZED HEALTH CARE EDUCATION/TRAINING PROGRAM

## 2023-08-06 PROCEDURE — 84443 ASSAY THYROID STIM HORMONE: CPT | Performed by: STUDENT IN AN ORGANIZED HEALTH CARE EDUCATION/TRAINING PROGRAM

## 2023-08-06 RX ORDER — LEVETIRACETAM 500 MG/5ML
1000 INJECTION, SOLUTION, CONCENTRATE INTRAVENOUS
Status: COMPLETED | OUTPATIENT
Start: 2023-08-06 | End: 2023-08-06

## 2023-08-06 RX ORDER — LEVETIRACETAM 500 MG/5ML
INJECTION, SOLUTION, CONCENTRATE INTRAVENOUS
Status: DISCONTINUED
Start: 2023-08-06 | End: 2023-08-06 | Stop reason: HOSPADM

## 2023-08-06 RX ORDER — MAGNESIUM SULFATE 1 G/100ML
1 INJECTION INTRAVENOUS
Status: COMPLETED | OUTPATIENT
Start: 2023-08-06 | End: 2023-08-06

## 2023-08-06 RX ORDER — LORAZEPAM 2 MG/ML
1 INJECTION INTRAMUSCULAR
Status: COMPLETED | OUTPATIENT
Start: 2023-08-06 | End: 2023-08-06

## 2023-08-06 RX ORDER — LORAZEPAM 2 MG/ML
4 INJECTION INTRAMUSCULAR
Status: COMPLETED | OUTPATIENT
Start: 2023-08-06 | End: 2023-08-06

## 2023-08-06 RX ORDER — LORAZEPAM 2 MG/ML
INJECTION INTRAMUSCULAR
Status: DISPENSED
Start: 2023-08-06 | End: 2023-08-07

## 2023-08-06 RX ORDER — LORAZEPAM 2 MG/ML
INJECTION INTRAMUSCULAR
Status: DISCONTINUED
Start: 2023-08-06 | End: 2023-08-07 | Stop reason: HOSPADM

## 2023-08-06 RX ORDER — LEVETIRACETAM 500 MG/1
500 TABLET ORAL 2 TIMES DAILY
Qty: 60 TABLET | Refills: 11 | OUTPATIENT
Start: 2023-08-06 | End: 2023-08-07

## 2023-08-06 RX ADMIN — LORAZEPAM 1 MG: 2 INJECTION INTRAMUSCULAR; INTRAVENOUS at 06:08

## 2023-08-06 RX ADMIN — POTASSIUM BICARBONATE 60 MEQ: 391 TABLET, EFFERVESCENT ORAL at 07:08

## 2023-08-06 RX ADMIN — LEVETIRACETAM 1000 MG: 100 INJECTION, SOLUTION INTRAVENOUS at 01:08

## 2023-08-06 RX ADMIN — LORAZEPAM 1 MG: 2 INJECTION INTRAMUSCULAR; INTRAVENOUS at 05:08

## 2023-08-06 RX ADMIN — LORAZEPAM 4 MG: 2 INJECTION INTRAMUSCULAR; INTRAVENOUS at 06:08

## 2023-08-06 RX ADMIN — LEVETIRACETAM 2560 MG: 100 INJECTION, SOLUTION INTRAVENOUS at 06:08

## 2023-08-06 RX ADMIN — SODIUM CHLORIDE, POTASSIUM CHLORIDE, SODIUM LACTATE AND CALCIUM CHLORIDE 1000 ML: 600; 310; 30; 20 INJECTION, SOLUTION INTRAVENOUS at 08:08

## 2023-08-06 RX ADMIN — DEXTROSE AND SODIUM CHLORIDE 1000 ML: 5; 900 INJECTION, SOLUTION INTRAVENOUS at 06:08

## 2023-08-06 RX ADMIN — MAGNESIUM SULFATE 1 G: 1 INJECTION INTRAVENOUS at 09:08

## 2023-08-06 NOTE — ED NOTES
Report received from FARIHA Can. Care taken over. Pt resting quietly on stretcher.  Pt remains on continuous cardiac and pulse ox monitoring with non-invasive blood pressure to cycle every 30 minutes.  VS stable; NSR noted. Pt denies pain at this time; no acute distress or discomfort reported or observed.  Pt denies restroom needs at this time; is able to reposition self on stretcher. Bed locked in lowest position; side rails up and locked x 2; call light, bedside table, and personal belongings within reach. Room assessed for safety measures and cleanliness; no action needed at this time. Plan of care discussed.  Pt instructed to alert nurse for assistance and before attempting to get out of bed; verbalizes understanding. Pt denies needs or complaints at this time; will continue to monitor.

## 2023-08-06 NOTE — ED PROVIDER NOTES
"Encounter Date: 8/6/2023       History     Chief Complaint   Patient presents with    Seizures     Pt bib noems for seizure. Seen here yesterday for same. Seizure witnessed by ems 10 mg IM versed given with improvement. Ems notes that at patients bedside several packets of benadryl were on the night stand and  states she takes benadryl to sleep every night.      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  Per pt she took 4 pills of 50 mg benadryl this AM and drank several drinks to try to fall asleep, denying HI/SI/AVH    Per  pt has SI hx remote, was hospitalized voluntarily several years ago, currently they are in the process of discussing divorce  Per chart review from care everywhere pt has been undergoing neuro PT for several months d/t gait imbalance thought to be of functional etiology (EMG, MRI of full spine with no organic findings 2023)    The history is provided by the spouse and the patient.     Review of patient's allergies indicates:   Allergen Reactions    Iodine and iodide containing products Anaphylaxis    Shellfish derived Anaphylaxis    Benadryl [diphenhydramine hcl] Itching     "with fast injection"    Fish containing products     Iodine      Past Medical History:   Diagnosis Date    Anemia     Anemia     Asthma     exercise asthma    Migraine headache     Ovarian cyst      Past Surgical History:   Procedure Laterality Date    APPENDECTOMY      4/2019    AUGMENTATION OF BREAST Bilateral 03/2022    BREAST BIOPSY Right     Excisional bx, benign    COLONOSCOPY N/A 05/18/2022    Procedure: COLONOSCOPY;  " Surgeon: Sabino Mcintosh MD;  Location: [facility];  Service: Endoscopy;  Laterality: N/A;  High risk for colon cancer (family)      fully vaccinated; instructions to portal-GT     Family History   Problem Relation Age of Onset    Asthma Mother     Miscarriages / Stillbirths Mother     Breast cancer Mother         60    Arthritis Father     COPD Father     Asthma Brother     Colon polyps Brother     Breast cancer Maternal Grandmother         60    Cancer Maternal Grandfather     Hearing loss Paternal Grandmother     Hearing loss Paternal Grandfather     Diabetes Maternal Uncle     Hypertension Maternal Uncle     Prostate cancer Maternal Uncle     Hearing loss Paternal Aunt     Hearing loss Paternal Uncle     Amblyopia Neg Hx     Blindness Neg Hx     Cataracts Neg Hx     Glaucoma Neg Hx     Macular degeneration Neg Hx     Retinal detachment Neg Hx     Strabismus Neg Hx      Social History     Tobacco Use    Smoking status: Never    Smokeless tobacco: Never   Substance Use Topics    Alcohol use: Yes     Comment: occass    Drug use: No     Review of Systems    Physical Exam     Initial Vitals [08/06/23 1712]   BP Pulse Resp Temp SpO2   105/64 (!) 115 -- 99.1 °F (37.3 °C) 99 %      MAP       --         Physical Exam    Nursing note and vitals reviewed.  Constitutional: No distress.   HENT:   Head: Atraumatic.   Mouth/Throat: Oropharynx is clear and moist and mucous membranes are normal.   Eyes: Conjunctivae and EOM are normal. Right conjunctiva is not injected. Left conjunctiva is not injected. No scleral icterus.   Neck: Neck supple.    Full passive range of motion without pain.     Cardiovascular:  S1 normal, S2 normal, normal heart sounds and normal pulses.           No murmur heard.  Pulses:       Radial pulses are 2+ on the right side and 2+ on the left side.   Pulmonary/Chest: Effort normal and breath sounds normal. No respiratory distress.   Abdominal: Abdomen is soft. She exhibits no distension. There is no abdominal  tenderness.   Musculoskeletal:         General: Normal range of motion.      Cervical back: Full passive range of motion without pain and neck supple.     Neurological: Gait normal.   Sedated, opens eyes to deep stimuli, withdraws all extremities to pain, protecting airway, no drooling, no jerking movements, no gaze deviation, pupils 3mm reactive   Skin: Skin is warm. No ecchymosis and no rash noted.         ED Course   Procedures  Labs Reviewed   CBC W/ AUTO DIFFERENTIAL   COMPREHENSIVE METABOLIC PANEL   TSH   URINALYSIS, REFLEX TO URINE CULTURE   DRUG SCREEN PANEL, URINE EMERGENCY   ALCOHOL,MEDICAL (ETHANOL)   ACETAMINOPHEN LEVEL   SARS-COV-2 RNA AMPLIFICATION, QUAL   SALICYLATE LEVEL   LITHIUM LEVEL   TRICYCLIC ANTIDEPRESSANT SCREEN (REF LAB)   VALPROIC ACID   POCT URINE PREGNANCY   POCT GLUCOSE          Imaging Results    None          Medications - No data to display  Medical Decision Making:   Initial Assessment:   44 yo F w/hx of gait imbalance 2/2 functional cause, lumbar radiculopathy, seen in ED last night for seizure d/c on keppra, presenting for >30 minutes of seizure like activity   While in ED began having mild jerking activity of upper torso, not responding to stimuli/voice, protecting airway satting 100%, ordered for 1 of ativan  Had another episode of upper body jerking, R gaze deviation, without full return to baseline, in total 20 minutes. Given 4 mg ativan, Keppra loaded  status epilepticus dosing, after which pt became responsive and verbal  After several hours of monitoring in the ED during which pt was arousable but sedated, protecting airway, normal vitals, several attempts were made to ambulate patient but with continued left leg weakness causing patient to stumble  Differential Diagnosis:   Diff: seizure d/o vs psychogenic nonepileptic seizure (PNES) vs ICH vs tox   -pt denying neck or back pain, no urinary incontinence or bowel incontinence, less concern for spinal cord etiology of  weakness  -pt reporting headache, intermittent dizziness as only symptoms, CTA head and neck obtained to w/u for aneurysm vs occult bleed causing gait imbalance, with no abnl findings  -no electrolyte abnl on labs to suggest period paralysis   -lactate mild elevation, cpk mild elevation consistent with seizures, given 2 L IVF, with nl creatinine  -chart review from 3/2/23 [facility] w/reports of negative MRI of full spine, gait imbalance inconsistence with conversion disorder being likely diagnosis     Difficult to ascertain clear history as  at bedside reports pt does not have difficulty with gait at home, and multiple medical notes in care everywhere documenting gait imbalance history              ED Course as of 08/07/23 0245   Sun Aug 06, 2023   1948 Discussed case with neurology consultant, Dr. Kelly.  Patient is currently back to baseline mental status after several episodes of seizures in the ED which resolved after 3rd dose of Ativan 4 mg.  Patient was Keppra loaded.  Dr. Kelly recommended discharging the patient on 750 mg b.i.d. of Keppra, and giving neurology outpatient follow-up.  If patient seizing recurs recurs in the ED, he recommended transfer to [facility] for continuous EEG monitoring. [LF]   2336 CPK mild increase consistent with seizure after initial CPK obtained, given 2nd L of fluids, no seizure activity after keppra load [LF]   2337 Pending ambulation, pt arousable, verbal, but still sedated [LF]   Mon Aug 07, 2023   0204 Pt awakened, giving clear history, understands and verbalizes back plan for outpatient Neurology follow-up to workup seizures and taking new medication of Keppra 750 daily as recommended by Neurology.  However when patient was stood up for ambulation test, she has severe gait imbalance, stumbling to the left.  She denies any numbness or back pain or headache or dizziness.  Sensation equal and intact in bilateral lower extremities, decreased  strength of left arm and left leg at patient's baseline.  Per chart review, patient has had EMG workup of gait imbalance and left arm and left leg weakness, and follows with PT and neurology, with Care everywhere records showing gait imbalance as a functional issue.  However given new seizure, while workup further with CTA head and neck to look for any structural abnormalities vascular abnormalities not seen on CT head causing gait imbalance [LF]      ED Course User Index  [LF] Mary Hitchcock MD                 Clinical Impression:   Final diagnoses:  [Z00.8] Medical clearance for psychiatric admission               Mary Hitchcock MD  08/07/23 0533

## 2023-08-06 NOTE — ED PROVIDER NOTES
"Encounter Date: 8/5/2023       History     Chief Complaint   Patient presents with    Seizures     Here via [facility] with c/o multiple consecutive seizures, given 10 mg Versed IM, had seizure en route with EMS and was given another 2.5 mg Versed IV, Hx seizures     44 yo female presents via [facility] s/p multiple seizure-like episodes today.  Patient states her  told her today that he wanted to leave her.  Tonight she was with a friend when she had two shaking episodes.  Friend called EMS.  When patient was being transferred to EMS stretcher, she had another shaking episode for which EMS gave IM Versed 10mg.  Patient had 1 additional episode en route and was given IV Versed 2.5mg.  She has not been post-ictal for EMS.  She has not had urinary or bowel incontinence.      Patient reports remote prior seizure (see CareEverywhere 8/25/21) but is not on meds.  On close review of her Epic chart, she has a history of myoclonic jerks.  [facility] neuro note 6/20/23: "H/o fall w/ CHI, concussion, post concussion syn, HA's, neck pain, LLE pain.  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 was consistent w/ functional d/o."              Review of patient's allergies indicates:   Allergen Reactions    Iodine and iodide containing products Anaphylaxis    Shellfish derived Anaphylaxis    Benadryl [diphenhydramine hcl] Itching     "with fast injection"    Fish containing products     Iodine      Past Medical History:   Diagnosis Date    Anemia     Anemia     Asthma     exercise asthma    Migraine headache     Ovarian cyst      Past Surgical History:   Procedure Laterality Date    APPENDECTOMY      4/2019    AUGMENTATION OF BREAST Bilateral 03/2022    BREAST BIOPSY Right     Excisional bx, benign    COLONOSCOPY N/A 05/18/2022    Procedure: COLONOSCOPY;  Surgeon: Sabino Mcintosh MD;  Location: [facility];  Service: Endoscopy;  Laterality: N/A;  High risk for colon cancer (family)      " fully vaccinated; instructions to portal-GT     Family History   Problem Relation Age of Onset    Asthma Mother     Miscarriages / Stillbirths Mother     Breast cancer Mother         60    Arthritis Father     COPD Father     Asthma Brother     Colon polyps Brother     Breast cancer Maternal Grandmother         60    Cancer Maternal Grandfather     Hearing loss Paternal Grandmother     Hearing loss Paternal Grandfather     Diabetes Maternal Uncle     Hypertension Maternal Uncle     Prostate cancer Maternal Uncle     Hearing loss Paternal Aunt     Hearing loss Paternal Uncle     Amblyopia Neg Hx     Blindness Neg Hx     Cataracts Neg Hx     Glaucoma Neg Hx     Macular degeneration Neg Hx     Retinal detachment Neg Hx     Strabismus Neg Hx      Social History     Tobacco Use    Smoking status: Never    Smokeless tobacco: Never   Substance Use Topics    Alcohol use: Yes     Comment: occass    Drug use: No     Review of Systems   Constitutional:  Negative for fever.   HENT:  Negative for sore throat.    Eyes:  Negative for photophobia.   Respiratory:  Negative for shortness of breath.    Cardiovascular:  Negative for chest pain.   Gastrointestinal:  Negative for abdominal pain.   Genitourinary:  Negative for dysuria.   Musculoskeletal:  Negative for neck pain and neck stiffness.   Skin:  Negative for rash.   Neurological:  Positive for seizures (seizure-like activity).   Hematological:  Does not bruise/bleed easily.       Physical Exam     Initial Vitals [08/05/23 2108]   BP Pulse Resp Temp SpO2   103/61 110 20 98 °F (36.7 °C) 100 %      MAP       --         Physical Exam    Nursing note and vitals reviewed.  Constitutional: She appears well-developed and well-nourished. She is not diaphoretic.   Patient initially lying quietly on stretcher, then interactive with nurses, then had episode of shaking.  She received IV ativan 2mg.  She then was lying quietly again with bizarre rapid blinking movements of her lids.  She  woke up immediately when I applied normal saline to her eye.  Patient was not postictal.   HENT:   Head: Normocephalic and atraumatic.   Eyes: Conjunctivae and EOM are normal. Pupils are equal, round, and reactive to light.   Neck: Neck supple.   Normal range of motion.  Cardiovascular:  Normal rate, regular rhythm and intact distal pulses.           Pulmonary/Chest: Breath sounds normal. No respiratory distress. She has no wheezes. She has no rhonchi. She has no rales.   S/p breast augmentation.   Abdominal: Abdomen is soft. There is no abdominal tenderness.   Musculoskeletal:         General: No tenderness or edema. Normal range of motion.      Cervical back: Normal range of motion and neck supple.     Neurological: She is alert and oriented to person, place, and time. She has normal strength. No cranial nerve deficit.   Moving all extremities.   Skin: Skin is warm and dry. No erythema. No pallor.         ED Course   Procedures  Labs Reviewed   DRUG SCREEN PANEL, URINE EMERGENCY - Abnormal; Notable for the following components:       Result Value    Benzodiazepines Presumptive Positive (*)     All other components within normal limits    Narrative:     Specimen Source->Urine   COMPREHENSIVE METABOLIC PANEL - Abnormal; Notable for the following components:    Potassium 3.2 (*)     CO2 19 (*)     BUN 5 (*)     Calcium 8.6 (*)     Alkaline Phosphatase 38 (*)     All other components within normal limits   CBC W/ AUTO DIFFERENTIAL - Abnormal; Notable for the following components:    RBC 3.87 (*)     Hematocrit 35.2 (*)     MCH 31.5 (*)     All other components within normal limits   ALCOHOL,MEDICAL (ETHANOL) - Abnormal; Notable for the following components:    Alcohol, Serum 37 (*)     All other components within normal limits   MAGNESIUM - Abnormal; Notable for the following components:    Magnesium 1.5 (*)     All other components within normal limits   ALCOHOL,MEDICAL (ETHANOL) - Abnormal; Notable for the following  components:    Alcohol, Serum 37 (*)     All other components within normal limits   TSH   POCT URINE PREGNANCY     EKG Readings: (Independently Interpreted)   21:26: Sinus tach, . No ectopy. No STEMI.        Imaging Results              X-Ray Chest AP Portable (Final result)  Result time 08/05/23 23:50:05      Final result by Angelito Lagos MD (08/05/23 23:50:05)                   Impression:      There is no radiographic evidence for acute intrathoracic process.      Electronically signed by: Angelito Lagos  Date:    08/05/2023  Time:    23:50               Narrative:    EXAMINATION:  XR CHEST AP PORTABLE    CLINICAL HISTORY:  Unspecified convulsions    TECHNIQUE:  Single frontal view of the chest was performed.    COMPARISON:  Chest radiograph March 28, 2021    FINDINGS:  Single portable chest view is submitted.  The cardiomediastinal silhouette appears appropriate.  Azygous lobe noted.    There is no evidence for confluent infiltrate or consolidation, significant pleural effusion or pneumothorax.    The osseous structures appear intact.  Mild curvature of the spine noted.                                       CT Head Without Contrast (Final result)  Result time 08/05/23 23:22:35      Final result by Angelito Lagos MD (08/05/23 23:22:35)                   Impression:      There is no evidence for acute intracranial process.    Paranasal sinus findings are noted, as above.      Electronically signed by: Angelito Lagos  Date:    08/05/2023  Time:    23:22               Narrative:    EXAMINATION:  CT HEAD WITHOUT CONTRAST    CLINICAL HISTORY:  Seizure, new-onset, no history of trauma;    TECHNIQUE:  Low dose axial images were obtained through the head.  Coronal and sagittal reformations were also performed. Contrast was not administered.    COMPARISON:  CT examination of the brain without contrast March 28, 2021    FINDINGS:  The ventricular system, sulcal pattern and parenchymal attenuation  characteristics appear appropriate for age.  There is no evidence for intracranial mass, mass effect or midline shift.  There is no evidence for acute intracranial hemorrhage.  Appropriate CSF spaces are seen at the skull base.    The visualized orbits appear intact.  The mastoid air cells appear well aerated.  There is mild-to-moderate mucosal thickening and opacity of the ethmoid air cells, there is a potentially viscous air-fluid level of the left sphenoid sinus with additional finding that may relate to mucous retention cyst.  The osseous structures appear intact.                                    X-Rays:   Independently Interpreted Readings:   Other Readings:  CXR NAD    Medications   LORazepam injection 2 mg (2 mg Intravenous Given 8/5/23 2132)   sodium chloride 0.9% bolus 1,000 mL 1,000 mL (0 mLs Intravenous Stopped 8/5/23 2245)   levETIRAcetam injection 1,000 mg (1,000 mg Intravenous Given 8/6/23 0152)   potassium bicarbonate disintegrating tablet 60 mEq (60 mEq Oral Given 8/6/23 0719)     Medical Decision Making:   History:   I obtained history from: EMS provider.  Old Medical Records: I decided to obtain old medical records.  Old Records Summarized: records from clinic visits, records from previous admission(s) and records from another hospital.  Initial Assessment:   43 y.o. female s/p multiple episodes of seizure-like activity today.    Differential Diagnosis:   Ddx includes seizures, status epilepticus, pseudoseizures, dysrhythmia, anemia, STEPHANI, toxidrome, other.  Independently Interpreted Test(s):   I have ordered and independently interpreted X-rays - see prior notes.  I have ordered and independently interpreted EKG Reading(s) - see prior notes  Clinical Tests:   Lab Tests: Ordered and Reviewed  Radiological Study: Ordered and Reviewed  Medical Tests: Ordered and Reviewed  ED Management:  UPT negative.    EKG no STEMI.  No dysrhythmia.    CXR NAD.    Head CT NAD.    Labs reassuring.  Tox + BZDs but  patient received versed prior to arrival.      I suspect patient's episodes are PNES related to recent upsetting news ( wants to separate).  Her seizure-like episodes were extremely brief (< 10 seconds), she was never postictal, and during one episode, she responded to saline in her eye and then became awake and alert.  However, I have started her on Keppra (she received 1g IV in ER) 500mg PO BID out of an abundance of caution.  I have encouraged her to f/u to neurology.      I have Epic-messaged PMD Dr. Katy Vergara regarding this visit.                            Clinical Impression:   Final diagnoses:  [R56.9] Seizure-like activity (Primary)  [R56.9] Convulsions, unspecified convulsion type        ED Disposition Condition    Discharge Stable          ED Prescriptions       Medication Sig Dispense Start Date End Date Auth. Provider    levETIRAcetam (KEPPRA) 500 MG Tab Take 1 tablet (500 mg total) by mouth 2 (two) times daily. 60 tablet 8/6/2023 8/5/2024 Agata Emmanuel MD          Follow-up Information       Follow up With Specialties Details Why Contact Info Additional Information    [facility] [place]- Neurology Neurology   [address]  [facility] [address] [phone] Please park in garage or Medical Ofc Bldg surface lot and use Medical Office Bldg elevator. Check in at [address].              Agata Emmanuel MD  08/06/23 0715

## 2023-08-06 NOTE — ED NOTES
Pt was speaking to this RN when had a seizure with shaking in torso and upper extremities with eyes rolled back lasting approx 20 seconds.   Pt had 2 seizures with friend and 2 en route with EMS.  Dr. Emmanuel at bedside, 2mg ativan IVP given.

## 2023-08-06 NOTE — ED NOTES
Pt seized for a 3rd time, more Ativan ordered. Dr. Hitchcock speaking w/  regarding possible intubation if seizures persist.

## 2023-08-06 NOTE — ED NOTES
Spoke w/ Pt mother Monet ([phone]), she will get in contact w/ Peg (friend) to come  Pt since she does not have her house keys.

## 2023-08-07 VITALS
OXYGEN SATURATION: 96 % | SYSTOLIC BLOOD PRESSURE: 101 MMHG | WEIGHT: 141 LBS | RESPIRATION RATE: 22 BRPM | DIASTOLIC BLOOD PRESSURE: 63 MMHG | TEMPERATURE: 99 F | HEART RATE: 69 BPM | BODY MASS INDEX: 24.98 KG/M2 | HEIGHT: 63 IN

## 2023-08-07 PROCEDURE — 25500020 PHARM REV CODE 255: Performed by: STUDENT IN AN ORGANIZED HEALTH CARE EDUCATION/TRAINING PROGRAM

## 2023-08-07 PROCEDURE — 63600175 PHARM REV CODE 636 W HCPCS: Performed by: STUDENT IN AN ORGANIZED HEALTH CARE EDUCATION/TRAINING PROGRAM

## 2023-08-07 RX ORDER — LEVETIRACETAM 750 MG/1
750 TABLET ORAL 2 TIMES DAILY
Qty: 180 TABLET | Refills: 3 | Status: SHIPPED | OUTPATIENT
Start: 2023-08-07 | End: 2023-08-07 | Stop reason: SDUPTHER

## 2023-08-07 RX ORDER — ONDANSETRON 2 MG/ML
4 INJECTION INTRAMUSCULAR; INTRAVENOUS
Status: COMPLETED | OUTPATIENT
Start: 2023-08-07 | End: 2023-08-07

## 2023-08-07 RX ORDER — LEVETIRACETAM 750 MG/1
750 TABLET ORAL 2 TIMES DAILY
Qty: 180 TABLET | Refills: 3 | Status: SHIPPED | OUTPATIENT
Start: 2023-08-07 | End: 2023-12-26

## 2023-08-07 RX ADMIN — ONDANSETRON 4 MG: 2 INJECTION INTRAMUSCULAR; INTRAVENOUS at 01:08

## 2023-08-07 RX ADMIN — IOHEXOL 80 ML: 350 INJECTION, SOLUTION INTRAVENOUS at 02:08

## 2023-08-07 NOTE — DISCHARGE INSTRUCTIONS
You were seen today in the ER for seizure-like activity, and started on a new medication called Keppra.  Please follow-up with neurology to be evaluated for seizure disorder.  Follow-up with your primary care doctor for enlarged thyroid seen on CT scan today.  Your TSH (thyroid hormone level) was normal today.    Return to the ER if you are having multiple seizure-like episodes while on Keppra.  Please continue to go to neuro PT for your gait imbalance issues.  Seeing a psychiatrist additionally may help if there is any component of stress causing either the seizure-like activity or the gait imbalance issues.    Thank you for coming to our Emergency Department today. It is important to remember that some problems or medical conditions are difficult to diagnose and may not be found or addressed during your Emergency Department visit.  These conditions often start with non-specific symptoms and can only be diagnosed on follow up visits with your primary care physician or specialist when the symptoms continue or change. Please remember that all medical conditions can change, and we cannot predict how you will be feeling tomorrow or the next day. Return to the ER with any questions/concerns, new/concerning symptoms, worsening or failure to improve.       Be sure to follow up with your primary care doctor and review all labs/imaging/tests that were performed during your ER visit with them. It is very common for us to identify non-emergent incidental findings which must be followed up with your primary care physician.  Some labs/imaging/tests may be outside of the normal range, and require non-emergent follow-up and/or further investigation/treatment/procedures/testing to help diagnose/exclude/prevent complications or other potentially serious medical conditions. Some abnormalities may not have been discussed or addressed during your ER visit. Some lab results may not return during your ER visit but can be accessible by  downloading the free [facility] Mychart rose or by visiting [email or web address] . It is important for you to review all labs/imaging/tests which are outside of the normal range with your physician.    An ER visit does not replace a primary care visit, and many screening tests or follow-up tests cannot be ordered by an ER doctor or performed by the ER. Some tests may even require pre-approval.    If you do not have a primary care doctor, you may contact the one listed on your discharge paperwork or you may also call the [facility] Appointment Desk at [phone] , or [facility] at  [phone] to schedule an appointment, or establish care with a primary care doctor or even a specialist and to obtain information about local resources. It is important to your health that you have a primary care doctor.    Please take all medications as directed. We have done our best to select a medication for you that will treat your condition however, all medications may potentially have side-effects and it is impossible to predict which medications may give you side-effects or what those side-effects (if any) those medications may give you.  If you feel that you are having a negative effect or side-effect of any medication you should stop taking those medications immediately and seek medical attention. If you feel that you are having a life-threatening reaction call 911.        Do not drive, swim, climb to height, take a bath, operate heavy machinery, drink alcohol or take potentially sedating medications, sign any legal documents or make any important decisions for 24 hours if you have received any pain medications, sedatives or mood altering drugs during your ER visit or within 24 hours of taking them if they have been prescribed to you.     You can find additional resources for Dentists, hearing aids, durable medical equipment, low cost pharmacies and other resources at [email or web address]        Thank you for  coming to our Emergency Department today. It is important to remember that some problems or medical conditions are difficult to diagnose and may not be found or addressed during your Emergency Department visit.  These conditions often start with non-specific symptoms and can only be diagnosed on follow up visits with your primary care physician or specialist when the symptoms continue or change. Please remember that all medical conditions can change, and we cannot predict how you will be feeling tomorrow or the next day. Return to the ER with any questions/concerns, new/concerning symptoms, worsening or failure to improve.       Be sure to follow up with your primary care doctor and review all labs/imaging/tests that were performed during your ER visit with them. It is very common for us to identify non-emergent incidental findings which must be followed up with your primary care physician.  Some labs/imaging/tests may be outside of the normal range, and require non-emergent follow-up and/or further investigation/treatment/procedures/testing to help diagnose/exclude/prevent complications or other potentially serious medical conditions. Some abnormalities may not have been discussed or addressed during your ER visit. Some lab results may not return during your ER visit but can be accessible by downloading the free [facility] Mychart rose or by visiting [email or web address] . It is important for you to review all labs/imaging/tests which are outside of the normal range with your physician.    An ER visit does not replace a primary care visit, and many screening tests or follow-up tests cannot be ordered by an ER doctor or performed by the ER. Some tests may even require pre-approval.    If you do not have a primary care doctor, you may contact the one listed on your discharge paperwork or you may also call the [facility] Appointment Desk at [phone] , or [address] [place] [address] at  [phone] to schedule an  appointment, or establish care with a primary care doctor or even a specialist and to obtain information about local resources. It is important to your health that you have a primary care doctor.    Please take all medications as directed. We have done our best to select a medication for you that will treat your condition however, all medications may potentially have side-effects and it is impossible to predict which medications may give you side-effects or what those side-effects (if any) those medications may give you.  If you feel that you are having a negative effect or side-effect of any medication you should stop taking those medications immediately and seek medical attention. If you feel that you are having a life-threatening reaction call 911.        Do not drive, swim, climb to height, take a bath, operate heavy machinery, drink alcohol or take potentially sedating medications, sign any legal documents or make any important decisions for 24 hours if you have received any pain medications, sedatives or mood altering drugs during your ER visit or within 24 hours of taking them if they have been prescribed to you.     You can find additional resources for Dentists, hearing aids, durable medical equipment, low cost pharmacies and other resources at [email or web address]

## 2023-08-07 NOTE — ED NOTES
42 yo female to ED via EMS for seizures. Pt was seen here in ED for new onset seizures yesterday and discharged home. Pt was given Versed IM by EMS PTA. PT was Alert and Oriented upon arrival but confused. Hx of head injury from a fall earlier this year that resulted in left sided weakness. PT endorses taking about 200 mg of benadryl w/ a shot of liquor, and 2 tylenol PM's today. Denies SI, HI, AH. Pt states, she has been seeing her  father and pets walking around her home. She is calm and cooperative, follows commands. VSS, placed on CO2 monitor, CBG-86 per EMS. Workup in progress, seizure pads applied.

## 2023-08-09 ENCOUNTER — OFFICE VISIT (OUTPATIENT)
Dept: FAMILY MEDICINE | Facility: CLINIC | Age: 43
End: 2023-08-09
Payer: OTHER GOVERNMENT

## 2023-08-09 VITALS
RESPIRATION RATE: 18 BRPM | DIASTOLIC BLOOD PRESSURE: 64 MMHG | HEART RATE: 82 BPM | OXYGEN SATURATION: 98 % | BODY MASS INDEX: 23.43 KG/M2 | HEIGHT: 63 IN | TEMPERATURE: 98 F | WEIGHT: 132.25 LBS | SYSTOLIC BLOOD PRESSURE: 110 MMHG

## 2023-08-09 DIAGNOSIS — R56.9 SEIZURE-LIKE ACTIVITY: Primary | ICD-10-CM

## 2023-08-09 DIAGNOSIS — L98.9 SKIN LESION: ICD-10-CM

## 2023-08-09 DIAGNOSIS — G43.809 OTHER MIGRAINE WITHOUT STATUS MIGRAINOSUS, NOT INTRACTABLE: ICD-10-CM

## 2023-08-09 DIAGNOSIS — J45.20 MILD INTERMITTENT ASTHMA, UNSPECIFIED WHETHER COMPLICATED: ICD-10-CM

## 2023-08-09 DIAGNOSIS — F41.9 ANXIETY AND DEPRESSION: ICD-10-CM

## 2023-08-09 DIAGNOSIS — E55.9 VITAMIN D DEFICIENCY: ICD-10-CM

## 2023-08-09 DIAGNOSIS — E04.1 THYROID NODULE: ICD-10-CM

## 2023-08-09 DIAGNOSIS — F32.A ANXIETY AND DEPRESSION: ICD-10-CM

## 2023-08-09 LAB
AMITRIP SERPL-MCNC: <10 NG/ML
AMITRIP+NOR SERPL-MCNC: ABNORMAL NG/ML (ref 95–250)
CLOMIPRAMINE SERPL-MCNC: <20 NG/ML
CLOMIPRAMINE+NOR SERPL-MCNC: ABNORMAL NG/ML (ref 220–500)
DESIPRAMINE SERPL-MCNC: <10 NG/ML (ref 100–300)
DOXEPIN SERPL-MCNC: <10 NG/ML
DOXEPIN+NOR SERPL-MCNC: ABNORMAL NG/ML (ref 100–300)
IMIPRAMINE SERPL-MCNC: <10 NG/ML
IMIPRAMINE+DESIPR SERPL-MCNC: ABNORMAL NG/ML (ref 150–300)
NORCLOMIPRAMINE SERPL-MCNC: <20 NG/ML
NORDOXEPIN SERPL-MCNC: <10 NG/ML
NORTRIP SERPL-MCNC: <10 NG/ML (ref 50–150)
PROTRIP SERPL-MCNC: <10 NG/ML (ref 70–240)

## 2023-08-09 PROCEDURE — 99999 PR PBB SHADOW E&M-EST. PATIENT-LVL V: ICD-10-PCS | Mod: PBBFAC,,, | Performed by: INTERNAL MEDICINE

## 2023-08-09 PROCEDURE — 99215 OFFICE O/P EST HI 40 MIN: CPT | Mod: PBBFAC,PO | Performed by: INTERNAL MEDICINE

## 2023-08-09 PROCEDURE — 99214 PR OFFICE/OUTPT VISIT, EST, LEVL IV, 30-39 MIN: ICD-10-PCS | Mod: S$PBB,,, | Performed by: INTERNAL MEDICINE

## 2023-08-09 PROCEDURE — 99214 OFFICE O/P EST MOD 30 MIN: CPT | Mod: S$PBB,,, | Performed by: INTERNAL MEDICINE

## 2023-08-09 PROCEDURE — 99999 PR PBB SHADOW E&M-EST. PATIENT-LVL V: CPT | Mod: PBBFAC,,, | Performed by: INTERNAL MEDICINE

## 2023-08-09 RX ORDER — LORATADINE 10 MG/1
TABLET ORAL
COMMUNITY
End: 2024-04-02

## 2023-08-09 RX ORDER — DOXYCYCLINE 100 MG/1
CAPSULE ORAL
COMMUNITY
End: 2024-04-02

## 2023-08-09 RX ORDER — ERGOCALCIFEROL 1.25 MG/1
50000 CAPSULE ORAL
Qty: 4 CAPSULE | Refills: 0 | Status: SHIPPED | OUTPATIENT
Start: 2023-08-09 | End: 2023-09-06

## 2023-08-09 RX ORDER — KETOROLAC TROMETHAMINE 10 MG/1
TABLET, FILM COATED ORAL
COMMUNITY
End: 2023-10-05

## 2023-08-09 RX ORDER — CETIRIZINE HYDROCHLORIDE 10 MG/1
TABLET ORAL
COMMUNITY
End: 2024-04-02

## 2023-08-09 RX ORDER — AMOXICILLIN 500 MG/1
TABLET, FILM COATED ORAL
COMMUNITY
End: 2024-04-02

## 2023-08-09 RX ORDER — MECLIZINE HYDROCHLORIDE 25 MG/1
TABLET ORAL
COMMUNITY
End: 2024-04-02

## 2023-08-09 RX ORDER — ALBUTEROL SULFATE 90 UG/1
AEROSOL, METERED RESPIRATORY (INHALATION)
COMMUNITY
End: 2023-08-09 | Stop reason: SDUPTHER

## 2023-08-09 RX ORDER — AMOXICILLIN 875 MG/1
TABLET, FILM COATED ORAL
COMMUNITY
End: 2024-04-02

## 2023-08-09 RX ORDER — BUTALBITAL, ACETAMINOPHEN AND CAFFEINE 300; 40; 50 MG/1; MG/1; MG/1
CAPSULE ORAL
COMMUNITY
End: 2024-04-02

## 2023-08-09 RX ORDER — ATOGEPANT 60 MG/1
TABLET ORAL
COMMUNITY
Start: 2023-06-21

## 2023-08-09 RX ORDER — CHLORHEXIDINE GLUCONATE ORAL RINSE 1.2 MG/ML
SOLUTION DENTAL
COMMUNITY
End: 2024-04-02

## 2023-08-09 RX ORDER — PHENAZOPYRIDINE HYDROCHLORIDE 200 MG/1
TABLET, FILM COATED ORAL
COMMUNITY
End: 2024-04-02

## 2023-08-09 RX ORDER — ACETAMINOPHEN AND CODEINE PHOSPHATE 300; 30 MG/1; MG/1
TABLET ORAL
COMMUNITY
End: 2024-04-02

## 2023-08-09 RX ORDER — HYDROGEN PEROXIDE 3 %
SOLUTION, NON-ORAL MISCELLANEOUS
COMMUNITY
End: 2023-12-26

## 2023-08-09 RX ORDER — PROMETHAZINE HYDROCHLORIDE 25 MG/1
TABLET ORAL
COMMUNITY
End: 2024-04-02

## 2023-08-09 RX ORDER — SUMATRIPTAN 20 MG/1
1 SPRAY NASAL
Qty: 10 EACH | Refills: 0 | Status: SHIPPED | OUTPATIENT
Start: 2023-08-09 | End: 2023-11-07 | Stop reason: SDUPTHER

## 2023-08-09 RX ORDER — SUMATRIPTAN 20 MG/1
SPRAY NASAL
COMMUNITY
End: 2023-08-09 | Stop reason: SDUPTHER

## 2023-08-09 RX ORDER — TOPIRAMATE 25 MG/1
TABLET ORAL
COMMUNITY
End: 2024-04-02

## 2023-08-09 RX ORDER — FLUTICASONE PROPIONATE 50 MCG
SPRAY, SUSPENSION (ML) NASAL
COMMUNITY
End: 2024-04-02

## 2023-08-09 RX ORDER — PSEUDOEPHEDRINE HCL 30 MG
TABLET ORAL
COMMUNITY
End: 2024-04-02

## 2023-08-09 RX ORDER — HYDROCODONE BITARTRATE AND ACETAMINOPHEN 7.5; 325 MG/1; MG/1
TABLET ORAL
COMMUNITY
End: 2024-04-02

## 2023-08-09 RX ORDER — ALBUTEROL SULFATE 90 UG/1
1-2 AEROSOL, METERED RESPIRATORY (INHALATION) EVERY 6 HOURS PRN
Qty: 18 G | Refills: 0 | Status: SHIPPED | OUTPATIENT
Start: 2023-08-09 | End: 2023-09-27

## 2023-08-09 RX ORDER — ONDANSETRON 8 MG/1
TABLET, ORALLY DISINTEGRATING ORAL
COMMUNITY
End: 2024-04-02

## 2023-08-09 RX ORDER — TRAMADOL HYDROCHLORIDE 50 MG/1
TABLET ORAL
COMMUNITY
End: 2024-04-02

## 2023-08-09 RX ORDER — DIAZEPAM 10 MG/1
TABLET ORAL
COMMUNITY
Start: 2022-08-16 | End: 2024-04-02

## 2023-08-09 RX ORDER — NITROFURANTOIN 25; 75 MG/1; MG/1
CAPSULE ORAL
COMMUNITY
End: 2024-04-02

## 2023-08-09 RX ORDER — POLYETHYLENE GLYCOL 3350 17 G/17G
POWDER, FOR SOLUTION ORAL
COMMUNITY
End: 2024-04-02

## 2023-08-09 RX ORDER — METHOCARBAMOL 500 MG/1
TABLET, FILM COATED ORAL
COMMUNITY
End: 2024-04-02

## 2023-08-09 RX ORDER — FUROSEMIDE 20 MG/1
TABLET ORAL
COMMUNITY
End: 2024-04-02

## 2023-08-09 RX ORDER — ALBUTEROL SULFATE 0.63 MG/3ML
SOLUTION RESPIRATORY (INHALATION)
COMMUNITY
End: 2024-04-02

## 2023-08-09 RX ORDER — ASCORBIC ACID 500 MG
TABLET ORAL
COMMUNITY

## 2023-08-09 RX ORDER — HYDROCODONE BITARTRATE AND ACETAMINOPHEN 5; 325 MG/1; MG/1
TABLET ORAL
COMMUNITY
End: 2024-04-02

## 2023-08-09 RX ORDER — FERROUS SULFATE 325(65) MG
TABLET ORAL
COMMUNITY
End: 2024-04-02

## 2023-08-09 RX ORDER — DIAZEPAM 5 MG/1
TABLET ORAL
COMMUNITY
End: 2024-04-02

## 2023-08-09 RX ORDER — METRONIDAZOLE 500 MG/1
TABLET ORAL
COMMUNITY
End: 2024-04-02

## 2023-08-09 RX ORDER — ONDANSETRON 4 MG/1
TABLET, ORALLY DISINTEGRATING ORAL
COMMUNITY
End: 2024-04-02

## 2023-08-09 NOTE — PROGRESS NOTES
"Subjective:       Patient ID: Lydia Thomas is a pleasant 43 y.o. White female patient    Chief Complaint: Seizures, ED Follow Up, Enlarged thyroid , and Fatigue      Patient is a pt I saw last on 03/24/2023, see my last notes. .     HPI     Patient with long and complicated past medical history who comes today reporting issue of pain, but mainly coming for ED visit f-up.  She undergoes PT ([employer]'s [employer]).  She recently went twice to ED for seizure like activity.  - 08/05/2023, as per notes:  " s/p multiple seizure-like episodes today.  Friend called EMS.  When patient was being transferred to EMS stretcher, she had another shaking episode for which EMS gave IM Versed 10mg.  Patient had 1 additional episode en route and was given IV Versed 2.5mg.  She has not been post-ictal as per EMS. She has not had urinary or bowel incontinence.    Patient reports remote prior seizure (see [facility] 8/25/21) but is not on meds.  On close review of her Epic chart, she has a history of myoclonic jerks.  [facility] neuro note 6/20/23: "H/o fall w/ CHI, concussion, post concussion syn, HA's, neck pain, LLE pain.  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 was consistent w/ functional d/o."   Possible PNES related to recent upsetting news..  Her seizure-like episodes were extremely brief (< 10 seconds), she was never postictal, and during one episode, she responded to saline in her eye and then became awake and alert.  However, I have started her on Keppra (she received 1g IV in ER) 500mg PO BID out of an abundance of caution.  I have encouraged her to f/u to Neurology.    - 08/06/2023:  "44 yo F w/hx of gait imbalance 2/2 functional cause, lumbar radiculopathy, seen in ED last night for seizure d/c on keppra, presenting for >30 minutes of seizure like activity   EMS found pt with generalized tonic clonic shaking, gave patient 10 versed IM, pt was sedated en route, " protecting airway without seizure like activity   BG on arrival 80, pt was arousable, following commands, L sided decreased motor at baseline per pt.  Per pt she took 4 pills of 50 mg benadryl this AM and drank several drinks to try to fall asleep, denying HI/SI/AVH  Per  pt has SI hx remote, was hospitalized voluntarily several years ago, currently they are in the process of discussing divorce  Per chart review from care everywhere pt has been undergoing neuro PT for several months d/t gait imbalance thought to be of functional etiology (EMG, MRI of full spine with no organic findings 2023)   44 yo F w/hx of gait imbalance 2/2 functional cause, lumbar radiculopathy, seen in ED last night for seizure d/c on keppra, presenting for >30 minutes of seizure like activity   While in ED began having mild jerking activity of upper torso, not responding to stimuli/voice, protecting airway satting 100%, ordered for 1 of ativan  Had another episode of upper body jerking, R gaze deviation, without full return to baseline, in total 20 minutes. Given 4 mg ativan, Keppra loaded  status epilepticus dosing, after which pt became responsive and verbal  After several hours of monitoring in the ED during which pt was arousable but sedated, protecting airway, normal vitals, several attempts were made to ambulate patient but with continued left leg weakness causing patient to stumble  Differential Diagnosis:   Diff: seizure d/o vs psychogenic nonepileptic seizure (PNES) vs ICH vs tox   -pt denying neck or back pain, no urinary incontinence or bowel incontinence, less concern for spinal cord etiology of weakness  -pt reporting headache, intermittent dizziness as only symptoms, CTA head and neck obtained to w/u for aneurysm vs occult bleed causing gait imbalance, with no abnl findings  -no electrolyte abnl on labs to suggest period paralysis   -lactate mild elevation, cpk mild elevation consistent with seizures, given 2 L IVF, with  "nl creatinine  -chart review from 3/2/23 [facility] w/reports of negative MRI of full spine, gait imbalance inconsistence with conversion disorder being likely diagnosis    Difficult to ascertain clear history as  at bedside reports pt does not have difficulty with gait at home, and multiple medical notes in care everywhere documenting gait imbalance history".  Pt is here today with her . She reports feeling very tired.   No new similar episode.  Her  answers several of the questions, she has her mother in law on the phone during the visit.  Pt and  want to review results of blood work done at the ED, as well as CT.    Patient Active Problem List   Diagnosis    Acetaminophen overdose    Suicide attempt by acetaminophen overdose    Migraine    Anemia of chronic disease    Adjustment disorder with mixed anxiety and depressed mood    Tylenol overdose    Migraine without status migrainosus, not intractable    Occipital headache    Vasovagal syncope    Myoclonic jerking    Traumatic brain injury without loss of consciousness    Dizziness and giddiness    Depression    Warts of foot    Screening for colon cancer    Family history of colon cancer    Decreased strength, endurance, and mobility    Functional gait abnormality    Lumbar radiculopathy    Weakness of left lower extremity    Balance disorder          ACTIVE MEDICAL ISSUES:  Documented in Problem List     PAST MEDICAL HISTORY  Documented     PAST SURGICAL HISTORY:  Documented     SOCIAL HISTORY:  Documented     FAMILY HISTORY:  Documented     ALLERGIES AND MEDICATIONS: updated and reviewed.  Documented    Review of Systems   Constitutional:  Positive for activity change and fatigue. Negative for unexpected weight change.   HENT:  Negative for hearing loss, rhinorrhea and trouble swallowing.    Eyes:  Negative for discharge and visual disturbance.   Respiratory:  Negative for chest tightness and wheezing.    Cardiovascular:  " "Negative for chest pain and palpitations.   Gastrointestinal:  Negative for blood in stool, constipation, diarrhea and vomiting.   Endocrine: Negative for polydipsia and polyuria.   Genitourinary:  Negative for difficulty urinating, dysuria, hematuria and menstrual problem.   Musculoskeletal:  Negative for arthralgias, joint swelling and neck pain.   Skin:  Positive for wound (under L breast, s/p breast implants).   Neurological:  Positive for weakness (LUE better, LLE) and headaches.   Psychiatric/Behavioral:  Negative for confusion and dysphoric mood.        Objective:      Physical Exam  Vitals and nursing note reviewed.   Constitutional:       Appearance: Normal appearance. She is normal weight.   HENT:      Right Ear: Tympanic membrane normal.      Left Ear: Tympanic membrane normal.   Eyes:      Conjunctiva/sclera: Conjunctivae normal.   Cardiovascular:      Rate and Rhythm: Normal rate and regular rhythm.      Pulses: Normal pulses.      Heart sounds: Normal heart sounds.   Pulmonary:      Effort: Pulmonary effort is normal.      Breath sounds: Normal breath sounds.   Chest:       Abdominal:      General: Bowel sounds are normal.   Neurological:      Mental Status: She is alert.         Vitals:    08/09/23 1448   BP: 110/64   BP Location: Right arm   Patient Position: Sitting   BP Method: Small (Manual)   Pulse: 82   Resp: 18   Temp: 98.2 °F (36.8 °C)   TempSrc: Oral   SpO2: 98%   Weight: 60 kg (132 lb 4.4 oz)   Height: 5' 3" (1.6 m)     Body mass index is 23.43 kg/m².    RESULTS: Reviewed labs from last 12 months    Last Lab Results:     Lab Results   Component Value Date    WBC 5.87 08/06/2023    HGB 12.4 08/06/2023    HCT 36.1 (L) 08/06/2023     08/06/2023     08/06/2023    K 3.5 08/06/2023     08/06/2023    CO2 17 (L) 08/06/2023    BUN 5 (L) 08/06/2023    CREATININE 0.8 08/06/2023    CALCIUM 8.9 08/06/2023    ALBUMIN 4.1 08/06/2023    AST 14 08/06/2023    ALT 11 08/06/2023    CHOL 177 " 04/06/2023    TRIG 60 04/06/2023    HDL 46 04/06/2023    LDLCALC 119.0 04/06/2023    HGBA1C 4.7 04/06/2023    TSH 0.999 08/06/2023       Assessment:       1. Seizure-like activity    2. Anxiety and depression    3. Thyroid nodule    4. Skin lesion    5. Other migraine without status migrainosus, not intractable    6. Mild intermittent asthma, unspecified whether complicated    7. Vitamin D deficiency        Plan:   Lydia was seen today for seizures, ed follow up, enlarged thyroid  and fatigue.    Diagnoses and all orders for this visit:    Seizure-like activity    See HPI. Will f-up with Neurologist.    Anxiety and depression  -     Ambulatory referral/consult to Psychiatry; Future    Pt starts to cry during the visit, holding my hand. She asks herself to be referred to Psychiatrist. She has a H/O voluntary admission to in psychiatry, [facility]. Difficult to discuss this in presence of her . Dx of PTSD in her list of diagnosis, pt's  is not willing to have it placed in her list of Dx.    Thyroid nodule  -     US Soft Tissue Head Neck Thyroid; Future    See CT, will do US. TSH fine.    Skin lesion  -     Ambulatory referral/consult to Dermatology; Future    Other migraine without status migrainosus, not intractable  -     SUMAtriptan (IMITREX) 20 mg/actuation nasal spray; 1 spray (20 mg total) by Nasal route every 2 (two) hours as needed for Migraine.    Mild intermittent asthma, unspecified whether complicated  -     albuterol (PROAIR HFA) 90 mcg/actuation inhaler; Inhale 1-2 puffs into the lungs every 6 (six) hours as needed for Wheezing.    Vitamin D deficiency  -     ergocalciferol (ERGOCALCIFEROL) 50,000 unit Cap; Take 1 capsule (50,000 Units total) by mouth every 7 days.      Follow up in about 4 weeks (around 9/6/2023) for f-up.    This note was created by combination of typed  and M-Modal dictation.  Transcription errors may be present.  If there are any questions, please contact  me.

## 2023-08-10 ENCOUNTER — PATIENT MESSAGE (OUTPATIENT)
Dept: FAMILY MEDICINE | Facility: CLINIC | Age: 43
End: 2023-08-10
Payer: OTHER GOVERNMENT

## 2023-08-11 ENCOUNTER — TELEPHONE (OUTPATIENT)
Dept: PSYCHIATRY | Facility: CLINIC | Age: 43
End: 2023-08-11
Payer: OTHER GOVERNMENT

## 2023-08-11 ENCOUNTER — HOSPITAL ENCOUNTER (OUTPATIENT)
Dept: RADIOLOGY | Facility: HOSPITAL | Age: 43
Discharge: HOME | End: 2023-08-11
Attending: INTERNAL MEDICINE
Payer: OTHER GOVERNMENT

## 2023-08-11 DIAGNOSIS — E04.1 THYROID NODULE: ICD-10-CM

## 2023-08-11 PROCEDURE — 76536 US SOFT TISSUE HEAD NECK THYROID: ICD-10-PCS | Mod: 26,,, | Performed by: STUDENT IN AN ORGANIZED HEALTH CARE EDUCATION/TRAINING PROGRAM

## 2023-08-11 PROCEDURE — 76536 US EXAM OF HEAD AND NECK: CPT | Mod: TC

## 2023-08-11 PROCEDURE — 76536 US EXAM OF HEAD AND NECK: CPT | Mod: 26,,, | Performed by: STUDENT IN AN ORGANIZED HEALTH CARE EDUCATION/TRAINING PROGRAM

## 2023-08-11 NOTE — TELEPHONE ENCOUNTER
Called and spoke to pt. Appt made w/ pt for NP appt w/ Alexandru for 9/6 @ 8am. Directions provided. No concerns voiced.

## 2023-08-13 ENCOUNTER — PATIENT MESSAGE (OUTPATIENT)
Dept: FAMILY MEDICINE | Facility: CLINIC | Age: 43
End: 2023-08-13
Payer: OTHER GOVERNMENT

## 2023-08-14 NOTE — TELEPHONE ENCOUNTER
Patient requesting Sumatriptan injections.  Per chart, nasal spray was prescribed.  Please advise.

## 2023-08-14 NOTE — TELEPHONE ENCOUNTER
----- Message from Natalee Price sent at 8/14/2023 10:53 AM CDT -----  Regarding: Return call  .Type:  Patient Returning Call    Who Called: self     Who Left Message for Patient: Marisela Carrillo LPN    Does the patient know what this is regarding?no     Would the patient rather a call back or a response via [facility]? Call     Best Call Back Number: .[phone]      Additional Information:

## 2023-09-04 DIAGNOSIS — E55.9 VITAMIN D DEFICIENCY: ICD-10-CM

## 2023-09-06 ENCOUNTER — PATIENT MESSAGE (OUTPATIENT)
Dept: PSYCHIATRY | Facility: CLINIC | Age: 43
End: 2023-09-06
Payer: OTHER GOVERNMENT

## 2023-09-06 RX ORDER — ERGOCALCIFEROL 1.25 MG/1
50000 CAPSULE ORAL
Qty: 4 CAPSULE | Refills: 0 | Status: SHIPPED | OUTPATIENT
Start: 2023-09-06 | End: 2023-11-16 | Stop reason: SDUPTHER

## 2023-09-11 ENCOUNTER — PATIENT MESSAGE (OUTPATIENT)
Dept: FAMILY MEDICINE | Facility: CLINIC | Age: 43
End: 2023-09-11
Payer: OTHER GOVERNMENT

## 2023-09-13 NOTE — PROGRESS NOTES
[facility]   Physical Therapy Treatment Note and Reassessment    Name: Lydia Thomas  Clinic Number: [id number]    Therapy Diagnosis:   Encounter Diagnosis   Name Primary?    Balance disorder Yes     Physician: Conrad Gutierres MD    Visit Date: 9/14/2023    Physician Orders: PT Eval and Treat - Neuro Program  Medical Diagnosis from Referral: R29.898 (ICD-10-CM) - Weakness of left lower extremity  Evaluation Date: 6/14/2023  Authorization Period Expiration: 5/24/24  Plan of Care Expiration: 10/6/23  Visit # / Visits authorized: 11/12     Progress Note Due: 9/2/23    PTA Visit #: 0/5     Time In: 0915  Time Out: 1000  Total Billable Time: 45 minutes    SUBJECTIVE     Pt reports: that she is feeling fine but has pain in her right hip     She was compliant with her home exercise program.  Response to previous treatment: tolerated well  Functional change: ongoing    Pain: 6/10  Location: right hip      OBJECTIVE     Objective Measures updated at progress report unless specified.     Strength:     Right Lower Extremity Strength Grade 7/17/23 8/2/23 9/14/2023 Left Lower Extremity Strength Grade 7/17/23 8/2/23 9/14/2023   Hip Flexion 5/5 5/5 5/5 5/5 Hip Flexion 2/5 trace trace trace   Hip Extension Grossly 3+/5 with functional mobility 4/5 5 4+/5 Hip Extension Grossly 3/5 with functional mobility trace trace trace   Hip Abduction Not tested 4/5 NT  Hip Abduction 2/5 trace NT    Hip Adduction Not tested NT NT  Hip Adduction 2/5 trace NT    Knee Flexion 5/5 5/5 5 5/5 Knee Flexion 2-/5 trace trace trace   Knee Extension 5/5 5/5 5 5/5 Knee Extension 3+/5 with ambulation; 3-/5 when assessed in sidelying Trace (supine) 2/5 (seated) 2/5 (seated)   Ankle Dorsiflexion 5/5 5/5 5 5/5 Ankle Dorsiflexion trace trace trace trace   Ankle Plantarflexion 4+/5 5/5 5 5/5 Ankle Plantarflexion trace trace trace trace      APTA Core Set Outcome Measures for Adults with Neurologic Conditions     Evaluation   8/2/23 9/14/2023 Reference values    Timed Up and Go To be assessed at follow up visit    25.42 seconds (patient with improve left lower extremity gait mechanics showing less circumduction and improved knee flexion with swing) 27 seconds using RW   score >14 seconds indicates risk for falls in older stroke patients (Autumn et al, 2006)   SSWS To be assessed at follow up visit --- 0.5 m/sec (6m/11s) 0-0.4 m/s = household walker  0.4-0.8 m/s = limited community ambulator   0.8-1.4 m/s = community ambultor  >1.4 m/s = can cross street safely    Functional Gait Assessment  To be assessed as appropriate --- -- <22/30 = identifies fall risk in community dwelling older adults   (MCID = 4)   Curran Balance Test To be assessed as appropriate    --- -- Fall risk cut-off for stroke= 45/56   MCTSIB   To be assessed at follow up visit --- -- 6 Minute Walk Test Distances: Means by Age and Gender     Age Gender Mean   60-69 Female  Male 572 meters (1876 feet)  538 meters (1765 feet)   70-79 Female  Male 527 meters (1729 feet)  471 meters (1545 feet)   80-89 Female  Male 417 meters (1368 feet)  392 meters ( 1286 feet)    LAUREN Benitez (2000)       5 times sit-stand    To be assessed at follow up visit    4 reps completed with BUE in 33.39 seconds then patient with early symptoms of functional seizure; activity stopped to allow recovery 21 seconds for 5 stands with BUE support using RW, patient reports she feels fine after performing test  >12 sec= fall risk for general elderly  >10 sec= balance/vestibular dysfunction (<59 y/o)  >14.2 sec= balance/vestibular dysfunction (>59 y/o)  >12 sec= fall risk for stroke       CMS Impairment/Limitation/Restriction for FOTO NOC-Neuromuscular disorder Survey     Therapist reviewed FOTO scores for Lydia Thomas on 6/14/2023.   FOTO documents entered into [employer] - see Media section.               Treatment     Lydia received the treatments listed below:      therapeutic exercises to  develop strength, endurance, ROM, and core stabilization for 10  minutes including:    SciFit recumbent stepper level 1 x8 minutes   Four minute break required at 4 minutes      neuromuscular re-education activities to improve: Balance, Coordination, Sense, Proprioception, and Posture for 0 minutes. The following activities were included:      therapeutic activities to improve functional performance for 35 minutes, including:    Over ground gait training with rolling walker 2 x150 feet to/from lobby to gym  Objective measures listed above  Sit to stands with close spv multiple times throughout session throughout session  FOTO survey    gait training to improve functional mobility and safety for 00  minutes, including:      Patient Education and Home Exercises     Home Exercises Provided and Patient Education Provided     Education provided:   - Diaphragmatic breathing  - grounding techniques using weight near core  - importance of working to engage left side but within tolerance to prevent mental fatigue  - 8/2: PT advised patient not to attempt standing foot slides at home due to safety and assistance required    Written Home Exercises Provided: none provided today    ASSESSMENT     Lydia tolerated today's session well with frequent rest breaks required throughout. Slight decline noted today with the patient's TUG score, placing the patient in the elevated fall risk category, Improvement noted with the patient's 5 time sit to stand. The patient was able to complete all 5 stands with minimal fatigue or symptoms following the test. The patient reports she has been working on her sit to stands often at home. The patient was able to complete her SSWS, which places her into the household ambulator category. No significant changes noted with the patient's MMT scores with the patient continuing to have only trace muscle activation on the left LE. She would benefit from skilled physical therapy services for an additional  8 weeks at her current frequency to address her functional deficits and improve her tolerance/independence with functional mobility.    Lydia Is progressing well towards her goals.   Pt prognosis is Good.     Pt will continue to benefit from skilled outpatient physical therapy to address the deficits listed in the problem list box on initial evaluation, provide pt/family education and to maximize pt's level of independence in the home and community environment.     Pt's spiritual, cultural and educational needs considered and pt agreeable to plan of care and goals.     Anticipated barriers to physical therapy: none at this time    Goals:  Short Term Goals: 4 weeks   Patient will report having less difficulty with walking around a room on her FOTO survey to show improvements in her self-perception of functional mobility. ongoing  Pt will report pain at B hips at </= 4/10 entering clinic. ongoing  Pt will improve left LE hip and knee MMT scores to 4/5 for improved stability with stance and functional mobility. ongoing  PT/A will administer Timed Up and Go, 10 Meter Walk Test, MTCSIB, and 5 Time Sit to Stand and establish appropriate goals for functional mobility Partially met  Revised: PT/A will administer 10 Meter Walk Test and MCTSIB as appropriate to assess balance and gait speed.  Patient will receive an individualized home exercise program to address functional deficits. ongoing     Long Term Goals: 8 weeks   Patient will improve her FOTO score from 16%  to at least 43% for improved self perception of functional mobility.(score 0-100, high score indicates greater level of function) ongoing  Pt will be able to ambulate 200 feet with no assistive device without loss of balance, increases in pain, or standing rest break for increased independence in the home with mobility. ongoing  Patient will report no falls over PT POC to show improvements in her safety awareness. ongoing  Patient will be independent with home  exercise program on >/= 4 days per week. Ongoing  Pt will improve 5x chair rise score to </= 30s with BUE assist for improved muscular endurance. New 8/2/23  Pt will improve Timed Up and Go (TUG) score to less than 20s with Walker or least restrictive assistive device. New 8/2/23     Plan     Continue to progress strength, endurance and activity tolerance as tolerated     Lala Sanford, PT

## 2023-09-14 ENCOUNTER — CLINICAL SUPPORT (OUTPATIENT)
Dept: REHABILITATION | Facility: HOSPITAL | Age: 43
End: 2023-09-14
Payer: OTHER MISCELLANEOUS

## 2023-09-14 DIAGNOSIS — R26.89 BALANCE DISORDER: Primary | ICD-10-CM

## 2023-09-14 PROCEDURE — 97110 THERAPEUTIC EXERCISES: CPT | Mod: PO

## 2023-09-14 PROCEDURE — 97530 THERAPEUTIC ACTIVITIES: CPT | Mod: PO

## 2023-09-25 DIAGNOSIS — M50.30 DDD (DEGENERATIVE DISC DISEASE), CERVICAL: ICD-10-CM

## 2023-09-25 DIAGNOSIS — M51.36 DDD (DEGENERATIVE DISC DISEASE), LUMBAR: Primary | ICD-10-CM

## 2023-09-26 DIAGNOSIS — J45.20 MILD INTERMITTENT ASTHMA, UNSPECIFIED WHETHER COMPLICATED: ICD-10-CM

## 2023-09-26 NOTE — TELEPHONE ENCOUNTER
Refill Routing Note   Medication(s) are not appropriate for processing by [facility] for the following reason(s):      New or recently adjusted medication    [facility] action(s):  Defer Care Due:  None identified              Appointments  past 12m or future 3m with PCP    Date Provider   Last Visit   8/9/2023 Katy Vergara MD   Next Visit   10/26/2023 Katy Vergara MD   ED visits in past 90 days: 2        Note composed:12:27 PM 09/26/2023

## 2023-09-26 NOTE — TELEPHONE ENCOUNTER
No care due was identified.  [facility] Embedded Care Due Messages. Reference number: [id number].   9/26/2023 4:05:05 AM CDT

## 2023-09-27 ENCOUNTER — CLINICAL SUPPORT (OUTPATIENT)
Dept: REHABILITATION | Facility: HOSPITAL | Age: 43
End: 2023-09-27
Payer: OTHER MISCELLANEOUS

## 2023-09-27 DIAGNOSIS — R26.89 BALANCE DISORDER: Primary | ICD-10-CM

## 2023-09-27 PROCEDURE — 97110 THERAPEUTIC EXERCISES: CPT | Mod: PO

## 2023-09-27 PROCEDURE — 97112 NEUROMUSCULAR REEDUCATION: CPT | Mod: PO

## 2023-09-27 PROCEDURE — 97530 THERAPEUTIC ACTIVITIES: CPT | Mod: PO

## 2023-09-27 RX ORDER — ALBUTEROL SULFATE 90 UG/1
AEROSOL, METERED RESPIRATORY (INHALATION)
Qty: 54 G | Refills: 3 | Status: SHIPPED | OUTPATIENT
Start: 2023-09-27 | End: 2023-11-16 | Stop reason: SDUPTHER

## 2023-09-27 NOTE — PROGRESS NOTES
[facility] OUTPATIENT THERAPY AND WELLNESS   Physical Therapy Treatment Note     Name: Lydia Thomas  Clinic Number: [id number]    Therapy Diagnosis:   Encounter Diagnosis   Name Primary?    Balance disorder Yes       Physician: Conrad Gutierres MD    Visit Date: 9/27/2023    Physician Orders: PT Eval and Treat - Neuro Program  Medical Diagnosis from Referral: R29.898 (ICD-10-CM) - Weakness of left lower extremity  Evaluation Date: 6/14/2023  Authorization Period Expiration: 5/24/24  Plan of Care Expiration: 10/6/23  Visit # / Visits authorized: 12/12     Progress Note Due: 10/6/2023    PTA Visit #: 0/5     Time In: 1425  Time Out: 1535  Total Billable Time: 70 minutes    SUBJECTIVE     Pt reports: that she if feeling very tired today    She was compliant with her home exercise program.  Response to previous treatment: tolerated well  Functional change: ongoing    Pain: 6/10  Location: right hip      OBJECTIVE     Objective Measures updated at progress report unless specified.     Treatment     Lydia received the treatments listed below:      therapeutic exercises to develop strength, endurance, ROM, and core stabilization for 10 minutes including:    2 x 5 reps LAQ AAROM while seated EOM     neuromuscular re-education activities to improve: Balance, Coordination, Sense, Proprioception, and Posture for 10 minutes. The following activities were included:    Forward/backward sliding with left lower extremity to target, AAROM 2 x10  Right upper extremity supported on parallel bars  VC provided to use bars as TC to maintain weight shift to right and to focus on target that she is trying to reach  Neuro Maryse skate used on left lower extremity to decrease friction       therapeutic activities to improve functional performance for 50 minutes, including:     Over ground gait training with rolling walker x150 feet from lobby to gym    2 x 7 sit to stands from mat with rollator  - verbal cues for anterior trunk lean,  foot positioning and UE positioning     4 reps bridges   - verbal cues for equal distribution of weight   - minimal assist to maintain LLE placement on mat       The patient arrived to the clinic ambulating with no assistive device mod (I). Upon beginning therapy session, therapist providded rollator to the patient to improve her overall stability and decrease her risk for fall. The patient tolerated the session well and at the end of the session the patient was walking to the [employer] seated elliptical using the rollator with a gait belt donned and the therapist providing contact guard assist. Once the patient stopped to turn towards the seated elliptical she closed her eyes and began to sway. The therapist called to nearby staff to grab a chair and the therapist braced herself behind the patient with both arms. The patient then became non responsive and lost consciousness falling backwards onto the therapist. The therapist was able to control the descent of the patient with the patient's head resting on the therapist's chest throughout. Second therapist assisted with taking hold of the patient's bilateral legs and the two therapists lifted the patient into the air. First therapist guarded patient's head throughout. The patient was placed into a chair with the chair then being tilted back at a 45 degree angle onto the lap of the therapist positioned sitting on a mat while supporting the patient's head. When the patient regained consciousness about 90 seconds later, she was disoriented and apologetic. EMS called immediately as the incident occurred and arrived to the scene shortly following the incident. EMS assessed the patient and her vitals and gave the patient the option to go to the ER or return home. The patient reported she felt completely fine at that point and signed saying she would like to return home. The therapist assisted the patient into a WC with minimal assist using the rollator. Therapist assisted  patient to the parking lot and performed car transfer with the patient with minimal assist stand pivot.     Patient Education and Home Exercises     Home Exercises Provided and Patient Education Provided     Education provided:   - Diaphragmatic breathing  - grounding techniques using weight near core  - importance of working to engage left side but within tolerance to prevent mental fatigue  - 8/2: PT advised patient not to attempt standing foot slides at home due to safety and assistance required    Written Home Exercises Provided: none provided today    ASSESSMENT     Lydia tolerated today's poorly with patient experiencing functional seizure lasting about 90 seconds at the end of the session. The patient's descent was well controlled by therapist and the patient was lifted into chair with chair tilted back. The patient's head was guarded throughout by the therapist. EMS arrived to the scene shortly following the incident and the patient required that she return home and refused going to the emergency room. The patient was assisted into the uber and reported her symptoms had completely resolved at this point. The patient will benefit on continued physical therapy intervention to work on the remaining functional deficits for the patient to return to work and daily activities.     Lydia Is progressing well towards her goals.   Pt prognosis is Good.     Pt will continue to benefit from skilled outpatient physical therapy to address the deficits listed in the problem list box on initial evaluation, provide pt/family education and to maximize pt's level of independence in the home and community environment.     Pt's spiritual, cultural and educational needs considered and pt agreeable to plan of care and goals.     Anticipated barriers to physical therapy: none at this time    Goals:  Short Term Goals: 4 weeks   Patient will report having less difficulty with walking around a room on her FOTO survey to show  improvements in her self-perception of functional mobility. ongoing  Pt will report pain at B hips at </= 4/10 entering clinic. ongoing  Pt will improve left LE hip and knee MMT scores to 4/5 for improved stability with stance and functional mobility. ongoing  PT/A will administer Timed Up and Go, 10 Meter Walk Test, MTCSIB, and 5 Time Sit to Stand and establish appropriate goals for functional mobility Partially met  Revised: PT/A will administer 10 Meter Walk Test and MCTSIB as appropriate to assess balance and gait speed.  Patient will receive an individualized home exercise program to address functional deficits. ongoing     Long Term Goals: 8 weeks   Patient will improve her FOTO score from 16%  to at least 43% for improved self perception of functional mobility.(score 0-100, high score indicates greater level of function) ongoing  Pt will be able to ambulate 200 feet with no assistive device without loss of balance, increases in pain, or standing rest break for increased independence in the home with mobility. ongoing  Patient will report no falls over PT POC to show improvements in her safety awareness. ongoing  Patient will be independent with home exercise program on >/= 4 days per week. Ongoing  Pt will improve 5x chair rise score to </= 30s with BUE assist for improved muscular endurance. New 8/2/23  Pt will improve Timed Up and Go (TUG) score to less than 20s with Walker or least restrictive assistive device. New 8/2/23     Plan     Continue to progress strength, endurance and activity tolerance as tolerated     Lala Sanford PT

## 2023-10-05 ENCOUNTER — OFFICE VISIT (OUTPATIENT)
Dept: ORTHOPEDICS | Facility: CLINIC | Age: 43
End: 2023-10-05
Payer: OTHER MISCELLANEOUS

## 2023-10-05 VITALS — BODY MASS INDEX: 25.16 KG/M2 | HEIGHT: 63 IN | WEIGHT: 142 LBS

## 2023-10-05 DIAGNOSIS — M54.16 LUMBAR RADICULOPATHY: ICD-10-CM

## 2023-10-05 DIAGNOSIS — M51.36 DDD (DEGENERATIVE DISC DISEASE), LUMBAR: ICD-10-CM

## 2023-10-05 DIAGNOSIS — R29.898 WEAKNESS OF LEFT LOWER EXTREMITY: Primary | ICD-10-CM

## 2023-10-05 PROCEDURE — 99214 PR OFFICE/OUTPT VISIT, EST, LEVL IV, 30-39 MIN: ICD-10-PCS | Mod: S$GLB,,, | Performed by: ORTHOPAEDIC SURGERY

## 2023-10-05 PROCEDURE — 99214 OFFICE O/P EST MOD 30 MIN: CPT | Mod: S$GLB,,, | Performed by: ORTHOPAEDIC SURGERY

## 2023-10-05 PROCEDURE — 99999 PR PBB SHADOW E&M-EST. PATIENT-LVL V: ICD-10-PCS | Mod: PBBFAC,,, | Performed by: ORTHOPAEDIC SURGERY

## 2023-10-05 PROCEDURE — 99999 PR PBB SHADOW E&M-EST. PATIENT-LVL V: CPT | Mod: PBBFAC,,, | Performed by: ORTHOPAEDIC SURGERY

## 2023-10-05 RX ORDER — CELECOXIB 200 MG/1
200 CAPSULE ORAL DAILY
Qty: 60 CAPSULE | Refills: 1 | Status: SHIPPED | OUTPATIENT
Start: 2023-10-05 | End: 2024-04-02

## 2023-10-05 NOTE — PROGRESS NOTES
"DATE: 10/5/2023  PATIENT: Lydia Thomas    Attending Physician: Conrad Gutierres M.D.    HISTORY:  Lydia Thomas is a 43 y.o. female who returns to me today for FU. She complained of worsening LBP that radiates posterolaterally down LLE to the top of the foot. Patient continues to have LLE n/t and weakness. PT helped with her LUE strength, and it also helped somewhat with her LLE weakness. She has not been able to work. She wants to continue neuro PT for her LLE. She complained of some R hip pain    The patient does not smoke, have DM or endorse IVDU. The patient is not on any blood thinners and does not take chronic narcotics. She works at [employer] ([employer]).    PMH/PSH/FamHx/SocHx:  Unchanged from prior visit    ROS:  Positive for LUE and LLE weakness and n/t  Denies myelopathic symptoms, perineal paresthesias, bowel or bladder incontinence    EXAM:  Ht 5' 3" (1.6 m)   Wt 64.4 kg (141 lb 15.6 oz)   BMI 25.15 kg/m²     My physical examination was notable for the following findings: diffuse weakness 3/5 in all muscle groups in LLE. Normal sensation to light touch in the C5-T1 and L2-S1 dermatomes bilaterally. 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    Through Care Everywhere, CT of entire spine (cervical, thoracic and lumbar) reports stated no fractures. CT head report stated no acute head bleed.     MRI C-T-L showed no significant central or foraminal stenosis.    EMG BLE was inconclusive.    ASSESSMENT/PLAN:  Patient has LLE weakness and pain following a concussion on 4/12/22. Her spine MRI was not very impressive. I discussed the natural history of their diagnoses as well as surgical and nonsurgical treatment options. I educated the patient on the importance of core/back strengthening, correct posture, bending/lifting ergonomics, and " low-impact aerobic exercises (walking, elliptical, and aquatherapy). Continue medications; I prescribed celebrex. I referred patient to neuro physical therapy for LLE stregnthening. No acute ortho spine surgical intervention warranted. Patient will follow up PRN. If R hip pain gets worse, we can refer her to Sports (nonop).    Conrad Gutierres MD  Orthopaedic Spine Surgeon  Department of Orthopaedic Surgery  [phone]

## 2023-10-19 ENCOUNTER — PATIENT MESSAGE (OUTPATIENT)
Dept: ORTHOPEDICS | Facility: CLINIC | Age: 43
End: 2023-10-19
Payer: OTHER GOVERNMENT

## 2023-10-20 NOTE — DISCHARGE INSTRUCTIONS
I called patient with below message, patient voiced understanding.  Patient asked to please schedule a visit with urologist, pt agreed.    Thank you for coming to our Emergency Department today. It is important to remember that some problems are difficult to diagnose and may not be found during your first visit. Be sure to follow up with your primary care doctor.    Return to the ER with any questions/concerns, new/concerning symptoms, worsening or failure to improve. Do not drive or make any important decisions for 24 hours if you have received any pain medications, sedatives or mood altering drugs during your ER visit.

## 2023-10-30 ENCOUNTER — PATIENT MESSAGE (OUTPATIENT)
Dept: ORTHOPEDICS | Facility: CLINIC | Age: 43
End: 2023-10-30
Payer: OTHER GOVERNMENT

## 2023-11-07 ENCOUNTER — PATIENT MESSAGE (OUTPATIENT)
Dept: FAMILY MEDICINE | Facility: CLINIC | Age: 43
End: 2023-11-07
Payer: OTHER GOVERNMENT

## 2023-11-07 DIAGNOSIS — G43.809 OTHER MIGRAINE WITHOUT STATUS MIGRAINOSUS, NOT INTRACTABLE: ICD-10-CM

## 2023-11-07 RX ORDER — SUMATRIPTAN 20 MG/1
SPRAY NASAL
Qty: 10 EACH | Refills: 0 | Status: SHIPPED | OUTPATIENT
Start: 2023-11-07

## 2023-11-07 NOTE — TELEPHONE ENCOUNTER
No care due was identified.  [facility] Embedded Care Due Messages. Reference number: [id number].   11/07/2023 9:01:08 AM CST

## 2023-11-09 ENCOUNTER — PATIENT MESSAGE (OUTPATIENT)
Dept: FAMILY MEDICINE | Facility: CLINIC | Age: 43
End: 2023-11-09
Payer: OTHER GOVERNMENT

## 2023-11-09 DIAGNOSIS — R51.9 CHRONIC NONINTRACTABLE HEADACHE, UNSPECIFIED HEADACHE TYPE: ICD-10-CM

## 2023-11-09 DIAGNOSIS — G89.29 CHRONIC NONINTRACTABLE HEADACHE, UNSPECIFIED HEADACHE TYPE: ICD-10-CM

## 2023-11-10 RX ORDER — CEFUROXIME AXETIL 250 MG/1
TABLET ORAL
Qty: 6 KIT | Refills: 2 | Status: SHIPPED | OUTPATIENT
Start: 2023-11-10 | End: 2024-01-16 | Stop reason: SDUPTHER

## 2023-11-10 NOTE — TELEPHONE ENCOUNTER
No care due was identified.  Health [facility] Embedded Care Due Messages. Reference number: [id number].   11/10/2023 8:20:22 AM CST

## 2023-11-14 ENCOUNTER — PATIENT MESSAGE (OUTPATIENT)
Dept: ADMINISTRATIVE | Facility: HOSPITAL | Age: 43
End: 2023-11-14
Payer: OTHER GOVERNMENT

## 2023-11-15 ENCOUNTER — PATIENT OUTREACH (OUTPATIENT)
Dept: ADMINISTRATIVE | Facility: HOSPITAL | Age: 43
End: 2023-11-15
Payer: OTHER GOVERNMENT

## 2023-11-15 DIAGNOSIS — Z12.31 ENCOUNTER FOR SCREENING MAMMOGRAM FOR MALIGNANT NEOPLASM OF BREAST: Primary | ICD-10-CM

## 2023-11-15 NOTE — PROGRESS NOTES

## 2023-11-16 ENCOUNTER — OFFICE VISIT (OUTPATIENT)
Dept: FAMILY MEDICINE | Facility: CLINIC | Age: 43
End: 2023-11-16
Payer: OTHER GOVERNMENT

## 2023-11-16 VITALS
RESPIRATION RATE: 16 BRPM | TEMPERATURE: 99 F | HEART RATE: 71 BPM | SYSTOLIC BLOOD PRESSURE: 112 MMHG | HEIGHT: 63 IN | BODY MASS INDEX: 24.3 KG/M2 | WEIGHT: 137.13 LBS | DIASTOLIC BLOOD PRESSURE: 62 MMHG | OXYGEN SATURATION: 98 %

## 2023-11-16 DIAGNOSIS — E55.9 VITAMIN D DEFICIENCY: ICD-10-CM

## 2023-11-16 DIAGNOSIS — R68.89 DECREASED STRENGTH, ENDURANCE, AND MOBILITY: ICD-10-CM

## 2023-11-16 DIAGNOSIS — R29.898 WEAKNESS OF LEFT LOWER EXTREMITY: Primary | ICD-10-CM

## 2023-11-16 DIAGNOSIS — Z00.00 ROUTINE ADULT HEALTH MAINTENANCE: ICD-10-CM

## 2023-11-16 DIAGNOSIS — J45.20 MILD INTERMITTENT ASTHMA, UNSPECIFIED WHETHER COMPLICATED: ICD-10-CM

## 2023-11-16 DIAGNOSIS — G89.29 CHRONIC NONINTRACTABLE HEADACHE, UNSPECIFIED HEADACHE TYPE: ICD-10-CM

## 2023-11-16 DIAGNOSIS — R53.1 DECREASED STRENGTH, ENDURANCE, AND MOBILITY: ICD-10-CM

## 2023-11-16 DIAGNOSIS — R56.9 SEIZURE-LIKE ACTIVITY: ICD-10-CM

## 2023-11-16 DIAGNOSIS — Z12.31 ENCOUNTER FOR SCREENING MAMMOGRAM FOR MALIGNANT NEOPLASM OF BREAST: ICD-10-CM

## 2023-11-16 DIAGNOSIS — R00.2 PALPITATIONS: ICD-10-CM

## 2023-11-16 DIAGNOSIS — R51.9 CHRONIC NONINTRACTABLE HEADACHE, UNSPECIFIED HEADACHE TYPE: ICD-10-CM

## 2023-11-16 DIAGNOSIS — Z74.09 DECREASED STRENGTH, ENDURANCE, AND MOBILITY: ICD-10-CM

## 2023-11-16 DIAGNOSIS — F41.9 ANXIETY AND DEPRESSION: ICD-10-CM

## 2023-11-16 DIAGNOSIS — S06.9X0S TRAUMATIC BRAIN INJURY WITHOUT LOSS OF CONSCIOUSNESS, SEQUELA: ICD-10-CM

## 2023-11-16 DIAGNOSIS — F32.A ANXIETY AND DEPRESSION: ICD-10-CM

## 2023-11-16 PROCEDURE — 99214 OFFICE O/P EST MOD 30 MIN: CPT | Mod: S$PBB,,, | Performed by: INTERNAL MEDICINE

## 2023-11-16 PROCEDURE — 99999 PR PBB SHADOW E&M-EST. PATIENT-LVL V: CPT | Mod: PBBFAC,,, | Performed by: INTERNAL MEDICINE

## 2023-11-16 PROCEDURE — 99999 PR PBB SHADOW E&M-EST. PATIENT-LVL V: ICD-10-PCS | Mod: PBBFAC,,, | Performed by: INTERNAL MEDICINE

## 2023-11-16 PROCEDURE — 99214 PR OFFICE/OUTPT VISIT, EST, LEVL IV, 30-39 MIN: ICD-10-PCS | Mod: S$PBB,,, | Performed by: INTERNAL MEDICINE

## 2023-11-16 PROCEDURE — 99215 OFFICE O/P EST HI 40 MIN: CPT | Mod: PBBFAC,PO | Performed by: INTERNAL MEDICINE

## 2023-11-16 RX ORDER — ERGOCALCIFEROL 1.25 MG/1
50000 CAPSULE ORAL
Qty: 4 CAPSULE | Refills: 0 | Status: SHIPPED | OUTPATIENT
Start: 2023-11-16 | End: 2023-12-22

## 2023-11-16 RX ORDER — ALBUTEROL SULFATE 90 UG/1
AEROSOL, METERED RESPIRATORY (INHALATION)
Qty: 54 G | Refills: 3 | Status: SHIPPED | OUTPATIENT
Start: 2023-11-16 | End: 2023-11-27 | Stop reason: SDUPTHER

## 2023-11-16 NOTE — PROGRESS NOTES
Health Maintenance Due   Topic     HIV Screening      Cervical Cancer Screening      Influenza Vaccine (1)     COVID-19 Vaccine (3 - 2023-24 season)     Mammogram

## 2023-11-16 NOTE — PROGRESS NOTES
Subjective:       Patient ID: Lydia Thomas is a pleasant 43 y.o. White female patient    Chief Complaint: Follow-up      Patient is a pt I saw last on  08/09/2023, see my last notes.    HPI     Patient with past medical history as per list of problems below coming today for a regular follow-up visit.  From our last appt:  - Orthopedics  Pt reporting feeling rather well today.  She still struggles with LLE weakness. F-up in Orthopedics. She has been referred to Neuro PT.  She otherwise is happy as her mom was here until recently and her  is in town and is supposed to stay around ([place]).  They plan a small Thanksgiving dinner with her  and their cats.  She likes to go and feed feral cats.       Patient Active Problem List   Diagnosis    Acetaminophen overdose    Suicide attempt by acetaminophen overdose    Migraine    Anemia of chronic disease    Adjustment disorder with mixed anxiety and depressed mood    Tylenol overdose    Migraine without status migrainosus, not intractable    Occipital headache    Vasovagal syncope    Myoclonic jerking    Traumatic brain injury without loss of consciousness    Dizziness and giddiness    Depression    Warts of foot    Screening for colon cancer    Family history of colon cancer    Decreased strength, endurance, and mobility    Functional gait abnormality    Lumbar radiculopathy    Weakness of left lower extremity    Balance disorder          ACTIVE MEDICAL ISSUES:  Documented in Problem List     PAST MEDICAL HISTORY  Documented     PAST SURGICAL HISTORY:  Documented     SOCIAL HISTORY:  Documented     FAMILY HISTORY:  Documented     ALLERGIES AND MEDICATIONS: updated and reviewed.  Documented    Review of Systems   Constitutional: Negative.  Negative for unexpected weight change.   HENT:  Negative for hearing loss, rhinorrhea and trouble swallowing.    Eyes:  Negative for discharge and visual disturbance.   Respiratory:  Negative for chest tightness and  "wheezing.    Cardiovascular:  Negative for chest pain and palpitations.   Gastrointestinal:  Negative for blood in stool, constipation, diarrhea and vomiting.   Endocrine: Negative for polydipsia and polyuria.   Genitourinary:  Negative for difficulty urinating, dysuria, hematuria and menstrual problem.   Musculoskeletal:  Negative for arthralgias, joint swelling and neck pain.   Skin:  Positive for wound (under L breast, s/p breast implants).   Neurological:  Positive for weakness (LLE) and headaches.   Psychiatric/Behavioral:  Negative for confusion and dysphoric mood.    All other systems reviewed and are negative.      Objective:      Physical Exam  Vitals and nursing note reviewed.   Constitutional:       Appearance: Normal appearance. She is normal weight.   HENT:      Right Ear: Tympanic membrane normal.      Left Ear: Tympanic membrane normal.   Eyes:      Conjunctiva/sclera: Conjunctivae normal.   Cardiovascular:      Rate and Rhythm: Normal rate and regular rhythm.      Pulses: Normal pulses.      Heart sounds: Normal heart sounds.   Pulmonary:      Effort: Pulmonary effort is normal.      Breath sounds: Normal breath sounds.   Chest:       Abdominal:      General: Bowel sounds are normal.   Neurological:      Mental Status: She is alert.      Motor: Weakness (LLE) present.         Vitals:    11/16/23 1505   BP: 112/62   BP Location: Right arm   Patient Position: Sitting   BP Method: Medium (Manual)   Pulse: 71   Resp: 16   Temp: 98.6 °F (37 °C)   TempSrc: Oral   SpO2: 98%   Weight: 62.2 kg (137 lb 2 oz)   Height: 5' 3" (1.6 m)     Body mass index is 24.29 kg/m².    RESULTS: Reviewed labs from last 12 months    Last Lab Results:     Lab Results   Component Value Date    WBC 5.87 08/06/2023    HGB 12.4 08/06/2023    HCT 36.1 (L) 08/06/2023     08/06/2023     08/06/2023    K 3.5 08/06/2023     08/06/2023    CO2 17 (L) 08/06/2023    BUN 5 (L) 08/06/2023    CREATININE 0.8 08/06/2023    " CALCIUM 8.9 08/06/2023    ALBUMIN 4.1 08/06/2023    AST 14 08/06/2023    ALT 11 08/06/2023    CHOL 177 04/06/2023    TRIG 60 04/06/2023    HDL 46 04/06/2023    LDLCALC 119.0 04/06/2023    HGBA1C 4.7 04/06/2023    TSH 0.999 08/06/2023         Assessment:       1. Weakness of left lower extremity    2. Decreased strength, endurance, and mobility    3. Traumatic brain injury without loss of consciousness, sequela    4. Anxiety and depression    5. Palpitations    6. Seizure-like activity    7. Mild intermittent asthma, unspecified whether complicated    8. Chronic nonintractable headache, unspecified headache type    9. Vitamin D deficiency    10. Encounter for screening mammogram for malignant neoplasm of breast    11. Routine adult health maintenance        Plan:   Lydia was seen today for follow-up.    Diagnoses and all orders for this visit:    Weakness of left lower extremity    See HPI, f-up Orthopedics and Neurology.     Decreased strength, endurance, and mobility    See above.    Traumatic brain injury without loss of consciousness, sequela    See above.    Anxiety and depression    Doing better.    Palpitations    Not an issue today.    Seizure-like activity    None recently.    Mild intermittent asthma, unspecified whether complicated  -     albuterol (PROVENTIL/VENTOLIN HFA) 90 mcg/actuation inhaler; INHALE 1 TO 2 PUFFS INTO THE LUNGS EVERY 6 HOURS AS NEEDED FOR WHEEZING    Refill provided.    Chronic nonintractable headache, unspecified headache type    Good response to present treatment.    Vitamin D deficiency  -     ergocalciferol (ERGOCALCIFEROL) 50,000 unit Cap; Take 1 capsule (50,000 Units total) by mouth every 7 days.  -     Vitamin D; Future    Encounter for screening mammogram for malignant neoplasm of breast  -     Mammo Digital Screening Bilat; Future    Routine adult health maintenance  -     CBC Auto Differential; Future  -     Comprehensive Metabolic Panel; Future  -     Hemoglobin A1C;  Future  -     TSH; Future  -     Lipid Panel; Future    Orders placed.    No follow-ups on file.    This note was created by combination of typed  and M-Modal dictation.  Transcription errors may be present.  If there are any questions, please contact me.

## 2023-11-27 ENCOUNTER — PATIENT MESSAGE (OUTPATIENT)
Dept: FAMILY MEDICINE | Facility: CLINIC | Age: 43
End: 2023-11-27
Payer: OTHER GOVERNMENT

## 2023-11-27 DIAGNOSIS — J45.20 MILD INTERMITTENT ASTHMA, UNSPECIFIED WHETHER COMPLICATED: ICD-10-CM

## 2023-11-27 RX ORDER — ALBUTEROL SULFATE 90 UG/1
AEROSOL, METERED RESPIRATORY (INHALATION)
Qty: 54 G | Refills: 3 | Status: SHIPPED | OUTPATIENT
Start: 2023-11-27 | End: 2024-01-16 | Stop reason: SDUPTHER

## 2023-11-27 NOTE — TELEPHONE ENCOUNTER
No care due was identified.  [facility] Embedded Care Due Messages. Reference number: [id number].   11/27/2023 11:12:11 AM CST

## 2023-12-22 DIAGNOSIS — E55.9 VITAMIN D DEFICIENCY: ICD-10-CM

## 2023-12-22 RX ORDER — ERGOCALCIFEROL 1.25 MG/1
50000 CAPSULE ORAL
Qty: 4 CAPSULE | Refills: 0 | Status: SHIPPED | OUTPATIENT
Start: 2023-12-22 | End: 2024-01-16 | Stop reason: SDUPTHER

## 2023-12-26 ENCOUNTER — PATIENT MESSAGE (OUTPATIENT)
Dept: FAMILY MEDICINE | Facility: CLINIC | Age: 43
End: 2023-12-26

## 2023-12-26 ENCOUNTER — OFFICE VISIT (OUTPATIENT)
Dept: FAMILY MEDICINE | Facility: CLINIC | Age: 43
End: 2023-12-26
Payer: OTHER GOVERNMENT

## 2023-12-26 VITALS
HEART RATE: 98 BPM | BODY MASS INDEX: 24.92 KG/M2 | RESPIRATION RATE: 18 BRPM | DIASTOLIC BLOOD PRESSURE: 64 MMHG | TEMPERATURE: 98 F | OXYGEN SATURATION: 99 % | WEIGHT: 140.63 LBS | SYSTOLIC BLOOD PRESSURE: 98 MMHG | HEIGHT: 63 IN

## 2023-12-26 DIAGNOSIS — R00.2 PALPITATIONS: ICD-10-CM

## 2023-12-26 DIAGNOSIS — K21.9 GASTROESOPHAGEAL REFLUX DISEASE, UNSPECIFIED WHETHER ESOPHAGITIS PRESENT: ICD-10-CM

## 2023-12-26 DIAGNOSIS — F50.2 BULIMIA NERVOSA IN REMISSION: ICD-10-CM

## 2023-12-26 DIAGNOSIS — Z12.4 CERVICAL CANCER SCREENING: ICD-10-CM

## 2023-12-26 DIAGNOSIS — R07.9 CHEST PAIN, UNSPECIFIED TYPE: Primary | ICD-10-CM

## 2023-12-26 PROCEDURE — 93010 ELECTROCARDIOGRAM REPORT: CPT | Mod: S$PBB,,, | Performed by: INTERNAL MEDICINE

## 2023-12-26 PROCEDURE — 99999 PR PBB SHADOW E&M-EST. PATIENT-LVL V: CPT | Mod: PBBFAC,,, | Performed by: PHYSICIAN ASSISTANT

## 2023-12-26 PROCEDURE — 93010 EKG 12-LEAD: ICD-10-PCS | Mod: S$PBB,,, | Performed by: INTERNAL MEDICINE

## 2023-12-26 PROCEDURE — 99214 OFFICE O/P EST MOD 30 MIN: CPT | Mod: S$PBB,,, | Performed by: PHYSICIAN ASSISTANT

## 2023-12-26 PROCEDURE — 99215 OFFICE O/P EST HI 40 MIN: CPT | Mod: PBBFAC,PO | Performed by: PHYSICIAN ASSISTANT

## 2023-12-26 PROCEDURE — 99214 PR OFFICE/OUTPT VISIT, EST, LEVL IV, 30-39 MIN: ICD-10-PCS | Mod: S$PBB,,, | Performed by: PHYSICIAN ASSISTANT

## 2023-12-26 PROCEDURE — 93005 ELECTROCARDIOGRAM TRACING: CPT | Mod: PBBFAC,PO | Performed by: INTERNAL MEDICINE

## 2023-12-26 PROCEDURE — 99999 PR PBB SHADOW E&M-EST. PATIENT-LVL V: ICD-10-PCS | Mod: PBBFAC,,, | Performed by: PHYSICIAN ASSISTANT

## 2023-12-26 RX ORDER — PANTOPRAZOLE SODIUM 20 MG/1
20 TABLET, DELAYED RELEASE ORAL DAILY
Qty: 30 TABLET | Refills: 0 | Status: SHIPPED | OUTPATIENT
Start: 2023-12-26 | End: 2024-12-25

## 2023-12-26 NOTE — PROGRESS NOTES
Health Maintenance Due   Topic     Pneumococcal Vaccines (Age 0-64) (1 - PCV) Pt decline    HIV Screening  Consult pcp    High Dose Statin  Consult pcp    Cervical Cancer Screening  Pt req OBGYN    Influenza Vaccine (1) Pt decline    COVID-19 Vaccine (3 - 2023-24 season) Not offered at this Facility    Mammogram  Pt scheduled

## 2023-12-26 NOTE — PROGRESS NOTES
Subjective     Patient ID: Lydia Thomas is a 43 y.o. female.    Chief Complaint: Chest Pain, Palpitations, and Referral (OBGYN)    HPI: 42 yo female with anxiety, depression presents for chest pain and palpitations. Occurring for years. Worse this past week occurring every day. Palpitations are quick lasting about 1 minute then stops, can feel SOB with that. EKG from August was normal. States that she was bulimic for the past 12 years. Recently stopped vomiting in August and has been eating a normal diet since. Seeing psychiatry. States she does suffer with reflux. She does feel some throat pain with swallowing at times. She is worried she has caused damage from the years of vomiting.     Review of Systems   Respiratory:  Positive for shortness of breath.    Cardiovascular:  Positive for chest pain.   Psychiatric/Behavioral:  The patient is nervous/anxious.           Objective     Physical Exam  Constitutional:       Appearance: Normal appearance.   HENT:      Head: Normocephalic and atraumatic.   Cardiovascular:      Rate and Rhythm: Normal rate and regular rhythm.   Pulmonary:      Effort: Pulmonary effort is normal.      Breath sounds: Normal breath sounds.   Abdominal:      General: Abdomen is flat. Bowel sounds are normal.      Tenderness: There is abdominal tenderness (epigastric).   Neurological:      Mental Status: She is alert.   Psychiatric:         Mood and Affect: Mood normal.         Thought Content: Thought content normal.            Assessment and Plan     1. Chest pain, unspecified type  -     IN OFFICE EKG 12-LEAD (to Muse)  -     assess egd and holter,     2. Palpitations  -     Holter monitor - 48 hour; Future    3. Bulimia nervosa in remission  -     Ambulatory referral/consult to Endo Procedure ; Future; Expected date: 12/27/2023    4. Gastroesophageal reflux disease, unspecified whether esophagitis present  -     Ambulatory referral/consult to Endo Procedure ;  Future; Expected date: 12/27/2023  -     pantoprazole (PROTONIX) 20 MG tablet; Take 1 tablet (20 mg total) by mouth once daily. In the morning on an empty stomach  Dispense: 30 tablet; Refill: 0    5. Cervical cancer screening  -     Ambulatory referral/consult to Obstetrics / Gynecology; Future; Expected date: 01/02/2024                 No follow-ups on file.

## 2023-12-28 ENCOUNTER — TELEPHONE (OUTPATIENT)
Dept: FAMILY MEDICINE | Facility: CLINIC | Age: 43
End: 2023-12-28
Payer: OTHER GOVERNMENT

## 2023-12-28 ENCOUNTER — PATIENT MESSAGE (OUTPATIENT)
Dept: FAMILY MEDICINE | Facility: CLINIC | Age: 43
End: 2023-12-28
Payer: OTHER GOVERNMENT

## 2023-12-28 NOTE — TELEPHONE ENCOUNTER
----- Message from Larissa Rendon sent at 12/28/2023  4:02 PM CST -----  Regarding: self  Who called: self        What is the request in detail: pt calling to [facility] heart Holter monitor, would like call back from nurse       Can the clinic reply by [facility]? No        Would the patient rather a call back or a response via [facility]? Call back        Best call back number:[phone]

## 2023-12-28 NOTE — TELEPHONE ENCOUNTER
Placed call to [facility] scheduling department to be sure I am giving the correct  number to call and schedule; called pt and gave her number to call and schedule

## 2024-01-10 ENCOUNTER — TELEPHONE (OUTPATIENT)
Dept: ENDOSCOPY | Facility: HOSPITAL | Age: 44
End: 2024-01-10
Payer: OTHER GOVERNMENT

## 2024-01-10 VITALS — HEIGHT: 63 IN | BODY MASS INDEX: 24.8 KG/M2 | WEIGHT: 140 LBS

## 2024-01-10 DIAGNOSIS — K21.9 GASTROESOPHAGEAL REFLUX DISEASE, UNSPECIFIED WHETHER ESOPHAGITIS PRESENT: ICD-10-CM

## 2024-01-10 DIAGNOSIS — F50.2 BULIMIA NERVOSA IN REMISSION: Primary | ICD-10-CM

## 2024-01-10 NOTE — TELEPHONE ENCOUNTER
Spoke to patient to schedule procedure(s) Upper Endoscopy (EGD)       Physician to perform procedure(s) Dr. JAD Mcintosh  Date of Procedure (s) 1/18/24  Arrival Time 7:30 AM  Time of Procedure(s) 8:30 AM   Location of Procedure(s) [facility] 2nd Floor--  Enter at the rear of the building through the emergency department screening station or the Outpatient Registration door, then continue to endoscopy department on the 2nd floor.    Type of Rx Prep sent to patient: N/A  Instructions provided to patient via [facility]    Patient was informed on the following information and verbalized understanding. Screening questionnaire reviewed with patient and complete. If procedure requires anesthesia, a responsible adult needs to be present to accompany the patient home, patient cannot drive after receiving anesthesia. Appointment details are tentative, especially check-in time. Patient will receive a prep-op call 7 days prior to confirm check-in time for procedure. If applicable the patient should contact their pharmacy to verify Rx for procedure prep is ready for pick-up. Patient was advised to call the scheduling department at [phone] if pharmacy states no Rx is available. Patient was advised to call the endoscopy scheduling department if any questions or concerns arise.      [facility] Scheduling Department

## 2024-01-16 DIAGNOSIS — R51.9 CHRONIC NONINTRACTABLE HEADACHE, UNSPECIFIED HEADACHE TYPE: ICD-10-CM

## 2024-01-16 DIAGNOSIS — J45.20 MILD INTERMITTENT ASTHMA, UNSPECIFIED WHETHER COMPLICATED: ICD-10-CM

## 2024-01-16 DIAGNOSIS — E55.9 VITAMIN D DEFICIENCY: ICD-10-CM

## 2024-01-16 DIAGNOSIS — G89.29 CHRONIC NONINTRACTABLE HEADACHE, UNSPECIFIED HEADACHE TYPE: ICD-10-CM

## 2024-01-16 NOTE — TELEPHONE ENCOUNTER
[facility]. Please do not send to [facility].

## 2024-01-16 NOTE — TELEPHONE ENCOUNTER
No care due was identified.  [facility] Embedded Care Due Messages. Reference number: [id number].   1/16/2024 11:03:06 AM CST

## 2024-01-17 ENCOUNTER — ANESTHESIA EVENT (OUTPATIENT)
Dept: ENDOSCOPY | Facility: HOSPITAL | Age: 44
End: 2024-01-17
Payer: OTHER GOVERNMENT

## 2024-01-17 ENCOUNTER — CLINICAL SUPPORT (OUTPATIENT)
Dept: REHABILITATION | Facility: HOSPITAL | Age: 44
End: 2024-01-17
Attending: ORTHOPAEDIC SURGERY
Payer: OTHER MISCELLANEOUS

## 2024-01-17 DIAGNOSIS — R26.89 FUNCTIONAL GAIT ABNORMALITY: Primary | ICD-10-CM

## 2024-01-17 DIAGNOSIS — R29.898 WEAKNESS OF LEFT LOWER EXTREMITY: ICD-10-CM

## 2024-01-17 DIAGNOSIS — Z74.09 IMPAIRED FUNCTIONAL MOBILITY, BALANCE, GAIT, AND ENDURANCE: ICD-10-CM

## 2024-01-17 PROCEDURE — 97161 PT EVAL LOW COMPLEX 20 MIN: CPT | Mod: PO

## 2024-01-17 RX ORDER — ERGOCALCIFEROL 1.25 MG/1
50000 CAPSULE ORAL
Qty: 4 CAPSULE | Refills: 0 | Status: SHIPPED | OUTPATIENT
Start: 2024-01-17 | End: 2024-02-14

## 2024-01-17 RX ORDER — LIDOCAINE HYDROCHLORIDE 10 MG/ML
1 INJECTION, SOLUTION EPIDURAL; INFILTRATION; INTRACAUDAL; PERINEURAL ONCE
Status: CANCELLED | OUTPATIENT
Start: 2024-01-17 | End: 2024-01-17

## 2024-01-17 RX ORDER — ALBUTEROL SULFATE 90 UG/1
AEROSOL, METERED RESPIRATORY (INHALATION)
Qty: 54 G | Refills: 3 | Status: SHIPPED | OUTPATIENT
Start: 2024-01-17 | End: 2024-03-11 | Stop reason: SDUPTHER

## 2024-01-17 RX ORDER — CEFUROXIME AXETIL 250 MG/1
TABLET ORAL
Qty: 6 KIT | Refills: 2 | Status: SHIPPED | OUTPATIENT
Start: 2024-01-17 | End: 2024-05-03 | Stop reason: SDUPTHER

## 2024-01-17 NOTE — PLAN OF CARE
"  [facility]  Physical Therapy Neurological Rehabilitation Initial Evaluation    Name: Lydia Thomas  Clinic Number: [id number]    Therapy Diagnosis:   Encounter Diagnoses   Name Primary?    Weakness of left lower extremity     Functional gait abnormality Yes    Impaired functional mobility, balance, gait, and endurance      Physician: Conrad Gutierres MD    Physician Orders: PT Eval and Treat; Neuro PT   Medical Diagnosis from Referral: Weakness of left lower extremity [R29.898]   Evaluation Date: 1/17/2024  Authorization Period Expiration: 10/4/24  Plan of Care Expiration: 3/1/24  Visit # / Visits authorized: 1/1    Progress Note Due: 2/17/24    Time In: 0901  Time Out: 0951  Total Billable Time: 50 minutes    Precautions: Standard and Fall    Subjective   Date of onset: April 2022  History of current condition - Lydia reports: She is feeling better compared to her last time coming to therapy. "I want to be at 100% but I feel like I'm at 40 and I'm really happy to be at 40% right now." She states that she has been practicing moving around and [place]  and her walking has improved significantly (able to take steps and bend her left knee). She states that her  has been in town more often and availble to help with her mobility as needed (helps with showers). She has also started taking walks around the block she lives on and has a goal to make 2 laps around her house without getting tired. She states that she saw her Neurologist who placed the order for her to come back to therapy and also believes that she would benefit from a referral to Neuropsychology.      Per chart review, Office visit to Neurology on 11/16/23:  HPI  Patient with past medical history as per list of problems below coming today for a regular follow-up visit.  From our last appt:  - Orthopedics  Pt reporting feeling rather well today.  She still " struggles with LLE weakness. F-up in Orthopedics. She has been referred to Neuro PT.  She otherwise is happy as her mom was here until recently and her  is in town and is supposed to stay around ().  They plan a small Thanksgiving dinner with her  and their cats.  She likes to go and feed feral cats.     Per chart review, Office visit to Internal Medicine on 12/26/23:  Patient ID: Lydia Thomas is a 43 y.o. female.  Chief Complaint: Chest Pain, Palpitations, and Referral (OBGYN)  HPI: 42 yo female with anxiety, depression presents for chest pain and palpitations. Occurring for years. Worse this past week occurring every day. Palpitations are quick lasting about 1 minute then stops, can feel SOB with that. EKG from August was normal. States that she was bulimic for the past 12 years. Recently stopped vomiting in August and has been eating a normal diet since. Seeing psychiatry. States she does suffer with reflux. She does feel some throat pain with swallowing at times. She is worried she has caused damage from the years of vomiting.     Medical History:   Past Medical History:   Diagnosis Date    Anemia     Anemia     Asthma     exercise asthma    Migraine headache     Ovarian cyst      Surgical History:   Lydia Thomas  has a past surgical history that includes Appendectomy; Breast biopsy (Right); Colonoscopy (N/A, 05/18/2022); and Augmentation of breast (Bilateral, 03/2022).    Medications:   Lydia has a current medication list which includes the following prescription(s): acetaminophen-codeine 300-30mg, albuterol, albuterol, amoxicillin, amoxicillin, ascorbic acid (vitamin c), butalbital-acetaminophen-caff, celecoxib, cetirizine, chlorhexidine, dextromethorphan-guaifenesin  mg, diazepam, diazepam, doxycycline, ergocalciferol, ferrous sulfate, fluticasone propionate, furosemide, hydrocodone-acetaminophen, hydrocodone-acetaminophen, loratadine, meclizine, methocarbamol,  "metronidazole, nitrofurantoin (macrocrystal-monohydrate), onabotulinumtoxina, ondansetron, ondansetron, ondansetron, pantoprazole, phenazopyridine, polyethylene glycol, promethazine, pseudoephedrine, qulipta, sumatriptan, sumatriptan, topiramate, and tramadol.    Allergies:   Review of patient's allergies indicates:   Allergen Reactions    Iodine and iodide containing products Anaphylaxis     PATIENT CAN RECEIVE IOHEXOL, has received multiple times with no pretreatment with no allergic reaction    Shellfish derived Anaphylaxis    Benadryl [diphenhydramine hcl] Itching     "with fast injection"    Fish containing products     Iodine         Imaging, CT scan films:   CTA HEAD AND NECK (XPD) (8/7/23):  Impression:  The major intracranial and extracranial arterial vascular structures demonstrate appropriate opacification without evidence for high-grade stenosis or occlusion, there is no evidence for intracranial aneurysm.  Thyroid nodules, most prominent measuring approximately 18.4 mm.  If previously unknown ultrasound follow-up is recommended.       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     Objective     Mental status: AAO x4; memory and cognition intact with PT interview    Vitals:   Blood Pressure: 112/80 mmHg  Heart Rate:96 bpm    Posture:   Sitting: within normal limits   Standing: within normal limits     Transfers:     Transfers Level of Assistance  Comments   Supine to Sit Alexandra.    Sit to Supine Alexandra.    Sit to Stand Alexandra.    Stand to Sit Alexandra.    Transfer from bed to chair Alexandra.        Strength:  right side tested in sitting position; left side tested in supine    Right Lower Extremity Strength Grade Left Lower Extremity Strength Grade   Hip Flexion 3+/5 Hip Flexion 3-/5   Hip Extension 4/5 Hip Extension NT   Hip Abduction NT but at least 3/5 Hip Abduction Trace   Hip Adduction NT but at least 3/5 Hip Adduction trace   Knee Flexion 4+/5 Knee Flexion 2/5   Knee Extension 4+/5 Knee Extension 3/5   Ankle Dorsiflexion 4+/5 Ankle Dorsiflexion trace   Ankle Plantarflexion 4+/5 Ankle Plantarflexion trace     Flexibility: no limitations noted with PROM      Coordination: not formally assessed but grossly intact with functional mobility      Light touch sensation:    Right Lower Extremity: intact per pt report   Left Lower Extremity: impaired to entire leg per pt report    Proprioception:  impaired per MCTSIB condition 2    Modified Harrison: no tone noted with PROM    Balance:    Static sitting balance:Normal  Dynamic sitting balance: Good    Static standing Balance: Good  Dynamic standing balance: Fair    MCTSIB:  1. Eyes Open/feet together/Firm: 30 seconds  2. Eyes Closed/feet together/Firm: 21 seconds  3. Eyes Open/feet together/Foam: NT   4. Eyes Closed/feet together/Foam: NT     SLS: NT    Tandem stance: NT    Ambulation:     Gait Assessment:   - AD used: none  - Assistance: CGA  - Distance: into/out of clinic  - Speed: 0.67 m/s  - Stairs: not assessed on today    Gait Analysis:  Upon observation of gait, patient demonstrates deviations  "including but not limited to: steppage gait on left side with decreased knee flexion throughout terminal stance and entire swing phase; as she fatigued, she showed more circumduction on left hip    Impairments contributing to deviations:  decreased functional strength and use of left side      Outcome Measures:     APTA Core Set Outcome Measures for Adults with Neurologic Conditions    Evaluation  Reference values    Timed Up and Go 23.97 sec   score >14 seconds indicates risk for falls in older stroke patients (Autumn et al, 2006)   10 Meter Walk Test  0.67 m/sec (6m/9 s)   "Limited community ambulator" speed category 0-0.4 m/s = household walker  0.4-0.8 m/s = limited community ambulator   0.8-1.4 m/s = community ambultor  >1.4 m/s = can cross street safely    Functional Gait Assessment  Deferred for now; plan to assess as safely able <22/30 = identifies fall risk in community dwelling older adults   (MCID = 4)   2 Minute Walk Test   To be assessed at first progress note      5 times sit-stand   19.07 seconds   >12 sec= fall risk for general elderly  >10 sec= balance/vestibular dysfunction (<61 y/o)  >14.2 sec= balance/vestibular dysfunction (>61 y/o)  >12 sec= fall risk for stroke       CMS Impairment/Limitation/Restriction for FOTO NOC-Neuromuscular disorder Survey    Therapist reviewed FOTO scores for Lydia Thomas on 1/17/2024.   FOTO documents entered into [employer] - see Media section.    FOTO Score: 44%         TREATMENT     No treatment performed on evaluation date due to time constraints. Time spent gathering pertinent patient information and assessing deficits to formulate PT POC.      Home Exercises and Patient Education Provided    Education provided:   - Role of PT in improving gait, balance, endurance, and tolerance  - Establishment of PT POC and goals    Written Home Exercises Provided: To be provided at first follow up session    Assessment   Lydia is a 43 y.o. female referred to outpatient " Physical Therapy with a medical diagnosis of Weakness of left lower extremity. She was recently coming to therapy at this clinic until September of 2023 but stopped coming due to issues with her insurance and multiple life events occurring requiring more immediate medical attention. On today, she presents with continued impairments in her left leg strength/sensation, her balance, gait, activity tolerance, and pain in her right hip from over use. Objective measures performed above also showed impairments in functional strength, risk of falling with functional transfers, gait speed, and static balance especially with her eyes closed. Due to her impairments listed above, she has reported decreased household and community mobility, difficulty with transitional movements like sit to stands, limited ability to navigate stairs, and requiring assistance with house bathing/showering. She is appropriate for skilled physical therapy interventions to address the above listed impairments with a goal of improving her balance and functional mobility. Additionally, she would benefit from participation in the 2MWT to assess her activity tolerance. Due to the functional nature of her deficits, their chronicity, and the lack of test findings identifying organic causes for these symptoms she would also benefit from referrals to Speech Therapy and Neuropsychology for further assessments of her cognition and potential for underlying psychological contributions to her symptoms respectively.    Patient prognosis is Good.   Patient will benefit from skilled outpatient Physical Therapy to address the deficits stated above and in the chart below, provide patient/family education, and to maximize patient's level of independence.     Plan of care discussed with patient: Yes  Patient's spiritual, cultural and educational needs considered and patient is agreeable to the plan of care and goals as stated below:     Anticipated Barriers for therapy:  none at this time    Medical Necessity is demonstrated by the following  History  Co-morbidities and personal factors that may impact the plan of care [] LOW: no personal factors / co-morbidities  [] MODERATE: 1-2 personal factors / co-morbidities  [x] HIGH: 3+ personal factors / co-morbidities    Moderate / High Support Documentation: migraines, history of Depression, functional seizures     Examination  Body Structures and Functions, activity limitations and participation restrictions that may impact the plan of care [] LOW: addressing 1-2 elements  [] MODERATE: 3+ elements  [x] HIGH: 4+ elements (please support below)    Moderate / High Support Documentation: balance, strength, gait, endurance     Clinical Presentation [x] LOW: stable  [] MODERATE: Evolving  [] HIGH: Unstable     Decision Making/ Complexity Score: low       Goals:  Short Term Goals = Long Term Goals: 4 weeks   Pt will be independent with an individualized home exercise program.   Pt will improve 5x chair rise score to </= 10 s with no UE assist for improved muscular endurance.   Pt will improve Timed Up and Go (TUG) score to less than 14 s with no assistive device.   Pt will improve self selected walking speed (SSWS) with no assistive device to at least 1.0 m/s for improved safety with home and community ambulation.   Pt will improve postural control with MCTSIB condition 2 score of at least 30 s for decreased fall risk with standing ADLs.   Pt will improve postural control to tolerate performing conditions 3 and 4 of the MCTSIB.   Pt will improve FOTO score to >/= 56% for improved self perception of functional mobility.  PT will administer 2MWT as safely able to assess pt's level of activity tolerance  Pt will improve dynamic gait abilities to tolerate performing the Functional Gait Assessment    Patient's goals: be able to walk around the block twice, go up stairs, I want to be able to do a real squat, and get into running.     Plan   Plan of  care Certification: 1/17/2024 to 3/1/2024.    Outpatient Physical Therapy 3 times weekly for 4 weeks to include the following interventions: Gait Training, Manual Therapy, Neuromuscular Re-ed, Orthotic Management and Training, Patient Education, Therapeutic Activities, and Therapeutic Exercise.     Jazz Cody, PT

## 2024-01-18 ENCOUNTER — ANESTHESIA (OUTPATIENT)
Dept: ENDOSCOPY | Facility: HOSPITAL | Age: 44
End: 2024-01-18
Payer: OTHER GOVERNMENT

## 2024-01-18 ENCOUNTER — HOSPITAL ENCOUNTER (OUTPATIENT)
Facility: HOSPITAL | Age: 44
Discharge: HOME | End: 2024-01-18
Attending: INTERNAL MEDICINE | Admitting: INTERNAL MEDICINE
Payer: OTHER GOVERNMENT

## 2024-01-18 VITALS
SYSTOLIC BLOOD PRESSURE: 95 MMHG | DIASTOLIC BLOOD PRESSURE: 60 MMHG | RESPIRATION RATE: 20 BRPM | HEART RATE: 81 BPM | TEMPERATURE: 98 F | OXYGEN SATURATION: 99 %

## 2024-01-18 DIAGNOSIS — F50.2 BULIMIA NERVOSA IN REMISSION: ICD-10-CM

## 2024-01-18 DIAGNOSIS — Z74.09 IMPAIRED FUNCTIONAL MOBILITY, BALANCE, GAIT, AND ENDURANCE: Primary | ICD-10-CM

## 2024-01-18 LAB
B-HCG UR QL: NEGATIVE
CTP QC/QA: YES

## 2024-01-18 PROCEDURE — D9220A PRA ANESTHESIA: Mod: ANES,,, | Performed by: ANESTHESIOLOGY

## 2024-01-18 PROCEDURE — 88342 IMHCHEM/IMCYTCHM 1ST ANTB: CPT | Performed by: PATHOLOGY

## 2024-01-18 PROCEDURE — 37000008 HC ANESTHESIA 1ST 15 MINUTES: Performed by: INTERNAL MEDICINE

## 2024-01-18 PROCEDURE — 43239 EGD BIOPSY SINGLE/MULTIPLE: CPT | Mod: ,,, | Performed by: INTERNAL MEDICINE

## 2024-01-18 PROCEDURE — 00731 ANES UPR GI NDSC PX NOS: CPT | Performed by: INTERNAL MEDICINE

## 2024-01-18 PROCEDURE — 63600175 PHARM REV CODE 636 W HCPCS: Performed by: STUDENT IN AN ORGANIZED HEALTH CARE EDUCATION/TRAINING PROGRAM

## 2024-01-18 PROCEDURE — 25000003 PHARM REV CODE 250: Performed by: STUDENT IN AN ORGANIZED HEALTH CARE EDUCATION/TRAINING PROGRAM

## 2024-01-18 PROCEDURE — 88305 TISSUE EXAM BY PATHOLOGIST: CPT | Mod: 26,,, | Performed by: PATHOLOGY

## 2024-01-18 PROCEDURE — 25000003 PHARM REV CODE 250: Performed by: ANESTHESIOLOGY

## 2024-01-18 PROCEDURE — 37000009 HC ANESTHESIA EA ADD 15 MINS: Performed by: INTERNAL MEDICINE

## 2024-01-18 PROCEDURE — 43239 EGD BIOPSY SINGLE/MULTIPLE: CPT | Performed by: INTERNAL MEDICINE

## 2024-01-18 PROCEDURE — 81025 URINE PREGNANCY TEST: CPT | Performed by: INTERNAL MEDICINE

## 2024-01-18 PROCEDURE — D9220A PRA ANESTHESIA: Mod: CRNA,,, | Performed by: STUDENT IN AN ORGANIZED HEALTH CARE EDUCATION/TRAINING PROGRAM

## 2024-01-18 PROCEDURE — 27202303 HC GRASPER, SPECIALTY: Performed by: INTERNAL MEDICINE

## 2024-01-18 PROCEDURE — 88342 IMHCHEM/IMCYTCHM 1ST ANTB: CPT | Mod: 26,,, | Performed by: PATHOLOGY

## 2024-01-18 PROCEDURE — 88305 TISSUE EXAM BY PATHOLOGIST: CPT | Performed by: PATHOLOGY

## 2024-01-18 RX ORDER — PROPOFOL 10 MG/ML
INJECTION, EMULSION INTRAVENOUS
Status: DISCONTINUED
Start: 2024-01-18 | End: 2024-01-18 | Stop reason: HOSPADM

## 2024-01-18 RX ORDER — PROPOFOL 10 MG/ML
VIAL (ML) INTRAVENOUS
Status: DISCONTINUED | OUTPATIENT
Start: 2024-01-18 | End: 2024-01-18

## 2024-01-18 RX ORDER — LIDOCAINE HYDROCHLORIDE 20 MG/ML
INJECTION INTRAVENOUS
Status: DISCONTINUED | OUTPATIENT
Start: 2024-01-18 | End: 2024-01-18

## 2024-01-18 RX ORDER — SODIUM CHLORIDE 9 MG/ML
INJECTION, SOLUTION INTRAVENOUS CONTINUOUS
Status: DISCONTINUED | OUTPATIENT
Start: 2024-01-18 | End: 2024-01-18 | Stop reason: HOSPADM

## 2024-01-18 RX ORDER — LIDOCAINE HYDROCHLORIDE 20 MG/ML
INJECTION, SOLUTION EPIDURAL; INFILTRATION; INTRACAUDAL; PERINEURAL
Status: DISCONTINUED
Start: 2024-01-18 | End: 2024-01-18 | Stop reason: HOSPADM

## 2024-01-18 RX ADMIN — PROPOFOL 50 MG: 10 INJECTION, EMULSION INTRAVENOUS at 08:01

## 2024-01-18 RX ADMIN — LIDOCAINE HYDROCHLORIDE 140 MG: 20 INJECTION, SOLUTION INTRAVENOUS at 08:01

## 2024-01-18 RX ADMIN — PROPOFOL 30 MG: 10 INJECTION, EMULSION INTRAVENOUS at 08:01

## 2024-01-18 RX ADMIN — PROPOFOL 20 MG: 10 INJECTION, EMULSION INTRAVENOUS at 08:01

## 2024-01-18 RX ADMIN — PROPOFOL 100 MG: 10 INJECTION, EMULSION INTRAVENOUS at 08:01

## 2024-01-18 RX ADMIN — SODIUM CHLORIDE: 0.9 INJECTION, SOLUTION INTRAVENOUS at 08:01

## 2024-01-18 NOTE — PROVATION PATIENT INSTRUCTIONS
Discharge Summary/Instructions after an Endoscopic Procedure  Patient Name: Lydia Thomas  Patient MRN: [id number]  Patient YOB: 1980  Thursday, January 18, 2024  Sabino Mcintosh MD  Dear patient,  As a result of recent federal legislation (The Federal Cures Act), you may   receive lab or pathology results from your procedure in your [facility]   account before your physician is able to contact you. Your physician or   their representative will relay the results to you with their   recommendations at their soonest availability.  Thank you,  RESTRICTIONS:  During your procedure today, you received medications for sedation.  These   medications may affect your judgment, balance and coordination.  Therefore,   for 24 hours, you have the following restrictions:   - DO NOT drive a car, operate machinery, make legal/financial decisions,   sign important papers or drink alcohol.    ACTIVITY:  Today: no heavy lifting, straining or running due to procedural   sedation/anesthesia.  The following day: return to full activity including work.  DIET:  Eat and drink normally unless instructed otherwise.     TREATMENT FOR COMMON SIDE EFFECTS:  - Mild abdominal pain, nausea, belching, bloating or excessive gas:  rest,   eat lightly and use a heating pad.  - Sore Throat: treat with throat lozenges and/or gargle with warm salt   water.  - Because air was used during the procedure, expelling large amounts of air   from your rectum or belching is normal.  - If a bowel prep was taken, you may not have a bowel movement for 1-3 days.    This is normal.  SYMPTOMS TO WATCH FOR AND REPORT TO YOUR PHYSICIAN:  1. Abdominal pain or bloating, other than gas cramps.  2. Chest pain.  3. Back pain.  4. Signs of infection such as: chills or fever occurring within 24 hours   after the procedure.  5. Rectal bleeding, which would show as bright red, maroon, or black stools.   (A tablespoon of blood from the rectum is not serious, especially if    hemorrhoids are present.)  6. Vomiting.  7. Weakness or dizziness.  GO DIRECTLY TO THE NEAREST EMERGENCY ROOM IF YOU HAVE ANY OF THE FOLLOWING:      Difficulty breathing              Chills and/or fever over 101 F   Persistent vomiting and/or vomiting blood   Severe abdominal pain   Severe chest pain   Black, tarry stools   Bleeding- more than one tablespoon   Any other symptom or condition that you feel may need urgent attention  Your doctor recommends these additional instructions:  If any biopsies were taken, your doctors clinic will contact you in 1 to 2   weeks with any results.  - Patient has a contact number available for emergencies.  The signs and   symptoms of potential delayed complications were discussed with the   patient.  Return to normal activities tomorrow.  Written discharge   instructions were provided to the patient.   - Discharge patient to home.   - Await pathology results.   - The findings and recommendations were discussed with the designated   responsible adult.   For questions, problems or results please call your physician - Sabino Mcintosh MD at Work:  [phone].  [facility] can be reached at [phone]     IF A COMPLICATION OR EMERGENCY SITUATION ARISES AND YOU ARE UNABLE TO REACH   YOUR PHYSICIAN - GO DIRECTLY TO THE EMERGENCY ROOM.  Sabino Mcintosh MD  1/18/2024 8:45:41 AM  This report has been verified and signed electronically.  Dear patient,  As a result of recent federal legislation (The Federal Cures Act), you may   receive lab or pathology results from your procedure in your [facility]   account before your physician is able to contact you. Your physician or   their representative will relay the results to you with their   recommendations at their soonest availability.  Thank you,  PROVATION

## 2024-01-18 NOTE — INTERVAL H&P NOTE
The patient has been examined and the H&P has been reviewed:    I concur with the findings and no changes have occurred since H&P was written.    Anesthesia risks, benefits and alternative options discussed and understood by patient/family.    History and Exam unchanged from visit.    Procedure - EGD  Neck - supple  Plan of anesthesia - General  ASA - per anesthesia  Mallampati - per anesthesia  Anesthesia problems - no  Family history of anesthesia problems - no        There are no hospital problems to display for this patient.

## 2024-01-18 NOTE — PLAN OF CARE
Procedure and recovery complete. Pt awake and alert. MD and  at bedside, procedure findings and suggestions discussed. Discharge instructions given, pt verbalized understanding of instruction. Gait steady, able to ambulate without assistance. Pt walked out accompanied by .

## 2024-01-18 NOTE — ANESTHESIA POSTPROCEDURE EVALUATION
Anesthesia Post Evaluation    Patient: Lydia Thomas    Procedure(s) Performed: Procedure(s) (LRB):  EGD (ESOPHAGOGASTRODUODENOSCOPY) (N/A)    Final Anesthesia Type: general      Patient location during evaluation: GI PACU  Patient participation: Yes- Able to Participate  Level of consciousness: awake and alert and oriented  Post-procedure vital signs: reviewed and stable  Pain management: adequate  Airway patency: patent    PONV status at discharge: No PONV  Anesthetic complications: no      Cardiovascular status: hemodynamically stable and blood pressure returned to baseline  Respiratory status: spontaneous ventilation, room air and unassisted  Hydration status: euvolemic  Follow-up not needed.              Vitals Value Taken Time   BP 95/60 01/18/24 0911   Temp 36.6 °C (97.9 °F) 01/18/24 0841   Pulse 81 01/18/24 0911   Resp 20 01/18/24 0911   SpO2 99 % 01/18/24 0911         Event Time   Out of Recovery 09:18:26         Pain/Matt Score: Matt Score: 10 (1/18/2024  9:11 AM)

## 2024-01-18 NOTE — TRANSFER OF CARE
Anesthesia Transfer of Care Note    Patient: Lydia Thomas    Procedure(s) Performed: Procedure(s) (LRB):  EGD (ESOPHAGOGASTRODUODENOSCOPY) (N/A)    Patient location: GI    Anesthesia Type: general    Transport from OR: Transported from OR on room air with adequate spontaneous ventilation    Post pain: adequate analgesia    Post assessment: no apparent anesthetic complications and tolerated procedure well    Post vital signs: stable    Level of consciousness: responds to stimulation and sedated    Nausea/Vomiting: no nausea/vomiting    Complications: none    Transfer of care protocol was followed      Last vitals: Visit Vitals  BP 90/64 (BP Location: Right arm, Patient Position: Lying)   Pulse 96   Temp 36.6 °C (97.9 °F) (Oral)   Resp 12   LMP 12/01/2023 (Approximate)   SpO2 100%   Breastfeeding No

## 2024-01-18 NOTE — ANESTHESIA PREPROCEDURE EVALUATION
01/18/2024  Lydia Thomas is a 43 y.o., female.  To undergo Procedure(s) (LRB):  EGD (ESOPHAGOGASTRODUODENOSCOPY) (N/A)       Past Medical History:  Past Medical History:   Diagnosis Date    Anemia     Anemia     Asthma     exercise asthma    Migraine headache     Ovarian cyst        Past Surgical History:  Past Surgical History:   Procedure Laterality Date    APPENDECTOMY      4/2019    AUGMENTATION OF BREAST Bilateral 03/2022    BREAST BIOPSY Right     Excisional bx, benign    COLONOSCOPY N/A 05/18/2022    Procedure: COLONOSCOPY;  Surgeon: Sabino Mcintosh MD;  Location: [facility];  Service: Endoscopy;  Laterality: N/A;  High risk for colon cancer (family)      fully vaccinated; instructions to portal-GT       Social History:  Social History     Socioeconomic History    Marital status:    Tobacco Use    Smoking status: Never    Smokeless tobacco: Never   Substance and Sexual Activity    Alcohol use: Yes     Comment: occass    Drug use: No    Sexual activity: Yes     Partners: Male     Birth control/protection: None   Social History Narrative    , she is from [place] originally, but comes from [place], she is Orthodox, her  is  ([employer]), no children, 3 cats    Works at the [employer]       Medications:  No current facility-administered medications on file prior to encounter.     Current Outpatient Medications on File Prior to Encounter   Medication Sig Dispense Refill    acetaminophen-codeine 300-30mg (TYLENOL #3) 300-30 mg Tab       albuterol (ACCUNEB) 0.63 mg/3 mL Nebu Inhale as needed by inhalation route as needed.      amoxicillin (AMOXIL) 500 MG Tab       amoxicillin (AMOXIL) 875 MG tablet       ascorbic acid, vitamin C, (VITAMIN C) 500 MG tablet       butalbital-acetaminophen-caff (FIORICET) -40 mg Cap Take 1 capsule every 6-8 hours by oral route as needed.       celecoxib (CELEBREX) 200 MG capsule Take 1 capsule (200 mg total) by mouth once daily. 60 capsule 1    cetirizine (ZYRTEC) 10 MG tablet       chlorhexidine (PERIDEX) 0.12 % solution       dextromethorphan-guaiFENesin  mg (MUCINEX DM)  mg per 12 hr tablet       diazePAM (VALIUM) 10 MG Tab 1 tab po 45 minutes prior to MRI      diazePAM (VALIUM) 5 MG tablet Take 1 tablet twice a day by oral route as needed.      doxycycline (VIBRAMYCIN) 100 MG Cap       ferrous sulfate (FEOSOL) 325 mg (65 mg iron) Tab tablet       fluticasone propionate (FLONASE) 50 mcg/actuation nasal spray       furosemide (LASIX) 20 MG tablet       HYDROcodone-acetaminophen (NORCO) 5-325 mg per tablet Take 1 tablet every 6 hours by oral route as needed.      HYDROcodone-acetaminophen (NORCO) 7.5-325 mg per tablet       loratadine (CLARITIN) 10 mg tablet       meclizine (ANTIVERT) 25 mg tablet       methocarbamoL (ROBAXIN) 500 MG Tab       metroNIDAZOLE (FLAGYL) 500 MG tablet       nitrofurantoin, macrocrystal-monohydrate, (MACROBID) 100 MG capsule       onabotulinumtoxinA (BOTOX INJ) Inject as directed.      ondansetron (ZOFRAN) 4 MG tablet Take 4 mg by mouth.      ondansetron (ZOFRAN-ODT) 4 MG TbDL Take 1 tablet every 8 hours by oral route as needed for 30 days.      ondansetron (ZOFRAN-ODT) 8 MG TbDL       pantoprazole (PROTONIX) 20 MG tablet Take 1 tablet (20 mg total) by mouth once daily. In the morning on an empty stomach 30 tablet 0    phenazopyridine (PYRIDIUM) 200 MG tablet       polyethylene glycol (MIRALAX) 17 gram/dose powder       promethazine (PHENERGAN) 25 MG tablet       pseudoephedrine (SUDOGEST) 30 MG tablet       QULIPTA 60 mg Tab       SUMAtriptan (IMITREX) 20 mg/actuation nasal spray Take a spray that can be repeated after 2 hours, not more than twice a day 10 each 0    topiramate (TOPAMAX) 25 MG tablet       traMADoL (ULTRAM) 50 mg tablet          Allergies:  Review of patient's allergies indicates:   Allergen  "Reactions    Iodine and iodide containing products Anaphylaxis     PATIENT CAN RECEIVE IOHEXOL, has received multiple times with no pretreatment with no allergic reaction    Shellfish derived Anaphylaxis    Benadryl [diphenhydramine hcl] Itching     "with fast injection"    Fish containing products     Iodine        Active Problems:  Patient Active Problem List   Diagnosis    Acetaminophen overdose    Suicide attempt by acetaminophen overdose    Migraine    Anemia of chronic disease    Adjustment disorder with mixed anxiety and depressed mood    Tylenol overdose    Migraine without status migrainosus, not intractable    Occipital headache    Vasovagal syncope    Myoclonic jerking    Traumatic brain injury without loss of consciousness    Dizziness and giddiness    Depression    Warts of foot    Screening for colon cancer    Family history of colon cancer    Decreased strength, endurance, and mobility    Functional gait abnormality    Lumbar radiculopathy    Weakness of left lower extremity    Balance disorder    Impaired functional mobility, balance, gait, and endurance     24 Hour Vitals:  Temp:  [37 °C (98.6 °F)] 37 °C (98.6 °F)  Pulse:  [80] 80  Resp:  [18] 18  SpO2:  [100 %] 100 %  BP: (107)/(67) 107/67   See Nursing Charting For Additional Vitals      Pre-op Assessment    I have reviewed the Patient Summary Reports.     I have reviewed the Nursing Notes.       Review of Systems  Social:  Non-Smoker, Social Alcohol Use       Cardiovascular:  Cardiovascular Normal                                            Pulmonary:    Asthma                    Hepatic/GI:  Hepatic/GI Normal                 Neurological:      Headaches                                 Endocrine:  Endocrine Normal            Psych:  Psychiatric History  depression                Physical Exam  General: Well nourished and Cooperative    Airway:  Mallampati: II   Mouth Opening: Normal  TM Distance: Normal    Dental:  Intact    Chest/Lungs:  Clear to " auscultation, Normal Respiratory Rate    Heart:  Rate: Normal  Rhythm: Regular Rhythm        Anesthesia Plan  Type of Anesthesia, risks & benefits discussed:    Anesthesia Type: Gen ETT, MAC, Gen Natural Airway  Intra-op Monitoring Plan: Standard ASA Monitors  Post Op Pain Control Plan: multimodal analgesia  Induction:  IV  Informed Consent: Informed consent signed with the Patient and all parties understand the risks and agree with anesthesia plan.  All questions answered.   ASA Score: 2    Ready For Surgery From Anesthesia Perspective.     .

## 2024-01-24 LAB
FINAL PATHOLOGIC DIAGNOSIS: NORMAL
GROSS: NORMAL
Lab: NORMAL

## 2024-01-25 ENCOUNTER — PATIENT MESSAGE (OUTPATIENT)
Dept: GASTROENTEROLOGY | Facility: CLINIC | Age: 44
End: 2024-01-25
Payer: OTHER GOVERNMENT

## 2024-01-25 DIAGNOSIS — A04.8 H. PYLORI INFECTION: Primary | ICD-10-CM

## 2024-01-25 RX ORDER — AMOXICILLIN 500 MG/1
1000 TABLET, FILM COATED ORAL EVERY 12 HOURS
Qty: 56 TABLET | Refills: 0 | Status: SHIPPED | OUTPATIENT
Start: 2024-01-25 | End: 2024-02-08

## 2024-01-25 RX ORDER — CLARITHROMYCIN 500 MG/1
500 TABLET, FILM COATED ORAL EVERY 12 HOURS
Qty: 28 TABLET | Refills: 0 | Status: SHIPPED | OUTPATIENT
Start: 2024-01-25 | End: 2024-02-08

## 2024-01-29 ENCOUNTER — CLINICAL SUPPORT (OUTPATIENT)
Dept: REHABILITATION | Facility: HOSPITAL | Age: 44
End: 2024-01-29
Payer: OTHER MISCELLANEOUS

## 2024-01-29 DIAGNOSIS — Z74.09 IMPAIRED FUNCTIONAL MOBILITY, BALANCE, GAIT, AND ENDURANCE: ICD-10-CM

## 2024-01-29 DIAGNOSIS — R26.89 FUNCTIONAL GAIT ABNORMALITY: Primary | Chronic | ICD-10-CM

## 2024-01-29 PROCEDURE — 97530 THERAPEUTIC ACTIVITIES: CPT | Mod: PO

## 2024-01-29 NOTE — PROGRESS NOTES
"  [facility]   Physical Therapy Treatment Note     [facility]  [facility] Number[facility] [id number]    Therapy Diagnosis:   Encounter Diagnoses   Name Primary?    Functional gait abnormality Yes    Impaired functional mobility, balance, gait, and endurance      Physician: Conrad Gutierres MD    Visit Date: 1/29/2024    Physician Orders: PT Eval and Treat; Neuro PT   Medical Diagnosis from Referral: Weakness of left lower extremity [R29.898]   Evaluation Date: 1/17/2024  Authorization Period Expiration: 10/4/24  Plan of Care Expiration: 3/1/24  Visit # / Visits authorized: 1/1     Progress Note Due: 2/17/24    PTA Visit #: 0/5     Time In: 0809  Time Out: 0845  Total Billable Time: 36 minutes    SUBJECTIVE     Pt reports: She is feeling a little tired this morning. States that she went for a walk in the cold yesterday and it had her outside for longer than she was planning because she got pretty tired.     She was not given a home exercise program on evaluation.  Response to previous treatment: tolerated evaluation well  Functional change: ongoing    Pain: not rated/10  Location: right hip      OBJECTIVE     Objective Measures updated at progress report unless specified.     2MWT: 190.02 (standing rest break at 1'40")    Treatment     Lydia received the treatments listed below:      therapeutic exercises to develop strength, endurance, flexibility, and posture for 00 minutes including:    NP      neuromuscular re-education activities to improve: Balance, Coordination, Sense, and Proprioception for 9 minutes. The following activities were included:    // bars:  X 15 reps, Modified SLS with right foot on 6" step, right UE on bar, left UE on dowel + chest press with left UE; CGA throughout  5 x failure (1,3,5,7, and 10" respectively), Modified SLS with right foot on 6" step, left UE on dowel + bouts of no right UE support      therapeutic activities to improve functional performance for " "27  minutes, includin Minute Walk Test + dual cog task of naming party foods    Review of home exercise program:  2 x 30" Seated piriformis stretch on right   2 x 30" Seated glut med stretch on right   X 30" Supine piriformis stretch on left leg  2 x 30" B seated hamstring stretch    --> Total activity time includes time walking into/out of clinic with CGA - close SPV and standing rest breaks as needed      gait training to improve functional mobility and safety for 00 minutes, including:    NP      Patient Education and Home Exercises     Home Exercises Provided and Patient Education Provided     Education provided:   :  - Interpretation of 2MWT   - Visual demonstration, verbal instructions and written handout provided with all exercises to be included as part of home exercise program.     Written Home Exercises Provided: yes. Exercises were reviewed and Lydia was able to demonstrate them prior to the end of the session.  Lydia demonstrated good  understanding of the education provided. See EMR under Patient Instructions for exercises provided during therapy sessions    ASSESSMENT     Lydia tolerated today's session well. She was able to perform the 2MWT on today with her distance being mostly limited by her gait speed, fatigue, and the need for 1 standing rest break while testing; PT had pt perform dual cognitive task of naming party foods while walking to decrease attention to gait and left lower extremity mechanics. She performed well with modified SLS in the parallel bars on today but needed some encouragement and reassurance to improve her confidence in regard to risk of falling while working on balance. She expressed good understanding of stretches to help decrease her right hip pain and maintain left hip mobility as part of her home exercise program. She will continue to benefit from skilled physical therapy services to address her strength, balance, and mobility deficits.     Lydia Is " progressing well towards her goals.   Pt prognosis is Good.     Pt will continue to benefit from skilled outpatient physical therapy to address the deficits listed in the problem list box on initial evaluation, provide pt/family education and to maximize pt's level of independence in the home and community environment.     Pt's spiritual, cultural and educational needs considered and pt agreeable to plan of care and goals.     Anticipated barriers to physical therapy: none at this time     Goals:   Short Term Goals = Long Term Goals: 4 weeks   Pt will be independent with an individualized home exercise program.  Ongoing  Pt will improve 5x chair rise score to </= 10 s with no UE assist for improved muscular endurance. Ongoing  Pt will improve Timed Up and Go (TUG) score to less than 14 s with no assistive device. Ongoing  Pt will improve self selected walking speed (SSWS) with no assistive device to at least 1.0 m/s for improved safety with home and community ambulation. Ongoing  Pt will improve postural control with MCTSIB condition 2 score of at least 30 s for decreased fall risk with standing ADLs. Ongoing  Pt will improve postural control to tolerate performing conditions 3 and 4 of the MCTSIB. Ongoing  Pt will improve FOTO score to >/= 56% for improved self perception of functional mobility. Ongoing  PT will administer 2MWT as safely able to assess pt's level of activity tolerance Met 1/29/24  Pt will improve dynamic gait abilities to tolerate performing the Functional Gait Assessment Ongoing  Pt to perform 2 minute walk test for 300 feet or greater to improve gait speed & endurance Ongoing     Patient's goals: be able to walk around the block twice, go up stairs, I want to be able to do a real squat, and get into running. Ongoing    PLAN     Plan of care Certification: 1/17/2024 to 3/1/2024.     Outpatient Physical Therapy 3 times weekly for 4 weeks to include the following interventions: Gait Training, Manual  Therapy, Neuromuscular Re-ed, Orthotic Management and Training, Patient Education, Therapeutic Activities, and Therapeutic Exercise.     Jazz Cody, PT

## 2024-01-30 ENCOUNTER — TELEPHONE (OUTPATIENT)
Dept: FAMILY MEDICINE | Facility: CLINIC | Age: 44
End: 2024-01-30
Payer: OTHER GOVERNMENT

## 2024-02-13 DIAGNOSIS — E55.9 VITAMIN D DEFICIENCY: ICD-10-CM

## 2024-02-13 NOTE — TELEPHONE ENCOUNTER
Care Due:                  Date            Visit Type   Department     Provider  --------------------------------------------------------------------------------                                EP -                              PRIMARY      [facility]  Last Visit: 11-      CARE ([facility])   TYRON Vergara                              EP -                              PRIMARY      [facility]  Next Visit: 02-      CARE ([facility])   TYRON Vergara                                                            Last  Test          Frequency    Reason                     Performed    Due Date  --------------------------------------------------------------------------------    Vitamin D...  12 months..  ergocalciferol...........  04- 03-    [facility] Embedded Care Due Messages. Reference number: [id number].   2/13/2024 1:00:13 PM CST

## 2024-02-14 ENCOUNTER — CLINICAL SUPPORT (OUTPATIENT)
Dept: REHABILITATION | Facility: HOSPITAL | Age: 44
End: 2024-02-14
Payer: OTHER MISCELLANEOUS

## 2024-02-14 DIAGNOSIS — R26.89 FUNCTIONAL GAIT ABNORMALITY: Primary | Chronic | ICD-10-CM

## 2024-02-14 DIAGNOSIS — Z74.09 IMPAIRED FUNCTIONAL MOBILITY, BALANCE, GAIT, AND ENDURANCE: ICD-10-CM

## 2024-02-14 PROCEDURE — 97110 THERAPEUTIC EXERCISES: CPT | Mod: PO

## 2024-02-14 PROCEDURE — 97112 NEUROMUSCULAR REEDUCATION: CPT | Mod: PO

## 2024-02-14 RX ORDER — ERGOCALCIFEROL 1.25 MG/1
50000 CAPSULE ORAL
Qty: 4 CAPSULE | Refills: 0 | Status: SHIPPED | OUTPATIENT
Start: 2024-02-14 | End: 2024-04-02

## 2024-02-15 ENCOUNTER — CLINICAL SUPPORT (OUTPATIENT)
Dept: REHABILITATION | Facility: HOSPITAL | Age: 44
End: 2024-02-15
Payer: OTHER MISCELLANEOUS

## 2024-02-15 DIAGNOSIS — Z74.09 IMPAIRED FUNCTIONAL MOBILITY, BALANCE, GAIT, AND ENDURANCE: ICD-10-CM

## 2024-02-15 DIAGNOSIS — R26.89 FUNCTIONAL GAIT ABNORMALITY: Primary | Chronic | ICD-10-CM

## 2024-02-15 PROCEDURE — 97530 THERAPEUTIC ACTIVITIES: CPT | Mod: PO

## 2024-02-15 PROCEDURE — 97110 THERAPEUTIC EXERCISES: CPT | Mod: PO

## 2024-02-15 NOTE — PROGRESS NOTES
"  [facility]   Physical Therapy Treatment Note     Name: Lydia Ramirez [facility]  Clinic Number: [id number]    Therapy Diagnosis:   Encounter Diagnoses   Name Primary?    Functional gait abnormality Yes    Impaired functional mobility, balance, gait, and endurance      Physician: Conrad Gutierres MD    Visit Date: 2/15/2024    Physician Orders: PT Eval and Treat; Neuro PT   Medical Diagnosis from Referral: Weakness of left lower extremity [R29.898]   Evaluation Date: 1/17/2024  Authorization Period Expiration: 10/4/24  Plan of Care Expiration: 3/1/24  Visit # / Visits authorized: 3/12     Progress Note Due: 2/17/24    PTA Visit #: 0/5     Time In: 0845  Time Out: 0930  Total Billable Time: 45 minutes    SUBJECTIVE     Pt reports: at start of session, "I know I don't feel this side (left leg) but why does it feel tired like I went for a run?" PT spent time providing education on the normal muscle response to exercise and the benefits of acknowledging these feelings to ensure she isn't being over worked.     She was not given a home exercise program on evaluation.  Response to previous treatment: tolerated evaluation well  Functional change: ongoing    Pain: not rated/10  Location: right hip      OBJECTIVE     Objective Measures updated at progress report unless specified.     Treatment     Lydia received the treatments listed below:      therapeutic exercises to develop strength, endurance, flexibility, and posture for 8 minutes including:    X 5 min, SciFit recumbent stepper, level 1, BUE/BLE for CV and muscular endurance    X 1.5 min sciFit with emphasis on BLE movement and left upper extremity only    --> Total activity time includes seated rest break between trials      neuromuscular re-education activities to improve: Balance, Coordination, Sense, and Proprioception for 00 minutes. The following activities were included:    NP      therapeutic activities to improve functional performance " "for 37  minutes, including:    Overground gait training: CGA throughout all trials via gait belt with red TB around left ankle, knee, and hip to assist with flexion at all 3 joints in swing  X 100 feet around gym  X 250 feet around gym then down hallway and to elevator with VC for reciprocal arm swing  X 500+ feet outside with w/c follow for safety and VC as needed to navigate ramps, curbs, and uneven sidewalks    Stair training:  CGA via gait belt with red TB around left ankle, knee, and hip to assist with flexion at all 3 joints in swing  X 4, ascending 6" steps with BUE support and step-to pattern  --> Following ascent of last step, pt had a functional seizure episode lasting longer than in previous sessions where she experienced eyes rolling back in head, became unresponsive, and showed brief jerking throughout BLE, BUE, and trunk. Required total A x2 to safely descend stairs and to get into left sidelying position on the mat. EMS was called and vitals checked while in left sidelying with /61, HR 96. Pt was unresponsive for ~2 minutes before coming to. She was able to get into a seated position and appeared to be at her baseline level of cognition and function just before the EMS team arrived.     --> Total activity time includes time required to walk from lobby to gym with SBA, seated rest breaks as needed between gait trials, and time to assist patient to left sidelying on mat following onset of seizure symptoms      gait training to improve functional mobility and safety for 00 minutes, including:    NP      Patient Education and Home Exercises     Home Exercises Provided and Patient Education Provided     Education provided:   1/29:  - Interpretation of 2MWT   - Visual demonstration, verbal instructions and written handout provided with all exercises to be included as part of home exercise program.     Written Home Exercises Provided: yes. Exercises were reviewed and Lydia was able to demonstrate them " prior to the end of the session.  Lydia demonstrated good  understanding of the education provided. See EMR under Patient Instructions for exercises provided during therapy sessions    ASSESSMENT     Lydia tolerated today's session fairly well. She presented with reports of feeling some fatigue in her left leg on today but was able to participate well in the majority of the session. She had her best session to date in terms of gait training as she continued to respond well to the use of the triple flexion band to her left leg to assist with swing phase advancement; she was able to navigate indoor and out door settings with minimal VC to navigate obstacles. She had some fatigue following gait training but was motivated to attempt stair training following her success with walking. Performed very well on the stairs with only contact guard assistance provided however she became overwhelmed by seeing her left leg move in the mirror which triggered a functional seizure (see above for details). After EMS arrived, PT assisted with answering their questions and providing relevant information then ended the session to allow the pt to consider her medical treatment options. She will continue to benefit from skilled physical therapy services to address her strength, balance, and mobility deficits.     Lydia Is progressing well towards her goals.   Pt prognosis is Good.     Pt will continue to benefit from skilled outpatient physical therapy to address the deficits listed in the problem list box on initial evaluation, provide pt/family education and to maximize pt's level of independence in the home and community environment.     Pt's spiritual, cultural and educational needs considered and pt agreeable to plan of care and goals.     Anticipated barriers to physical therapy: none at this time     Goals:   Short Term Goals = Long Term Goals: 4 weeks   Pt will be independent with an individualized home exercise program.   Ongoing  Pt will improve 5x chair rise score to </= 10 s with no UE assist for improved muscular endurance. Ongoing  Pt will improve Timed Up and Go (TUG) score to less than 14 s with no assistive device. Ongoing  Pt will improve self selected walking speed (SSWS) with no assistive device to at least 1.0 m/s for improved safety with home and community ambulation. Ongoing  Pt will improve postural control with MCTSIB condition 2 score of at least 30 s for decreased fall risk with standing ADLs. Ongoing  Pt will improve postural control to tolerate performing conditions 3 and 4 of the MCTSIB. Ongoing  Pt will improve FOTO score to >/= 56% for improved self perception of functional mobility. Ongoing  PT will administer 2MWT as safely able to assess pt's level of activity tolerance Met 1/29/24  Pt will improve dynamic gait abilities to tolerate performing the Functional Gait Assessment Ongoing  Pt to perform 2 minute walk test for 300 feet or greater to improve gait speed & endurance Ongoing     Patient's goals: be able to walk around the block twice, go up stairs, I want to be able to do a real squat, and get into running. Ongoing    PLAN     Plan of care Certification: 1/17/2024 to 3/1/2024.     Outpatient Physical Therapy 3 times weekly for 4 weeks to include the following interventions: Gait Training, Manual Therapy, Neuromuscular Re-ed, Orthotic Management and Training, Patient Education, Therapeutic Activities, and Therapeutic Exercise.     Jazz Cody, PT

## 2024-02-16 ENCOUNTER — CLINICAL SUPPORT (OUTPATIENT)
Dept: REHABILITATION | Facility: HOSPITAL | Age: 44
End: 2024-02-16
Payer: OTHER MISCELLANEOUS

## 2024-02-16 DIAGNOSIS — Z74.09 IMPAIRED FUNCTIONAL MOBILITY, BALANCE, GAIT, AND ENDURANCE: ICD-10-CM

## 2024-02-16 DIAGNOSIS — R26.89 FUNCTIONAL GAIT ABNORMALITY: Primary | Chronic | ICD-10-CM

## 2024-02-16 PROCEDURE — 97530 THERAPEUTIC ACTIVITIES: CPT | Mod: PO

## 2024-02-16 NOTE — PROGRESS NOTES
" [facility]   Physical Therapy Treatment Note     Name: Lydia Thomas  Clinic Number: [id number]    Therapy Diagnosis:   Encounter Diagnoses   Name Primary?    Functional gait abnormality Yes    Impaired functional mobility, balance, gait, and endurance      Physician: Conrad Gutierres MD    Visit Date: 2/16/2024    Physician Orders: PT Eval and Treat; Neuro PT   Medical Diagnosis from Referral: Weakness of left lower extremity [R29.898]   Evaluation Date: 1/17/2024  Authorization Period Expiration: 10/4/24  Plan of Care Expiration: 3/29/24  Visit # / Visits authorized: 5/12     Progress Note Due: 2/17/24    PTA Visit #: 0/5     Time In: 0845  Time Out: 0930  Total Billable Time: 45 minutes    SUBJECTIVE     Pt reports: that she is feeling better today but she is feeling tired. She feels encouraged by her progress with therapy.     She was not given a home exercise program on evaluation.  Response to previous treatment: tolerated evaluation well  Functional change: ongoing    Pain: not rated/10  Location: right hip      OBJECTIVE     MCTSIB:  1. Eyes Open/feet together/Firm: 30 seconds  2. Eyes Closed/feet together/Firm: 24 seconds (21 seconds at prior assessment)  3. Eyes Open/feet together/Foam: NT   4. Eyes Closed/feet together/Foam: NT      APTA Core Set Outcome Measures for Adults with Neurologic Conditions     Evaluation  2/16/24 Reference values    Timed Up and Go 23.97 sec    21 sec, HHA  score >14 seconds indicates risk for falls in older stroke patients (Autumn et al, 2006)   10 Meter Walk Test  0.67 m/sec (6m/9 s)   "Limited community ambulator" speed category 0.67 m/sec (6m/9 s)   "Limited community ambulator" speed category  0-0.4 m/s = household walker  0.4-0.8 m/s = limited community ambulator   0.8-1.4 m/s = community ambultor  >1.4 m/s = can cross street safely    Functional Gait Assessment  Deferred for now; plan to assess as safely able Deferred for now; plan " "to assess as safely able  <22/30 = identifies fall risk in community dwelling older adults   (MCID = 4)   2 Minute Walk Test   190.02 (standing rest break at 1'40") (1/29/24)  227 feet, no rest breaks, HHA        5 times sit-stand    19.07 seconds    20 seconds  >12 sec= fall risk for general elderly  >10 sec= balance/vestibular dysfunction (<61 y/o)  >14.2 sec= balance/vestibular dysfunction (>61 y/o)  >12 sec= fall risk for stroke       Treatment     Lydia received the treatments listed below:      therapeutic exercises to develop strength, endurance, flexibility, and posture for 00 minutes including:    NP      neuromuscular re-education activities to improve: Balance, Coordination, Sense, and Proprioception for 00 minutes. The following activities were included:    NP      therapeutic activities to improve functional performance for 45  minutes, including:  Time above includes time spent on objective testing. See above for details.       gait training to improve functional mobility and safety for 00 minutes, including:    NP      Patient Education and Home Exercises     Home Exercises Provided and Patient Education Provided     Education provided:   1/29:  - Interpretation of 2MWT   - Visual demonstration, verbal instructions and written handout provided with all exercises to be included as part of home exercise program.     Written Home Exercises Provided: yes. Exercises were reviewed and Lydia was able to demonstrate them prior to the end of the session.  Lydia demonstrated good  understanding of the education provided. See EMR under Patient Instructions for exercises provided during therapy sessions    ASSESSMENT     Patient reassessed for plan of care (POC) update. Reassessment period: 01/17/24 to 02/16/24. Patient demonstrates good progress with therapy and endorses benefit from attending therapy. Improvements most noted with Timed Up and Go and 2 minute walk test, indicating improved endurance with " short distance ambulation. Performance on chair rise test and self selected walking speed (SSWS) test remains fairly consistent with prior trial. Slight improvement also noted on MCTSIB condition 2 compared to prior trial, but instability still noted with this condition. Current performance on functional testing performed today still places patient in elevated fall risk category. She will continue to benefit from skilled physical therapy services to address her strength, balance, and mobility deficits. PT to extend plan of care (POC) x 6 weeks to further address remaining mobility deficits.     Lydia Is progressing well towards her goals.   Pt prognosis is Good.     Pt will continue to benefit from skilled outpatient physical therapy to address the deficits listed in the problem list box on initial evaluation, provide pt/family education and to maximize pt's level of independence in the home and community environment.     Pt's spiritual, cultural and educational needs considered and pt agreeable to plan of care and goals.     Anticipated barriers to physical therapy: none at this time     Goals:   Short Term Goals = Long Term Goals: 4 weeks   Pt will be independent with an individualized home exercise program.  Ongoing  Pt will improve 5x chair rise score to </= 10 s with no UE assist for improved muscular endurance. Ongoing  Pt will improve Timed Up and Go (TUG) score to less than 14 s with no assistive device. Progressing  Pt will improve self selected walking speed (SSWS) with no assistive device to at least 1.0 m/s for improved safety with home and community ambulation. Ongoing  Pt will improve postural control with MCTSIB condition 2 score of at least 30 s for decreased fall risk with standing ADLs. Progressing  Pt will improve postural control to tolerate performing conditions 3 and 4 of the MCTSIB. Ongoing  Pt will improve FOTO score to >/= 56% for improved self perception of functional mobility. Ongoing  PT  will administer 2MWT as safely able to assess pt's level of activity tolerance Met 1/29/24  Pt will improve dynamic gait abilities to tolerate performing the Functional Gait Assessment Ongoing  Pt to perform 2 minute walk test for 300 feet or greater to improve gait speed & endurance Progressing     Patient's goals: be able to walk around the block twice, go up stairs, I want to be able to do a real squat, and get into running. Ongoing    PLAN   PT to extend plan of care (POC) x 6 weeks.     Plan of care Certification: 02/16/24 to 03/29/24     Outpatient Physical Therapy 3 times weekly for 4 weeks to include the following interventions: Gait Training, Manual Therapy, Neuromuscular Re-ed, Orthotic Management and Training, Patient Education, Therapeutic Activities, and Therapeutic Exercise.     Toya Pascal, PT

## 2024-02-16 NOTE — PROGRESS NOTES
"  [facility]   Physical Therapy Treatment Note     Name: Lydia Thomas  Clinic Number: [id number]    Therapy Diagnosis:   No diagnosis found.    Physician: Conrad Gutierres MD    Visit Date: 2/16/2024    Physician Orders: PT Eval and Treat; Neuro PT   Medical Diagnosis from Referral: Weakness of left lower extremity [R29.898]   Evaluation Date: 1/17/2024  Authorization Period Expiration: 10/4/24  Plan of Care Expiration: 3/1/24  Visit # / Visits authorized: 3/12     Progress Note Due: 2/17/24    PTA Visit #: 0/5     Time In: 0845  Time Out: 0930  Total Billable Time: 45 minutes    SUBJECTIVE     Pt reports: at start of session, "I know I don't feel this side (left leg) but why does it feel tired like I went for a run?" PT spent time providing education on the normal muscle response to exercise and the benefits of acknowledging these feelings to ensure she isn't being over worked.     She was not given a home exercise program on evaluation.  Response to previous treatment: tolerated evaluation well  Functional change: ongoing    Pain: not rated/10  Location: right hip      OBJECTIVE     Objective Measures updated at progress report unless specified.     MCTSIB:  1. Eyes Open/feet together/Firm: 30 seconds  2. Eyes Closed/feet together/Firm: 21 seconds  3. Eyes Open/feet together/Foam: NT   4. Eyes Closed/feet together/Foam: NT     APTA Core Set Outcome Measures for Adults with Neurologic Conditions     Evaluation  2/16/24 Reference values    Timed Up and Go 23.97 sec     score >14 seconds indicates risk for falls in older stroke patients (Autmun et al, 2006)   10 Meter Walk Test  0.67 m/sec (6m/9 s)   "Limited community ambulator" speed category  0-0.4 m/s = household walker  0.4-0.8 m/s = limited community ambulator   0.8-1.4 m/s = community ambultor  >1.4 m/s = can cross street safely    Functional Gait Assessment  Deferred for now; plan to assess as safely able  <22/30 = " "identifies fall risk in community dwelling older adults   (MCID = 4)   2 Minute Walk Test   190.02 (standing rest break at 1'40") (1/29/24)         5 times sit-stand    19.07 seconds     >12 sec= fall risk for general elderly  >10 sec= balance/vestibular dysfunction (<61 y/o)  >14.2 sec= balance/vestibular dysfunction (>61 y/o)  >12 sec= fall risk for stroke        Treatment     Lydia received the treatments listed below:      therapeutic exercises to develop strength, endurance, flexibility, and posture for 8 minutes including:    X 5 min, SciFit recumbent stepper, level 1, BUE/BLE for CV and muscular endurance    X 1.5 min sciFit with emphasis on BLE movement and left upper extremity only    --> Total activity time includes seated rest break between trials      neuromuscular re-education activities to improve: Balance, Coordination, Sense, and Proprioception for 00 minutes. The following activities were included:    NP      therapeutic activities to improve functional performance for 37  minutes, including:    Overground gait training: CGA throughout all trials via gait belt with red TB around left ankle, knee, and hip to assist with flexion at all 3 joints in swing  X 100 feet around gym  X 250 feet around gym then down hallway and to elevator with VC for reciprocal arm swing  X 500+ feet outside with w/c follow for safety and VC as needed to navigate ramps, curbs, and uneven sidewalks    Stair training:  CGA via gait belt with red TB around left ankle, knee, and hip to assist with flexion at all 3 joints in swing  X 4, ascending 6" steps with BUE support and step-to pattern  --> Following ascent of last step, pt had a functional seizure episode lasting longer than in previous sessions where she experienced eyes rolling back in head, became unresponsive, and showed brief jerking throughout BLE, BUE, and trunk. Required total A x2 to safely descend stairs and to get into left sidelying position on the mat. EMS " was called and vitals checked while in left sidelying with /61, HR 96. Pt was unresponsive for ~2 minutes before coming to. She was able to get into a seated position and appeared to be at her baseline level of cognition and function just before the EMS team arrived.     --> Total activity time includes time required to walk from lobby to gym with SBA, seated rest breaks as needed between gait trials, and time to assist patient to left sidelying on mat following onset of seizure symptoms      gait training to improve functional mobility and safety for 00 minutes, including:    NP      Patient Education and Home Exercises     Home Exercises Provided and Patient Education Provided     Education provided:   1/29:  - Interpretation of 2MWT   - Visual demonstration, verbal instructions and written handout provided with all exercises to be included as part of home exercise program.     Written Home Exercises Provided: yes. Exercises were reviewed and Lydia was able to demonstrate them prior to the end of the session.  Lydia demonstrated good  understanding of the education provided. See EMR under Patient Instructions for exercises provided during therapy sessions    ASSESSMENT     Lydia tolerated today's session fairly well. She presented with reports of feeling some fatigue in her left leg on today but was able to participate well in the majority of the session. She had her best session to date in terms of gait training as she continued to respond well to the use of the triple flexion band to her left leg to assist with swing phase advancement; she was able to navigate indoor and out door settings with minimal VC to navigate obstacles. She had some fatigue following gait training but was motivated to attempt stair training following her success with walking. Performed very well on the stairs with only contact guard assistance provided however she became overwhelmed by seeing her left leg move in the mirror which  triggered a functional seizure (see above for details). After EMS arrived, PT assisted with answering their questions and providing relevant information then ended the session to allow the pt to consider her medical treatment options. She will continue to benefit from skilled physical therapy services to address her strength, balance, and mobility deficits.     Lydia Is progressing well towards her goals.   Pt prognosis is Good.     Pt will continue to benefit from skilled outpatient physical therapy to address the deficits listed in the problem list box on initial evaluation, provide pt/family education and to maximize pt's level of independence in the home and community environment.     Pt's spiritual, cultural and educational needs considered and pt agreeable to plan of care and goals.     Anticipated barriers to physical therapy: none at this time     Goals:   Short Term Goals = Long Term Goals: 4 weeks   Pt will be independent with an individualized home exercise program.  Ongoing  Pt will improve 5x chair rise score to </= 10 s with no UE assist for improved muscular endurance. Ongoing  Pt will improve Timed Up and Go (TUG) score to less than 14 s with no assistive device. Ongoing  Pt will improve self selected walking speed (SSWS) with no assistive device to at least 1.0 m/s for improved safety with home and community ambulation. Ongoing  Pt will improve postural control with MCTSIB condition 2 score of at least 30 s for decreased fall risk with standing ADLs. Ongoing  Pt will improve postural control to tolerate performing conditions 3 and 4 of the MCTSIB. Ongoing  Pt will improve FOTO score to >/= 56% for improved self perception of functional mobility. Ongoing  PT will administer 2MWT as safely able to assess pt's level of activity tolerance Met 1/29/24  Pt will improve dynamic gait abilities to tolerate performing the Functional Gait Assessment Ongoing  Pt to perform 2 minute walk test for 300 feet or  greater to improve gait speed & endurance Ongoing     Patient's goals: be able to walk around the block twice, go up stairs, I want to be able to do a real squat, and get into running. Ongoing    PLAN     Plan of care Certification: 1/17/2024 to 3/1/2024.     Outpatient Physical Therapy 3 times weekly for 4 weeks to include the following interventions: Gait Training, Manual Therapy, Neuromuscular Re-ed, Orthotic Management and Training, Patient Education, Therapeutic Activities, and Therapeutic Exercise.     Jazz Cody, PT

## 2024-02-16 NOTE — PLAN OF CARE
" [facility]   Physical Therapy Treatment Note     Name: Lydia Thomas  Clinic Number: [id number]    Therapy Diagnosis:   Encounter Diagnoses   Name Primary?    Functional gait abnormality Yes    Impaired functional mobility, balance, gait, and endurance      Physician: Conrad Gutierres MD    Visit Date: 2/16/2024    Physician Orders: PT Eval and Treat; Neuro PT   Medical Diagnosis from Referral: Weakness of left lower extremity [R29.898]   Evaluation Date: 1/17/2024  Authorization Period Expiration: 10/4/24  Plan of Care Expiration: 3/29/24  Visit # / Visits authorized: 5/12     Progress Note Due: 2/17/24    PTA Visit #: 0/5     Time In: 0845  Time Out: 0930  Total Billable Time: 45 minutes    SUBJECTIVE     Pt reports: that she is feeling better today but she is feeling tired. She feels encouraged by her progress with therapy.     She was not given a home exercise program on evaluation.  Response to previous treatment: tolerated evaluation well  Functional change: ongoing    Pain: not rated/10  Location: right hip      OBJECTIVE     MCTSIB:  1. Eyes Open/feet together/Firm: 30 seconds  2. Eyes Closed/feet together/Firm: 24 seconds (21 seconds at prior assessment)  3. Eyes Open/feet together/Foam: NT   4. Eyes Closed/feet together/Foam: NT      APTA Core Set Outcome Measures for Adults with Neurologic Conditions     Evaluation  2/16/24 Reference values    Timed Up and Go 23.97 sec    21 sec, HHA  score >14 seconds indicates risk for falls in older stroke patients (Autumn et al, 2006)   10 Meter Walk Test  0.67 m/sec (6m/9 s)   "Limited community ambulator" speed category 0.67 m/sec (6m/9 s)   "Limited community ambulator" speed category  0-0.4 m/s = household walker  0.4-0.8 m/s = limited community ambulator   0.8-1.4 m/s = community ambultor  >1.4 m/s = can cross street safely    Functional Gait Assessment  Deferred for now; plan to assess as safely able Deferred for now; plan " "to assess as safely able  <22/30 = identifies fall risk in community dwelling older adults   (MCID = 4)   2 Minute Walk Test   190.02 (standing rest break at 1'40") (1/29/24)  227 feet, no rest breaks, HHA        5 times sit-stand    19.07 seconds    20 seconds  >12 sec= fall risk for general elderly  >10 sec= balance/vestibular dysfunction (<61 y/o)  >14.2 sec= balance/vestibular dysfunction (>61 y/o)  >12 sec= fall risk for stroke       Treatment     Lydia received the treatments listed below:      therapeutic exercises to develop strength, endurance, flexibility, and posture for 00 minutes including:    NP      neuromuscular re-education activities to improve: Balance, Coordination, Sense, and Proprioception for 00 minutes. The following activities were included:    NP      therapeutic activities to improve functional performance for 45  minutes, including:  Time above includes time spent on objective testing. See above for details.       gait training to improve functional mobility and safety for 00 minutes, including:    NP      Patient Education and Home Exercises     Home Exercises Provided and Patient Education Provided     Education provided:   1/29:  - Interpretation of 2MWT   - Visual demonstration, verbal instructions and written handout provided with all exercises to be included as part of home exercise program.     Written Home Exercises Provided: yes. Exercises were reviewed and Lydia was able to demonstrate them prior to the end of the session.  Lydia demonstrated good  understanding of the education provided. See EMR under Patient Instructions for exercises provided during therapy sessions    ASSESSMENT     Patient reassessed for plan of care (POC) update. Reassessment period: 01/17/24 to 02/16/24. Patient demonstrates good progress with therapy and endorses benefit from attending therapy. Improvements most noted with Timed Up and Go and 2 minute walk test, indicating improved endurance with " short distance ambulation. Performance on chair rise test and self selected walking speed (SSWS) test remains fairly consistent with prior trial. Slight improvement also noted on MCTSIB condition 2 compared to prior trial, but instability still noted with this condition. Current performance on functional testing performed today still places patient in elevated fall risk category. She will continue to benefit from skilled physical therapy services to address her strength, balance, and mobility deficits. PT to extend plan of care (POC) x 6 weeks to further address remaining mobility deficits.     Lydia Is progressing well towards her goals.   Pt prognosis is Good.     Pt will continue to benefit from skilled outpatient physical therapy to address the deficits listed in the problem list box on initial evaluation, provide pt/family education and to maximize pt's level of independence in the home and community environment.     Pt's spiritual, cultural and educational needs considered and pt agreeable to plan of care and goals.     Anticipated barriers to physical therapy: none at this time     Goals:   Short Term Goals = Long Term Goals: 4 weeks   Pt will be independent with an individualized home exercise program.  Ongoing  Pt will improve 5x chair rise score to </= 10 s with no UE assist for improved muscular endurance. Ongoing  Pt will improve Timed Up and Go (TUG) score to less than 14 s with no assistive device. Progressing  Pt will improve self selected walking speed (SSWS) with no assistive device to at least 1.0 m/s for improved safety with home and community ambulation. Ongoing  Pt will improve postural control with MCTSIB condition 2 score of at least 30 s for decreased fall risk with standing ADLs. Progressing  Pt will improve postural control to tolerate performing conditions 3 and 4 of the MCTSIB. Ongoing  Pt will improve FOTO score to >/= 56% for improved self perception of functional mobility. Ongoing  PT  will administer 2MWT as safely able to assess pt's level of activity tolerance Met 1/29/24  Pt will improve dynamic gait abilities to tolerate performing the Functional Gait Assessment Ongoing  Pt to perform 2 minute walk test for 300 feet or greater to improve gait speed & endurance Progressing     Patient's goals: be able to walk around the block twice, go up stairs, I want to be able to do a real squat, and get into running. Ongoing    PLAN   PT to extend plan of care (POC) x 6 weeks.     Plan of care Certification: 02/16/24 to 03/29/24     Outpatient Physical Therapy 3 times weekly for 4 weeks to include the following interventions: Gait Training, Manual Therapy, Neuromuscular Re-ed, Orthotic Management and Training, Patient Education, Therapeutic Activities, and Therapeutic Exercise.     Toya Pascal, PT

## 2024-02-26 ENCOUNTER — OFFICE VISIT (OUTPATIENT)
Dept: ORTHOPEDICS | Facility: CLINIC | Age: 44
End: 2024-02-26
Payer: OTHER GOVERNMENT

## 2024-02-26 DIAGNOSIS — M54.16 LUMBAR RADICULOPATHY: ICD-10-CM

## 2024-02-26 DIAGNOSIS — R29.898 WEAKNESS OF LEFT LOWER EXTREMITY: Primary | ICD-10-CM

## 2024-02-26 DIAGNOSIS — M51.36 DDD (DEGENERATIVE DISC DISEASE), LUMBAR: ICD-10-CM

## 2024-02-26 PROCEDURE — 99999 PR PBB SHADOW E&M-EST. PATIENT-LVL IV: CPT | Mod: PBBFAC,,, | Performed by: ORTHOPAEDIC SURGERY

## 2024-02-26 PROCEDURE — 99214 OFFICE O/P EST MOD 30 MIN: CPT | Mod: S$PBB,,, | Performed by: ORTHOPAEDIC SURGERY

## 2024-02-26 PROCEDURE — 99214 OFFICE O/P EST MOD 30 MIN: CPT | Mod: PBBFAC | Performed by: ORTHOPAEDIC SURGERY

## 2024-02-26 NOTE — PROGRESS NOTES
DATE: 2/26/2024  PATIENT: Lydia Thomas    Attending Physician: Conrad Gutierres M.D.    HISTORY:  Lydia Thomas is a 44 y.o. female who returns to me today for FU. She complained of R lateral hip pain. Patient continues to have LLE n/t and weakness, but PT has been helping tremendously. She has not been able to work. She wants to continue neuro PT for her LLE.    The patient does not smoke, have DM or endorse IVDU. The patient is not on any blood thinners and does not take chronic narcotics. She works at [employer] ([employer]).    PMH/PSH/FamHx/SocHx:  Unchanged from prior visit    ROS:  Positive for LUE and LLE weakness and n/t  Denies myelopathic symptoms, perineal paresthesias, bowel or bladder incontinence    EXAM:  There were no vitals taken for this visit.    My physical examination was notable for the following findings: diffuse weakness 3/5 in all muscle groups in LLE. Normal sensation to light touch in the C5-T1 and L2-S1 dermatomes bilaterally. 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    Through Care Everywhere, CT of entire spine (cervical, thoracic and lumbar) reports stated no fractures. CT head report stated no acute head bleed.     MRI C-T-L showed no significant central or foraminal stenosis.    EMG BLE was inconclusive.    ASSESSMENT/PLAN:  Patient has LLE weakness and pain following a concussion on 4/12/22. Her spine MRI was not very impressive; no ortho spine surgical intervention warranted. I discussed the natural history of their diagnoses as well as surgical and nonsurgical treatment options. I educated the patient on the importance of core/back strengthening, correct posture, bending/lifting ergonomics, and low-impact aerobic exercises (walking, elliptical, and aquatherapy). Continue medications. I referred patient to neuro  physical therapy for LLE stregnthening. Patient will follow up PRN.     Conrad Gutierres MD  Orthopaedic Spine Surgeon  Department of Orthopaedic Surgery  [phone]

## 2024-03-07 ENCOUNTER — CLINICAL SUPPORT (OUTPATIENT)
Dept: REHABILITATION | Facility: HOSPITAL | Age: 44
End: 2024-03-07
Payer: OTHER MISCELLANEOUS

## 2024-03-07 DIAGNOSIS — Z74.09 IMPAIRED FUNCTIONAL MOBILITY, BALANCE, GAIT, AND ENDURANCE: ICD-10-CM

## 2024-03-07 DIAGNOSIS — R26.89 FUNCTIONAL GAIT ABNORMALITY: Primary | Chronic | ICD-10-CM

## 2024-03-07 PROCEDURE — 97530 THERAPEUTIC ACTIVITIES: CPT | Mod: PO

## 2024-03-07 NOTE — PROGRESS NOTES
" [facility]   Physical Therapy Treatment Note     [facility]  Cl[facility] Number: [id number]    Therapy Diagnosis:   Encounter Diagnoses   Name Primary?    Functional gait abnormality Yes    Impaired functional mobility, balance, gait, and endurance        Physician: Conrad Gutierres MD    Visit Date: 3/7/2024    Physician Orders: PT Eval and Treat; Neuro PT   Medical Diagnosis from Referral: Weakness of left lower extremity [R29.898]   Evaluation Date: 1/17/2024  Authorization Period Expiration: 10/4/24  Plan of Care Expiration: 3/29/24  Visit # / Visits authorized: 6/12     Progress Note Due: 3/16/24    PTA Visit #: 0/5     Time In: 1117  Time Out: 1200  Total Billable Time: 43 minutes    SUBJECTIVE     Pt reports: She is feeling pretty good this morning. States that her trip went well but she and her  are still coping with the loss of his father. Otherwise she denies any changes in her mobility or pain.     She was not given a home exercise program on evaluation.  Response to previous treatment: tolerated evaluation well  Functional change: ongoing    Pain: not rated/10  Location: right hip      OBJECTIVE     Objective measures updated on 2/16/24.     Treatment     Lydia received the treatments listed below:      therapeutic exercises to develop strength, endurance, flexibility, and posture for 4 minutes including:    X 3'30" SciFit recumbent stepper, level 1, BUE/BLE for muscular and CV endurance training      neuromuscular re-education activities to improve: Balance, Coordination, Sense, and Proprioception for 00 minutes. The following activities were included:    NP      therapeutic activities to improve functional performance for 39  minutes, including:    Education listed below    X 300 feet walking in hallway with yellow TB to donned as triple flexion band to assist with foot clearance; CGA - SBA  X 90 feet "running" in hallway triple flexion band, CGA  X 25 " "feet backwards walking with triple flexion band, CGA    --> Total activity time includes time to mello gait belt and yellow TB around ankle, knee and hip      gait training to improve functional mobility and safety for 00 minutes, including:    NP      Patient Education and Home Exercises     Home Exercises Provided and Patient Education Provided     Education provided:   1/29:  - Interpretation of 2MWT   - Visual demonstration, verbal instructions and written handout provided with all exercises to be included as part of home exercise program.   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 Exercises were reviewed and Lydai was able to demonstrate them prior to the end of the session.  Lydia demonstrated good  understanding of the education provided. See EMR under Patient Instructions for exercises provided during therapy sessions    ASSESSMENT     Lydia tolerated today's session well. The start of the session was utilized to provide pt education on FND and different exacerbating and alleviating factors in regards to her symptoms of anxiety with movement and ways to work within her tolerance and prevent frequent functional seizures. Following education, she performed very well with all ambulatory tasks performed on today and was even able to progress to running for a short distance. She continues to be limited by physical and cognitive fatigue which affects how much she is able to do during her sessions. She was, however, able to verbalize her need for a break just before her symptoms reached a breaking point which was beneficial in maximizing her participation in the session. She continues to be appropriate for skilled physical therapy services to address her deficits regarding functional mobility.      Lydia Is progressing well " towards her goals.   Pt prognosis is Good.     Pt will continue to benefit from skilled outpatient physical therapy to address the deficits listed in the problem list box on initial evaluation, provide pt/family education and to maximize pt's level of independence in the home and community environment.     Pt's spiritual, cultural and educational needs considered and pt agreeable to plan of care and goals.     Anticipated barriers to physical therapy: none at this time     Goals:   Short Term Goals = Long Term Goals: 4 weeks   Pt will be independent with an individualized home exercise program.  Ongoing  Pt will improve 5x chair rise score to </= 10 s with no UE assist for improved muscular endurance. Ongoing  Pt will improve Timed Up and Go (TUG) score to less than 14 s with no assistive device. Progressing  Pt will improve self selected walking speed (SSWS) with no assistive device to at least 1.0 m/s for improved safety with home and community ambulation. Ongoing  Pt will improve postural control with MCTSIB condition 2 score of at least 30 s for decreased fall risk with standing ADLs. Progressing  Pt will improve postural control to tolerate performing conditions 3 and 4 of the MCTSIB. Ongoing  Pt will improve FOTO score to >/= 56% for improved self perception of functional mobility. Ongoing  PT will administer 2MWT as safely able to assess pt's level of activity tolerance Met 1/29/24  Pt will improve dynamic gait abilities to tolerate performing the Functional Gait Assessment Ongoing  Pt to perform 2 minute walk test for 300 feet or greater to improve gait speed & endurance Progressing     Patient's goals: be able to walk around the block twice, go up stairs, I want to be able to do a real squat, and get into running. Ongoing    PLAN   PT to extend plan of care (POC) x 6 weeks.     Plan of care Certification: 02/16/24 to 03/29/24     Outpatient Physical Therapy 3 times weekly for 4 weeks to include the  following interventions: Gait Training, Manual Therapy, Neuromuscular Re-ed, Orthotic Management and Training, Patient Education, Therapeutic Activities, and Therapeutic Exercise.     Jazz Cody, PT

## 2024-03-08 ENCOUNTER — CLINICAL SUPPORT (OUTPATIENT)
Dept: REHABILITATION | Facility: HOSPITAL | Age: 44
End: 2024-03-08
Payer: OTHER MISCELLANEOUS

## 2024-03-08 DIAGNOSIS — R26.89 FUNCTIONAL GAIT ABNORMALITY: Primary | Chronic | ICD-10-CM

## 2024-03-08 DIAGNOSIS — Z74.09 IMPAIRED FUNCTIONAL MOBILITY, BALANCE, GAIT, AND ENDURANCE: ICD-10-CM

## 2024-03-08 PROCEDURE — 97112 NEUROMUSCULAR REEDUCATION: CPT | Mod: PO

## 2024-03-08 PROCEDURE — 97110 THERAPEUTIC EXERCISES: CPT | Mod: PO

## 2024-03-08 PROCEDURE — 97530 THERAPEUTIC ACTIVITIES: CPT | Mod: PO

## 2024-03-08 NOTE — PROGRESS NOTES
"  [facility]   Physical Therapy Treatment Note     [facility]  [facility] Number[facility] [id number]    Therapy Diagnosis:   Encounter Diagnoses   Name Primary?    Functional gait abnormality Yes    Impaired functional mobility, balance, gait, and endurance      Physician: Conrad Gutierres MD    Visit Date: 3/8/2024    Physician Orders: PT Eval and Treat; Neuro PT   Medical Diagnosis from Referral: Weakness of left lower extremity [R29.898]   Evaluation Date: 1/17/2024  Authorization Period Expiration: 10/4/24  Plan of Care Expiration: 3/29/24  Visit # / Visits authorized: 7/12     Progress Note Due: 3/16/24    PTA Visit #: 0/5     Time In: 0845  Time Out: 0930  Total Billable Time: 45 minutes    SUBJECTIVE     Pt reports: She is feeling pretty tired this morning. States that her left glut feels the burn from yesterday and her right hip is a little sore as well.     She was not given a home exercise program on evaluation.  Response to previous treatment: tolerated evaluation well  Functional change: ongoing    Pain: 6/10  Location: right hip      OBJECTIVE     Objective measures updated on 2/16/24.     Treatment     Lydia received the treatments listed below:      therapeutic exercises to develop strength, endurance, flexibility, and posture for 14 minutes including:    X 8 SciFit recumbent stepper, level 1, BUE/BLE for muscular and CV endurance training     2 x 30" B supine piriformis stretch    2 x 30" child's pose stretch    --> Total activity time includes time transitioning between treatment areas/positions and rests between sets      neuromuscular re-education activities to improve: Balance, Coordination, Sense, and Proprioception for 20 minutes. The following activities were included:    RoxPro:   2 X 2 min, Quadruped position with VC to hit green lights with left leg, purple lights with right hand  > VC to engage core to increase stability    X 1 min tall kneeling, BUE support " "on short blue bench with PT and tech stabilizing bench; VC to maintain some space between her hips and the bench    ATTEMPTED tall kneeling x 1 more minute; pt requested to stop due to feeling dizzy/anxious in tall kneeling position    --> Total activity time includes rest breaks between repetitions/attempts to improve tolerance for session    therapeutic activities to improve functional performance for 11 minutes, including:    Sit > supine Mod I  Supine <> prone mod I   Prone <> quadruped Min A to bend left knee; performed x4 throughout session  Supine <> sit with Min A for left lower extremity (PT lifting shoe to prevent friction between shoe and mat, VC to decrease focus on moving limb but instead on getting shoe closer to floor); performed x2 throughout session  Stand piv t/f mat > w/c, SBA     --> Total activity time includes time to wheel patient down to 1st floor lobby due to increased dizziness at end of session      gait training to improve functional mobility and safety for 00 minutes, including:    NP      Patient Education and Home Exercises     Home Exercises Provided and Patient Education Provided     Education provided:   1/29:  - Interpretation of 2MWT   - Visual demonstration, verbal instructions and written handout provided with all exercises to be included as part of home exercise program.   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 Exercises were reviewed and Lydia was able to demonstrate them prior to the end of the session.  Lydia demonstrated good  understanding of the education provided. See EMR under Patient Instructions for exercises provided during therapy sessions    ASSESSMENT     Lydia tolerated today's session well. She showed improved tolerance to endurance training on the SciFit today when " distracted by maintaining conversation with PT. She reported decrease in right hip pain following stretching performed on the mat today and was able to tolerate quadruped and tall kneeling positions to improve weightbearing on left lower extremity. While she was successful in both tasks, they were challenging enough to cause fatigue and she reported feeling increased dizziness following her second attempt at tall kneeling; this improved by the time she was assisted to the first floor lobby. She continues to be appropriate for skilled physical therapy services to address her deficits regarding functional mobility.      Lydia Is progressing well towards her goals.   Pt prognosis is Good.     Pt will continue to benefit from skilled outpatient physical therapy to address the deficits listed in the problem list box on initial evaluation, provide pt/family education and to maximize pt's level of independence in the home and community environment.     Pt's spiritual, cultural and educational needs considered and pt agreeable to plan of care and goals.     Anticipated barriers to physical therapy: none at this time     Goals:   Short Term Goals = Long Term Goals: 4 weeks   Pt will be independent with an individualized home exercise program.  Ongoing  Pt will improve 5x chair rise score to </= 10 s with no UE assist for improved muscular endurance. Ongoing  Pt will improve Timed Up and Go (TUG) score to less than 14 s with no assistive device. Progressing  Pt will improve self selected walking speed (SSWS) with no assistive device to at least 1.0 m/s for improved safety with home and community ambulation. Ongoing  Pt will improve postural control with MCTSIB condition 2 score of at least 30 s for decreased fall risk with standing ADLs. Progressing  Pt will improve postural control to tolerate performing conditions 3 and 4 of the MCTSIB. Ongoing  Pt will improve FOTO score to >/= 56% for improved self perception of  functional mobility. Ongoing  PT will administer 2MWT as safely able to assess pt's level of activity tolerance Met 1/29/24  Pt will improve dynamic gait abilities to tolerate performing the Functional Gait Assessment Ongoing  Pt to perform 2 minute walk test for 300 feet or greater to improve gait speed & endurance Progressing     Patient's goals: be able to walk around the block twice, go up stairs, I want to be able to do a real squat, and get into running. Ongoing    PLAN   PT to extend plan of care (POC) x 6 weeks.     Plan of care Certification: 02/16/24 to 03/29/24     Outpatient Physical Therapy 3 times weekly for 4 weeks to include the following interventions: Gait Training, Manual Therapy, Neuromuscular Re-ed, Orthotic Management and Training, Patient Education, Therapeutic Activities, and Therapeutic Exercise.     Jazz Cody, PT

## 2024-03-11 ENCOUNTER — CLINICAL SUPPORT (OUTPATIENT)
Dept: REHABILITATION | Facility: HOSPITAL | Age: 44
End: 2024-03-11
Payer: OTHER MISCELLANEOUS

## 2024-03-11 ENCOUNTER — OFFICE VISIT (OUTPATIENT)
Dept: FAMILY MEDICINE | Facility: CLINIC | Age: 44
End: 2024-03-11
Payer: OTHER GOVERNMENT

## 2024-03-11 DIAGNOSIS — L98.9 SKIN LESION: Primary | ICD-10-CM

## 2024-03-11 DIAGNOSIS — J45.20 MILD INTERMITTENT ASTHMA, UNSPECIFIED WHETHER COMPLICATED: ICD-10-CM

## 2024-03-11 DIAGNOSIS — F32.A DEPRESSION, UNSPECIFIED DEPRESSION TYPE: ICD-10-CM

## 2024-03-11 DIAGNOSIS — Z74.09 IMPAIRED FUNCTIONAL MOBILITY, BALANCE, GAIT, AND ENDURANCE: ICD-10-CM

## 2024-03-11 DIAGNOSIS — F43.23 ADJUSTMENT DISORDER WITH MIXED ANXIETY AND DEPRESSED MOOD: ICD-10-CM

## 2024-03-11 DIAGNOSIS — R26.89 FUNCTIONAL GAIT ABNORMALITY: Primary | Chronic | ICD-10-CM

## 2024-03-11 PROCEDURE — 97530 THERAPEUTIC ACTIVITIES: CPT | Mod: PO

## 2024-03-11 PROCEDURE — 97533 SENSORY INTEGRATION: CPT | Mod: PO

## 2024-03-11 PROCEDURE — 99214 OFFICE O/P EST MOD 30 MIN: CPT | Mod: 95,,, | Performed by: INTERNAL MEDICINE

## 2024-03-11 RX ORDER — ALBUTEROL SULFATE 90 UG/1
AEROSOL, METERED RESPIRATORY (INHALATION)
Qty: 54 G | Refills: 3 | Status: SHIPPED | OUTPATIENT
Start: 2024-03-11

## 2024-03-11 NOTE — PROGRESS NOTES
The patient location is: [place]  The chief complaint leading to consultation is: follow-up    Visit type: audiovisual    Face to Face time with patient: 10 min  20 minutes of total time spent on the encounter, which includes face to face time and non-face to face time preparing to see the patient (eg, review of tests), Obtaining and/or reviewing separately obtained history, Documenting clinical information in the electronic or other health record, Independently interpreting results (not separately reported) and communicating results to the patient/family/caregiver, or Care coordination (not separately reported).         Each patient to whom he or she provides medical services by telemedicine is:  (1) informed of the relationship between the physician and patient and the respective role of any other health care provider with respect to management of the patient; and (2) notified that he or she may decline to receive medical services by telemedicine and may withdraw from such care at any time.    Subjective:       Patient ID: Lydia Thomas is a pleasant 44 y.o. White female patient    Chief Complaint: Follow-up      Patient is a pt I saw last on 11/16/2023. See my last notes.    HPI    Patient with long and complicated PMH as per list of problems below stated to come due to issue of oily stools.  However she states today that she has no more issue after her holding on eating nuts in rather large quantity.  She would like a referral for Psychiatry as her appt was canceled several times, she would like to seek for medical attention outside of [facility] .  Finally, she was assessed in Dermatology on the [facility], she would like to have a follow-up on the [facility] [place] instead.    She would finally like for me to send a request for refill for albuterol at the [facility] of [facility].    Patient Active Problem List   Diagnosis    Acetaminophen overdose    Suicide attempt by acetaminophen overdose    Migraine     Anemia of chronic disease    Adjustment disorder with mixed anxiety and depressed mood    Tylenol overdose    Migraine without status migrainosus, not intractable    Occipital headache    Vasovagal syncope    Myoclonic jerking    Traumatic brain injury without loss of consciousness    Dizziness and giddiness    Depression    Warts of foot    Screening for colon cancer    Family history of colon cancer    Decreased strength, endurance, and mobility    Functional gait abnormality    Lumbar radiculopathy    Weakness of left lower extremity    Balance disorder    Impaired functional mobility, balance, gait, and endurance          ACTIVE MEDICAL ISSUES:  Documented in Problem List     PAST MEDICAL HISTORY  Documented     PAST SURGICAL HISTORY:  Documented     SOCIAL HISTORY:  Documented     FAMILY HISTORY:  Documented     ALLERGIES AND MEDICATIONS: updated and reviewed.  Documented    Review of Systems   Constitutional: Negative.  Negative for unexpected weight change.   HENT:  Negative for hearing loss, rhinorrhea and trouble swallowing.    Eyes:  Negative for discharge and visual disturbance.   Respiratory:  Negative for chest tightness and wheezing.    Cardiovascular:  Negative for chest pain and palpitations.   Gastrointestinal:  Negative for blood in stool, constipation, diarrhea and vomiting.   Endocrine: Negative for polydipsia and polyuria.   Genitourinary:  Negative for difficulty urinating, dysuria, hematuria and menstrual problem.   Musculoskeletal:  Negative for arthralgias, joint swelling and neck pain.   Skin:  Positive for wound (under L breast, s/p breast implants).   Neurological:  Positive for weakness (LLE) and headaches.   Psychiatric/Behavioral:  Positive for dysphoric mood. Negative for confusion. The patient is nervous/anxious.    All other systems reviewed and are negative.      Objective:      Physical Exam    There were no vitals filed for this visit.  There is no height or weight on file to  calculate BMI.    RESULTS: Reviewed labs from last 12 months    Last Lab Results:     Lab Results   Component Value Date    WBC 5.87 08/06/2023    HGB 12.4 08/06/2023    HCT 36.1 (L) 08/06/2023     08/06/2023     08/06/2023    K 3.5 08/06/2023     08/06/2023    CO2 17 (L) 08/06/2023    BUN 5 (L) 08/06/2023    CREATININE 0.8 08/06/2023    CALCIUM 8.9 08/06/2023    ALBUMIN 4.1 08/06/2023    AST 14 08/06/2023    ALT 11 08/06/2023    CHOL 177 04/06/2023    TRIG 60 04/06/2023    HDL 46 04/06/2023    LDLCALC 119.0 04/06/2023    HGBA1C 4.7 04/06/2023    TSH 0.999 08/06/2023       Assessment:       1. Skin lesion    2. Mild intermittent asthma, unspecified whether complicated    3. Adjustment disorder with mixed anxiety and depressed mood    4. Depression, unspecified depression type        Plan:   Lydia was seen today for follow-up.    Diagnoses and all orders for this visit:    Skin lesion  -     Ambulatory referral/consult to Dermatology; Future    Referral placed as per pt's request.    Mild intermittent asthma, unspecified whether complicated  -     albuterol (PROVENTIL/VENTOLIN HFA) 90 mcg/actuation inhaler; INHALE 1 TO 2 PUFFS INTO THE LUNGS EVERY 6 HOURS AS NEEDED FOR WHEEZING    Refill provided.    Adjustment disorder with mixed anxiety and depressed mood    Pt will check with insurance where they would cover for her to be seen in Psychiatry. She can contact me anytime when she has this info and I will place the referral.    Depression, unspecified depression type    See above.    No follow-ups on file.    This note was created by combination of typed  and M-Modal dictation.  Transcription errors may be present.  If there are any questions, please contact me.

## 2024-03-11 NOTE — PROGRESS NOTES
[facility]   Physical Therapy Treatment Note     [facility]  [facility] Number: [id number]    Therapy Diagnosis:   Encounter Diagnoses   Name Primary?    Functional gait abnormality Yes    Impaired functional mobility, balance, gait, and endurance      Physician: Conrad Gutierres MD    Visit Date: 3/11/2024    Physician Orders: PT Eval and Treat; Neuro PT   Medical Diagnosis from Referral: Weakness of left lower extremity [R29.898]   Evaluation Date: 1/17/2024  Authorization Period Expiration: 10/4/24  Plan of Care Expiration: 3/29/24  Visit # / Visits authorized: 7/12     Progress Note Due: 3/16/24    PTA Visit #: 0/5     Time In: 0931  Time Out: 1018  Total Billable Time: 47 minutes    SUBJECTIVE     Pt reports: She is feeling a little tired this morning. States that she did some walking around her house this morning.     She was not given a home exercise program on evaluation.  Response to previous treatment: tolerated evaluation well  Functional change: ongoing    Pain: 6/10  Location: right hip      OBJECTIVE     Objective measures updated on 2/16/24.     Treatment     Lydia received the treatments listed below:      therapeutic exercises to develop strength, endurance, flexibility, and posture for 00 minutes including:    NP      Sensory integration activities to improve: Coordination, Sense, and Proprioception for 33 minutes. The following activities were included:    Yellow theraband attached to gait belt assisting with flexion at hip, knee, and ankle to improve proprioceptive input to left lower extremity:     Open gym floor:  X 4 laps, multi-directional stepping around mat, B HHA to CGA via gait belt    4 x 21 feet obstacle course, stepping over obstacles of various heights (x5) (dowel, truncated cones, reji), CGA - Min A    Parallel bars:  X 12 Hip, knee, ankle flexion to place bean bag in bucket, BUE support  > with increased repetition, PT asked pt to try to  "isolate left LE movement and decrease trunk/hip rotation    --> Total activity time includes set up/breakdown of theraband      neuromuscular re-education activities to improve: Balance, Coordination, Sense, and Proprioception for 00 minutes. The following activities were included:    NP      therapeutic activities to improve functional performance for 14 minutes, including:    Sit <> supine Mod I  Supine <> prone Mod I  2 x 30" child's pose stretch    Overground gait training x175 feet with no band for proprioceptive input, CGA, VC for arm swing and to relax    --> Total activity time includes rest breaks between repetitions/attempts to improve tolerance for session and time transitioning between treatment areas      gait training to improve functional mobility and safety for 00 minutes, including:    NP      Patient Education and Home Exercises     Home Exercises Provided and Patient Education Provided     Education provided:   1/29:  - Interpretation of 2MWT   - Visual demonstration, verbal instructions and written handout provided with all exercises to be included as part of home exercise program.   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 Exercises were reviewed and Lydia was able to demonstrate them prior to the end of the session.  Lydia demonstrated good  understanding of the education provided. See EMR under Patient Instructions for exercises provided during therapy sessions    ASSESSMENT     Lydia tolerated today's session well. She performed well with multi-directional stepping around a mat with good ability to vary her stepping pattern. She also showed good stepping strategies throughout the session to assist with recovering her balance when lost (I.e. with backwards walking and obstacle negotiation). She performed " really well with obstacles and bean bag placements showing improved hip and knee flexion on today when cued to focus more on moving the bags than moving her legs. She denied any reports of fatigue at the end of her session and showed good carryover of gait mechanics when walking out of her session on today. She continues to be appropriate for skilled physical therapy services to address her deficits regarding functional mobility.      Lydia Is progressing well towards her goals.   Pt prognosis is Good.     Pt will continue to benefit from skilled outpatient physical therapy to address the deficits listed in the problem list box on initial evaluation, provide pt/family education and to maximize pt's level of independence in the home and community environment.     Pt's spiritual, cultural and educational needs considered and pt agreeable to plan of care and goals.     Anticipated barriers to physical therapy: none at this time     Goals:   Short Term Goals = Long Term Goals: 4 weeks   Pt will be independent with an individualized home exercise program.  Ongoing  Pt will improve 5x chair rise score to </= 10 s with no UE assist for improved muscular endurance. Ongoing  Pt will improve Timed Up and Go (TUG) score to less than 14 s with no assistive device. Progressing  Pt will improve self selected walking speed (SSWS) with no assistive device to at least 1.0 m/s for improved safety with home and community ambulation. Ongoing  Pt will improve postural control with MCTSIB condition 2 score of at least 30 s for decreased fall risk with standing ADLs. Progressing  Pt will improve postural control to tolerate performing conditions 3 and 4 of the MCTSIB. Ongoing  Pt will improve FOTO score to >/= 56% for improved self perception of functional mobility. Ongoing  PT will administer 2MWT as safely able to assess pt's level of activity tolerance Met 1/29/24  Pt will improve dynamic gait abilities to tolerate performing the  Functional Gait Assessment Ongoing  Pt to perform 2 minute walk test for 300 feet or greater to improve gait speed & endurance Progressing     Patient's goals: be able to walk around the block twice, go up stairs, I want to be able to do a real squat, and get into running. Ongoing    PLAN   PT to extend plan of care (POC) x 6 weeks.     Plan of care Certification: 02/16/24 to 03/29/24     Outpatient Physical Therapy 3 times weekly for 4 weeks to include the following interventions: Gait Training, Manual Therapy, Neuromuscular Re-ed, Orthotic Management and Training, Patient Education, Therapeutic Activities, and Therapeutic Exercise.     Jazz Cody, PT

## 2024-03-12 NOTE — PROGRESS NOTES
[facility]   Physical Therapy Updated POC    Name: Lydia Thomas  Clinic Number: [id number]    Therapy Diagnosis:   Encounter Diagnoses   Name Primary?    Functional gait abnormality Yes    Impaired functional mobility, balance, gait, and endurance      Physician: Conrad Gutierres MD    Visit Date: 3/13/2024    Physician Orders: PT Eval and Treat; Neuro PT   Medical Diagnosis from Referral: Weakness of left lower extremity [R29.898]   Evaluation Date: 1/17/2024  Authorization Period Expiration: 10/4/24  Plan of Care Expiration: 3/29/24  Visit # / Visits authorized: 9/12     Progress Note Due: 3/16/24    PTA Visit #: 0/5     Time In: 0800  Time Out: 0845  Total Billable Time: 45 minutes    SUBJECTIVE     Pt reports: She is feeling good this morning. She was pretty tired after her last session and needed to rest the remainder of the day. States that she has been using her strategies at home to help improve her activity tolerance and participation.    She was not given a home exercise program on evaluation.  Response to previous treatment: tolerated evaluation well  Functional change: ongoing    Pain: 6/10  Location: right hip      OBJECTIVE     Objective measures updated on 2/16/24.     Functional Gait Assessment:   1. Gait on level surface =  1 (8.28 s)   (3) Normal: less than 5.5 sec, no A.D., no imbalance, normal gait pattern, deviates< 6in   (2) Mild impairment: 7-5.6 sec, uses A.D., mild gait deviations, or deviates 6-10 in   (1) Moderate impairment: > 7 sec, slow speed, imbalance, deviates 10-15 in.   (0) Severe impairment: needs assist, deviates >15 in, reach/touch wall  2. Change in Gait Speed = 2   (3) Normal: smooth change w/o loss of balance or gait deviation, deviates < 6 in, significant difference between speeds   (2) Mild impairment: changes speed, but demonstrates mild gait deviations, deviates 6-10 in, OR no deviations but unable to significantly speed, OR uses  A.D.   (1) Moderate impairment: minor changes to speed, OR changes speed w/ significant deviations, deviates 10-15 in, OR  Changes speed , but loses balance & recovers   (0) Severe impairment: cannot change speed, deviates >15 in, or loses balance & needs assist  3. Gait with horizontal head turns  = 3   (3) Normal: no change in gait, deviates <6 in   (2) Mild impairment: slight change in speed, deviates 6-10 in, OR uses A.D.   (1) Moderate impairment: moderate change in speed, deviates 10-15 in   (0) Severe impairment: severe disruption of gait, deviates >15in  4. Gait with vertical head turns = 3   (3) Normal: no change in gait, deviates <6 in   (2) Mild impairment: slight change in speed, deviates 6-10 in OR uses A.D.   (1) Moderate impairment: moderate change in speed, deviates 10-15 in   (0) Severe impairment: severe disruption of gait, deviates >15 in  5. Gait with pivot turns = 1*   (3) Normal: performs safely in 3 sec, no LOB   (2) Mild impairment: performs in >3 sec & no LOB, OR turns safely & requires several steps to regain LOB   (1) Moderate impairment: turns slow, OR requires several small steps for balance following turn & stop   (0) Severe impairment: cannot turn safely, needs assist  6. Step over obstacle = 1*   (3) Normal: steps over 2 stacked boxes w/o change in speed or LOB   (2) Mild impairment: able to step over 1 box w/o change in speed or LOB   (1) Moderate impairment: steps over 1 box but must slow down, may require VC   (0) Severe impairment: cannot perform w/o assist  7. Gait with Narrow MARY = 0   (3) Normal: 10 steps no staggering   (2) Mild impairment: 7-9 steps   (1) Moderate impairment: 4-7 steps   (0) Severe impairment: < 4 steps or cannot perform w/o assist  8. Gait with eyes closed = 1   (3) Normal: < 7 sec, no A.D., no LOB, normal gait pattern, deviates <6 in   (2) Mild impairment: 7.1-9 sec, mild gait deviations, deviates 6-10 in   (1) Moderate impairment: > 9 sec, abnormal  "pattern, LOB, deviates 10-15 in   (0) Severe impairment: cannot perform w/o assist, LOB, deviates >15in  9. Ambulating Backwards = 2*   (3) Normal: no A.D., no LOB, normal gait pattern, deviates <6in   (2) Mild impairment: uses A.D., slower speed, mild gait deviations, deviates 6-10 in   (1) Moderate impairment: slow speed, abnormal gait pattern, LOB, deviates 10-15 in   (0) Severe impairment: severe gait deviations or LOB, deviates >15in  10. Steps = 1*   (3) Normal: alternating feet, no rail   (2) Mild Impairment: alternating feet, uses rail   (1) Moderate impairment: step-to, uses rail   (0) Severe impairment: cannot perform safely    Score 15*/30   *= PT rated score based on observation of previous performance during sessions  Score:   <22/30 fall risk   <20/30 fall risk in older adults   <18/30 fall risk in Parkinsons         APTA Core Set Outcome Measures for Adults with Neurologic Conditions     Evaluation  2/16/24 3/13/24 Reference values    Timed Up and Go 23.97 sec    21 sec, HHA  18 sec, SBA score >14 seconds indicates risk for falls in older stroke patients (Autumn et al, 2006)   10 Meter Walk Test  0.67 m/sec (6m/9 s)   "Limited community ambulator" speed category 0.67 m/sec (6m/9 s)   "Limited community ambulator" speed category  0.72 m/sec (6m/8.28 s)   "Limited community" speed category  0-0.4 m/s = household walker  0.4-0.8 m/s = limited community ambulator   0.8-1.4 m/s = community ambultor  >1.4 m/s = can cross street safely    Functional Gait Assessment  Deferred for now; plan to assess as safely able Deferred for now; plan to assess as safely able  15*/30 <22/30 = identifies fall risk in community dwelling older adults   (MCID = 4)   2 Minute Walk Test   190.02 (standing rest break at 1'40") (1/29/24)  227 feet, no rest breaks,  feet, no rest breaks, SBA        5 times sit-stand    19.07 seconds    20 seconds  20 seconds, right UE support initially, then no UE support >12 sec= fall " "risk for general elderly  >10 sec= balance/vestibular dysfunction (<61 y/o)  >14.2 sec= balance/vestibular dysfunction (>61 y/o)  >12 sec= fall risk for stroke        Treatment     Lydia received the treatments listed below:      therapeutic exercises to develop strength, endurance, flexibility, and posture for 00 minutes including:    NP      Sensory integration activities to improve: Coordination, Sense, and Proprioception for 00 minutes. The following activities were included:    NP      neuromuscular re-education activities to improve: Balance, Coordination, Sense, and Proprioception for 00 minutes. The following activities were included:    NP      therapeutic activities to improve functional performance for 45 minutes, including:    Total time dedicated to performing objective measures listed above       gait training to improve functional mobility and safety for 00 minutes, including:    NP      Patient Education and Home Exercises     Home Exercises Provided and Patient Education Provided     Education provided:   1/29:  - Interpretation of 2MWT   - Visual demonstration, verbal instructions and written handout provided with all exercises to be included as part of home exercise program.   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 Exercises were reviewed and Lydia was able to demonstrate them prior to the end of the session.  Lydia demonstrated good  understanding of the education provided. See EMR under Patient Instructions for exercises provided during therapy sessions    ASSESSMENT     Lydia Ramirez Alexandrianeda tolerated today's session well with a focus on reassessment of progress towards goals. She was able to tolerate performing the outcome measures listed above with significant improvements noted in all ambulatory tests " on today, most notably on her 2MWT. She was also able to tolerate performing most of the Functional Gait Assessment on today, however she had to stop prior to finishing due to increased physical and cognitive fatigue. Her score was calculated based on her performance of functional tasks performed during prior sessions and places her at an increased risk of falling with dynamic gait activities. To date, she has met 2/11 short and long-term goals established at her initial evaluation and prior progress notes. She remains appropriate for skilled physical therapy services at their current frequency for an additional 6 weeks beyond her established plan of care to continue addressing her functional deficits and goals related to improved independence with functional mobility.     Lydia Is progressing well towards her goals.   Pt prognosis is Good.     Pt will continue to benefit from skilled outpatient physical therapy to address the deficits listed in the problem list box on initial evaluation, provide pt/family education and to maximize pt's level of independence in the home and community environment.     Pt's spiritual, cultural and educational needs considered and pt agreeable to plan of care and goals.     Anticipated barriers to physical therapy: none at this time     Goals:   Short Term Goals = Long Term Goals: 4 weeks   Pt will be independent with an individualized home exercise program.  Ongoing  Pt will improve 5x chair rise score to </= 10 s with no UE assist for improved muscular endurance. Progressing  Pt will improve Timed Up and Go (TUG) score to less than 14 s with no assistive device. Progressing  Pt will improve self selected walking speed (SSWS) with no assistive device to at least 1.0 m/s for improved safety with home and community ambulation. Progressing  Pt will improve postural control with MCTSIB condition 2 score of at least 30 s for decreased fall risk with standing ADLs. Progressing  Pt will  improve postural control to tolerate performing conditions 3 and 4 of the MCTSIB. Ongoing  Pt will improve FOTO score to >/= 56% for improved self perception of functional mobility. Ongoing  PT will administer 2MWT as safely able to assess pt's level of activity tolerance Met 1/29/24  Pt will improve dynamic gait abilities to tolerate performing the Functional Gait Assessment Met 3/13/24  Pt to perform 2 minute walk test for 300 feet or greater to improve gait speed & endurance Progressing  New 3/13/24: Pt will improve Functional Gait Assessment (FGA) score to at least 23/30 for increased independence with home and community ambulation.      Patient's goals: be able to walk around the block twice, go up stairs, I want to be able to do a real squat, and get into running. Ongoing      PLAN   PT POC extended from 3/29/24 to 5/10/24     Outpatient Physical Therapy 3 times weekly for 4 weeks to include the following interventions: Gait Training, Manual Therapy, Neuromuscular Re-ed, Orthotic Management and Training, Patient Education, Therapeutic Activities, and Therapeutic Exercise.     Jazz Cody, PT

## 2024-03-13 ENCOUNTER — CLINICAL SUPPORT (OUTPATIENT)
Dept: REHABILITATION | Facility: HOSPITAL | Age: 44
End: 2024-03-13
Payer: OTHER MISCELLANEOUS

## 2024-03-13 DIAGNOSIS — R26.89 FUNCTIONAL GAIT ABNORMALITY: Primary | Chronic | ICD-10-CM

## 2024-03-13 DIAGNOSIS — Z74.09 IMPAIRED FUNCTIONAL MOBILITY, BALANCE, GAIT, AND ENDURANCE: ICD-10-CM

## 2024-03-13 PROCEDURE — 97530 THERAPEUTIC ACTIVITIES: CPT | Mod: PO

## 2024-03-13 NOTE — PLAN OF CARE
[facility]   Physical Therapy Updated POC    Name: Lydia Thomas  Clinic Number: [id number]    Therapy Diagnosis:   Encounter Diagnoses   Name Primary?    Functional gait abnormality Yes    Impaired functional mobility, balance, gait, and endurance      Physician: Conrad Gutierres MD    Visit Date: 3/13/2024    Physician Orders: PT Eval and Treat; Neuro PT   Medical Diagnosis from Referral: Weakness of left lower extremity [R29.898]   Evaluation Date: 1/17/2024  Authorization Period Expiration: 10/4/24  Plan of Care Expiration: 3/29/24  Visit # / Visits authorized: 9/12     Progress Note Due: 3/16/24    PTA Visit #: 0/5     Time In: 0800  Time Out: 0845  Total Billable Time: 45 minutes    SUBJECTIVE     Pt reports: She is feeling good this morning. She was pretty tired after her last session and needed to rest the remainder of the day. States that she has been using her strategies at home to help improve her activity tolerance and participation.    She was not given a home exercise program on evaluation.  Response to previous treatment: tolerated evaluation well  Functional change: ongoing    Pain: 6/10  Location: right hip      OBJECTIVE     Objective measures updated on 2/16/24.     Functional Gait Assessment:   1. Gait on level surface =  1 (8.28 s)   (3) Normal: less than 5.5 sec, no A.D., no imbalance, normal gait pattern, deviates< 6in   (2) Mild impairment: 7-5.6 sec, uses A.D., mild gait deviations, or deviates 6-10 in   (1) Moderate impairment: > 7 sec, slow speed, imbalance, deviates 10-15 in.   (0) Severe impairment: needs assist, deviates >15 in, reach/touch wall  2. Change in Gait Speed = 2   (3) Normal: smooth change w/o loss of balance or gait deviation, deviates < 6 in, significant difference between speeds   (2) Mild impairment: changes speed, but demonstrates mild gait deviations, deviates 6-10 in, OR no deviations but unable to significantly speed, OR uses  A.D.   (1) Moderate impairment: minor changes to speed, OR changes speed w/ significant deviations, deviates 10-15 in, OR  Changes speed , but loses balance & recovers   (0) Severe impairment: cannot change speed, deviates >15 in, or loses balance & needs assist  3. Gait with horizontal head turns  = 3   (3) Normal: no change in gait, deviates <6 in   (2) Mild impairment: slight change in speed, deviates 6-10 in, OR uses A.D.   (1) Moderate impairment: moderate change in speed, deviates 10-15 in   (0) Severe impairment: severe disruption of gait, deviates >15in  4. Gait with vertical head turns = 3   (3) Normal: no change in gait, deviates <6 in   (2) Mild impairment: slight change in speed, deviates 6-10 in OR uses A.D.   (1) Moderate impairment: moderate change in speed, deviates 10-15 in   (0) Severe impairment: severe disruption of gait, deviates >15 in  5. Gait with pivot turns = 1*   (3) Normal: performs safely in 3 sec, no LOB   (2) Mild impairment: performs in >3 sec & no LOB, OR turns safely & requires several steps to regain LOB   (1) Moderate impairment: turns slow, OR requires several small steps for balance following turn & stop   (0) Severe impairment: cannot turn safely, needs assist  6. Step over obstacle = 1*   (3) Normal: steps over 2 stacked boxes w/o change in speed or LOB   (2) Mild impairment: able to step over 1 box w/o change in speed or LOB   (1) Moderate impairment: steps over 1 box but must slow down, may require VC   (0) Severe impairment: cannot perform w/o assist  7. Gait with Narrow MARY = 0   (3) Normal: 10 steps no staggering   (2) Mild impairment: 7-9 steps   (1) Moderate impairment: 4-7 steps   (0) Severe impairment: < 4 steps or cannot perform w/o assist  8. Gait with eyes closed = 1   (3) Normal: < 7 sec, no A.D., no LOB, normal gait pattern, deviates <6 in   (2) Mild impairment: 7.1-9 sec, mild gait deviations, deviates 6-10 in   (1) Moderate impairment: > 9 sec, abnormal  "pattern, LOB, deviates 10-15 in   (0) Severe impairment: cannot perform w/o assist, LOB, deviates >15in  9. Ambulating Backwards = 2*   (3) Normal: no A.D., no LOB, normal gait pattern, deviates <6in   (2) Mild impairment: uses A.D., slower speed, mild gait deviations, deviates 6-10 in   (1) Moderate impairment: slow speed, abnormal gait pattern, LOB, deviates 10-15 in   (0) Severe impairment: severe gait deviations or LOB, deviates >15in  10. Steps = 1*   (3) Normal: alternating feet, no rail   (2) Mild Impairment: alternating feet, uses rail   (1) Moderate impairment: step-to, uses rail   (0) Severe impairment: cannot perform safely    Score 15*/30   *= PT rated score based on observation of previous performance during sessions  Score:   <22/30 fall risk   <20/30 fall risk in older adults   <18/30 fall risk in Parkinsons         APTA Core Set Outcome Measures for Adults with Neurologic Conditions     Evaluation  2/16/24 3/13/24 Reference values    Timed Up and Go 23.97 sec    21 sec, HHA  18 sec, SBA score >14 seconds indicates risk for falls in older stroke patients (Autumn et al, 2006)   10 Meter Walk Test  0.67 m/sec (6m/9 s)   "Limited community ambulator" speed category 0.67 m/sec (6m/9 s)   "Limited community ambulator" speed category  0.72 m/sec (6m/8.28 s)   "Limited community" speed category  0-0.4 m/s = household walker  0.4-0.8 m/s = limited community ambulator   0.8-1.4 m/s = community ambultor  >1.4 m/s = can cross street safely    Functional Gait Assessment  Deferred for now; plan to assess as safely able Deferred for now; plan to assess as safely able  15*/30 <22/30 = identifies fall risk in community dwelling older adults   (MCID = 4)   2 Minute Walk Test   190.02 (standing rest break at 1'40") (1/29/24)  227 feet, no rest breaks,  feet, no rest breaks, SBA        5 times sit-stand    19.07 seconds    20 seconds  20 seconds, right UE support initially, then no UE support >12 sec= fall " "risk for general elderly  >10 sec= balance/vestibular dysfunction (<59 y/o)  >14.2 sec= balance/vestibular dysfunction (>59 y/o)  >12 sec= fall risk for stroke        Treatment     Lydia received the treatments listed below:      therapeutic exercises to develop strength, endurance, flexibility, and posture for 00 minutes including:    NP      Sensory integration activities to improve: Coordination, Sense, and Proprioception for 00 minutes. The following activities were included:    NP      neuromuscular re-education activities to improve: Balance, Coordination, Sense, and Proprioception for 00 minutes. The following activities were included:    NP      therapeutic activities to improve functional performance for 45 minutes, including:    Total time dedicated to performing objective measures listed above       gait training to improve functional mobility and safety for 00 minutes, including:    NP      Patient Education and Home Exercises     Home Exercises Provided and Patient Education Provided     Education provided:   1/29:  - Interpretation of 2MWT   - Visual demonstration, verbal instructions and written handout provided with all exercises to be included as part of home exercise program.   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 Exercises were reviewed and Lydia was able to demonstrate them prior to the end of the session.  Lydia demonstrated good  understanding of the education provided. See EMR under Patient Instructions for exercises provided during therapy sessions    ASSESSMENT     Lydia Ramirez Alexandrianeda tolerated today's session well with a focus on reassessment of progress towards goals. She was able to tolerate performing the outcome measures listed above with significant improvements noted in all ambulatory tests " on today, most notably on her 2MWT. She was also able to tolerate performing most of the Functional Gait Assessment on today, however she had to stop prior to finishing due to increased physical and cognitive fatigue. Her score was calculated based on her performance of functional tasks performed during prior sessions and places her at an increased risk of falling with dynamic gait activities. To date, she has met 2/11 short and long-term goals established at her initial evaluation and prior progress notes. She remains appropriate for skilled physical therapy services at their current frequency for an additional 6 weeks beyond her established plan of care to continue addressing her functional deficits and goals related to improved independence with functional mobility.     Lydia Is progressing well towards her goals.   Pt prognosis is Good.     Pt will continue to benefit from skilled outpatient physical therapy to address the deficits listed in the problem list box on initial evaluation, provide pt/family education and to maximize pt's level of independence in the home and community environment.     Pt's spiritual, cultural and educational needs considered and pt agreeable to plan of care and goals.     Anticipated barriers to physical therapy: none at this time     Goals:   Short Term Goals = Long Term Goals: 4 weeks   Pt will be independent with an individualized home exercise program.  Ongoing  Pt will improve 5x chair rise score to </= 10 s with no UE assist for improved muscular endurance. Progressing  Pt will improve Timed Up and Go (TUG) score to less than 14 s with no assistive device. Progressing  Pt will improve self selected walking speed (SSWS) with no assistive device to at least 1.0 m/s for improved safety with home and community ambulation. Progressing  Pt will improve postural control with MCTSIB condition 2 score of at least 30 s for decreased fall risk with standing ADLs. Progressing  Pt will  improve postural control to tolerate performing conditions 3 and 4 of the MCTSIB. Ongoing  Pt will improve FOTO score to >/= 56% for improved self perception of functional mobility. Ongoing  PT will administer 2MWT as safely able to assess pt's level of activity tolerance Met 1/29/24  Pt will improve dynamic gait abilities to tolerate performing the Functional Gait Assessment Met 3/13/24  Pt to perform 2 minute walk test for 300 feet or greater to improve gait speed & endurance Progressing  New 3/13/24: Pt will improve Functional Gait Assessment (FGA) score to at least 23/30 for increased independence with home and community ambulation.      Patient's goals: be able to walk around the block twice, go up stairs, I want to be able to do a real squat, and get into running. Ongoing      PLAN   PT POC extended from 3/29/24 to 5/10/24     Outpatient Physical Therapy 3 times weekly for 4 weeks to include the following interventions: Gait Training, Manual Therapy, Neuromuscular Re-ed, Orthotic Management and Training, Patient Education, Therapeutic Activities, and Therapeutic Exercise.     Jazz Cody, PT

## 2024-03-17 NOTE — PROGRESS NOTES
"  [facility]   Physical Therapy Treatment Note    [facility]  [facility] Number: [id number]    Therapy Diagnosis:   Encounter Diagnoses   Name Primary?    Functional gait abnormality Yes    Impaired functional mobility, balance, gait, and endurance      Physician: Conrad Gutierres MD    Visit Date: 3/18/2024    Physician Orders: PT Eval and Treat; Neuro PT   Medical Diagnosis from Referral: Weakness of left lower extremity [R29.898]   Evaluation Date: 1/17/2024  Authorization Period Expiration: 10/4/24  Plan of Care Expiration: 5/10/24  Visit # / Visits authorized: 10/12     Progress Note Due: 4/13/24    PTA Visit #: 0/5     Time In: 0930  Time Out: 1010  Total Billable Time: 40 minutes    SUBJECTIVE     Pt reports: She is feeling pretty good this morning. States that her right hip is feeling a little tight this morning but no complaints otherwise.    She was not given a home exercise program on evaluation.  Response to previous treatment: tolerated evaluation well  Functional change: ongoing    Pain: 6/10  Location: right hip      OBJECTIVE     Objective measures updated on 3/13/24.       Treatment     Lydia received the treatments listed below:      therapeutic exercises to develop strength, endurance, flexibility, and posture for 8 minutes including:    3x 30" B piriformis stretch   2 x 30" standing toe touch stretch      Sensory integration activities to improve: Coordination, Sense, and Proprioception for 27 minutes. The following activities were included:    Treadmill training:  X 3 min, 0.6 mph, BUE support, harness for safety + video of PT's feet walking on treadmill to cue reciprocal gait pattern with normal step length  X 5 min, 0.6 mph, BUE support, harness for safety + GaitBetter (see below)    GaitBetter     Duration Speed Distance Obstacles Navigation Level Handrails Score   Trial 1 5 mins 0.6 mph 83 yards 0% N/A 1 bilateral 84      Left Right   Hurdles  0/0 0/2 " "  Puddles 0/0 0/2   Total 0% 0%       Skilled setup and breakdown required. Therapeutic rest break in between normal training and GaitBetter trials.    X 5 min diaphragmatic breathing with pacing by PT to decrease sensory stimulation      neuromuscular re-education activities to improve: Balance, Coordination, Sense, and Proprioception for 00 minutes. The following activities were included:    NP      therapeutic activities to improve functional performance for 5 minutes, including:    Education listed below      gait training to improve functional mobility and safety for 00 minutes, including:    NP      Patient Education and Home Exercises     Home Exercises Provided and Patient Education Provided     Education provided:   1/29:  - Interpretation of 2MWT   - Visual demonstration, verbal instructions and written handout provided with all exercises to be included as part of home exercise program.   3/7:  - Educated on "[place]" analogy and factors that cause her symptoms to ramp up vs those that help her relax to decrease the frequency of her functional seizures during sessions  3/18: - Educated on successful use of strategies to avoid allowing her symptoms to over flow and lead to a functional seizure    Written Home Exercises Provided: yes. Exercises were reviewed and Lydia was able to demonstrate them prior to the end of the session.  Lydia demonstrated good  understanding of the education provided. See EMR under Patient Instructions for exercises provided during therapy sessions    ASSESSMENT     Lydia Thomas tolerated today's session well. She performed very well with standing toe touch stretching however reports only feeling the stretch in her right leg; may attempt with flexion band to improve proprioceptive input to left LE. She was also able to perform gait training on the treadmill on today. She showed good tolerance and endurance however, the novelty of the task increased her fear of " falling and she reverted to walking with increased left hip circumduction and decreased knee flexion. Her performance improved with use of the GaitBetter as an external point of focus however, she accidentally pulled the emergency stop while walking which heightened her fear and anxiety due to the sudden stop of the treadmill. She took several minutes to return to her baseline level of arousal with PT providing a chair and cuing for diaphragmatic breathing but she was able to decrease these symptoms on today with cuing and without resulting in a functional seizure. She is progressing well with therapy and continues to be appropriate for skilled physical therapy services to maximize her independence and mobility.     Lydia Is progressing well towards her goals.   Pt prognosis is Good.     Pt will continue to benefit from skilled outpatient physical therapy to address the deficits listed in the problem list box on initial evaluation, provide pt/family education and to maximize pt's level of independence in the home and community environment.     Pt's spiritual, cultural and educational needs considered and pt agreeable to plan of care and goals.     Anticipated barriers to physical therapy: none at this time     Goals:   Short Term Goals = Long Term Goals: 4 weeks   Pt will be independent with an individualized home exercise program.  Ongoing  Pt will improve 5x chair rise score to </= 10 s with no UE assist for improved muscular endurance. Progressing  Pt will improve Timed Up and Go (TUG) score to less than 14 s with no assistive device. Progressing  Pt will improve self selected walking speed (SSWS) with no assistive device to at least 1.0 m/s for improved safety with home and community ambulation. Progressing  Pt will improve postural control with MCTSIB condition 2 score of at least 30 s for decreased fall risk with standing ADLs. Progressing  Pt will improve postural control to tolerate performing conditions 3  and 4 of the MCTSIB. Ongoing  Pt will improve FOTO score to >/= 56% for improved self perception of functional mobility. Ongoing  PT will administer 2MWT as safely able to assess pt's level of activity tolerance Met 1/29/24  Pt will improve dynamic gait abilities to tolerate performing the Functional Gait Assessment Met 3/13/24  Pt to perform 2 minute walk test for 300 feet or greater to improve gait speed & endurance Progressing  New 3/13/24: Pt will improve Functional Gait Assessment (FGA) score to at least 23/30 for increased independence with home and community ambulation.      Patient's goals: be able to walk around the block twice, go up stairs, I want to be able to do a real squat, and get into running. Ongoing      PLAN   PT POC extended from 3/29/24 to 5/10/24     Outpatient Physical Therapy 3 times weekly for 4 weeks to include the following interventions: Gait Training, Manual Therapy, Neuromuscular Re-ed, Orthotic Management and Training, Patient Education, Therapeutic Activities, and Therapeutic Exercise.     Jazz Cody, PT

## 2024-03-18 ENCOUNTER — CLINICAL SUPPORT (OUTPATIENT)
Dept: REHABILITATION | Facility: HOSPITAL | Age: 44
End: 2024-03-18
Payer: OTHER MISCELLANEOUS

## 2024-03-18 DIAGNOSIS — R26.89 FUNCTIONAL GAIT ABNORMALITY: Primary | Chronic | ICD-10-CM

## 2024-03-18 DIAGNOSIS — Z74.09 IMPAIRED FUNCTIONAL MOBILITY, BALANCE, GAIT, AND ENDURANCE: ICD-10-CM

## 2024-03-18 PROCEDURE — 97533 SENSORY INTEGRATION: CPT | Mod: PO

## 2024-03-18 PROCEDURE — 97110 THERAPEUTIC EXERCISES: CPT | Mod: PO

## 2024-03-19 ENCOUNTER — TELEPHONE (OUTPATIENT)
Dept: FAMILY MEDICINE | Facility: CLINIC | Age: 44
End: 2024-03-19
Payer: OTHER GOVERNMENT

## 2024-03-19 DIAGNOSIS — R07.9 CHEST PAIN, UNSPECIFIED TYPE: Primary | ICD-10-CM

## 2024-03-19 NOTE — TELEPHONE ENCOUNTER
Patient requesting a referral to Cardiology.  Stated it was discussed during virtual visit.  Advised patient that message will be forwarded to Dr. Vergara to remind her of need for referral.  Patient stated that it may have been overlooked because there was a lot of stuff discussed.  Please advise.

## 2024-03-19 NOTE — TELEPHONE ENCOUNTER
----- Message from Marito Trivedi sent at 3/19/2024  4:15 PM CDT -----  Regarding: Self [phone]  Type: Patient Call Back    Who called: Self     What is the request in detail: called to see if she could talk to the nurse about she was supposed to have a referral for cardiology. Would like a call back.     Can the clinic reply by [facility]? No     Would the patient rather a call back or a response via [facility]? Call back     Best call back number: [phone]     Additional Information:    Thank you.

## 2024-03-19 NOTE — PROGRESS NOTES
[facility]   Physical Therapy Treatment Note    [facility]  [facility] Number: [id number]    Therapy Diagnosis:   Encounter Diagnoses   Name Primary?    Functional gait abnormality Yes    Impaired functional mobility, balance, gait, and endurance      Physician: Conrad Gutierres MD    Visit Date: 3/20/2024    Physician Orders: PT Eval and Treat; Neuro PT   Medical Diagnosis from Referral: Weakness of left lower extremity [R29.898]   Evaluation Date: 1/17/2024  Authorization Period Expiration: 10/4/24  Plan of Care Expiration: 5/10/24  Visit # / Visits authorized: 11/12     Progress Note Due: 4/13/24    PTA Visit #: 0/5     Time In: 1107  Time Out: 1200  Total Billable Time: 53 minutes    SUBJECTIVE     Pt reports: She is feeling pretty good this morning. States that after her last session she had a migraine     She was not given a home exercise program on evaluation.  Response to previous treatment: tolerated evaluation well  Functional change: ongoing    Pain: 6/10  Location: right hip      OBJECTIVE     Objective measures updated on 3/13/24.       Treatment     Lydia received the treatments listed below:      therapeutic exercises to develop strength, endurance, flexibility, and posture for 13 minutes including:    X 8 minutes, SciFit Recumbent stepper, level 1, BUE/BLE for muscular and CV endurance    --> Total activity time includes time discussing POC and upcoming appointments      Sensory integration activities to improve: Coordination, Sense, and Proprioception for 40 minutes. The following activities were included:    Triple flexion band donned to left LE for improved proprioception for all activities    Ballet bar:  X 12 placing bean bags in bucket with left leg, left UE support only  > using mirror for visual feedback and video recording for improved motor control and activation    8 x 93 feet gait training, no AD, CGA - SBA  > 30 feet of each trial performed in  "front of mirror to work on normalizing gait mechanics and self-agency over use of her left leg  > performed 4 trials, required sitting break, then was able to perform 4 more trials    2 x 5 sit to stands from standard chair + self-balloon tapping hand to hand, CGA - SBA    --> total activity time includes seated rest breaks as needed      neuromuscular re-education activities to improve: Balance, Coordination, Sense, and Proprioception for 00 minutes. The following activities were included:    NP      therapeutic activities to improve functional performance for 00 minutes, including:    NP      gait training to improve functional mobility and safety for 00 minutes, including:    NP      Patient Education and Home Exercises     Home Exercises Provided and Patient Education Provided     Education provided:   1/29:  - Interpretation of 2MWT   - Visual demonstration, verbal instructions and written handout provided with all exercises to be included as part of home exercise program.   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 Exercises were reviewed and Lydia was able to demonstrate them prior to the end of the session.  Lydia demonstrated good  understanding of the education provided. See EMR under Patient Instructions for exercises provided during therapy sessions    ASSESSMENT     Lydia Ramirez Alexandrianeda tolerated today's session very well. She responded very well to the use of a video recording of PT's foot performing bean bag activity with normal " mechanics and showed improved activation of her hip flexors and dorsiflexors with less hip hiking and trunk rotation to compensate. She also tolerated walking trials better on today when walking in front of the mirror; showed decreased hip hiking and more normal arm swing bilaterally with cuing. She was initially anxious and confused about walking and seeing her left leg move however this improved with repetition and reinforcement of using her strategies to decrease her anxiety. She showed improved bilateral weight bearing with sit to stands while distracted by balloon tapping and was even able to take steps to correct her balance when reaching for balloon outside of her limits of stability. She is progressing well with therapy and continues to be appropriate for skilled physical therapy services to maximize her independence and mobility.     Lydia Is progressing well towards her goals.   Pt prognosis is Good.     Pt will continue to benefit from skilled outpatient physical therapy to address the deficits listed in the problem list box on initial evaluation, provide pt/family education and to maximize pt's level of independence in the home and community environment.     Pt's spiritual, cultural and educational needs considered and pt agreeable to plan of care and goals.     Anticipated barriers to physical therapy: none at this time     Goals:   Short Term Goals = Long Term Goals: 4 weeks   Pt will be independent with an individualized home exercise program.  Ongoing  Pt will improve 5x chair rise score to </= 10 s with no UE assist for improved muscular endurance. Progressing  Pt will improve Timed Up and Go (TUG) score to less than 14 s with no assistive device. Progressing  Pt will improve self selected walking speed (SSWS) with no assistive device to at least 1.0 m/s for improved safety with home and community ambulation. Progressing  Pt will improve postural control with MCTSIB condition 2 score of at least 30  s for decreased fall risk with standing ADLs. Progressing  Pt will improve postural control to tolerate performing conditions 3 and 4 of the MCTSIB. Ongoing  Pt will improve FOTO score to >/= 56% for improved self perception of functional mobility. Ongoing  PT will administer 2MWT as safely able to assess pt's level of activity tolerance Met 1/29/24  Pt will improve dynamic gait abilities to tolerate performing the Functional Gait Assessment Met 3/13/24  Pt to perform 2 minute walk test for 300 feet or greater to improve gait speed & endurance Progressing  New 3/13/24: Pt will improve Functional Gait Assessment (FGA) score to at least 23/30 for increased independence with home and community ambulation.      Patient's goals: be able to walk around the block twice, go up stairs, I want to be able to do a real squat, and get into running. Ongoing      PLAN   PT POC extended from 3/29/24 to 5/10/24     Outpatient Physical Therapy 3 times weekly for 4 weeks to include the following interventions: Gait Training, Manual Therapy, Neuromuscular Re-ed, Orthotic Management and Training, Patient Education, Therapeutic Activities, and Therapeutic Exercise.     Jazz Cody, PT

## 2024-03-20 ENCOUNTER — CLINICAL SUPPORT (OUTPATIENT)
Dept: REHABILITATION | Facility: HOSPITAL | Age: 44
End: 2024-03-20
Payer: OTHER MISCELLANEOUS

## 2024-03-20 DIAGNOSIS — R26.89 FUNCTIONAL GAIT ABNORMALITY: Primary | Chronic | ICD-10-CM

## 2024-03-20 DIAGNOSIS — Z74.09 IMPAIRED FUNCTIONAL MOBILITY, BALANCE, GAIT, AND ENDURANCE: ICD-10-CM

## 2024-03-20 PROCEDURE — 97110 THERAPEUTIC EXERCISES: CPT | Mod: PO

## 2024-03-20 PROCEDURE — 97533 SENSORY INTEGRATION: CPT | Mod: PO

## 2024-03-21 ENCOUNTER — OFFICE VISIT (OUTPATIENT)
Dept: DERMATOLOGY | Facility: CLINIC | Age: 44
End: 2024-03-21
Payer: OTHER GOVERNMENT

## 2024-03-21 DIAGNOSIS — D23.5 DILATED PORE OF WINER OF BACK: ICD-10-CM

## 2024-03-21 DIAGNOSIS — L98.8 RHYTIDES: ICD-10-CM

## 2024-03-21 DIAGNOSIS — Z12.83 SCREENING EXAM FOR SKIN CANCER: ICD-10-CM

## 2024-03-21 DIAGNOSIS — I83.90 VARICOSE VEINS OF LOWER EXTREMITY, UNSPECIFIED LATERALITY, UNSPECIFIED WHETHER COMPLICATED: ICD-10-CM

## 2024-03-21 DIAGNOSIS — L21.9 SEBORRHEIC DERMATITIS: Primary | ICD-10-CM

## 2024-03-21 DIAGNOSIS — D22.9 MULTIPLE BENIGN NEVI: ICD-10-CM

## 2024-03-21 DIAGNOSIS — D18.01 CHERRY ANGIOMA: ICD-10-CM

## 2024-03-21 PROCEDURE — 99999 PR PBB SHADOW E&M-EST. PATIENT-LVL V: CPT | Mod: PBBFAC,,, | Performed by: STUDENT IN AN ORGANIZED HEALTH CARE EDUCATION/TRAINING PROGRAM

## 2024-03-21 PROCEDURE — 99215 OFFICE O/P EST HI 40 MIN: CPT | Mod: PBBFAC | Performed by: STUDENT IN AN ORGANIZED HEALTH CARE EDUCATION/TRAINING PROGRAM

## 2024-03-21 PROCEDURE — 99204 OFFICE O/P NEW MOD 45 MIN: CPT | Mod: S$PBB,,, | Performed by: STUDENT IN AN ORGANIZED HEALTH CARE EDUCATION/TRAINING PROGRAM

## 2024-03-21 RX ORDER — KETOCONAZOLE 20 MG/ML
SHAMPOO, SUSPENSION TOPICAL
Qty: 120 ML | Refills: 3 | Status: SHIPPED | OUTPATIENT
Start: 2024-03-21

## 2024-03-21 RX ORDER — TRETINOIN 0.25 MG/G
CREAM TOPICAL
Qty: 45 G | Refills: 5 | Status: SHIPPED | OUTPATIENT
Start: 2024-03-21

## 2024-03-21 NOTE — PROGRESS NOTES
Subjective:      Patient ID:  Lydia Thomas is a 44 y.o. female who presents for   Chief Complaint   Patient presents with    Skin Check     TBSE     Lydia Thomas is a 44 y.o. female who presents for: FBSE screening exam for skin cancer.    New patient    The patient has the following lesions of concern:  Location: face rash  Duration: 2 days ago  Symptoms: burns, red, slight itch  Relieving factors/Previous treatments: shaved face and applied The Ordinary glycolic acid which created rash and irritation    Patient with new complaint of lesion(s)  Location: L leg vein  Duration: since childhood  Symptoms: PCP wanted looked at  Relieving factors/Previous treatments: none    Patient with new complaint of lesion(s)  Location: spot on R shoulder  Duration: since childhood  Symptoms:   Relieving factors/Previous treatments: none    Pertinent history:  History of blistering sunburns: No  History of tanning bed use: No  Family history of melanoma: No  Personal history of mole removal: No  Personal history of skin cancer: No  Pt reports a history of Bulimia nervosa         Review of Systems   Skin:  Positive for activity-related sunscreen use. Negative for daily sunscreen use and recent sunburn.   Hematologic/Lymphatic: Does not bruise/bleed easily.       Objective:   Physical Exam   Constitutional: She appears well-developed and well-nourished. No distress.   Neurological: She is alert and oriented to person, place, and time. She is not disoriented.   Psychiatric: She has a normal mood and affect.   Skin:   Areas Examined (abnormalities noted in diagram):   Scalp / Hair Palpated and Inspected  Head / Face Inspection Performed  Neck Inspection Performed  Chest / Axilla Inspection Performed  Abdomen Inspection Performed  Genitals / Buttocks / Groin Inspection Performed  Back Inspection Performed  RUE Inspected  LUE Inspection Performed  RLE Inspected  LLE Inspection Performed  Nails and Digits Inspection  Performed            Diagram Legend     Erythematous scaling macule/papule c/w actinic keratosis       Vascular papule c/w angioma      Pigmented verrucoid papule/plaque c/w seborrheic keratosis      Yellow umbilicated papule c/w sebaceous hyperplasia      Irregularly shaped tan macule c/w lentigo     1-2 mm smooth white papules consistent with Milia      Movable subcutaneous cyst with punctum c/w epidermal inclusion cyst      Subcutaneous movable cyst c/w pilar cyst      Firm pink to brown papule c/w dermatofibroma      Pedunculated fleshy papule(s) c/w skin tag(s)      Evenly pigmented macule c/w junctional nevus     Mildly variegated pigmented, slightly irregular-bordered macule c/w mildly atypical nevus      Flesh colored to evenly pigmented papule c/w intradermal nevus       Pink pearly papule/plaque c/w basal cell carcinoma      Erythematous hyperkeratotic cursted plaque c/w SCC      Surgical scar with no sign of skin cancer recurrence      Open and closed comedones      Inflammatory papules and pustules      Verrucoid papule consistent consistent with wart     Erythematous eczematous patches and plaques     Dystrophic onycholytic nail with subungual debris c/w onychomycosis     Umbilicated papule    Erythematous-base heme-crusted tan verrucoid plaque consistent with inflamed seborrheic keratosis     Erythematous Silvery Scaling Plaque c/w Psoriasis     See annotation      Assessment / Plan:        Seborrheic dermatitis  Mild flaking scalp  Status: chronic condition flaring and/or not at treatment goal  Alternate shampoos- ketoconazole Rx, Head and Shoulders, Selsun Blue can increase frequency when flaring  Reviewed chronic nature of condition, waxing and waning course    Cherry angioma  This is a benign vascular lesion. Reassurance given. No treatment required.     Multiple benign nevi  Reassurance    Varicose veins of lower extremity, unspecified laterality, unspecified whether complicated  -     Ambulatory  referral/consult to [facility]; Future; Expected date: 03/28/2024  Pt interested to discuss treatment of vein of L medial thigh- referral to vein center    Dilated pore of [place] of back  Reassurance, do not recommend punch excision would leave scar pt agrees    Rhytides  Pt currently getting botox and chemical peels for prevention- interested in coming here  Discussed cosmetic cost of botox $14 per unit and chemical peels $150 for superficial and $300 for deep, series of peels typically better than one  Start tretinoin 0.025% cream every other night and increase to nightly as tolerated. Reviewed dryness, irritation are SE use moisturizer after    Screening exam for skin cancer  Total body skin examination performed today including at least 12 points as noted in physical examination. No lesions suspicious for malignancy noted.    Recommend daily sun protection/avoidance, use of at least SPF 30, broad spectrum sunscreen (OTC drug), skin self examinations, and routine physician surveillance to optimize early detection

## 2024-03-21 NOTE — PROGRESS NOTES
DATE: 3/21/2024  PATIENT: Lydia Thomas    Attending Physician: Conrad Gutierres MD    HISTORY:  Lydia Thomas is a 44 y.o. female last seen by Dr. Gutierres on 2/26/24.  She continues to have LLE n/t and weakness, but PT has been helping tremendously. She has not been able to work. She wants to continue neuro PT for her LLE.     The Patient denies myelopathic symptoms such as handwriting changes or difficulty with buttons/coins/keys. Denies perineal paresthesias, bowel/bladder dysfunction.    PMH/PSH/FamHx/SocHx:  Unchanged from prior visit    ROS:  REVIEW OF SYSTEMS:  Constitution: Negative. Negative for chills, fever and night sweats.   HENT: Negative for congestion and headaches.    Eyes: Negative for blurred vision, left vision loss and right vision loss.   Cardiovascular: Negative for chest pain and syncope.   Respiratory: Negative for cough and shortness of breath.    Endocrine: Negative for polydipsia, polyphagia and polyuria.   Hematologic/Lymphatic: Negative for bleeding problem. Does not bruise/bleed easily.   Skin: Negative for dry skin, itching and rash.   Musculoskeletal: Negative for falls and muscle weakness.   Gastrointestinal: Negative for abdominal pain and bowel incontinence.   Allergic/Immunologic: Negative for hives and persistent infections.  Genitourinary: Negative for urinary retention/incontinence and nocturia.   Neurological: positive for disturbances in coordination, no myelopathic symptoms such as handwriting changes or difficulty with buttons, coins, keys or small objects. No loss of balance and seizures.   Psychiatric/Behavioral: Negative for depression. The patient does not have insomnia.   Denies perineal paresthesias, bowel or bladder incontinence    EXAM:    diffuse weakness 3/5 in all muscle groups in LLE. Normal sensation to light touch in the C5-T1 and L2-S1 dermatomes bilaterally. + L Prajapati's       IMAGING:  No new imaging today.    Today I personally re- reviewed AP,  Lat and Flex/Ex  upright L-spine that demonstrate no fractures or listhesis.     Through Care Everywhere, CT of entire spine (cervical, thoracic and lumbar) reports stated no fractures. CT head report stated no acute head bleed.      MRI C-T-L showed no significant central or foraminal stenosis.    There is no height or weight on file to calculate BMI.    Hemoglobin A1C   Date Value Ref Range Status   04/06/2023 4.7 4.0 - 5.6 % Final     Comment:     ADA Screening Guidelines:  5.7-6.4%  Consistent with prediabetes  >or=6.5%  Consistent with diabetes    High levels of fetal hemoglobin interfere with the HbA1C  assay. Heterozygous hemoglobin variants (HbS, HgC, etc)do  not significantly interfere with this assay.   However, presence of multiple variants may affect accuracy.     04/14/2021 4.9 4.0 - 5.6 % Final     Comment:     ADA Screening Guidelines:  5.7-6.4%  Consistent with prediabetes  >or=6.5%  Consistent with diabetes    High levels of fetal hemoglobin interfere with the HbA1C  assay. Heterozygous hemoglobin variants (HbS, HgC, etc)do  not significantly interfere with this assay.   However, presence of multiple variants may affect accuracy.           ASSESSMENT/PLAN:    There are no diagnoses linked to this encounter.    Today we discussed at length all of the different treatment options including anti-inflammatories, acetaminophen, rest, ice, heat, physical therapy including strengthening and stretching exercises, home exercises, ROM, aerobic conditioning, aqua therapy, other modalities including ultrasound, massage, and dry needling, epidural steroid injections and finally surgical intervention.      Pt has left leg weakness and pain following a concussion on 4/12/22. There is no orthopedic spine surgery warranted at this time. Patient is doing very well with neuro physical therapy and making improvements in her strength. Recommend continued PT is approved.

## 2024-03-26 ENCOUNTER — TELEPHONE (OUTPATIENT)
Dept: FAMILY MEDICINE | Facility: CLINIC | Age: 44
End: 2024-03-26
Payer: OTHER GOVERNMENT

## 2024-03-29 ENCOUNTER — HOSPITAL ENCOUNTER (EMERGENCY)
Facility: HOSPITAL | Age: 44
Discharge: HOME | End: 2024-03-29
Attending: STUDENT IN AN ORGANIZED HEALTH CARE EDUCATION/TRAINING PROGRAM
Payer: OTHER GOVERNMENT

## 2024-03-29 VITALS
SYSTOLIC BLOOD PRESSURE: 105 MMHG | WEIGHT: 133 LBS | BODY MASS INDEX: 23.57 KG/M2 | DIASTOLIC BLOOD PRESSURE: 58 MMHG | OXYGEN SATURATION: 98 % | HEIGHT: 63 IN | TEMPERATURE: 99 F | RESPIRATION RATE: 20 BRPM | HEART RATE: 72 BPM

## 2024-03-29 DIAGNOSIS — R04.2 COUGHING UP BLOOD: ICD-10-CM

## 2024-03-29 DIAGNOSIS — J40 BRONCHITIS: Primary | ICD-10-CM

## 2024-03-29 DIAGNOSIS — R05.9 COUGH: ICD-10-CM

## 2024-03-29 LAB
ALBUMIN SERPL BCP-MCNC: 4 G/DL (ref 3.5–5.2)
ALP SERPL-CCNC: 37 U/L (ref 55–135)
ALT SERPL W/O P-5'-P-CCNC: 11 U/L (ref 10–44)
ANION GAP SERPL CALC-SCNC: 5 MMOL/L (ref 8–16)
APTT PPP: 28.8 SEC (ref 21–32)
AST SERPL-CCNC: 16 U/L (ref 10–40)
BASOPHILS # BLD AUTO: 0.08 K/UL (ref 0–0.2)
BASOPHILS NFR BLD: 1.7 % (ref 0–1.9)
BILIRUB SERPL-MCNC: 0.8 MG/DL (ref 0.1–1)
BNP SERPL-MCNC: <10 PG/ML (ref 0–99)
BUN SERPL-MCNC: 7 MG/DL (ref 6–20)
CALCIUM SERPL-MCNC: 9.2 MG/DL (ref 8.7–10.5)
CHLORIDE SERPL-SCNC: 108 MMOL/L (ref 95–110)
CO2 SERPL-SCNC: 24 MMOL/L (ref 23–29)
CREAT SERPL-MCNC: 0.7 MG/DL (ref 0.5–1.4)
CTP QC/QA: YES
CTP QC/QA: YES
D DIMER PPP IA.FEU-MCNC: 0.27 MG/L FEU
DIFFERENTIAL METHOD BLD: ABNORMAL
EOSINOPHIL # BLD AUTO: 0.1 K/UL (ref 0–0.5)
EOSINOPHIL NFR BLD: 2.8 % (ref 0–8)
ERYTHROCYTE [DISTWIDTH] IN BLOOD BY AUTOMATED COUNT: 11.9 % (ref 11.5–14.5)
EST. GFR  (NO RACE VARIABLE): >60 ML/MIN/1.73 M^2
GLUCOSE SERPL-MCNC: 96 MG/DL (ref 70–110)
HCT VFR BLD AUTO: 36.7 % (ref 37–48.5)
HGB BLD-MCNC: 12.9 G/DL (ref 12–16)
IMM GRANULOCYTES # BLD AUTO: 0.01 K/UL (ref 0–0.04)
IMM GRANULOCYTES NFR BLD AUTO: 0.2 % (ref 0–0.5)
INR PPP: 1 (ref 0.8–1.2)
LYMPHOCYTES # BLD AUTO: 1.7 K/UL (ref 1–4.8)
LYMPHOCYTES NFR BLD: 36.2 % (ref 18–48)
MAGNESIUM SERPL-MCNC: 1.9 MG/DL (ref 1.6–2.6)
MCH RBC QN AUTO: 32.9 PG (ref 27–31)
MCHC RBC AUTO-ENTMCNC: 35.1 G/DL (ref 32–36)
MCV RBC AUTO: 94 FL (ref 82–98)
MONOCYTES # BLD AUTO: 0.4 K/UL (ref 0.3–1)
MONOCYTES NFR BLD: 8.1 % (ref 4–15)
NEUTROPHILS # BLD AUTO: 2.3 K/UL (ref 1.8–7.7)
NEUTROPHILS NFR BLD: 51 % (ref 38–73)
NRBC BLD-RTO: 0 /100 WBC
PLATELET # BLD AUTO: 213 K/UL (ref 150–450)
PMV BLD AUTO: 10.4 FL (ref 9.2–12.9)
POC MOLECULAR INFLUENZA A AGN: NEGATIVE
POC MOLECULAR INFLUENZA B AGN: NEGATIVE
POTASSIUM SERPL-SCNC: 3.7 MMOL/L (ref 3.5–5.1)
PROT SERPL-MCNC: 6.7 G/DL (ref 6–8.4)
PROTHROMBIN TIME: 10.9 SEC (ref 9–12.5)
RBC # BLD AUTO: 3.92 M/UL (ref 4–5.4)
SARS-COV-2 RDRP RESP QL NAA+PROBE: NEGATIVE
SODIUM SERPL-SCNC: 137 MMOL/L (ref 136–145)
TROPONIN I SERPL DL<=0.01 NG/ML-MCNC: <0.006 NG/ML (ref 0–0.03)
WBC # BLD AUTO: 4.58 K/UL (ref 3.9–12.7)

## 2024-03-29 PROCEDURE — 85379 FIBRIN DEGRADATION QUANT: CPT | Performed by: STUDENT IN AN ORGANIZED HEALTH CARE EDUCATION/TRAINING PROGRAM

## 2024-03-29 PROCEDURE — 87502 INFLUENZA DNA AMP PROBE: CPT

## 2024-03-29 PROCEDURE — 84484 ASSAY OF TROPONIN QUANT: CPT | Performed by: STUDENT IN AN ORGANIZED HEALTH CARE EDUCATION/TRAINING PROGRAM

## 2024-03-29 PROCEDURE — 87635 SARS-COV-2 COVID-19 AMP PRB: CPT | Performed by: STUDENT IN AN ORGANIZED HEALTH CARE EDUCATION/TRAINING PROGRAM

## 2024-03-29 PROCEDURE — 80053 COMPREHEN METABOLIC PANEL: CPT | Performed by: STUDENT IN AN ORGANIZED HEALTH CARE EDUCATION/TRAINING PROGRAM

## 2024-03-29 PROCEDURE — 83880 ASSAY OF NATRIURETIC PEPTIDE: CPT | Performed by: STUDENT IN AN ORGANIZED HEALTH CARE EDUCATION/TRAINING PROGRAM

## 2024-03-29 PROCEDURE — 83735 ASSAY OF MAGNESIUM: CPT | Performed by: STUDENT IN AN ORGANIZED HEALTH CARE EDUCATION/TRAINING PROGRAM

## 2024-03-29 PROCEDURE — 85025 COMPLETE CBC W/AUTO DIFF WBC: CPT | Performed by: STUDENT IN AN ORGANIZED HEALTH CARE EDUCATION/TRAINING PROGRAM

## 2024-03-29 PROCEDURE — 85610 PROTHROMBIN TIME: CPT | Performed by: STUDENT IN AN ORGANIZED HEALTH CARE EDUCATION/TRAINING PROGRAM

## 2024-03-29 PROCEDURE — 99284 EMERGENCY DEPT VISIT MOD MDM: CPT | Mod: 25

## 2024-03-29 PROCEDURE — 25000003 PHARM REV CODE 250: Performed by: STUDENT IN AN ORGANIZED HEALTH CARE EDUCATION/TRAINING PROGRAM

## 2024-03-29 PROCEDURE — 85730 THROMBOPLASTIN TIME PARTIAL: CPT | Performed by: STUDENT IN AN ORGANIZED HEALTH CARE EDUCATION/TRAINING PROGRAM

## 2024-03-29 RX ORDER — BENZONATATE 100 MG/1
100 CAPSULE ORAL 3 TIMES DAILY PRN
Qty: 20 CAPSULE | Refills: 0 | Status: SHIPPED | OUTPATIENT
Start: 2024-03-29 | End: 2024-04-02

## 2024-03-29 RX ORDER — PROMETHAZINE HYDROCHLORIDE AND CODEINE PHOSPHATE 6.25; 1 MG/5ML; MG/5ML
5 SOLUTION ORAL EVERY 4 HOURS PRN
Qty: 240 ML | Refills: 0 | Status: SHIPPED | OUTPATIENT
Start: 2024-03-29 | End: 2024-04-02

## 2024-03-29 RX ORDER — BENZONATATE 100 MG/1
200 CAPSULE ORAL ONCE
Status: COMPLETED | OUTPATIENT
Start: 2024-03-29 | End: 2024-03-29

## 2024-03-29 RX ORDER — AZITHROMYCIN 250 MG/1
TABLET, FILM COATED ORAL
Qty: 6 EACH | Refills: 0 | Status: SHIPPED | OUTPATIENT
Start: 2024-03-29 | End: 2024-04-02

## 2024-03-29 RX ADMIN — BENZONATATE 200 MG: 100 CAPSULE ORAL at 06:03

## 2024-03-29 NOTE — ED PROVIDER NOTES
"Encounter Date: 3/29/2024       History     Chief Complaint   Patient presents with    Cough     Pt presents to the ED with c/o worsening cough since last Sunday that has now progressed to include left-side CP with the cough and pink-tinged sputum since this AM. Endorses chronic post-nasal drip. Denies any other symptoms. Took OTC cold/flu medication yesterday with no relief.      HPI    Is a 44-year-old female past medical history of asthma, exercise-induced, migraine headaches, presented to the emergency department for evaluation of offices.  She has worsening cough since last Sunday the known occludes left-sided chest pain, pleuritic chest pain, pink tinged sputum noticed this morning.  She endorses chronic postnasal drip.  Patient notes taking over-the-counter cough medicine without relief.  Patient denies nausea, vomiting, abdominal pain, dysuria, hematuria, fever, chills, diaphoresis, syncope, shortness of breath.  No other mitigating or exacerbating factors.  Review of patient's allergies indicates:   Allergen Reactions    Iodine and iodide containing products Anaphylaxis     PATIENT CAN RECEIVE IOHEXOL, has received multiple times with no pretreatment with no allergic reaction    Shellfish derived Anaphylaxis    Benadryl [diphenhydramine hcl] Itching     "with fast injection"    Fish containing products     Iodine      Past Medical History:   Diagnosis Date    Anemia     Anemia     Asthma     exercise asthma    Migraine headache     Ovarian cyst      Past Surgical History:   Procedure Laterality Date    APPENDECTOMY      4/2019    AUGMENTATION OF BREAST Bilateral 03/2022    BREAST BIOPSY Right     Excisional bx, benign    COLONOSCOPY N/A 05/18/2022    Procedure: COLONOSCOPY;  Surgeon: Sabino Mcintosh MD;  Location: [facility];  Service: Endoscopy;  Laterality: N/A;  High risk for colon cancer (family)      fully vaccinated; instructions to portal-GT    ESOPHAGOGASTRODUODENOSCOPY N/A 1/18/2024    Procedure: EGD " (ESOPHAGOGASTRODUODENOSCOPY);  Surgeon: Sabino Mcintosh MD;  Location: [facility];  Service: Endoscopy;  Laterality: N/A;  1/10 ref by SHANEL Wright, instr. to [place]-  1/16- pc complete. DBM     Family History   Problem Relation Age of Onset    Asthma Mother     Miscarriages / Stillbirths Mother     Breast cancer Mother         60    Arthritis Father     COPD Father     Asthma Brother     Colon polyps Brother     Breast cancer Maternal Grandmother         60    Cancer Maternal Grandfather     Hearing loss Paternal Grandmother     Hearing loss Paternal Grandfather     Diabetes Maternal Uncle     Hypertension Maternal Uncle     Prostate cancer Maternal Uncle     Hearing loss Paternal Aunt     Hearing loss Paternal Uncle     Amblyopia Neg Hx     Blindness Neg Hx     Cataracts Neg Hx     Glaucoma Neg Hx     Macular degeneration Neg Hx     Retinal detachment Neg Hx     Strabismus Neg Hx      Social History     Tobacco Use    Smoking status: Never    Smokeless tobacco: Never   Substance Use Topics    Alcohol use: Yes     Comment: occass    Drug use: No     Review of Systems  See HPI  Physical Exam     Initial Vitals [03/29/24 0442]   BP Pulse Resp Temp SpO2   122/61 82 16 98.9 °F (37.2 °C) 98 %      MAP       --         Physical Exam    Nursing note and vitals reviewed.  Constitutional: She appears well-developed and well-nourished. She is not diaphoretic. No distress.   HENT:   Head: Normocephalic and atraumatic.   Right Ear: External ear normal.   Left Ear: External ear normal.   Nose: Nose normal.   Eyes: Conjunctivae are normal. No scleral icterus.   Neck: Neck supple. No tracheal deviation present.   Normal range of motion.  Cardiovascular:  Normal rate, regular rhythm, normal heart sounds and intact distal pulses.     Exam reveals no gallop and no friction rub.       No murmur heard.  Pulmonary/Chest: No respiratory distress. She has no wheezes. She has rhonchi. She has no rales.   Left basilar rhonchi noted.    Abdominal: Abdomen is soft. Bowel sounds are normal. There is no abdominal tenderness.   Musculoskeletal:      Cervical back: Normal range of motion and neck supple.     Neurological: She is alert and oriented to person, place, and time. GCS score is 15. GCS eye subscore is 4. GCS verbal subscore is 5. GCS motor subscore is 6.   Skin: Skin is warm and dry.   No jaundice   Psychiatric: She has a normal mood and affect. Thought content normal.         ED Course   Procedures  Labs Reviewed   CBC W/ AUTO DIFFERENTIAL - Abnormal; Notable for the following components:       Result Value    RBC 3.92 (*)     Hematocrit 36.7 (*)     MCH 32.9 (*)     All other components within normal limits   COMPREHENSIVE METABOLIC PANEL - Abnormal; Notable for the following components:    Alkaline Phosphatase 37 (*)     Anion Gap 5 (*)     All other components within normal limits   D DIMER, QUANTITATIVE   B-TYPE NATRIURETIC PEPTIDE   TROPONIN I   MAGNESIUM   APTT   PROTIME-INR   SARS-COV-2 RDRP GENE   POCT INFLUENZA A/B MOLECULAR          Imaging Results              X-Ray Chest PA And Lateral (Final result)  Result time 03/29/24 05:45:08      Final result by Shannan Tran MD (03/29/24 05:45:08)                   Impression:      No radiographic evidence of acute intra-thoracic process.      Electronically signed by: Shannan Tran MD  Date:    03/29/2024  Time:    05:45               Narrative:    EXAMINATION:  XR CHEST PA AND LATERAL    CLINICAL HISTORY:  Cough, unspecified    TECHNIQUE:  PA and lateral views of the chest were performed.    COMPARISON:  08/06/2023    FINDINGS:  Soft tissues of the patient's arms project over lateral view obscuring some detail the retrosternal airspace and mediastinal margins.  The cardiomediastinal silhouette is within normal limits of size and configuration.  Mediastinal structures are midline.  The lungs appear symmetrically aerated without definite focal alveolar consolidation. No large pleural  effusion or pneumothorax is appreciated.Visualized osseous structures are intact.                                       Medications   benzonatate capsule 200 mg (200 mg Oral Given 3/29/24 0621)     Medical Decision Making  Amount and/or Complexity of Data Reviewed  Labs: ordered. Decision-making details documented in ED Course.  Radiology:  Decision-making details documented in ED Course.    Risk  Prescription drug management.    44-year-old female presenting to the emergency department for evaluation of pink tinged sputum with cough.  History of asthma.  Differential diagnoses considered but not limited to bronchitis, pulmonary embolism, ACS, CHF, electrolyte derangement, anemia.  Clinically appears patient has bronchitis.  D-dimer is normal, no evidence of DVT on exam, doubt pulmonary embolism.  BNP and troponin are normal, doubt CHF exacerbation.  Chest x-ray is unremarkable, doubt pneumonia.  Mildly decreased hematocrit otherwise hemoglobin within normal limits.  No large clots noted only pink tinged sputum and patient's call for an.  Afebrile, no leukocytosis, platelet count is normal.  Coags within normal limits, doubt coagulopathy.  COVID and flu were negative, doubt these etiologies.  Plan to treat patient with azithromycin given history of pulmonary disease with worsening bronchitis with hemoptysis.  Tessalon Perles and cough syrup ordered as well.  Patient otherwise looks well and plan for discharge.  Strict return precautions given. I discussed with the patient/family the diagnosis, treatment plan, indications for return to the emergency department, and for expected follow-up. The patient/family verbalized an understanding. The patient/family  asked if there are any questions or concerns. We discuss the case, until all issues are addressed to the patient/family's satisfaction. Patient/family understands and is agreeable to the plan. Patient is stable and ready for discharge.             ED Course as of  03/29/24 2111   Fri Mar 29, 2024   0607 X-Ray Chest PA And Lateral  Impression:     No radiographic evidence of acute intra-thoracic process.      [CC]   0644 D-Dimer: 0.27 [CC]      ED Course User Index  [CC] Sorin Casas MD                           Clinical Impression:  Final diagnoses:  [R05.9] Cough  [J40] Bronchitis (Primary)  [R04.2] Coughing up blood          ED Disposition Condition    Discharge Stable          ED Prescriptions       Medication Sig Dispense Start Date End Date Auth. Provider    azithromycin (Z-DARÍO) 250 MG tablet Take 2 tablets by mouth on day 1; Take 1 tablet by mouth on days 2-5 6 each 3/29/2024 -- Sorin Casas MD    benzonatate (TESSALON) 100 MG capsule Take 1 capsule (100 mg total) by mouth 3 (three) times daily as needed for Cough. 20 capsule 3/29/2024 4/8/2024 Sorin Casas MD    promethazine-codeine 6.25-10 mg/5 ml (PHENERGAN WITH CODEINE) 6.25-10 mg/5 mL syrup Take 5 mLs by mouth every 4 (four) hours as needed for Cough. 240 mL 3/29/2024 4/8/2024 Sorin Casas MD          Follow-up Information       Follow up With Specialties Details Why Contact Info    Katy Vergara MD Family Medicine, Internal Medicine Schedule an appointment as soon as possible for a visit in 2 days  [address] [place] [address]  [phone]      [facility] [place] Go to  If symptoms worsen [address] [facility] [place] [id number] [phone]             Sorin Casas MD  03/29/24 2111

## 2024-03-29 NOTE — ED TRIAGE NOTES
Pt presents to ED d/t cough since Sunday. Pt reports she began to notice pink-tinged sputum  when coughing starting this AM, blood on cough rag noted. Pt also reports 10/10 HA  and  L sided chest pain  that she believes came about from from coughing so much.

## 2024-04-02 ENCOUNTER — OFFICE VISIT (OUTPATIENT)
Dept: OBSTETRICS AND GYNECOLOGY | Facility: CLINIC | Age: 44
End: 2024-04-02
Payer: OTHER GOVERNMENT

## 2024-04-02 VITALS
SYSTOLIC BLOOD PRESSURE: 116 MMHG | DIASTOLIC BLOOD PRESSURE: 64 MMHG | WEIGHT: 141.44 LBS | BODY MASS INDEX: 25.06 KG/M2 | HEIGHT: 63 IN

## 2024-04-02 DIAGNOSIS — Z85.9 HISTORY OF CANCER: ICD-10-CM

## 2024-04-02 DIAGNOSIS — Z12.39 SCREENING BREAST EXAMINATION: ICD-10-CM

## 2024-04-02 DIAGNOSIS — Z01.419 ENCOUNTER FOR GYNECOLOGICAL EXAMINATION WITHOUT ABNORMAL FINDING: Primary | ICD-10-CM

## 2024-04-02 PROCEDURE — 99386 PREV VISIT NEW AGE 40-64: CPT | Mod: S$PBB,,,

## 2024-04-02 PROCEDURE — 99213 OFFICE O/P EST LOW 20 MIN: CPT | Mod: PBBFAC

## 2024-04-02 PROCEDURE — 87591 N.GONORRHOEAE DNA AMP PROB: CPT

## 2024-04-02 PROCEDURE — 87491 CHLMYD TRACH DNA AMP PROBE: CPT

## 2024-04-02 PROCEDURE — 81514 NFCT DS BV&VAGINITIS DNA ALG: CPT

## 2024-04-02 PROCEDURE — 87624 HPV HI-RISK TYP POOLED RSLT: CPT

## 2024-04-02 PROCEDURE — 99999 PR PBB SHADOW E&M-EST. PATIENT-LVL III: CPT | Mod: PBBFAC,,,

## 2024-04-02 PROCEDURE — 88175 CYTOPATH C/V AUTO FLUID REDO: CPT

## 2024-04-02 RX ORDER — CLOBETASOL PROPIONATE 0.05 G/100ML
1 SHAMPOO TOPICAL EVERY OTHER DAY
COMMUNITY
Start: 2024-01-15

## 2024-04-02 NOTE — PROGRESS NOTES
Subjective     Patient ID: Lydia Thomas is a 44 y.o. female.    Chief Complaint:  Well Woman      History of Present Illness  HPI  HISTORY OF PRESENT ILLNESS:    Lydia Thomas is a 44 y.o. female, , Patient's last menstrual period was 2024 (exact date).,  presents for a routine exam and has no complaints.   She reports a history of fibroids in the past and wants to make sure it is not growing. States she still has some intermittent left pelvic pain. Denies any abnormal cycles.     She would also like a referral for genetic testing due to strong  maternal family history of breast, ovarian, pancreatic, and stomach cancers.     She is sexually active with her . She use condoms for contraception.  History of STDs: denies.  She does want STD screening.  Denies any other GYN complaints.      The patient participates in regular exercise: yes.  The patient does not smoke.  The patient wears seatbelts.   Pt denies any domestic violence. reports tattoos or blood transfusions    SCREENING:   Pap: 2021 nilm, hpv neg (DUE TODAY)   Mammogram:  2022 NML, TC Score: 26.45 %   Colonoscopy: N/A   DEXA:  N/A     GYN FH:   Breast cancer: MOTHER, MGM, MAT AUNTS  Colon cancer: none  Ovarian cancer: MAT AUNT  Endometrial cancer: none      GYN & OB History  Patient's last menstrual period was 2024 (exact date).   Date of Last Pap: 2021    OB History    Para Term  AB Living   2 0 0 0 2 0   SAB IAB Ectopic Multiple Live Births   0 2 0 0 0      # Outcome Date GA Lbr [place]/2nd Weight Sex Delivery Anes PTL Lv   2 IAB      TAB      1 IAB      TAB        Past Medical History:  Past Medical History:   Diagnosis Date    Anemia     Anemia     Asthma     exercise asthma    Migraine headache     Ovarian cyst        Past Surgical History:  Past Surgical History:   Procedure Laterality Date    APPENDECTOMY      2019    AUGMENTATION OF BREAST Bilateral 2022    BREAST BIOPSY Right      "Excisional bx, benign    COLONOSCOPY N/A 05/18/2022    Procedure: COLONOSCOPY;  Surgeon: Sabino Mcintosh MD;  Location: [facility] ENDO;  Service: Endoscopy;  Laterality: N/A;  High risk for colon cancer (family)      fully vaccinated; instructions to portal-GT    ESOPHAGOGASTRODUODENOSCOPY N/A 1/18/2024    Procedure: EGD (ESOPHAGOGASTRODUODENOSCOPY);  Surgeon: Sabino Mcintosh MD;  Location: [facility] ENDO;  Service: Endoscopy;  Laterality: N/A;  1/10 ref by Feli Jc, PAC, instr. to portal-st  1/16- pc complete. DBM       Family History:  Family History   Problem Relation Age of Onset    Asthma Mother     Miscarriages / Stillbirths Mother     Breast cancer Mother         60    Arthritis Father     COPD Father     Asthma Brother     Colon polyps Brother     Breast cancer Maternal Grandmother         60    Cancer Maternal Grandfather     Stomach cancer Maternal Grandfather     Hearing loss Paternal Grandmother     Hearing loss Paternal Grandfather     Diabetes Maternal Uncle     Hypertension Maternal Uncle     Prostate cancer Maternal Uncle     Pancreatic cancer Maternal Uncle     Hearing loss Paternal Aunt     Hearing loss Paternal Uncle     Amblyopia Neg Hx     Blindness Neg Hx     Cataracts Neg Hx     Glaucoma Neg Hx     Macular degeneration Neg Hx     Retinal detachment Neg Hx     Strabismus Neg Hx        Allergies:  Review of patient's allergies indicates:   Allergen Reactions    Iodine and iodide containing products Anaphylaxis     PATIENT CAN RECEIVE IOHEXOL, has received multiple times with no pretreatment with no allergic reaction    Shellfish derived Anaphylaxis    Benadryl [diphenhydramine hcl] Itching     "with fast injection"    Fish containing products     Iodine        Medications:  Current Outpatient Medications on File Prior to Visit   Medication Sig Dispense Refill    albuterol (PROVENTIL/VENTOLIN HFA) 90 mcg/actuation inhaler INHALE 1 TO 2 PUFFS INTO THE LUNGS EVERY 6 HOURS AS NEEDED FOR WHEEZING 54 g 3    ascorbic " acid, vitamin C, (VITAMIN C) 500 MG tablet       clobetasoL (CLOBEX) 0.05 % shampoo Apply 1 application  topically every other day.      ketoconazole (NIZORAL) 2 % shampoo Wash scalp a few times weekly 120 mL 3    onabotulinumtoxinA (BOTOX INJ) Inject as directed.      pantoprazole (PROTONIX) 20 MG tablet Take 1 tablet (20 mg total) by mouth once daily. In the morning on an empty stomach 30 tablet 0    QULIPTA 60 mg Tab       sumatriptan (IMITREX STATDOSE) 6 mg/0.5 mL kit To use max twice within 24 hours, 2 hours apart. 6 kit 2    SUMAtriptan (IMITREX) 20 mg/actuation nasal spray Take a spray that can be repeated after 2 hours, not more than twice a day 10 each 0    tretinoin (RETIN-A) 0.025 % cream Apply pea-sized amount to face nightly 45 g 5    [DISCONTINUED] acetaminophen-codeine 300-30mg (TYLENOL #3) 300-30 mg Tab       [DISCONTINUED] albuterol (ACCUNEB) 0.63 mg/3 mL Nebu Inhale as needed by inhalation route as needed.      [DISCONTINUED] amoxicillin (AMOXIL) 500 MG Tab       [DISCONTINUED] amoxicillin (AMOXIL) 875 MG tablet       [DISCONTINUED] azithromycin (Z-DARÍO) 250 MG tablet Take 2 tablets by mouth on day 1; Take 1 tablet by mouth on days 2-5 6 each 0    [DISCONTINUED] benzonatate (TESSALON) 100 MG capsule Take 1 capsule (100 mg total) by mouth 3 (three) times daily as needed for Cough. 20 capsule 0    [DISCONTINUED] butalbital-acetaminophen-caff (FIORICET) -40 mg Cap Take 1 capsule every 6-8 hours by oral route as needed.      [DISCONTINUED] celecoxib (CELEBREX) 200 MG capsule Take 1 capsule (200 mg total) by mouth once daily. 60 capsule 1    [DISCONTINUED] cetirizine (ZYRTEC) 10 MG tablet       [DISCONTINUED] chlorhexidine (PERIDEX) 0.12 % solution       [DISCONTINUED] dextromethorphan-guaiFENesin  mg (MUCINEX DM)  mg per 12 hr tablet       [DISCONTINUED] diazePAM (VALIUM) 10 MG Tab 1 tab po 45 minutes prior to MRI      [DISCONTINUED] diazePAM (VALIUM) 5 MG tablet Take 1 tablet twice a  day by oral route as needed.      [DISCONTINUED] doxycycline (VIBRAMYCIN) 100 MG Cap       [DISCONTINUED] ergocalciferol (ERGOCALCIFEROL) 50,000 unit Cap TAKE 1 CAPSULE BY MOUTH EVERY 7 DAYS 4 capsule 0    [DISCONTINUED] ferrous sulfate (FEOSOL) 325 mg (65 mg iron) Tab tablet       [DISCONTINUED] fluticasone propionate (FLONASE) 50 mcg/actuation nasal spray       [DISCONTINUED] furosemide (LASIX) 20 MG tablet       [DISCONTINUED] HYDROcodone-acetaminophen (NORCO) 5-325 mg per tablet Take 1 tablet every 6 hours by oral route as needed.      [DISCONTINUED] HYDROcodone-acetaminophen (NORCO) 7.5-325 mg per tablet       [DISCONTINUED] loratadine (CLARITIN) 10 mg tablet       [DISCONTINUED] meclizine (ANTIVERT) 25 mg tablet       [DISCONTINUED] methocarbamoL (ROBAXIN) 500 MG Tab       [DISCONTINUED] metroNIDAZOLE (FLAGYL) 500 MG tablet       [DISCONTINUED] nitrofurantoin, macrocrystal-monohydrate, (MACROBID) 100 MG capsule       [DISCONTINUED] ondansetron (ZOFRAN) 4 MG tablet Take 4 mg by mouth.      [DISCONTINUED] ondansetron (ZOFRAN-ODT) 4 MG TbDL Take 1 tablet every 8 hours by oral route as needed for 30 days.      [DISCONTINUED] ondansetron (ZOFRAN-ODT) 8 MG TbDL       [DISCONTINUED] phenazopyridine (PYRIDIUM) 200 MG tablet       [DISCONTINUED] polyethylene glycol (MIRALAX) 17 gram/dose powder       [DISCONTINUED] promethazine (PHENERGAN) 25 MG tablet       [DISCONTINUED] promethazine-codeine 6.25-10 mg/5 ml (PHENERGAN WITH CODEINE) 6.25-10 mg/5 mL syrup Take 5 mLs by mouth every 4 (four) hours as needed for Cough. 240 mL 0    [DISCONTINUED] pseudoephedrine (SUDOGEST) 30 MG tablet       [DISCONTINUED] topiramate (TOPAMAX) 25 MG tablet       [DISCONTINUED] traMADoL (ULTRAM) 50 mg tablet        No current facility-administered medications on file prior to visit.       Social History:  Social History     Tobacco Use    Smoking status: Never    Smokeless tobacco: Never   Substance Use Topics    Alcohol use: Yes      Comment: occass    Drug use: No            Review of Systems  Review of Systems   Constitutional:  Negative for activity change and appetite change.   Respiratory:  Negative for shortness of breath.    Cardiovascular:  Negative for chest pain.   Gastrointestinal:  Negative for abdominal pain, diarrhea and nausea.   Genitourinary:  Negative for bladder incontinence, decreased libido, dysmenorrhea, dyspareunia, dysuria, flank pain, frequency, genital sores, hematuria, hot flashes, menorrhagia, menstrual problem, pelvic pain, urgency, vaginal bleeding, vaginal discharge, vaginal pain, urinary incontinence, postcoital bleeding, postmenopausal bleeding, vaginal dryness and vaginal odor.   Integumentary:  Negative for breast tenderness.   Neurological:  Negative for headaches.   Breast: Negative for breast self exam and tenderness         Objective   Physical Exam:   Constitutional: She is oriented to person, place, and time. She appears well-developed and well-nourished.    HENT:   Head: Normocephalic.      Cardiovascular:       Exam reveals no clubbing.        Pulmonary/Chest: Effort normal and breath sounds normal. Right breast exhibits no nipple discharge, no skin change, no tenderness, no bleeding and no swelling. Left breast exhibits no nipple discharge, no skin change, no tenderness, no bleeding and no swelling. Breasts are symmetrical.        Abdominal: Soft. There is no abdominal tenderness. Hernia confirmed negative in the right inguinal area and confirmed negative in the left inguinal area.     Genitourinary:    Inguinal canal, vagina, uterus, right adnexa, left adnexa and rectum normal.   Rectum:      No tenderness.      Pelvic exam was performed with patient supine.   The external female genitalia was normal.   No external genitalia lesions identified,Genitalia hair distrobution normal .     Labial bartholins normal.Cervix is normal. No vaginal discharge or tenderness in the vagina.    No signs of injury in  the vagina.   Vagina was moist.Cervix exhibits no discharge and no tenderness. Uterus is not tender. Normal urethral meatus.Urethra findings: no tendernessBladder findings: no bladder tenderness          Musculoskeletal: Normal range of motion and moves all extremeties.      Lymphadenopathy: No inguinal adenopathy noted on the right or left side.    Neurological: She is alert and oriented to person, place, and time.    Skin: Skin is warm. Nails show no clubbing.    Psychiatric: She has a normal mood and affect. Her behavior is normal. Judgment and thought content normal.            Assessment and Plan     1. Encounter for gynecological examination without abnormal finding    2. Screening breast examination    3. History of cancer          Plan:  1. Encounter for gynecological examination without abnormal finding  - Liquid-Based Pap Smear, Screening  - HPV High Risk Genotypes, PCR  - C. trachomatis/N. gonorrhoeae by AMP DNA  - Vaginosis Screen by DNA Probe    A thorough history was obtained. I reviewed the patient's health maintenance schedule and informed her that she needs to do her monthly self-breast exams, and to come to the office yearly for her breast and pelvic examinations, and her pap smears will be performed every 3 to 5 years if indicated. She was informed that breast cancer screening with mammogram is recommended every 1 - 2 years until age 50, then annually. General health monitoring and injury prevention were discussed. She was counseled on the need for colo-rectal cancer screening with colonoscopy at the age of 50  and she should contact her primary care physician about this recommendation. She was also counseled about osteoporosis screening at age 65, sexuality, sexually transmitted diseases and reproductive issues in her age group were discussed. The patient was counseled on nutrition and exercise and to make sure she is getting adequate calcium and Vitamin D every day for her bone health as well as  to continue weight bearing exercises.  . She was informed that she will receive any test results from today's visit via the patient portal. She will follow up for annual exam in one year or sooner if gynecological problems develop.    2. Screening breast examination  - Self breast exams encouraged  - MM scheduled for 5/4/2024    3. History of cancer  - Ambulatory referral/consult to Genetics; Future        Follow up next year  DOTTY Herrera-BC

## 2024-04-03 LAB
C TRACH DNA SPEC QL NAA+PROBE: NOT DETECTED
N GONORRHOEA DNA SPEC QL NAA+PROBE: NOT DETECTED

## 2024-04-04 LAB
BACTERIAL VAGINOSIS DNA: NEGATIVE
CANDIDA GLABRATA DNA: NEGATIVE
CANDIDA KRUSEI DNA: NEGATIVE
CANDIDA RRNA VAG QL PROBE: NEGATIVE
T VAGINALIS RRNA GENITAL QL PROBE: NEGATIVE

## 2024-04-09 ENCOUNTER — PATIENT MESSAGE (OUTPATIENT)
Dept: OBSTETRICS AND GYNECOLOGY | Facility: CLINIC | Age: 44
End: 2024-04-09
Payer: OTHER GOVERNMENT

## 2024-04-09 DIAGNOSIS — Z86.018 HISTORY OF UTERINE FIBROID: Primary | ICD-10-CM

## 2024-04-10 LAB
FINAL PATHOLOGIC DIAGNOSIS: NORMAL
Lab: NORMAL

## 2024-04-11 ENCOUNTER — PATIENT MESSAGE (OUTPATIENT)
Dept: CARDIOLOGY | Facility: CLINIC | Age: 44
End: 2024-04-11
Payer: OTHER GOVERNMENT

## 2024-04-14 ENCOUNTER — PATIENT MESSAGE (OUTPATIENT)
Dept: FAMILY MEDICINE | Facility: CLINIC | Age: 44
End: 2024-04-14
Payer: OTHER GOVERNMENT

## 2024-04-15 ENCOUNTER — CLINICAL SUPPORT (OUTPATIENT)
Dept: REHABILITATION | Facility: HOSPITAL | Age: 44
End: 2024-04-15
Payer: OTHER MISCELLANEOUS

## 2024-04-15 DIAGNOSIS — S06.9X0S TRAUMATIC BRAIN INJURY WITHOUT LOSS OF CONSCIOUSNESS, SEQUELA: ICD-10-CM

## 2024-04-15 DIAGNOSIS — R41.841 COGNITIVE COMMUNICATION DEFICIT: Primary | ICD-10-CM

## 2024-04-15 PROCEDURE — 96125 COGNITIVE TEST BY HC PRO: CPT | Mod: PO

## 2024-04-15 NOTE — PROGRESS NOTES
" [facility]   Physical Therapy Treatment Note    Name: Lydia Thomas  Clinic Number: [id number]    Therapy Diagnosis:   Encounter Diagnoses   Name Primary?    Functional gait abnormality Yes    Impaired functional mobility, balance, gait, and endurance      Physician: Conrad Gutierres MD    Visit Date: 4/17/2024    Physician Orders: PT Eval and Treat; Neuro PT   Medical Diagnosis from Referral: Weakness of left lower extremity [R29.898]   Evaluation Date: 1/17/2024  Authorization Period Expiration: 10/4/24  Plan of Care Expiration: 5/10/24  Updated Plan of Care Expiration: 6/21/24  Visit # / Visits authorized: 12/12     Progress Note Due: 4/13/24    PTA Visit #: 0/5     Time In: 0951  Time Out: 1030   Total Billable Time: 39 minutes    SUBJECTIVE     Pt reports: Things have been good at home. She had 1 fall at home when trying to ascend stairs but denies any injuries besides a big bruise on her right leg.     She was not given a home exercise program on evaluation.  Response to previous treatment: tolerated evaluation well  Functional change: ongoing    Pain: 6/10  Location: right hip      OBJECTIVE     Objective measures updated on 4/17/24.     APTA Core Set Outcome Measures for Adults with Neurologic Conditions     Evaluation  2/16/24 3/13/24 4/17/24 Reference values    Timed Up and Go 23.97 sec    21 sec, HHA  18 sec, SBA NT score >14 seconds indicates risk for falls in older stroke patients (Autumn et al, 2006)   10 Meter Walk Test  0.67 m/sec (6m/9 s)   "Limited community ambulator" speed category 0.67 m/sec (6m/9 s)   "Limited community ambulator" speed category  0.72 m/sec (6m/8.28 s)   "Limited community" speed category  0.68 m/sec (81.4 m/120 s)   "Limited community" speed category  0-0.4 m/s = household walker  0.4-0.8 m/s = limited community ambulator   0.8-1.4 m/s = community ambultor  >1.4 m/s = can cross street safely    Functional Gait Assessment  Deferred for " "now; plan to assess as safely able Deferred for now; plan to assess as safely able  15*/30 NT <22/30 = identifies fall risk in community dwelling older adults   (MCID = 4)   2 Minute Walk Test   190.02 (standing rest break at 1'40") (1/29/24)  227 feet, no rest breaks,  feet, no rest breaks,  feet (~81.4 m), no rest breaks, SBA        5 times sit-stand    19.07 seconds    20 seconds  20 seconds, right UE support initially, then no UE support NT >12 sec= fall risk for general elderly  >10 sec= balance/vestibular dysfunction (<59 y/o)  >14.2 sec= balance/vestibular dysfunction (>59 y/o)  >12 sec= fall risk for stroke        Treatment     Lydai received the treatments listed below:      therapeutic exercises to develop strength, endurance, flexibility, and posture for 00 minutes including:    NP      Sensory integration activities to improve: Coordination, Sense, and Proprioception for 00 minutes. The following activities were included:    NP      neuromuscular re-education activities to improve: Balance, Coordination, Sense, and Proprioception for 31 minutes. The following activities were included:    // bars:  X 12 reps, Placing bean bags in bucket with left leg, B UE support     Low blue mat:  X 5 laps, Multi-directional stepping around mat  > PT and pt used swiss ball for increased weightbearing/compression and proprioceptive feedback for last 3 laps  X 5 reps, Tall kneeling <> partial heel sitting with swiss ball for BUE support (fearful but able to hip hinge approp)    Pilate's box:  X 10 reps, Working pad up/down with 2 white springs, B UE support    --> total activity time includes seated rest breaks as needed      therapeutic activities to improve functional performance for 8 minutes, including:    Objective testing listed above    --> Total activity time includes time discussing POC and upcoming appointments      gait training to improve functional mobility and safety for 00 minutes, " "including:    NP      Patient Education and Home Exercises     Home Exercises Provided and Patient Education Provided     Education provided:   1/29:  - Interpretation of 2MWT   - Visual demonstration, verbal instructions and written handout provided with all exercises to be included as part of home exercise program.   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 Exercises were reviewed and Lydia was able to demonstrate them prior to the end of the session.  Lydia demonstrated good  understanding of the education provided. See EMR under Patient Instructions for exercises provided during therapy sessions    ASSESSMENT     Lydia Thomas tolerated today's session well with a focus on reassessment of progress towards goals. She was able to tolerate performing the outcome measures listed above with only minimal declines noted in her ambulatory endurance and gait speed on today. To date, she has met 2/11 short and long-term goals established at her initial evaluation and prior progress notes. She performed well with the activities listed during the remainder of the session however continues to have difficulty with volitional movement of her left LE when performing isolated tasks (I.e. bean bags and pilate's box). She remains appropriate for skilled physical therapy services at 2x/ week for an additional 6 weeks beyond her established plan of care to continue addressing her functional deficits and goals related to improved independence and mobility. "     Lydia Is progressing well towards her goals.   Pt prognosis is Good.     Pt will continue to benefit from skilled outpatient physical therapy to address the deficits listed in the problem list box on initial evaluation, provide pt/family education and to maximize pt's level of independence in the home and community environment.     Pt's spiritual, cultural and educational needs considered and pt agreeable to plan of care and goals.     Anticipated barriers to physical therapy: none at this time     Goals:   Short Term Goals = Long Term Goals: 4 weeks   Pt will be independent with an individualized home exercise program.  Ongoing  Pt will improve 5x chair rise score to </= 10 s with no UE assist for improved muscular endurance. Progressing  Pt will improve Timed Up and Go (TUG) score to less than 14 s with no assistive device. Progressing  Pt will improve self selected walking speed (SSWS) with no assistive device to at least 1.0 m/s for improved safety with home and community ambulation. Progressing  Pt will improve postural control with MCTSIB condition 2 score of at least 30 s for decreased fall risk with standing ADLs. Progressing  Pt will improve postural control to tolerate performing conditions 3 and 4 of the MCTSIB. Ongoing  Pt will improve FOTO score to >/= 56% for improved self perception of functional mobility. Ongoing  PT will administer 2MWT as safely able to assess pt's level of activity tolerance Met 1/29/24  Pt will improve dynamic gait abilities to tolerate performing the Functional Gait Assessment Met 3/13/24  Pt to perform 2 minute walk test for 300 feet or greater to improve gait speed & endurance Progressing  Pt will improve Functional Gait Assessment (FGA) score to at least 23/30 for increased independence with home and community ambulation. Ongoing     Patient's goals: be able to walk around the block twice, go up stairs, I want to be able to do a real squat, and get into running.  Ongoing      PLAN   PT POC extended from 5/10/24 to 6/21/24     Outpatient Physical Therapy 2 times weekly for 6 weeks to include the following interventions: Gait Training, Manual Therapy, Neuromuscular Re-ed, Orthotic Management and Training, Patient Education, Therapeutic Activities, and Therapeutic Exercise.     Jazz Cody, PT

## 2024-04-15 NOTE — PLAN OF CARE
[facility]  Speech Therapy Evaluation -Neurological Rehabilitation     Name: Lydia Thomas   MRN: [id number]    Therapy Diagnosis:   Encounter Diagnoses   Name Primary?    Cognitive communication deficit Yes    Traumatic brain injury without loss of consciousness, sequela       Physician: Francine Contreras,*  Physician Orders: Ambulatory Referral to Speech Therapy   Medical Diagnosis: Z74.09 (ICD-10-CM) - Impaired functional mobility, balance, gait, and endurance     Visit # / Visits Authorized:  1 / 12   Date of Evaluation:  4/15/2024   Insurance Authorization Period: 1/18/2024 to 1/17/2025  Plan of Care Certification:    4/15/2024 to 5/31/2024     Time In:9:30 am  Time Out: 10:25 am   Total time: 55 minutes    Procedure   Cognitive Communication Evaluation including scoring and interpretation (Total time: 85 minutes)     Precautions: Standard, Fall, Cognition, and Communication    Subjective      Date of Onset: April 2022  History of Current Condition:  Lydia Thomas is a 44 y.o. female who presents to [facility] for evaluation secondary to post concussion syndrome. Patient was referred to therapy by Francine Contreras,* , which is the patient's orthopedic provider. Patient reports that she fell while at work and hit her head. She was unconscious for about 30 minutes per patient report. She is now having difficulty remembering, multitasking, finding her words, saying her words (now stutters), reading comprehension, and comprehending English, Persian, and [place] speakers. She reported having difficulty reading and getting a headache on her right side when she reads. Patient displays left side weakness with reduced sensation on her left leg.   Patient was unaccompanied to the evaluation.   Past Medical History: Lydia Thomas  has a past medical history of Anemia, Anemia, Asthma, Migraine headache, and Ovarian  "cyst.  Lydia Thomas  has a past surgical history that includes Appendectomy; Breast biopsy (Right); Colonoscopy (N/A, 05/18/2022); Augmentation of breast (Bilateral, 03/2022); and Esophagogastroduodenoscopy (N/A, 1/18/2024).  Medical Hx and Allergies: Lydia has a current medication list which includes the following prescription(s): albuterol, ascorbic acid (vitamin c), clobetasol, ketoconazole, onabotulinumtoxina, pantoprazole, qulipta, sumatriptan, sumatriptan, and tretinoin.   Review of patient's allergies indicates:   Allergen Reactions    Iodine and iodide containing products Anaphylaxis     PATIENT CAN RECEIVE IOHEXOL, has received multiple times with no pretreatment with no allergic reaction    Shellfish derived Anaphylaxis    Benadryl [diphenhydramine hcl] Itching     "with fast injection"    Fish containing products     Iodine      Prior Therapy:  no speech therapy  Social History:  Patient lives with her  and 2 cats in [place]. Patient is not currently driving. Her  is a marine and travels frequently requiring her to go with him since they have no family locally.   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 Patient sleeps on sleeper sofa on first floor of their home since she can not walk up the stairs.   Pain Scale: no pain indicated throughout session  Patient's Therapy Goals:  "to be normal again"    Objective      Formal Assessment:  The Repeatable Battery for the Assessment of Neuropsychological Status (RBANS) Version B was administered to measure the patient's attention, language, visuospatial/constructional abilities, and immediate and delayed memory. The results are outlined below:     Domain Subtest Total Score " Index Score   Immediate Memory List Learning 17    61    Story Memory 11    Visuospatial/  Constructional Figure Copy 20    100    Line Orientation 14    Language Picture Naming 9    57    Semantic Fluency 9    Attention Digit Span 10    79    Coding 31       Delayed Memory List Recall 5       48    List Recognition 13     Story Recall 8     Figure Recall 2          Total Scale    61      Percentile    0.5      Descriptor    Extremely Low     Interpretation:  Index score: mean of 100, standard deviation of 15. Therefore, 130+ = Very Superior; 120-129 = Superior; 110-119 = High average;  = Average; 80-89 = Low Average; 70-79 = Borderline; 69 and below = Extremely Low. Apparently the most reliable score; factor in education level.      The Cognitive Communication Checklist for Acquired Brain Injury (CCCABI): The CCCABI is a referral tool designed to help flag communication difficulties after brain injury. This questionnaire screens for dysfunction in six domains: Functional Daily Communication, Auditory Comprehension & Information Processing, Expression, Discourse & Social Communication, Reading Comprehension, Written Expression, and Executive Functions & Self-Regulation. The results of the questionnaire are presented below.    Patient completed the questionnaire and  24/45 cognitive communication concerns were identified. These are listed below. However patient needed some assistance with completing and comprehending the statements.     For the below categories only the patients self-identified complaints are listed for each domain.    Domain Patient Self-Identified Complaints   Functional Daily Communications Difficulties with:  Communications needed for problem solving/decision making or self advocacy   Auditory Comprehension & Information Processing Difficulties with:  Understanding words and sentences  Understanding long statements  Understanding complex statements  Integrating information  Focusing  attention on what is said  Shifting attention from one speaker to another  Staying on track with the conversation, staying on topic  Holding thoughts in mind while talking or listening  Remembering new conversations, events, and new information   Expression, Discourse & Social Communication Difficulties with:  Speech sounds, muscle movements, voice, fluency, stuttering  Word finding, word retrieval, thinking of the word, vocabulary, word choice  Vague, nonspecific, disorganized conversation  [place] talkative, rambling, or verbose conversation  Perceiving or understanding conversation partner cues, emotions, context, views   Reading Comprehension Difficulties with:  Physical difficulties  Decoding letters or words, reading aloud fluently  Comprehending read sentences, paragraph text  Retaining read information over time, remembering, organizing  Attending to what is read, need to read everything twice   Written Expression Difficulties with:  Writing words   Thinking, Reasoning, Problem Solving, Executive Functions, Self-Regulation Difficulties with:  Making and expressing decisions  Discussing without being overwhelmed, upset, withdrawn  Filtering out less relevant information, focusing on priorities, main points  Organizing, integrating, analyzing, inferring, seeing the whole picture  Summarizing, getting the gist or the bottom line, drawing conclusions  Brainstorming, generating ideas, alternatives, thinking creatively  Planning, prioritizing, implementing, following through, evaluating, self-monitoring of communication   Reference: Chelo Thomas (2015) Cognitive Communication Checklist for Acquired Brain Injury (CCCABI) [facility]; [facility], N1H 6J2 , [email or web address]         Treatment       Total Treatment Time Separate from Evaluation: not applicable   No treatment performed secondary to time to complete evaluation.     Education provided:   -role of Speech Therapy, goals/plan of  care, scheduling/cancellations, insurance limitations with patient  -Additional Education provided:   Memory strategies  Attention Strategies  Word finding strategies  Effects of deficits on driving  Effects of deficits on return to work    Patient expressed understanding.     Home Program: not established    Assessment      Lydia presents to [facility] status post medical diagnosis of history Traumatic Brain Injury/concussion.     Interpretation of objective assessment: Results of RBANS - B  Immediate Memory Score: Recalling information following immediate presentation is assessed through the List Learning and Story Memory subtests. In the List Learning subtest, the patient is given 10 words to remember. This list is presented four times overall. In this subtest, the patient did not demonstrate learning over the 4 trials. At end of 4th presentation, patient recalled 5/10 details (43% accuracy). In the Story Memory subtest, the patient recalled 4/12 details on the first presentation and 7/12 details on the second presentation (46% accuracy). These results indicate immediate memory skills were not within functional limits.  Visuospatial score: Perceiving spatial relations and constructing a spatially accurate copy of a drawing is assessed through the Figure Copy and Line Orientation subtests. The Figure Copy subtest asks the patient to copy a complex line drawing. The patient accurately yoselin   20/20 details (100% accuracy). The Line Orientation subtest the presents the patient with 12 line displays and asks the patient to match two given lines at the bottom to the display at the top. The patient matched 14/20 lines (70% accuracy). These results indicate visuospatial skills were not within functional limits.  Language score: Naming common items and retrieving learned material is assessed through the Picture Naming and Semantic Fluency subtests. The Picture Naming subtest asks the patient to name 10  line drawings. The patient was able to accurately name 9/10 items. The Semantic Fluency subtest asks the patient to name as many animals as she can in 60 seconds. The patient was able to name 9 fruits and vegetables (15 to 20 = norm). These results indicate language skills were not within functional limits and mildly impaired.   Attention score: Attending to, holding and manipulating information presented visually and orally in working memory is assessed with use of the Digit Span and Coding subtests. The Digit Span subtest asks the patient to repeat progressively lengthening strings of numbers. Patient was able to recall up to 6 digit sequences. The Coding subtest asks the patient to alternate attention between a key the given work and then to decode symbols to numbers. Patient scored 31/89 (35% accuracy). The patient's results on these subtests indicate attention skills were not within normal limits.  Delayed Memory score: Anterograde memory capacity is assessed through the List Recall, List Recognition, Story Recall, and Figure Recall subtests. The List Recall subtest asks the patient to recall items from the list presented at the beginning of the test. The patient was able to recall 5/10 items. The List Recognition subtest has the patient recall whether a word was or was not in the original list. The patient accurately identified whether a word was or was not on the list in 13 of 20 items. On the Story Recall subtest, the patient was able to recall 8/12 details. Finally, on the Figure Recall, the patient recalled 2/20 details. These results indicate delayed memory skills were not within functional limits     Overall, according to the RBANS research, total Scale index is a good indicator of general cognitive functioning. The patient demonstrated with severely impaired cognitive communication skills and were not not functional within her daily environment.   Patient demonstrates decreased short term memory,  attention, problem solving, executive function, word fluency, and mental flexibility. Her speech is hesitant and effortful with obvious secondary behaviors such as eye blinks.       Demonstrates impairments including limitations as described in the problem list.     Positive prognostic factors: patient motivation  Negative prognostic factors: complex medical history and psychological/mental health  Barriers to therapy: Barriers to therapy include transportation      Patient's spiritual, cultural, and educational needs considered and patient agreeable to plan of care and goals.    Patient will benefit from skilled therapy.    Rehab Potential: fair    Short Term Goals: (4 weeks) Current Progress:   1.Patient will complete alternating attention tasks with 90% accuracy given minimum cues.    Progressing/ Not Met 4/15/2024   Established this date   2. Patient will complete selective attention tasks with 90% accuracy given minimum cues.     Progressing/ Not Met 4/15/2024   Established this date    3. Patient will complete immediate memory tasks using strategies with 90% accuracy given minimum cues.      Progressing/ Not Met 4/15/2024   Established this date    4. Patient will complete delayed memory tasks using strategies with 90% accuracy given minimum cues.     Progressing/ Not Met 4/15/2024   Established this date    5. Patient will complete mental manipulation tasks with 90% accuracy given minimum cues.     Progressing/ Not Met 4/15/2024   Established this date    6. Patient will complete moderate to complex problem solving tasks with 90% accuracy given minimum cues.     Progressing/ Not Met 4/15/2024   Established this date    7. Patient will use easy onset and diaphragmatic breathing during speech tasks on 4/5 trials to increase speech fluency.    Established this date    8. Patient will participate in assessment of reading and writing. Established this date        Long Term Goals: (6 weeks) Current Progress:    Patient will use appropriate memory strategies to schedule and recall weekly activities, perform daily tasks, and express needs and recall names to maintain safety and participate socially in functional living environment.     Established this date     2. Patient will improve  attention skills to effectively attend to and communicate in complex daily living tasks in functional living environment.       Established this date     3. Patient will demonstrate use of  goal setting, planning,  initiation, and  self-monitoring during daily living activities to improve safety and awareness in functional living environment.    Established this date     4. Patient will apply problem-solving strategies with visual support in daily living functional activities at home and in the community.      Established this date     5. Patient will develop functional cognitive-linguistic based skills and utilize compensatory strategies to communicate wants and needs effectively to different conversational partners, maintain safety, and participate socially in functional living environment.        Established this date         Plan    Recommended Treatment Plan:  Patient will participate in the [facility] program for speech therapy 2 times per week for 6 weeks to address her Communication and Cognition deficits, to educate patient and their family, and to participate in a home exercise program.    Other Recommendations:   None at this time    Therapist's Name:   TAMMY Mendez, CCC-SLP, CBIS  4/15/2024           Physician's Signature: _________________________________________ Date: ________________

## 2024-04-16 NOTE — PROGRESS NOTES
[facility]  Speech Therapy Treatment Note- Neurological Rehabilitation  Date: 4/17/2024     Name: Lydia Thomas   MRN: [id number]   Therapy Diagnosis:   Encounter Diagnosis   Name Primary?    Cognitive communication deficit Yes   Physician: Francine Contreras,*  Physician Orders: Ambulatory Referral to Speech Therapy   Medical Diagnosis:  Impaired functional mobility, balance, gait, and endurance [Z74.09]     Visit #/ Visits Authorized: 2/ 12 (including evaluation)   Date of Evaluation:  4/15/24   Insurance Authorization Period: 1/18/2024 - 1/17/2025   Plan of Care Expiration Date:    5/31/24   Extended Plan of Care:  not applicable    Progress Note: 5/3/24     Time In:  0845   Time Out:  0930  Total Billable Time: 45 minutes      Precautions: Standard, Fall, Cognition, and Communication  Subjective:   Patient reports: Patient requested to close the door to improve attention.  She wants to improve memory, speech fluency (stuttering), reading. She gets frustrated.   She was compliant to home exercise program.   Response to previous treatment: good  Pain Scale: no pain indicated throughout session  Objective:   TIMED  Procedure Min.   Cognitive Therapeutic Interventions, first 15 minutes CPT 05334  15   Cognitive Therapeutic Interventions, each additional 15 minutes CPT 74680  30       Short Term Goals: (4 weeks) Current Progress:   1.Patient will complete alternating attention tasks with 90% accuracy given minimum cues.     Progressing/ Not Met 4/17/2024  Not formally addressed. See education below.    2. Patient will complete selective attention tasks with 90% accuracy given minimum cues.      Progressing/ Not Met 4/17/2024  Not formally addressed. See education below.    3. Patient will complete immediate memory tasks using strategies with 90% accuracy given minimum cues.       Progressing/ Not Met 4/17/2024  Not formally addressed. See education below.    4. Patient will complete  delayed memory tasks using strategies with 90% accuracy given minimum cues.      Progressing/ Not Met 2024  Not formally addressed. See education below.    5. Patient will complete mental manipulation tasks with 90% accuracy given minimum cues.      Progressing/ Not Met  2024   Not formally addressed. See education below.    6. Patient will complete moderate to complex problem solving tasks with 90% accuracy given minimum cues.      Progressing/ Not Met 2024   Not formally addressed. See education below.    7. Patient will use easy onset and diaphragmatic breathing during speech tasks on 4/5 trials to increase speech fluency.       Progressing/ Not Met 2024  Not formally addressed. See education below.    8. Patient will participate in assessment of reading and writing.    Progressing/ Not Met 2024  Not formally addressed. See education below.      Patient Education/Response:   Patient educated regarding the followin. What is a cognitive communication disorder?  2. Cognitive compensatory strategies   3. Cognitive fatigue, cognitive load management, spoon theory   4. Daily planner     Home program established: yes-spoon theory  Patient verbalized understanding to all above education provided.     See Electronic Medical Record under Patient Instructions for exercises provided throughout therapy.  Assessment:   Lydia participated well today in today's session which focused on education. Today strengths were noted in awareness of difficulties. Improvement continues to be noted in use of strategies. Deficits remain in attention, memory, problem solving, executive functions. Cognitive, Physical, and Emotional fatigue was not believed to have been a barrier to the session. To date, Lydia has met 0 goals.      Lydia is progressing well towards her goals. Current goals remain appropriate. Goals to be updated as necessary.     Patient prognosis is Fair. Patient will continue to benefit from  skilled outpatient speech and language therapy to address the deficits listed in the problem list on initial evaluation, provide patient/family education and to maximize patient's level of independence in the home and community environment.   Medical necessity is demonstrated by the following IMPAIRMENTS:  Cognition: Deficits in executive functioning and memory prevent the pt from returning to work, and place her at risk of unsafe behavior and a decline in quality of life.    Cognition: Deficits in executive functioning, attention, and memory prevent the pt from relaying medically and safety relevant information in a timely manner in a state of emergency.   Barriers to Therapy: transportation   Patient's spiritual, cultural and educational needs considered and patient agreeable to plan of care and goals.  Plan:   Continue Plan of Care with focus on rehabilitation and compensation for cognitive communication deficits.     TAMMY Encarnacion, CCC-SLP  Speech-Language Pathologist  4/17/2024

## 2024-04-17 ENCOUNTER — CLINICAL SUPPORT (OUTPATIENT)
Dept: REHABILITATION | Facility: HOSPITAL | Age: 44
End: 2024-04-17
Payer: OTHER GOVERNMENT

## 2024-04-17 ENCOUNTER — CLINICAL SUPPORT (OUTPATIENT)
Dept: REHABILITATION | Facility: HOSPITAL | Age: 44
End: 2024-04-17
Payer: OTHER MISCELLANEOUS

## 2024-04-17 DIAGNOSIS — Z74.09 IMPAIRED FUNCTIONAL MOBILITY, BALANCE, GAIT, AND ENDURANCE: ICD-10-CM

## 2024-04-17 DIAGNOSIS — R41.841 COGNITIVE COMMUNICATION DEFICIT: Primary | ICD-10-CM

## 2024-04-17 DIAGNOSIS — R26.89 FUNCTIONAL GAIT ABNORMALITY: Primary | Chronic | ICD-10-CM

## 2024-04-17 PROCEDURE — 97112 NEUROMUSCULAR REEDUCATION: CPT | Mod: PO

## 2024-04-17 PROCEDURE — 97130 THER IVNTJ EA ADDL 15 MIN: CPT

## 2024-04-17 PROCEDURE — 97129 THER IVNTJ 1ST 15 MIN: CPT

## 2024-04-17 PROCEDURE — 97530 THERAPEUTIC ACTIVITIES: CPT | Mod: PO

## 2024-04-17 NOTE — PLAN OF CARE
"  [facility]   Physical Therapy [facility]    Name: Lydia Thomas  Clinic Number: [id number]    Therapy Diagnosis:   Encounter Diagnoses   Name Primary?    Functional gait abnormality Yes    Impaired functional mobility, balance, gait, and endurance      Physician: Conrad Gutierres MD    Visit Date: 4/17/2024    Physician Orders: PT Eval and Treat; Neuro PT   Medical Diagnosis from Referral: Weakness of left lower extremity [R29.898]   Evaluation Date: 1/17/2024  Authorization Period Expiration: 10/4/24  Plan of Care Expiration: 5/10/24  Updated Plan of Care Expiration: 6/21/24  Visit # / Visits authorized: 12/12     Progress Note Due: 4/13/24    PTA Visit #: 0/5     Time In: 0951  Time Out: 1030   Total Billable Time: 39 minutes    SUBJECTIVE     Pt reports: Things have been good at home. She had 1 fall at home when trying to ascend stairs but denies any injuries besides a big bruise on her right leg.     She was not given a home exercise program on evaluation.  Response to previous treatment: tolerated evaluation well  Functional change: ongoing    Pain: 6/10  Location: right hip      OBJECTIVE     Objective measures updated on 4/17/24.     APTA Core Set Outcome Measures for Adults with Neurologic Conditions     Evaluation  2/16/24 3/13/24 4/17/24 Reference values    Timed Up and Go 23.97 sec    21 sec, HHA  18 sec, SBA NT score >14 seconds indicates risk for falls in older stroke patients (Autumn et al, 2006)   10 Meter Walk Test  0.67 m/sec (6m/9 s)   "Limited community ambulator" speed category 0.67 m/sec (6m/9 s)   "Limited community ambulator" speed category  0.72 m/sec (6m/8.28 s)   "Limited community" speed category  0.68 m/sec (81.4 m/120 s)   "Limited community" speed category  0-0.4 m/s = household walker  0.4-0.8 m/s = limited community ambulator   0.8-1.4 m/s = community ambultor  >1.4 m/s = can cross street safely    Functional Gait Assessment  Deferred for now; plan to " "assess as safely able Deferred for now; plan to assess as safely able  15*/30 NT <22/30 = identifies fall risk in community dwelling older adults   (MCID = 4)   2 Minute Walk Test   190.02 (standing rest break at 1'40") (1/29/24)  227 feet, no rest breaks,  feet, no rest breaks,  feet (~81.4 m), no rest breaks, SBA        5 times sit-stand    19.07 seconds    20 seconds  20 seconds, right UE support initially, then no UE support NT >12 sec= fall risk for general elderly  >10 sec= balance/vestibular dysfunction (<59 y/o)  >14.2 sec= balance/vestibular dysfunction (>59 y/o)  >12 sec= fall risk for stroke        Treatment     Lydia received the treatments listed below:      therapeutic exercises to develop strength, endurance, flexibility, and posture for 00 minutes including:    NP      Sensory integration activities to improve: Coordination, Sense, and Proprioception for 00 minutes. The following activities were included:    NP      neuromuscular re-education activities to improve: Balance, Coordination, Sense, and Proprioception for 31 minutes. The following activities were included:    // bars:  X 12 reps, Placing bean bags in bucket with left leg, B UE support     Low blue mat:  X 5 laps, Multi-directional stepping around mat  > PT and pt used swiss ball for increased weightbearing/compression and proprioceptive feedback for last 3 laps  X 5 reps, Tall kneeling <> partial heel sitting with swiss ball for BUE support (fearful but able to hip hinge approp)    Pilate's box:  X 10 reps, Working pad up/down with 2 white springs, B UE support    --> total activity time includes seated rest breaks as needed      therapeutic activities to improve functional performance for 8 minutes, including:    Objective testing listed above    --> Total activity time includes time discussing POC and upcoming appointments      gait training to improve functional mobility and safety for 00 minutes, " "including:    NP      Patient Education and Home Exercises     Home Exercises Provided and Patient Education Provided     Education provided:   1/29:  - Interpretation of 2MWT   - Visual demonstration, verbal instructions and written handout provided with all exercises to be included as part of home exercise program.   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 Exercises were reviewed and Lydia was able to demonstrate them prior to the end of the session.  Lydia demonstrated good  understanding of the education provided. See EMR under Patient Instructions for exercises provided during therapy sessions    ASSESSMENT     Lydia Thomas tolerated today's session well with a focus on reassessment of progress towards goals. She was able to tolerate performing the outcome measures listed above with only minimal declines noted in her ambulatory endurance and gait speed on today. To date, she has met 2/11 short and long-term goals established at her initial evaluation and prior progress notes. She performed well with the activities listed during the remainder of the session however continues to have difficulty with volitional movement of her left LE when performing isolated tasks (I.e. bean bags and pilate's box). She remains appropriate for skilled physical therapy services at 2x/ week for an additional 6 weeks beyond her established plan of care to continue addressing her functional deficits and goals related to improved independence and mobility. "     Lydia Is progressing well towards her goals.   Pt prognosis is Good.     Pt will continue to benefit from skilled outpatient physical therapy to address the deficits listed in the problem list box on initial evaluation, provide pt/family education and to maximize pt's level of independence in the home and community environment.     Pt's spiritual, cultural and educational needs considered and pt agreeable to plan of care and goals.     Anticipated barriers to physical therapy: none at this time     Goals:   Short Term Goals = Long Term Goals: 4 weeks   Pt will be independent with an individualized home exercise program.  Ongoing  Pt will improve 5x chair rise score to </= 10 s with no UE assist for improved muscular endurance. Progressing  Pt will improve Timed Up and Go (TUG) score to less than 14 s with no assistive device. Progressing  Pt will improve self selected walking speed (SSWS) with no assistive device to at least 1.0 m/s for improved safety with home and community ambulation. Progressing  Pt will improve postural control with MCTSIB condition 2 score of at least 30 s for decreased fall risk with standing ADLs. Progressing  Pt will improve postural control to tolerate performing conditions 3 and 4 of the MCTSIB. Ongoing  Pt will improve FOTO score to >/= 56% for improved self perception of functional mobility. Ongoing  PT will administer 2MWT as safely able to assess pt's level of activity tolerance Met 1/29/24  Pt will improve dynamic gait abilities to tolerate performing the Functional Gait Assessment Met 3/13/24  Pt to perform 2 minute walk test for 300 feet or greater to improve gait speed & endurance Progressing  Pt will improve Functional Gait Assessment (FGA) score to at least 23/30 for increased independence with home and community ambulation. Ongoing     Patient's goals: be able to walk around the block twice, go up stairs, I want to be able to do a real squat, and get into running.  Ongoing      PLAN   PT POC extended from 5/10/24 to 6/21/24     Outpatient Physical Therapy 2 times weekly for 6 weeks to include the following interventions: Gait Training, Manual Therapy, Neuromuscular Re-ed, Orthotic Management and Training, Patient Education, Therapeutic Activities, and Therapeutic Exercise.     Jazz Cody, PT

## 2024-04-18 ENCOUNTER — TELEPHONE (OUTPATIENT)
Dept: VASCULAR SURGERY | Facility: CLINIC | Age: 44
End: 2024-04-18
Payer: OTHER GOVERNMENT

## 2024-04-18 DIAGNOSIS — I83.90 VARICOSE VEINS OF LOWER EXTREMITY, UNSPECIFIED LATERALITY, UNSPECIFIED WHETHER COMPLICATED: Primary | ICD-10-CM

## 2024-04-18 NOTE — TELEPHONE ENCOUNTER
Called and spoke with pt. Aware of test ordered and sched.prior to vascular appt. Pt.verbalized understanding.

## 2024-05-03 ENCOUNTER — PATIENT MESSAGE (OUTPATIENT)
Dept: FAMILY MEDICINE | Facility: CLINIC | Age: 44
End: 2024-05-03
Payer: OTHER GOVERNMENT

## 2024-05-03 DIAGNOSIS — R51.9 CHRONIC NONINTRACTABLE HEADACHE, UNSPECIFIED HEADACHE TYPE: ICD-10-CM

## 2024-05-03 DIAGNOSIS — G43.809 OTHER MIGRAINE WITHOUT STATUS MIGRAINOSUS, NOT INTRACTABLE: ICD-10-CM

## 2024-05-03 DIAGNOSIS — G89.29 CHRONIC NONINTRACTABLE HEADACHE, UNSPECIFIED HEADACHE TYPE: ICD-10-CM

## 2024-05-03 RX ORDER — SUMATRIPTAN 20 MG/1
SPRAY NASAL
Qty: 10 EACH | Refills: 0 | Status: SHIPPED | OUTPATIENT
Start: 2024-05-03

## 2024-05-03 RX ORDER — CEFUROXIME AXETIL 250 MG/1
TABLET ORAL
Qty: 6 KIT | Refills: 2 | Status: SHIPPED | OUTPATIENT
Start: 2024-05-03

## 2024-05-03 NOTE — TELEPHONE ENCOUNTER
No care due was identified.  [facility] Embedded Care Due Messages. Reference number: [id number].   5/03/2024 1:45:23 PM CDT

## 2024-05-06 NOTE — PROGRESS NOTES
[facility]   Physical Therapy Treatment Note    [facility]  [facility] Number[id number]    Therapy Diagnosis:   Encounter Diagnoses   Name Primary?    Functional gait abnormality Yes    Impaired functional mobility, balance, gait, and endurance      Physician: Conrad Gutierres MD    Visit Date: 5/7/2024    Physician Orders: PT Eval and Treat; Neuro PT   Medical Diagnosis from Referral: Weakness of left lower extremity [R29.898]   Evaluation Date: 1/17/2024  Authorization Period Expiration: 10/4/24  Plan of Care Expiration: 5/10/24  Updated Plan of Care Expiration: 6/21/24  Visit # / Visits authorized: 1/12     Progress Note Due: 5/17/24    PTA Visit #: 0/5     Time In: 0807  Time Out: 0845   Total Billable Time: 38 minutes    SUBJECTIVE     Pt reports: She is doing pretty good this morning. States that she had a fall the day before yesterday when she was walking outside and one of her neighbor's dogs ran towards her. She has a little soreness in her right knee but otherwise denies any issues since her last visit.    She was not given a home exercise program on evaluation.  Response to previous treatment: tolerated evaluation well  Functional change: ongoing    Pain: 6/10  Location: right hip      OBJECTIVE     Objective measures updated on 4/17/24.     Treatment     Lydia received the treatments listed below:      therapeutic exercises to develop strength, endurance, flexibility, and posture for 8 minutes including:    X 8 minutes, SciFit Recumbent stepper, level 1, BUE/BLE for muscular and CV endurance/neuro priming      Sensory integration activities to improve: Coordination, Sense, and Proprioception for 21 minutes. The following activities were included:    Triple flexion band donned to left LE for improved proprioception for all activities listed below:    Hallway:  2 x 100 feet gait training with focus on midline posture and reciprocal arm swing   2 x 100 feet  "walking + ball toss and naming names for each letter  4 x 30 feet walking over hurdles (3) with triple flexion band + dual cognitive task      neuromuscular re-education activities to improve: Balance, Coordination, Sense, and Proprioception for 00 minutes. The following activities were included:    NP      therapeutic activities to improve functional performance for 9 minutes, including:    3 x 30 feet walking without triple flexion band   Discussion/education on FND pathophysiology       gait training to improve functional mobility and safety for 00 minutes, including:    NP      Patient Education and Home Exercises     Home Exercises Provided and Patient Education Provided     Education provided:   1/29:  - Interpretation of 2MWT   - Visual demonstration, verbal instructions and written handout provided with all exercises to be included as part of home exercise program.   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 Exercises were reviewed and Lydia was able to demonstrate them prior to the end of the session.  Lydia demonstrated good  understanding of the education provided. See EMR under Patient Instructions for exercises provided during therapy sessions    ASSESSMENT     Lydia Ramirez Alexandriakhushiravi tolerated today's session well. She inquired about FND and how her brain/nerves are affected so time was spent providing education on the pathophysiology of FND along with the rational to her treatment interventions. She performed well with gait training with " the triple flexion band but showed limited carryover to gait mechanics once it was removed. She did show improved hip and knee flexion with reji navigation though and would benefit from continued practice with normalized mechanics. She continues to be appropriate for skilled physical therapy services to maximize her functional mobility.     Lydia Is progressing well towards her goals.   Pt prognosis is Good.     Pt will continue to benefit from skilled outpatient physical therapy to address the deficits listed in the problem list box on initial evaluation, provide pt/family education and to maximize pt's level of independence in the home and community environment.     Pt's spiritual, cultural and educational needs considered and pt agreeable to plan of care and goals.     Anticipated barriers to physical therapy: none at this time     Goals:   Short Term Goals = Long Term Goals: 4 weeks   Pt will be independent with an individualized home exercise program.  Ongoing  Pt will improve 5x chair rise score to </= 10 s with no UE assist for improved muscular endurance. Progressing  Pt will improve Timed Up and Go (TUG) score to less than 14 s with no assistive device. Progressing  Pt will improve self selected walking speed (SSWS) with no assistive device to at least 1.0 m/s for improved safety with home and community ambulation. Progressing  Pt will improve postural control with MCTSIB condition 2 score of at least 30 s for decreased fall risk with standing ADLs. Progressing  Pt will improve postural control to tolerate performing conditions 3 and 4 of the MCTSIB. Ongoing  Pt will improve FOTO score to >/= 56% for improved self perception of functional mobility. Ongoing  PT will administer 2MWT as safely able to assess pt's level of activity tolerance Met 1/29/24  Pt will improve dynamic gait abilities to tolerate performing the Functional Gait Assessment Met 3/13/24  Pt to perform 2 minute walk test for 300 feet or  greater to improve gait speed & endurance Progressing  Pt will improve Functional Gait Assessment (FGA) score to at least 23/30 for increased independence with home and community ambulation. Ongoing     Patient's goals: be able to walk around the block twice, go up stairs, I want to be able to do a real squat, and get into running. Ongoing      PLAN   PT POC extended from 5/10/24 to 6/21/24     Outpatient Physical Therapy 2 times weekly for 6 weeks to include the following interventions: Gait Training, Manual Therapy, Neuromuscular Re-ed, Orthotic Management and Training, Patient Education, Therapeutic Activities, and Therapeutic Exercise.     Jazz Cody, PT

## 2024-05-07 ENCOUNTER — CLINICAL SUPPORT (OUTPATIENT)
Dept: REHABILITATION | Facility: HOSPITAL | Age: 44
End: 2024-05-07
Payer: OTHER MISCELLANEOUS

## 2024-05-07 DIAGNOSIS — Z74.09 IMPAIRED FUNCTIONAL MOBILITY, BALANCE, GAIT, AND ENDURANCE: ICD-10-CM

## 2024-05-07 DIAGNOSIS — R26.89 FUNCTIONAL GAIT ABNORMALITY: Primary | Chronic | ICD-10-CM

## 2024-05-07 PROCEDURE — 97530 THERAPEUTIC ACTIVITIES: CPT | Mod: PO

## 2024-05-07 PROCEDURE — 97533 SENSORY INTEGRATION: CPT | Mod: PO

## 2024-05-07 PROCEDURE — 97110 THERAPEUTIC EXERCISES: CPT | Mod: PO

## 2024-05-08 NOTE — PROGRESS NOTES
[facility]   Physical Therapy Treatment Note    [facility]  [facility] Number: [id number]    Therapy Diagnosis:   Encounter Diagnoses   Name Primary?    Functional gait abnormality Yes    Impaired functional mobility, balance, gait, and endurance      Physician: Conrad Gutierres MD    Visit Date: 5/9/2024    Physician Orders: PT Eval and Treat; Neuro PT   Medical Diagnosis from Referral: Weakness of left lower extremity [R29.898]   Evaluation Date: 1/17/2024  Authorization Period Expiration: 10/4/24  Plan of Care Expiration: 5/10/24  Updated Plan of Care Expiration: 6/21/24  Visit # / Visits authorized: 1/12     Progress Note Due: 5/17/24    PTA Visit #: 0/5     Time In: 0754  Time Out: 0851   Total Billable Time: 57 minutes    SUBJECTIVE     Pt reports: She is feeling pretty good. Denies any issues at the start of her session.    She was not given a home exercise program on evaluation.  Response to previous treatment: tolerated evaluation well  Functional change: ongoing    Pain: 6/10  Location: right hip      OBJECTIVE     Objective measures updated on 4/17/24.     Treatment     Lydia received the treatments listed below:      therapeutic exercises to develop strength, endurance, flexibility, and posture for 8 minutes including:    X 8 minutes, SciFit Recumbent stepper, level 1, BUE/BLE for muscular and CV endurance/neuro priming      Sensory integration activities to improve: Coordination, Sense, and Proprioception for 34 minutes. The following activities were included:    Triple flexion band donned to left LE for improved proprioception for all activities listed below:    X ~15 mins, Community ambulation outside on sidewalk navigating curbs and ramps    Stair case:  X 10 steps, Ascending/Descending with step-to pattern and left UE support; HHA on right UE    Hallway:  3 x 100 feet, Running with reciprocal arm swing  3 x 100 feet, Backwards walking, VC to decrease trunk  "rotation and improve foot clearance by decreasing friction between floor and band        neuromuscular re-education activities to improve: Balance, Coordination, Sense, and Proprioception for 00 minutes. The following activities were included:    NP      therapeutic activities to improve functional performance for 13 minutes, including:    Education provided below  Standard chair > MWC via stand pivot with CGA  Manual propulsion from PT via MWC to lobby      gait training to improve functional mobility and safety for 00 minutes, including:    NP      Patient Education and Home Exercises     Home Exercises Provided and Patient Education Provided     Education provided:   1/29:  - Interpretation of 2MWT   - Visual demonstration, verbal instructions and written handout provided with all exercises to be included as part of home exercise program.   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 Educated on using her "sand bags" to decrease her arousal with nervous activities    Written Home Exercises Provided: yes. Exercises were reviewed and Lydia was able to demonstrate them prior to the end of the session.  Lydia demonstrated good  understanding of the education provided. See EMR under Patient Instructions for exercises provided during therapy sessions    ASSESSMENT     Lydia Thomas tolerated today's session well. She tolerated walking outside for a significant portion of today's session with good " postural control and no losses of balance noted. Performed better when distracted from mechanics of what her left leg was doing and focused on task as a whole. She had the most difficulty with stairs on today as she was unable to avoid compensatory hip hiking when ascending with left leg. She performed very well with running and backwards walking on today but reported increased fatigue following. She continues to have increased nervousness and difficulty coping with her control over using her left leg but is responding well to education. She continues to be appropriate for skilled physical therapy services to maximize her functional mobility.     Lydia Is progressing well towards her goals.   Pt prognosis is Good.     Pt will continue to benefit from skilled outpatient physical therapy to address the deficits listed in the problem list box on initial evaluation, provide pt/family education and to maximize pt's level of independence in the home and community environment.     Pt's spiritual, cultural and educational needs considered and pt agreeable to plan of care and goals.     Anticipated barriers to physical therapy: none at this time     Goals:   Short Term Goals = Long Term Goals: 4 weeks   Pt will be independent with an individualized home exercise program.  Ongoing  Pt will improve 5x chair rise score to </= 10 s with no UE assist for improved muscular endurance. Progressing  Pt will improve Timed Up and Go (TUG) score to less than 14 s with no assistive device. Progressing  Pt will improve self selected walking speed (SSWS) with no assistive device to at least 1.0 m/s for improved safety with home and community ambulation. Progressing  Pt will improve postural control with MCTSIB condition 2 score of at least 30 s for decreased fall risk with standing ADLs. Progressing  Pt will improve postural control to tolerate performing conditions 3 and 4 of the MCTSIB. Ongoing  Pt will improve FOTO score to >/= 56% for  improved self perception of functional mobility. Ongoing  PT will administer 2MWT as safely able to assess pt's level of activity tolerance Met 1/29/24  Pt will improve dynamic gait abilities to tolerate performing the Functional Gait Assessment Met 3/13/24  Pt to perform 2 minute walk test for 300 feet or greater to improve gait speed & endurance Progressing  Pt will improve Functional Gait Assessment (FGA) score to at least 23/30 for increased independence with home and community ambulation. Ongoing     Patient's goals: be able to walk around the block twice, go up stairs, I want to be able to do a real squat, and get into running. Ongoing      PLAN   PT POC extended from 5/10/24 to 6/21/24     Outpatient Physical Therapy 2 times weekly for 6 weeks to include the following interventions: Gait Training, Manual Therapy, Neuromuscular Re-ed, Orthotic Management and Training, Patient Education, Therapeutic Activities, and Therapeutic Exercise.     Jazz Cody, PT

## 2024-05-09 ENCOUNTER — CLINICAL SUPPORT (OUTPATIENT)
Dept: REHABILITATION | Facility: HOSPITAL | Age: 44
End: 2024-05-09
Payer: OTHER MISCELLANEOUS

## 2024-05-09 DIAGNOSIS — R26.89 FUNCTIONAL GAIT ABNORMALITY: Primary | Chronic | ICD-10-CM

## 2024-05-09 DIAGNOSIS — Z74.09 IMPAIRED FUNCTIONAL MOBILITY, BALANCE, GAIT, AND ENDURANCE: ICD-10-CM

## 2024-05-09 PROCEDURE — 97110 THERAPEUTIC EXERCISES: CPT | Mod: PO

## 2024-05-09 PROCEDURE — 97530 THERAPEUTIC ACTIVITIES: CPT | Mod: PO

## 2024-05-09 PROCEDURE — 97533 SENSORY INTEGRATION: CPT | Mod: PO

## 2024-05-10 ENCOUNTER — CLINICAL SUPPORT (OUTPATIENT)
Dept: REHABILITATION | Facility: HOSPITAL | Age: 44
End: 2024-05-10
Payer: OTHER GOVERNMENT

## 2024-05-10 DIAGNOSIS — R41.841 COGNITIVE COMMUNICATION DEFICIT: Primary | ICD-10-CM

## 2024-05-10 PROCEDURE — 97129 THER IVNTJ 1ST 15 MIN: CPT | Mod: PO

## 2024-05-10 PROCEDURE — 97130 THER IVNTJ EA ADDL 15 MIN: CPT | Mod: PO

## 2024-05-10 NOTE — PROGRESS NOTES
[facility]  Speech Therapy PROGRESS Note- Neurological Rehabilitation  Date: 5/10/2024     Name: Lydia Thomas   MRN: [id number]   Therapy Diagnosis:   Encounter Diagnosis   Name Primary?    Cognitive communication deficit Yes   Physician: Francine Contreras,*  Physician Orders: Ambulatory Referral to Speech Therapy   Medical Diagnosis:  Impaired functional mobility, balance, gait, and endurance [Z74.09]     Visit #/ Visits Authorized: 3/ 12 (including evaluation)   Date of Evaluation:  4/15/24   Insurance Authorization Period: 1/18/2024 - 1/17/2025   Plan of Care Expiration Date:    5/31/24   Extended Plan of Care:  not applicable    Progress Note: 5/3/24 completed today      Time In:  0845   Time Out:  0930  Total Billable Time: 45 minutes      Precautions: Standard, Fall, Cognition, and Communication  Subjective:   Patient reports: that she has to write down everything. Her  is buying her a large planner and he puts her purse,etc by the door for her.      She was compliant to home exercise program.   Response to previous treatment: good  Pain Scale: 7/10 on a Visual Analog Scale currently.  Pain Location: headache and needle sticking on right side of head   Objective:   TIMED  Procedure Min.   Cognitive Therapeutic Interventions, first 15 minutes CPT 68667  15   Cognitive Therapeutic Interventions, each additional 15 minutes CPT 45128  30       Short Term Goals: (4 weeks) Current Progress:   1.Patient will complete alternating attention tasks with 90% accuracy given minimum cues.     Progressing/  5/10/2024  Constant Therapy - alternating symbols L1 - 99% accuracy independently    Met x 1   2. Patient will complete selective attention tasks with 90% accuracy given minimum cues.      Progressing/  5/10/2024  Constant Therapy - alternating symbols L1 - 99% accuracy independently     Constant Therapy - understand voicemail - 70% accuracy given minimum cues to write down specific  details  Repeated messages - 2    3. Patient will complete immediate memory tasks using strategies with 90% accuracy given minimum cues.       Progressing/ 5/10/2024  Constant Therapy - understand voicemail - 70% accuracy given minimum cues to write down specific details  Repeated messages - 2     Picture retention (man fishing) - 80% accuracy independently        4. Patient will complete delayed memory tasks using strategies with 90% accuracy given minimum cues.      Progressing/ Not Met 5/10/2024  Picture retention (man fishing) - 80% accuracy after 6 minute delay with her briefly looking at the picture again. Without the visual cue, she could not remember anything about the picture. She only mentioned shapes from the Constant Therapy alternating symbols task.     Cognitive load 7/7 at end of the session.   5. Patient will complete mental manipulation tasks with 90% accuracy given minimum cues.      Progressing/   5/10/2024   Constant Therapy - alternating symbols L1 - 99% accuracy independently     Picture retention (man fishing) - 80% accuracy after 6 minute delay with her briefly looking at the picture again. Without the visual cue, she could not remember anything about the picture. She only mentioned shapes from the Constant Therapy alternating symbols task.     Cognitive load 7/7 at end of the session when remembering the picture.   6. Patient will complete moderate to complex problem solving tasks with 90% accuracy given minimum cues.      Progressing/  5/10/2024   Constant Therapy - put steps in order L2 - 4 steps - 87% accuracy independently   Met x 1     7. Patient will use easy onset and diaphragmatic breathing during speech tasks on 4/5 trials to increase speech fluency.       Progressing/  5/10/2024    # episodes of blocks during speech: 3 only when stressed  Minimum cues to repeat the words    Introduced breathing rose   take a deep breath ([email or web address])     8. Patient will participate in assessment of  reading and writing.    Progressing/ 5/10/2024  Reading - Constant Therapy - put steps in order L2 - 4 steps - 87% accuracy independently     Patient wrote down information when listening to the voice messages.      Patient Education/Response:   Patient educated regarding the followin. What is a cognitive communication disorder?  2. Cognitive compensatory strategies   3. Cognitive fatigue, cognitive load management, spoon theory   4. Daily planner     Home program established: yes-spoon theory  Patient verbalized understanding to all above education provided.     See Electronic Medical Record under Patient Instructions for exercises provided throughout therapy.  Assessment:   PROGRESS Lydia participated well today in today's session which focused on memory, sustained attention, alternating attention, meta-cognitive strategy training, mental manipulation, education, Dysarthria strategies, and Fluency strategies. Speech more fluent today with 3 episodes of blocks and eye blinks only when she was stressed. Immediate memory and mental manipulation was better today when using strategies. Delayed memory requiring visual cues to recall the picture scene. Overall, Today strengths were noted in awareness of difficulties. Improvement continues to be noted in use of strategies. Deficits remain in attention, memory, problem solving, executive functions. Cognitive, Physical, and Emotional fatigue was not believed to have been a barrier to the session. To date, Lydia has met 0 goals.  Lydia is progressing well towards her goals. Current goals remain appropriate. Goals to be updated as necessary.     Patient prognosis is Fair. Patient will continue to benefit from skilled outpatient speech and language therapy to address the deficits listed in the problem list on initial evaluation, provide patient/family education and to maximize patient's level of independence in the home and community environment.   Medical necessity is  demonstrated by the following IMPAIRMENTS:  Cognition: Deficits in executive functioning and memory prevent the pt from returning to work, and place her at risk of unsafe behavior and a decline in quality of life.    Cognition: Deficits in executive functioning, attention, and memory prevent the pt from relaying medically and safety relevant information in a timely manner in a state of emergency.   Barriers to Therapy: transportation   Patient's spiritual, cultural and educational needs considered and patient agreeable to plan of care and goals.  Plan:   Continue Plan of Care with focus on rehabilitation and compensation for cognitive communication deficits.     ADELA Ambriz., CCC-L-SLP, CBIS  Speech-Language Pathologist  Certified Brain Injury Specialist     5/10/2024

## 2024-05-13 NOTE — PROGRESS NOTES
[facility]   Physical Therapy Treatment Note    Name: Lydia Thomas  Clinic Number: [id number]    Therapy Diagnosis:   Encounter Diagnoses   Name Primary?    Functional gait abnormality Yes    Impaired functional mobility, balance, gait, and endurance      Physician: Conrad Gutierres MD    Visit Date: 5/15/2024    Physician Orders: PT Eval and Treat; Neuro PT   Medical Diagnosis from Referral: Weakness of left lower extremity [R29.898]   Evaluation Date: 1/17/2024  Authorization Period Expiration: 10/4/24  Plan of Care Expiration: 5/10/24  Updated Plan of Care Expiration: 6/21/24  Visit # / Visits authorized: 3/12     Progress Note Due: 5/17/24    PTA Visit #: 0/5     Time In: 1607  Time Out: 1700   Total Billable Time: 53 minutes    SUBJECTIVE     Pt reports: She is feeling okay this afternoon but states that her right hip is really bothering her.     She was not given a home exercise program on evaluation.  Response to previous treatment: tolerated evaluation well  Functional change: ongoing    Pain: 6/10  Location: right hip      OBJECTIVE     Objective measures updated on 4/17/24.     Treatment     Lydia received the treatments listed below:      therapeutic exercises to develop strength, endurance, flexibility, and posture for 11 minutes including:    X 8 minutes, SciFit Recumbent stepper, level 1, BUE/BLE for muscular and CV endurance/neuro priming    --> Total activity time includes set up and brief seated rest break following      Sensory integration activities to improve: Coordination, Sense, and Proprioception for 42 minutes. The following activities were included:    Triple flexion band donned to left LE for improved proprioception for all activities listed below:    Low black:  X 5 laps, multi-directional stepping around mat (forward/backward along longer edges, side stepping on shorter ones)    Open gym floor:  2 x 40 feet, Forward walking with mirror for visual feed  "back  2 x 40 feet, Backwards walking with mirror for visual feed back    Ballet bar: CGA  X 12 reps, Placing bean bags in bucket with left leg, R UE support, mirror utilized for visual feedback    Standard chair:  2 x 10, Sit to stands + holding tidal tank    --> Total activity time includes donning/doffing gait belt and TB to left LE, therapeutic rest breaks to improve tolerance to session and review of "Sand bags" to help decrease nervous system arousal      neuromuscular re-education activities to improve: Balance, Coordination, Sense, and Proprioception for 00 minutes. The following activities were included:    NP      therapeutic activities to improve functional performance for 00 minutes, including:    NP      gait training to improve functional mobility and safety for 00 minutes, including:    NP      Patient Education and Home Exercises     Home Exercises Provided and Patient Education Provided     Education provided:   1/29:  - Interpretation of 2MWT   - Visual demonstration, verbal instructions and written handout provided with all exercises to be included as part of home exercise program.   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 Educated on using her "sand bags" to decrease her arousal with nervous activities    Written Home Exercises Provided: yes. Exercises were reviewed and Lydia was able to demonstrate them prior to the end of the session.  Lydia demonstrated good  understanding of " the education provided. See EMR under Patient Instructions for exercises provided during therapy sessions    ASSESSMENT     Lydia Thomas tolerated today's session well. She continues to show good left LE activation with multi-directional stepping and forward/backward walking. She responded well to verbal cuing to improve her self-agency over her movement on today but continues to show adverse reaction to visual feedback as she will avoid looking at her left leg while in action. She is progressing well and continues to be appropriate for skilled physical therapy services to maximize her functional mobility. Progress note to be performed at next visit.    Lydia Is progressing well towards her goals.   Pt prognosis is Good.     Pt will continue to benefit from skilled outpatient physical therapy to address the deficits listed in the problem list box on initial evaluation, provide pt/family education and to maximize pt's level of independence in the home and community environment.     Pt's spiritual, cultural and educational needs considered and pt agreeable to plan of care and goals.     Anticipated barriers to physical therapy: none at this time     Goals:   Short Term Goals = Long Term Goals: 4 weeks   Pt will be independent with an individualized home exercise program.  Ongoing  Pt will improve 5x chair rise score to </= 10 s with no UE assist for improved muscular endurance. Progressing  Pt will improve Timed Up and Go (TUG) score to less than 14 s with no assistive device. Progressing  Pt will improve self selected walking speed (SSWS) with no assistive device to at least 1.0 m/s for improved safety with home and community ambulation. Progressing  Pt will improve postural control with MCTSIB condition 2 score of at least 30 s for decreased fall risk with standing ADLs. Progressing  Pt will improve postural control to tolerate performing conditions 3 and 4 of the MCTSIB. Ongoing  Pt will improve FOTO  score to >/= 56% for improved self perception of functional mobility. Ongoing  PT will administer 2MWT as safely able to assess pt's level of activity tolerance Met 1/29/24  Pt will improve dynamic gait abilities to tolerate performing the Functional Gait Assessment Met 3/13/24  Pt to perform 2 minute walk test for 300 feet or greater to improve gait speed & endurance Progressing  Pt will improve Functional Gait Assessment (FGA) score to at least 23/30 for increased independence with home and community ambulation. Ongoing     Patient's goals: be able to walk around the block twice, go up stairs, I want to be able to do a real squat, and get into running. Ongoing      PLAN     Perform progress note at next visit    Jazz Cody, PT

## 2024-05-14 ENCOUNTER — HOSPITAL ENCOUNTER (OUTPATIENT)
Dept: RADIOLOGY | Facility: HOSPITAL | Age: 44
Discharge: HOME | End: 2024-05-14
Attending: INTERNAL MEDICINE
Payer: OTHER GOVERNMENT

## 2024-05-14 VITALS — WEIGHT: 140 LBS | BODY MASS INDEX: 24.8 KG/M2

## 2024-05-14 DIAGNOSIS — Z12.31 ENCOUNTER FOR SCREENING MAMMOGRAM FOR MALIGNANT NEOPLASM OF BREAST: ICD-10-CM

## 2024-05-14 PROCEDURE — 77067 SCR MAMMO BI INCL CAD: CPT | Mod: 26,,, | Performed by: RADIOLOGY

## 2024-05-14 PROCEDURE — 77063 BREAST TOMOSYNTHESIS BI: CPT | Mod: 26,,, | Performed by: RADIOLOGY

## 2024-05-14 PROCEDURE — 77067 SCR MAMMO BI INCL CAD: CPT | Mod: TC

## 2024-05-15 ENCOUNTER — TELEPHONE (OUTPATIENT)
Dept: ORTHOPEDICS | Facility: CLINIC | Age: 44
End: 2024-05-15
Payer: OTHER GOVERNMENT

## 2024-05-15 ENCOUNTER — CLINICAL SUPPORT (OUTPATIENT)
Dept: REHABILITATION | Facility: HOSPITAL | Age: 44
End: 2024-05-15
Payer: OTHER MISCELLANEOUS

## 2024-05-15 DIAGNOSIS — R26.89 FUNCTIONAL GAIT ABNORMALITY: Primary | Chronic | ICD-10-CM

## 2024-05-15 DIAGNOSIS — Z74.09 IMPAIRED FUNCTIONAL MOBILITY, BALANCE, GAIT, AND ENDURANCE: ICD-10-CM

## 2024-05-15 PROCEDURE — 97110 THERAPEUTIC EXERCISES: CPT | Mod: PO

## 2024-05-15 PROCEDURE — 97533 SENSORY INTEGRATION: CPT | Mod: PO

## 2024-05-15 NOTE — TELEPHONE ENCOUNTER
Attempted to return patient's call, no answer. Left msg on voicemail.    ----- Message from Eden Turner sent at 5/15/2024 11:34 AM CDT -----  Regarding: Requesting another Neuro-Psy  Contact: Pt @[phone]  Pt is calling to speak with someone in the office about being referred to another neuro-psychologist . Pt states she felt uncomfortable with Dr. Brien Brady and would like to be referred to another provider. Please c/b to advise.. Thanks

## 2024-05-16 ENCOUNTER — CLINICAL SUPPORT (OUTPATIENT)
Dept: REHABILITATION | Facility: HOSPITAL | Age: 44
End: 2024-05-16
Payer: OTHER MISCELLANEOUS

## 2024-05-16 DIAGNOSIS — Z74.09 IMPAIRED FUNCTIONAL MOBILITY, BALANCE, GAIT, AND ENDURANCE: ICD-10-CM

## 2024-05-16 DIAGNOSIS — R26.89 FUNCTIONAL GAIT ABNORMALITY: Primary | Chronic | ICD-10-CM

## 2024-05-16 PROCEDURE — 97530 THERAPEUTIC ACTIVITIES: CPT | Mod: PO

## 2024-05-16 PROCEDURE — 97110 THERAPEUTIC EXERCISES: CPT | Mod: PO

## 2024-05-16 NOTE — PROGRESS NOTES
[facility]   Physical Therapy Progress Note    Name: Lydia Thomas  Clinic Number: [id number]    Therapy Diagnosis:   Encounter Diagnoses   Name Primary?    Functional gait abnormality Yes    Impaired functional mobility, balance, gait, and endurance      Physician: Conrad Gutierres MD    Visit Date: 5/16/2024    Physician Orders: PT Eval and Treat; Neuro PT   Medical Diagnosis from Referral: Weakness of left lower extremity [R29.898]   Evaluation Date: 1/17/2024  Authorization Period Expiration: 10/4/24  Plan of Care Expiration: 5/10/24  Updated Plan of Care Expiration: 6/21/24  Visit # / Visits authorized: 4/12     Progress Note Due: 6/16/24    PTA Visit #: 0/5     Time In: 0800  Time Out: 0845   Total Billable Time: 45 minutes    SUBJECTIVE     Pt reports: She is feeling a little tired this morning following her appointment yesterday afternoon.     She was not given a home exercise program on evaluation.  Response to previous treatment: tolerated evaluation well  Functional change: ongoing    Pain: 6/10  Location: right hip      OBJECTIVE     Objective measures updated on 5/16/24.     Functional Gait Assessment:   1. Gait on level surface =  1 (7.93 s)   (3) Normal: less than 5.5 sec, no A.D., no imbalance, normal gait pattern, deviates< 6in   (2) Mild impairment: 7-5.6 sec, uses A.D., mild gait deviations, or deviates 6-10 in   (1) Moderate impairment: > 7 sec, slow speed, imbalance, deviates 10-15 in.   (0) Severe impairment: needs assist, deviates >15 in, reach/touch wall  2. Change in Gait Speed = 3   (3) Normal: smooth change w/o loss of balance or gait deviation, deviates < 6 in, significant difference between speeds   (2) Mild impairment: changes speed, but demonstrates mild gait deviations, deviates 6-10 in, OR no deviations but unable to significantly speed, OR uses A.D.   (1) Moderate impairment: minor changes to speed, OR changes speed w/ significant deviations,  deviates 10-15 in, OR  Changes speed , but loses balance & recovers   (0) Severe impairment: cannot change speed, deviates >15 in, or loses balance & needs assist  3. Gait with horizontal head turns  = 2   (3) Normal: no change in gait, deviates <6 in   (2) Mild impairment: slight change in speed, deviates 6-10 in, OR uses A.D.   (1) Moderate impairment: moderate change in speed, deviates 10-15 in   (0) Severe impairment: severe disruption of gait, deviates >15in  4. Gait with vertical head turns = 3   (3) Normal: no change in gait, deviates <6 in   (2) Mild impairment: slight change in speed, deviates 6-10 in OR uses A.D.   (1) Moderate impairment: moderate change in speed, deviates 10-15 in   (0) Severe impairment: severe disruption of gait, deviates >15 in  5. Gait with pivot turns = 2   (3) Normal: performs safely in 3 sec, no LOB   (2) Mild impairment: performs in >3 sec & no LOB, OR turns safely & requires several steps to regain LOB   (1) Moderate impairment: turns slow, OR requires several small steps for balance following turn & stop   (0) Severe impairment: cannot turn safely, needs assist  6. Step over obstacle = 1   (3) Normal: steps over 2 stacked boxes w/o change in speed or LOB   (2) Mild impairment: able to step over 1 box w/o change in speed or LOB   (1) Moderate impairment: steps over 1 box but must slow down, may require VC   (0) Severe impairment: cannot perform w/o assist  7. Gait with Narrow MARY = 0   (3) Normal: 10 steps no staggering   (2) Mild impairment: 7-9 steps   (1) Moderate impairment: 4-7 steps   (0) Severe impairment: < 4 steps or cannot perform w/o assist  8. Gait with eyes closed = 2   (3) Normal: < 7 sec, no A.D., no LOB, normal gait pattern, deviates <6 in   (2) Mild impairment: 7.1-9 sec, mild gait deviations, deviates 6-10 in   (1) Moderate impairment: > 9 sec, abnormal pattern, LOB, deviates 10-15 in   (0) Severe impairment: cannot perform w/o assist, LOB, deviates >15in  9.  "Ambulating Backwards = 2   (3) Normal: no A.D., no LOB, normal gait pattern, deviates <6in   (2) Mild impairment: uses A.D., slower speed, mild gait deviations, deviates 6-10 in   (1) Moderate impairment: slow speed, abnormal gait pattern, LOB, deviates 10-15 in   (0) Severe impairment: severe gait deviations or LOB, deviates >15in  10. Steps = 1   (3) Normal: alternating feet, no rail   (2) Mild Impairment: alternating feet, uses rail   (1) Moderate impairment: step-to, uses rail   (0) Severe impairment: cannot perform safely    Score 17/30     Score:   <22/30 fall risk   <20/30 fall risk in older adults   <18/30 fall risk in Parkinsons         APTA Core Set Outcome Measures for Adults with Neurologic Conditions     Evaluation  2/16/24 3/13/24 4/17/24 5/16/24 Reference values    Timed Up and Go 23.97 sec    21 sec, HHA  18 sec, SBA NT 17.62 sec, SBA score >14 seconds indicates risk for falls in older stroke patients (Autumn et al, 2006)   10 Meter Walk Test  0.67 m/sec (6m/9 s)   "Limited community ambulator" speed category 0.67 m/sec (6m/9 s)   "Limited community ambulator" speed category  0.72 m/sec (6m/8.28 s)   "Limited community" speed category  0.68 m/sec (81.4 m/120 s)   "Limited community" speed category  0.76 m/sec (6m/7.93 s)   "Limited community" speed category  0-0.4 m/s = household walker  0.4-0.8 m/s = limited community ambulator   0.8-1.4 m/s = community ambultor  >1.4 m/s = can cross street safely    Functional Gait Assessment  Deferred for now; plan to assess as safely able Deferred for now; plan to assess as safely able  15*/30 NT 17/30 <22/30 = identifies fall risk in community dwelling older adults   (MCID = 4)   2 Minute Walk Test   190.02 (standing rest break at 1'40") (1/29/24)  227 feet, no rest breaks,  feet, no rest breaks,  feet (~81.4 m), no rest breaks,  feet, no rest breaks, SBA        5 times sit-stand    19.07 seconds    20 seconds  20 seconds, right UE " "support initially, then no UE support NT 25.58 seconds, no UE support, more equal B LE weight bearing >12 sec= fall risk for general elderly  >10 sec= balance/vestibular dysfunction (<59 y/o)  >14.2 sec= balance/vestibular dysfunction (>59 y/o)  >12 sec= fall risk for stroke     FOTO survey: 35%      Treatment     Lydia received the treatments listed below:      therapeutic exercises to develop strength, endurance, flexibility, and posture for 10 minutes including:    X 8 minutes, SciFit Recumbent stepper, level 1, BUE/BLE for muscular and CV endurance/neuro priming    --> Total activity time includes set up and brief seated rest break following      Sensory integration activities to improve: Coordination, Sense, and Proprioception for 00 minutes. The following activities were included:    NP      neuromuscular re-education activities to improve: Balance, Coordination, Sense, and Proprioception for 00 minutes. The following activities were included:    NP      therapeutic activities to improve functional performance for 35 minutes, including:    Objective testing listed above      gait training to improve functional mobility and safety for 00 minutes, including:    NP      Patient Education and Home Exercises     Home Exercises Provided and Patient Education Provided     Education provided:   1/29:  - Interpretation of 2MWT   - Visual demonstration, verbal instructions and written handout provided with all exercises to be included as part of home exercise program.   3/7:  - Educated on "[place]" analogy and factors that cause her symptoms to ramp up vs those that help her relax to decrease the frequency of her functional seizures during sessions  3/18: - Educated on successful use of strategies to avoid allowing her symptoms to over flow and lead to a functional seizure  3/20: - Advised pt to schedule her final remaining approved appointment for 2-3 weeks from today to allow time for authorization of her pending " "PT referral   5/9: - Educated on self-agency and decreasing anxiety/nerves surrounding use of her left LE. Educated on using her "sand bags" to decrease her arousal with nervous activities    Written Home Exercises Provided: yes. Exercises were reviewed and Lydia was able to demonstrate them prior to the end of the session.  Lydia demonstrated good  understanding of the education provided. See EMR under Patient Instructions for exercises provided during therapy sessions    ASSESSMENT     Lydia Thomas tolerated today's session well with a focus on reassessment of progress towards goals. She was able to tolerate performing the outcome measures listed above with improvements noted in her Timed Up and Go, gait speed, dynamic balance with gait activities, and, most notably, her ambulatory endurance as her 2MWT improved by nearly 100 feet today! To date, she has met 3/12 short and long-term goals established at her initial evaluation and prior progress notes. She remains appropriate for skilled physical therapy services at their current frequency for the remainder of the plan of care to continue addressing her functional deficits and goals related to improved safety and independence with functional mobility.     Lydia Is progressing well towards her goals.   Pt prognosis is Good.     Pt will continue to benefit from skilled outpatient physical therapy to address the deficits listed in the problem list box on initial evaluation, provide pt/family education and to maximize pt's level of independence in the home and community environment.     Pt's spiritual, cultural and educational needs considered and pt agreeable to plan of care and goals.     Anticipated barriers to physical therapy: none at this time     Goals:   Short Term Goals = Long Term Goals: 4 weeks   Pt will be independent with an individualized home exercise program.  Ongoing  Pt will improve 5x chair rise score to </= 10 s with no UE assist for " improved muscular endurance. Progressing  Pt will improve Timed Up and Go (TUG) score to less than 14 s with no assistive device. Progressing  Pt will improve self selected walking speed (SSWS) with no assistive device to at least 1.0 m/s for improved safety with home and community ambulation. Progressing  Pt will improve postural control with MCTSIB condition 2 score of at least 30 s for decreased fall risk with standing ADLs. Progressing  Pt will improve postural control to tolerate performing conditions 3 and 4 of the MCTSIB. Ongoing  Pt will improve FOTO score to >/= 56% for improved self perception of functional mobility. Ongoing  PT will administer 2MWT as safely able to assess pt's level of activity tolerance Met 1/29/24  Pt will improve dynamic gait abilities to tolerate performing the Functional Gait Assessment Met 3/13/24  Pt to perform 2 minute walk test for 300 feet or greater to improve gait speed & endurance Met 5/16/24  Pt will improve Functional Gait Assessment (FGA) score to at least 23/30 for increased independence with home and community ambulation. Progressing  New 5/16/24: Pt will improve ambulatory tolerance to walking for 6 minutes continuously without reports of excessive fatigue.      Patient's goals: be able to walk around the block twice, go up stairs, I want to be able to do a real squat, and get into running. Ongoing      PLAN     Perform progress note at next visit    Jazz Cody, PT

## 2024-05-26 NOTE — PROGRESS NOTES
"  [facility]   Physical Therapy Progress Note    [facility]  [facility] Number[facility] [id number]    Therapy Diagnosis:   Encounter Diagnoses   Name Primary?    Functional gait abnormality Yes    Impaired functional mobility, balance, gait, and endurance      Physician: Conrad Gutierres MD    Visit Date: 5/27/2024    Physician Orders: PT Eval and Treat; Neuro PT   Medical Diagnosis from Referral: Weakness of left lower extremity [R29.898]   Evaluation Date: 1/17/2024  Authorization Period Expiration: 10/4/24  Plan of Care Expiration: 5/10/24  Updated Plan of Care Expiration: 6/21/24  Visit # / Visits authorized: 5/12     Progress Note Due: 6/16/24    PTA Visit #: 0/5     Time In: 1530  Time Out: 1613  Total Billable Time: 43 minutes    SUBJECTIVE     Pt reports: She is feeling pretty good this afternoon. States that she had a really good weekend.     She was not given a home exercise program on evaluation.  Response to previous treatment: tolerated evaluation well  Functional change: ongoing    Pain: 6/10  Location: right hip      OBJECTIVE     Objective measures updated on 5/16/24.       Treatment     Lydia received the treatments listed below:      therapeutic exercises to develop strength, endurance, flexibility, and posture for 10 minutes including:    X 8'30", SciFit Recumbent stepper, level 1, BUE/BLE for muscular and CV endurance/neuro priming    --> Total activity time includes set up and brief seated rest break following      Sensory integration activities to improve: Coordination, Sense, and Proprioception for 00 minutes. The following activities were included:    NP      neuromuscular re-education activities to improve: Balance, Coordination, Sense, and Proprioception for 4 minutes. The following activities were included:    ATTEMPTED, static stance holds on sand dune; unable to perform as pt became visibly worked up/anxious. Therapeutic rest break with diaphragmatic " "breathing performed followign to decrease anxiety and maximize safety prior to leaving session      therapeutic activities to improve functional performance for 00 minutes, including:    NP      gait training to improve functional mobility and safety for 29 minutes, including:    Treadmill training with LiteGait x~10 minutes: ~20-30# off loaded, B UE support, 0.7 mph, 616 feet  > PT recorded patient's feet and placed in front of pt for visual feedback to improve gait mechanics    Overground gait training x~145 feet from clinic to lobby with CGA for safety as patient exited clinic  > VC for arm swing and to focus on deep breathing    --> Skilled set up/breakdown of LiteGait required and included in activity time. Therapeutic rest breaks provided to maximize tolerance      Patient Education and Home Exercises     Home Exercises Provided and Patient Education Provided     Education provided:   1/29:  - Interpretation of 2MWT   - Visual demonstration, verbal instructions and written handout provided with all exercises to be included as part of home exercise program.   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 Educated on using her "sand bags" to decrease her arousal with nervous activities    Written Home Exercises Provided: yes. Exercises were reviewed and Lydia was able to demonstrate them prior to the end of the session.  Lydia demonstrated good  understanding of the education provided. See EMR under " Patient Instructions for exercises provided during therapy sessions    ASSESSMENT     Lydia Thomas tolerated today's session fairly well. She performed well with treadmill training on today with partial bodyweight support; showed improve left knee flexion in swing, step length and weight shifting left/right while walking. She would benefit from continued training overground to continue working on dynamic balance, gait mechanics and for visual feedback on lateral weight bearing. PT attempted the sand dune with patient on today however pt was unable to perform as she became very anxious/nervous about the task and required several minutes to recover to prevent further functional neurologic overload. She continues to progress well with therapy and is appropriate for continued skilled services to maximize her safety and independence with community mobility.    Lydia Is progressing well towards her goals.   Pt prognosis is Good.     Pt will continue to benefit from skilled outpatient physical therapy to address the deficits listed in the problem list box on initial evaluation, provide pt/family education and to maximize pt's level of independence in the home and community environment.     Pt's spiritual, cultural and educational needs considered and pt agreeable to plan of care and goals.     Anticipated barriers to physical therapy: none at this time     Goals:   Short Term Goals = Long Term Goals: 4 weeks   Pt will be independent with an individualized home exercise program.  Ongoing  Pt will improve 5x chair rise score to </= 10 s with no UE assist for improved muscular endurance. Progressing  Pt will improve Timed Up and Go (TUG) score to less than 14 s with no assistive device. Progressing  Pt will improve self selected walking speed (SSWS) with no assistive device to at least 1.0 m/s for improved safety with home and community ambulation. Progressing  Pt will improve postural control with MCTSIB condition  2 score of at least 30 s for decreased fall risk with standing ADLs. Progressing  Pt will improve postural control to tolerate performing conditions 3 and 4 of the MCTSIB. Ongoing  Pt will improve FOTO score to >/= 56% for improved self perception of functional mobility. Ongoing  PT will administer 2MWT as safely able to assess pt's level of activity tolerance Met 1/29/24  Pt will improve dynamic gait abilities to tolerate performing the Functional Gait Assessment Met 3/13/24  Pt to perform 2 minute walk test for 300 feet or greater to improve gait speed & endurance Met 5/16/24  Pt will improve Functional Gait Assessment (FGA) score to at least 23/30 for increased independence with home and community ambulation. Progressing  New 5/16/24: Pt will improve ambulatory tolerance to walking for 6 minutes continuously without reports of excessive fatigue.      Patient's goals: be able to walk around the block twice, go up stairs, I want to be able to do a real squat, and get into running. Ongoing      PLAN     Perform progress note at next visit    Jazz Cody, PT

## 2024-05-27 ENCOUNTER — CLINICAL SUPPORT (OUTPATIENT)
Dept: REHABILITATION | Facility: HOSPITAL | Age: 44
End: 2024-05-27
Payer: OTHER MISCELLANEOUS

## 2024-05-27 DIAGNOSIS — Z74.09 IMPAIRED FUNCTIONAL MOBILITY, BALANCE, GAIT, AND ENDURANCE: ICD-10-CM

## 2024-05-27 DIAGNOSIS — R26.89 FUNCTIONAL GAIT ABNORMALITY: Primary | Chronic | ICD-10-CM

## 2024-05-27 PROCEDURE — 97110 THERAPEUTIC EXERCISES: CPT | Mod: PO

## 2024-05-27 PROCEDURE — 97116 GAIT TRAINING THERAPY: CPT | Mod: PO

## 2024-05-29 ENCOUNTER — CLINICAL SUPPORT (OUTPATIENT)
Dept: REHABILITATION | Facility: HOSPITAL | Age: 44
End: 2024-05-29
Payer: OTHER GOVERNMENT

## 2024-05-29 DIAGNOSIS — R41.841 COGNITIVE COMMUNICATION DEFICIT: Primary | ICD-10-CM

## 2024-05-29 DIAGNOSIS — M54.16 LUMBAR RADICULOPATHY: Primary | ICD-10-CM

## 2024-05-29 PROCEDURE — 97130 THER IVNTJ EA ADDL 15 MIN: CPT | Mod: PO

## 2024-05-29 PROCEDURE — 97129 THER IVNTJ 1ST 15 MIN: CPT | Mod: PO

## 2024-05-29 NOTE — PROGRESS NOTES
[facility]  Speech Therapy PROGRESS Note- Neurological Rehabilitation  Date: 5/29/2024     Name: Lydia Thomas   MRN: [id number]   Therapy Diagnosis:   Encounter Diagnosis   Name Primary?    Cognitive communication deficit Yes   Physician: Francine Contreras,*  Physician Orders: Ambulatory Referral to Speech Therapy   Medical Diagnosis:  Impaired functional mobility, balance, gait, and endurance [Z74.09]     Visit #/ Visits Authorized: 4/ 12 (including evaluation)   Date of Evaluation:  4/15/24   Insurance Authorization Period: 1/18/2024 - 1/17/2025   Plan of Care Expiration Date:    5/31/24   Extended Plan of Care:  7/26/24    Progress Note: 5/3/24 completed today      Time In:  1:50   Time Out: 2:30  Total Billable Time: 45 minutes      Precautions: Standard, Fall, Cognition, and Communication  Subjective:   Patient reports: that she is depressed and feels like she can not do anything. She does not look in the mirror because she does not like the way she looks since her injury.      She was compliant to home exercise program.   Response to previous treatment: good  Pain Scale: no pain indicated throughout session  Objective:   TIMED  Procedure Min.   Cognitive Therapeutic Interventions, first 15 minutes CPT 69965  15   Cognitive Therapeutic Interventions, each additional 15 minutes CPT 37947  30       Short Term Goals: (4 weeks) Current Progress:   1.Patient will complete alternating attention tasks with 90% accuracy given minimum cues.     Progressing/  5/29/2024  Not formally addressed    Met x 1   2. Patient will complete selective attention tasks with 90% accuracy given minimum cues.      Progressing/  5/29/2024  Not formally addressed      3. Patient will complete immediate memory tasks using strategies with 90% accuracy given minimum cues.       Progressing/ 5/29/2024  Patient writes down everything in her journal but has trouble finding some of her words and how to spell them.         4. Patient will complete delayed memory tasks using strategies with 90% accuracy given minimum cues.      Progressing/  2024  Patient writes down everything in her journal but has trouble finding some of her words and how to spell them.    5. Patient will complete mental manipulation tasks with 90% accuracy given minimum cues.      Progressing/   2024   Not formally addressed      6. Patient will complete moderate to complex problem solving tasks with 90% accuracy given minimum cues.      Progressing/  2024   Discussed increasing household tasks such as helping prep for meals, doing laundry. Patient mostly watches reruns of old tv shows such [employer] because she knows how they end and only likes to watch things that are predictable.   Met x 1     7. Patient will use easy onset and diaphragmatic breathing during speech tasks on 4/5 trials to increase speech fluency.       Progressing/  2024    # episodes of blocks during speech: 3 or 5 only when stressed  Minimum cues to repeat the words    Introduced breathing rose   take a deep breath ([employer])     8. Patient will participate in assessment of reading and writing.    Progressing2024  Not formally addressed today but encouraged patient to get adult literacy books in large print to practice reading.      Patient Education/Response:   Patient educated regarding the followin. Cognitive compensatory strategies   2. Cognitive fatigue, cognitive load management, spoon theory   3. Daily planner and journaling      Extensive education today - discussed activities to perform at home such as washing and drying clothes with assistance to carry basket due to her physical limitations, Engaging in personal daily tasks that she previously enjoyed like putting on make-up and doing her hair, watching a variety of tv shows instead of the same show due to its predictability.     Home program established: yes-spoon theory  watch 30 minute tv  shows and discuss with her  and/or write brief summary about them  Patient verbalized understanding to all above education provided.     See Electronic Medical Record under Patient Instructions for exercises provided throughout therapy.  Assessment:   Lydia participated well today in today's session which focused on extensive education regarding memory, sustained attention, alternating attention, meta-cognitive strategy training, mental manipulation, education, Dysarthria strategies, and Fluency strategies. Speech more fluent today with 3 to 5 episodes of blocks and eye blinks only when she was stressed. Overall, Today strengths were noted in motivation, willing to try different activities, and using strategies. Improvement continues to be noted in use of strategies. Deficits remain in attention, memory, problem solving, executive functions. Patient became emotional in the session and stated that she is depressed because of her current medical, cognitive, and physical limitations. She spends all of her time downstairs and watches only one old tv show due to the predictability of the show. Cognitive, Physical, and Emotional fatigue was not believed to have been a barrier to the session. To date, Lydia has met 0 goals.  Lydia is progressing well towards her goals. Current goals remain appropriate. Goals to be updated as necessary. Patient feels like speech therapy is helping and wants to continue.  Extend the Plan of Care for 8 weeks.     Patient prognosis is Fair. Patient will continue to benefit from skilled outpatient speech and language therapy to address the deficits listed in the problem list on initial evaluation, provide patient/family education and to maximize patient's level of independence in the home and community environment.   Medical necessity is demonstrated by the following IMPAIRMENTS:  Cognition: Deficits in executive functioning and memory prevent the pt from returning to work, and place  her at risk of unsafe behavior and a decline in quality of life.    Cognition: Deficits in executive functioning, attention, and memory prevent the pt from relaying medically and safety relevant information in a timely manner in a state of emergency.   Barriers to Therapy: transportation   Patient's spiritual, cultural and educational needs considered and patient agreeable to plan of care and goals.  Plan:   Continue  updated Plan of Care with focus on rehabilitation and compensation for cognitive communication deficits for 2 times per week for 8 weeks.     Recommendation: referral to  for mental health support and referral    ADELA Ambriz., CCC-L-SLP, CBIS  Speech-Language Pathologist  Certified Brain Injury Specialist     5/29/2024

## 2024-05-30 ENCOUNTER — HOSPITAL ENCOUNTER (OUTPATIENT)
Dept: RADIOLOGY | Facility: HOSPITAL | Age: 44
Discharge: HOME | End: 2024-05-30
Attending: ORTHOPAEDIC SURGERY
Payer: OTHER GOVERNMENT

## 2024-05-30 ENCOUNTER — OFFICE VISIT (OUTPATIENT)
Dept: ORTHOPEDICS | Facility: CLINIC | Age: 44
End: 2024-05-30
Payer: OTHER GOVERNMENT

## 2024-05-30 ENCOUNTER — CLINICAL SUPPORT (OUTPATIENT)
Dept: REHABILITATION | Facility: HOSPITAL | Age: 44
End: 2024-05-30
Payer: OTHER MISCELLANEOUS

## 2024-05-30 DIAGNOSIS — Z74.09 IMPAIRED FUNCTIONAL MOBILITY, BALANCE, GAIT, AND ENDURANCE: ICD-10-CM

## 2024-05-30 DIAGNOSIS — R26.89 FUNCTIONAL GAIT ABNORMALITY: Primary | Chronic | ICD-10-CM

## 2024-05-30 DIAGNOSIS — R29.898 WEAKNESS OF LEFT UPPER EXTREMITY: ICD-10-CM

## 2024-05-30 DIAGNOSIS — M54.16 LUMBAR RADICULOPATHY: ICD-10-CM

## 2024-05-30 DIAGNOSIS — R29.898 WEAKNESS OF LEFT LOWER EXTREMITY: Primary | ICD-10-CM

## 2024-05-30 DIAGNOSIS — M51.36 DDD (DEGENERATIVE DISC DISEASE), LUMBAR: ICD-10-CM

## 2024-05-30 PROCEDURE — 99213 OFFICE O/P EST LOW 20 MIN: CPT | Mod: PBBFAC,25 | Performed by: ORTHOPAEDIC SURGERY

## 2024-05-30 PROCEDURE — 99214 OFFICE O/P EST MOD 30 MIN: CPT | Mod: S$PBB,,, | Performed by: ORTHOPAEDIC SURGERY

## 2024-05-30 PROCEDURE — 99999 PR PBB SHADOW E&M-EST. PATIENT-LVL III: CPT | Mod: PBBFAC,,, | Performed by: ORTHOPAEDIC SURGERY

## 2024-05-30 PROCEDURE — 72110 X-RAY EXAM L-2 SPINE 4/>VWS: CPT | Mod: 26,,, | Performed by: RADIOLOGY

## 2024-05-30 PROCEDURE — 72110 X-RAY EXAM L-2 SPINE 4/>VWS: CPT | Mod: TC

## 2024-05-30 PROCEDURE — 97110 THERAPEUTIC EXERCISES: CPT | Mod: PO

## 2024-05-30 PROCEDURE — 97530 THERAPEUTIC ACTIVITIES: CPT | Mod: PO

## 2024-05-30 NOTE — PROGRESS NOTES
[facility]   Physical Therapy Progress Note    Name: Lydia Thomas  Clinic Number: [id number]    Therapy Diagnosis:   Encounter Diagnoses   Name Primary?    Functional gait abnormality Yes    Impaired functional mobility, balance, gait, and endurance      Physician: Conrad Gutierres MD    Visit Date: 5/30/2024    Physician Orders: PT Eval and Treat; Neuro PT   Medical Diagnosis from Referral: Weakness of left lower extremity [R29.898]   Evaluation Date: 1/17/2024  Authorization Period Expiration: 10/4/24  Plan of Care Expiration: 5/10/24  Updated Plan of Care Expiration: 6/21/24  Visit # / Visits authorized: 6/12     Progress Note Due: 6/16/24    PTA Visit #: 0/5     Time In: 1431  Time Out: 1517  Total Billable Time: 46 minutes    SUBJECTIVE     Pt reports: She is feeling pretty good this afternoon but is tired following her appointment [facility] this morning.    She was not given a home exercise program on evaluation.  Response to previous treatment: tolerated evaluation well  Functional change: ongoing    Pain: 6/10  Location: right hip      OBJECTIVE     Objective measures updated on 5/16/24.       Treatment     Lydia received the treatments listed below:      therapeutic exercises to develop strength, endurance, flexibility, and posture for 10 minutes including:    X 8 min total, SciFit Recumbent stepper, level 1, BUE/BLE for muscular and CV endurance/neuro priming  > Performed x 5 min then needed a rest break due to increased fatigue at start of session    --> Total activity time includes set up and brief seated rest break following      Sensory integration activities to improve: Coordination, Sense, and Proprioception for 00 minutes. The following activities were included:    NP      neuromuscular re-education activities to improve: Balance, Coordination, Sense, and Proprioception for 00 minutes. The following activities were included:    NP      therapeutic activities to  "improve functional performance for 36 minutes, including:    Education listed below      gait training to improve functional mobility and safety for 00 minutes, including:    NP      Patient Education and Home Exercises     Home Exercises Provided and Patient Education Provided     Education provided:   1/29:  - Interpretation of 2MWT   - Visual demonstration, verbal instructions and written handout provided with all exercises to be included as part of home exercise program.   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 Educated on using her "sand bags" to decrease her arousal with nervous activities  5/30: - Reviewed "Functional Movement Disorder" Workbook published by [facility]. Discussed anatomy of the nervous system, pathophysiology regarding FND/FMD, and strategies to maximize her potential for recovery with therapy    Written Home Exercises Provided: yes. Exercises were reviewed and Lydia was able to demonstrate them prior to the end of the session.  Lydia demonstrated good  understanding of the education provided. See EMR under Patient Instructions for exercises provided during therapy sessions    ASSESSMENT     Lydia Ramirez Alexandrianeda tolerated today's session well with a focus on patient education regarding functional movement disorder. She responded to education well and expressed fair understanding of her symptoms and underlying issues. She does endorse not fully understanding " everything presented today and PT advised patient to work through the work book with the assistance of her , mom, and potentially speech therapy. She continues to be appropriate for skilled physical therapy services to maximize her safety and independence with community mobility.    Lydia Is progressing well towards her goals.   Pt prognosis is Good.     Pt will continue to benefit from skilled outpatient physical therapy to address the deficits listed in the problem list box on initial evaluation, provide pt/family education and to maximize pt's level of independence in the home and community environment.     Pt's spiritual, cultural and educational needs considered and pt agreeable to plan of care and goals.     Anticipated barriers to physical therapy: none at this time     Goals:   Short Term Goals = Long Term Goals: 4 weeks   Pt will be independent with an individualized home exercise program.  Ongoing  Pt will improve 5x chair rise score to </= 10 s with no UE assist for improved muscular endurance. Progressing  Pt will improve Timed Up and Go (TUG) score to less than 14 s with no assistive device. Progressing  Pt will improve self selected walking speed (SSWS) with no assistive device to at least 1.0 m/s for improved safety with home and community ambulation. Progressing  Pt will improve postural control with MCTSIB condition 2 score of at least 30 s for decreased fall risk with standing ADLs. Progressing  Pt will improve postural control to tolerate performing conditions 3 and 4 of the MCTSIB. Ongoing  Pt will improve FOTO score to >/= 56% for improved self perception of functional mobility. Ongoing  PT will administer 2MWT as safely able to assess pt's level of activity tolerance Met 1/29/24  Pt will improve dynamic gait abilities to tolerate performing the Functional Gait Assessment Met 3/13/24  Pt to perform 2 minute walk test for 300 feet or greater to improve gait speed & endurance Met  5/16/24  Pt will improve Functional Gait Assessment (FGA) score to at least 23/30 for increased independence with home and community ambulation. Progressing  New 5/16/24: Pt will improve ambulatory tolerance to walking for 6 minutes continuously without reports of excessive fatigue.      Patient's goals: be able to walk around the block twice, go up stairs, I want to be able to do a real squat, and get into running. Ongoing      PLAN     Continue to progress dynamic balance and activity tolerance    Jazz Cody, PT

## 2024-05-30 NOTE — PROGRESS NOTES
DATE: 5/30/2024  PATIENT: Lydia Thomas    Attending Physician: Conrad Gutierres M.D.    HISTORY:  Lydia Thomas is a 44 y.o. female who returns to me today for FU. She complained of R lateral hip pain. Patient continues to have LLE n/t and weakness, but PT has been helping tremendously. She is switching her neuro-psychologist. She wants to continue neuro PT for her LLE.    The patient does not smoke, have DM or endorse IVDU. The patient is not on any blood thinners and does not take chronic narcotics. She works at [employer] (WORK[employer]).    PMH/PSH/FamHx/SocHx:  Unchanged from prior visit    ROS:  Positive for LUE and LLE weakness and n/t  Denies myelopathic symptoms, perineal paresthesias, bowel or bladder incontinence    EXAM:  LMP 05/09/2024     My physical examination was notable for the following findings: diffuse weakness 3/5 in all muscle groups in LLE. Normal sensation to light touch in the C5-T1 and L2-S1 dermatomes bilaterally. 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    Through Care Everywhere, CT of entire spine (cervical, thoracic and lumbar) reports stated no fractures. CT head report stated no acute head bleed.     MRI C-T-L showed no significant central or foraminal stenosis.    EMG BLE was inconclusive.    ASSESSMENT/PLAN:  Patient has LLE weakness and pain following a concussion on 4/12/22. Her spine MRI was not very impressive; no ortho spine surgical intervention warranted. I discussed the natural history of their diagnoses as well as surgical and nonsurgical treatment options. I educated the patient on the importance of core/back strengthening, correct posture, bending/lifting ergonomics, and low-impact aerobic exercises (walking, elliptical, and aquatherapy). Continue medications. Continue neuro physical therapy for LLE  stregnthening. Patient will follow up PRN.     Conrad Gutierres MD  Orthopaedic Spine Surgeon  Department of Orthopaedic Surgery  [phone]

## 2024-05-30 NOTE — PLAN OF CARE
[facility]  Speech Therapy Updated Plan of Care-Neurological Rehabilitation         Date: 5/29/2024   Name: Lydia Ramirez [facility]  Clinic Number: [id number]    Therapy Diagnosis:   Encounter Diagnosis   Name Primary?    Cognitive communication deficit Yes     Physician: Francine Contreras,*    Physician Orders: Ambulatory Referral to Speech Therapy   Medical Diagnosis:  Impaired functional mobility, balance, gait, and endurance [Z74.09]      Visit #/ Visits Authorized: 4/ 12 (including evaluation)   Date of Evaluation:  4/15/24   Insurance Authorization Period: 1/18/2024 to 1/17/2025  Plan of Care Expiration:    5/31/2024  New POC Certification Period:  5/29/24 to 7/26/2024    Total Visits Received: 4    Precautions:Standard, Fall, Cognition, and Communication  Subjective     Update: Lydia participated well today in today's session which focused on extensive education regarding memory, sustained attention, alternating attention, meta-cognitive strategy training, mental manipulation, education, Dysarthria strategies, and Fluency strategies. Speech more fluent today with 3 to 5 episodes of blocks and eye blinks only when she was stressed. Overall, Today strengths were noted in motivation, willing to try different activities, and using strategies. Improvement continues to be noted in use of strategies. Deficits remain in attention, memory, problem solving, executive functions. Patient became emotional in the session and stated that she is depressed because of her current medical, cognitive, and physical limitations. She spends all of her time downstairs and watches only one old tv show due to the predictability of the show. Patient prognosis is Fair. Patient will continue to benefit from skilled outpatient speech and language therapy to address the deficits listed in the problem list on initial evaluation, provide patient/family education and to maximize patient's level of independence in the home  and community environment.     Objective     Update: see follow up note dated 5/29/2024    Assessment     Update: Lydia Thomas presents to [facility] status post medical diagnosis of status post medical diagnosis of history Traumatic Brain Injury/concussion . Demonstrates impairments including limitations as described in the problem list. Positive prognostic factors include patient motivation. Negative prognostic factors include  complex medical history and psychological/mental health . Lydia demonstrated with severely impaired cognitive communication skills and were not not functional within her daily environment.  She demonstrates decreased short term memory, attention, problem solving, executive function, word fluency, and mental flexibility. Her speech is hesitant and effortful with obvious secondary behaviors such as eye blinks.Barriers to therapy include transportation . Patient will benefit from skilled, outpatient rehabilitation speech therapy.    Rehab Potential: fair   Pt's spiritual, cultural, and educational needs considered and patient agreeable to plan of care and goals.    Education: Plan of Care, role of SLP in care, motor speech strategies, memory strategies, attention shifting strategies, course of medical disease affect on therapy diagnosis , scheduling/ cancellation policy, insurance limitations / visit limit , and fluency strategies, word finding strategies      Previous Short Term Goals Status: 4 weeks  Short Term Goals: (4 weeks) Current Progress:   1.Patient will complete alternating attention tasks with 90% accuracy given minimum cues.     Progressing/ Not Met  continue   2. Patient will complete selective attention tasks with 90% accuracy given minimum cues.      Progressing/ Not Met    continue   3. Patient will complete immediate memory tasks using strategies with 90% accuracy given minimum cues.       Progressing/ Not Met   continue   4. Patient will complete  delayed memory tasks using strategies with 90% accuracy given minimum cues.      Progressing/ Not Met  continue    5. Patient will complete mental manipulation tasks with 90% accuracy given minimum cues.      Progressing/ Not Met   continue   6. Patient will complete moderate to complex problem solving tasks with 90% accuracy given minimum cues.      Progressing/ Not Met   continue   7. Patient will use easy onset and diaphragmatic breathing during speech tasks on 4/5 trials to increase speech fluency.     Progressing/ Not Met   continue   8. Patient will participate in assessment of reading and writing. Discontinue met x 1         New Short Term Goals: 4 weeks  Short Term Goals: (4 weeks) Current Progress:   1.Patient will complete alternating attention tasks with 90% accuracy given minimum cues.      2. Patient will complete selective attention tasks with 90% accuracy given minimum cues.         3. Patient will complete immediate memory tasks using strategies with 90% accuracy given minimum cues.        4. Patient will complete delayed memory tasks using strategies with 90% accuracy given minimum cues.          5. Patient will complete mental manipulation tasks with 90% accuracy given minimum cues.           6. Patient will complete moderate to complex problem solving tasks with 90% accuracy given minimum cues.         7. Patient will use easy onset and diaphragmatic breathing during speech tasks on 4/5 trials to increase speech fluency.        8. Patient will functional reading comprehension tasks with 90% accuracy given minimum cues.          Long Term Goal Status:  8 weeks  Long Term Goals: (8 weeks) Current Progress:   Patient will use appropriate memory strategies to schedule and recall weekly activities, perform daily tasks, and express needs and recall names to maintain safety and participate socially in functional living environment.      Established this date     2. Patient will improve  attention skills  to effectively attend to and communicate in complex daily living tasks in functional living environment.       Established this date     3. Patient will demonstrate use of  goal setting, planning,  initiation, and  self-monitoring during daily living activities to improve safety and awareness in functional living environment.    Established this date     4. Patient will apply problem-solving strategies with visual support in daily living functional activities at home and in the community.      Established this date     5. Patient will develop functional cognitive-linguistic based skills and utilize compensatory strategies to communicate wants and needs effectively to different conversational partners, maintain safety, and participate socially in functional living environment.        Established this date          Goals Previously Met:  8. Patient will participate in assessment of reading and writing.     Reasons for Recertification of Therapy: Deficits in executive functioning, attention, and memory prevent the patient performing herwork and personal daily activities effectively and efficiently which may put her at risk of unsafe behavior and a decline in quality of life.        Plan     Updated Certification Period: 5/29/2024 to 7/26/2024    Recommended Treatment Plan: Patient will participate in the [facility] rehabilitation program for speech therapy 2 times per week to address her Communication, Cognition, and Motor Speech deficits, to educate patient and their family, and to participate in a home exercise program.     Other recommendations: 1. counseling for mental health  2. Neuropsychological evaluation if not completed already.     Therapist's Name:  ML Ambriz, L-SLP,CCC-SLP, CBIS  Speech-Language Pathologist  Certified Brain Injury Specialist     5/29/2024      I CERTIFY THE NEED FOR THESE SERVICES FURNISHED UNDER THIS PLAN OF TREATMENT AND WHILE UNDER MY CARE      Physician Name:  _______________________________    Physician Signature: ____________________________

## 2024-05-31 ENCOUNTER — PATIENT OUTREACH (OUTPATIENT)
Dept: ADMINISTRATIVE | Facility: OTHER | Age: 44
End: 2024-05-31
Payer: OTHER GOVERNMENT

## 2024-05-31 ENCOUNTER — OUTPATIENT CASE MANAGEMENT (OUTPATIENT)
Dept: ADMINISTRATIVE | Facility: OTHER | Age: 44
End: 2024-05-31
Payer: OTHER GOVERNMENT

## 2024-05-31 ENCOUNTER — CLINICAL SUPPORT (OUTPATIENT)
Dept: REHABILITATION | Facility: HOSPITAL | Age: 44
End: 2024-05-31
Payer: OTHER GOVERNMENT

## 2024-05-31 DIAGNOSIS — R41.841 COGNITIVE COMMUNICATION DEFICIT: Primary | ICD-10-CM

## 2024-05-31 PROCEDURE — 97129 THER IVNTJ 1ST 15 MIN: CPT | Mod: PO

## 2024-05-31 PROCEDURE — 97130 THER IVNTJ EA ADDL 15 MIN: CPT | Mod: PO

## 2024-05-31 NOTE — PROGRESS NOTES
[facility]  Speech Therapy PROGRESS Note- Neurological Rehabilitation  Date: 5/31/2024     Name: Lydia Thomas   MRN: [id number]   Therapy Diagnosis:   Encounter Diagnosis   Name Primary?    Cognitive communication deficit Yes   Physician: Francine Contreras,*  Physician Orders: Ambulatory Referral to Speech Therapy   Medical Diagnosis:  Impaired functional mobility, balance, gait, and endurance [Z74.09]     Visit #/ Visits Authorized: 5/ 12 (including evaluation)   Date of Evaluation:  4/15/24   Insurance Authorization Period: 1/18/2024 - 1/17/2025   Plan of Care Expiration Date:    5/31/24   Extended Plan of Care:  7/26/24    Progress Note: 5/3/24 completed today      Time In:  1:50   Time Out: 2:30  Total Billable Time: 45 minutes      Precautions: Standard, Fall, Cognition, and Communication  Subjective:   Patient reports: spoke to , Ronny, over the phone about activities for Lydia to engage in at home.  Patient reported watching a [employer] show - [place] last night for 20 minutes. She enjoyed it and plans finish watching today.   She was compliant to home exercise program.   Response to previous treatment: good  Pain Scale: no pain indicated throughout session  Objective:   TIMED  Procedure Min.   Cognitive Therapeutic Interventions, first 15 minutes CPT 78408  15   Cognitive Therapeutic Interventions, each additional 15 minutes CPT 60527  30         Short Term Goals: (4 weeks) Current Progress:   1.Patient will complete alternating attention tasks with 90% accuracy given minimum cues.     Progressing/  5/31/2024  Patient alternated between 2 tasks every 2 minutes:  Task 1: Patient completed complex written directions task  with 88% accuracy independently, 100% accuracy given minimum cues  Task 2: Patient completed Constant Therapy - put concepts in order L1   with 86% accuracy independently.    Overall, no difficulty sustaining or alternating her attention.     Met x 1    2. Patient will complete selective attention tasks with 90% accuracy given minimum cues.      Progressing/  5/31/2024  Patient alternated between 2 tasks every 2 minutes:  Task 1: Patient completed complex written directions task  with 88% accuracy independently, 100% accuracy given minimum cues  Task 2: Patient completed Constant Therapy - put concepts in order L1   with 86% accuracy independently.  Overall, no difficulty sustaining or alternating her attention.    Met x 1   3. Patient will complete immediate memory tasks using strategies with 90% accuracy given minimum cues.       Progressing/ 5/31/2024  Patient wrote down everything that was being discussed with her  on the phone.         4. Patient will complete delayed memory tasks using strategies with 90% accuracy given minimum cues.      Progressing/  5/31/2024  Patient recalled Netflix show she watched last night.    5. Patient will complete mental manipulation tasks with 90% accuracy given minimum cues.      Progressing/   5/31/2024   Patient completed complex written directions task  with 88% accuracy independently, 100% accuracy given minimum cues  Task 2: Patient completed Constant Therapy - put concepts in order L1   with 86% accuracy independently  Met x 1   6. Patient will complete moderate to complex problem solving tasks with 90% accuracy given minimum cues.      Progressing/ 5/31/2024   Patient completed complex written directions task  with 88% accuracy independently, 100% accuracy given minimum cues  Task 2: Patient completed Constant Therapy - put concepts in order L1   with 86% accuracy independently  Met x 2     7. Patient will use easy onset and diaphragmatic breathing during speech tasks on 4/5 trials to increase speech fluency.       Progressing/ 5/31/2024    # episodes of blocks during speech:  2 to 3 with  no cues to repeat the words    Introduced breathing rose   take a deep breath ([email or web address])     8.  Patient will functional  "reading comprehension tasks with 90% accuracy given minimum cues.    2024  Patient completed complex written directions task  with 88% accuracy independently, 100% accuracy given minimum cues. She misread one word ("pepper for paper")  Task 2: Patient completed Constant Therapy - put concepts in order L1   with 86% accuracy independently     Patient Education/Response:   Patient educated regarding the followin. Cognitive compensatory strategies   2. Cognitive fatigue, cognitive load management, spoon theory   3. Daily planner and journaling  4. Cognitive stimulating activities.       Extensive education today - discussed activities to perform at home such as washing and drying clothes with assistance to carry basket due to her physical limitations, Engaging in personal daily tasks that she previously enjoyed like putting on make-up and doing her hair, watching a variety of tv shows instead of the same show due to its predictability.  Sign up for trial for Constant Therapy if able to.      Home program established: yes-spoon theory  watch 30 minute tv shows and discuss with her  and/or write brief summary about them  Patient verbalized understanding to all above education provided.     See Electronic Medical Record under Patient Instructions for exercises provided throughout therapy.  Assessment:   Lydia participated well today in today's session which focused on extensive education regarding memory, sustained attention, alternating attention, meta-cognitive strategy training, mental manipulation, education, reading comprehension, Dysarthria strategies, and Fluency strategies. Speech more fluent today with 2 to 3 episodes of blocks and eye blinks only when she was stressed. Good performance and increased accuracy for all tasks performed.  Overall, Today strengths were noted in motivation, willing to try different activities, and using strategies. Improvement continues to be noted in use " of strategies and in memory, attention, and problem solving. Deficits remain in attention, memory, problem solving, executive functions.  Cognitive, Physical, and Emotional fatigue was not believed to have been a barrier to the session. To date, Lydia has met 0 goals.  Lydia is progressing well towards her goals. Current goals remain appropriate. Goals to be updated as necessary. Patient feels like speech therapy is helping and wants to continue.  Extend the Plan of Care for 8 weeks.     Patient prognosis is Fair. Patient will continue to benefit from skilled outpatient speech and language therapy to address the deficits listed in the problem list on initial evaluation, provide patient/family education and to maximize patient's level of independence in the home and community environment.   Medical necessity is demonstrated by the following IMPAIRMENTS:  Cognition: Deficits in executive functioning and memory prevent the pt from returning to work, and place her at risk of unsafe behavior and a decline in quality of life.    Cognition: Deficits in executive functioning, attention, and memory prevent the pt from relaying medically and safety relevant information in a timely manner in a state of emergency.   Barriers to Therapy: transportation   Patient's spiritual, cultural and educational needs considered and patient agreeable to plan of care and goals.  Plan:   Continue  updated Plan of Care with focus on rehabilitation and compensation for cognitive communication deficits for 2 times per week for 8 weeks.     Recommendation: referral to  for mental health support and referral    ADELA Ambriz., CCC-L-SLP, CBIS  Speech-Language Pathologist  Certified Brain Injury Specialist     5/31/2024

## 2024-06-05 ENCOUNTER — PATIENT MESSAGE (OUTPATIENT)
Dept: FAMILY MEDICINE | Facility: CLINIC | Age: 44
End: 2024-06-05
Payer: OTHER GOVERNMENT

## 2024-06-05 NOTE — PROGRESS NOTES
[facility]   Physical Therapy Treatment Note    Name: Lydia Thomas  Clinic Number: [id number]    Therapy Diagnosis:   Encounter Diagnoses   Name Primary?    Functional gait abnormality Yes    Impaired functional mobility, balance, gait, and endurance        Physician: Conrad Gutierres MD    Visit Date: 6/6/2024    Physician Orders: PT Eval and Treat; Neuro PT   Medical Diagnosis from Referral: Weakness of left lower extremity [R29.898]   Evaluation Date: 1/17/2024  Authorization Period Expiration: 10/4/24  Plan of Care Expiration: 5/10/24  Updated Plan of Care Expiration: 6/21/24  Visit # / Visits authorized: 7/12     Progress Note Due: 6/16/24    PTA Visit #: 0/5     Time In: 930  Time Out: 1033  Total Billable Time: 63 minutes    SUBJECTIVE     Pt reports: that she had a death in the family this past week and has been working on coping with that mentally       She was not given a home exercise program on evaluation.  Response to previous treatment: tolerated evaluation well  Functional change: ongoing    Pain: 6/10  Location: right hip      OBJECTIVE     Objective measures updated on 5/16/24.       Treatment     Lydia received the treatments listed below:      therapeutic exercises to develop strength, endurance, flexibility, and posture for 8  minutes including:    X 8 min total, SciFit Recumbent stepper, level 1, BUE/BLE for muscular and CV endurance/neuro priming  > no rest breaks required     --> Total activity time includes set up and brief seated rest break following      Sensory integration activities to improve: Coordination, Sense, and Proprioception for 30 minutes. The following activities were included:      Triple flexion band donned to left LE for improved proprioception for all activities listed below:   - SPT used as mirror when performing all the activities listed below, patient and SPT perform activities simultaneously to provide visual cues and feedback to  "the patient     Ballet bar: CGA  X 12 reps, Placing bean bags in bucket with left leg, R UE support,   - SPT used as mirror for      Standard chair:  2 x 10, Sit to stands + holding tidal tank    4 x 30 feet walking over hurdles (3) with triple flexion band + dual cognitive task         neuromuscular re-education activities to improve: Balance, Coordination, Sense, and Proprioception for 00 minutes. The following activities were included:    NP      therapeutic activities to improve functional performance for 25 minutes, including:    Upon walking out of the parallel bars to exit the clinic, the patient looked at her self in the mirror and became unresponsive. The patient had BUE support of // bars at this time and therapist was providing moderate assist to keep the patient upright while SPT brought chair behind the patient for the therapist to assist lowering the patient into the chair. The patient remained upright in chair but was unresponsive to therapist and SPT for about 8 minutes. Upon patient regaining awareness to surroundings, the therapist worked on blowing bubbles, deep breathing and calming techniques fowhile regaining composure due to being extremely overwhelmed and overstimulated. The patient remained seated in chair for an additional 5 minutes prior to being transferred to  to exit clinic     Stand pivot transfer from chair to  with minimal assist     Stand pivot transfer from WC to [employer] with moderate assist     Time above includes time to ambulate into the gym      gait training to improve functional mobility and safety for 00 minutes, including:    NP      Patient Education and Home Exercises     Home Exercises Provided and Patient Education Provided     Education provided:   1/29:  - Interpretation of 2MWT   - Visual demonstration, verbal instructions and written handout provided with all exercises to be included as part of home exercise program.   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 Educated on using her "sand bags" to decrease her arousal with nervous activities  5/30: - Reviewed "Functional Movement Disorder" Workbook published by [facility]. Discussed anatomy of the nervous system, pathophysiology regarding FND/FMD, and strategies to maximize her potential for recovery with therapy    Written Home Exercises Provided: yes. Exercises were reviewed and Lydia was able to demonstrate them prior to the end of the session.  Lydia demonstrated good  understanding of the education provided. See EMR under Patient Instructions for exercises provided during therapy sessions    ASSESSMENT     Lydia Ramirez William tolerated today's session fairly. The session focused primarily on performing standing activities with triple flexion band donned to left LE for improved proprioception. Improved performance noted with the addition of visual cues of SPT mirroring the patient's movements for increased feedback and sensory integration of movements. Upon walking out of the parallel bars to exit the clinic, the patient looked at herself in the mirror and became unresponsive. The patient had BUE support of // bars at this time and therapist was providing moderate assist to keep the patient upright while SPT brought chair behind the patient for the therapist to assist lowering the patient into the chair. The patient remained upright in chair but was unresponsive to therapist and SPT. Upon " patient regaining awareness to surroundings, the therapist worked on blowing bubbles for the patient to focus on while regaining composure due to being extremely overwhelmed and overstimulated.  The patient was then assisted into [employer] by therapist and SPT. The patient reports feeling good upon leaving the clinic and did not require any additional assistance at this time. She continues to be appropriate for skilled physical therapy services to maximize her safety and independence with community mobility.      Lydia Is progressing well towards her goals.   Pt prognosis is Good.     Pt will continue to benefit from skilled outpatient physical therapy to address the deficits listed in the problem list box on initial evaluation, provide pt/family education and to maximize pt's level of independence in the home and community environment.     Pt's spiritual, cultural and educational needs considered and pt agreeable to plan of care and goals.     Anticipated barriers to physical therapy: none at this time     Goals:   Short Term Goals = Long Term Goals: 4 weeks   Pt will be independent with an individualized home exercise program.  Ongoing  Pt will improve 5x chair rise score to </= 10 s with no UE assist for improved muscular endurance. Progressing  Pt will improve Timed Up and Go (TUG) score to less than 14 s with no assistive device. Progressing  Pt will improve self selected walking speed (SSWS) with no assistive device to at least 1.0 m/s for improved safety with home and community ambulation. Progressing  Pt will improve postural control with MCTSIB condition 2 score of at least 30 s for decreased fall risk with standing ADLs. Progressing  Pt will improve postural control to tolerate performing conditions 3 and 4 of the MCTSIB. Ongoing  Pt will improve FOTO score to >/= 56% for improved self perception of functional mobility. Ongoing  PT will administer 2MWT as safely able to assess pt's level of activity tolerance  Met 1/29/24  Pt will improve dynamic gait abilities to tolerate performing the Functional Gait Assessment Met 3/13/24  Pt to perform 2 minute walk test for 300 feet or greater to improve gait speed & endurance Met 5/16/24  Pt will improve Functional Gait Assessment (FGA) score to at least 23/30 for increased independence with home and community ambulation. Progressing  New 5/16/24: Pt will improve ambulatory tolerance to walking for 6 minutes continuously without reports of excessive fatigue.      Patient's goals: be able to walk around the block twice, go up stairs, I want to be able to do a real squat, and get into running. Ongoing      PLAN     Continue to progress dynamic balance and activity tolerance    Lala Sanford, PT

## 2024-06-06 ENCOUNTER — CLINICAL SUPPORT (OUTPATIENT)
Dept: REHABILITATION | Facility: HOSPITAL | Age: 44
End: 2024-06-06
Payer: OTHER MISCELLANEOUS

## 2024-06-06 ENCOUNTER — TELEPHONE (OUTPATIENT)
Dept: GASTROENTEROLOGY | Facility: CLINIC | Age: 44
End: 2024-06-06
Payer: OTHER GOVERNMENT

## 2024-06-06 DIAGNOSIS — Z74.09 IMPAIRED FUNCTIONAL MOBILITY, BALANCE, GAIT, AND ENDURANCE: ICD-10-CM

## 2024-06-06 DIAGNOSIS — R26.89 FUNCTIONAL GAIT ABNORMALITY: Primary | Chronic | ICD-10-CM

## 2024-06-06 PROCEDURE — 97533 SENSORY INTEGRATION: CPT | Mod: PO

## 2024-06-06 PROCEDURE — 97530 THERAPEUTIC ACTIVITIES: CPT | Mod: PO

## 2024-06-06 NOTE — TELEPHONE ENCOUNTER
----- Message from Patricia Lawrence sent at 6/6/2024 11:35 AM CDT -----  Regarding: patient call back  Type: Patient Call Back    Who called: Self     What is the request in detail: asked for a call back to get her apt R/S. System is not allowing me to R/S    Can the clinic reply by [facility]? Yes     Would the patient rather a call back or a response via [facility]? Yes     Best call back number: [phone]

## 2024-06-10 ENCOUNTER — TELEPHONE (OUTPATIENT)
Dept: VASCULAR SURGERY | Facility: CLINIC | Age: 44
End: 2024-06-10
Payer: OTHER GOVERNMENT

## 2024-06-10 NOTE — PROGRESS NOTES
[facility]   Physical Therapy Treatment Note    Name: Lydia Thomas  Clinic Number: [id number]    Therapy Diagnosis:   Encounter Diagnoses   Name Primary?    Functional gait abnormality Yes    Impaired functional mobility, balance, gait, and endurance      Physician: Conrad Gutierres MD    Visit Date: 6/11/2024    Physician Orders: PT Eval and Treat; Neuro PT   Medical Diagnosis from Referral: Weakness of left lower extremity [R29.898]   Evaluation Date: 1/17/2024  Authorization Period Expiration: 10/4/24  Plan of Care Expiration: 5/10/24  Updated Plan of Care Expiration: 6/21/24  Visit # / Visits authorized: 8/12     Progress Note Due: 6/16/24    PTA Visit #: 0/5     Time In: 1107  Time Out: 1200  Total Billable Time: 53 minutes    SUBJECTIVE     Pt reports: She is feeling pretty good this morning. Denies any complaints at the start of her session.     She was not given a home exercise program on evaluation.  Response to previous treatment: tolerated evaluation well  Functional change: ongoing    Pain: 6/10  Location: right hip      OBJECTIVE     Objective measures updated on 5/16/24.       Treatment     Lydia received the treatments listed below:      therapeutic exercises to develop strength, endurance, flexibility, and posture for 8 minutes including:    X 8 min total, SciFit Recumbent stepper, level 1, BUE/BLE for muscular and CV endurance/neuro priming  > no rest breaks required       Sensory integration activities to improve: Coordination, Sense, and Proprioception for 41 minutes. The following activities were included:    Triple flexion band donned to left LE for improved proprioception for all activities listed below:     GaitBetter   Duration Speed Distance Obstacles Navigation Level Handrails Score   Trial 1 5 mins 1.0 mph 139 yds 139 yards N/A 1 bilaterally 158      Left Right   Hurdles  1/1 0/2   Puddles 0/2 1/1   Total 33% 33%     --> Skilled setup and breakdown  "required.     X 4 laps, multi-directional stepping around mat    X 10 Sit to stands from low black mat + tidal tank    --> Therapeutic rest break required throughout the session and time spent discussing symptoms/activity tolerance      neuromuscular re-education activities to improve: Balance, Coordination, Sense, and Proprioception for 00 minutes. The following activities were included:    NP      therapeutic activities to improve functional performance for 4 minutes, including:    Time above includes time to ambulate into/out of the gym      gait training to improve functional mobility and safety for 00 minutes, including:    NP      Patient Education and Home Exercises     Home Exercises Provided and Patient Education Provided     Education provided:   1/29:  - Interpretation of 2MWT   - Visual demonstration, verbal instructions and written handout provided with all exercises to be included as part of home exercise program.   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 Educated on using her "sand bags" to decrease her arousal with nervous activities  5/30: - Reviewed "Functional Movement Disorder" Workbook published by [facility]. Discussed anatomy of the nervous system, pathophysiology regarding FND/FMD, and strategies to maximize her potential for recovery with therapy    Written Home Exercises Provided: yes. Exercises were reviewed and Lydia was able to demonstrate them prior to the " end of the session.  Lydia demonstrated good  understanding of the education provided. See EMR under Patient Instructions for exercises provided during therapy sessions    ASSESSMENT     Lydia Thomas tolerated today's session well. She was able to verbalize factors that contributed to her becoming overworked at her last session and expressed understanding of the importance of taking breaks between interventions to prevent over fatigue. She performed well with dynamic gait interventions performed on today and showed good attempts at foot clearance on the GaitBetter. This carried over to improved knee flexion in swing when patient was ambulating out of the clinic at the end of the session however pt continues to compensate with increased left hip circumduction. She continues to be limited by her apprehension towards visual feedback and decreased self-agency but expresses understanding of their importance in her recovery and is open to trying the mirror in the future. She continues to be appropriate for skilled physical therapy services to maximize her safety and independence with community mobility.      Lydia Is progressing well towards her goals.   Pt prognosis is Good.     Pt will continue to benefit from skilled outpatient physical therapy to address the deficits listed in the problem list box on initial evaluation, provide pt/family education and to maximize pt's level of independence in the home and community environment.     Pt's spiritual, cultural and educational needs considered and pt agreeable to plan of care and goals.     Anticipated barriers to physical therapy: none at this time     Goals:   Short Term Goals = Long Term Goals: 4 weeks   Pt will be independent with an individualized home exercise program.  Ongoing  Pt will improve 5x chair rise score to </= 10 s with no UE assist for improved muscular endurance. Progressing  Pt will improve Timed Up and Go (TUG) score to less than 14 s with  no assistive device. Progressing  Pt will improve self selected walking speed (SSWS) with no assistive device to at least 1.0 m/s for improved safety with home and community ambulation. Progressing  Pt will improve postural control with MCTSIB condition 2 score of at least 30 s for decreased fall risk with standing ADLs. Progressing  Pt will improve postural control to tolerate performing conditions 3 and 4 of the MCTSIB. Ongoing  Pt will improve FOTO score to >/= 56% for improved self perception of functional mobility. Ongoing  PT will administer 2MWT as safely able to assess pt's level of activity tolerance Met 1/29/24  Pt will improve dynamic gait abilities to tolerate performing the Functional Gait Assessment Met 3/13/24  Pt to perform 2 minute walk test for 300 feet or greater to improve gait speed & endurance Met 5/16/24  Pt will improve Functional Gait Assessment (FGA) score to at least 23/30 for increased independence with home and community ambulation. Progressing  New 5/16/24: Pt will improve ambulatory tolerance to walking for 6 minutes continuously without reports of excessive fatigue.      Patient's goals: be able to walk around the block twice, go up stairs, I want to be able to do a real squat, and get into running. Ongoing      PLAN     Continue to progress dynamic balance and activity tolerance    Jazz Cody, PT

## 2024-06-11 ENCOUNTER — CLINICAL SUPPORT (OUTPATIENT)
Dept: REHABILITATION | Facility: HOSPITAL | Age: 44
End: 2024-06-11
Payer: OTHER MISCELLANEOUS

## 2024-06-11 DIAGNOSIS — Z74.09 IMPAIRED FUNCTIONAL MOBILITY, BALANCE, GAIT, AND ENDURANCE: ICD-10-CM

## 2024-06-11 DIAGNOSIS — R26.89 FUNCTIONAL GAIT ABNORMALITY: Primary | Chronic | ICD-10-CM

## 2024-06-11 PROCEDURE — 97533 SENSORY INTEGRATION: CPT | Mod: PO

## 2024-06-11 PROCEDURE — 97110 THERAPEUTIC EXERCISES: CPT | Mod: PO

## 2024-06-11 NOTE — PROGRESS NOTES
Outpatient Care Management   - Patient Assessment    Patient: Lydia Thomas  MRN:  [id number]  Date of Service:  6/12/2024  Completed by:  Claire Rider LCSW  Referral Date: 05/30/2024    Reason for Visit   Patient presents with    OPCM Enrollment Call     6/11/24    Social Work Assessment     6/11/24       Brief Summary:  received a referral from outpatient provider, Freida (DEBBIE), for the following Low/Mod SW psychosocial needs mental health resources.  Pt elected to participate in the OPCM program. Social work assessment and SDOH questionnaire completed. Pt reported she lives with her  in a single story home. Pt utilizes no DME but has a RW at home to use and a WC. She is dependent on assistance with ADLs. 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 Pt has some friends, and her mother will by flying in to visit in July. The patient also requests assistance with navigating workers comp and seeing if her needs can be covered. Care plan was created in collaboration with patient/caregiver input.   INTERVENTIONS: Obtained consent and workers comp /RN information. Provided list of mental health providers. Discussed  caregiver support options and provided number to rep that assists with that.   CM ACTION PLAN: Work with Workers Comp to assist Pt in seeing a doctor that is closer if possible, and getting a mental health referral/auth to start therapy. Patient agrees to follow up call with in 1 week  PATIENT SELF MANAGEMENT PLAN: Review list to see if there are preferences for therapist.     Workers comp- goes through [employer] [facility]-  Popeye Barreto [phone]; [phone] (in [place])    Future Appointments   Date Time Provider Department Center   6/12/2024 11:15 AM SLP FLOAT [facility] [place] [address] [facility]   6/14/2024 11:15 AM Lala Sanford, PT [place] [address] [facility]   6/17/2024  8:45 AM Freida Oh,  TAMMY, CCC-SLP [facility]   6/17/2024  9:30 AM Jazz Cody, PT [facility]   6/18/2024  2:00 PM [facility] OIC-US1 MASTER [facility] ULTR [facility]   6/20/2024  9:30 AM Freida Oh MCD, CCC-SLP [facility]   6/20/2024  3:00 PM Camille Patten PA-C [facility]   7/8/2024  1:45 PM Freida Oh MCD, CCC-SLP [facility]   7/11/2024  1:45 PM Freida Oh MCD, CCC-SLP [facility]   7/15/2024  1:00 PM Rachel Regalado, CCC-SLP [facility]   7/16/2024  3:00 PM Jose Garcia MD [facility] VAS ERVIN [facility]   7/18/2024  2:30 PM Freida Oh MCD, CCC-SLP [facility]   7/22/2024  2:30 PM Rachel Regalado, CCC-SLP [facility]   7/24/2024 11:00 AM Eder Do, [facility] LAURA [place]   7/29/2024  1:00 PM Rachel Regalado, [facility]-SLP [facility]   7/31/2024  1:00 PM Gertrudis Crowder, [facility]-SLP [facility]   8/2/2024  1:30 PM VASCULAR ULTRASOUND, [facility] EKG [facility]     Claire Rider LCSW  Neuro Therapy   [facility]  [phone]

## 2024-06-12 ENCOUNTER — CLINICAL SUPPORT (OUTPATIENT)
Dept: REHABILITATION | Facility: HOSPITAL | Age: 44
End: 2024-06-12
Payer: OTHER GOVERNMENT

## 2024-06-12 DIAGNOSIS — R41.841 COGNITIVE COMMUNICATION DEFICIT: Primary | ICD-10-CM

## 2024-06-12 PROCEDURE — 97130 THER IVNTJ EA ADDL 15 MIN: CPT | Mod: 95,PO

## 2024-06-12 PROCEDURE — 97129 THER IVNTJ 1ST 15 MIN: CPT | Mod: 95,PO

## 2024-06-13 NOTE — PROGRESS NOTES
"[facility]  Speech Therapy Treatment Note- Neurological Rehabilitation  Date: 6/12/2024     Name: Lydia Thomas   MRN: [id number]   Therapy Diagnosis:   Encounter Diagnosis   Name Primary?    Cognitive communication deficit Yes   Physician: No ref. provider found  Physician Orders: Ambulatory Referral to Speech Therapy   Medical Diagnosis:  Impaired functional mobility, balance, gait, and endurance [Z74.09]     Visit #/ Visits Authorized: 6/ 12 (including evaluation)   Date of Evaluation:  4/15/24   Insurance Authorization Period: 1/18/2024 - 1/17/2025   Plan of Care Expiration Date:   7/26/2024  Extended Plan of Care:  N/A  Progress Note: 7/26/2024    Time In:  11:15 AM    Time Out: 12:00 PM   Total Billable Time: 45 minutes      Precautions: Standard, Fall, Cognition, and Communication    VIRTUAL VISIT:   The patient location is: home ([place])  The chief complaint leading to consultation is: Fluency and memory     Visit type: audiovisual    Face to Face time with patient: 45 minutes     Each patient to whom he or she provides medical services by telemedicine is:  (1) informed of the relationship between the physician and patient and the respective role of any other health care provider with respect to management of the patient; and (2) notified that he or she may decline to receive medical services by telemedicine and may withdraw from such care at any time.    Subjective:   Patient reports: she has been working with clinician Freida Oh regarding her memory. She reported feeling "depressed" regarding her deficits and that she is no longer the "old Lydia." She will be seeking a psychologist.   She was compliant to home exercise program.   Response to previous treatment: good  Pain Scale: no pain indicated throughout session  Objective:   TIMED  Procedure Min.   Cognitive Therapeutic Interventions, first 15 minutes CPT 15679  15   Cognitive Therapeutic Interventions, each additional " 15 minutes CPT 78103  30       Short Term Goals: (4 weeks) Current Progress:   1.Patient will complete alternating attention tasks with 90% accuracy given minimum cues.             Progressing/  6/12/2024  Auditory:   Patient completed auditory alternating attention task of listing items in two categories in an alternating fashion (one animal, one color, one animal, one color). She completed this task with 90% accuracy independently.    Met x 2   2. Patient will complete selective attention tasks with 90% accuracy given minimum cues.      Progressing/  6/12/2024  Not formally addressed.         Met x 1 Previously    3. Patient will complete immediate memory tasks using strategies with 90% accuracy given minimum cues.       Progressing/ 6/12/2024  Patient recalls words read at immediate and delayed intervals with 90% accuracy.     Met x1   4. Patient will complete delayed memory tasks using strategies with 90% accuracy given minimum cues.      Progressing/  6/12/2024  Patient recalls words read at immediate and delayed intervals with 90% accuracy.     Met x1   5. Patient will complete mental manipulation tasks with 90% accuracy given minimum cues.            Progressing/   6/12/2024   Not formally addresed.               Met x 1 previously    6. Patient will complete moderate to complex problem solving tasks with 90% accuracy given minimum cues.      Progressing/  6/12/2024   Not formally addressed         Met x 2 previously    7. Patient will use easy onset and diaphragmatic breathing during speech tasks on 4/5 trials to increase speech fluency.       Progressing/  6/12/2024  Discussed a variety of fluency enhancing techniques such as slow speech, easy onset, and light articulatory contact.    8.  Patient will functional reading comprehension tasks with 90% accuracy given minimum cues.    Progressing/ 6/12/2024  Patient reading comprehension task with 70% accuracy (4th grade reading level).      Patient  Education/Response:   Patient educated regarding the followin. Cognitive compensatory strategies   2. Fluency enhancing strategies   3. Types of disfluencies   4. Teletherapy visits       Extensive education/counseling today - discussed types of disfluencies and fluency enhancing strategies to practice. Discussed feelings regarding current cognitive-communication deficits.  Discussed setting up an appointment with psychology.   Discussed obtaining a library card in order to listen to audio books.    Home program established: Patient instructed to continue prior program     See Electronic Medical Record under Patient Instructions for exercises provided throughout therapy.  Assessment:   Lydia participated well today in today's session which focused on extensive education regarding memory, sustained attention, alternating attention, reading comprehension, and Fluency strategies.  Overall, Today strengths were noted in motivation, willing to try different activities, and using strategies. She completed her first session with this clinician today and majority of session spent on rapport building.  Cognitive, Physical, and Emotional fatigue was not believed to have been a barrier to the session.  Lydia is progressing well towards her goals. Current goals remain appropriate. Goals to be updated as necessary. Patient feels like speech therapy is helping and wants to continue. See updated POC.     Patient prognosis is Fair. Patient will continue to benefit from skilled outpatient speech and language therapy to address the deficits listed in the problem list on initial evaluation, provide patient/family education and to maximize patient's level of independence in the home and community environment.   Medical necessity is demonstrated by the following IMPAIRMENTS:  Cognition: Deficits in executive functioning and memory prevent the pt from returning to work, and place her at risk of unsafe behavior and a decline in  quality of life.    Cognition: Deficits in executive functioning, attention, and memory prevent the pt from relaying medically and safety relevant information in a timely manner in a state of emergency.   Barriers to Therapy: transportation   Patient's spiritual, cultural and educational needs considered and patient agreeable to plan of care and goals.  Plan:   Continue  updated Plan of Care with focus on rehabilitation and compensation for cognitive communication deficits for 2 times per week for 8 weeks.     Recommendation: referral to  for mental health support and referral    TERESE Grijalva-SLP, CCC-SLP, CBIS   Speech Language Pathologist   Certified Brain Injury Specialist   6/13/2024

## 2024-06-14 ENCOUNTER — CLINICAL SUPPORT (OUTPATIENT)
Dept: REHABILITATION | Facility: HOSPITAL | Age: 44
End: 2024-06-14
Payer: OTHER MISCELLANEOUS

## 2024-06-14 ENCOUNTER — TELEPHONE (OUTPATIENT)
Dept: FAMILY MEDICINE | Facility: CLINIC | Age: 44
End: 2024-06-14
Payer: OTHER GOVERNMENT

## 2024-06-14 DIAGNOSIS — F43.23 ADJUSTMENT DISORDER WITH MIXED ANXIETY AND DEPRESSED MOOD: Primary | ICD-10-CM

## 2024-06-14 DIAGNOSIS — Z74.09 IMPAIRED FUNCTIONAL MOBILITY, BALANCE, GAIT, AND ENDURANCE: ICD-10-CM

## 2024-06-14 DIAGNOSIS — R26.89 FUNCTIONAL GAIT ABNORMALITY: Primary | Chronic | ICD-10-CM

## 2024-06-14 PROCEDURE — 97110 THERAPEUTIC EXERCISES: CPT | Mod: PO

## 2024-06-14 PROCEDURE — 97533 SENSORY INTEGRATION: CPT | Mod: PO

## 2024-06-14 NOTE — PROGRESS NOTES
"  [facility]   Physical Therapy Treatment Note    [facility]  [facility] Number: [id number]    Therapy Diagnosis:   Encounter Diagnoses   Name Primary?    Functional gait abnormality Yes    Impaired functional mobility, balance, gait, and endurance        Physician: Conrad Gutierres MD    Visit Date: 6/14/2024    Physician Orders: PT Eval and Treat; Neuro PT   Medical Diagnosis from Referral: Weakness of left lower extremity [R29.898]   Evaluation Date: 1/17/2024  Authorization Period Expiration: 10/4/24  Plan of Care Expiration: 5/10/24  Updated Plan of Care Expiration: 6/21/24  Visit # / Visits authorized: 9/12     Progress Note Due: 6/16/24    PTA Visit #: 0/5     Time In: 11:15  Time Out: 12:00  Total Billable Time: 45 minutes    SUBJECTIVE     Pt reports: She is having some more right sided hip pain.     She was not given a home exercise program on evaluation.  Response to previous treatment: tolerated evaluation well  Functional change: ongoing    Pain: 6/10  Location: right hip      OBJECTIVE     Objective measures updated on 5/16/24.       Treatment     Lydia received the treatments listed below:      therapeutic exercises to develop strength, endurance, flexibility, and posture for 26 minutes including:    X 8 min total, SciFit Recumbent stepper, level 1, BUE/BLE for muscular and CV endurance/neuro priming  > no rest breaks required     3 x 30" seated on mat bilateral QL stretch    2 x 10 reps, standing left lateral flexion with 5# kettle bell to adjust for right lateral flexion compensation when standing    2 x 10 reps, standing left lateral flexion with 5# kettle bell to adjust for right lateral flexion compensation when standing- following ambulation trial       Sensory integration activities to improve: Coordination, Sense, and Proprioception for 15 minutes. The following activities were included:    Triple flexion band donned to left LE for improved " "proprioception for all activities listed below:     2 X 10 Sit to stands from low black mat + tidal sphere    X 300 feet ambulation around neuro gym, around obstacles, and passing other patients    --> Therapeutic rest break required throughout the session and time spent discussing symptoms/activity tolerance      neuromuscular re-education activities to improve: Balance, Coordination, Sense, and Proprioception for 00 minutes. The following activities were included:    NP      therapeutic activities to improve functional performance for 4 minutes, including:    Time above includes time to ambulate into/out of the gym      gait training to improve functional mobility and safety for 00 minutes, including:    NP      Patient Education and Home Exercises     Home Exercises Provided and Patient Education Provided     Education provided:   1/29:  - Interpretation of 2MWT   - Visual demonstration, verbal instructions and written handout provided with all exercises to be included as part of home exercise program.   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 Educated on using her "sand bags" to decrease her arousal with nervous activities  5/30: - Reviewed "Functional Movement Disorder" Workbook published by [facility]. Discussed anatomy of the nervous system, pathophysiology regarding FND/FMD, and strategies to maximize her potential for recovery with therapy    Written Home Exercises Provided: yes. " Exercises were reviewed and Lydia was able to demonstrate them prior to the end of the session.  Lydia demonstrated good  understanding of the education provided. See EMR under Patient Instructions for exercises provided during therapy sessions    ASSESSMENT     Lydia Thomas tolerated therapy session well this morning. Focused more on trunk stretching and stability since patient endorses more right sided hip pain and tightness upon walking into clinic. Patient endorses good relief with QL stretch. Standing lateral flexion with kettle bell added to address opposite side trunk lateral flexion compensation during sit <> stand activity. Again, good response noted with this activity. She continues to be appropriate for skilled physical therapy services to maximize her safety and independence with community mobility.      Lydia Is progressing well towards her goals.   Pt prognosis is Good.     Pt will continue to benefit from skilled outpatient physical therapy to address the deficits listed in the problem list box on initial evaluation, provide pt/family education and to maximize pt's level of independence in the home and community environment.     Pt's spiritual, cultural and educational needs considered and pt agreeable to plan of care and goals.     Anticipated barriers to physical therapy: none at this time     Goals:   Short Term Goals = Long Term Goals: 4 weeks   Pt will be independent with an individualized home exercise program.  Ongoing  Pt will improve 5x chair rise score to </= 10 s with no UE assist for improved muscular endurance. Progressing  Pt will improve Timed Up and Go (TUG) score to less than 14 s with no assistive device. Progressing  Pt will improve self selected walking speed (SSWS) with no assistive device to at least 1.0 m/s for improved safety with home and community ambulation. Progressing  Pt will improve postural control with MCTSIB condition 2 score of at least 30 s for  decreased fall risk with standing ADLs. Progressing  Pt will improve postural control to tolerate performing conditions 3 and 4 of the MCTSIB. Ongoing  Pt will improve FOTO score to >/= 56% for improved self perception of functional mobility. Ongoing  PT will administer 2MWT as safely able to assess pt's level of activity tolerance Met 1/29/24  Pt will improve dynamic gait abilities to tolerate performing the Functional Gait Assessment Met 3/13/24  Pt to perform 2 minute walk test for 300 feet or greater to improve gait speed & endurance Met 5/16/24  Pt will improve Functional Gait Assessment (FGA) score to at least 23/30 for increased independence with home and community ambulation. Progressing  New 5/16/24: Pt will improve ambulatory tolerance to walking for 6 minutes continuously without reports of excessive fatigue.      Patient's goals: be able to walk around the block twice, go up stairs, I want to be able to do a real squat, and get into running. Ongoing      PLAN     Continue to progress dynamic balance and activity tolerance    Toya Pascal, PT

## 2024-06-14 NOTE — TELEPHONE ENCOUNTER
----- Message from Claire Rider LCSW sent at 6/14/2024  2:25 PM CDT -----  Regarding: RE: referral for therapy needed  Hi,    No this would be for behavioral health. I think that's the referral. I am going to set up with an outside agency or provider that does mental health counseling. I only do case management social work.    Thank you!  ----- Message -----  From: Cristy Fitzgerald LPN  Sent: 6/14/2024   2:07 PM CDT  To: Claire Rider LCSW  Subject: RE: referral for therapy needed                  Good afternoon, just to clarify is the referral for outpatient case management?  ----- Message -----  From: Claire Rider LCSW  Sent: 6/14/2024  11:26 AM CDT  To: Ursula DELA CRUZ Staff  Subject: referral for therapy needed                      Good morning,    I am now working with this Pt in OP case management. I am working to set up therapy for her. Can a referral be placed in [facility] for me for therapy? She has a list of  providers that she will give me preferences for, so I can take care of faxing it once it is in the system.     Have a wonderful day/weekend!    Claire Rider LCSW  Neuro Therapy   [facility]  [phone]

## 2024-06-16 NOTE — PROGRESS NOTES
[facility]   Physical Therapy Treatment Note    Name: Lydia Thomas  Clinic Number: [id number]    Therapy Diagnosis:   Encounter Diagnoses   Name Primary?    Functional gait abnormality Yes    Impaired functional mobility, balance, gait, and endurance      Physician: Conrad Gutierres MD    Visit Date: 6/17/2024    Physician Orders: PT Eval and Treat; Neuro PT   Medical Diagnosis from Referral: Weakness of left lower extremity [R29.898]   Evaluation Date: 1/17/2024  Authorization Period Expiration: 10/4/24  Plan of Care Expiration: 6/21/24  Updated Plan of Care Expiration: 8/30/24  Visit # / Visits authorized: 10/12     Progress Note Due: 6/16/24    PTA Visit #: 0/5     Time In: 0930  Time Out: 1015  Total Billable Time: 45 minutes    SUBJECTIVE     Pt reports: She is feeling pretty good this morning. Denies any complaints besides     She was not given a home exercise program on evaluation.  Response to previous treatment: tolerated evaluation well  Functional change: ongoing    Pain: 6/10  Location: right hip      OBJECTIVE     Objective measures updated on 6/17/24.     Functional Gait Assessment:   1. Gait on level surface =  2 (6.72 s)   (3) Normal: less than 5.5 sec, no A.D., no imbalance, normal gait pattern, deviates< 6in   (2) Mild impairment: 7-5.6 sec, uses A.D., mild gait deviations, or deviates 6-10 in   (1) Moderate impairment: > 7 sec, slow speed, imbalance, deviates 10-15 in.   (0) Severe impairment: needs assist, deviates >15 in, reach/touch wall  2. Change in Gait Speed = 3   (3) Normal: smooth change w/o loss of balance or gait deviation, deviates < 6 in, significant difference between speeds   (2) Mild impairment: changes speed, but demonstrates mild gait deviations, deviates 6-10 in, OR no deviations but unable to significantly speed, OR uses A.D.   (1) Moderate impairment: minor changes to speed, OR changes speed w/ significant deviations, deviates 10-15 in, OR   Changes speed , but loses balance & recovers   (0) Severe impairment: cannot change speed, deviates >15 in, or loses balance & needs assist  3. Gait with horizontal head turns  = 3   (3) Normal: no change in gait, deviates <6 in   (2) Mild impairment: slight change in speed, deviates 6-10 in, OR uses A.D.   (1) Moderate impairment: moderate change in speed, deviates 10-15 in   (0) Severe impairment: severe disruption of gait, deviates >15in  4. Gait with vertical head turns = 3   (3) Normal: no change in gait, deviates <6 in   (2) Mild impairment: slight change in speed, deviates 6-10 in OR uses A.D.   (1) Moderate impairment: moderate change in speed, deviates 10-15 in   (0) Severe impairment: severe disruption of gait, deviates >15 in  5. Gait with pivot turns = 2   (3) Normal: performs safely in 3 sec, no LOB   (2) Mild impairment: performs in >3 sec & no LOB, OR turns safely & requires several steps to regain LOB   (1) Moderate impairment: turns slow, OR requires several small steps for balance following turn & stop   (0) Severe impairment: cannot turn safely, needs assist  6. Step over obstacle = 1   (3) Normal: steps over 2 stacked boxes w/o change in speed or LOB   (2) Mild impairment: able to step over 1 box w/o change in speed or LOB   (1) Moderate impairment: steps over 1 box but must slow down, may require VC   (0) Severe impairment: cannot perform w/o assist  7. Gait with Narrow MARY = 0   (3) Normal: 10 steps no staggering   (2) Mild impairment: 7-9 steps   (1) Moderate impairment: 4-7 steps   (0) Severe impairment: < 4 steps or cannot perform w/o assist  8. Gait with eyes closed = 1   (3) Normal: < 7 sec, no A.D., no LOB, normal gait pattern, deviates <6 in   (2) Mild impairment: 7.1-9 sec, mild gait deviations, deviates 6-10 in   (1) Moderate impairment: > 9 sec, abnormal pattern, LOB, deviates 10-15 in   (0) Severe impairment: cannot perform w/o assist, LOB, deviates >15in  9. Ambulating Backwards =  "2   (3) Normal: no A.D., no LOB, normal gait pattern, deviates <6in   (2) Mild impairment: uses A.D., slower speed, mild gait deviations, deviates 6-10 in   (1) Moderate impairment: slow speed, abnormal gait pattern, LOB, deviates 10-15 in   (0) Severe impairment: severe gait deviations or LOB, deviates >15in  10. Steps = 1   (3) Normal: alternating feet, no rail   (2) Mild Impairment: alternating feet, uses rail   (1) Moderate impairment: step-to, uses rail   (0) Severe impairment: cannot perform safely    Score 18/30     Score:   <22/30 fall risk   <20/30 fall risk in older adults   <18/30 fall risk in Parkinsons       APTA Core Set Outcome Measures for Adults with Neurologic Conditions     Evaluation  2/16/24 3/13/24 4/17/24 5/16/24 6/17/24 Reference values    Timed Up and Go 23.97 sec    21 sec, HHA  18 sec, SBA NT 17.62 sec, SBA 15.48 sec, CGA-SBA score >14 seconds indicates risk for falls in older stroke patients (Autumn et al, 2006)   10 Meter Walk Test  0.67 m/sec (6m/9 s)   "Limited community ambulator" speed category 0.67 m/sec (6m/9 s)   "Limited community ambulator" speed category  0.72 m/sec (6m/8.28 s)   "Limited community" speed category  0.68 m/sec (81.4 m/120 s)   "Limited community" speed category  0.76 m/sec (6m/7.93 s)   "Limited community" speed category  0.89 m/sec (6m/6.72 s)   "Limited community" speed category  0-0.4 m/s = household walker  0.4-0.8 m/s = limited community ambulator   0.8-1.4 m/s = community ambultor  >1.4 m/s = can cross street safely    Functional Gait Assessment  Deferred for now; plan to assess as safely able Deferred for now; plan to assess as safely able  15*/30 NT 17/30 18/30 <22/30 = identifies fall risk in community dwelling older adults   (MCID = 4)   2 Minute Walk Test   190.02 (standing rest break at 1'40") (1/29/24)  227 feet, no rest breaks,  feet, no rest breaks,  feet (~81.4 m), no rest breaks,  feet, no rest breaks,  feet, no " "rest breaks, SBA        5 times sit-stand    19.07 seconds    20 seconds  20 seconds, right UE support initially, then no UE support NT 25.58 seconds, no UE support, more equal B LE weight bearing 16.65 seconds, no UE support, increased weight shift to right side >12 sec= fall risk for general elderly  >10 sec= balance/vestibular dysfunction (<59 y/o)  >14.2 sec= balance/vestibular dysfunction (>59 y/o)  >12 sec= fall risk for stroke        Treatment     Lydia received the treatments listed below:      therapeutic exercises to develop strength, endurance, flexibility, and posture for 5 minutes including:    X 5 min total, SciFit Recumbent stepper, level 1, BUE/BLE for muscular and CV endurance/neuro priming  > no rest breaks required       Sensory integration activities to improve: Coordination, Sense, and Proprioception for 00 minutes. The following activities were included:    NP      neuromuscular re-education activities to improve: Balance, Coordination, Sense, and Proprioception for 00 minutes. The following activities were included:    NP      therapeutic activities to improve functional performance for 40 minutes, including:    Objective testing listed above      gait training to improve functional mobility and safety for 00 minutes, including:    NP      Patient Education and Home Exercises     Home Exercises Provided and Patient Education Provided     Education provided:   1/29:  - Interpretation of 2MWT   - Visual demonstration, verbal instructions and written handout provided with all exercises to be included as part of home exercise program.   3/7:  - Educated on "[place]" analogy and factors that cause her symptoms to ramp up vs those that help her relax to decrease the frequency of her functional seizures during sessions  3/18: - Educated on successful use of strategies to avoid allowing her symptoms to over flow and lead to a functional seizure  3/20: - Advised pt to schedule her final remaining " "approved appointment for 2-3 weeks from today to allow time for authorization of her pending PT referral   5/9: - Educated on self-agency and decreasing anxiety/nerves surrounding use of her left LE. Educated on using her "sand bags" to decrease her arousal with nervous activities  5/30: - Reviewed "Functional Movement Disorder" Workbook published by [facility]. Discussed anatomy of the nervous system, pathophysiology regarding FND/FMD, and strategies to maximize her potential for recovery with therapy    Written Home Exercises Provided: yes. Exercises were reviewed and Lydia was able to demonstrate them prior to the end of the session.  Lydia demonstrated good  understanding of the education provided. See EMR under Patient Instructions for exercises provided during therapy sessions    ASSESSMENT     Lydia Thomas tolerated today's session well with a focus on reassessment of progress towards goals. She was able to tolerate performing the outcome measures listed above with improvements noted in her 5 time Sit to Stand, Timed Up and Go, gait speed, and Functional Gait Assessment. Despite these improvements, her scores continue to indicate that she is at an increased risk of falling with functional transfers and gait activities. To date, she has met 3/12 short and long-term goals established at her initial evaluation and prior progress notes. She remains appropriate for skilled physical therapy services at their current frequency for an additional 10 weeks beyond her established plan of care to continue addressing her functional deficits and goals related to improved confidence and safety with community mobility. She would also benefit from a referral for Occupational Therapy services to improve her independence with bathing, dressing, and other ADL's.      Lydia Is progressing well towards her goals.   Pt prognosis is Good.     Pt will continue to benefit from skilled outpatient physical " therapy to address the deficits listed in the problem list box on initial evaluation, provide pt/family education and to maximize pt's level of independence in the home and community environment.     Pt's spiritual, cultural and educational needs considered and pt agreeable to plan of care and goals.     Anticipated barriers to physical therapy: none at this time     Goals:   Short Term Goals = Long Term Goals: 4 weeks   Pt will be independent with an individualized home exercise program.  Ongoing  Pt will improve 5x chair rise score to </= 10 s with no UE assist for improved muscular endurance. Progressing  Pt will improve Timed Up and Go (TUG) score to less than 14 s with no assistive device. Progressing  Pt will improve self selected walking speed (SSWS) with no assistive device to at least 1.0 m/s for improved safety with home and community ambulation. Progressing  Pt will improve postural control with MCTSIB condition 2 score of at least 30 s for decreased fall risk with standing ADLs. Progressing  Pt will improve postural control to tolerate performing conditions 3 and 4 of the MCTSIB. Ongoing  Pt will improve FOTO score to >/= 56% for improved self perception of functional mobility. Ongoing  PT will administer 2MWT as safely able to assess pt's level of activity tolerance Met 1/29/24  Pt will improve dynamic gait abilities to tolerate performing the Functional Gait Assessment Met 3/13/24  Pt to perform 2 minute walk test for 300 feet or greater to improve gait speed & endurance Met 5/16/24  Pt will improve Functional Gait Assessment (FGA) score to at least 23/30 for increased independence with home and community ambulation. Progressing  New 5/16/24: Pt will improve ambulatory tolerance to walking for 6 minutes continuously without reports of excessive fatigue.      Patient's goals: be able to walk around the block twice, go up stairs, I want to be able to do a real squat, and get into running.  Ongoing      PLAN     Extend PT POC from 6/21/24 to 8/30/24    Continue to progress dynamic balance and activity tolerance    Jazz Cody, PT

## 2024-06-17 ENCOUNTER — CLINICAL SUPPORT (OUTPATIENT)
Dept: REHABILITATION | Facility: HOSPITAL | Age: 44
End: 2024-06-17
Payer: OTHER MISCELLANEOUS

## 2024-06-17 ENCOUNTER — TELEPHONE (OUTPATIENT)
Dept: FAMILY MEDICINE | Facility: CLINIC | Age: 44
End: 2024-06-17
Payer: OTHER GOVERNMENT

## 2024-06-17 ENCOUNTER — OUTPATIENT CASE MANAGEMENT (OUTPATIENT)
Dept: ADMINISTRATIVE | Facility: OTHER | Age: 44
End: 2024-06-17
Payer: OTHER GOVERNMENT

## 2024-06-17 ENCOUNTER — TELEPHONE (OUTPATIENT)
Dept: ORTHOPEDICS | Facility: CLINIC | Age: 44
End: 2024-06-17
Payer: OTHER GOVERNMENT

## 2024-06-17 DIAGNOSIS — Z74.09 IMPAIRED FUNCTIONAL MOBILITY, BALANCE, GAIT, AND ENDURANCE: ICD-10-CM

## 2024-06-17 DIAGNOSIS — R29.898 WEAKNESS OF LEFT LOWER EXTREMITY: Primary | ICD-10-CM

## 2024-06-17 DIAGNOSIS — R53.1 DECREASED STRENGTH, ENDURANCE, AND MOBILITY: Primary | ICD-10-CM

## 2024-06-17 DIAGNOSIS — R41.841 COGNITIVE COMMUNICATION DEFICIT: Primary | ICD-10-CM

## 2024-06-17 DIAGNOSIS — Z74.09 DECREASED STRENGTH, ENDURANCE, AND MOBILITY: Primary | ICD-10-CM

## 2024-06-17 DIAGNOSIS — R26.89 FUNCTIONAL GAIT ABNORMALITY: Primary | Chronic | ICD-10-CM

## 2024-06-17 DIAGNOSIS — R68.89 DECREASED STRENGTH, ENDURANCE, AND MOBILITY: Primary | ICD-10-CM

## 2024-06-17 PROCEDURE — 97530 THERAPEUTIC ACTIVITIES: CPT | Mod: PO

## 2024-06-17 PROCEDURE — 97129 THER IVNTJ 1ST 15 MIN: CPT | Mod: PO

## 2024-06-17 PROCEDURE — 97130 THER IVNTJ EA ADDL 15 MIN: CPT | Mod: PO

## 2024-06-17 NOTE — TELEPHONE ENCOUNTER
----- Message from Claire Rider LCSW sent at 6/17/2024 10:07 AM CDT -----  Regarding: OT referral  Good morning,    I am so sorry to bother you all again!! This Pt is here for therapy (PT, SLP), but her SLP told me she feels she needs OT too. I didn't realize this when I reached out last week. Can you all place neuro OT orders?      I did receive the behavioral health orders!! I am working on this now. Thank you so much!!!    Sincerely,     Claire Rider LCSW  Neuro Therapy   [facility]  [phone]

## 2024-06-17 NOTE — PROGRESS NOTES
Outpatient Care Management   - Care Plan Follow Up    Patient: Lydia Thomas  MRN:  [id number]  Date of Service:  6/17/2024  Completed by:  Claire Rider LCSW  Referral Date: 05/30/2024    Reason for Visit   Patient presents with    OPCM SW Follow Up Call       Brief Summary: SW met with Pt after her therapy session today. Discussed the list of therapists and Pt identified preferences. SW received referral from MD office to send to the therapists. Also discussed going to a concussion MD. SW to follow up with the  per the Workers Comp rep. Per SLP, Pt has been using a regular chair in the shower because insurance would not cover a tub bench. Provided information for the [facility] that might have them for free to give her. Also, SLP and Pt are recommending OT. Sent message requesting orders from PCP.   INTERVENTIONS: Provided [employer] information, Sent message to MD for OT orders, Contacted therapists that are preference and requested information on the referral process via email. Reached out to Pt  and advised Pt saw a concussion MD who stated she had no further medical need, also she had a second opinion already so they will not cover it. They also requested help with getting SLP notes and justification from MD to cover that. Emailed Rigoberto with Workers Comp at [facility].     Future Appointments   Date Time Provider Department Center   6/18/2024  2:00 PM [facility] OIC-US1 MASTER [facility]   6/20/2024  9:30 AM Freida Oh MCD, CCC-SLP [facility]   7/8/2024  1:45 PM Freida Oh MCD, CCC-SLP [facility]   7/11/2024  1:45 PM Freida Oh MCD, CCC-SLP [facility]   7/12/2024  1:30 PM Camille Patten PA-C [facility] [place]   7/15/2024  1:00 PM Rachel Regalado CCC-SLP [facility]   7/16/2024  3:00 PM Jose Garcia MD [facility]    7/18/2024  2:30 PM Freida Oh, [facility]   7/22/2024  2:30 PM Rachel Regalado, [place] [address] [facility]   7/24/2024 11:00 AM Eder Do, [facility] LAURA [facility] [place]   7/29/2024  1:00 PM Rachel Regalado, [place] [address] [facility]   7/31/2024  1:00 PM Gertrudis Crowder, [place] [address] [facility]   8/2/2024  1:30 PM [facility]AS[facility], [facility]  Ne[facility]   [facility]  [phone]

## 2024-06-17 NOTE — PLAN OF CARE
[facility]   Physical Therapy Treatment Note    Name: Lydia Thomas  Clinic Number: [id number]    Therapy Diagnosis:   Encounter Diagnoses   Name Primary?    Functional gait abnormality Yes    Impaired functional mobility, balance, gait, and endurance      Physician: Conrad Gutierres MD    Visit Date: 6/17/2024    Physician Orders: PT Eval and Treat; Neuro PT   Medical Diagnosis from Referral: Weakness of left lower extremity [R29.898]   Evaluation Date: 1/17/2024  Authorization Period Expiration: 10/4/24  Plan of Care Expiration: 6/21/24  Updated Plan of Care Expiration: 8/30/24  Visit # / Visits authorized: 10/12     Progress Note Due: 6/16/24    PTA Visit #: 0/5     Time In: 0930  Time Out: 1015  Total Billable Time: 45 minutes    SUBJECTIVE     Pt reports: She is feeling pretty good this morning. Denies any complaints besides     She was not given a home exercise program on evaluation.  Response to previous treatment: tolerated evaluation well  Functional change: ongoing    Pain: 6/10  Location: right hip      OBJECTIVE     Objective measures updated on 6/17/24.     Functional Gait Assessment:   1. Gait on level surface =  2 (6.72 s)   (3) Normal: less than 5.5 sec, no A.D., no imbalance, normal gait pattern, deviates< 6in   (2) Mild impairment: 7-5.6 sec, uses A.D., mild gait deviations, or deviates 6-10 in   (1) Moderate impairment: > 7 sec, slow speed, imbalance, deviates 10-15 in.   (0) Severe impairment: needs assist, deviates >15 in, reach/touch wall  2. Change in Gait Speed = 3   (3) Normal: smooth change w/o loss of balance or gait deviation, deviates < 6 in, significant difference between speeds   (2) Mild impairment: changes speed, but demonstrates mild gait deviations, deviates 6-10 in, OR no deviations but unable to significantly speed, OR uses A.D.   (1) Moderate impairment: minor changes to speed, OR changes speed w/ significant deviations, deviates 10-15 in, OR   Changes speed , but loses balance & recovers   (0) Severe impairment: cannot change speed, deviates >15 in, or loses balance & needs assist  3. Gait with horizontal head turns  = 3   (3) Normal: no change in gait, deviates <6 in   (2) Mild impairment: slight change in speed, deviates 6-10 in, OR uses A.D.   (1) Moderate impairment: moderate change in speed, deviates 10-15 in   (0) Severe impairment: severe disruption of gait, deviates >15in  4. Gait with vertical head turns = 3   (3) Normal: no change in gait, deviates <6 in   (2) Mild impairment: slight change in speed, deviates 6-10 in OR uses A.D.   (1) Moderate impairment: moderate change in speed, deviates 10-15 in   (0) Severe impairment: severe disruption of gait, deviates >15 in  5. Gait with pivot turns = 2   (3) Normal: performs safely in 3 sec, no LOB   (2) Mild impairment: performs in >3 sec & no LOB, OR turns safely & requires several steps to regain LOB   (1) Moderate impairment: turns slow, OR requires several small steps for balance following turn & stop   (0) Severe impairment: cannot turn safely, needs assist  6. Step over obstacle = 1   (3) Normal: steps over 2 stacked boxes w/o change in speed or LOB   (2) Mild impairment: able to step over 1 box w/o change in speed or LOB   (1) Moderate impairment: steps over 1 box but must slow down, may require VC   (0) Severe impairment: cannot perform w/o assist  7. Gait with Narrow MARY = 0   (3) Normal: 10 steps no staggering   (2) Mild impairment: 7-9 steps   (1) Moderate impairment: 4-7 steps   (0) Severe impairment: < 4 steps or cannot perform w/o assist  8. Gait with eyes closed = 1   (3) Normal: < 7 sec, no A.D., no LOB, normal gait pattern, deviates <6 in   (2) Mild impairment: 7.1-9 sec, mild gait deviations, deviates 6-10 in   (1) Moderate impairment: > 9 sec, abnormal pattern, LOB, deviates 10-15 in   (0) Severe impairment: cannot perform w/o assist, LOB, deviates >15in  9. Ambulating Backwards =  "2   (3) Normal: no A.D., no LOB, normal gait pattern, deviates <6in   (2) Mild impairment: uses A.D., slower speed, mild gait deviations, deviates 6-10 in   (1) Moderate impairment: slow speed, abnormal gait pattern, LOB, deviates 10-15 in   (0) Severe impairment: severe gait deviations or LOB, deviates >15in  10. Steps = 1   (3) Normal: alternating feet, no rail   (2) Mild Impairment: alternating feet, uses rail   (1) Moderate impairment: step-to, uses rail   (0) Severe impairment: cannot perform safely    Score 18/30     Score:   <22/30 fall risk   <20/30 fall risk in older adults   <18/30 fall risk in Parkinsons       APTA Core Set Outcome Measures for Adults with Neurologic Conditions     Evaluation  2/16/24 3/13/24 4/17/24 5/16/24 6/17/24 Reference values    Timed Up and Go 23.97 sec    21 sec, HHA  18 sec, SBA NT 17.62 sec, SBA 15.48 sec, CGA-SBA score >14 seconds indicates risk for falls in older stroke patients (Autumn et al, 2006)   10 Meter Walk Test  0.67 m/sec (6m/9 s)   "Limited community ambulator" speed category 0.67 m/sec (6m/9 s)   "Limited community ambulator" speed category  0.72 m/sec (6m/8.28 s)   "Limited community" speed category  0.68 m/sec (81.4 m/120 s)   "Limited community" speed category  0.76 m/sec (6m/7.93 s)   "Limited community" speed category  0.89 m/sec (6m/6.72 s)   "Limited community" speed category  0-0.4 m/s = household walker  0.4-0.8 m/s = limited community ambulator   0.8-1.4 m/s = community ambultor  >1.4 m/s = can cross street safely    Functional Gait Assessment  Deferred for now; plan to assess as safely able Deferred for now; plan to assess as safely able  15*/30 NT 17/30 18/30 <22/30 = identifies fall risk in community dwelling older adults   (MCID = 4)   2 Minute Walk Test   190.02 (standing rest break at 1'40") (1/29/24)  227 feet, no rest breaks,  feet, no rest breaks,  feet (~81.4 m), no rest breaks,  feet, no rest breaks,  feet, no " "rest breaks, SBA        5 times sit-stand    19.07 seconds    20 seconds  20 seconds, right UE support initially, then no UE support NT 25.58 seconds, no UE support, more equal B LE weight bearing 16.65 seconds, no UE support, increased weight shift to right side >12 sec= fall risk for general elderly  >10 sec= balance/vestibular dysfunction (<59 y/o)  >14.2 sec= balance/vestibular dysfunction (>59 y/o)  >12 sec= fall risk for stroke        Treatment     Lydia received the treatments listed below:      therapeutic exercises to develop strength, endurance, flexibility, and posture for 5 minutes including:    X 5 min total, SciFit Recumbent stepper, level 1, BUE/BLE for muscular and CV endurance/neuro priming  > no rest breaks required       Sensory integration activities to improve: Coordination, Sense, and Proprioception for 00 minutes. The following activities were included:    NP      neuromuscular re-education activities to improve: Balance, Coordination, Sense, and Proprioception for 00 minutes. The following activities were included:    NP      therapeutic activities to improve functional performance for 40 minutes, including:    Objective testing listed above      gait training to improve functional mobility and safety for 00 minutes, including:    NP      Patient Education and Home Exercises     Home Exercises Provided and Patient Education Provided     Education provided:   1/29:  - Interpretation of 2MWT   - Visual demonstration, verbal instructions and written handout provided with all exercises to be included as part of home exercise program.   3/7:  - Educated on "[place]" analogy and factors that cause her symptoms to ramp up vs those that help her relax to decrease the frequency of her functional seizures during sessions  3/18: - Educated on successful use of strategies to avoid allowing her symptoms to over flow and lead to a functional seizure  3/20: - Advised pt to schedule her final remaining " "approved appointment for 2-3 weeks from today to allow time for authorization of her pending PT referral   5/9: - Educated on self-agency and decreasing anxiety/nerves surrounding use of her left LE. Educated on using her "sand bags" to decrease her arousal with nervous activities  5/30: - Reviewed "Functional Movement Disorder" Workbook published by [facility]. Discussed anatomy of the nervous system, pathophysiology regarding FND/FMD, and strategies to maximize her potential for recovery with therapy    Written Home Exercises Provided: yes. Exercises were reviewed and Lydia was able to demonstrate them prior to the end of the session.  Lydia demonstrated good  understanding of the education provided. See EMR under Patient Instructions for exercises provided during therapy sessions    ASSESSMENT     Lydia Thomas tolerated today's session well with a focus on reassessment of progress towards goals. She was able to tolerate performing the outcome measures listed above with improvements noted in her 5 time Sit to Stand, Timed Up and Go, gait speed, and Functional Gait Assessment. Despite these improvements, her scores continue to indicate that she is at an increased risk of falling with functional transfers and gait activities. To date, she has met 3/12 short and long-term goals established at her initial evaluation and prior progress notes. She remains appropriate for skilled physical therapy services at their current frequency for an additional 10 weeks beyond her established plan of care to continue addressing her functional deficits and goals related to improved confidence and safety with community mobility. She would also benefit from a referral for Occupational Therapy services to improve her independence with bathing, dressing, and other ADL's.      yLdia Is progressing well towards her goals.   Pt prognosis is Good.     Pt will continue to benefit from skilled outpatient physical " therapy to address the deficits listed in the problem list box on initial evaluation, provide pt/family education and to maximize pt's level of independence in the home and community environment.     Pt's spiritual, cultural and educational needs considered and pt agreeable to plan of care and goals.     Anticipated barriers to physical therapy: none at this time     Goals:   Short Term Goals = Long Term Goals: 4 weeks   Pt will be independent with an individualized home exercise program.  Ongoing  Pt will improve 5x chair rise score to </= 10 s with no UE assist for improved muscular endurance. Progressing  Pt will improve Timed Up and Go (TUG) score to less than 14 s with no assistive device. Progressing  Pt will improve self selected walking speed (SSWS) with no assistive device to at least 1.0 m/s for improved safety with home and community ambulation. Progressing  Pt will improve postural control with MCTSIB condition 2 score of at least 30 s for decreased fall risk with standing ADLs. Progressing  Pt will improve postural control to tolerate performing conditions 3 and 4 of the MCTSIB. Ongoing  Pt will improve FOTO score to >/= 56% for improved self perception of functional mobility. Ongoing  PT will administer 2MWT as safely able to assess pt's level of activity tolerance Met 1/29/24  Pt will improve dynamic gait abilities to tolerate performing the Functional Gait Assessment Met 3/13/24  Pt to perform 2 minute walk test for 300 feet or greater to improve gait speed & endurance Met 5/16/24  Pt will improve Functional Gait Assessment (FGA) score to at least 23/30 for increased independence with home and community ambulation. Progressing  New 5/16/24: Pt will improve ambulatory tolerance to walking for 6 minutes continuously without reports of excessive fatigue.      Patient's goals: be able to walk around the block twice, go up stairs, I want to be able to do a real squat, and get into running.  Ongoing      PLAN     Extend PT POC from 6/21/24 to 8/30/24    Continue to progress dynamic balance and activity tolerance    Jazz Cody, PT

## 2024-06-20 ENCOUNTER — OUTPATIENT CASE MANAGEMENT (OUTPATIENT)
Dept: ADMINISTRATIVE | Facility: OTHER | Age: 44
End: 2024-06-20
Payer: OTHER GOVERNMENT

## 2024-06-20 ENCOUNTER — CLINICAL SUPPORT (OUTPATIENT)
Dept: REHABILITATION | Facility: HOSPITAL | Age: 44
End: 2024-06-20
Payer: OTHER MISCELLANEOUS

## 2024-06-20 DIAGNOSIS — R41.841 COGNITIVE COMMUNICATION DEFICIT: Primary | ICD-10-CM

## 2024-06-20 PROCEDURE — 97130 THER IVNTJ EA ADDL 15 MIN: CPT | Mod: PO

## 2024-06-20 PROCEDURE — 97129 THER IVNTJ 1ST 15 MIN: CPT | Mod: PO

## 2024-06-20 NOTE — PROGRESS NOTES
[facility]  Speech Therapy Treatment Note- Neurological Rehabilitation  Date: 6/20/2024     Name: Lydia Thomas   MRN: [id number]   Therapy Diagnosis:   Encounter Diagnosis   Name Primary?    Cognitive communication deficit Yes   Physician: Francine Contreras,*  Physician Orders: Ambulatory Referral to Speech Therapy   Medical Diagnosis:  Impaired functional mobility, balance, gait, and endurance [Z74.09]     Visit #/ Visits Authorized: 7/ 12 (including evaluation)   Date of Evaluation:  4/15/24   Insurance Authorization Period: 1/18/2024 - 1/17/2025   Plan of Care Expiration Date:   7/26/2024  Extended Plan of Care:  N/A  Progress Note: 7/26/2024    Time In:  9:30 AM    Time Out: 10:25 AM   Total Billable Time: 55 minutes      Precautions: Standard, Fall, Cognition, and Communication      Subjective:   Patient reports: she took a bath and shaved her legs by herself and felt really good. Her  waiting near the door until she was ready for him to help her get out of the tub.     She was compliant to home exercise program.   Response to previous treatment: good  Pain Scale: no pain indicated throughout session  Objective:   TIMED  Procedure Min.   Cognitive Therapeutic Interventions, first 15 minutes CPT 39349  15   Cognitive Therapeutic Interventions, each additional 15 minutes CPT 32089  40       Short Term Goals: (4 weeks) Current Progress:   1.Patient will complete alternating attention tasks with 90% accuracy given minimum cues.   Progressing/  6/20/2024  Not formally addressed        Met x 2   2. Patient will complete selective attention tasks with 90% accuracy given minimum cues.      Progressing/  6/20/2024  Constant Therapy - remember picture order L1- 96% accuracy     Picture retention - (stores)- 70% accuracy independently - immediately  Picture retention - (stores)- after 5 minute delay 100% accuracy independently   After 10 minutes - 100% accuracy independently     Repeat  3 words to arrange words into sentence - 100% accuracy independently   Repeat 4 words to arrange words into sentence - 100% accuracy independently     Door opened with loud conversations in hallway. Typing in background    Met x 2    3. Patient will complete immediate memory tasks using strategies with 90% accuracy given minimum cues.       Progressing/ 6/20/2024  Constant Therapy - remember picture order L1- 96% accuracy     Picture retention - (stores)- 70% accuracy independently      Met x 2   4. Patient will complete delayed memory tasks using strategies with 90% accuracy given minimum cues.      Progressing/ 6/20/2024  Picture retention - (stores)- after 5 minute delay 100% accuracy independently   After 10 minutes - 100% accuracy independently.   Met x 2   5. Patient will complete mental manipulation tasks with 90% accuracy given minimum cues.      Progressing/ 6/20/2024   Constant Therapy - remember picture order L1- 96% accuracy     Picture retention - (stores)- 70% accuracy independently - immediately  Picture retention - (stores)- after 5 minute delay 100% accuracy independently   After 10 minutes - 100% accuracy independently     Repeat 3 words to arrange words into sentence - 100% accuracy independently   Repeat 4 words to arrange words into sentence - 100% accuracy independently     Door opened with loud conversations in hallway. Typing in background   Met x 2    6. Patient will complete moderate to complex problem solving tasks with 90% accuracy given minimum cues.      Progressing/ 6/20/2024   Inferences about paragraph - 0% accuracy .Patient stated that she was thinking about herself in the scenario. She did not complete the second paragraph about  getting hurt because she said she gets nervous and stressed when she sees or read about such things. She avoids these types of situations.     Met x 2 previously    7. Patient will use easy onset and diaphragmatic breathing during speech  tasks on 4/5 trials to increase speech fluency.       Progressing/  2024  Patient was able to use slow easy onset and breathing to speak fluently when stutter was present. Disfluent episodes only when reading orally- 4 x    Met x 2   8.  Patient will functional reading comprehension tasks with 90% accuracy given minimum cues.    Progressing/ 2024  Paragraph about apple pie - 90% accuracy independently, 100% accuracy given one cue.     Met x 1      Patient Education/Response:   Patient educated regarding the followin. Cognitive compensatory strategies   2. Fluency enhancing strategies   3. Types of disfluencies   4. Teletherapy visits   5. Activities to complete at home in order to continue improving and stimulating cognitive-communicative skills as well as increasing independence.   6. Discussed neuroplasticity       Extensive education/counseling today - discussed types of disfluencies and fluency enhancing strategies to practice. Discussed feelings regarding current cognitive-communication deficits.  Discussed setting up an appointment with psychology to address mental health and depression.   Discussed obtaining a library card in order to listen to audio books.    Home program established: Patient instructed to continue prior program     See Electronic Medical Record under Patient Instructions for exercises provided throughout therapy.  Assessment:   Lydia participated well today in today's session which focused on extensive education regarding memory, sustained attention, alternating attention, problem solving, meta-cognitive strategy training, education, reading comprehension, and Fluency strategies. Excellent performance on reading comprehension, memory, attention, and mental manipulation tasks today. Decreased processing for making inferences about paragraphs read. Less stuttering noted especially in conversation with only 4 episodes while reading aloud. Patient has started doing her own  personal hygiene tasks such as bathing and shaving her legs with no assistance.  Overall, Today strengths were noted in motivation, willing to try different activities, and using strategies. Patient motivated to incorporate strategies and activities into her daily routine to continue improving.  Cognitive, Physical, and Emotional fatigue was not believed to have been a barrier to the session.  Lydia is progressing well towards her goals. Current goals remain appropriate. Goals to be updated as necessary. Patient feels like speech therapy is helping and wants to continue. See updated POC.     Patient prognosis is Fair. Patient will continue to benefit from skilled outpatient speech and language therapy to address the deficits listed in the problem list on initial evaluation, provide patient/family education and to maximize patient's level of independence in the home and community environment.   Medical necessity is demonstrated by the following IMPAIRMENTS:  Cognition: Deficits in executive functioning and memory prevent the pt from returning to work, and place her at risk of unsafe behavior and a decline in quality of life.    Cognition: Deficits in executive functioning, attention, and memory prevent the pt from relaying medically and safety relevant information in a timely manner in a state of emergency.   Barriers to Therapy: transportation   Patient's spiritual, cultural and educational needs considered and patient agreeable to plan of care and goals.  Plan:   Continue  updated Plan of Care with focus on rehabilitation and compensation for cognitive communication deficits for 2 times per week for 8 weeks.     ADELA Ambriz., L-SLP,CCC-SLP, CBIS  Speech-Language Pathologist  Certified Brain Injury Specialist     6/20/2024

## 2024-06-20 NOTE — PROGRESS NOTES
Outpatient Care Management   - Care Plan Follow Up    Patient: Lydia Thomas  MRN:  [id number]  Date of Service:  6/20/2024  Completed by:  Claire Rider LCSW  Referral Date: 05/30/2024    Reason for Visit   Patient presents with    OPCM SW Follow Up Call       Brief Summary: SW advised via email that Pt preference for therapist has openings and would accept the referral. Obtained consents for the preferred therapist and workers comp agency then placed in media. Sent referral officially to Pt therapist preference.     Future Appointments   Date Time Provider Department Center   7/5/2024  2:15 PM [facility] ROOM 4 [facility]   7/8/2024  1:45 PM Freida Oh MCD, CCC-SLP [facility]   7/11/2024  1:45 PM Freida Oh MCD, CCC-SLP [facility]   7/11/2024  2:30 PM Lala Sanford, PT [facility]   7/12/2024  1:30 PM Camille Patten PA-C [facility] [place]   7/15/2024  1:00 PM Rachel Regalado, CCC-SLP [facility]   7/16/2024  3:00 PM Jose Garcia MD [facility]   7/18/2024  2:30 PM Freida Oh MCD, CCC-SLP [facility]   7/18/2024  3:30 PM Lala Sanford, PT [facility]   7/22/2024  2:30 PM Rachel Regalado, CCC-SLP [facility]   7/24/2024 11:00 AM Eder Do DNP [facility] [place]   7/29/2024  1:00 PM Rachel Regalado, CCC-SLP [facility]   7/31/2024  1:00 PM Gertrudis Crowder, CCC-SLP [facility]   8/2/2024  1:30 PM VASCULAR ULTRASOUND, [facility] EKG Lucy Rider, [facility]  Neuro Therapy   [facility]  [phone]

## 2024-06-21 ENCOUNTER — PATIENT MESSAGE (OUTPATIENT)
Dept: PSYCHIATRY | Facility: CLINIC | Age: 44
End: 2024-06-21
Payer: OTHER GOVERNMENT

## 2024-07-05 ENCOUNTER — PATIENT MESSAGE (OUTPATIENT)
Dept: OBSTETRICS AND GYNECOLOGY | Facility: CLINIC | Age: 44
End: 2024-07-05
Payer: OTHER GOVERNMENT

## 2024-07-05 ENCOUNTER — HOSPITAL ENCOUNTER (OUTPATIENT)
Dept: RADIOLOGY | Facility: HOSPITAL | Age: 44
Discharge: HOME | End: 2024-07-05
Payer: OTHER GOVERNMENT

## 2024-07-05 DIAGNOSIS — Z86.018 HISTORY OF UTERINE FIBROID: ICD-10-CM

## 2024-07-05 PROCEDURE — 76830 TRANSVAGINAL US NON-OB: CPT | Mod: TC

## 2024-07-05 PROCEDURE — 76856 US EXAM PELVIC COMPLETE: CPT | Mod: TC

## 2024-07-08 ENCOUNTER — CLINICAL SUPPORT (OUTPATIENT)
Dept: REHABILITATION | Facility: HOSPITAL | Age: 44
End: 2024-07-08
Payer: COMMERCIAL

## 2024-07-08 ENCOUNTER — OUTPATIENT CASE MANAGEMENT (OUTPATIENT)
Dept: ADMINISTRATIVE | Facility: OTHER | Age: 44
End: 2024-07-08
Payer: OTHER GOVERNMENT

## 2024-07-08 ENCOUNTER — TELEPHONE (OUTPATIENT)
Dept: OBSTETRICS AND GYNECOLOGY | Facility: CLINIC | Age: 44
End: 2024-07-08
Payer: OTHER GOVERNMENT

## 2024-07-08 ENCOUNTER — TELEPHONE (OUTPATIENT)
Dept: FAMILY MEDICINE | Facility: CLINIC | Age: 44
End: 2024-07-08
Payer: OTHER GOVERNMENT

## 2024-07-08 DIAGNOSIS — Z74.09 DECREASED STRENGTH, ENDURANCE, AND MOBILITY: Primary | ICD-10-CM

## 2024-07-08 DIAGNOSIS — R53.1 DECREASED STRENGTH, ENDURANCE, AND MOBILITY: Primary | ICD-10-CM

## 2024-07-08 DIAGNOSIS — N83.201 CYSTS OF BOTH OVARIES: ICD-10-CM

## 2024-07-08 DIAGNOSIS — R41.841 COGNITIVE COMMUNICATION DEFICIT: Primary | ICD-10-CM

## 2024-07-08 DIAGNOSIS — N83.202 CYSTS OF BOTH OVARIES: ICD-10-CM

## 2024-07-08 DIAGNOSIS — Z71.1 WORRIED WELL: Primary | ICD-10-CM

## 2024-07-08 DIAGNOSIS — R68.89 DECREASED STRENGTH, ENDURANCE, AND MOBILITY: Primary | ICD-10-CM

## 2024-07-08 PROCEDURE — 97130 THER IVNTJ EA ADDL 15 MIN: CPT | Mod: PO

## 2024-07-08 PROCEDURE — 99212 OFFICE O/P EST SF 10 MIN: CPT | Mod: 95,,,

## 2024-07-08 PROCEDURE — 97129 THER IVNTJ 1ST 15 MIN: CPT | Mod: PO

## 2024-07-08 NOTE — TELEPHONE ENCOUNTER
Refill signed for neuro OT.  Made him aware that if it has the worker's comp related the referral needs to be canceled.

## 2024-07-08 NOTE — TELEPHONE ENCOUNTER
Claire Rider, CAMERON sent to Cristy Fitzgerald LPN Hi again!!!    We got the referral for therapy and she has started, but... we are hoping to get one for OT neuro eval and treat for her.    Can you place this for us?    Thank you so much!  Claire

## 2024-07-08 NOTE — TELEPHONE ENCOUNTER
----- Message from Darryl Anders NP sent at 7/8/2024  9:07 AM CDT -----  Please schedule a virtual visit to discuss her ultrasound

## 2024-07-08 NOTE — PROGRESS NOTES
Outpatient Care Management   - Care Plan Follow Up    Patient: Lydia Thomas  MRN:  [id number]  Date of Service:  7/8/2024  Completed by:  Claire Rider LCSW  Referral Date: 05/30/2024    Reason for Visit   Patient presents with    OPCM SW Follow Up Call       Brief Summary: SW spoke with Pt to check in. She reported she got a message about scheduling OT but when attempting to schedule there is an issue. SW to work with Rigoberto on this. Discussed her vacation and that she was contacted by the therapist to schedule a time to meet on the phone first. SW spoke with Rigoberto and advised he needs orders placed for OT before he can process it for workers comp. Sent message to PCP office to request orders.     Future Appointments   Date Time Provider Department Center   7/8/2024  4:00 PM [facility]   7/11/2024  1:45 PM Freida Oh MCD, [facility]-SLP [facility]   7/11/2024  2:30 PM Jazz Cody, PT [facility]   7/12/2024  2:00 PM Katy Vergara MD [facility] MED [place]   7/15/2024  1:00 PM Rachel Regalado, [facility]-SLP [facility]   7/16/2024  3:00 PM Jose [facility]   7/18/2024  2:30 PM Freida Oh MCD, [facility]-SLP [facility]   7/18/2024  3:30 PM Lala Sanford, PT [facility]   7/22/2024  2:30 PM Rachel Regalado, CCC-SLP [facility]   7/24/2024 11:00 AM Eder Do DNP [facility] [place]   7/29/2024  1:00 PM Rachel Regalado, CCC-SLP [facility]   7/31/2024  1:00 PM Gertrudis Crowder, [facility]-SLP [facility]   8/2/2024  1:30 PM VASCULAR ULTRASOUND, [facility] EKG [facility]     [facility], [facility]  Neuro Therapy   [facility]  [phone]

## 2024-07-08 NOTE — PROGRESS NOTES
Subjective     Patient ID: Lydia Thomas is a 44 y.o. female.    Chief Complaint:  No chief complaint on file.      History of Present Illness  HPI  The patient location is: home  The chief complaint leading to consultation is: left pelvic pain    Visit type: audiovisual    Face to Face time with patient: 10 min  15 minutes of total time spent on the encounter, which includes face to face time and non-face to face time preparing to see the patient (eg, review of tests), Obtaining and/or reviewing separately obtained history, Documenting clinical information in the electronic or other health record, Independently interpreting results (not separately reported) and communicating results to the patient/family/caregiver, or Care coordination (not separately reported).         Each patient to whom he or she provides medical services by telemedicine is:  (1) informed of the relationship between the physician and patient and the respective role of any other health care provider with respect to management of the patient; and (2) notified that he or she may decline to receive medical services by telemedicine and may withdraw from such care at any time.    Notes:  Patient arrived via telehealth visit today for follow-up on ultrasound results.  Seen on 4/2/24 for annual and had complaints of left pelvic pain.  She is feeling better now. We reviewed US and discussed nature of ovarian cysts. She is not on birth control.    PUS 7/2024:  FINDINGS:  Uterus measures 6.6 x 2.8 x 4 cm, endometrium 10.2 mm.     Right ovary measures 3.7 x 2.7 x 3.4 cm.     Left ovary measures 3.2 x 2.2 x 3.0 cm.     There are 2 cysts of the right ovary measuring 1.4, and 1.0 cm.     There is 1 cyst of the left ovary measuring 2.1 cm.     Uterus demonstrates a complex area within the posterior myometrium that is hyperechoic peripherally with a fluid like center.  This measures 0.8 cm.     Impression:     Two physiologic cyst right ovary, 1  physiologic cyst left ovary.     Air in the posterior myometrium is indeterminate but may represent fibroid.       GYN & OB History  No LMP recorded.   Date of Last Pap: 4/10/2024    OB History    Para Term  AB Living   4 2 2 0 2 0   SAB IAB Ectopic Multiple Live Births   0 2 0 0 0      # Outcome Date GA Lbr [place]/2nd Weight Sex Type Anes PTL Lv   4 Term            3 Term            2 IAB      TAB      1 IAB      TAB          Review of Systems  Review of Systems   Constitutional:  Negative for activity change and appetite change.   Respiratory:  Negative for shortness of breath.    Cardiovascular:  Negative for chest pain.   Gastrointestinal:  Negative for abdominal pain, diarrhea and nausea.   Genitourinary:  Negative for bladder incontinence, decreased libido, dysmenorrhea, dyspareunia, dysuria, flank pain, frequency, genital sores, hematuria, hot flashes, menorrhagia, menstrual problem, pelvic pain, urgency, vaginal bleeding, vaginal discharge, vaginal pain, urinary incontinence, postcoital bleeding, postmenopausal bleeding, vaginal dryness and vaginal odor.   Integumentary:  Negative for breast tenderness.   Neurological:  Negative for headaches.   Breast: Negative for breast self exam and tenderness         Objective   Physical Exam:   Constitutional: She is oriented to person, place, and time. She appears well-developed.    HENT:   Head: Normocephalic and atraumatic.    Eyes: EOM are normal.      Pulmonary/Chest: Effort normal.                      Neurological: She is oriented to person, place, and time.     Psychiatric: She has a normal mood and affect.            Assessment and Plan     1. Worried well            Plan:  1. Worried well  -all questions answered to patient satisfaction    2. Cysts of both ovaries    Discussed simple ovarian cysts typically resolve spontaneously without treatment  Discussed OCPs may help shrink cyst an prevent new cysts from forming- pt does not wants to start OCP  at this time  ED precautions reviewed for pain worsening or fails to get better with home treatment, Fever of 100.4°F (38°C) or higher, Nausea and vomiting, Weakness, dizziness, or fainting, abnormal vaginal bleeding       RICCI HerreraP-BC

## 2024-07-08 NOTE — PROGRESS NOTES
[facility]  Speech Therapy Treatment Note- Neurological Rehabilitation  Date: 7/8/2024     Name: Lydia Thomas   MRN: [id number]   Therapy Diagnosis:   Encounter Diagnosis   Name Primary?    Cognitive communication deficit Yes   Physician: Francine Contreras,*  Physician Orders: Ambulatory Referral to Speech Therapy   Medical Diagnosis:  Impaired functional mobility, balance, gait, and endurance [Z74.09]     Visit #/ Visits Authorized: 8/ 12 (including evaluation)   Date of Evaluation:  4/15/24   Insurance Authorization Period: 1/18/2024 - 1/17/2025   Plan of Care Expiration Date:   7/26/2024  Extended Plan of Care:  N/A  Progress Note: 7/26/2024    Time In:  1:45 pm   Time Out: 2:30 pm   Total Billable Time: 45 minutes      Precautions: Standard, Fall, Cognition, and Communication      Subjective:   Patient reports: participated in Neuropsychological evaluation last week. Went to [place] with her  and had a nice time.   She was compliant to home exercise program.   Response to previous treatment: good  Pain Scale: no pain indicated throughout session  Objective:   TIMED  Procedure Min.   Cognitive Therapeutic Interventions, first 15 minutes CPT 29563  15   Cognitive Therapeutic Interventions, each additional 15 minutes CPT 10845  30       Short Term Goals: (4 weeks) Current Progress:   1.Patient will complete alternating attention tasks with 90% accuracy given minimum cues.   Progressing/  7/8/2024  Not formally addressed        Met x 2   2. Patient will complete selective attention tasks with 90% accuracy given minimum cues.      Progressing/  7/8/2024  Constant Therapy - remember picture order L2 - 44% accuracy     Door opened with loud conversations in hallway. Typing in background    Met x 2 previously   3. Patient will complete immediate memory tasks using strategies with 90% accuracy given minimum cues.       Progressing/ 7/8/2024  onstant Therapy - remember picture order L2- 44  % accuracy      Met x 2   4. Patient will complete delayed memory tasks using strategies with 90% accuracy given minimum cues.      Progressing/  2024  Patient recalled therapy schedule and doctor's appointment     Met x 2   5. Patient will complete mental manipulation tasks with 90% accuracy given minimum cues.      Progressing/   2024   Constant Therapy - remember picture order L2- 44 % accuracy     Door opened with loud conversations in hallway. Typing in background   Met x 2    6. Patient will complete moderate to complex problem solving tasks with 90% accuracy given minimum cues.      Progressing2024    Patient played a game on her phone independently to connect the colors.    She is beginning to schedule her own appointments.        Met x 2 previously    7. Patient will use easy onset and diaphragmatic breathing during speech tasks on 4/5 trials to increase speech fluency.       Progressing2024  Patient was able to use slow easy onset and breathing to speak fluently when stutter was present. Disfluent episodes - 3 x    Met x 2   8.  Patient will functional reading comprehension tasks with 90% accuracy given minimum cues.    Progressing2024  Constant Therapy - Read everyday things L1- 60% accuracy independently     Patient answered yes/no questions with 100% accuracy independently.    Met x 1      Patient Education/Response:   Patient educated regarding the followin. Cognitive compensatory strategies   2. Fluency enhancing strategies   3. Types of disfluencies   4. Teletherapy visits   5. Activities to complete at home in order to continue improving and stimulating cognitive-communicative skills as well as increasing independence.   6. Discussed neuroplasticity       Extensive education/counseling today - discussed types of disfluencies and fluency enhancing strategies to practice. Discussed feelings regarding current cognitive-communication deficits.  Discussed setting up an  appointment with psychology to address mental health and depression.   Discussed obtaining a library card in order to listen to audio books.    Home program established: Patient instructed to continue prior program     See Electronic Medical Record under Patient Instructions for exercises provided throughout therapy.  Assessment:   Lydia participated well today in today's session which focused on extensive education regarding memory, sustained attention, alternating attention, problem solving, meta-cognitive strategy training, education, reading comprehension, and Fluency strategies. Difficulty with everyday reading task for reading comprehension. Increased memory for delayed memory but decreased for immediate memory and attention.   Less stuttering noted especially in conversation with only 3 episodes while reading aloud. Patient has started doing her own personal hygiene tasks such as bathing and shaving her legs with no assistance.  She is also handling her own appointments. Overall, Today strengths were noted in motivation, willing to try different activities, and using strategies. Patient motivated to incorporate strategies and activities into her daily routine to continue improving.  Cognitive, Physical, and Emotional fatigue was not believed to have been a barrier to the session.  Lydia is progressing well towards her goals. Current goals remain appropriate. Goals to be updated as necessary. Patient feels like speech therapy is helping and wants to continue. See updated POC.     Patient prognosis is Fair. Patient will continue to benefit from skilled outpatient speech and language therapy to address the deficits listed in the problem list on initial evaluation, provide patient/family education and to maximize patient's level of independence in the home and community environment.   Medical necessity is demonstrated by the following IMPAIRMENTS:  Cognition: Deficits in executive functioning and memory prevent  the pt from returning to work, and place her at risk of unsafe behavior and a decline in quality of life.    Cognition: Deficits in executive functioning, attention, and memory prevent the pt from relaying medically and safety relevant information in a timely manner in a state of emergency.   Barriers to Therapy: transportation   Patient's spiritual, cultural and educational needs considered and patient agreeable to plan of care and goals.  Plan:   Continue updated Plan of Care with focus on rehabilitation and compensation for cognitive communication deficits for 2 times per week for 8 weeks.     ADELA Ambriz., L-SLP,CCC-SLP, CBIS  Speech-Language Pathologist  Certified Brain Injury Specialist     7/8/2024

## 2024-07-11 ENCOUNTER — CLINICAL SUPPORT (OUTPATIENT)
Dept: REHABILITATION | Facility: HOSPITAL | Age: 44
End: 2024-07-11
Payer: OTHER GOVERNMENT

## 2024-07-11 ENCOUNTER — CLINICAL SUPPORT (OUTPATIENT)
Dept: REHABILITATION | Facility: HOSPITAL | Age: 44
End: 2024-07-11

## 2024-07-11 DIAGNOSIS — R26.89 FUNCTIONAL GAIT ABNORMALITY: Primary | Chronic | ICD-10-CM

## 2024-07-11 DIAGNOSIS — R47.9 SPEECH DISTURBANCE, UNSPECIFIED TYPE: ICD-10-CM

## 2024-07-11 DIAGNOSIS — Z74.09 IMPAIRED FUNCTIONAL MOBILITY, BALANCE, GAIT, AND ENDURANCE: ICD-10-CM

## 2024-07-11 DIAGNOSIS — R29.898 WEAKNESS OF LEFT LOWER EXTREMITY: Primary | ICD-10-CM

## 2024-07-11 DIAGNOSIS — R41.841 COGNITIVE COMMUNICATION DEFICIT: Primary | ICD-10-CM

## 2024-07-11 PROCEDURE — 97130 THER IVNTJ EA ADDL 15 MIN: CPT | Mod: PO

## 2024-07-11 PROCEDURE — 97129 THER IVNTJ 1ST 15 MIN: CPT | Mod: PO

## 2024-07-11 PROCEDURE — 97533 SENSORY INTEGRATION: CPT | Mod: KX,PO

## 2024-07-11 PROCEDURE — 97110 THERAPEUTIC EXERCISES: CPT | Mod: PO

## 2024-07-11 NOTE — PROGRESS NOTES
[facility]  Speech Therapy Treatment Note- Neurological Rehabilitation  Date: 7/11/2024     Name: Lydia Thomas   MRN: [id number]   Therapy Diagnosis:   Encounter Diagnosis   Name Primary?    Cognitive communication deficit Yes   Physician: No ref. provider found  Physician Orders: Ambulatory Referral to Speech Therapy   Medical Diagnosis:  Impaired functional mobility, balance, gait, and endurance [Z74.09]     Visit #/ Visits Authorized: 9/ 12 (including evaluation)   Date of Evaluation:  4/15/24   Insurance Authorization Period: 1/18/2024 - 1/17/2025   Plan of Care Expiration Date:   7/26/2024  Extended Plan of Care:  N/A  Progress Note: 7/26/2024    Time In:  1:50 pm  Time Out: 2:30 pm   Total Billable Time: 40 minutes      Precautions: Standard, Fall, Cognition, and Communication      Subjective:   Patient reports: upset earlier when received call that speech therapy was no longer approved. After speaking to Workers comp she was told to ask the therapists to request more visits.      She was compliant to home exercise program. All written homework and home activities such as grooming, using compensatory strategies.   Response to previous treatment: good  Pain Scale: no pain indicated throughout session  Objective:   TIMED  Procedure Min.   Cognitive Therapeutic Interventions, first 15 minutes CPT 42145  15   Cognitive Therapeutic Interventions, each additional 15 minutes CPT 54367  30       Short Term Goals: (4 weeks) Current Progress:   1.Patient will complete alternating attention tasks with 90% accuracy given minimum cues.   Progressing/  7/11/2024  Completed complex written directions (100% accuracy independently) with door opened and answering questions about schedule. Initially she needed cues to go back to redo her answers that were completed at home.      Met x 3/ ongoing   2. Patient will complete selective attention tasks with 90% accuracy given minimum cues.       Completed  complex written directions with door opened and answering questions about schedule.     Door opened with loud conversations in hallway. Typing in background    Met x 3/ discontinue 7/11/24   3. Patient will complete immediate memory tasks using strategies with 90% accuracy given minimum cues.       Progressing/ 7/11/2024  Not formally addressed        Met x 2   4. Patient will complete delayed memory tasks using strategies with 90% accuracy given minimum cues.       Patient recalled information discussed with workers comp  independently. She also remembered that this clinician will be on medical leave starting next week and she will be seen in person and/or virtually with other Speech-Language Pathologists independently.       Met x3 / discontinue 7/11/24   5. Patient will complete mental manipulation tasks with 90% accuracy given minimum cues.      Progressing/   7/11/2024   Completed complex written directions (100% accuracy independently) with door opened and answering questions about schedule.       Door opened with loud conversations in hallway. Typing in background   Met x 2    6. Patient will complete moderate to complex problem solving tasks with 90% accuracy given minimum cues.      Progressing/  7/11/2024    Completed complex written directions (100% accuracy independently) Initially she needed cues to go back to redo her answers that were completed at home.    Analogies with pictures - 100% accuracy independently     Patient completed sentence analogies - 100% accuracy independently     Modified sentence incongruities- 100% accuracy independently     Met x 3    7. Patient will use easy onset and diaphragmatic breathing during speech tasks on 4/5 trials to increase speech fluency.       Progressing/  7/11/2024  Patient was able to use slow easy onset and breathing to speak fluently when stutter was present. Disfluent episodes - 4 x especially when speaking about her deficits or something  stressful    Met x 3/ongoing   8.  Patient will functional reading comprehension tasks with 90% accuracy given minimum cues.    2024  Patient read a paragraph and answered yes/no questions with 100% accuracy independently.     Followed simple written directions - 83% accuracy independently, 100% accuracy given minimum cues    Patient completed sentence analogies - 100% accuracy independently     Met x 2     Patient Education/Response:   Patient educated regarding the followin. Cognitive compensatory strategies   2. Fluency enhancing strategies   3. Types of disfluencies   4. Teletherapy visits   5. Activities to complete at home in order to continue improving and stimulating cognitive-communicative skills as well as increasing independence.   6. Discussed neuroplasticity       Extensive education/counseling today - discussed types of disfluencies and fluency enhancing strategies to practice. Discussed feelings regarding current cognitive-communication deficits.  Discussed setting up an appointment with psychology to address mental health and depression.   Discussed obtaining a library card in order to listen to audio books.    Home program established: Patient instructed to continue prior program     See Electronic Medical Record under Patient Instructions for exercises provided throughout therapy.  Assessment:   Lydia participated well today in today's session which focused on extensive education regarding memory, sustained attention, alternating attention, problem solving, meta-cognitive strategy training, education, reading comprehension, and Fluency strategies. Increased accuracy for all structured tasks today. Improved reading comprehension for paragraph. Minimum cues needed to reread and correct responses for complex written directions for reasoning and comprehension.  She was able to read aloud to correct her responses. Increased memory for delayed memory and immediate memory.    Minimum cues  for attention to details. Less stuttering noted especially in conversation with only 4 episodes. Patient has started doing her own personal hygiene tasks such as bathing and shaving her legs with no assistance.  She is also handling her own appointments. Overall, Today strengths were noted in motivation, willing to try different activities, and using strategies. Patient motivated to incorporate strategies and activities into her daily routine to continue improving.  Cognitive, Physical, and Emotional fatigue was not believed to have been a barrier to the session.  Lydia is progressing well towards her goals. Current goals remain appropriate. Goals to be updated as necessary. Patient feels like speech therapy is helping and wants to continue. Consider extension of Plan of Care since patient is making progress.    Patient prognosis is Fair. Patient will continue to benefit from skilled outpatient speech and language therapy to address the deficits listed in the problem list on initial evaluation, provide patient/family education and to maximize patient's level of independence in the home and community environment.   Medical necessity is demonstrated by the following IMPAIRMENTS:  Cognition: Deficits in executive functioning and memory prevent the pt from returning to work, and place her at risk of unsafe behavior and a decline in quality of life.    Cognition: Deficits in executive functioning, attention, and memory prevent the pt from relaying medically and safety relevant information in a timely manner in a state of emergency.   Barriers to Therapy: transportation   Patient's spiritual, cultural and educational needs considered and patient agreeable to plan of care and goals.  Plan:   Continue updated Plan of Care with focus on rehabilitation and compensation for cognitive communication deficits.    ADELA Ambriz., L-SLP,CCC-SLP, CBIS  Speech-Language Pathologist  Certified Brain Injury  Specialist     7/11/2024

## 2024-07-11 NOTE — PROGRESS NOTES
[facility]   Physical Therapy Treatment Note    [facility]  [facility] Number: [id number]    Therapy Diagnosis:   Encounter Diagnoses   Name Primary?    Functional gait abnormality Yes    Impaired functional mobility, balance, gait, and endurance      Physician: No ref. provider found    Visit Date: 7/11/2024    Physician Orders: PT Eval and Treat; Neuro PT   Medical Diagnosis from Referral: Weakness of left lower extremity [R29.898]   Evaluation Date: 1/17/2024  Authorization Period Expiration: 10/4/24  Plan of Care Expiration: 6/21/24  Updated Plan of Care Expiration: 8/30/24  Visit # / Visits authorized: 11/12     Progress Note Due: 6/16/24    PTA Visit #: 0/5     Time In: 1433  Time Out: 1517  Total Billable Time: 44 minutes    SUBJECTIVE     Pt reports: She is feeling pretty good this afternoon. States that she has been doing good with speech and she was able to get in with Neuro Psych and that appointment went really well.     She was not given a home exercise program on evaluation.  Response to previous treatment: tolerated evaluation well  Functional change: ongoing    Pain: 6/10  Location: right hip      OBJECTIVE     Objective measures updated on 6/17/24.     Treatment     Lydia received the treatments listed below:      therapeutic exercises to develop strength, endurance, flexibility, and posture for 9 minutes including:    X 9 min, SciFit Recumbent stepper, level 1, BUE/BLE for muscular and CV endurance/neuro priming      Sensory integration activities to improve: Coordination, Sense, and Proprioception for 35 minutes. The following activities were included:    Mirror therapy:  X~3 mins, desensitization to B LE rubbing with hands  2 x 10 marches with right LE looking at reflection  2 x 10 LAQ's with right LE looking at reflexion  3 x 15 B, ankle pumps    Triple flexion banned donned using gait belt and red TB on left LE for improved proprioception; mirror  "utilized for visual feedback during the following interventions:  3 x 10, Sit to stands from hi/low mat + holding 5# tidal tank; mat lowered between sets  X~25 feet, Overground gait training while holding tidal tank; VC to blow bubbles intermittently to decrease ANS stimulation  > VC to focus on keeping water even in the tank while looking in mirror    --> Total time includes seated/standing breaks as needed to decrease ANS over stimulation      neuromuscular re-education activities to improve: Balance, Coordination, Sense, and Proprioception for 00 minutes. The following activities were included:    NP      therapeutic activities to improve functional performance for 00 minutes, including:    NP      gait training to improve functional mobility and safety for 00 minutes, including:    NP      Patient Education and Home Exercises     Home Exercises Provided and Patient Education Provided     Education provided:   1/29:  - Interpretation of 2MWT   - Visual demonstration, verbal instructions and written handout provided with all exercises to be included as part of home exercise program.   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 Educated on using her "sand bags" to decrease her arousal with nervous activities  5/30: - Reviewed "Functional Movement Disorder" Workbook published by [facility]. Discussed anatomy of the nervous system, pathophysiology regarding FND/FMD, and strategies to maximize " her potential for recovery with therapy    Written Home Exercises Provided: yes. Exercises were reviewed and Lydia was able to demonstrate them prior to the end of the session.  Lydia demonstrated good  understanding of the education provided. See EMR under Patient Instructions for exercises provided during therapy sessions    ASSESSMENT     Lydia Thomas tolerated today's session well with a focus on sensory integration to improve her left LE perception. She responded well to seated desensitization and AROM exercises using the mirror; reported feeling more activation in her left LE. She also performed well with functional mobility using the mirror for visual feedback but required Max motivation and external focus on the tidal tank/bubbles to prevent over stimulation of her autonomic NS. She continues to be appropriate for skilled physical therapy services to improve her confidence and safety with community mobility.     Lydia Is progressing well towards her goals.   Pt prognosis is Good.     Pt will continue to benefit from skilled outpatient physical therapy to address the deficits listed in the problem list box on initial evaluation, provide pt/family education and to maximize pt's level of independence in the home and community environment.     Pt's spiritual, cultural and educational needs considered and pt agreeable to plan of care and goals.     Anticipated barriers to physical therapy: none at this time     Goals:   Short Term Goals = Long Term Goals: 4 weeks   Pt will be independent with an individualized home exercise program.  Ongoing  Pt will improve 5x chair rise score to </= 10 s with no UE assist for improved muscular endurance. Progressing  Pt will improve Timed Up and Go (TUG) score to less than 14 s with no assistive device. Progressing  Pt will improve self selected walking speed (SSWS) with no assistive device to at least 1.0 m/s for improved safety with home and community ambulation.  Progressing  Pt will improve postural control with MCTSIB condition 2 score of at least 30 s for decreased fall risk with standing ADLs. Progressing  Pt will improve postural control to tolerate performing conditions 3 and 4 of the MCTSIB. Ongoing  Pt will improve FOTO score to >/= 56% for improved self perception of functional mobility. Ongoing  PT will administer 2MWT as safely able to assess pt's level of activity tolerance Met 1/29/24  Pt will improve dynamic gait abilities to tolerate performing the Functional Gait Assessment Met 3/13/24  Pt to perform 2 minute walk test for 300 feet or greater to improve gait speed & endurance Met 5/16/24  Pt will improve Functional Gait Assessment (FGA) score to at least 23/30 for increased independence with home and community ambulation. Progressing  New 5/16/24: Pt will improve ambulatory tolerance to walking for 6 minutes continuously without reports of excessive fatigue.      Patient's goals: be able to walk around the block twice, go up stairs, I want to be able to do a real squat, and get into running. Ongoing      PLAN     Extend PT POC from 6/21/24 to 8/30/24    Continue to progress dynamic balance and activity tolerance    Jazz Cody, PT

## 2024-07-12 ENCOUNTER — OFFICE VISIT (OUTPATIENT)
Dept: FAMILY MEDICINE | Facility: CLINIC | Age: 44
End: 2024-07-12
Payer: OTHER GOVERNMENT

## 2024-07-12 DIAGNOSIS — S06.9X0S TRAUMATIC BRAIN INJURY WITHOUT LOSS OF CONSCIOUSNESS, SEQUELA: ICD-10-CM

## 2024-07-12 DIAGNOSIS — Z00.00 ROUTINE ADULT HEALTH MAINTENANCE: ICD-10-CM

## 2024-07-12 DIAGNOSIS — E04.1 THYROID NODULE: ICD-10-CM

## 2024-07-12 DIAGNOSIS — G43.809 OTHER MIGRAINE WITHOUT STATUS MIGRAINOSUS, NOT INTRACTABLE: Primary | Chronic | ICD-10-CM

## 2024-07-12 DIAGNOSIS — R74.8 ELEVATED CK: ICD-10-CM

## 2024-07-12 PROCEDURE — 99214 OFFICE O/P EST MOD 30 MIN: CPT | Mod: PBBFAC,PO | Performed by: INTERNAL MEDICINE

## 2024-07-12 PROCEDURE — 99999 PR PBB SHADOW E&M-EST. PATIENT-LVL IV: CPT | Mod: PBBFAC,,, | Performed by: INTERNAL MEDICINE

## 2024-07-12 PROCEDURE — 99214 OFFICE O/P EST MOD 30 MIN: CPT | Mod: S$PBB,,, | Performed by: INTERNAL MEDICINE

## 2024-07-12 NOTE — PROGRESS NOTES
Health Maintenance Due   Topic     Pneumococcal Vaccines (Age 0-64) (1 of 2 - PCV) CONSULT WITH PCP    HIV Screening  CONSULT WITH PCP    COVID-19 Vaccine (3 - 2023-24 season) Not offered at this facility.

## 2024-07-12 NOTE — PROGRESS NOTES
Subjective:       Patient ID: Lydia Thomas is a pleasant 44 y.o. White female patient    Chief Complaint: Follow-up      VISIT CONDUCTED WITH EPIC DOWN    Patient is a pt I saw last on 03/11/2024, see my last notes.    HPI:     Patient with long and complicated past medical history, who follows up with multiple specialists  due to Worker's Comp related issues who comes today for a regular f-up visit. Would also likes a refill for vitamin D, as well as a referral for Neurology, she undergoes Botox injections for migraine (Dr. Luigi Baca, [facility], ph # [phone]) that helps.  She has f-up in Neuro PT and OT (Workers' Comp) as well as in Neurology. She also sees OB-Gyn and will have genetics assessment due to family Hx/O ovarian cancer.     Patient Active Problem List   Diagnosis    Acetaminophen overdose    Suicide attempt by acetaminophen overdose    Migraine    Anemia of chronic disease    Adjustment disorder with mixed anxiety and depressed mood    Migraine without status migrainosus, not intractable    Occipital headache    Vasovagal syncope    Myoclonic jerking    Traumatic brain injury without loss of consciousness    Dizziness and giddiness    Depression    Warts of foot    Screening for colon cancer    Family history of colon cancer    Decreased strength, endurance, and mobility    Functional gait abnormality    Lumbar radiculopathy    Weakness of left lower extremity    Balance disorder    Impaired functional mobility, balance, gait, and endurance    Cognitive communication deficit    Seizure         PAST MEDICAL HISTORY  Past Medical History:   Diagnosis Date    Anemia     Anemia     Asthma     exercise asthma    Migraine headache     Ovarian cyst         PAST SURGICAL HISTORY:  Past Surgical History:   Procedure Laterality Date    APPENDECTOMY      4/2019    AUGMENTATION OF BREAST Bilateral 03/2022    BREAST BIOPSY Right     Excisional bx, benign    COLONOSCOPY N/A 05/18/2022     "Procedure: COLONOSCOPY;  Surgeon: Sabino Mcintosh MD;  Location: [facility];  Service: Endoscopy;  Laterality: N/A;  High risk for colon cancer (family)      fully vaccinated; instructions to portal-GT    ESOPHAGOGASTRODUODENOSCOPY N/A 1/18/2024    Procedure: EGD (ESOPHAGOGASTRODUODENOSCOPY);  Surgeon: Sabino Mcintosh MD;  Location: [facility];  Service: Endoscopy;  Laterality: N/A;  1/10 ref by SHANEL Wright, instr. to portal-st  1/16- pc complete. DBM        SOCIAL HISTORY:   reports that she has never smoked. She has never used smokeless tobacco. She reports current alcohol use. She reports that she does not use drugs.     FAMILY HISTORY:  Family History   Problem Relation Name Age of Onset    Asthma Mother      Miscarriages / Stillbirths Mother      Breast cancer Mother          60    Arthritis Father      COPD Father      Asthma Brother      Colon polyps Brother      Breast cancer Maternal Grandmother          60    Cancer Maternal Grandfather      Stomach cancer Maternal Grandfather      Hearing loss Paternal Grandmother      Hearing loss Paternal Grandfather      Diabetes Maternal Uncle      Hypertension Maternal Uncle      Prostate cancer Maternal Uncle      Pancreatic cancer Maternal Uncle      Hearing loss Paternal Aunt      Hearing loss Paternal Uncle      Amblyopia Neg Hx      Blindness Neg Hx      Cataracts Neg Hx      Glaucoma Neg Hx      Macular degeneration Neg Hx      Retinal detachment Neg Hx      Strabismus Neg Hx          ALLERGIES:   Review of patient's allergies indicates:   Allergen Reactions    Iodine and iodide containing products Anaphylaxis     PATIENT CAN RECEIVE IOHEXOL, has received multiple times with no pretreatment with no allergic reaction    Shellfish derived Anaphylaxis    Benadryl [diphenhydramine hcl] Itching     "with fast injection"    Fish containing products     Iodine        MEDICATIONS:  No current facility-administered medications for this visit.  No current outpatient medications " on file.    Facility-Administered Medications Ordered in Other Visits:     albuterol inhaler 1 puff, 1 puff, Inhalation, Q6H PRN, Natalee Dickey MD    aluminum-magnesium hydroxide-simethicone 200-200-20 mg/5 mL suspension 30 mL, 30 mL, Oral, QID PRN, Natalee Dickey MD    dextrose 10% bolus 125 mL 125 mL, 12.5 g, Intravenous, PRN, Natalee Dickey MD    dextrose 10% bolus 250 mL 250 mL, 25 g, Intravenous, PRN, Natalee Dickey MD    glucagon (human recombinant) injection 1 mg, 1 mg, Intramuscular, PRN, Natalee Dickey MD    glucose chewable tablet 16 g, 16 g, Oral, PRN, Natalee Dickey MD    glucose chewable tablet 24 g, 24 g, Oral, PRN, Natalee Dickey MD    LORazepam injection 1 mg, 1 mg, Intravenous, Q10 Min PRN, Natalee Dickey MD    melatonin tablet 6 mg, 6 mg, Oral, Nightly PRN, Natalee Dickey MD    naloxone 0.4 mg/mL injection 0.02 mg, 0.02 mg, Intravenous, PRN, Natalee Dickey MD    ondansetron disintegrating tablet 8 mg, 8 mg, Oral, Q8H PRN, Natalee Dickey MD    polyethylene glycol packet 17 g, 17 g, Oral, Daily, Natalee Dickey MD    prochlorperazine injection Soln 5 mg, 5 mg, Intravenous, Q6H PRN, Natalee Dickey MD    simethicone chewable tablet 80 mg, 1 tablet, Oral, QID PRN, Natalee Dickey MD    sodium chloride 0.9% flush 10 mL, 10 mL, Intravenous, Q12H PRN, Natalee Dickey MD    Review of Systems   Constitutional: Negative.  Negative for unexpected weight change.   HENT:  Negative for hearing loss, rhinorrhea and trouble swallowing.    Eyes:  Negative for discharge and visual disturbance.   Respiratory:  Negative for chest tightness and wheezing.    Cardiovascular:  Negative for chest pain and palpitations.   Gastrointestinal:  Negative for blood in stool, constipation, diarrhea and vomiting.   Endocrine: Negative for polydipsia and polyuria.   Genitourinary:  Negative for difficulty urinating, dysuria, hematuria and menstrual problem.   Musculoskeletal:  Negative for arthralgias, joint swelling and  "neck pain.   Skin:  Positive for wound (under L breast, s/p breast implants).   Neurological:  Positive for weakness (LLE) and headaches.   Psychiatric/Behavioral:  Negative for confusion and dysphoric mood. The patient is not nervous/anxious.    All other systems reviewed and are negative.      Objective:      Physical Exam  Vitals and nursing note reviewed.   Constitutional:       Appearance: Normal appearance. She is normal weight.   HENT:      Right Ear: Tympanic membrane normal.      Left Ear: Tympanic membrane normal.   Cardiovascular:      Rate and Rhythm: Normal rate and regular rhythm.      Pulses: Normal pulses.      Heart sounds: Normal heart sounds.   Pulmonary:      Effort: Pulmonary effort is normal.      Breath sounds: Normal breath sounds.   Neurological:      Mental Status: She is alert.      Comments: Not conducted in detail   Psychiatric:         Mood and Affect: Mood normal.         Behavior: Behavior normal.         Thought Content: Thought content normal.         Judgment: Judgment normal.         Vitals:    07/15/24 0815   BP: 102/62   BP Location: Left arm   Patient Position: Sitting   BP Method: Medium (Manual)   Pulse: 84   Resp: 16   Temp: 98.7 °F (37.1 °C)   TempSrc: Oral   SpO2: 97%   Weight: 62.8 kg (138 lb 7.2 oz)   Height: 5' 3" (1.6 m)     Body mass index is 24.53 kg/m².    RESULTS: Reviewed labs from last 12 months    Last Lab Results:     Lab Results   Component Value Date    WBC 4.49 07/19/2024    HGB 11.6 (L) 07/19/2024    HCT 34.0 (L) 07/19/2024     07/19/2024     07/19/2024    K 3.3 (L) 07/19/2024     07/19/2024    CO2 23 07/19/2024    BUN 6 07/19/2024    CREATININE 0.7 07/19/2024    CALCIUM 8.7 07/19/2024    ALBUMIN 3.6 07/19/2024    AST 13 07/19/2024    ALT 9 (L) 07/19/2024    CHOL 177 04/06/2023    TRIG 60 04/06/2023    HDL 46 04/06/2023    LDLCALC 119.0 04/06/2023    HGBA1C 4.7 04/06/2023    TSH 1.240 07/18/2024         Assessment & Plan:       1. Routine " adult health maintenance  -     CBC W/ AUTO DIFFERENTIAL; Future; Expected date: 07/15/2024  -     COMPREHENSIVE METABOLIC PANEL; Future; Expected date: 07/15/2024  -     HEMOGLOBIN A1C; Future; Expected date: 07/15/2024  -     LIPID PANEL; Future; Expected date: 07/15/2024    Will do blood work. Discussed preventative measures.    2. Other migraine without status migrainosus, not intractable    Referral placed for specialist as per her request for Botox injections.    3. Thyroid nodule  -     TSH; Future; Expected date: 07/15/2024    Will check TSH.    4. Elevated CK  -     CK; Future; Expected date: 07/15/2024    5. Traumatic brain injury without loss of consciousness, sequela    See all notes from specialist, on Worker's Comp.       No follow-ups on file.    This note was created by combination of typed  and M-Modal dictation.  Transcription errors may be present.  If there are any questions, please contact me.

## 2024-07-15 ENCOUNTER — OUTPATIENT CASE MANAGEMENT (OUTPATIENT)
Dept: ADMINISTRATIVE | Facility: OTHER | Age: 44
End: 2024-07-15
Payer: OTHER GOVERNMENT

## 2024-07-15 ENCOUNTER — CLINICAL SUPPORT (OUTPATIENT)
Dept: REHABILITATION | Facility: HOSPITAL | Age: 44
End: 2024-07-15
Payer: COMMERCIAL

## 2024-07-15 VITALS
OXYGEN SATURATION: 97 % | WEIGHT: 138.44 LBS | RESPIRATION RATE: 16 BRPM | DIASTOLIC BLOOD PRESSURE: 62 MMHG | HEART RATE: 84 BPM | TEMPERATURE: 99 F | HEIGHT: 63 IN | BODY MASS INDEX: 24.53 KG/M2 | SYSTOLIC BLOOD PRESSURE: 102 MMHG

## 2024-07-15 DIAGNOSIS — R41.841 COGNITIVE COMMUNICATION DEFICIT: Primary | ICD-10-CM

## 2024-07-15 PROCEDURE — 97129 THER IVNTJ 1ST 15 MIN: CPT | Mod: PO

## 2024-07-15 PROCEDURE — 97130 THER IVNTJ EA ADDL 15 MIN: CPT | Mod: PO

## 2024-07-15 NOTE — PROGRESS NOTES
Outpatient Care Management   - Care Plan Follow Up    Patient: Lydia Thomas  MRN:  [id number]  Date of Service:  7/15/2024  Completed by:  Claire Rider LCSW  Referral Date: 05/30/2024    Reason for Visit   Patient presents with    OPCM SW Follow Up Call       Brief Summary: SW spoke with Pt to check in. Discussed the issue with OT and how it was resolved but we are pending approval now. Pt reported she is starting behavioral health therapy on Wednesday and her mother is coming in end of July through August.     Future Appointments   Date Time Provider Department Center   7/16/2024  8:45 AM LAB, [facility] LAB [place]   7/16/2024  3:00 PM Jose Garcia MD [facility] VAS ERVIN [facility]   7/18/2024  9:45 AM SLP FLOAT [place] [address] [facility]   7/18/2024  3:30 PM Jazz Cody, ARNOL [place] [address] [facility]   7/22/2024  2:30 PM Rachel Regalado [facility]-SLP [place] [address] [facility]   7/24/2024 11:00 AM Eder Do, JOHNNY [facility] LAURA [place]   8/1/2024  1:45 PM SLP NASEEMAT [place] [address] [facility]   8/2/2024  1:30 PM VASCULAR ULTRASOUND, [facility] EKG [facility]   1/21/2025  3:20 PM Darryl Anders, NP [facility] OBGYN [facility]     Claire Rider LCSW  Neuro Therapy   [facility]  [phone]

## 2024-07-15 NOTE — PROGRESS NOTES
[facility]  Speech Therapy Treatment Note- Neurological Rehabilitation  Date: 7/15/2024     Name: Lydia Thomas   MRN: [id number]   Therapy Diagnosis:   Encounter Diagnosis   Name Primary?    Cognitive communication deficit Yes     Physician: Francine Contreras,*  Physician Orders: Ambulatory Referral to Speech Therapy   Medical Diagnosis:  Impaired functional mobility, balance, gait, and endurance [Z74.09]     Visit #/ Visits Authorized: 9/ 12 (including evaluation)   Date of Evaluation:  4/15/24   Insurance Authorization Period: 1/18/2024 - 1/17/2025   Plan of Care Expiration Date:   7/26/2024  Extended Plan of Care:  N/A  Progress Note: 7/26/2024    Time In:  1:04 pm  Time Out: 1:45 pm   Total Billable Time: 41 minutes      Precautions: Standard, Fall, Cognition, and Communication      Subjective:   Patient reports: pleasant; no complaints.     She was compliant to home exercise program. All written homework and home activities such as grooming, using compensatory strategies.   Response to previous treatment: good  Pain Scale: no pain indicated throughout session  Objective:   TIMED  Procedure Min.   Cognitive Therapeutic Interventions, first 15 minutes CPT 83682  15   Cognitive Therapeutic Interventions, each additional 15 minutes CPT 43379  30       Short Term Goals: (4 weeks) Current Progress:   1.Patient will complete alternating attention tasks with 90% accuracy given minimum cues.   Progressing/  7/15/2024  Putting steps in order constant therapy - 5 steps- 100% acc Independently          Met x 3/ ongoing   2. Patient will complete selective attention tasks with 90% accuracy given minimum cues.       Met x 3/ discontinue 7/11/24   3. Patient will complete immediate memory tasks using strategies with 90% accuracy given minimum cues.       Progressing/ 7/15/2024  Not formally addressed        Met x 2   4. Patient will complete delayed memory tasks using strategies with 90% accuracy  given minimum cues.       Met x3 / discontinue 24   5. Patient will complete mental manipulation tasks with 90% accuracy given minimum cues.      Progressing/   7/15/2024   -Not addressed this session.     Met x 2    6. Patient will complete moderate to complex problem solving tasks with 90% accuracy given minimum cues.      Progressing/  7/15/2024    Reading everyday things 70% acc with min A. Difficulty with reading prescription bottles.     Completed a deductive puzzle with mod-max A.    7. Patient will use easy onset and diaphragmatic breathing during speech tasks on 4/5 trials to increase speech fluency.       Progressing/  7/15/2024  Patient was able to use slow easy onset and breathing to speak fluently when stutter was present.     Met x 3/ongoing   8.  Patient will functional reading comprehension tasks with 90% accuracy given minimum cues.    Progressing/ 7/15/2024  Completed a deductive puzzle with mod-max A.      Patient Education/Response:   Patient educated regarding the followin. Cognitive compensatory strategies   2. Fluency enhancing strategies   3. Types of disfluencies   4. Activities to complete at home in order to continue improving and stimulating cognitive-communicative skills as well as increasing independence.     education/counseling today - discussed types of disfluencies and fluency enhancing strategies to practice. Discussed feelings regarding current cognitive-communication deficits.      Home program established: Patient instructed to continue prior program     See Electronic Medical Record under Patient Instructions for exercises provided throughout therapy.  Assessment:   Lydia participated well today in today's session which focused on extensive education regarding memory, sustained attention, alternating attention, problem solving, meta-cognitive strategy training, education, reading comprehension, and Fluency strategies. Difficulty reading and answering simple questions  about prescription bottles. Difficulty with deductive puzzle with mod A.  Patient did well organizing 5 step tasks. Overall, Today strengths were noted in motivation, willing to try different activities, and using strategies. Patient motivated to incorporate strategies and activities into her daily routine to continue improving.  Cognitive, Physical, and Emotional fatigue was not believed to have been a barrier to the session.  Lydia is progressing well towards her goals. Current goals remain appropriate. Goals to be updated as necessary. Patient feels like speech therapy is helping and wants to continue. Consider extension of Plan of Care since patient is making progress.    Patient prognosis is Fair. Patient will continue to benefit from skilled outpatient speech and language therapy to address the deficits listed in the problem list on initial evaluation, provide patient/family education and to maximize patient's level of independence in the home and community environment.   Medical necessity is demonstrated by the following IMPAIRMENTS:  Cognition: Deficits in executive functioning and memory prevent the pt from returning to work, and place her at risk of unsafe behavior and a decline in quality of life.    Cognition: Deficits in executive functioning, attention, and memory prevent the pt from relaying medically and safety relevant information in a timely manner in a state of emergency.   Barriers to Therapy: transportation   Patient's spiritual, cultural and educational needs considered and patient agreeable to plan of care and goals.  Plan:   Continue updated Plan of Care with focus on rehabilitation and compensation for cognitive communication deficits.    Rachel Regalado M.S., L-SLP,CCC-SLP   Speech Language Pathologist    7/15/2024

## 2024-07-18 ENCOUNTER — PATIENT MESSAGE (OUTPATIENT)
Dept: HEMATOLOGY/ONCOLOGY | Facility: CLINIC | Age: 44
End: 2024-07-18
Payer: OTHER GOVERNMENT

## 2024-07-18 ENCOUNTER — DOCUMENTATION ONLY (OUTPATIENT)
Dept: REHABILITATION | Facility: HOSPITAL | Age: 44
End: 2024-07-18
Payer: OTHER GOVERNMENT

## 2024-07-18 ENCOUNTER — HOSPITAL ENCOUNTER (INPATIENT)
Facility: HOSPITAL | Age: 44
LOS: 2 days | Discharge: HOME | DRG: 880 | End: 2024-07-21
Attending: STUDENT IN AN ORGANIZED HEALTH CARE EDUCATION/TRAINING PROGRAM | Admitting: STUDENT IN AN ORGANIZED HEALTH CARE EDUCATION/TRAINING PROGRAM
Payer: OTHER GOVERNMENT

## 2024-07-18 ENCOUNTER — CLINICAL SUPPORT (OUTPATIENT)
Dept: REHABILITATION | Facility: HOSPITAL | Age: 44
End: 2024-07-18
Payer: COMMERCIAL

## 2024-07-18 ENCOUNTER — TELEPHONE (OUTPATIENT)
Dept: HEMATOLOGY/ONCOLOGY | Facility: CLINIC | Age: 44
End: 2024-07-18
Payer: OTHER GOVERNMENT

## 2024-07-18 DIAGNOSIS — R41.841 COGNITIVE COMMUNICATION DEFICIT: Primary | ICD-10-CM

## 2024-07-18 DIAGNOSIS — F44.5 PSYCHOGENIC NONEPILEPTIC SEIZURE: ICD-10-CM

## 2024-07-18 DIAGNOSIS — R56.9 SEIZURE: ICD-10-CM

## 2024-07-18 DIAGNOSIS — R56.9 SEIZURE-LIKE ACTIVITY: Primary | ICD-10-CM

## 2024-07-18 DIAGNOSIS — R07.9 CHEST PAIN: ICD-10-CM

## 2024-07-18 DIAGNOSIS — F44.4 FUNCTIONAL NEUROLOGICAL SYMPTOM DISORDER (CONVERSION DISORDER), WITH ABNORMAL MOVEMENT: ICD-10-CM

## 2024-07-18 LAB
ALBUMIN SERPL BCP-MCNC: 4.1 G/DL (ref 3.5–5.2)
ALP SERPL-CCNC: 41 U/L (ref 55–135)
ALT SERPL W/O P-5'-P-CCNC: 9 U/L (ref 10–44)
AMPHET+METHAMPHET UR QL: NEGATIVE
ANION GAP SERPL CALC-SCNC: 9 MMOL/L (ref 8–16)
APAP SERPL-MCNC: 18.5 UG/ML (ref 10–20)
AST SERPL-CCNC: 13 U/L (ref 10–40)
B-HCG UR QL: NEGATIVE
BACTERIA #/AREA URNS AUTO: ABNORMAL /HPF
BARBITURATES UR QL SCN>200 NG/ML: NEGATIVE
BASOPHILS # BLD AUTO: 0.06 K/UL (ref 0–0.2)
BASOPHILS NFR BLD: 1.1 % (ref 0–1.9)
BENZODIAZ UR QL SCN>200 NG/ML: ABNORMAL
BILIRUB SERPL-MCNC: 1.2 MG/DL (ref 0.1–1)
BILIRUB UR QL STRIP: NEGATIVE
BUN SERPL-MCNC: 6 MG/DL (ref 6–20)
BZE UR QL SCN: NEGATIVE
CALCIUM SERPL-MCNC: 9.3 MG/DL (ref 8.7–10.5)
CANNABINOIDS UR QL SCN: NEGATIVE
CHLORIDE SERPL-SCNC: 104 MMOL/L (ref 95–110)
CLARITY UR REFRACT.AUTO: ABNORMAL
CO2 SERPL-SCNC: 22 MMOL/L (ref 23–29)
COLOR UR AUTO: ABNORMAL
CREAT SERPL-MCNC: 0.8 MG/DL (ref 0.5–1.4)
CREAT UR-MCNC: 34 MG/DL (ref 15–325)
CTP QC/QA: YES
DIFFERENTIAL METHOD BLD: ABNORMAL
EOSINOPHIL # BLD AUTO: 0 K/UL (ref 0–0.5)
EOSINOPHIL NFR BLD: 0.2 % (ref 0–8)
ERYTHROCYTE [DISTWIDTH] IN BLOOD BY AUTOMATED COUNT: 12.1 % (ref 11.5–14.5)
EST. GFR  (NO RACE VARIABLE): >60 ML/MIN/1.73 M^2
ETHANOL SERPL-MCNC: <10 MG/DL
GLUCOSE SERPL-MCNC: 94 MG/DL (ref 70–110)
GLUCOSE UR QL STRIP: NEGATIVE
HCG INTACT+B SERPL-ACNC: <2.4 MIU/ML
HCT VFR BLD AUTO: 37.7 % (ref 37–48.5)
HGB BLD-MCNC: 13.2 G/DL (ref 12–16)
HGB UR QL STRIP: ABNORMAL
IMM GRANULOCYTES # BLD AUTO: 0.01 K/UL (ref 0–0.04)
IMM GRANULOCYTES NFR BLD AUTO: 0.2 % (ref 0–0.5)
KETONES UR QL STRIP: NEGATIVE
LACTATE SERPL-SCNC: 1.1 MMOL/L (ref 0.5–2.2)
LEUKOCYTE ESTERASE UR QL STRIP: ABNORMAL
LIPASE SERPL-CCNC: 21 U/L (ref 4–60)
LYMPHOCYTES # BLD AUTO: 1.1 K/UL (ref 1–4.8)
LYMPHOCYTES NFR BLD: 19.6 % (ref 18–48)
MAGNESIUM SERPL-MCNC: 1.9 MG/DL (ref 1.6–2.6)
MCH RBC QN AUTO: 32.4 PG (ref 27–31)
MCHC RBC AUTO-ENTMCNC: 35 G/DL (ref 32–36)
MCV RBC AUTO: 93 FL (ref 82–98)
METHADONE UR QL SCN>300 NG/ML: NEGATIVE
MICROSCOPIC COMMENT: ABNORMAL
MONOCYTES # BLD AUTO: 0.3 K/UL (ref 0.3–1)
MONOCYTES NFR BLD: 6 % (ref 4–15)
NEUTROPHILS # BLD AUTO: 4 K/UL (ref 1.8–7.7)
NEUTROPHILS NFR BLD: 72.9 % (ref 38–73)
NITRITE UR QL STRIP: NEGATIVE
NRBC BLD-RTO: 0 /100 WBC
OPIATES UR QL SCN: NEGATIVE
PCP UR QL SCN>25 NG/ML: NEGATIVE
PH UR STRIP: 7 [PH] (ref 5–8)
PLATELET # BLD AUTO: 247 K/UL (ref 150–450)
PMV BLD AUTO: 10.9 FL (ref 9.2–12.9)
POTASSIUM SERPL-SCNC: 3.8 MMOL/L (ref 3.5–5.1)
PROT SERPL-MCNC: 6.9 G/DL (ref 6–8.4)
PROT UR QL STRIP: ABNORMAL
RBC # BLD AUTO: 4.07 M/UL (ref 4–5.4)
RBC #/AREA URNS AUTO: >100 /HPF (ref 0–4)
SALICYLATES SERPL-MCNC: <5 MG/DL (ref 15–30)
SODIUM SERPL-SCNC: 135 MMOL/L (ref 136–145)
SP GR UR STRIP: 1.01 (ref 1–1.03)
SQUAMOUS #/AREA URNS AUTO: 4 /HPF
TOXICOLOGY INFORMATION: ABNORMAL
URN SPEC COLLECT METH UR: ABNORMAL
WBC # BLD AUTO: 5.46 K/UL (ref 3.9–12.7)
WBC #/AREA URNS AUTO: 51 /HPF (ref 0–5)

## 2024-07-18 PROCEDURE — 93005 ELECTROCARDIOGRAM TRACING: CPT

## 2024-07-18 PROCEDURE — 80143 DRUG ASSAY ACETAMINOPHEN: CPT

## 2024-07-18 PROCEDURE — 84443 ASSAY THYROID STIM HORMONE: CPT | Performed by: STUDENT IN AN ORGANIZED HEALTH CARE EDUCATION/TRAINING PROGRAM

## 2024-07-18 PROCEDURE — 87040 BLOOD CULTURE FOR BACTERIA: CPT | Mod: 59 | Performed by: STUDENT IN AN ORGANIZED HEALTH CARE EDUCATION/TRAINING PROGRAM

## 2024-07-18 PROCEDURE — 25000003 PHARM REV CODE 250: Performed by: STUDENT IN AN ORGANIZED HEALTH CARE EDUCATION/TRAINING PROGRAM

## 2024-07-18 PROCEDURE — 85025 COMPLETE CBC W/AUTO DIFF WBC: CPT

## 2024-07-18 PROCEDURE — 84702 CHORIONIC GONADOTROPIN TEST: CPT | Performed by: STUDENT IN AN ORGANIZED HEALTH CARE EDUCATION/TRAINING PROGRAM

## 2024-07-18 PROCEDURE — 82077 ASSAY SPEC XCP UR&BREATH IA: CPT

## 2024-07-18 PROCEDURE — 87086 URINE CULTURE/COLONY COUNT: CPT

## 2024-07-18 PROCEDURE — 97130 THER IVNTJ EA ADDL 15 MIN: CPT | Mod: 95,PO

## 2024-07-18 PROCEDURE — 96376 TX/PRO/DX INJ SAME DRUG ADON: CPT

## 2024-07-18 PROCEDURE — 63600175 PHARM REV CODE 636 W HCPCS

## 2024-07-18 PROCEDURE — 80053 COMPREHEN METABOLIC PANEL: CPT

## 2024-07-18 PROCEDURE — 93010 ELECTROCARDIOGRAM REPORT: CPT | Mod: 76,,, | Performed by: INTERNAL MEDICINE

## 2024-07-18 PROCEDURE — 81025 URINE PREGNANCY TEST: CPT

## 2024-07-18 PROCEDURE — 25000003 PHARM REV CODE 250

## 2024-07-18 PROCEDURE — 80179 DRUG ASSAY SALICYLATE: CPT

## 2024-07-18 PROCEDURE — 96375 TX/PRO/DX INJ NEW DRUG ADDON: CPT

## 2024-07-18 PROCEDURE — 97129 THER IVNTJ 1ST 15 MIN: CPT | Mod: 95,PO

## 2024-07-18 PROCEDURE — 83605 ASSAY OF LACTIC ACID: CPT | Performed by: STUDENT IN AN ORGANIZED HEALTH CARE EDUCATION/TRAINING PROGRAM

## 2024-07-18 PROCEDURE — 99285 EMERGENCY DEPT VISIT HI MDM: CPT | Mod: 25

## 2024-07-18 PROCEDURE — 80307 DRUG TEST PRSMV CHEM ANLYZR: CPT

## 2024-07-18 PROCEDURE — 82550 ASSAY OF CK (CPK): CPT | Performed by: STUDENT IN AN ORGANIZED HEALTH CARE EDUCATION/TRAINING PROGRAM

## 2024-07-18 PROCEDURE — 81001 URINALYSIS AUTO W/SCOPE: CPT | Mod: XB

## 2024-07-18 PROCEDURE — 96365 THER/PROPH/DIAG IV INF INIT: CPT

## 2024-07-18 PROCEDURE — 93010 ELECTROCARDIOGRAM REPORT: CPT | Mod: ,,, | Performed by: INTERNAL MEDICINE

## 2024-07-18 PROCEDURE — 83735 ASSAY OF MAGNESIUM: CPT | Performed by: STUDENT IN AN ORGANIZED HEALTH CARE EDUCATION/TRAINING PROGRAM

## 2024-07-18 PROCEDURE — 83690 ASSAY OF LIPASE: CPT | Performed by: STUDENT IN AN ORGANIZED HEALTH CARE EDUCATION/TRAINING PROGRAM

## 2024-07-18 RX ORDER — LORAZEPAM 2 MG/ML
2 INJECTION INTRAMUSCULAR
Status: COMPLETED | OUTPATIENT
Start: 2024-07-18 | End: 2024-07-18

## 2024-07-18 RX ORDER — LEVETIRACETAM 500 MG/5ML
2000 INJECTION, SOLUTION, CONCENTRATE INTRAVENOUS
Status: COMPLETED | OUTPATIENT
Start: 2024-07-18 | End: 2024-07-18

## 2024-07-18 RX ORDER — LORAZEPAM 2 MG/ML
1 INJECTION INTRAMUSCULAR
Status: COMPLETED | OUTPATIENT
Start: 2024-07-18 | End: 2024-07-18

## 2024-07-18 RX ORDER — LORAZEPAM 2 MG/ML
INJECTION INTRAMUSCULAR
Status: COMPLETED
Start: 2024-07-18 | End: 2024-07-18

## 2024-07-18 RX ADMIN — IOHEXOL 75 ML: 350 INJECTION, SOLUTION INTRAVENOUS at 11:07

## 2024-07-18 RX ADMIN — LORAZEPAM 1 MG: 2 INJECTION INTRAMUSCULAR at 07:07

## 2024-07-18 RX ADMIN — LEVETIRACETAM 2000 MG: 100 INJECTION, SOLUTION INTRAVENOUS at 06:07

## 2024-07-18 RX ADMIN — CEFTRIAXONE 1 G: 1 INJECTION, POWDER, FOR SOLUTION INTRAMUSCULAR; INTRAVENOUS at 08:07

## 2024-07-18 RX ADMIN — SODIUM CHLORIDE 1000 ML: 9 INJECTION, SOLUTION INTRAVENOUS at 09:07

## 2024-07-18 NOTE — PROGRESS NOTES
[facility]  Speech Therapy Treatment Note- Neurological Rehabilitation  Date: 7/18/2024     Name: Lydia Thomas   MRN: [id number]   Therapy Diagnosis:   Encounter Diagnosis   Name Primary?    Cognitive communication deficit Yes     Physician: Francine Contreras,*  Physician Orders: Ambulatory Referral to Speech Therapy   Medical Diagnosis:  Impaired functional mobility, balance, gait, and endurance [Z74.09]     Visit #/ Visits Authorized: 10/ 12 (including evaluation)   Date of Evaluation:  4/15/24   Insurance Authorization Period: 1/18/2024 - 1/17/2025   Plan of Care Expiration Date:   7/26/2024  Extended Plan of Care:  N/A  Progress Note: 7/26/2024    Time In:  9:47 AM  Time Out: 10:30 AM   Total Billable Time: 43 minutes      Precautions: Standard, Fall, Cognition, and Communication    Subjective:   Patient reports: having a migraine since her last speech therapy appointment on Monday. Rated the migraine an 8/10. However, was excited and pleased to be doing a virtual session.      She was compliant to home exercise program. All written homework and home activities such as grooming, using compensatory strategies.   Response to previous treatment: good  Pain Scale: no pain indicated throughout session  Objective:   TIMED  Procedure Min.   Cognitive Therapeutic Interventions, first 15 minutes CPT 97981  15   Cognitive Therapeutic Interventions, each additional 15 minutes CPT 27201  28       Short Term Goals: (4 weeks) Current Progress:   1.Patient will complete alternating attention tasks with 90% accuracy given minimum cues.                Progressing/  7/18/2024  Patient completed auditory alternating task of naming items in a two categories in an alternating fashion (naming a fruit, then naming a vegetable) with 95% accuracy given minimal cueing. She reported this task to be challenging. Patient needed minimal cueing to recall which fruits or vegetables had already been said.      Met  x 4/ ongoing   2. Patient will complete selective attention tasks with 90% accuracy given minimum cues.       Completed complex written directions with door opened and answering questions about schedule.     Door opened with loud conversations in hallway. Typing in background    Met x 3/ discontinue 7/11/24   3. Patient will complete immediate memory tasks using strategies with 90% accuracy given minimum cues.           Progressing/ 7/18/2024  Patient completed short term recall task with a 5 minute delay with 75% accuracy given semantic cueing. Patient was asked to recall 4 related items.      Met x 2   4. Patient will complete delayed memory tasks using strategies with 90% accuracy given minimum cues.       Patient recalled information discussed with workers comp  independently. She also remembered that this clinician will be on medical leave starting next week and she will be seen in person and/or virtually with other Speech-Language Pathologists independently.       Met x3 / discontinue 7/11/24   5. Patient will complete mental manipulation tasks with 90% accuracy given minimum cues.                       Patient completed various mental manipulation tasks x4 of recalling 4-5 words called aloud, naming an item that did not fit the category, naming which items go together, naming the order in which they occur, and/or answering a question about the 4-5 words called aloud. Patient completed these tasks with 90% accuracy given minimal cueing. Patient asked for repetitions as needed. Patient reported these tasks to be not to easy but not to hard.     Typing in background (noise).   Met x 3 GOAL MET 7/18/2024   6. Patient will complete moderate to complex problem solving tasks with 90% accuracy given minimum cues.      Progressing/  7/18/2024   Not formally addressed.         Met x 3    7. Patient will use easy onset and diaphragmatic breathing during speech tasks on 4/5 trials to increase speech fluency.        Progressing/  2024  Not formally addressed.           Met x 3/ongoing   8.  Patient will functional reading comprehension tasks with 90% accuracy given minimum cues.       Patient completed functional reading task of a short paragraph read aloud by clinician, 2. Patient then summarized and answered questions regarding short paragraph with 90% accuracy.   Met x 3 GOAL MET 2024     Patient Education/Response:   Patient educated regarding the followin. Cognitive compensatory strategies   2. Teletherapy visits   3. Cognitive fatigue and taking breaks     Extensive education/counseling today - Discussed feelings regarding current cognitive-communication deficits.     Home program established: Patient instructed to continue prior program     See Electronic Medical Record under Patient Instructions for exercises provided throughout therapy.  Assessment:   Lydia participated well today in today's session which focused on competing various mental manipulation and auditory attention tasks with Increased accuracy for all structured tasks today. She required minimal cueing for all tasks completed during today's therapy session. Patient and clinician discussed cognitive fatigue and importance of taking rest periods as needed. Additionally, patient and clinician discussed compensatory strategies that appear to be beneficial for the patient in her daily life. Patient encourage to continue listening to audio books. Patient motivated to incorporate strategies and activities into her daily routine to continue improving.  Cognitive, Physical, and Emotional fatigue was not believed to have been a barrier to the session.  Lydia is progressing well towards her goals. Current goals remain appropriate. Goals to be updated as necessary. Patient feels like speech therapy is helping and wants to continue. Consider extension of Plan of Care since patient is making progress.    Patient prognosis is Fair. Patient will  continue to benefit from skilled outpatient speech and language therapy to address the deficits listed in the problem list on initial evaluation, provide patient/family education and to maximize patient's level of independence in the home and community environment.   Medical necessity is demonstrated by the following IMPAIRMENTS:  Cognition: Deficits in executive functioning and memory prevent the pt from returning to work, and place her at risk of unsafe behavior and a decline in quality of life.    Cognition: Deficits in executive functioning, attention, and memory prevent the pt from relaying medically and safety relevant information in a timely manner in a state of emergency.   Barriers to Therapy: transportation   Patient's spiritual, cultural and educational needs considered and patient agreeable to plan of care and goals.  Plan:   Continue updated Plan of Care with focus on rehabilitation and compensation for cognitive communication deficits.    TODD Wilcox    Clinician   7/18/2024      Lottie Davey, CCC-SLP, CBIS   Speech Language Pathologist   Certified Brain Injury Specialist   7/18/2024

## 2024-07-18 NOTE — ED NOTES
Patient identifiers verified and correct for Lydia Thomas  LOC: The patient is awake but not responding appropriately. Answers questions with yes or no.  APPEARANCE: Patient appears comfortable and in no acute distress, patient is clean and well groomed.  SKIN: The skin is warm and dry, color consistent with ethnicity, patient has normal skin turgor and moist mucus membranes, skin intact, no breakdown or bruising noted.   MUSCULOSKELETAL: Patient moving all extremities spontaneously, no swelling noted.  RESPIRATORY: Airway is open and patent, respirations are spontaneous, patient has a normal effort and rate, no accessory muscle use noted, O2 Sat 99% on room air.  CARDIAC: Patient has a tachy rate and regular rhythm, no edema noted, capillary refill < 3 seconds.   GASTRO: Soft and non tender to palpation, no distention noted, Pt states bowel movements have been regular.  : Pt denies any pain or frequency with urination.  NEURO: Pt opens eyes spontaneously, answers questions slowly. Pt has normal sensation to extremities

## 2024-07-18 NOTE — ED TRIAGE NOTES
"Seizures (Arrives via EMS from [facility], had 2 witnessed seizures with staff. 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 No reported trauma from seizures )   "

## 2024-07-18 NOTE — ED PROVIDER NOTES
"Encounter Date: 7/18/2024       History     Chief Complaint   Patient presents with    Seizures     Arrives via EMS from [facility], had 2 witnessed seizures with staff. 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 No reported trauma from seizures      HPI    Lydia Thomas is a 44 y.o. 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  EMS gave 2 g of Versed for this event.  On arrival to the ED, patient appeared postictal but did returned to her baseline w/o evidence of focal neurologic deficit aside from her known lower extremity weakness/paresthesias which she states have not worsened.  Patient does endorse a 4 day history of generalized HA, which has not resolved w/taking her home Triptan or Tylenol.  Patient states that she does not recall the events preceding her Sz, but does recall briefly waking up while EN route w/ EMS.  After that, she states she only recalls waking up in the ED w/ confusion.  Patient also endorses a 1 day history of abdominal pain.  Patient denies f/c, neck stiffness, worsening weakness/paresthesias, vision changes, CP, SOB, cough/congestion, N/V/D, dysuria/hematuria, incontinence, tongue biting, lower extremity pain/erythema/edema, or any additional trauma.      Review of patient's allergies indicates:   Allergen Reactions    Iodine and iodide containing products Anaphylaxis     PATIENT CAN RECEIVE IOHEXOL, has received multiple times with no pretreatment with no allergic reaction    Shellfish derived Anaphylaxis    Benadryl [diphenhydramine hcl] Itching     "with fast injection"    Fish containing products  "    Iodine      Past Medical History:   Diagnosis Date    Anemia     Anemia     Asthma     exercise asthma    Migraine headache     Ovarian cyst      Past Surgical History:   Procedure Laterality Date    APPENDECTOMY      4/2019    AUGMENTATION OF BREAST Bilateral 03/2022    BREAST BIOPSY Right     Excisional bx, benign    COLONOSCOPY N/A 05/18/2022    Procedure: COLONOSCOPY;  Surgeon: Sabino Mcintosh MD;  Location: [facility] ENDO;  Service: Endoscopy;  Laterality: N/A;  High risk for colon cancer (family)      fully vaccinated; instructions to portal-GT    ESOPHAGOGASTRODUODENOSCOPY N/A 1/18/2024    Procedure: EGD (ESOPHAGOGASTRODUODENOSCOPY);  Surgeon: Sabino Mcintosh MD;  Location: [facility] ENDO;  Service: Endoscopy;  Laterality: N/A;  1/10 ref by Feli Jc, PAC, instr. to portal-st  1/16- pc complete. DBM     Family History   Problem Relation Name Age of Onset    Asthma Mother      Miscarriages / Stillbirths Mother      Breast cancer Mother          60    Arthritis Father      COPD Father      Asthma Brother      Colon polyps Brother      Breast cancer Maternal Grandmother          60    Cancer Maternal Grandfather      Stomach cancer Maternal Grandfather      Hearing loss Paternal Grandmother      Hearing loss Paternal Grandfather      Diabetes Maternal Uncle      Hypertension Maternal Uncle      Prostate cancer Maternal Uncle      Pancreatic cancer Maternal Uncle      Hearing loss Paternal Aunt      Hearing loss Paternal Uncle      Amblyopia Neg Hx      Blindness Neg Hx      Cataracts Neg Hx      Glaucoma Neg Hx      Macular degeneration Neg Hx      Retinal detachment Neg Hx      Strabismus Neg Hx       Social History     Tobacco Use    Smoking status: Never    Smokeless tobacco: Never   Substance Use Topics    Alcohol use: Yes     Comment: occass    Drug use: No     Review of Systems  A full review of systems was obtained, see HPI for pertinent positives.    Physical Exam     Initial Vitals [07/18/24 1722]   BP Pulse Resp Temp  SpO2   128/78 (!) 139 (!) 24 97.6 °F (36.4 °C) 99 %      MAP       --         Physical Exam    Constitutional: She appears well-nourished. She is not diaphoretic. No distress.   Patient initially appears postictal but arousable.   HENT:   Head: Normocephalic and atraumatic.   Nose: Nose normal.   Mouth/Throat: Oropharynx is clear and moist.   Eyes: Conjunctivae and EOM are normal. Pupils are equal, round, and reactive to light.   Neck: Neck supple.   Normal range of motion.  Cardiovascular:  Normal rate, regular rhythm, normal heart sounds and intact distal pulses.           Pulmonary/Chest: Breath sounds normal. No respiratory distress. She exhibits no tenderness.   Abdominal: She exhibits no distension and no mass. There is abdominal tenderness. There is no rebound and no guarding.   Musculoskeletal:         General: No tenderness or edema. Normal range of motion.      Cervical back: Normal range of motion and neck supple.     Neurological: No cranial nerve deficit.   Postictal on initial evaluation, but AAO x3.  Patient does have significant weakness in left lower extremity as well as paresthesias in left lower extremity, but she states that this is her baseline and unchanged.  No other focal neurologic deficits appreciated.   Skin: Skin is warm and dry. Capillary refill takes less than 2 seconds.   Psychiatric: Thought content normal. Her mood appears not anxious. Her affect is not angry. Her speech is delayed. Her speech is not rapid and/or pressured and not tangential. She is slowed. She is not agitated, not aggressive, not actively hallucinating and not combative. She does not exhibit a depressed mood. She exhibits abnormal recent memory. She is attentive.         ED Course   Procedures  Labs Reviewed   CBC W/ AUTO DIFFERENTIAL - Abnormal; Notable for the following components:       Result Value    MCH 32.4 (*)     All other components within normal limits   COMPREHENSIVE METABOLIC PANEL - Abnormal; Notable  for the following components:    Sodium 135 (*)     CO2 22 (*)     Total Bilirubin 1.2 (*)     Alkaline Phosphatase 41 (*)     ALT 9 (*)     All other components within normal limits   DRUG SCREEN PANEL, URINE EMERGENCY - Abnormal; Notable for the following components:    Benzodiazepines Presumptive Positive (*)     All other components within normal limits    Narrative:     Specimen Source->Urine   URINALYSIS, REFLEX TO URINE CULTURE - Abnormal; Notable for the following components:    Color, UA Brown (*)     Appearance, UA Hazy (*)     Protein, UA Trace (*)     Occult Blood UA 3+ (*)     Leukocytes, UA 2+ (*)     All other components within normal limits    Narrative:     Specimen Source->Urine   SALICYLATE LEVEL - Abnormal; Notable for the following components:    Salicylate Lvl <5.0 (*)     All other components within normal limits    Narrative:     Release to patient->Immediate   URINALYSIS MICROSCOPIC - Abnormal; Notable for the following components:    RBC, UA >100 (*)     WBC, UA 51 (*)     All other components within normal limits    Narrative:     Specimen Source->Urine   CULTURE, URINE   CULTURE, BLOOD   CULTURE, BLOOD   ACETAMINOPHEN LEVEL   ALCOHOL,MEDICAL (ETHANOL)    Narrative:     Release to patient->Immediate   LIPASE    Narrative:     Release to patient->Immediate   LACTIC ACID, PLASMA   MAGNESIUM    Narrative:     Release to patient->Immediate   HCG, QUANTITATIVE    Narrative:     Release to patient->Immediate   ACETAMINOPHEN LEVEL   POCT URINE PREGNANCY   POCT GLUCOSE MONITORING CONTINUOUS          Imaging Results              CTA Head and Neck (xpd) (In process)                      CT Abdomen Pelvis With IV Contrast NO Oral Contrast (In process)                      X-Ray Chest AP Portable (Final result)  Result time 07/18/24 21:50:50      Final result by Stalin Duran MD (07/18/24 21:50:50)                   Impression:      No acute findings in the chest.      Electronically signed  by: Stalin Duran MD  Date:    07/18/2024  Time:    21:50               Narrative:    EXAMINATION:  XR CHEST AP PORTABLE    CLINICAL HISTORY:  tachycardia;    TECHNIQUE:  Single frontal view of the chest was performed.    COMPARISON:  03/29/2024.    FINDINGS:  Underinflated lungs with hypoventilatory change.    No consolidation, pleural effusion or pneumothorax.    Cardiomediastinal silhouette is unremarkable.                                       Medications   levETIRAcetam injection 2,000 mg (2,000 mg Intravenous Given 7/18/24 1815)   LORazepam injection 2 mg (1 mg Intravenous Given 7/18/24 1940)   cefTRIAXone (Rocephin) 1 g in D5W 100 mL IVPB (MB+) (0 g Intravenous Stopped 7/18/24 2157)   LORazepam injection 1 mg (1 mg Intravenous Given 7/18/24 1956)   sodium chloride 0.9% bolus 1,000 mL 1,000 mL (0 mLs Intravenous Stopped 7/18/24 2220)     Medical Decision Making  Lydia Thomas is a 44 y.o. female who presents to the emergency department for Sz-like activity witnessed both at outpatient rehab and w/ EMS. On initial evaluation, patient is communicative and can respond to simple commands and answer simple questions.  She is AAO x2 (person, place).  She does appear confused and frightened.  Strength equal in bilateral upper extremities. Significantly diminished strength in right lower extremity, which she states is her baseline.  Otherwise, no focal neurologic deficits.  When in CT scan, patient was reported to have had additional episode of Sz-like activity.  I did not personally witnessed this, but when I came to CT scanner she seemed postictal and would respond to simple commands such as squeezing my hand.  Staff who witnessed the event described tonic activity w/the patient arching her back and shaking for a few minutes.  At this time, patient given 1 mg of Ativan.  Following this event, patient did have a postictal state where she was confused and had disorganized/somewhat in coherent responses to  questions but was able to follow simple commands.  Patient eventually returned to baseline.  About 15 minutes later,  patient proceeded to have another episode of Sz-like activity which I witnessed personally.  Patient did have tonic-clonic movements, but seemed to have some purposeful movements as well including rolling onto her left side, touching her face, and opening her eyes to stimuli.  This Sz-like activity increased the concern/suspicion I have for nonepileptic form Sz-like activity.  At this time, patient was also given an additional 1 mg of Ativan.  Patient did eventually returned to her baseline in terms of mental status, but did become slightly hypotensive, which I suspect was at least in part due to multiple doses of Ativan.  Patient given IV fluids at this time.  Given her hypotension and episodes of tachycardia, workup will be expanded to include blood cultures for concern of possible development urosepsis given patient's UA concerning for UTI.  Patient has been treated w/Rocephin 1 g for UTI.     Pertinent Labs:  CBC shows no leukocytosis, reducing concern for significant infectious process.  CMP w/no gross metabolic derangements or hypoglycemia, reducing concern for metabolic abnormalities contributing to patient's Sz-like activity.  Lactate WNL.  EtOH WNL.  UDS positive for benzodiazepines, consistent w/patient's treatment.  Urinalysis w/3+ blood, 2+ leukocyte esterase, as well as 51 WBCs concerning for UTI.  Initial acetaminophen level of 18.5, which is not consistent w/ the patient's reports of taking 10 Tylenol p.m. so per day.  We will repeat acetaminophen level in 4 hours for more accurate value if patient has taken a large dose of Tylenol immediately prior to arrival.    Pertinent Imaging:  CTA head and neck not completed at time of handoff.  CT abdomen and pelvis not completed at time of handoff.  CXR largely WNL, reducing concern for acute intrathoracic process.     Ddx including, but not  limited to:  Epileptiform seizures v. Non-epileptiform Sz v. metabolic derangement v. infection v. Tylenol toxicity v. intoxication/drug use v. ICH v. intracranial mass       Most likely Dx:  At this time, I do have highest suspicion for nonepileptic Sz-like activity given patient's known functional gait abnormalities as well as the character of her seizures as described above.  Patient will need further workup w/Neurology to definitively determine epileptic v. nonepileptic Sz.  Can not definitively rule out ICH or other intracranial processes patient has not yet received CT scan.  However, I do have lower suspicion for this given the chronicity of patient's symptoms and lack of trauma/acute neurologic findings.  Patient does also have evidence of UTI, though I doubt sepsis.  No definitive evidence of intoxication/drug use or Tylenol overdose, though repeat acetaminophen level will be needed to fully determine whether or not patient has toxic levels of Tylenol.     Disposition:   Pending at time of handoff.  I anticipate the patient will be admitted due to multiple Sz-like events as described above.    Please see HPI, physical exam, ED course for additional details.    Amount and/or Complexity of Data Reviewed  Labs: ordered.  Radiology: ordered.    Risk  Prescription drug management.  Decision regarding hospitalization.              Attending Attestation:   Physician Attestation Statement for Resident:  As the supervising MD   Physician Attestation Statement: I have personally seen and examined this patient.   I agree with the above history.  -:   As the supervising MD I agree with the above PE.     As the supervising MD I agree with the above treatment, course, plan, and disposition.   -: See my additional documentation in the ED course                   ED Course as of 07/18/24 2303   Thu Jul 18, 2024   1739  EKG with sinus tachycardia, rate 124, no STEMI [NN]   1751 Received 2.5 mg ativan from EMS. [NN]   1928    Patient is a 44-year-old female with history of migraines follow up by Neurology, functional gait abnormality currently receiving physical therapy at [facility] who presents for seizure-like activity.  Per EMS report, patient had 2 brief, generalized tonic-clonic like seizures at rehab today.  EN route, patient had a 3rd seizure witnessed by EMS it was described as generalized, tonic-clonic.  They gave her 2 mg of Versed.  Upon arrival here, patient initially appeared postictal but then slowly came back to baseline.  She is awake, alert and oriented.  She does not remember any of the events today.  She remembers being at rehab but other than that does not remember any seizures or feeling abnormal.  She does state that she was had a headache for the last 4 days that has been persistent, diffuse.  She tried taking her Triptan on Monday without any improvement.  No vision changes.   Patient reports left lower extremity weakness and numbness at baseline.  She denies any new extremity weakness or numbness.  No neck stiffness or pain.  No fevers or chills.  She states that she has been feeling her usual state of health other than the persistent headache that she has been experiencing.  She states that the headache is similar in quality and character to her prior headaches this one is just persistent.  She reports taking 10 Tylenol p.m. today prior to rehab.  She does have a prior history of Tylenol overdose but denies trying to intentionally hurt herself. [NN]   1931   On exam, patient is awake, alert and oriented.  She has normal cranial nerves.  She has 5/5 motor and sensation in her bilateral upper extremities and  right lower extremity.  She has 2/5 motor in the left lower extremity and decreased sensation which she states is baseline.  Lungs clear.  Normal rhythm, mildly tachycardic.  Well perfused in all extremities. [NN]   2001   Patient had an episode of seizure-like activity that I was able to witnessed in the  room.  It started while getting Ativan after patient had an episode while at CT.  She had generalized tonic-clonic like shaking but would raise her right side off the bed and tried to turn to the left.  She also open her eyes to command towards the end of it.  There was some purposeful movement with her right arm where she touched her face during it as well.  It was difficult to tell if this is true epileptiform seizure versus pseudo-seizure.  Patient was given a total of 2 mg of Ativan.  Will continue to closely monitor.  Her heart rate does go up to the 130s to 140s during the episodes but she does not have any hypoxia during them.  [NN]   2121  Patient now hypotensive but this was after getting Ativan and she was sleepy.  Will go ahead and treat with IV fluids and closely monitor.  Given that she was tachycardic earlier, will go ahead and broaden her workup to include blood cultures and chest x-ray.  Her urine looks concerning for possible infection she will be treated with Rocephin for this. [NN]      ED Course User Index  [NN] Tami Franklin MD                           Clinical Impression:  Final diagnoses:  [R56.9] Seizure-like activity (Primary)                 Aristeo Fofana MD  Resident  07/18/24 8645       Tami Franklin MD  07/19/24 3512

## 2024-07-18 NOTE — PROGRESS NOTES
[facility]   Neurological Rehabilitation      Name: Lydia Thomas  MRN: [id number]  Today's Date: 7/18/2024    Pt arrived late to therapy clinic on this date. Reported to  staff that she had a headache and needed to use the bathroom. Staff noted that something was off with the patient from her baseline; therefore, followed her into the restroom.     Pt reported headache (like a migraine) all week. Staff reported: She had been taking Tylenol PM all week without noticing.     Before entering the bathroom stall, PAR witness seizure like activity with upward eye gaze. Pt lowered to the ground, she did not hit her head. EMS called.     Witnessed 2 more seizure like episodes with convulsions and upward eye gaze. HR: 150s and O2 normal (98) throughout.     EMS arrived. Pt assisted onto stretcher. Nonconscious.     Facesheet provided. Spouse notified.     JOYA Milton 7/18/2024   Clinical Supervisor   [address] [place] [address]

## 2024-07-19 ENCOUNTER — DOCUMENTATION ONLY (OUTPATIENT)
Dept: NEUROLOGY | Facility: CLINIC | Age: 44
End: 2024-07-19
Payer: OTHER GOVERNMENT

## 2024-07-19 PROBLEM — E87.6 HYPOKALEMIA: Status: ACTIVE | Noted: 2024-07-19

## 2024-07-19 PROBLEM — F41.9 ANXIETY: Status: ACTIVE | Noted: 2024-07-19

## 2024-07-19 PROBLEM — G83.30 MONOPARESIS: Status: ACTIVE | Noted: 2024-07-19

## 2024-07-19 PROBLEM — E87.1 HYPONATREMIA: Status: ACTIVE | Noted: 2024-07-19

## 2024-07-19 PROBLEM — R56.9 SEIZURE: Status: ACTIVE | Noted: 2024-07-19

## 2024-07-19 PROBLEM — Z91.51 HISTORY OF SUICIDE ATTEMPT: Status: ACTIVE | Noted: 2024-07-19

## 2024-07-19 PROBLEM — F44.4 FUNCTIONAL NEUROLOGICAL SYMPTOM DISORDER (CONVERSION DISORDER), WITH ABNORMAL MOVEMENT: Status: ACTIVE | Noted: 2024-07-19

## 2024-07-19 PROBLEM — R20.0 SENSORY LOSS: Status: ACTIVE | Noted: 2024-07-19

## 2024-07-19 LAB
ALBUMIN SERPL BCP-MCNC: 3.6 G/DL (ref 3.5–5.2)
ALP SERPL-CCNC: 38 U/L (ref 55–135)
ALT SERPL W/O P-5'-P-CCNC: 9 U/L (ref 10–44)
ANION GAP SERPL CALC-SCNC: 7 MMOL/L (ref 8–16)
APAP SERPL-MCNC: <3 UG/ML (ref 10–20)
AST SERPL-CCNC: 13 U/L (ref 10–40)
BACTERIA UR CULT: NORMAL
BACTERIA UR CULT: NORMAL
BASOPHILS # BLD AUTO: 0.05 K/UL (ref 0–0.2)
BASOPHILS NFR BLD: 1.1 % (ref 0–1.9)
BILIRUB SERPL-MCNC: 1.1 MG/DL (ref 0.1–1)
BUN SERPL-MCNC: 6 MG/DL (ref 6–20)
CALCIUM SERPL-MCNC: 8.7 MG/DL (ref 8.7–10.5)
CHLORIDE SERPL-SCNC: 109 MMOL/L (ref 95–110)
CK SERPL-CCNC: 89 U/L (ref 20–180)
CO2 SERPL-SCNC: 23 MMOL/L (ref 23–29)
CREAT SERPL-MCNC: 0.7 MG/DL (ref 0.5–1.4)
DIFFERENTIAL METHOD BLD: ABNORMAL
EOSINOPHIL # BLD AUTO: 0.1 K/UL (ref 0–0.5)
EOSINOPHIL NFR BLD: 2.4 % (ref 0–8)
ERYTHROCYTE [DISTWIDTH] IN BLOOD BY AUTOMATED COUNT: 12.3 % (ref 11.5–14.5)
EST. GFR  (NO RACE VARIABLE): >60 ML/MIN/1.73 M^2
GLUCOSE SERPL-MCNC: 82 MG/DL (ref 70–110)
HCT VFR BLD AUTO: 34 % (ref 37–48.5)
HGB BLD-MCNC: 11.6 G/DL (ref 12–16)
IMM GRANULOCYTES # BLD AUTO: 0.01 K/UL (ref 0–0.04)
IMM GRANULOCYTES NFR BLD AUTO: 0.2 % (ref 0–0.5)
LYMPHOCYTES # BLD AUTO: 1.7 K/UL (ref 1–4.8)
LYMPHOCYTES NFR BLD: 38.5 % (ref 18–48)
MAGNESIUM SERPL-MCNC: 1.9 MG/DL (ref 1.6–2.6)
MCH RBC QN AUTO: 31.9 PG (ref 27–31)
MCHC RBC AUTO-ENTMCNC: 34.1 G/DL (ref 32–36)
MCV RBC AUTO: 93 FL (ref 82–98)
MONOCYTES # BLD AUTO: 0.4 K/UL (ref 0.3–1)
MONOCYTES NFR BLD: 8.2 % (ref 4–15)
NEUTROPHILS # BLD AUTO: 2.2 K/UL (ref 1.8–7.7)
NEUTROPHILS NFR BLD: 49.6 % (ref 38–73)
NRBC BLD-RTO: 0 /100 WBC
OHS QRS DURATION: 78 MS
OHS QRS DURATION: 80 MS
OHS QTC CALCULATION: 456 MS
OHS QTC CALCULATION: 463 MS
PHOSPHATE SERPL-MCNC: 3.8 MG/DL (ref 2.7–4.5)
PLATELET # BLD AUTO: 232 K/UL (ref 150–450)
PMV BLD AUTO: 10.6 FL (ref 9.2–12.9)
POTASSIUM SERPL-SCNC: 3.3 MMOL/L (ref 3.5–5.1)
PROT SERPL-MCNC: 5.8 G/DL (ref 6–8.4)
RBC # BLD AUTO: 3.64 M/UL (ref 4–5.4)
SODIUM SERPL-SCNC: 139 MMOL/L (ref 136–145)
TSH SERPL DL<=0.005 MIU/L-ACNC: 1.24 UIU/ML (ref 0.4–4)
WBC # BLD AUTO: 4.49 K/UL (ref 3.9–12.7)

## 2024-07-19 PROCEDURE — 25000003 PHARM REV CODE 250

## 2024-07-19 PROCEDURE — 99223 1ST HOSP IP/OBS HIGH 75: CPT | Mod: ,,, | Performed by: PSYCHIATRY & NEUROLOGY

## 2024-07-19 PROCEDURE — 25000003 PHARM REV CODE 250: Performed by: STUDENT IN AN ORGANIZED HEALTH CARE EDUCATION/TRAINING PROGRAM

## 2024-07-19 PROCEDURE — 85025 COMPLETE CBC W/AUTO DIFF WBC: CPT

## 2024-07-19 PROCEDURE — 11000001 HC ACUTE MED/SURG PRIVATE ROOM

## 2024-07-19 PROCEDURE — 80143 DRUG ASSAY ACETAMINOPHEN: CPT | Performed by: STUDENT IN AN ORGANIZED HEALTH CARE EDUCATION/TRAINING PROGRAM

## 2024-07-19 PROCEDURE — 36415 COLL VENOUS BLD VENIPUNCTURE: CPT

## 2024-07-19 PROCEDURE — 95719 EEG PHYS/QHP EA INCR W/O VID: CPT | Mod: ,,, | Performed by: PSYCHIATRY & NEUROLOGY

## 2024-07-19 PROCEDURE — 95714 VEEG EA 12-26 HR UNMNTR: CPT

## 2024-07-19 PROCEDURE — 95700 EEG CONT REC W/VID EEG TECH: CPT

## 2024-07-19 PROCEDURE — 80053 COMPREHEN METABOLIC PANEL: CPT

## 2024-07-19 PROCEDURE — 25500020 PHARM REV CODE 255: Performed by: STUDENT IN AN ORGANIZED HEALTH CARE EDUCATION/TRAINING PROGRAM

## 2024-07-19 PROCEDURE — 84100 ASSAY OF PHOSPHORUS: CPT

## 2024-07-19 PROCEDURE — 63600175 PHARM REV CODE 636 W HCPCS: Performed by: STUDENT IN AN ORGANIZED HEALTH CARE EDUCATION/TRAINING PROGRAM

## 2024-07-19 PROCEDURE — 83735 ASSAY OF MAGNESIUM: CPT

## 2024-07-19 RX ORDER — POLYETHYLENE GLYCOL 3350 17 G/17G
17 POWDER, FOR SOLUTION ORAL DAILY
Status: DISCONTINUED | OUTPATIENT
Start: 2024-07-19 | End: 2024-07-21 | Stop reason: HOSPADM

## 2024-07-19 RX ORDER — GLUCAGON 1 MG
1 KIT INJECTION
Status: DISCONTINUED | OUTPATIENT
Start: 2024-07-19 | End: 2024-07-21 | Stop reason: HOSPADM

## 2024-07-19 RX ORDER — TALC
6 POWDER (GRAM) TOPICAL NIGHTLY PRN
Status: DISCONTINUED | OUTPATIENT
Start: 2024-07-19 | End: 2024-07-21 | Stop reason: HOSPADM

## 2024-07-19 RX ORDER — ALUMINUM HYDROXIDE, MAGNESIUM HYDROXIDE, AND SIMETHICONE 1200; 120; 1200 MG/30ML; MG/30ML; MG/30ML
30 SUSPENSION ORAL 4 TIMES DAILY PRN
Status: DISCONTINUED | OUTPATIENT
Start: 2024-07-19 | End: 2024-07-21 | Stop reason: HOSPADM

## 2024-07-19 RX ORDER — SUMATRIPTAN 50 MG/1
100 TABLET, FILM COATED ORAL ONCE
Status: COMPLETED | OUTPATIENT
Start: 2024-07-19 | End: 2024-07-19

## 2024-07-19 RX ORDER — LORAZEPAM 2 MG/ML
1 INJECTION INTRAMUSCULAR EVERY 10 MIN PRN
Status: DISCONTINUED | OUTPATIENT
Start: 2024-07-19 | End: 2024-07-19

## 2024-07-19 RX ORDER — PROCHLORPERAZINE EDISYLATE 5 MG/ML
5 INJECTION INTRAMUSCULAR; INTRAVENOUS EVERY 6 HOURS PRN
Status: DISCONTINUED | OUTPATIENT
Start: 2024-07-19 | End: 2024-07-19

## 2024-07-19 RX ORDER — SIMETHICONE 80 MG
1 TABLET,CHEWABLE ORAL 4 TIMES DAILY PRN
Status: DISCONTINUED | OUTPATIENT
Start: 2024-07-19 | End: 2024-07-21 | Stop reason: HOSPADM

## 2024-07-19 RX ORDER — POTASSIUM CHLORIDE 20 MEQ/1
40 TABLET, EXTENDED RELEASE ORAL 2 TIMES DAILY
Status: COMPLETED | OUTPATIENT
Start: 2024-07-19 | End: 2024-07-19

## 2024-07-19 RX ORDER — SODIUM CHLORIDE 0.9 % (FLUSH) 0.9 %
10 SYRINGE (ML) INJECTION EVERY 12 HOURS PRN
Status: DISCONTINUED | OUTPATIENT
Start: 2024-07-19 | End: 2024-07-21 | Stop reason: HOSPADM

## 2024-07-19 RX ORDER — ALBUTEROL SULFATE 90 UG/1
1 AEROSOL, METERED RESPIRATORY (INHALATION) EVERY 6 HOURS PRN
Status: DISCONTINUED | OUTPATIENT
Start: 2024-07-19 | End: 2024-07-21 | Stop reason: HOSPADM

## 2024-07-19 RX ORDER — IBUPROFEN 200 MG
24 TABLET ORAL
Status: DISCONTINUED | OUTPATIENT
Start: 2024-07-19 | End: 2024-07-21 | Stop reason: HOSPADM

## 2024-07-19 RX ORDER — NALOXONE HCL 0.4 MG/ML
0.02 VIAL (ML) INJECTION
Status: DISCONTINUED | OUTPATIENT
Start: 2024-07-19 | End: 2024-07-21 | Stop reason: HOSPADM

## 2024-07-19 RX ORDER — IBUPROFEN 200 MG
16 TABLET ORAL
Status: DISCONTINUED | OUTPATIENT
Start: 2024-07-19 | End: 2024-07-21 | Stop reason: HOSPADM

## 2024-07-19 RX ORDER — ONDANSETRON 8 MG/1
8 TABLET, ORALLY DISINTEGRATING ORAL EVERY 8 HOURS PRN
Status: DISCONTINUED | OUTPATIENT
Start: 2024-07-19 | End: 2024-07-21 | Stop reason: HOSPADM

## 2024-07-19 RX ORDER — ACETAMINOPHEN 325 MG/1
650 TABLET ORAL EVERY 4 HOURS PRN
Status: DISCONTINUED | OUTPATIENT
Start: 2024-07-19 | End: 2024-07-19

## 2024-07-19 RX ADMIN — CEFTRIAXONE 1 G: 1 INJECTION, POWDER, FOR SOLUTION INTRAMUSCULAR; INTRAVENOUS at 09:07

## 2024-07-19 RX ADMIN — POTASSIUM CHLORIDE 40 MEQ: 1500 TABLET, EXTENDED RELEASE ORAL at 08:07

## 2024-07-19 RX ADMIN — Medication 6 MG: at 09:07

## 2024-07-19 RX ADMIN — SUMATRIPTAN SUCCINATE 100 MG: 50 TABLET ORAL at 03:07

## 2024-07-19 RX ADMIN — POLYETHYLENE GLYCOL 3350 17 G: 17 POWDER, FOR SOLUTION ORAL at 11:07

## 2024-07-19 RX ADMIN — POTASSIUM CHLORIDE 40 MEQ: 1500 TABLET, EXTENDED RELEASE ORAL at 11:07

## 2024-07-19 NOTE — H&P
"  [facility] - Emergency [facility] Medicine  History & Physical    Patient Name: Lydia Thomas  MRN: [id number]  Patient Class: IP- Inpatient  Admission Date: 7/18/2024  Attending Physician: Olivia Clemons MD   Primary Care Provider: Katy Vergara MD         Patient information was obtained from patient, past medical records, and ER records.     Subjective:     Principal Problem:Seizure    Chief Complaint:   Chief Complaint   Patient presents with    Seizures     Arrives via EMS from [facility], had 2 witnessed seizures with staff. 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  EMS gave 2 g of Versed for this event.  On arrival to the ED, patient appeared postictal but later returned to her baseline w/o evidence of focal neurologic deficit aside from her known lower extremity weakness/paresthesias which she states have not worsened.  Patient does endorse a 4 day history of generalized HA, which has not resolved w/taking her home Triptan or Tylenol.  Patient states that she does not recall the events preceding her seizures, but does recall briefly waking up while EN route w/ EMS.  After that, she states she only recalls waking up in the ED w/ confusion.  Patient also endorses a 1 day history of abdominal pain.  Patient denies f/c, neck stiffness, worsening weakness/paresthesias, vision changes, CP, SOB, cough/congestion, N/V/D, dysuria/hematuria, incontinence, tongue biting, lower extremity pain/erythema/edema, or any additional trauma.     In " "ED, had intermittent hypotension with tachycardia (BP 86/53 with HR max 164), tachypnea, on RA, afebrile. Had 3 additional seizures witnessed by ED staff with rhythmic jerking movement. Keppra loaded and received Ativan x2. CTA head and neck with no acute findings and no vessel stenosis. CT AP with no acute findings. CXR NAIPP. Received CTX and 1L NS. BG 94. CMP with no electrolyte derangements, renal impairment, or LFT elevation except slightly elevated T. Bili 1.2. CBC with no leukocytosis. Lipase normal. LA 1.1. ASA level normal, tylenol level with expected elevation as using tylenol at [facility]. UA with RBC > 100 and WBC 51. Blood cultures collected.     Past Medical History:   Diagnosis Date    Anemia     Anemia     Asthma     exercise asthma    Migraine headache     Ovarian cyst        Past Surgical History:   Procedure Laterality Date    APPENDECTOMY      4/2019    AUGMENTATION OF BREAST Bilateral 03/2022    BREAST BIOPSY Right     Excisional bx, benign    COLONOSCOPY N/A 05/18/2022    Procedure: COLONOSCOPY;  Surgeon: Sabino Mcintosh MD;  Location: [facility];  Service: Endoscopy;  Laterality: N/A;  High risk for colon cancer (family)      fully vaccinated; instructions to portal-GT    ESOPHAGOGASTRODUODENOSCOPY N/A 1/18/2024    Procedure: EGD (ESOPHAGOGASTRODUODENOSCOPY);  Surgeon: Sabino Mcintosh MD;  Location: [facility];  Service: Endoscopy;  Laterality: N/A;  1/10 ref by Feli Jc, PAC, instr. to portal-st  1/16- pc complete. DBM       Review of patient's allergies indicates:   Allergen Reactions    Iodine and iodide containing products Anaphylaxis     PATIENT CAN RECEIVE IOHEXOL, has received multiple times with no pretreatment with no allergic reaction    Shellfish derived Anaphylaxis    Benadryl [diphenhydramine hcl] Itching     "with fast injection"    Fish containing products     Iodine        No current facility-administered medications on file prior to encounter.     Current Outpatient Medications on File " Prior to Encounter   Medication Sig    albuterol (PROVENTIL/VENTOLIN HFA) 90 mcg/actuation inhaler INHALE 1 TO 2 PUFFS INTO THE LUNGS EVERY 6 HOURS AS NEEDED FOR WHEEZING    ascorbic acid, vitamin C, (VITAMIN C) 500 MG tablet     clobetasoL (CLOBEX) 0.05 % shampoo Apply 1 application  topically every other day.    ketoconazole (NIZORAL) 2 % shampoo Wash scalp a few times weekly    onabotulinumtoxinA (BOTOX INJ) Inject as directed.    pantoprazole (PROTONIX) 20 MG tablet Take 1 tablet (20 mg total) by mouth once daily. In the morning on an empty stomach    QULIPTA 60 mg Tab     sumatriptan (IMITREX STATDOSE) 6 mg/0.5 mL kit To use max twice within 24 hours, 2 hours apart.    SUMAtriptan (IMITREX) 20 mg/actuation nasal spray Take a spray that can be repeated after 2 hours, not more than twice a day    tretinoin (RETIN-A) 0.025 % cream Apply pea-sized amount to face nightly     Family History       Problem Relation (Age of Onset)    Arthritis Father    Asthma Mother, Brother    Breast cancer Mother, Maternal Grandmother    COPD Father    Cancer Maternal Grandfather    Colon polyps Brother    Diabetes Maternal Uncle    Hearing loss Paternal Grandmother, Paternal Grandfather, Paternal Aunt, Paternal Uncle    Hypertension Maternal Uncle    Miscarriages / Stillbirths Mother    Pancreatic cancer Maternal Uncle    Prostate cancer Maternal Uncle    Stomach cancer Maternal Grandfather          Tobacco Use    Smoking status: Never    Smokeless tobacco: Never   Substance and Sexual Activity    Alcohol use: Yes     Comment: occass    Drug use: No    Sexual activity: Yes     Partners: Male     Birth control/protection: None     Review of Systems   Constitutional:  Negative for activity change, appetite change, chills and fever.   HENT:  Negative for congestion and drooling.    Respiratory:  Positive for cough. Negative for chest tightness and shortness of breath.    Cardiovascular:  Negative for chest pain and palpitations.    Gastrointestinal:  Negative for abdominal distention, abdominal pain, nausea and vomiting.   Endocrine: Negative for cold intolerance and heat intolerance.   Genitourinary:  Negative for dysuria and frequency.   Musculoskeletal:  Negative for arthralgias and joint swelling.   Allergic/Immunologic: Negative for environmental allergies and food allergies.   Neurological:  Positive for seizures and weakness. Negative for dizziness and light-headedness.   Psychiatric/Behavioral:  Negative for agitation and behavioral problems.      Objective:     Vital Signs (Most Recent):  Temp: 98.3 °F (36.8 °C) (07/19/24 0139)  Pulse: 76 (07/19/24 0200)  Resp: 18 (07/19/24 0200)  BP: 101/62 (07/19/24 0200)  SpO2: 98 % (07/19/24 0200) Vital Signs (24h Range):  Temp:  [97.5 °F (36.4 °C)-98.3 °F (36.8 °C)] 98.3 °F (36.8 °C)  Pulse:  [] 76  Resp:  [18-24] 18  SpO2:  [84 %-100 %] 98 %  BP: ()/(52-78) 101/62     Weight: 62.6 kg (138 lb)  Body mass index is 24.45 kg/m².     Physical Exam  Constitutional:       General: She is not in acute distress.     Appearance: Normal appearance. She is not ill-appearing.   HENT:      Head: Normocephalic and atraumatic.      Right Ear: External ear normal.      Left Ear: External ear normal.      Nose: Nose normal.      Mouth/Throat:      Mouth: Mucous membranes are moist.      Pharynx: Oropharynx is clear.   Eyes:      Extraocular Movements: Extraocular movements intact.   Cardiovascular:      Rate and Rhythm: Normal rate and regular rhythm.      Pulses: Normal pulses.      Heart sounds: Normal heart sounds.   Pulmonary:      Effort: Pulmonary effort is normal.      Breath sounds: Normal breath sounds.   Abdominal:      General: Abdomen is flat. There is no distension.      Palpations: Abdomen is soft.      Tenderness: There is no abdominal tenderness.   Genitourinary:     General: Normal vulva.      Rectum: Normal.   Musculoskeletal:         General: No swelling. Normal range of motion.       Cervical back: Normal range of motion and neck supple.      Comments: Patient moving all extremities spontaneously   Skin:     General: Skin is warm and dry.      Capillary Refill: Capillary refill takes less than 2 seconds.   Neurological:      Mental Status: She is alert and oriented to person, place, and time.      Comments: Unable to lift/move LLE, however no pressure noted under R heel when asked to lift left leg (possible Glass sign)   Psychiatric:         Mood and Affect: Mood normal.         Behavior: Behavior normal.                Significant Labs: All pertinent labs within the past 24 hours have been reviewed.  CBC:   Recent Labs   Lab 07/18/24  1815   WBC 5.46   HGB 13.2   HCT 37.7        CMP:   Recent Labs   Lab 07/18/24  1815   *   K 3.8      CO2 22*   GLU 94   BUN 6   CREATININE 0.8   CALCIUM 9.3   PROT 6.9   ALBUMIN 4.1   BILITOT 1.2*   ALKPHOS 41*   AST 13   ALT 9*   ANIONGAP 9     Lactic Acid:   Recent Labs   Lab 07/18/24  1833   LACTATE 1.1     Magnesium:   Recent Labs   Lab 07/18/24  1833   MG 1.9     Urine Studies:   Recent Labs   Lab 07/18/24  1924   COLORU Brown*   APPEARANCEUA Hazy*   PHUR 7.0   SPECGRAV 1.010   PROTEINUA Trace*   GLUCUA Negative   KETONESU Negative   BILIRUBINUA Negative   OCCULTUA 3+*   NITRITE Negative   LEUKOCYTESUR 2+*   RBCUA >100*   WBCUA 51*   BACTERIA Occasional   SQUAMEPITHEL 4       Significant Imaging: I have reviewed all pertinent imaging results/findings within the past 24 hours.  Assessment/Plan:     * Seizure  Seizure-like activity s/p Keppra load and Ativan x2 observed at [facility] and in [facility] ED. Described as tonic clonic. Upward gaze, loss of consciousness. No tongue lacerations, no incontinence. Postictal state noted (confusion, lethargy). Not on AED currently (prev on Keppra 500 bid). CTA head/neck normal. No lyte abnormalities. Preceded by HA for 4d. WBC in UA but no dysuria. Overall, lack of physiologic provoking  factors. Of note, seen for similar 8/2023, Dr. Kelly had recommended Keppra 750mg bid, noted concern for PNES at that time.     - EEG given previous Keppra use with recurrence of possible seizures after taper  - CK, TSH pending  - CTM CMP for lyte abnormalities  - No need for continued CTX given lack of urinary symptoms, no other infectious process suspected      Functional gait abnormality  Sees PT/OT. At baseline.       Migraine  Takes triptan prn, HA not relieved prior to admission with triptan use.        VTE Risk Mitigation (From admission, onward)           Ordered     IP VTE LOW RISK PATIENT  Once         07/19/24 0238     Place sequential compression device  Until discontinued         07/19/24 0238                                    Natalee Dickey MD  Department of Hospital Medicine  [facility] - Emergency Dept

## 2024-07-19 NOTE — HPI
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  EMS gave 2 g of Versed for this event.  On arrival to the ED, patient appeared postictal but later returned to her baseline w/o evidence of focal neurologic deficit aside from her known lower extremity weakness/paresthesias which she states have not worsened.  Patient does endorse a 4 day history of generalized HA, which has not resolved w/taking her home Triptan or Tylenol.  Patient states that she does not recall the events preceding her seizures, but does recall briefly waking up while EN route w/ EMS.  After that, she states she only recalls waking up in the ED w/ confusion.  Patient also endorses a 1 day history of abdominal pain.  Patient denies f/c, neck stiffness, worsening weakness/paresthesias, vision changes, CP, SOB, cough/congestion, N/V/D, dysuria/hematuria, incontinence, tongue biting, lower extremity pain/erythema/edema, or any additional trauma.     In ED, had intermittent hypotension with tachycardia (BP 86/53 with HR max 164), tachypnea, on RA, afebrile. Had 3 additional seizures witnessed by ED staff with rhythmic jerking movement. Keppra loaded and received Ativan x2. CTA head and neck with no acute findings and no vessel stenosis. CT AP with no acute findings. CXR NAIPP. Received CTX and 1L NS. BG 94. CMP with no electrolyte derangements, renal impairment, or LFT elevation except slightly elevated T. Bili 1.2. CBC with no leukocytosis. Lipase normal. LA 1.1. ASA level normal, tylenol level with expected elevation as using tylenol at [facility]. UA with RBC > 100 and WBC 51. Blood cultures collected.

## 2024-07-19 NOTE — PROGRESS NOTES
EEG Hook up  AM Check Electrodes had to be fixed.Yes    Skin Integrity: Normal   No signs of skin breakdown seen during hookup  Lisa Cervantes   07/19/2024 9:55 AM

## 2024-07-19 NOTE — PROGRESS NOTES
EEG tech went in room to hook pt up to EEG and upon entering room,Pt had an episode.Pt appear to have bitten her tongue due to blood being on pillowcase. . Body stiff, eyes closed and rolling back and forth, head shaking ,arms and legs stiff. Event lasted approx 30sec as she was coming out of event,pt started to have another event which was exactly the same still lasting 30-45 sec. Pt's body relaxed and she was able to talk(whisper) and answer questions appropriately. Charge nurse and MD at bedside. VS 99% room air,130,107/66,22. Side rails up x4 and padded, bed in low position. Will continue to monitor

## 2024-07-19 NOTE — PLAN OF CARE
Problem: Adult Inpatient Plan of Care  Goal: Patient-Specific Goal (Individualized)  Description: Pt will maintain sbp below 180  Outcome: Progressing  Flowsheets (Taken 7/19/2024 4397)  Individualized Care Needs: active listening  Anxieties, Fears or Concerns: none     @0500 Pt told nurse that she took 7 tylenol pm pills that she had from home due to headache. Medication secured in pt lock box. Team notified. Seizure precautions maintained. VSS. Bed in lowest position, side rails x 3, and call light in use.

## 2024-07-19 NOTE — SUBJECTIVE & OBJECTIVE
"Past Medical History:   Diagnosis Date    Anemia     Anemia     Asthma     exercise asthma    Migraine headache     Ovarian cyst        Past Surgical History:   Procedure Laterality Date    APPENDECTOMY      4/2019    AUGMENTATION OF BREAST Bilateral 03/2022    BREAST BIOPSY Right     Excisional bx, benign    COLONOSCOPY N/A 05/18/2022    Procedure: COLONOSCOPY;  Surgeon: Sabino Mcintosh MD;  Location: [facility] ENDO;  Service: Endoscopy;  Laterality: N/A;  High risk for colon cancer (family)      fully vaccinated; instructions to portal-GT    ESOPHAGOGASTRODUODENOSCOPY N/A 1/18/2024    Procedure: EGD (ESOPHAGOGASTRODUODENOSCOPY);  Surgeon: Sabino Mcintosh MD;  Location: [facility] ENDO;  Service: Endoscopy;  Laterality: N/A;  1/10 ref by Feli Jc, PAC, instr. to portal-st  1/16- pc complete. DB       Review of patient's allergies indicates:   Allergen Reactions    Iodine and iodide containing products Anaphylaxis     PATIENT CAN RECEIVE IOHEXOL, has received multiple times with no pretreatment with no allergic reaction    Shellfish derived Anaphylaxis    Benadryl [diphenhydramine hcl] Itching     "with fast injection"    Fish containing products     Iodine        No current facility-administered medications on file prior to encounter.     Current Outpatient Medications on File Prior to Encounter   Medication Sig    albuterol (PROVENTIL/VENTOLIN HFA) 90 mcg/actuation inhaler INHALE 1 TO 2 PUFFS INTO THE LUNGS EVERY 6 HOURS AS NEEDED FOR WHEEZING    ascorbic acid, vitamin C, (VITAMIN C) 500 MG tablet     clobetasoL (CLOBEX) 0.05 % shampoo Apply 1 application  topically every other day.    ketoconazole (NIZORAL) 2 % shampoo Wash scalp a few times weekly    onabotulinumtoxinA (BOTOX INJ) Inject as directed.    pantoprazole (PROTONIX) 20 MG tablet Take 1 tablet (20 mg total) by mouth once daily. In the morning on an empty stomach    QULIPTA 60 mg Tab     sumatriptan (IMITREX STATDOSE) 6 mg/0.5 mL kit To use max twice within 24 hours, 2 " hours apart.    SUMAtriptan (IMITREX) 20 mg/actuation nasal spray Take a spray that can be repeated after 2 hours, not more than twice a day    tretinoin (RETIN-A) 0.025 % cream Apply pea-sized amount to face nightly     Family History       Problem Relation (Age of Onset)    Arthritis Father    Asthma Mother, Brother    Breast cancer Mother, Maternal Grandmother    COPD Father    Cancer Maternal Grandfather    Colon polyps Brother    Diabetes Maternal Uncle    Hearing loss Paternal Grandmother, Paternal Grandfather, Paternal Aunt, Paternal Uncle    Hypertension Maternal Uncle    Miscarriages / Stillbirths Mother    Pancreatic cancer Maternal Uncle    Prostate cancer Maternal Uncle    Stomach cancer Maternal Grandfather          Tobacco Use    Smoking status: Never    Smokeless tobacco: Never   Substance and Sexual Activity    Alcohol use: Yes     Comment: occass    Drug use: No    Sexual activity: Yes     Partners: Male     Birth control/protection: None     Review of Systems   Constitutional:  Negative for activity change, appetite change, chills and fever.   HENT:  Negative for congestion and drooling.    Respiratory:  Positive for cough. Negative for chest tightness and shortness of breath.    Cardiovascular:  Negative for chest pain and palpitations.   Gastrointestinal:  Negative for abdominal distention, abdominal pain, nausea and vomiting.   Endocrine: Negative for cold intolerance and heat intolerance.   Genitourinary:  Negative for dysuria and frequency.   Musculoskeletal:  Negative for arthralgias and joint swelling.   Allergic/Immunologic: Negative for environmental allergies and food allergies.   Neurological:  Positive for seizures and weakness. Negative for dizziness and light-headedness.   Psychiatric/Behavioral:  Negative for agitation and behavioral problems.      Objective:     Vital Signs (Most Recent):  Temp: 98.3 °F (36.8 °C) (07/19/24 0139)  Pulse: 76 (07/19/24 0200)  Resp: 18 (07/19/24  0200)  BP: 101/62 (07/19/24 0200)  SpO2: 98 % (07/19/24 0200) Vital Signs (24h Range):  Temp:  [97.5 °F (36.4 °C)-98.3 °F (36.8 °C)] 98.3 °F (36.8 °C)  Pulse:  [] 76  Resp:  [18-24] 18  SpO2:  [84 %-100 %] 98 %  BP: ()/(52-78) 101/62     Weight: 62.6 kg (138 lb)  Body mass index is 24.45 kg/m².     Physical Exam  Constitutional:       General: She is not in acute distress.     Appearance: Normal appearance. She is not ill-appearing.   HENT:      Head: Normocephalic and atraumatic.      Right Ear: External ear normal.      Left Ear: External ear normal.      Nose: Nose normal.      Mouth/Throat:      Mouth: Mucous membranes are moist.      Pharynx: Oropharynx is clear.   Eyes:      Extraocular Movements: Extraocular movements intact.   Cardiovascular:      Rate and Rhythm: Normal rate and regular rhythm.      Pulses: Normal pulses.      Heart sounds: Normal heart sounds.   Pulmonary:      Effort: Pulmonary effort is normal.      Breath sounds: Normal breath sounds.   Abdominal:      General: Abdomen is flat. There is no distension.      Palpations: Abdomen is soft.      Tenderness: There is no abdominal tenderness.   Genitourinary:     General: Normal vulva.      Rectum: Normal.   Musculoskeletal:         General: No swelling. Normal range of motion.      Cervical back: Normal range of motion and neck supple.      Comments: Patient moving all extremities spontaneously   Skin:     General: Skin is warm and dry.      Capillary Refill: Capillary refill takes less than 2 seconds.   Neurological:      Mental Status: She is alert and oriented to person, place, and time.      Comments: Unable to lift/move LLE, however no pressure noted under R heel when asked to lift left leg (possible Glass sign)   Psychiatric:         Mood and Affect: Mood normal.         Behavior: Behavior normal.                Significant Labs: All pertinent labs within the past 24 hours have been reviewed.  CBC:   Recent Labs   Lab  07/18/24  1815   WBC 5.46   HGB 13.2   HCT 37.7        CMP:   Recent Labs   Lab 07/18/24  1815   *   K 3.8      CO2 22*   GLU 94   BUN 6   CREATININE 0.8   CALCIUM 9.3   PROT 6.9   ALBUMIN 4.1   BILITOT 1.2*   ALKPHOS 41*   AST 13   ALT 9*   ANIONGAP 9     Lactic Acid:   Recent Labs   Lab 07/18/24  1833   LACTATE 1.1     Magnesium:   Recent Labs   Lab 07/18/24  1833   MG 1.9     Urine Studies:   Recent Labs   Lab 07/18/24  1924   COLORU Brown*   APPEARANCEUA Hazy*   PHUR 7.0   SPECGRAV 1.010   PROTEINUA Trace*   GLUCUA Negative   KETONESU Negative   BILIRUBINUA Negative   OCCULTUA 3+*   NITRITE Negative   LEUKOCYTESUR 2+*   RBCUA >100*   WBCUA 51*   BACTERIA Occasional   SQUAMEPITHEL 4       Significant Imaging: I have reviewed all pertinent imaging results/findings within the past 24 hours.

## 2024-07-19 NOTE — ED NOTES
Notified by CT tech that pt is currently seizing on the table. MD made aware and at bedside. 1mg ativan given. Pt brought back to room 3. All vitals remain stable. Per CT tech, pt started arching back and became unresponsive. Pt still remains staring off into space, only has eye response to sternal rub

## 2024-07-19 NOTE — PROGRESS NOTES
Visitor came out of room and stated that pt was having a seizure. Entered room and found pt having jerking movements with stiffness  of body. Eyes closed, but rolling back and forth VS 99%room air,120,118/75,22. Event lasted 5min. MD was notified. No new orders were given. Visitor remains at bedside. Will continue to monitor.

## 2024-07-19 NOTE — NURSING
Nurses Note -- 4 Eyes      7/19/2024   4:44 AM      Skin assessed during: Transfer      [x] No Altered Skin Integrity Present    []Prevention Measures Documented      [] Yes- Altered Skin Integrity Present or Discovered   [] LDA Added if Not in Epic (Describe Wound)   [] New Altered Skin Integrity was Present on Admit and Documented in LDA   [] Wound Image Taken    Wound Care Consulted? No    Attending Nurse:  Padmini Lugo RN/Staff Member:  Evelyn

## 2024-07-19 NOTE — ED NOTES
Patient again with rhythmic jerking seizure like activity while Brigido and MD Noemi at bedside. 3 minutes of activity.

## 2024-07-19 NOTE — PROGRESS NOTES
Walked in room and pt was crying, sat down with Pt and she started talking about her (very upset) and  started blank starring and the went into another  episode of body stiffness,head bent all the way back,shaking arms and legs with eyes rolling back. Episode lasted less than 1min. Team was ;notified. VS 99%room air,127,22,118/72. When episode was complete, pt started to apologize. No injuries noted. Will continue to monitor.

## 2024-07-19 NOTE — PROVIDER PROGRESS NOTES - EMERGENCY DEPT.
Encounter Date: 2024    ED Physician Progress Notes        ED Physician Hand-off Note:    ED Course: I assumed care of patient from off-going ED physician, Aristeo Gray.  Briefly, Patient is a 44-year-old female who presents for seizure-like activity with no clear etiology.  Laboratory studies thus far grossly normal patient received total of 3 doses Ativan for seizure .  Moderate hypotension likely due to Ativan admission.    At the time of signout plan was pending CT imaging and final disposition.    Disposition:  Imaging was negative for intracranial pathology.  We will admit to hospital medicine for EEG monitoring     Patient comfortable with care plan. Patient counseled regarding exam, results, diagnosis, treatment, and plan.    Impression:  Nonepileptic seizures    Final diagnoses:  [R56.9] Seizure-like activity (Primary)

## 2024-07-19 NOTE — PLAN OF CARE
[facility] - Neurosurgery (Hospital)  Initial Discharge Assessment       Primary Care Provider: Katy Vergara MD    Admission Diagnosis: Seizure [R56.9]  Chest pain [R07.9]  Seizure-like activity [R56.9]    Admission Date: 7/18/2024  Expected Discharge Date: 7/21/2024    Transition of Care Barriers: (P) Mental illness    Payor:  / Plan:  [employer] / Product Type: Government /     Extended Emergency Contact Information  Primary Emergency Contact: Jose Rico   [facility]  Home Phone: [phone]  Mobile Phone: [phone]  Relation: Spouse    Discharge Plan A: (P) Home with family  Discharge Plan B: (P) Home with family, Home Health      [id number] - [place], [place] - [address]  [address] [address]  Phone: [phone] Fax: [phone]      Initial Assessment (most recent)       Adult Discharge Assessment - 07/19/24 1448          Discharge Assessment    Assessment Type Discharge Planning Assessment     Confirmed/corrected address, phone number and insurance Yes     Confirmed Demographics Correct on Facesheet     Source of Information patient     When was your last doctors appointment? 07/12/24 (P)      Communicated TRACEY with patient/caregiver Date not available/Unable to determine (P)      Reason For Admission Seizures (P)      People in Home spouse (P)      Facility Arrived From: [facility] (P)      Do you expect to return to your current living situation? Yes (P)      Do you have help at home or someone to help you manage your care at home? Yes (P)      Who are your caregiver(s) and their phone number(s)?  Jose (P)      Prior to hospitilization cognitive status: Unable to Assess (P)      Current cognitive status: Alert/Oriented (P)      Walking or Climbing Stairs Difficulty yes (P)      Walking or Climbing Stairs stair climbing difficulty, assistance 1 person  (P)      Dressing/Bathing Difficulty no (P)      Home Layout Able to live on 1st floor (P)      Equipment Currently Used at Home none (P)      Readmission within 30 days? No (P)      Patient currently being followed by outpatient case management? No (P)      Do you currently have service(s) that help you manage your care at home? No (P)      Do you take prescription medications? Yes (P)      Do you have prescription coverage? Yes (P)      Coverage  (P)      Do you have any problems affording any of your prescribed medications? No (P)      Is the patient taking medications as prescribed? yes (P)      Who is going to help you get home at discharge?  Jose (P)      How do you get to doctors appointments? family or friend will provide (P)      Are you on dialysis? No (P)      Do you take coumadin? No (P)      Discharge Plan A Home with family (P)      Discharge Plan B Home with family;Home Health (P)      DME Needed Upon Discharge  none (P)      Discharge Plan discussed with: Patient (P)      Transition of Care Barriers Mental illness (P)         Physical Activity    On average, how many days per week do you engage in moderate to strenuous exercise (like a brisk walk)? 3 days (P)      On average, how many minutes do you engage in exercise at this level? 20 min (P)         Financial Resource Strain    How hard is it for you to pay for the very basics like food, housing, medical care, and heating? Not very hard (P)         Housing Stability    In the last 12 months, was there a time when you were not able to pay the mortgage or rent on time? No (P)      At any time in the past 12 months, were you homeless or living in a shelter (including now)? No (P)         Transportation Needs    Has the lack of transportation kept you from medical appointments, meetings, work or from getting things needed for daily living? No (P)         Food Insecurity    Within the past 12 months, you worried that your food would  run out before you got the money to buy more. Never true (P)      Within the past 12 months, the food you bought just didn't last and you didn't have money to get more. Never true (P)         Stress    Do you feel stress - tense, restless, nervous, or anxious, or unable to sleep at night because your mind is troubled all the time - these days? To some extent (P)         Social Isolation    How often do you feel lonely or isolated from those around you?  Sometimes (P)         Alcohol Use    Q1: How often do you have a drink containing alcohol? Monthly or less (P)      Q2: How many drinks containing alcohol do you have on a typical day when you are drinking? 1 or 2 (P)      Q3: How often do you have six or more drinks on one occasion? Never (P)         Utilities    In the past 12 months has the electric, gas, oil, or water company threatened to shut off services in your home? No (P)         Health Literacy    How often do you need to have someone help you when you read instructions, pamphlets, or other written material from your doctor or pharmacy? Rarely (P)         OTHER    Name(s) of People in Home  Jose (P)                    Patient resides with her spouse jose in a [facility].  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  She does not have any DME.  Patient is not on HD or Coumadin.  Patient will discharge home with spouse once med cleared.      No anticipated post acute needs.   can provide transportation and support.      Discharge Plan A and Plan B have been determined by review of patient's clinical status, future medical and therapeutic needs, and coverage/benefits for post-acute care in coordination with multidisciplinary team members.    Kathleen Delvalle, ELIANE  [facility]  [phone]

## 2024-07-19 NOTE — NURSING
@0500 Pt told nurse that she took 7 tylenol pm pills that she had from home due to headache. Medication secured in pt lock box. Team notified.

## 2024-07-19 NOTE — ASSESSMENT & PLAN NOTE
Seizure-like activity s/p Keppra load and Ativan x2 observed at [facility] and in [facility] ED. Described as tonic clonic. Upward gaze, loss of consciousness. No tongue lacerations, no incontinence. Postictal state noted (confusion, lethargy). Not on AED currently (prev on Keppra 500 bid). CTA head/neck normal. No lyte abnormalities. Preceded by HA for 4d. WBC in UA but no dysuria. Overall, lack of physiologic provoking factors. Of note, seen for similar 8/2023, Dr. Kelly had recommended Keppra 750mg bid, noted concern for PNES at that time.     - EEG given previous Keppra use with recurrence of possible seizures after taper  - CK, TSH pending  - CTM CMP for lyte abnormalities  - No need for continued CTX given lack of urinary symptoms, no other infectious process suspected

## 2024-07-19 NOTE — PROCEDURES
Prelim EEG 9:11 - 15:11   Normal background   One episode of tonic posturing and arching back with no clear EEG correlate at 11:41  Recommend cont recording     Dr. Santillan

## 2024-07-19 NOTE — CARE UPDATE
I have reviewed the chart of Lydia Thomas who is hospitalized for the following:    Active Hospital Problems    Diagnosis    *Seizure    Anxiety    Hyponatremia     POA  Monitor with daily cmp      Hypokalemia     Monitor with daily cmp  replaced      Sensory loss     POA      Monoparesis    History of suicide attempt    Functional gait abnormality    Depression    Migraine        Helga Montana NP  [facility]

## 2024-07-19 NOTE — ED NOTES
Patient with another episode of seizure like activity. Provider Jett MD to patient bedside. Patient with eye response and able to stick tongue out when asked.

## 2024-07-20 LAB
ALBUMIN SERPL BCP-MCNC: 3.6 G/DL (ref 3.5–5.2)
ALP SERPL-CCNC: 37 U/L (ref 55–135)
ALT SERPL W/O P-5'-P-CCNC: 8 U/L (ref 10–44)
ANION GAP SERPL CALC-SCNC: 8 MMOL/L (ref 8–16)
AST SERPL-CCNC: 14 U/L (ref 10–40)
BASOPHILS # BLD AUTO: 0.07 K/UL (ref 0–0.2)
BASOPHILS NFR BLD: 1.3 % (ref 0–1.9)
BILIRUB SERPL-MCNC: 0.5 MG/DL (ref 0.1–1)
BUN SERPL-MCNC: 7 MG/DL (ref 6–20)
CALCIUM SERPL-MCNC: 8.6 MG/DL (ref 8.7–10.5)
CHLORIDE SERPL-SCNC: 111 MMOL/L (ref 95–110)
CO2 SERPL-SCNC: 21 MMOL/L (ref 23–29)
CREAT SERPL-MCNC: 0.8 MG/DL (ref 0.5–1.4)
DIFFERENTIAL METHOD BLD: ABNORMAL
EOSINOPHIL # BLD AUTO: 0.2 K/UL (ref 0–0.5)
EOSINOPHIL NFR BLD: 2.7 % (ref 0–8)
ERYTHROCYTE [DISTWIDTH] IN BLOOD BY AUTOMATED COUNT: 12.5 % (ref 11.5–14.5)
EST. GFR  (NO RACE VARIABLE): >60 ML/MIN/1.73 M^2
GLUCOSE SERPL-MCNC: 77 MG/DL (ref 70–110)
HCT VFR BLD AUTO: 36.4 % (ref 37–48.5)
HGB BLD-MCNC: 12.2 G/DL (ref 12–16)
IMM GRANULOCYTES # BLD AUTO: 0.01 K/UL (ref 0–0.04)
IMM GRANULOCYTES NFR BLD AUTO: 0.2 % (ref 0–0.5)
LYMPHOCYTES # BLD AUTO: 2 K/UL (ref 1–4.8)
LYMPHOCYTES NFR BLD: 36.3 % (ref 18–48)
MAGNESIUM SERPL-MCNC: 2 MG/DL (ref 1.6–2.6)
MCH RBC QN AUTO: 32 PG (ref 27–31)
MCHC RBC AUTO-ENTMCNC: 33.5 G/DL (ref 32–36)
MCV RBC AUTO: 96 FL (ref 82–98)
MONOCYTES # BLD AUTO: 0.4 K/UL (ref 0.3–1)
MONOCYTES NFR BLD: 7.5 % (ref 4–15)
NEUTROPHILS # BLD AUTO: 2.9 K/UL (ref 1.8–7.7)
NEUTROPHILS NFR BLD: 52 % (ref 38–73)
NRBC BLD-RTO: 0 /100 WBC
PHOSPHATE SERPL-MCNC: 3.8 MG/DL (ref 2.7–4.5)
PLATELET # BLD AUTO: 246 K/UL (ref 150–450)
PMV BLD AUTO: 11.1 FL (ref 9.2–12.9)
POTASSIUM SERPL-SCNC: 3.9 MMOL/L (ref 3.5–5.1)
PROT SERPL-MCNC: 6.1 G/DL (ref 6–8.4)
RBC # BLD AUTO: 3.81 M/UL (ref 4–5.4)
SODIUM SERPL-SCNC: 140 MMOL/L (ref 136–145)
WBC # BLD AUTO: 5.48 K/UL (ref 3.9–12.7)

## 2024-07-20 PROCEDURE — 80053 COMPREHEN METABOLIC PANEL: CPT

## 2024-07-20 PROCEDURE — 85025 COMPLETE CBC W/AUTO DIFF WBC: CPT

## 2024-07-20 PROCEDURE — 36415 COLL VENOUS BLD VENIPUNCTURE: CPT

## 2024-07-20 PROCEDURE — 84100 ASSAY OF PHOSPHORUS: CPT

## 2024-07-20 PROCEDURE — 83735 ASSAY OF MAGNESIUM: CPT

## 2024-07-20 PROCEDURE — 63600175 PHARM REV CODE 636 W HCPCS

## 2024-07-20 PROCEDURE — 25000003 PHARM REV CODE 250

## 2024-07-20 PROCEDURE — 99233 SBSQ HOSP IP/OBS HIGH 50: CPT | Mod: ,,, | Performed by: PSYCHIATRY & NEUROLOGY

## 2024-07-20 PROCEDURE — 11000001 HC ACUTE MED/SURG PRIVATE ROOM

## 2024-07-20 PROCEDURE — 63600175 PHARM REV CODE 636 W HCPCS: Performed by: STUDENT IN AN ORGANIZED HEALTH CARE EDUCATION/TRAINING PROGRAM

## 2024-07-20 PROCEDURE — 25000003 PHARM REV CODE 250: Performed by: STUDENT IN AN ORGANIZED HEALTH CARE EDUCATION/TRAINING PROGRAM

## 2024-07-20 RX ORDER — ACETAMINOPHEN 325 MG/1
650 TABLET ORAL EVERY 6 HOURS PRN
Status: DISCONTINUED | OUTPATIENT
Start: 2024-07-20 | End: 2024-07-21 | Stop reason: HOSPADM

## 2024-07-20 RX ORDER — SUMATRIPTAN 50 MG/1
50 TABLET, FILM COATED ORAL ONCE
Status: COMPLETED | OUTPATIENT
Start: 2024-07-20 | End: 2024-07-20

## 2024-07-20 RX ADMIN — Medication 6 MG: at 08:07

## 2024-07-20 RX ADMIN — ACETAMINOPHEN 650 MG: 325 TABLET ORAL at 11:07

## 2024-07-20 RX ADMIN — ACETAMINOPHEN 650 MG: 325 TABLET ORAL at 08:07

## 2024-07-20 RX ADMIN — SODIUM CHLORIDE, POTASSIUM CHLORIDE, SODIUM LACTATE AND CALCIUM CHLORIDE 1000 ML: 600; 310; 30; 20 INJECTION, SOLUTION INTRAVENOUS at 04:07

## 2024-07-20 RX ADMIN — SUMATRIPTAN SUCCINATE 50 MG: 50 TABLET ORAL at 03:07

## 2024-07-20 RX ADMIN — CEFTRIAXONE 1 G: 1 INJECTION, POWDER, FOR SOLUTION INTRAMUSCULAR; INTRAVENOUS at 08:07

## 2024-07-20 RX ADMIN — ONDANSETRON 8 MG: 8 TABLET, ORALLY DISINTEGRATING ORAL at 02:07

## 2024-07-20 NOTE — ASSESSMENT & PLAN NOTE
Lydia Thomas is a 44 y.o. 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 EMS gave 2 g of Versed for this event. On arrival to the ED, patient appeared slow but later returned to baseline. Patient does endorse a 4 day history of generalized HA, which has not resolved w/taking her home Triptan or Tylenol. Patient states that she does not recall the events preceding her spells. After that, she states she only recalls waking up in the ED w/ confusion. Patient also endorses a 1 day history of abdominal pain. Patient denies f/c, neck stiffness, worsening weakness/paresthesias, vision changes, CP, SOB, cough/congestion, N/V/D, dysuria/hematuria, incontinence, tongue biting, lower extremity pain/erythema/edema, or any additional trauma.       Patient evaluated at bedside and exam is grossly normal.  Left lower extremity exam is inconsistent and left lower extremity weakness with positive Glass sign.  Multiple witnessed episodes nonepileptic events.  Three nonepileptic events captured on EEG showing no electrical correlate.  Consistent with FND.    Recommendations:  --EEG discontinued   --please do not give any antiepileptic medications as these events are not epileptic in nature; EEG is continuing to record and if a true seizure is captured we can change treatment  --consider Psychology referral  --consider neuropsychology referral for functional neurologic disorder  --consider psychiatry referral, patient is agreeable to treating anxiety    Neurology will sign off. Please reach out for any further questions or change in neuro status.

## 2024-07-20 NOTE — ASSESSMENT & PLAN NOTE
Lydia Thomas is a 44 y.o. 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 EMS gave 2 g of Versed for this event. On arrival to the ED, patient appeared slow but later returned to baseline. Patient does endorse a 4 day history of generalized HA, which has not resolved w/taking her home Triptan or Tylenol. Patient states that she does not recall the events preceding her spells. After that, she states she only recalls waking up in the ED w/ confusion. Patient also endorses a 1 day history of abdominal pain. Patient denies f/c, neck stiffness, worsening weakness/paresthesias, vision changes, CP, SOB, cough/congestion, N/V/D, dysuria/hematuria, incontinence, tongue biting, lower extremity pain/erythema/edema, or any additional trauma.       Patient evaluated at bedside and exam is grossly normal.  Left lower extremity exam is inconsistent and left lower extremity weakness with positive Glass sign.  Multiple witnessed episodes nonepileptic events.  So far only to have been captured on EEG showing no electrical correlate.    Recommendations:  --please discontinue EEG after third event captured  --please do not give any antiepileptic medications as these events are not epileptic in nature; EEG is continuing to record and if a true seizure is captured we can change treatment  --consider Psychology referral  --consider neuropsychology referral for functional neurologic disorder    Neurology will continue to follow.  Please reach out for any further questions or change in neuro status.

## 2024-07-20 NOTE — PLAN OF CARE
Problem: Adult Inpatient Plan of Care  Goal: Patient-Specific Goal (Individualized)  Description: Pt will maintain sbp below 180  Outcome: Progressing  Flowsheets (Taken 7/20/2024 0213)  Individualized Care Needs: active listening  Anxieties, Fears or Concerns: none  Goal: Absence of Hospital-Acquired Illness or Injury  Outcome: Progressing  Intervention: Identify and Manage Fall Risk  Flowsheets (Taken 7/20/2024 0213)  Safety Promotion/Fall Prevention: bed alarm set  Intervention: Prevent Skin Injury  Flowsheets (Taken 7/20/2024 0213)  Body Position: position changed independently  Device Skin Pressure Protection:   adhesive use limited   pressure points protected   skin-to-device areas padded   tubing/devices free from skin contact  Goal: Optimal Comfort and Wellbeing  Outcome: Progressing  Intervention: Monitor Pain and Promote Comfort  Flowsheets (Taken 7/20/2024 0213)  Pain Management Interventions: care clustered  Intervention: Provide Person-Centered Care  Flowsheets (Taken 7/20/2024 0213)  Trust Relationship/Rapport:   care explained   choices provided     @2050 Pt had event that lasted about 30 seconds with pt exhibiting rhythmic jerking and eyes fluttering. Team notified. @0000 Pt blood pressure 89/50 automatic, MAP 64. Manual blood pressure 98/78. Team notified. @0400 Pt blood pressure 87/50 automatic, MAP 64. Retaken with pt blood pressure 85/50 automatic, MAP 65. Team notified and lactated ringers bolus given. Pt blood pressure rechecked. Pt blood pressure 89/50, MAP 65. Retaken with pt blood pressure 91/54, MAP 67. Team notified.     Seizure precautions maintained. EEG cap continued. VSS. Bed in lowest position, side rails x 3, and call light in use.

## 2024-07-20 NOTE — SUBJECTIVE & OBJECTIVE
Subjective:     Interval History:  EEG discontinued.  Three nonepileptic events captured on EEG consistent with FND.  No indication for antiepileptic medications      Current Facility-Administered Medications   Medication Dose Route Frequency Provider Last Rate Last Admin    acetaminophen tablet 650 mg  650 mg Oral Q6H PRN Tessie Garcia MD   650 mg at 07/20/24 1133    albuterol inhaler 1 puff  1 puff Inhalation Q6H PRN Natalee Dickey MD        aluminum-magnesium hydroxide-simethicone 200-200-20 mg/5 mL suspension 30 mL  30 mL Oral QID PRN Natalee Dickey MD        cefTRIAXone (Rocephin) 1 g in D5W 100 mL IVPB (MB+)  1 g Intravenous Q24H Olivia Clemons MD   Stopped at 07/19/24 2137    dextrose 10% bolus 125 mL 125 mL  12.5 g Intravenous PRN Natalee Dickey MD        dextrose 10% bolus 250 mL 250 mL  25 g Intravenous PRN Natalee Dickey MD        glucagon (human recombinant) injection 1 mg  1 mg Intramuscular PRN Natalee Dickey MD        glucose chewable tablet 16 g  16 g Oral PRN Natalee Dickey MD        glucose chewable tablet 24 g  24 g Oral PRN Natalee Dickey MD        melatonin tablet 6 mg  6 mg Oral Nightly PRN Natalee Dickey MD   6 mg at 07/19/24 2104    naloxone 0.4 mg/mL injection 0.02 mg  0.02 mg Intravenous PRN Natalee Dickey MD        ondansetron disintegrating tablet 8 mg  8 mg Oral Q8H PRN Natalee Dickey MD   8 mg at 07/20/24 1453    polyethylene glycol packet 17 g  17 g Oral Daily Natalee Dickey MD   17 g at 07/19/24 1100    simethicone chewable tablet 80 mg  1 tablet Oral QID PRN Natalee Dickey MD        sodium chloride 0.9% flush 10 mL  10 mL Intravenous Q12H PRN Natalee Dickey MD           Review of Systems   Neurological:  Positive for seizures and weakness.     Objective:     Vital Signs (Most Recent):  Temp: 99.8 °F (37.7 °C) (07/20/24 1520)  Pulse: 73 (07/20/24 1520)  Resp: 18 (07/20/24 1520)  BP: 99/60 (07/20/24 1520)  SpO2: 98 % (07/20/24 1520) Vital Signs (24h Range):  Temp:   [97 °F (36.1 °C)-99.8 °F (37.7 °C)] 99.8 °F (37.7 °C)  Pulse:  [] 73  Resp:  [16-32] 18  SpO2:  [95 %-98 %] 98 %  BP: ()/(50-78) 99/60     Weight: 62.6 kg (138 lb)  Body mass index is 24.45 kg/m².     Physical Exam  Vitals and nursing note reviewed.   Constitutional:       General: She is not in acute distress.     Appearance: Normal appearance. She is not ill-appearing or diaphoretic.   HENT:      Head: Normocephalic and atraumatic.      Right Ear: External ear normal.      Left Ear: External ear normal.      Nose: Nose normal. No congestion or rhinorrhea.      Mouth/Throat:      Mouth: Mucous membranes are moist.      Pharynx: Oropharynx is clear.   Eyes:      General: No scleral icterus.     Extraocular Movements: Extraocular movements intact.      Pupils: Pupils are equal, round, and reactive to light.   Pulmonary:      Effort: Pulmonary effort is normal.   Abdominal:      Palpations: Abdomen is soft.   Musculoskeletal:         General: Normal range of motion.      Cervical back: Normal range of motion and neck supple.      Right lower leg: No edema.      Left lower leg: No edema.   Skin:     General: Skin is warm and dry.   Neurological:      Mental Status: She is alert and oriented to person, place, and time.      Cranial Nerves: Cranial nerves 2-12 are intact.      Coordination: Finger-Nose-Finger Test normal.      Deep Tendon Reflexes:      Reflex Scores:       Tricep reflexes are 2+ on the right side and 2+ on the left side.       Bicep reflexes are 2+ on the right side and 2+ on the left side.       Brachioradialis reflexes are 2+ on the right side and 2+ on the left side.       Patellar reflexes are 2+ on the right side and 2+ on the left side.       Achilles reflexes are 2+ on the right side and 2+ on the left side.  Psychiatric:         Attention and Perception: Attention normal.         Mood and Affect: Mood normal. Affect is labile.         Speech: Speech normal.         Behavior:  Behavior normal. Behavior is cooperative.         Thought Content: Thought content normal.         Judgment: Judgment normal.          NEUROLOGICAL EXAMINATION:     MENTAL STATUS   Oriented to person, place, and time.   Attention: normal. Concentration: normal.   Speech: speech is normal   Level of consciousness: alert    CRANIAL NERVES   Cranial nerves II through XII intact.     CN III, IV, VI   Pupils are equal, round, and reactive to light.    MOTOR EXAM   Muscle bulk: normal  Overall muscle tone: normal    Strength   Strength 5/5 except as noted.   Left strength: No movement in left lower extremity       Glass positive LLE     REFLEXES     Reflexes   Right brachioradialis: 2+  Left brachioradialis: 2+  Right biceps: 2+  Left biceps: 2+  Right triceps: 2+  Left triceps: 2+  Right patellar: 2+  Left patellar: 2+  Right achilles: 2+  Left achilles: 2+  Right plantar: normal  Left plantar: normal  Right Prajapati: absent  Left Prajapati: absent    SENSORY EXAM   Left leg light touch: decreased from knee (Reports decreased)       Physical reaction observed to noxious stimulus in left lower extremity however reports no sensation     GAIT AND COORDINATION      Coordination   Finger to nose coordination: normal    Tremor   Resting tremor: absent  Intention tremor: absent  Action tremor: absent      Significant Labs: CBC:   Recent Labs   Lab 07/19/24 0437 07/20/24  0347   WBC 4.49 5.48   HGB 11.6* 12.2   HCT 34.0* 36.4*    246     CMP:   Recent Labs   Lab 07/19/24 0437 07/20/24  0347   GLU 82 77    140   K 3.3* 3.9    111*   CO2 23 21*   BUN 6 7   CREATININE 0.7 0.8   CALCIUM 8.7 8.6*   MG 1.9 2.0   PROT 5.8* 6.1   ALBUMIN 3.6 3.6   BILITOT 1.1* 0.5   ALKPHOS 38* 37*   AST 13 14   ALT 9* 8*   ANIONGAP 7* 8       Significant Imaging: I have reviewed all pertinent imaging results/findings within the past 24 hours.

## 2024-07-20 NOTE — HOSPITAL COURSE
Ms. 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 Presents to ED for 2 witnessed seizure-like episodes. On arrival to the ED, patient appeared postictal but later returned to her baseline w/o evidence of focal neurologic deficit aside from her known lower extremity weakness/paresthesias which she states have not worsened.  Patient does endorse a 4 day history of generalized HA, which has not resolved w/taking her home Triptan or Tylenol. Patient denies vision changes, SOB, cough/congestion, N/V/D, dysuria/hematuria, incontinence, tongue biting, lower extremity pain/erythema/edema, or any additional trauma.      In ED, had intermittent hypotension with tachycardia (BP 86/53 with HR max 164), tachypnea, on RA, afebrile. Had 3 additional seizure-like episodes witnessed by ED staff. Keppra loaded and received Ativan x2. CTA head and neck with no acute findings and no vessel stenosis. CT AP with no acute findings. CXR NAIPP. Received CTX and 1L NS. BG 94. CMP with no electrolyte derangements, renal impairment, or LFT elevation except slightly elevated T. Bili 1.2. CBC with no leukocytosis. Lipase normal. LA 1.1. ASA and tynelol level negative.    Due to her presentation and physical exam, with concerns for PNES. Neurology consulted and EEG ordered which captured 3 episodes of seizure-like events, no correlating epileptic activity on EEG. Neurology r/o epilepsy and recommend neuropsychology outpatient in addition to PT/OT. Psychiatry consulted recommends Buspar 10 mg TDS for her anxiety. Continue chronic medication, neuropsychology consult ordered, follow up with your neurology and PCP.

## 2024-07-20 NOTE — NURSING
@0400 Pt blood pressure 87/50 automatic, MAP 64. Retaken with pt blood pressure at 85/50 automatic, MAP 65. Team notified and lactated ringers bolus given. Pt blood pressure rechecked. Pt. blood pressure 89/50 automatic, MAP 65. Retaken showing pt blood pressure 91/54 automatic, MAP 67. Team notified.

## 2024-07-20 NOTE — NURSING
@2050 Pt had episode of rhythmic jerking and eyes fluttering lasting about 30 seconds. Team notified. Pt AAO x 4.

## 2024-07-20 NOTE — CONSULTS
[facility] - Neurosurgery ([facility])  Neurology  Consult Note    Patient Name: Lydia Thomas  MRN: [id number]  Admission Date: 7/18/2024  Hospital Length of Stay: 0 days  Code Status: Full Code   Attending Provider: Olivia Clemons MD   Consulting Provider: Tenzin Carnes MD  Primary Care Physician: Katy Vergara MD  Principal Problem:Functional neurological symptom disorder (conversion disorder), with abnormal movement    Inpatient consult to Neurology  Consult performed by: Tenzin Carnes MD  Consult ordered by: Olivia Clemons MD         Subjective:     Chief Complaint:  Seizure-like activity     HPI:   Lydia Thomas is a 44 y.o. 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 EMS gave 2 g of Versed for this event. On arrival to the ED, patient appeared slow but later returned to baseline. Patient does endorse a 4 day history of generalized HA, which has not resolved w/taking her home Triptan or Tylenol. Patient states that she does not recall the events preceding her spells. After that, she states she only recalls waking up in the ED w/ confusion. Patient also endorses a 1 day history of abdominal pain. Patient denies f/c, neck stiffness, worsening weakness/paresthesias, vision changes, CP, SOB, cough/congestion, N/V/D, dysuria/hematuria, incontinence, tongue biting, lower extremity pain/erythema/edema, or any additional trauma.      Past Medical History:   Diagnosis Date    Anemia     Anemia     Asthma     exercise asthma    Migraine headache     Ovarian cyst        Past Surgical History:   Procedure Laterality Date    APPENDECTOMY      4/2019    AUGMENTATION OF BREAST Bilateral 03/2022    BREAST BIOPSY Right     Excisional bx, benign     "COLONOSCOPY N/A 05/18/2022    Procedure: COLONOSCOPY;  Surgeon: Sabino Mcintosh MD;  Location: [facility] ENDO;  Service: Endoscopy;  Laterality: N/A;  High risk for colon cancer (family)      fully vaccinated; instructions to portal-    ESOPHAGOGASTRODUODENOSCOPY N/A 1/18/2024    Procedure: EGD (ESOPHAGOGASTRODUODENOSCOPY);  Surgeon: Sabino Mcintosh MD;  Location: [facility] ENDO;  Service: Endoscopy;  Laterality: N/A;  1/10 ref by Feli cJ, PAC, instr. to [place]-  1/16- pc complete. DBM       Review of patient's allergies indicates:   Allergen Reactions    Iodine and iodide containing products Anaphylaxis     PATIENT CAN RECEIVE IOHEXOL, has received multiple times with no pretreatment with no allergic reaction    Shellfish derived Anaphylaxis    Benadryl [diphenhydramine hcl] Itching     "with fast injection"    Fish containing products     Iodine        No current facility-administered medications on file prior to encounter.     Current Outpatient Medications on File Prior to Encounter   Medication Sig    albuterol (PROVENTIL/VENTOLIN HFA) 90 mcg/actuation inhaler INHALE 1 TO 2 PUFFS INTO THE LUNGS EVERY 6 HOURS AS NEEDED FOR WHEEZING    ascorbic acid, vitamin C, (VITAMIN C) 500 MG tablet     clobetasoL (CLOBEX) 0.05 % shampoo Apply 1 application  topically every other day.    ketoconazole (NIZORAL) 2 % shampoo Wash scalp a few times weekly    onabotulinumtoxinA (BOTOX INJ) Inject as directed.    pantoprazole (PROTONIX) 20 MG tablet Take 1 tablet (20 mg total) by mouth once daily. In the morning on an empty stomach    QULIPTA 60 mg Tab     sumatriptan (IMITREX STATDOSE) 6 mg/0.5 mL kit To use max twice within 24 hours, 2 hours apart.    SUMAtriptan (IMITREX) 20 mg/actuation nasal spray Take a spray that can be repeated after 2 hours, not more than twice a day    tretinoin (RETIN-A) 0.025 % cream Apply pea-sized amount to face nightly     Family History       Problem Relation (Age of Onset)    Arthritis Father    Asthma Mother, " Brother    Breast cancer Mother, Maternal Grandmother    COPD Father    Cancer Maternal Grandfather    Colon polyps Brother    Diabetes Maternal Uncle    Hearing loss Paternal Grandmother, Paternal Grandfather, Paternal Aunt, Paternal Uncle    Hypertension Maternal Uncle    Miscarriages / Stillbirths Mother    Pancreatic cancer Maternal Uncle    Prostate cancer Maternal Uncle    Stomach cancer Maternal Grandfather          Tobacco Use    Smoking status: Never    Smokeless tobacco: Never   Substance and Sexual Activity    Alcohol use: Yes     Comment: occass    Drug use: No    Sexual activity: Yes     Partners: Male     Birth control/protection: None     Review of Systems   Constitutional:  Positive for activity change.   Neurological:         Witnessed spells     Psychiatric/Behavioral:  Positive for behavioral problems and confusion.      Objective:     Vital Signs (Most Recent):  Temp: 98.2 °F (36.8 °C) (07/19/24 1530)  Pulse: 84 (07/19/24 1530)  Resp: 18 (07/19/24 1530)  BP: (!) 90/54 (07/19/24 1530)  SpO2: 98 % (07/19/24 1530) Vital Signs (24h Range):  Temp:  [97.8 °F (36.6 °C)-98.3 °F (36.8 °C)] 98.2 °F (36.8 °C)  Pulse:  [] 84  Resp:  [17-21] 18  SpO2:  [84 %-100 %] 98 %  BP: ()/(52-74) 90/54     Weight: 62.6 kg (138 lb)  Body mass index is 24.45 kg/m².     Physical Exam  Vitals and nursing note reviewed.   Constitutional:       General: She is not in acute distress.     Appearance: Normal appearance. She is not ill-appearing or diaphoretic.   HENT:      Head: Normocephalic and atraumatic.      Right Ear: External ear normal.      Left Ear: External ear normal.      Nose: Nose normal. No congestion or rhinorrhea.      Mouth/Throat:      Mouth: Mucous membranes are moist.      Pharynx: Oropharynx is clear.   Eyes:      General: No scleral icterus.     Extraocular Movements: Extraocular movements intact.      Pupils: Pupils are equal, round, and reactive to light.   Pulmonary:      Effort: Pulmonary  effort is normal.   Abdominal:      Palpations: Abdomen is soft.   Musculoskeletal:         General: Normal range of motion.      Cervical back: Normal range of motion and neck supple.      Right lower leg: No edema.      Left lower leg: No edema.   Skin:     General: Skin is warm and dry.   Neurological:      Mental Status: She is alert and oriented to person, place, and time.      Cranial Nerves: Cranial nerves 2-12 are intact.      Motor: Motor strength is normal.     Coordination: Finger-Nose-Finger Test abnormal (volitionally impaired).      Deep Tendon Reflexes:      Reflex Scores:       Tricep reflexes are 2+ on the right side and 2+ on the left side.       Bicep reflexes are 2+ on the right side and 2+ on the left side.       Brachioradialis reflexes are 2+ on the right side and 2+ on the left side.       Patellar reflexes are 2+ on the right side and 2+ on the left side.       Achilles reflexes are 2+ on the right side and 2+ on the left side.  Psychiatric:         Mood and Affect: Mood normal.         Speech: Speech normal.         Behavior: Behavior normal.         Thought Content: Thought content normal.         Judgment: Judgment normal.          NEUROLOGICAL EXAMINATION:     MENTAL STATUS   Oriented to person, place, and time.   Attention: normal. Concentration: normal.   Speech: speech is normal   Level of consciousness: alert    CRANIAL NERVES   Cranial nerves II through XII intact.     CN III, IV, VI   Pupils are equal, round, and reactive to light.    MOTOR EXAM   Muscle bulk: normal  Overall muscle tone: normal    Strength   Strength 5/5 throughout.        Glass positive LLE     REFLEXES     Reflexes   Right brachioradialis: 2+  Left brachioradialis: 2+  Right biceps: 2+  Left biceps: 2+  Right triceps: 2+  Left triceps: 2+  Right patellar: 2+  Left patellar: 2+  Right achilles: 2+  Left achilles: 2+  Right plantar: normal  Left plantar: normal    SENSORY EXAM   Left leg light touch: decreased  from knee (patient denies sensation but physically reacts to noxious stimuli)    GAIT AND COORDINATION      Coordination   Finger to nose coordination: abnormal (volitionally impaired)    Tremor   Resting tremor: absent  Intention tremor: absent  Action tremor: absent      Significant Labs: CBC:   Recent Labs   Lab 07/18/24 1815 07/19/24  0437   WBC 5.46 4.49   HGB 13.2 11.6*   HCT 37.7 34.0*    232     CMP:   Recent Labs   Lab 07/18/24 1815 07/18/24  1833 07/19/24  0437   GLU 94  --  82   *  --  139   K 3.8  --  3.3*     --  109   CO2 22*  --  23   BUN 6  --  6   CREATININE 0.8  --  0.7   CALCIUM 9.3  --  8.7   MG  --  1.9 1.9   PROT 6.9  --  5.8*   ALBUMIN 4.1  --  3.6   BILITOT 1.2*  --  1.1*   ALKPHOS 41*  --  38*   AST 13  --  13   ALT 9*  --  9*   ANIONGAP 9  --  7*       Significant Imaging: I have reviewed and interpreted all pertinent imaging results/findings within the past 24 hours.  Assessment and Plan:     * Functional neurological symptom disorder (conversion disorder), with abnormal movement  Lydia Thomas is a 44 y.o. 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 EMS gave 2 g of Versed for this event. On arrival to the ED, patient appeared slow but later returned to baseline. Patient does endorse a 4 day history of generalized HA, which has not resolved w/taking her home Triptan or Tylenol. Patient states that she does not recall the events preceding her spells. After that, she states she only recalls waking up in the ED w/ confusion. Patient also endorses a 1 day history of abdominal pain. Patient denies f/c, neck stiffness, worsening weakness/paresthesias, vision changes, CP, SOB, cough/congestion, N/V/D,  dysuria/hematuria, incontinence, tongue biting, lower extremity pain/erythema/edema, or any additional trauma.       Patient evaluated at bedside and exam is grossly normal.  Left lower extremity exam is inconsistent and left lower extremity weakness with positive Glass sign.  Multiple witnessed episodes nonepileptic events.  So far only to have been captured on EEG showing no electrical correlate.    Recommendations:  --please discontinue EEG after third event captured  --please do not give any antiepileptic medications as these events are not epileptic in nature; EEG is continuing to record and if a true seizure is captured we can change treatment  --consider Psychology referral  --consider neuropsychology referral for functional neurologic disorder    Neurology will continue to follow.  Please reach out for any further questions or change in neuro status.    Sensory loss  Functional    Functional gait abnormality  Left lower extremity movements noted on video for episode of nonepileptic events         VTE Risk Mitigation (From admission, onward)           Ordered     IP VTE LOW RISK PATIENT  Once         07/19/24 0238     Place sequential compression device  Until discontinued         07/19/24 0238                    Thank you for your consult. I will follow-up with patient. Please contact us if you have any additional questions.    Tenzin Canres MD  Neurology  [facility] - Neurosurgery ([facility])

## 2024-07-20 NOTE — SUBJECTIVE & OBJECTIVE
Interval History: Patient c/o of headache today which she claims is similar to her usual migraine pain. Sumatriptan PRN given. Neuropsychology and psychiatry consults ordered.    Review of Systems   Constitutional: Negative.    Respiratory: Negative.     Cardiovascular: Negative.  Negative for chest pain, palpitations and leg swelling.   Gastrointestinal:  Negative for abdominal distention and abdominal pain.   Skin: Negative.    Neurological:  Positive for weakness (LLE).     Objective:     Vital Signs (Most Recent):  Temp: 99.8 °F (37.7 °C) (07/20/24 1520)  Pulse: 73 (07/20/24 1520)  Resp: 18 (07/20/24 1520)  BP: 99/60 (07/20/24 1520)  SpO2: 98 % (07/20/24 1520) Vital Signs (24h Range):  Temp:  [97 °F (36.1 °C)-99.8 °F (37.7 °C)] 99.8 °F (37.7 °C)  Pulse:  [] 73  Resp:  [16-32] 18  SpO2:  [95 %-98 %] 98 %  BP: ()/(50-78) 99/60     Weight: 62.6 kg (138 lb)  Body mass index is 24.45 kg/m².    Intake/Output Summary (Last 24 hours) at 7/20/2024 1549  Last data filed at 7/19/2024 2100  Gross per 24 hour   Intake 120 ml   Output --   Net 120 ml         Physical Exam  Vitals and nursing note reviewed.   Constitutional:       Appearance: Normal appearance.   Cardiovascular:      Rate and Rhythm: Normal rate and regular rhythm.      Heart sounds: No murmur heard.     No gallop.   Pulmonary:      Effort: No respiratory distress.      Breath sounds: No wheezing or rales.   Abdominal:      General: Abdomen is flat. There is no distension.      Tenderness: There is no abdominal tenderness.   Skin:     General: Skin is warm.   Neurological:      Mental Status: She is alert and oriented to person, place, and time.             Significant Labs: All pertinent labs within the past 24 hours have been reviewed.    Significant Imaging: I have reviewed all pertinent imaging results/findings within the past 24 hours.

## 2024-07-20 NOTE — PROGRESS NOTES
[facility] - Neurosurgery ([facility])  Neurology  Progress Note    Patient Name: Lydia Thomas  MRN: [id number]  Admission Date: 7/18/2024  Hospital Length of Stay: 1 days  Code Status: Full Code   Attending Provider: Olivia Clemons MD  Primary Care Physician: Katy Vergara MD   Principal Problem:Functional neurological symptom disorder (conversion disorder), with abnormal movement    HPI:   Lydia Thomas is a 44 y.o. 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 EMS gave 2 g of Versed for this event. On arrival to the ED, patient appeared slow but later returned to baseline. Patient does endorse a 4 day history of generalized HA, which has not resolved w/taking her home Triptan or Tylenol. Patient states that she does not recall the events preceding her spells. After that, she states she only recalls waking up in the ED w/ confusion. Patient also endorses a 1 day history of abdominal pain. Patient denies f/c, neck stiffness, worsening weakness/paresthesias, vision changes, CP, SOB, cough/congestion, N/V/D, dysuria/hematuria, incontinence, tongue biting, lower extremity pain/erythema/edema, or any additional trauma.     Overview/Hospital Course:  No notes on file        Subjective:     Interval History:  EEG discontinued.  Three nonepileptic events captured on EEG consistent with FND.  No indication for antiepileptic medications      Current Facility-Administered Medications   Medication Dose Route Frequency Provider Last Rate Last Admin    acetaminophen tablet 650 mg  650 mg Oral Q6H PRN Tessie Garcia MD   650 mg at 07/20/24 1133    albuterol inhaler 1 puff  1 puff Inhalation Q6H PRN Natalee Dickey MD        aluminum-magnesium  hydroxide-simethicone 200-200-20 mg/5 mL suspension 30 mL  30 mL Oral QID PRN Natalee Dickey MD        cefTRIAXone (Rocephin) 1 g in D5W 100 mL IVPB (MB+)  1 g Intravenous Q24H Olivia Clemons MD   Stopped at 07/19/24 2137    dextrose 10% bolus 125 mL 125 mL  12.5 g Intravenous PRN Natalee Dickey MD        dextrose 10% bolus 250 mL 250 mL  25 g Intravenous PRN Natalee Dickey MD        glucagon (human recombinant) injection 1 mg  1 mg Intramuscular PRN Natalee Dickey MD        glucose chewable tablet 16 g  16 g Oral PRN Natalee Dickey MD        glucose chewable tablet 24 g  24 g Oral PRN Natalee Dickey MD        melatonin tablet 6 mg  6 mg Oral Nightly PRN Natalee Dickey MD   6 mg at 07/19/24 2104    naloxone 0.4 mg/mL injection 0.02 mg  0.02 mg Intravenous PRN Natalee Dickey MD        ondansetron disintegrating tablet 8 mg  8 mg Oral Q8H PRN Natalee Dickey MD   8 mg at 07/20/24 1453    polyethylene glycol packet 17 g  17 g Oral Daily Natalee Dickey MD   17 g at 07/19/24 1100    simethicone chewable tablet 80 mg  1 tablet Oral QID PRN Natalee Dickey MD        sodium chloride 0.9% flush 10 mL  10 mL Intravenous Q12H PRN Natalee Dickey MD           Review of Systems   Neurological:  Positive for seizures and weakness.     Objective:     Vital Signs (Most Recent):  Temp: 99.8 °F (37.7 °C) (07/20/24 1520)  Pulse: 73 (07/20/24 1520)  Resp: 18 (07/20/24 1520)  BP: 99/60 (07/20/24 1520)  SpO2: 98 % (07/20/24 1520) Vital Signs (24h Range):  Temp:  [97 °F (36.1 °C)-99.8 °F (37.7 °C)] 99.8 °F (37.7 °C)  Pulse:  [] 73  Resp:  [16-32] 18  SpO2:  [95 %-98 %] 98 %  BP: ()/(50-78) 99/60     Weight: 62.6 kg (138 lb)  Body mass index is 24.45 kg/m².     Physical Exam  Vitals and nursing note reviewed.   Constitutional:       General: She is not in acute distress.     Appearance: Normal appearance. She is not ill-appearing or diaphoretic.   HENT:      Head: Normocephalic and atraumatic.      Right Ear: External  ear normal.      Left Ear: External ear normal.      Nose: Nose normal. No congestion or rhinorrhea.      Mouth/Throat:      Mouth: Mucous membranes are moist.      Pharynx: Oropharynx is clear.   Eyes:      General: No scleral icterus.     Extraocular Movements: Extraocular movements intact.      Pupils: Pupils are equal, round, and reactive to light.   Pulmonary:      Effort: Pulmonary effort is normal.   Abdominal:      Palpations: Abdomen is soft.   Musculoskeletal:         General: Normal range of motion.      Cervical back: Normal range of motion and neck supple.      Right lower leg: No edema.      Left lower leg: No edema.   Skin:     General: Skin is warm and dry.   Neurological:      Mental Status: She is alert and oriented to person, place, and time.      Cranial Nerves: Cranial nerves 2-12 are intact.      Coordination: Finger-Nose-Finger Test normal.      Deep Tendon Reflexes:      Reflex Scores:       Tricep reflexes are 2+ on the right side and 2+ on the left side.       Bicep reflexes are 2+ on the right side and 2+ on the left side.       Brachioradialis reflexes are 2+ on the right side and 2+ on the left side.       Patellar reflexes are 2+ on the right side and 2+ on the left side.       Achilles reflexes are 2+ on the right side and 2+ on the left side.  Psychiatric:         Attention and Perception: Attention normal.         Mood and Affect: Mood normal. Affect is labile.         Speech: Speech normal.         Behavior: Behavior normal. Behavior is cooperative.         Thought Content: Thought content normal.         Judgment: Judgment normal.          NEUROLOGICAL EXAMINATION:     MENTAL STATUS   Oriented to person, place, and time.   Attention: normal. Concentration: normal.   Speech: speech is normal   Level of consciousness: alert    CRANIAL NERVES   Cranial nerves II through XII intact.     CN III, IV, VI   Pupils are equal, round, and reactive to light.    MOTOR EXAM   Muscle bulk:  normal  Overall muscle tone: normal    Strength   Strength 5/5 except as noted.   Left strength: No movement in left lower extremity       Glass positive LLE     REFLEXES     Reflexes   Right brachioradialis: 2+  Left brachioradialis: 2+  Right biceps: 2+  Left biceps: 2+  Right triceps: 2+  Left triceps: 2+  Right patellar: 2+  Left patellar: 2+  Right achilles: 2+  Left achilles: 2+  Right plantar: normal  Left plantar: normal  Right Prajapati: absent  Left Prajapati: absent    SENSORY EXAM   Left leg light touch: decreased from knee (Reports decreased)       Physical reaction observed to noxious stimulus in left lower extremity however reports no sensation     GAIT AND COORDINATION      Coordination   Finger to nose coordination: normal    Tremor   Resting tremor: absent  Intention tremor: absent  Action tremor: absent      Significant Labs: CBC:   Recent Labs   Lab 07/19/24 0437 07/20/24  0347   WBC 4.49 5.48   HGB 11.6* 12.2   HCT 34.0* 36.4*    246     CMP:   Recent Labs   Lab 07/19/24 0437 07/20/24  0347   GLU 82 77    140   K 3.3* 3.9    111*   CO2 23 21*   BUN 6 7   CREATININE 0.7 0.8   CALCIUM 8.7 8.6*   MG 1.9 2.0   PROT 5.8* 6.1   ALBUMIN 3.6 3.6   BILITOT 1.1* 0.5   ALKPHOS 38* 37*   AST 13 14   ALT 9* 8*   ANIONGAP 7* 8       Significant Imaging: I have reviewed all pertinent imaging results/findings within the past 24 hours.  Assessment and Plan:     * Functional neurological symptom disorder (conversion disorder), with abnormal movement  Lydia Thomas is a 44 y.o. 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 EMS gave 2 g of Versed for this event. On arrival to the ED, patient appeared slow but later returned to baseline.  Patient does endorse a 4 day history of generalized HA, which has not resolved w/taking her home Triptan or Tylenol. Patient states that she does not recall the events preceding her spells. After that, she states she only recalls waking up in the ED w/ confusion. Patient also endorses a 1 day history of abdominal pain. Patient denies f/c, neck stiffness, worsening weakness/paresthesias, vision changes, CP, SOB, cough/congestion, N/V/D, dysuria/hematuria, incontinence, tongue biting, lower extremity pain/erythema/edema, or any additional trauma.       Patient evaluated at bedside and exam is grossly normal.  Left lower extremity exam is inconsistent and left lower extremity weakness with positive Glass sign.  Multiple witnessed episodes nonepileptic events.  Three nonepileptic events captured on EEG showing no electrical correlate.  Consistent with FND.    Recommendations:  --EEG discontinued   --please do not give any antiepileptic medications as these events are not epileptic in nature; EEG is continuing to record and if a true seizure is captured we can change treatment  --consider Psychology referral  --consider neuropsychology referral for functional neurologic disorder  --consider psychiatry referral, patient is agreeable to treating anxiety    Neurology will sign off. Please reach out for any further questions or change in neuro status.    Sensory loss  Functional    Functional gait abnormality  Left lower extremity movements noted on video for episode of nonepileptic events         VTE Risk Mitigation (From admission, onward)           Ordered     IP VTE LOW RISK PATIENT  Once         07/19/24 0238     Place sequential compression device  Until discontinued         07/19/24 0238                    Tenzin Carnes MD  Neurology  [facility] - Neurosurgery ([facility])

## 2024-07-20 NOTE — HPI
Lydia Thomas is a 44 y.o. 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 EMS gave 2 g of Versed for this event. On arrival to the ED, patient appeared slow but later returned to baseline. Patient does endorse a 4 day history of generalized HA, which has not resolved w/taking her home Triptan or Tylenol. Patient states that she does not recall the events preceding her spells. After that, she states she only recalls waking up in the ED w/ confusion. Patient also endorses a 1 day history of abdominal pain. Patient denies f/c, neck stiffness, worsening weakness/paresthesias, vision changes, CP, SOB, cough/congestion, N/V/D, dysuria/hematuria, incontinence, tongue biting, lower extremity pain/erythema/edema, or any additional trauma.

## 2024-07-20 NOTE — PROCEDURES
EEG Extended Monitoring greater than one hour    Date/Time: 7/18/2024 5:20 PM    Performed by: Antonio Salguero MD  Authorized by: Natalee Dickey MD      EXTENDED  ELECTROENCEPHALOGRAM  REPORT    DATE OF SERVICE: 7/19/24-7/20/24   EEG NUMBER: FH -1,2  REQUESTED BY:  Keli  LOCATION OF SERVICE:  [facility] [place]    METHODOLOGY   Electroencephalographic (EEG) recording is with electrodes placed according to the International 10-20 placement system.  Thirty two (32) channels of digital signal (sampling rate of 512/sec) including T1 and T2 was simultaneously recorded from the scalp and may include  EKG, EMG, and/or eye monitors.  Recording band pass was 0.1 to 512 hz.  Digital video recording of the patient is simultaneously recorded with the EEG.  The patient is instructed report clinical symptoms which may occur during the recording session.  EEG and video recording is stored and archived in digital format.  Activation procedures which include photic stimulation, hyperventilation and instructing patients to perform simple task are done in selected patients.   The EEG is displayed on a monitor screen and can be reviewed using different montages.  Computer assisted analysis is employed to detect spike and electrographic seizure activity.   The entire record is submitted for computer analysis.  The entire recording is visually reviewed and the times identified by computer analysis as being spikes or seizures are reviewed again.  Compresses spectral analysis (CSA) is also performed on the activity recorded from each individual channel.  This is displayed as a power display of frequencies from 0 to 30 Hz over time.   The CSA is reviewed looking for asymmetries in power between homologous areas of the scalp and then compared with the original EEG recording.     Blyk software was also utilized in the review of this study.  This software suite analyzes the EEG recording in multiple domains.  Coherence and rhythmicity is  computed to identify EEG sections which may contain organized seizures.  Each channel undergoes analysis to detect presence of spike and sharp waves which have special and morphological characteristic of epileptic activity.  The routine EEG recording is converted from spacial into frequency domain.  This is then displayed comparing homologous areas to identify areas of significant asymmetry.  Algorithm to identify non-cortically generated artifact is used to separate eye movement, EMG and other artifact from the EEG.      RECORDING TIMES  Start on 7/19/24 at 9:11:32   Stop on 7/20/24  at 7:00:10   Start on 7/20/24 at 07:00:29  Stop on 7/2024 at 11:09:56    A total of  25 hrs of EEG recording was obtained.    EEG FINDINGS:  The record shows a good  organization at rest, consisting of a 9 Hz posterior dominant rhythm with good  reactivity. There is mild bilateral beta activity.    Drowsiness is characterized by attenuation of the background, vertex waves, and bilateral theta slowing. Stage II sleep is characterized by slowing, vertex waves, and symmetric sleep spindles.     Provocative maneuvers including hyperventilation and photic stimulation were not performed.     EKG recording shows a regular rhythm.    There are three push button clinical events. The first is at  at 11:38. The patient rubs her face and begins to cry before exhibiting upward gaze and tonic stiffening and neck extension with bilateral extremity jerking ending at 11:42. Normal background activity seen during event. No epileptiform discharges or electrographic seizures are seen during this time.     The second is at 17:36. The patient exhibits unresponsiveness to nursing staff followed by dramatic opisthotonic posturing and then pelvic thrusting.  Normal background activity seen during event. No epileptiform discharges or electrographic seizures are seen during this time.     The third is at 20:45. The patient is lying in bed and turns her head to  the right before exhibiting torso flexion with bilateral, alternating leg kicking followed by whole body jerking, back arching, and side to side head shaking followed by pelvic thrusting.   Normal background activity seen during event. No epileptiform discharges or electrographic seizures are seen during this time.     IMPRESSION:  Normal study. Three  patient events captured and consistent with psychogenic nonepileptic events.     Antonio Salguero MD

## 2024-07-20 NOTE — CONSULTS
"CONSULTATION LIAISON PSYCHIATRY INITIAL EVALUATION    Patient Name: Lydia Thomas  MRN: [id number]  Patient Class: IP- Inpatient  Admission Date: 7/18/2024  Attending Physician: Olivia Clemons MD      HPI:   Lydia Thomas is a 44 y.o. 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 Expresses increased anxiety since having her fall at work in 2022 in which she hit her head against the bathroom room wall. She states that she frequently relives the event and remembers hearing the words "you're going to be a trauma patient." States that since the fall she has experienced seizures, decreased sensation in her left left, and new stuttering that she can't control. Also reports that she cannot remember speaking [place] and English (which she spoke fluently previously). States that she cannot read books anymore.  She expresses depression and anxiety over her inability to control her body. States that she participates in PT and Speech Therapy since 2023.     States that her anxiety has gotten to the point where she frequently sleeps downstairs because she is afraid she will fall again and lose more sensation. She expresses she cannot be in large crowds. Continues to have recurrent nightmares about the fall.     Denies SI. Denies HI. Denies AH. Reports strong social connections in her , family, and best friend.     Filled out FREEMAN-7 with me today. Score 8/21.    Collateral with patient's permission:   ***    Medical Review of Systems:  Pertinent items are noted in " "HPI.    Psychiatric Review of Systems (is patient experiencing or having changes in):  sleep: yes  appetite: no  weight: no  energy/anergy: no  interest/pleasure/anhedonia: yes  somatic symptoms: yes  libido: no  anxiety/panic: yes  guilty/hopelessness: no  concentration: yes  Doris:no  Psychosis: no  Trauma: yes  S.I.B.s/risky behavior: no    Past Psychiatric History:  Previous Medication Trials: no  Previous Psychiatric Hospitalizations:no   Previous Suicide Attempts: no  History of Violence: no  Outpatient Psychiatrist: no  Family Psychiatric History: no    Substance Abuse History (with emphasis over the last 12 months):  Recreational Drugs:  denies all recreational drugs  Use of Alcohol: denied  Tobacco Use:no  Rehab History:no    Social History:  Marital Status:   Children: 0  Employment Status/Info:  on workman's compensation. 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)  :no,  is in [place]  Education: some college  Special Ed: no  Housing Status: with spouse  Access to gun: 2 guns locked and secured by the   Psychosocial Stressors: health  Functioning Relationships: good support system    Legal History:  Past Charges/Incarcerations: no  Pending charges:no    Mental Status Exam:  General Appearance: unremarkable, appears stated age, dressed in hospital garb  Behavior: normal; cooperative; reasonably friendly, pleasant, and polite; appropriate eye-contact; under good behavioral control  Involuntary Movements and Motor Activity: no psychomotor agitation or retardation, occasional stuttering and pursing or lips noted  Gait and Station: unable to assess - patient lying down or seated  Speech and Language: intact; normal rate, rhythm, volume, tone, and pitch; conversational, spontaneous, and coherent; speaks and understands English proficiently and fluently; repeats words and phrases, no word finding difficulties are noted  Mood: "nervous"  Affect: mood-congruent, " anxious  Thought Process and Associations: intact; linear, goal-directed, organized, and logical; no loosening of associations noted  Thought Content and Perceptions:: no suicidal or homicidal ideation, no auditory or visual hallucinations, no paranoid ideation, no ideas of reference, no evidence of delusions or psychosis  Sensorium and Orientation: intact; alert with clear sensorium; oriented fully to person, place, time and situation  Recent and Remote Memory: registers 3/3 objects, recalls 0/3 objects at 5 minutes, Remote Memory: able to relate details of childhood  Attention and Concentration: attentive to conversation, impaired to a degree  Fund of Knowledge: aware of current events, consistent with educational level achieved, correctly identifies the   Insight: demonstrates awareness of situation  Judgment: intact, behavior is adequate/appropriate to the circumstances, compliant with health provider's recommendations and instructions    CAM ICU positive? no      ASSESSMENT & RECOMMENDATIONS   Functional neurological disorder with abnormal movement  Anxiety  R/o PTSD        --------------------------------------------------------------------------------------------------------------------------------------------------------------------------------------------------------------------------------------    CONTINUED HISTORY & OBJECTIVE clinical data & findings reviewed and noted for above decision making    Past Medical/Surgical History:   Past Medical History:   Diagnosis Date    Anemia     Anemia     Asthma     exercise asthma    Migraine headache     Ovarian cyst      Past Surgical History:   Procedure Laterality Date    APPENDECTOMY      4/2019    AUGMENTATION OF BREAST Bilateral 03/2022    BREAST BIOPSY Right     Excisional bx, benign    COLONOSCOPY N/A 05/18/2022    Procedure: COLONOSCOPY;  Surgeon: Sabino Mcintosh MD;  Location: [facility];  Service: Endoscopy;  Laterality: N/A;   "High risk for colon cancer (family)      fully vaccinated; instructions to portal-GT    ESOPHAGOGASTRODUODENOSCOPY N/A 1/18/2024    Procedure: EGD (ESOPHAGOGASTRODUODENOSCOPY);  Surgeon: Sabino Mcintosh MD;  Location: [facility];  Service: Endoscopy;  Laterality: N/A;  1/10 ref by Feli Jc, PAC, instr. to portal-st  1/16- pc complete. DBM       Current Medications:   Scheduled Meds:    cefTRIAXone (Rocephin) IV (PEDS and ADULTS)  1 g Intravenous Q24H    polyethylene glycol  17 g Oral Daily     PRN Meds:   Current Facility-Administered Medications:     acetaminophen, 650 mg, Oral, Q6H PRN    albuterol, 1 puff, Inhalation, Q6H PRN    aluminum-magnesium hydroxide-simethicone, 30 mL, Oral, QID PRN    dextrose 10%, 12.5 g, Intravenous, PRN    dextrose 10%, 25 g, Intravenous, PRN    glucagon (human recombinant), 1 mg, Intramuscular, PRN    glucose, 16 g, Oral, PRN    glucose, 24 g, Oral, PRN    melatonin, 6 mg, Oral, Nightly PRN    naloxone, 0.02 mg, Intravenous, PRN    ondansetron, 8 mg, Oral, Q8H PRN    simethicone, 1 tablet, Oral, QID PRN    sodium chloride 0.9%, 10 mL, Intravenous, Q12H PRN    Allergies:   Review of patient's allergies indicates:   Allergen Reactions    Iodine and iodide containing products Anaphylaxis     PATIENT CAN RECEIVE IOHEXOL, has received multiple times with no pretreatment with no allergic reaction    Shellfish derived Anaphylaxis    Benadryl [diphenhydramine hcl] Itching     "with fast injection"    Fish containing products     Iodine        Vitals  Vitals:    07/20/24 1520   BP: 99/60   Pulse: 73   Resp: 18   Temp: 99.8 °F (37.7 °C)       Labs/Imaging/Studies:  Recent Results (from the past 24 hour(s))   Comprehensive Metabolic Panel (CMP)    Collection Time: 07/20/24  3:47 AM   Result Value Ref Range    Sodium 140 136 - 145 mmol/L    Potassium 3.9 3.5 - 5.1 mmol/L    Chloride 111 (H) 95 - 110 mmol/L    CO2 21 (L) 23 - 29 mmol/L    Glucose 77 70 - 110 mg/dL    BUN 7 6 - 20 mg/dL    Creatinine " 0.8 0.5 - 1.4 mg/dL    Calcium 8.6 (L) 8.7 - 10.5 mg/dL    Total Protein 6.1 6.0 - 8.4 g/dL    Albumin 3.6 3.5 - 5.2 g/dL    Total Bilirubin 0.5 0.1 - 1.0 mg/dL    Alkaline Phosphatase 37 (L) 55 - 135 U/L    AST 14 10 - 40 U/L    ALT 8 (L) 10 - 44 U/L    eGFR >60.0 >60 mL/min/1.73 m^2    Anion Gap 8 8 - 16 mmol/L   Magnesium    Collection Time: 07/20/24  3:47 AM   Result Value Ref Range    Magnesium 2.0 1.6 - 2.6 mg/dL   Phosphorus    Collection Time: 07/20/24  3:47 AM   Result Value Ref Range    Phosphorus 3.8 2.7 - 4.5 mg/dL   CBC with Automated Differential    Collection Time: 07/20/24  3:47 AM   Result Value Ref Range    WBC 5.48 3.90 - 12.70 K/uL    RBC 3.81 (L) 4.00 - 5.40 M/uL    Hemoglobin 12.2 12.0 - 16.0 g/dL    Hematocrit 36.4 (L) 37.0 - 48.5 %    MCV 96 82 - 98 fL    MCH 32.0 (H) 27.0 - 31.0 pg    MCHC 33.5 32.0 - 36.0 g/dL    RDW 12.5 11.5 - 14.5 %    Platelets 246 150 - 450 K/uL    MPV 11.1 9.2 - 12.9 fL    Immature Granulocytes 0.2 0.0 - 0.5 %    Gran # (ANC) 2.9 1.8 - 7.7 K/uL    Immature Grans (Abs) 0.01 0.00 - 0.04 K/uL    Lymph # 2.0 1.0 - 4.8 K/uL    Mono # 0.4 0.3 - 1.0 K/uL    Eos # 0.2 0.0 - 0.5 K/uL    Baso # 0.07 0.00 - 0.20 K/uL    nRBC 0 0 /100 WBC    Gran % 52.0 38.0 - 73.0 %    Lymph % 36.3 18.0 - 48.0 %    Mono % 7.5 4.0 - 15.0 %    Eosinophil % 2.7 0.0 - 8.0 %    Basophil % 1.3 0.0 - 1.9 %    Differential Method Automated      Imaging Results              CT Abdomen Pelvis With IV Contrast NO Oral Contrast (Final result)  Result time 07/18/24 23:52:36      Final result by Franklin Arteaga MD (07/18/24 23:52:36)                   Impression:      No acute findings in the abdomen or pelvis, allowing for motion limitations.    Probable subcentimeter uterine fibroid and nabothian cysts.      Electronically signed by: Franklin Arteaga MD  Date:    07/18/2024  Time:    23:52               Narrative:    EXAMINATION:  CT ABDOMEN PELVIS WITH IV CONTRAST    CLINICAL HISTORY:  Abdominal pain,  acute, nonlocalized;    TECHNIQUE:  Low dose axial images, sagittal and coronal reformations were obtained from the lung bases to the pubic symphysis following the IV administration of 75 mL of Omnipaque 350 .  Oral contrast was not given.    COMPARISON:  CT abdomen pelvis without contrast, 01/14/2022.  CT abdomen pelvis with contrast, 01/13/2020.  Pelvic ultrasound, 07/05/2024.    FINDINGS:  Lower Chest:    Lung bases are clear.  Heart size is normal.    Abdomen:    Exam quality is limited by significant motion in the upper and mid abdomen.    Liver is normal in size and contour.  No focal hepatic lesion.  Gallbladder is unremarkable. No intrahepatic biliary ductal dilatation.    Spleen is at the upper limits of normal in size and otherwise unremarkable.  Adrenal glands and pancreas are unremarkable.    The kidneys are symmetric.  No hydronephrosis. Contrast is noted in the bilateral collecting system which limits evaluation for small stones.  No asymmetric perinephric fat stranding.    No small bowel obstruction.  No inflammatory changes identified in bowel, allowing for motion.  Appendix is surgically absent per history.    No pneumoperitoneum or organized fluid collection.    No bulky retroperitoneal lymphadenopathy.    Abdominal aorta is normal in caliber without significant calcific atherosclerosis.    Portal, splenic, and superior mesenteric veins are patent. No portal venous gas.    Pelvis:    Urinary bladder is decompressed and not well evaluated.  No focal wall thickening.  Heterogeneous appearance of the cervix with probable nabothian cysts.  Negative Pap smear noted in the chart in April 2024, which is reassuring.  Small uterine fibroids.  No overt endometrial thickening.  Rectum is unremarkable.  No significant pelvic free fluid.  No bulky pelvic lymphadenopathy.    Bones and soft tissues:    No aggressive osseous lesions.  Extraperitoneal soft tissues are negative for acute finding.  Bilateral breast  prostheses.                                       CTA Head and Neck (xpd) (Final result)  Result time 07/18/24 23:52:36      Final result by Socrates Beltran MD (07/18/24 23:52:36)                   Impression:      No acute abnormality.    No high-grade stenosis or major vessel occlusion.      Electronically signed by: Socrates Beltran  Date:    07/18/2024  Time:    23:52               Narrative:    EXAMINATION:  CTA HEAD AND NECK (XPD)    CLINICAL HISTORY:  Stroke/TIA, determine embolic source;    TECHNIQUE:  Non contrast low dose axial images were obtained through the head. CT angiogram was performed from the level of the drew to the top of the head following the IV administration of 75mL of Omnipaque 350.   Sagittal and coronal reconstructions and maximum intensity projection reconstructions were performed. Arterial stenosis percentages are based on NASCET measurement criteria.    COMPARISON:  CTA, 08/07/2023    FINDINGS:  Intracranial Compartment:    Ventricles and sulci are normal in size for age without evidence of hydrocephalus. No extra-axial blood or fluid collections.    The brain parenchyma appears normal. No parenchymal mass, hemorrhage, edema, or major vascular distribution infarct.    Skull/Extracranial Contents (limited evaluation): No fracture. Scattered paranasal sinus disease again noted.  The mastoids and middle ears remain clear.    Non-Vascular Structures of the Neck/Thoracic Inlet (limited evaluation): Saline breast implants are noted.    Aorta: Normal 3 vessel arch.    Extracranial carotid circulation: No hemodynamically significant stenosis, aneurysmal dilatation, or dissection.    Extracranial vertebral circulation: No hemodynamically significant stenosis, aneurysmal dilatation, or dissection.    Intracranial Arteries: No focal high-grade stenosis, occlusion, or aneurysm.    Venous structures (limited evaluation): Normal.                                       X-Ray Chest AP Portable  (Final result)  Result time 07/18/24 21:50:50      Final result by Stalin Duran MD (07/18/24 21:50:50)                   Impression:      No acute findings in the chest.      Electronically signed by: Stalin Duran MD  Date:    07/18/2024  Time:    21:50               Narrative:    EXAMINATION:  XR CHEST AP PORTABLE    CLINICAL HISTORY:  tachycardia;    TECHNIQUE:  Single frontal view of the chest was performed.    COMPARISON:  03/29/2024.    FINDINGS:  Underinflated lungs with hypoventilatory change.    No consolidation, pleural effusion or pneumothorax.    Cardiomediastinal silhouette is unremarkable.

## 2024-07-20 NOTE — SUBJECTIVE & OBJECTIVE
"Past Medical History:   Diagnosis Date    Anemia     Anemia     Asthma     exercise asthma    Migraine headache     Ovarian cyst        Past Surgical History:   Procedure Laterality Date    APPENDECTOMY      4/2019    AUGMENTATION OF BREAST Bilateral 03/2022    BREAST BIOPSY Right     Excisional bx, benign    COLONOSCOPY N/A 05/18/2022    Procedure: COLONOSCOPY;  Surgeon: Sabino Mcintosh MD;  Location: [facility] ENDO;  Service: Endoscopy;  Laterality: N/A;  High risk for colon cancer (family)      fully vaccinated; instructions to portal-GT    ESOPHAGOGASTRODUODENOSCOPY N/A 1/18/2024    Procedure: EGD (ESOPHAGOGASTRODUODENOSCOPY);  Surgeon: Sabino Mcintosh MD;  Location: [facility] ENDO;  Service: Endoscopy;  Laterality: N/A;  1/10 ref by Feli Jc, PAC, instr. to portal-st  1/16- pc complete. DB       Review of patient's allergies indicates:   Allergen Reactions    Iodine and iodide containing products Anaphylaxis     PATIENT CAN RECEIVE IOHEXOL, has received multiple times with no pretreatment with no allergic reaction    Shellfish derived Anaphylaxis    Benadryl [diphenhydramine hcl] Itching     "with fast injection"    Fish containing products     Iodine        No current facility-administered medications on file prior to encounter.     Current Outpatient Medications on File Prior to Encounter   Medication Sig    albuterol (PROVENTIL/VENTOLIN HFA) 90 mcg/actuation inhaler INHALE 1 TO 2 PUFFS INTO THE LUNGS EVERY 6 HOURS AS NEEDED FOR WHEEZING    ascorbic acid, vitamin C, (VITAMIN C) 500 MG tablet     clobetasoL (CLOBEX) 0.05 % shampoo Apply 1 application  topically every other day.    ketoconazole (NIZORAL) 2 % shampoo Wash scalp a few times weekly    onabotulinumtoxinA (BOTOX INJ) Inject as directed.    pantoprazole (PROTONIX) 20 MG tablet Take 1 tablet (20 mg total) by mouth once daily. In the morning on an empty stomach    QULIPTA 60 mg Tab     sumatriptan (IMITREX STATDOSE) 6 mg/0.5 mL kit To use max twice within 24 hours, 2 " hours apart.    SUMAtriptan (IMITREX) 20 mg/actuation nasal spray Take a spray that can be repeated after 2 hours, not more than twice a day    tretinoin (RETIN-A) 0.025 % cream Apply pea-sized amount to face nightly     Family History       Problem Relation (Age of Onset)    Arthritis Father    Asthma Mother, Brother    Breast cancer Mother, Maternal Grandmother    COPD Father    Cancer Maternal Grandfather    Colon polyps Brother    Diabetes Maternal Uncle    Hearing loss Paternal Grandmother, Paternal Grandfather, Paternal Aunt, Paternal Uncle    Hypertension Maternal Uncle    Miscarriages / Stillbirths Mother    Pancreatic cancer Maternal Uncle    Prostate cancer Maternal Uncle    Stomach cancer Maternal Grandfather          Tobacco Use    Smoking status: Never    Smokeless tobacco: Never   Substance and Sexual Activity    Alcohol use: Yes     Comment: occass    Drug use: No    Sexual activity: Yes     Partners: Male     Birth control/protection: None     Review of Systems   Constitutional:  Positive for activity change.   Neurological:         Witnessed spells     Psychiatric/Behavioral:  Positive for behavioral problems and confusion.      Objective:     Vital Signs (Most Recent):  Temp: 98.2 °F (36.8 °C) (07/19/24 1530)  Pulse: 84 (07/19/24 1530)  Resp: 18 (07/19/24 1530)  BP: (!) 90/54 (07/19/24 1530)  SpO2: 98 % (07/19/24 1530) Vital Signs (24h Range):  Temp:  [97.8 °F (36.6 °C)-98.3 °F (36.8 °C)] 98.2 °F (36.8 °C)  Pulse:  [] 84  Resp:  [17-21] 18  SpO2:  [84 %-100 %] 98 %  BP: ()/(52-74) 90/54     Weight: 62.6 kg (138 lb)  Body mass index is 24.45 kg/m².     Physical Exam  Vitals and nursing note reviewed.   Constitutional:       General: She is not in acute distress.     Appearance: Normal appearance. She is not ill-appearing or diaphoretic.   HENT:      Head: Normocephalic and atraumatic.      Right Ear: External ear normal.      Left Ear: External ear normal.      Nose: Nose normal. No  congestion or rhinorrhea.      Mouth/Throat:      Mouth: Mucous membranes are moist.      Pharynx: Oropharynx is clear.   Eyes:      General: No scleral icterus.     Extraocular Movements: Extraocular movements intact.      Pupils: Pupils are equal, round, and reactive to light.   Pulmonary:      Effort: Pulmonary effort is normal.   Abdominal:      Palpations: Abdomen is soft.   Musculoskeletal:         General: Normal range of motion.      Cervical back: Normal range of motion and neck supple.      Right lower leg: No edema.      Left lower leg: No edema.   Skin:     General: Skin is warm and dry.   Neurological:      Mental Status: She is alert and oriented to person, place, and time.      Cranial Nerves: Cranial nerves 2-12 are intact.      Motor: Motor strength is normal.     Coordination: Finger-Nose-Finger Test abnormal (volitionally impaired).      Deep Tendon Reflexes:      Reflex Scores:       Tricep reflexes are 2+ on the right side and 2+ on the left side.       Bicep reflexes are 2+ on the right side and 2+ on the left side.       Brachioradialis reflexes are 2+ on the right side and 2+ on the left side.       Patellar reflexes are 2+ on the right side and 2+ on the left side.       Achilles reflexes are 2+ on the right side and 2+ on the left side.  Psychiatric:         Mood and Affect: Mood normal.         Speech: Speech normal.         Behavior: Behavior normal.         Thought Content: Thought content normal.         Judgment: Judgment normal.          NEUROLOGICAL EXAMINATION:     MENTAL STATUS   Oriented to person, place, and time.   Attention: normal. Concentration: normal.   Speech: speech is normal   Level of consciousness: alert    CRANIAL NERVES   Cranial nerves II through XII intact.     CN III, IV, VI   Pupils are equal, round, and reactive to light.    MOTOR EXAM   Muscle bulk: normal  Overall muscle tone: normal    Strength   Strength 5/5 throughout.        Glass positive LLE      REFLEXES     Reflexes   Right brachioradialis: 2+  Left brachioradialis: 2+  Right biceps: 2+  Left biceps: 2+  Right triceps: 2+  Left triceps: 2+  Right patellar: 2+  Left patellar: 2+  Right achilles: 2+  Left achilles: 2+  Right plantar: normal  Left plantar: normal    SENSORY EXAM   Left leg light touch: decreased from knee (patient denies sensation but physically reacts to noxious stimuli)    GAIT AND COORDINATION      Coordination   Finger to nose coordination: abnormal (volitionally impaired)    Tremor   Resting tremor: absent  Intention tremor: absent  Action tremor: absent      Significant Labs: CBC:   Recent Labs   Lab 07/18/24 1815 07/19/24  0437   WBC 5.46 4.49   HGB 13.2 11.6*   HCT 37.7 34.0*    232     CMP:   Recent Labs   Lab 07/18/24 1815 07/18/24 1833 07/19/24  0437   GLU 94  --  82   *  --  139   K 3.8  --  3.3*     --  109   CO2 22*  --  23   BUN 6  --  6   CREATININE 0.8  --  0.7   CALCIUM 9.3  --  8.7   MG  --  1.9 1.9   PROT 6.9  --  5.8*   ALBUMIN 4.1  --  3.6   BILITOT 1.2*  --  1.1*   ALKPHOS 41*  --  38*   AST 13  --  13   ALT 9*  --  9*   ANIONGAP 9  --  7*       Significant Imaging: I have reviewed and interpreted all pertinent imaging results/findings within the past 24 hours.

## 2024-07-20 NOTE — PROGRESS NOTES
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  EMS gave 2 g of Versed for this event.  On arrival to the ED, patient appeared postictal but later returned to her baseline w/o evidence of focal neurologic deficit aside from her known lower extremity weakness/paresthesias which she states have not worsened.  Patient does endorse a 4 day history of generalized HA, which has not resolved w/taking her home Triptan or Tylenol.  Patient states that she does not recall the events preceding her seizures, but does recall briefly waking up while EN route w/ EMS.  After that, she states she only recalls waking up in the ED w/ confusion.  Patient also endorses a 1 day history of abdominal pain.  Patient denies f/c, neck stiffness, worsening weakness/paresthesias, vision changes, CP, SOB, cough/congestion, N/V/D, dysuria/hematuria, incontinence, tongue biting, lower extremity pain/erythema/edema, or any additional trauma.     In ED, had intermittent hypotension with tachycardia (BP 86/53 with HR max 164), tachypnea, on RA, afebrile. Had 3 additional seizures witnessed by ED staff with rhythmic jerking movement. Keppra loaded and received Ativan x2. CTA head and neck with no acute findings and no vessel stenosis. CT AP  with no acute findings. CXR NAIPP. Received CTX and 1L NS. BG 94. CMP with no electrolyte derangements, renal impairment, or LFT elevation except slightly elevated T. Bili 1.2. CBC with no leukocytosis. Lipase normal. LA 1.1. ASA level normal, tylenol level with expected elevation as using tylenol at [facility]. UA with RBC > 100 and WBC 51. Blood cultures collected.     Overview/Hospital Course:  Patient presents to the ED for 2 witnessed generalized tonic-clonic seizures occurring at [facility] as well as an additional episode EN route w/EMS. On arrival to the ED, patient appeared postictal but later returned to her baseline w/o evidence of focal neurologic deficit aside from her known lower extremity weakness/paresthesias which she states have not worsened. EEG ordered and neurology saw patient and think is a seizure like episodes, not seizure. Anti-seizure medication is not recommended. Consults are in for psychiatry and neuropsychology.     Patient c/o of headache today which she claims is similar to her usual migraine pain. Sumatriptan PRN given.     Interval History: Patient c/o of headache today which she claims is similar to her usual migraine pain. Sumatriptan PRN given. Neuropsychology and psychiatry consults ordered.    Review of Systems   Constitutional: Negative.    Respiratory: Negative.     Cardiovascular: Negative.  Negative for chest pain, palpitations and leg swelling.   Gastrointestinal:  Negative for abdominal distention and abdominal pain.   Skin: Negative.    Neurological:  Positive for weakness (LLE).     Objective:     Vital Signs (Most Recent):  Temp: 99.8 °F (37.7 °C) (07/20/24 1520)  Pulse: 73 (07/20/24 1520)  Resp: 18 (07/20/24 1520)  BP: 99/60 (07/20/24 1520)  SpO2: 98 % (07/20/24 1520) Vital Signs (24h Range):  Temp:  [97 °F (36.1 °C)-99.8 °F (37.7 °C)] 99.8 °F (37.7 °C)  Pulse:  [] 73  Resp:  [16-32] 18  SpO2:  [95 %-98 %] 98 %  BP: ()/(50-78) 99/60     Weight:  62.6 kg (138 lb)  Body mass index is 24.45 kg/m².    Intake/Output Summary (Last 24 hours) at 7/20/2024 1549  Last data filed at 7/19/2024 2100  Gross per 24 hour   Intake 120 ml   Output --   Net 120 ml         Physical Exam  Vitals and nursing note reviewed.   Constitutional:       Appearance: Normal appearance.   Cardiovascular:      Rate and Rhythm: Normal rate and regular rhythm.      Heart sounds: No murmur heard.     No gallop.   Pulmonary:      Effort: No respiratory distress.      Breath sounds: No wheezing or rales.   Abdominal:      General: Abdomen is flat. There is no distension.      Tenderness: There is no abdominal tenderness.   Skin:     General: Skin is warm.   Neurological:      Mental Status: She is alert and oriented to person, place, and time.             Significant Labs: All pertinent labs within the past 24 hours have been reviewed.    Significant Imaging: I have reviewed all pertinent imaging results/findings within the past 24 hours.    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 Described as tonic clonic. Upward gaze, loss of consciousness. No tongue lacerations, no incontinence. Postictal state noted (confusion, lethargy). Not on AED currently (prev on Keppra 500 bid). CTA head/neck normal. No lyte abnormalities. Preceded by HA for 4d. WBC in UA but no dysuria. Overall, lack of physiologic provoking factors. Of note, seen for similar 8/2023, Dr. Kelly had recommended Keppra 750mg bid, noted concern for PNES at that time.     - EEG given previous Keppra use with recurrence of possible seizures after taper  - CK, TSH pending  - CTM CMP for lyte abnormalities  - No need for continued CTX given lack of urinary symptoms, no other infectious process suspected      Functional gait abnormality  Sees PT/OT. At baseline.        Migraine  Takes triptan prn, HA not relieved prior to admission with triptan use.        VTE Risk Mitigation (From admission, onward)           Ordered     IP VTE LOW RISK PATIENT  Once         07/19/24 0238     Place sequential compression device  Until discontinued         07/19/24 0238                    Discharge Planning   TRACEY: 7/21/2024     Code Status: Full Code   Is the patient medically ready for discharge?:     Reason for patient still in hospital (select all that apply): Patient trending condition  Discharge Plan A: Home with family                  Tessie Garcia MD  Department of Hospital Medicine   [facility] - Neurosurgery ([facility])

## 2024-07-21 VITALS
BODY MASS INDEX: 24.45 KG/M2 | TEMPERATURE: 98 F | SYSTOLIC BLOOD PRESSURE: 95 MMHG | DIASTOLIC BLOOD PRESSURE: 61 MMHG | OXYGEN SATURATION: 98 % | WEIGHT: 138 LBS | HEIGHT: 63 IN | RESPIRATION RATE: 16 BRPM | HEART RATE: 65 BPM

## 2024-07-21 LAB
ALBUMIN SERPL BCP-MCNC: 3.5 G/DL (ref 3.5–5.2)
ALP SERPL-CCNC: 35 U/L (ref 55–135)
ALT SERPL W/O P-5'-P-CCNC: 10 U/L (ref 10–44)
ANION GAP SERPL CALC-SCNC: 8 MMOL/L (ref 8–16)
AST SERPL-CCNC: 13 U/L (ref 10–40)
BASOPHILS # BLD AUTO: 0.08 K/UL (ref 0–0.2)
BASOPHILS NFR BLD: 1.4 % (ref 0–1.9)
BILIRUB SERPL-MCNC: 0.4 MG/DL (ref 0.1–1)
BUN SERPL-MCNC: 5 MG/DL (ref 6–20)
CALCIUM SERPL-MCNC: 8.9 MG/DL (ref 8.7–10.5)
CHLORIDE SERPL-SCNC: 109 MMOL/L (ref 95–110)
CO2 SERPL-SCNC: 23 MMOL/L (ref 23–29)
CREAT SERPL-MCNC: 0.8 MG/DL (ref 0.5–1.4)
DIFFERENTIAL METHOD BLD: ABNORMAL
EOSINOPHIL # BLD AUTO: 0.2 K/UL (ref 0–0.5)
EOSINOPHIL NFR BLD: 4 % (ref 0–8)
ERYTHROCYTE [DISTWIDTH] IN BLOOD BY AUTOMATED COUNT: 12.4 % (ref 11.5–14.5)
EST. GFR  (NO RACE VARIABLE): >60 ML/MIN/1.73 M^2
GLUCOSE SERPL-MCNC: 90 MG/DL (ref 70–110)
HCT VFR BLD AUTO: 36.3 % (ref 37–48.5)
HGB BLD-MCNC: 12.1 G/DL (ref 12–16)
IMM GRANULOCYTES # BLD AUTO: 0 K/UL (ref 0–0.04)
IMM GRANULOCYTES NFR BLD AUTO: 0 % (ref 0–0.5)
LYMPHOCYTES # BLD AUTO: 2.2 K/UL (ref 1–4.8)
LYMPHOCYTES NFR BLD: 38.6 % (ref 18–48)
MAGNESIUM SERPL-MCNC: 1.8 MG/DL (ref 1.6–2.6)
MCH RBC QN AUTO: 32.1 PG (ref 27–31)
MCHC RBC AUTO-ENTMCNC: 33.3 G/DL (ref 32–36)
MCV RBC AUTO: 96 FL (ref 82–98)
MONOCYTES # BLD AUTO: 0.5 K/UL (ref 0.3–1)
MONOCYTES NFR BLD: 9 % (ref 4–15)
NEUTROPHILS # BLD AUTO: 2.7 K/UL (ref 1.8–7.7)
NEUTROPHILS NFR BLD: 47 % (ref 38–73)
NRBC BLD-RTO: 0 /100 WBC
PHOSPHATE SERPL-MCNC: 4.2 MG/DL (ref 2.7–4.5)
PLATELET # BLD AUTO: 243 K/UL (ref 150–450)
PMV BLD AUTO: 11.2 FL (ref 9.2–12.9)
POTASSIUM SERPL-SCNC: 3.6 MMOL/L (ref 3.5–5.1)
PROT SERPL-MCNC: 5.9 G/DL (ref 6–8.4)
RBC # BLD AUTO: 3.77 M/UL (ref 4–5.4)
SODIUM SERPL-SCNC: 140 MMOL/L (ref 136–145)
WBC # BLD AUTO: 5.75 K/UL (ref 3.9–12.7)

## 2024-07-21 PROCEDURE — 84100 ASSAY OF PHOSPHORUS: CPT

## 2024-07-21 PROCEDURE — 36415 COLL VENOUS BLD VENIPUNCTURE: CPT

## 2024-07-21 PROCEDURE — 80053 COMPREHEN METABOLIC PANEL: CPT

## 2024-07-21 PROCEDURE — 83735 ASSAY OF MAGNESIUM: CPT

## 2024-07-21 PROCEDURE — 90792 PSYCH DIAG EVAL W/MED SRVCS: CPT | Mod: ,,, | Performed by: PSYCHIATRY & NEUROLOGY

## 2024-07-21 PROCEDURE — 85025 COMPLETE CBC W/AUTO DIFF WBC: CPT

## 2024-07-21 RX ORDER — BUSPIRONE HYDROCHLORIDE 10 MG/1
10 TABLET ORAL 3 TIMES DAILY
Qty: 90 TABLET | Refills: 24 | Status: SHIPPED | OUTPATIENT
Start: 2024-07-21 | End: 2026-07-20

## 2024-07-21 NOTE — ASSESSMENT & PLAN NOTE
Patient has hypokalemia which is Acute and currently stable. Most recent potassium levels reviewed-   Lab Results   Component Value Date    K 3.6 07/21/2024   . Will continue potassium replacement per protocol and recheck repeat levels after replacement completed.

## 2024-07-21 NOTE — PLAN OF CARE
Problem: Adult Inpatient Plan of Care  Goal: Plan of Care Review  Outcome: Progressing  Goal: Absence of Hospital-Acquired Illness or Injury  Outcome: Progressing  Intervention: Identify and Manage Fall Risk  Flowsheets (Taken 7/21/2024 0348)  Safety Promotion/Fall Prevention:   Fall Risk reviewed with patient/family   Fall Risk signage in place   family to remain at bedside   instructed to call staff for mobility   medications reviewed   nonskid shoes/socks when out of bed  Goal: Optimal Comfort and Wellbeing  Outcome: Progressing  Intervention: Provide Person-Centered Care  Flowsheets (Taken 7/21/2024 0348)  Trust Relationship/Rapport:   care explained   questions answered   questions encouraged   reassurance provided   thoughts/feelings acknowledged

## 2024-07-21 NOTE — ASSESSMENT & PLAN NOTE
Patient has hyponatremia which is stable,We will aim to correct the sodium by 4-6mEq in 24 hours. We will monitor sodium Daily. The hyponatremia is due to Dehydration/hypovolemia. We will obtain the following studies:  Recent Labs   Lab 07/21/24  0414

## 2024-07-21 NOTE — NURSING
AVS reviewed with patient and   both deny questions. IV removed  and patient is riding home with .

## 2024-07-21 NOTE — PLAN OF CARE
[facility] - Neurosurgery (Hospital)  Discharge Final Note    Primary Care Provider: Katy Vergara MD    Expected Discharge Date: 7/21/2024    Final Discharge Note (most recent)       Final Note - 07/21/24 0925          Final Note    Assessment Type Final Discharge Note     Anticipated Discharge Disposition Home or Self Care     What phone number can be called within the next 1-3 days to see how you are doing after discharge? [phone]     Hospital Resources/Appts/Education Provided Provided patient/caregiver with written discharge plan information;Appointments scheduled and added to AVS        Post-Acute Status    Coverage Payor:  -  [employer] [facility] -     Patient choice form signed by patient/caregiver List with quality metrics by geographic area provided     Discharge Delays None known at this time                     Important Message from Medicare             Patient to discharge to home today; no needs. SW emailed NOATTRIO per patients request for [facility] to obtain patients secondary insurance information.    SERENE Fisher  [facility]  [phone]

## 2024-07-21 NOTE — SUBJECTIVE & OBJECTIVE
Interval History: Patient will be discharged today,.      Objective:     Vital Signs (Most Recent):  Temp: 98 °F (36.7 °C) (07/21/24 0745)  Pulse: 65 (07/21/24 0745)  Resp: 16 (07/21/24 0745)  BP: 95/61 (07/21/24 0745)  SpO2: 98 % (07/21/24 0745) Vital Signs (24h Range):  Temp:  [98 °F (36.7 °C)-99.8 °F (37.7 °C)] 98 °F (36.7 °C)  Pulse:  [65-73] 65  Resp:  [16-18] 16  SpO2:  [97 %-100 %] 98 %  BP: ()/(60-68) 95/61     Weight: 62.6 kg (138 lb)  Body mass index is 24.45 kg/m².  No intake or output data in the 24 hours ending 07/21/24 1146      Physical Exam  Vitals and nursing note reviewed.   Constitutional:       Appearance: Normal appearance.   Cardiovascular:      Rate and Rhythm: Normal rate and regular rhythm.      Heart sounds: No murmur heard.     No gallop.   Pulmonary:      Effort: No respiratory distress.      Breath sounds: No wheezing or rales.   Abdominal:      General: Abdomen is flat. There is no distension.      Tenderness: There is no abdominal tenderness.   Skin:     General: Skin is warm.   Neurological:      Mental Status: She is alert and oriented to person, place, and time.             Significant Labs: All pertinent labs within the past 24 hours have been reviewed.  Recent Lab Results         07/21/24  0414        Albumin 3.5       ALP 35       ALT 10       Anion Gap 8       AST 13       Baso # 0.08       Basophil % 1.4       BILIRUBIN TOTAL 0.4  Comment: For infants and newborns, interpretation of results should be based  on gestational age, weight and in agreement with clinical  observations.    Premature Infant recommended reference ranges:  Up to 24 hours.............<8.0 mg/dL  Up to 48 hours............<12.0 mg/dL  3-5 days..................<15.0 mg/dL  6-29 days.................<15.0 mg/dL         BUN 5       Calcium 8.9       Chloride 109       CO2 23       Creatinine 0.8       Differential Method Automated       eGFR >60.0       Eos # 0.2       Eos % 4.0       Glucose 90        Gran # (ANC) 2.7       Gran % 47.0       Hematocrit 36.3       Hemoglobin 12.1       Immature Grans (Abs) 0.00  Comment: Mild elevation in immature granulocytes is non specific and   can be seen in a variety of conditions including stress response,   acute inflammation, trauma and pregnancy. Correlation with other   laboratory and clinical findings is essential.         Immature Granulocytes 0.0       Lymph # 2.2       Lymph % 38.6       Magnesium  1.8       MCH 32.1       MCHC 33.3       MCV 96       Mono # 0.5       Mono % 9.0       MPV 11.2       nRBC 0       Phosphorus Level 4.2       Platelet Count 243       Potassium 3.6       PROTEIN TOTAL 5.9       RBC 3.77       RDW 12.4       Sodium 140       WBC 5.75               Significant Imaging: I have reviewed all pertinent imaging results/findings within the past 24 hours.

## 2024-07-21 NOTE — DISCHARGE SUMMARY
Charbel Wyatt - Neurosurgery ([facility])  [facility] Medicine  Discharge Summary      Patient Name: Lydia Thomas  MRN: [id number]  OLENA: [id number]  Patient Class: IP- Inpatient  Admission Date: 7/18/2024  Hospital Length of Stay: 2 days  Discharge Date and Time:  07/21/2024 2:06 PM  Attending Physician: Rebecca att. 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  EMS gave 2 g of Versed for this event.  On arrival to the ED, patient appeared postictal but later returned to her baseline w/o evidence of focal neurologic deficit aside from her known lower extremity weakness/paresthesias which she states have not worsened.  Patient does endorse a 4 day history of generalized HA, which has not resolved w/taking her home Triptan or Tylenol.  Patient states that she does not recall the events preceding her seizures, but does recall briefly waking up while EN route w/ EMS.  After that, she states she only recalls waking up in the ED w/ confusion.  Patient also endorses a 1 day history of abdominal pain.  Patient denies f/c, neck stiffness, worsening weakness/paresthesias, vision changes, CP, SOB, cough/congestion, N/V/D, dysuria/hematuria, incontinence, tongue biting, lower extremity pain/erythema/edema, or any additional trauma.     In ED, had intermittent hypotension with tachycardia (BP 86/53 with HR max 164), tachypnea, on RA, afebrile. Had 3 additional seizures witnessed by ED staff with rhythmic jerking movement. Keppra loaded  and received Ativan x2. CTA head and neck with no acute findings and no vessel stenosis. CT AP with no acute findings. CXR NAIPP. Received CTX and 1L NS. BG 94. CMP with no electrolyte derangements, renal impairment, or LFT elevation except slightly elevated T. Bili 1.2. CBC with no leukocytosis. Lipase normal. LA 1.1. ASA level normal, tylenol level with expected elevation as using tylenol at [facility]. UA with RBC > 100 and WBC 51. Blood cultures collected.     * No surgery found *      Hospital Course:   Ms. 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 Presents to ED for 2 witnessed seizure-like episodes. On arrival to the ED, patient appeared postictal but later returned to her baseline w/o evidence of focal neurologic deficit aside from her known lower extremity weakness/paresthesias which she states have not worsened.  Patient does endorse a 4 day history of generalized HA, which has not resolved w/taking her home Triptan or Tylenol. Patient denies vision changes, SOB, cough/congestion, N/V/D, dysuria/hematuria, incontinence, tongue biting, lower extremity pain/erythema/edema, or any additional trauma.      In ED, had intermittent hypotension with tachycardia (BP 86/53 with HR max 164), tachypnea, on RA, afebrile. Had 3 additional seizure-like episodes witnessed by ED staff. Keppra loaded and received Ativan x2. CTA head and neck with no acute findings and no vessel stenosis. CT AP with no acute findings. CXR NAIPP. Received CTX and 1L NS. BG 94. CMP with no electrolyte derangements, renal impairment, or LFT elevation except slightly elevated T. Bili 1.2. CBC with no leukocytosis. Lipase normal. LA 1.1. ASA and tynelol level negative.    Due to her presentation and physical exam, with concerns for PNES. Neurology consulted and EEG ordered which captured 3 episodes of  seizure-like events, no correlating epileptic activity on EEG. Neurology r/o epilepsy and recommend neuropsychology outpatient in addition to PT/OT. Psychiatry consulted recommends Buspar 10 mg TDS for her anxiety. Continue chronic medication, neuropsychology consult ordered, follow up with your neurology and PCP.           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  -Neurology F/U    History of suicide attempt  NA    Anxiety  Started on buspar 10 mg TID per psych rec.    Renal/  Hypokalemia  Patient has hypokalemia which is Acute and currently stable. Most recent potassium levels reviewed-   Lab Results   Component Value Date    K 3.6 07/21/2024   . Will continue potassium replacement per protocol and recheck repeat levels after replacement completed.     Endocrine  Hyponatremia  Patient has hyponatremia which is stable,We will aim to correct the sodium by 4-6mEq in 24 hours. We will monitor sodium Daily. The hyponatremia is due to Dehydration/hypovolemia. 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), with abnormal movement [F44.4]  07/19/2024 Yes    Anxiety [F41.9] 07/19/2024 Yes    Hyponatremia [E87.1] 07/19/2024 Yes    Hypokalemia [E87.6] 07/19/2024 Yes    Sensory loss [R20.0] 07/19/2024 Yes    History of suicide attempt [Z91.51] 07/19/2024 Not Applicable    Functional gait abnormality [R26.89] 03/10/2023 Yes     Chronic    Migraine [G43.909] 08/20/2014 Yes     Chronic      Problems Resolved During this Admission:       Discharged Condition: stable    Disposition: Home or Self Care    Follow Up:    Patient Instructions:      Notify your health care provider if you experience any of the following:  increased confusion or weakness     Notify your health care provider if you experience any of the following:  increased confusion or weakness     Activity as tolerated       Significant Diagnostic Studies: N/A    Pending Diagnostic Studies:       None           Medications:  Reconciled Home Medications:      Medication List        START taking these medications      busPIRone 10 MG tablet  Commonly known as: BUSPAR  Take 1 tablet (10 mg total) by mouth 3 (three) times daily.            CONTINUE taking these medications      albuterol 90 mcg/actuation inhaler  Commonly known as: PROVENTIL/VENTOLIN HFA  INHALE 1 TO 2 PUFFS INTO THE LUNGS EVERY 6 HOURS AS NEEDED FOR WHEEZING     ascorbic acid (vitamin C) 500 MG tablet  Commonly known as: VITAMIN C     BOTOX INJ  Inject as directed.     clobetasoL 0.05 % shampoo  Commonly known as: CLOBEX  Apply 1 application  topically every other day.     ketoconazole 2 % shampoo  Commonly known as: NIZORAL  Wash scalp a few times weekly     pantoprazole 20 MG tablet  Commonly known as: PROTONIX  Take 1 tablet (20 mg total) by mouth once daily. In the morning on an empty stomach     QULIPTA 60 mg Tab  Generic drug: atogepant     SUMAtriptan 20 mg/actuation nasal spray  Commonly known as: IMITREX  Take a spray that can be repeated after 2 hours, not more than twice a day     sumatriptan 6 mg/0.5 mL kit  Commonly known  as: IMITREX STATDOSE  To use max twice within 24 hours, 2 hours apart.     tretinoin 0.025 % cream  Commonly known as: RETIN-A  Apply pea-sized amount to face nightly              Indwelling Lines/Drains at time of discharge:   Lines/Drains/Airways       None                   Time spent on the discharge of patient: 45 minutes         Tessie Garcia MD  Department of Hospital Medicine  [facility] - Neurosurgery ([facility])

## 2024-07-21 NOTE — CONSULTS
"CONSULTATION LIAISON PSYCHIATRY INITIAL EVALUATION    Patient Name: Lydia Thomas  MRN: [id number]  Patient Class: IP- Inpatient  Admission Date: 7/18/2024  Attending Physician: Olivia Clemons MD      Lydia Thomas is a 44 y.o. 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 Expresses increased anxiety since having her fall at work in 2022 in which she hit her head against the bathroom room wall. She states that she frequently relives the event and remembers hearing the words "you're going to be a trauma patient." States that since the fall she has experienced seizures, decreased sensation in her left left, and new stuttering that she can't control. Also reports that she cannot remember speaking Serbian and Zimbabwean (which she spoke fluently previously). States that she cannot read books anymore.  She expresses depression and anxiety over her inability to control her body. States that she participates in PT and Speech Therapy since 2023.   States that her anxiety has gotten to the point where she frequently sleeps downstairs because she is afraid she will fall again and lose more sensation. She expresses she cannot be in large crowds. Continues to have recurrent nightmares about the fall.   Denies SI. Denies HI. Denies AH. Reports strong social connections in her , family, and best friend.      Today, patient affirms frustration over having no control of her body. Reports that fear of having future episodes keeps her from doing things that she once enjoyed such as " exercise, dancing. Feels she has made progress in physical therapy and speech.  notes language difficulties as above with word finding difficulty in all languages. They both note that patient is forgetful. Endorses decreased concentration. Reports nighttime awakenings associated with nightmares. Clarifies that she does not feel she is persistently depressed, though acknowledges frequent sad days over situation. Unable to quantify due to impaired memory. Denies SI/HI/AVH.       Collateral with patient's permission:   See above    Medical Review of Systems:  A comprehensive review of systems was negative.    Psychiatric Review of Systems (is patient experiencing or having changes in):  sleep: yes  appetite: no  weight: no  energy/anergy: no  interest/pleasure/anhedonia: yes  somatic symptoms: yes  libido: no  anxiety/panic: yes  guilty/hopelessness: no  concentration: yes  Doris:no  Psychosis: no  Trauma: yes  S.I.B.s/risky behavior: no     Past Psychiatric History:  Previous Medication Trials: no  Previous Psychiatric Hospitalizations:no   Previous Suicide Attempts: no  History of Violence: no  Outpatient Psychiatrist: no  Family Psychiatric History: no     Substance Abuse History (with emphasis over the last 12 months):  Recreational Drugs:  denies all recreational drugs  Use of Alcohol: denied  Tobacco Use:no  Rehab History:no     Social History:  Marital Status:   Children: 0  Employment Status/Info:  on workman's compensation. 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)  :no,  is in [employer]  Education: some college  Special Ed: no  Housing Status: with spouse  Access to gun: 2 guns locked and secured by the   Psychosocial Stressors: health  Functioning Relationships: good support system     Legal History:  Past Charges/Incarcerations: no  Pending charges:no    Mental Status Exam:  General Appearance: appears stated age, well developed and nourished, adequately groomed  "and appropriately dressed, in no acute distress  Behavior: normal; cooperative; reasonably friendly, pleasant, and polite; appropriate eye-contact; under good behavioral control  Involuntary Movements and Motor Activity: no abnormal involuntary movements noted; no tics, no tremors, no akathisia, no dystonia, no evidence of tardive dyskinesia; no psychomotor agitation or retardation  Gait and Station: unable to assess - patient lying down or seated  Speech and Language: intact; normal rate, rhythm, volume, tone, and pitch; conversational, spontaneous, and coherent; speaks and understands English proficiently and fluently; repeats words and phrases, no word finding difficulties are noted  Mood: "frustrated"  Affect: full-range, mood-congruent, anxious  Thought Process and Associations: intact; linear, goal-directed, organized, and logical; no loosening of associations noted  Thought Content and Perceptions:: no suicidal or homicidal ideation, no auditory or visual hallucinations, no paranoid ideation, no ideas of reference, no evidence of delusions or psychosis  Sensorium and Orientation: intact; alert with clear sensorium; oriented fully to person, place, time and situation  Recent and Remote Memory: grossly intact, able to recall relevant and salient information from the recent and remote past  Attention and Concentration: grossly intact, attentive to the conversation and not readily distractible  Fund of Knowledge: grossly intact, used appropriate vocabulary and demonstrated an awareness of current events, consistent with educational level achieved  Insight: intact, demonstrates awareness of illness and situation  Judgment: intact, behavior is adequate/appropriate to the circumstances, compliant with health provider's recommendations and instructions    CAM ICU positive? Not assessed    FREEMAN-7: Score 9/21.    ASSESSMENT & RECOMMENDATIONS   Functional neurological disorder with abnormal movement  Generalized Anxiety " Disorder, moderate  R/o PTSD    PSYCH MEDICATIONS  Scheduled: start buspar 10 mg TID  PRN: not indicated      RISK ASSESSMENT  NO NEED FOR PEC patient NOT in any imminent danger of hurting self or others and not gravely disabled.     FOLLOW UP  Will sign off. Patient can follow up with PCP or outpatient mental health provider. Psychiatry referral by primary.    DISPOSITION - once medically cleared:   Defer to medical team    Please contact ON CALL psychiatry service (24/7) for any acute issues that may arise.    Note originally pended by Dr. Michelet Espitia. I have personally reviewed the patient's chart and evaluated the patient this morning, and our findings are integrated in this note.       Dr. Franklin SCHILLING Psychiatry  [facility]  7/21/2024 5:13 PM        --------------------------------------------------------------------------------------------------------------------------------------------------------------------------------------------------------------------------------------    CONTINUED HISTORY & OBJECTIVE clinical data & findings reviewed and noted for above decision making    Past Medical/Surgical History:   Past Medical History:   Diagnosis Date    Anemia     Anemia     Asthma     exercise asthma    Migraine headache     Ovarian cyst      Past Surgical History:   Procedure Laterality Date    APPENDECTOMY      4/2019    AUGMENTATION OF BREAST Bilateral 03/2022    BREAST BIOPSY Right     Excisional bx, benign    COLONOSCOPY N/A 05/18/2022    Procedure: COLONOSCOPY;  Surgeon: Sabino Mcintosh MD;  Location: [facility];  Service: Endoscopy;  Laterality: N/A;  High risk for colon cancer (family)      fully vaccinated; instructions to portal-GT    ESOPHAGOGASTRODUODENOSCOPY N/A 1/18/2024    Procedure: EGD (ESOPHAGOGASTRODUODENOSCOPY);  Surgeon: Sabino Mcintosh MD;  Location: [facility];  Service: Endoscopy;  Laterality: N/A;  1/10 ref by SHANEL Wright, instr. to portal-st  1/16- pc complete.  "DBM       Current Medications:   Scheduled Meds:   PRN Meds:     Allergies:   Review of patient's allergies indicates:   Allergen Reactions    Iodine and iodide containing products Anaphylaxis     PATIENT CAN RECEIVE IOHEXOL, has received multiple times with no pretreatment with no allergic reaction    Shellfish derived Anaphylaxis    Benadryl [diphenhydramine hcl] Itching     "with fast injection"    Fish containing products     Iodine        Vitals  Vitals:    07/21/24 0745   BP: 95/61   Pulse: 65   Resp: 16   Temp: 98 °F (36.7 °C)       Labs/Imaging/Studies:  Recent Results (from the past 24 hour(s))   Comprehensive Metabolic Panel (CMP)    Collection Time: 07/21/24  4:14 AM   Result Value Ref Range    Sodium 140 136 - 145 mmol/L    Potassium 3.6 3.5 - 5.1 mmol/L    Chloride 109 95 - 110 mmol/L    CO2 23 23 - 29 mmol/L    Glucose 90 70 - 110 mg/dL    BUN 5 (L) 6 - 20 mg/dL    Creatinine 0.8 0.5 - 1.4 mg/dL    Calcium 8.9 8.7 - 10.5 mg/dL    Total Protein 5.9 (L) 6.0 - 8.4 g/dL    Albumin 3.5 3.5 - 5.2 g/dL    Total Bilirubin 0.4 0.1 - 1.0 mg/dL    Alkaline Phosphatase 35 (L) 55 - 135 U/L    AST 13 10 - 40 U/L    ALT 10 10 - 44 U/L    eGFR >60.0 >60 mL/min/1.73 m^2    Anion Gap 8 8 - 16 mmol/L   Magnesium    Collection Time: 07/21/24  4:14 AM   Result Value Ref Range    Magnesium 1.8 1.6 - 2.6 mg/dL   Phosphorus    Collection Time: 07/21/24  4:14 AM   Result Value Ref Range    Phosphorus 4.2 2.7 - 4.5 mg/dL   CBC with Automated Differential    Collection Time: 07/21/24  4:14 AM   Result Value Ref Range    WBC 5.75 3.90 - 12.70 K/uL    RBC 3.77 (L) 4.00 - 5.40 M/uL    Hemoglobin 12.1 12.0 - 16.0 g/dL    Hematocrit 36.3 (L) 37.0 - 48.5 %    MCV 96 82 - 98 fL    MCH 32.1 (H) 27.0 - 31.0 pg    MCHC 33.3 32.0 - 36.0 g/dL    RDW 12.4 11.5 - 14.5 %    Platelets 243 150 - 450 K/uL    MPV 11.2 9.2 - 12.9 fL    Immature Granulocytes 0.0 0.0 - 0.5 %    Gran # (ANC) 2.7 1.8 - 7.7 K/uL    Immature Grans (Abs) 0.00 0.00 - 0.04 " K/uL    Lymph # 2.2 1.0 - 4.8 K/uL    Mono # 0.5 0.3 - 1.0 K/uL    Eos # 0.2 0.0 - 0.5 K/uL    Baso # 0.08 0.00 - 0.20 K/uL    nRBC 0 0 /100 WBC    Gran % 47.0 38.0 - 73.0 %    Lymph % 38.6 18.0 - 48.0 %    Mono % 9.0 4.0 - 15.0 %    Eosinophil % 4.0 0.0 - 8.0 %    Basophil % 1.4 0.0 - 1.9 %    Differential Method Automated      Imaging Results              CT Abdomen Pelvis With IV Contrast NO Oral Contrast (Final result)  Result time 07/18/24 23:52:36      Final result by Franklin Arteaga MD (07/18/24 23:52:36)                   Impression:      No acute findings in the abdomen or pelvis, allowing for motion limitations.    Probable subcentimeter uterine fibroid and nabothian cysts.      Electronically signed by: Franklin Arteaga MD  Date:    07/18/2024  Time:    23:52               Narrative:    EXAMINATION:  CT ABDOMEN PELVIS WITH IV CONTRAST    CLINICAL HISTORY:  Abdominal pain, acute, nonlocalized;    TECHNIQUE:  Low dose axial images, sagittal and coronal reformations were obtained from the lung bases to the pubic symphysis following the IV administration of 75 mL of Omnipaque 350 .  Oral contrast was not given.    COMPARISON:  CT abdomen pelvis without contrast, 01/14/2022.  CT abdomen pelvis with contrast, 01/13/2020.  Pelvic ultrasound, 07/05/2024.    FINDINGS:  Lower Chest:    Lung bases are clear.  Heart size is normal.    Abdomen:    Exam quality is limited by significant motion in the upper and mid abdomen.    Liver is normal in size and contour.  No focal hepatic lesion.  Gallbladder is unremarkable. No intrahepatic biliary ductal dilatation.    Spleen is at the upper limits of normal in size and otherwise unremarkable.  Adrenal glands and pancreas are unremarkable.    The kidneys are symmetric.  No hydronephrosis. Contrast is noted in the bilateral collecting system which limits evaluation for small stones.  No asymmetric perinephric fat stranding.    No small bowel obstruction.  No inflammatory  changes identified in bowel, allowing for motion.  Appendix is surgically absent per history.    No pneumoperitoneum or organized fluid collection.    No bulky retroperitoneal lymphadenopathy.    Abdominal aorta is normal in caliber without significant calcific atherosclerosis.    Portal, splenic, and superior mesenteric veins are patent. No portal venous gas.    Pelvis:    Urinary bladder is decompressed and not well evaluated.  No focal wall thickening.  Heterogeneous appearance of the cervix with probable nabothian cysts.  Negative Pap smear noted in the chart in April 2024, which is reassuring.  Small uterine fibroids.  No overt endometrial thickening.  Rectum is unremarkable.  No significant pelvic free fluid.  No bulky pelvic lymphadenopathy.    Bones and soft tissues:    No aggressive osseous lesions.  Extraperitoneal soft tissues are negative for acute finding.  Bilateral breast prostheses.                                       CTA Head and Neck (xpd) (Final result)  Result time 07/18/24 23:52:36      Final result by Socrates Beltran MD (07/18/24 23:52:36)                   Impression:      No acute abnormality.    No high-grade stenosis or major vessel occlusion.      Electronically signed by: Socrates Beltran  Date:    07/18/2024  Time:    23:52               Narrative:    EXAMINATION:  CTA HEAD AND NECK (XPD)    CLINICAL HISTORY:  Stroke/TIA, determine embolic source;    TECHNIQUE:  Non contrast low dose axial images were obtained through the head. CT angiogram was performed from the level of the drew to the top of the head following the IV administration of 75mL of Omnipaque 350.   Sagittal and coronal reconstructions and maximum intensity projection reconstructions were performed. Arterial stenosis percentages are based on NASCET measurement criteria.    COMPARISON:  CTA, 08/07/2023    FINDINGS:  Intracranial Compartment:    Ventricles and sulci are normal in size for age without evidence of  hydrocephalus. No extra-axial blood or fluid collections.    The brain parenchyma appears normal. No parenchymal mass, hemorrhage, edema, or major vascular distribution infarct.    Skull/Extracranial Contents (limited evaluation): No fracture. Scattered paranasal sinus disease again noted.  The mastoids and middle ears remain clear.    Non-Vascular Structures of the Neck/Thoracic Inlet (limited evaluation): Saline breast implants are noted.    Aorta: Normal 3 vessel arch.    Extracranial carotid circulation: No hemodynamically significant stenosis, aneurysmal dilatation, or dissection.    Extracranial vertebral circulation: No hemodynamically significant stenosis, aneurysmal dilatation, or dissection.    Intracranial Arteries: No focal high-grade stenosis, occlusion, or aneurysm.    Venous structures (limited evaluation): Normal.                                       X-Ray Chest AP Portable (Final result)  Result time 07/18/24 21:50:50      Final result by Stalin Duran MD (07/18/24 21:50:50)                   Impression:      No acute findings in the chest.      Electronically signed by: Stalin Duran MD  Date:    07/18/2024  Time:    21:50               Narrative:    EXAMINATION:  XR CHEST AP PORTABLE    CLINICAL HISTORY:  tachycardia;    TECHNIQUE:  Single frontal view of the chest was performed.    COMPARISON:  03/29/2024.    FINDINGS:  Underinflated lungs with hypoventilatory change.    No consolidation, pleural effusion or pneumothorax.    Cardiomediastinal silhouette is unremarkable.

## 2024-07-22 ENCOUNTER — PATIENT MESSAGE (OUTPATIENT)
Dept: FAMILY MEDICINE | Facility: CLINIC | Age: 44
End: 2024-07-22
Payer: OTHER GOVERNMENT

## 2024-07-22 DIAGNOSIS — S06.9X0S TRAUMATIC BRAIN INJURY WITHOUT LOSS OF CONSCIOUSNESS, SEQUELA: Primary | ICD-10-CM

## 2024-07-22 PROBLEM — R56.9 SEIZURE-LIKE ACTIVITY: Status: ACTIVE | Noted: 2021-03-31

## 2024-07-22 PROBLEM — F44.5 PSYCHOGENIC NONEPILEPTIC SEIZURE: Status: ACTIVE | Noted: 2024-07-19

## 2024-07-22 NOTE — PROGRESS NOTES
"  Cancer Genetics  Hereditary/High Risk Clinic  Department of Hematology and Oncology  [facility]        Date of Service:  2024  Provider:  Aisha Loco MS, [facility]    Patient Information  Name:  Lydia Thomas  :  1980  MRN:  [id number]        Referring Provider: Darryl Anders NP     Televisit Information  The patient location is: [place], [place].  The chief complaint leading to consultation is: as below.  Visit type: audiovisual.  Face-to-face time with patient: approximately 50 minutes.  Approximately 110 minutes in total were spent on this encounter, which includes face-to-face time and non-face-to-face time preparing to see the patient (e.g., review of tests), obtaining and/or reviewing separately obtained history, documenting clinical information in the electronic or other health record, independently interpreting results (not separately reported) and communicating results to the patient/family/caregiver, or care coordination (not separately reported).  Each patient to whom he or she provides medical services by telemedicine is:  (1) informed of the relationship between the physician and patient and the respective role of any other health care provider with respect to management of the patient; and (2) notified that he or she may decline to receive medical services by telemedicine and may withdraw from such care at any time.    SUBJECTIVE:      Reason for Referral: History of cancer    History of Present Illness (HPI):  Lydia Thomas ("Lydia"), 44 y.o., assigned female sex at birth is new to [facility].  She was referred by  Obstetrics and Gynecology  for genetic cancer risk assessment given her history of cancer. After a brief discussion, Lydia stated that she has no personal history of cancer. However, she has a strong maternal family history of breast, pancreatic, and ovarian cancer.    Focused Medical History:   Germline cancer-genetic " testing:  No  Cancer:  No  Colonoscopy: Yes  Most recent colonoscopy: 05/18/2022, told to repeat in 5 years  Most recent EGD: 01/18/2024  Colon polyp:  No  Mammogram: Yes  Most recent mammo: 05/14/2024  Breast MRI:  No  Other benign tumors/findings:  Yes  Right breast mass (age 18)  Left ovarian cyst , surveillance ()  Pancreatitis:  No  Pancreatic cyst:  No  Reproductive organs intact     Focused Surgical History  N/A    Ancestry:  Ashkenazi Druze ancestry:  Yes - possibly  Paternal: [place] citizen   Maternal: [place], [place],     Focused Family History:  Consanguinity in ancestors:  No  Germline cancer-genetic testing in blood relatives:  No  Cancer in family:  Yes; there are no known blood relatives affected with cancer other than those mentioned in the pedigree below    Family Cancer Pedigree: Please see pedigree in History tab for clearer view.              Review of Systems:  See HPI.      SUBJECTIVE:   Records Reviewed  Medical History  Problem List  Any pertinent, available Procedures and Pathology reports in both [facility] and [facility]    COUNSELING   Causes of cancer  Germline cancer genetic testing is the testing of genes associated with cancer, known as cancer susceptibility genes.  Just as these genes are inherited from parents, mutations in these genes can be inherited, as well.  A mutation in a cancer susceptibility gene adversely affects the gene's ability to prevent cancer; therefore, carriers of cancer susceptibility gene mutations may be at increased risk for certain cancers.     Only 5-10% cancers are caused by a cancer susceptibility gene mutation, meaning the cancer is genetic/hereditary; rather, most cancers are sporadic.  Causes of sporadic cancers may include environmental risk factors, lifestyle risk factors, and non-modifiable risk factors.  It is important to note that members of a family often share not only their genetics but also risk factors  including environmental and lifestyle risk factors, so cancers can be familial.    Mutations in BRCA1 and BRCA2 are associated with an increased risk for breast and ovarian cancer, in addition to male breast cancer, pancreatic, melanoma, and prostate cancer. Discussed that mutations in other genes may increase the risk of breast and prostate cancer, in addition to other cancers.     Potential results of genetic testing, and their implications  Potential results of genetic testing include positive, negative, and variant of unknown significance (VUS).    A positive result indicates the presence of at least one clinically significant mutation, and the patient's associated cancer risks vary depending upon the cancer susceptibility gene(s) in which there is/are a mutation(s).  With a positive result, in some cases, depending upon the specific result and the patient's clinical history, modified risk management may be recommended, including measures for risk reduction and/or surveillance; however, modified management is not always an option.    A negative result indicates that no clinically significant mutations were identified in the gene(s) tested.    A VUS indicates that there is not presently enough data for the laboratory to make a determination as to whether the variant is clinically significant.  VUSs are not typically acted upon clinically.       The ability to interpret the meaning of a negative genetic testing result in genes associated with cancer with which the patient has not personally been affected, when done prior to testing the appropriate affected relative(s), is significantly limited.  A negative result in the patient does not indicate that she cannot develop cancer, and, in fact, the patient may even be at increased risk for cancer based on shared risk factors with affected relatives.  The most informative candidates for initial genetic testing in a family are those who have been affected with cancer.      Modified management may also be recommended, even with a result of no or unknown significance, based upon risk assessment that incorporates the family history.      Tyer Cuzik Score  Breast Cancer Risk Stratification  Current estimated lifetime risk of breast cancer, as calculated utilizing the Tyrer-Cuzick risk-assessment model v8.0b:  24.18%.  This places the patient in the high-risk range according to current clinical guidelines. As such, the NCCN guidelines recommend that she continue annual mammograms, and also consider  meeting with a breast healthcare specialist to determine whether she would benefit from additional screenings for breast cancer, such as breast MRI     Genetic mutation inheritance  If Lydia tests positive for a mutation, her first-degree relatives would each have a 50% chance of having the same mutation, and other, more distantly related blood-relatives would also be at risk of having the same mutation.       Genetic discrimination  The Genetic Information Nondiscrimination Act (ELVIN) is [place] federal legislation that provides some protections against use of an individual's genetic information by their health insurer and by their employer.  Title I of ELVIN prohibits most health insurers from utilizing an individual's genetic information to make decisions regarding insurance eligibility or premium charges.  Title II of ELVIN prohibits covered entities, including employers, from requesting the genetic information of employees and applicants.  ELVIN does not protect individuals from genetic discrimination toward health insurance obtained through a job with the  or through the Federal Employees Health Benefits Plan; from genetic discrimination by employers with fewer than 15 employees or if employed by the ; or from genetic discrimination by any other type of policies/entities, including but not limited to life insurance, disability insurance, long-term care insurance,   benefits, and [facility] benefits.     Genetic testing logistics  An outside laboratory would perform the testing after a blood sample is collected or a saliva sample is collected by the patient at home.  With genetic testing, there is a potential for the patient to incur out-of-pocket costs.  Results can take 2-3 weeks.  Post-test genetic counseling can be conducted once the genetic testing results are available.     Assessment/Plan  Based on the information provided by Lydia, she meets current criteria for genetic testing of hereditary breast and ovarian cancer according to current clinical guidelines. Lydia's clinical history, including personal history and/or family history, is strongly suggestive of a hereditary cancer syndrome in the family given the maternal family history of breast cancer in a first-, second- and third-degree relative, as well as the maternal family history of prostate and pancreatic cancer in a second degree-relative     Offered germline cancer-genetic testing at this time versus deferring testing at this time or declining testing altogether.  Various test panel options were discussed. Lydia decided to proceed with genetic testing through the .  Lydia has provided her informed consent to proceed. Lydia agreed to proceed with genetic testing through the [employer] BRCA1 and BRCA2 gene sequence and deletion/duplication and 85-gene CustomNext-Cancer Panel.     Questions were encouraged and answered to the patient's satisfaction, and she verbalized understanding of information and agreement with the plan.         Signed,    Aisha Bean MS, [facility] [place]  Licensed - Certified Genetic Counselor  [facility] - Breast Surgery    07/24/2024

## 2024-07-23 ENCOUNTER — CLINICAL SUPPORT (OUTPATIENT)
Dept: REHABILITATION | Facility: HOSPITAL | Age: 44
End: 2024-07-23
Payer: COMMERCIAL

## 2024-07-23 ENCOUNTER — TELEPHONE (OUTPATIENT)
Dept: FAMILY MEDICINE | Facility: CLINIC | Age: 44
End: 2024-07-23
Payer: OTHER GOVERNMENT

## 2024-07-23 DIAGNOSIS — R41.841 COGNITIVE COMMUNICATION DEFICIT: Primary | ICD-10-CM

## 2024-07-23 DIAGNOSIS — G43.809 OTHER MIGRAINE WITHOUT STATUS MIGRAINOSUS, NOT INTRACTABLE: Primary | ICD-10-CM

## 2024-07-23 PROCEDURE — 97130 THER IVNTJ EA ADDL 15 MIN: CPT | Mod: 95,PO

## 2024-07-23 PROCEDURE — 97129 THER IVNTJ 1ST 15 MIN: CPT | Mod: 95,PO

## 2024-07-23 NOTE — PROGRESS NOTES
[facility]  Speech Therapy Treatment Note- Neurological Rehabilitation  Date: 7/23/2024     Name: Lydia Thomas   MRN: [id number]   Therapy Diagnosis:   Encounter Diagnosis   Name Primary?    Cognitive communication deficit Yes     Physician: Francine Contreras,*  Physician Orders: Ambulatory Referral to Speech Therapy   Medical Diagnosis:  Impaired functional mobility, balance, gait, and endurance [Z74.09]     Visit #/ Visits Authorized: 10/ 12 (including evaluation)   Date of Evaluation:  4/15/24   Insurance Authorization Period: 1/18/2024 - 1/17/2025   Plan of Care Expiration Date:   7/26/2024  Extended Plan of Care:  N/A  Progress Note: 7/26/2024    Time In:  1:50 pm  Time Out: 2:26 pm   Total Billable Time: 36 minutes      Precautions: Standard, Fall, Cognition, and Communication    Subjective:   Patient reports: pleasant; no complaints.  Virtual session today.    She was compliant to home exercise program.   Response to previous treatment: good  Pain Scale: no pain indicated throughout session  Objective:   TIMED  Procedure Min.   Cognitive Therapeutic Interventions, first 15 minutes CPT 54057  15   Cognitive Therapeutic Interventions, each additional 15 minutes CPT 37293  21       Short Term Goals: (4 weeks) Current Progress:   1.Patient will complete alternating attention tasks with 90% accuracy given minimum cues.    Met x 3/ ongoing   2. Patient will complete selective attention tasks with 90% accuracy given minimum cues.       Met x 3/ discontinue 7/11/24   3. Patient will complete immediate memory tasks using strategies with 90% accuracy given minimum cues.      Met x 2   4. Patient will complete delayed memory tasks using strategies with 90% accuracy given minimum cues.       Met x3 / discontinue 7/11/24   5. Patient will complete mental manipulation tasks with 90% accuracy given minimum cues.     Met x 2    6. Patient will complete moderate to complex problem solving tasks  with 90% accuracy given minimum cues.      Progressing/  2024   Problem solving clock math with mod-max A.   Problem solving simple math with 100% acc min A; occasional repetitions required; patient needed to use pen and paper for calculations.   7. Patient will use easy onset and diaphragmatic breathing during speech tasks on 4/5 trials to increase speech fluency.      Met x 3/ongoing   8.  Patient will functional reading comprehension tasks with 90% accuracy given minimum cues.    Progressing/ 2024  Functional reading comprehension task with 80% acc min A.     Completed 2 minutes of mindfulness at the beginning of the session. Patient   Patient Education/Response:   Patient educated regarding the followin. Cognitive compensatory strategies   2. Activities to complete at home in order to continue improving and stimulating cognitive-communicative skills as well as increasing independence.   3. Mindfulness    education/counseling today - discussed types of disfluencies and fluency enhancing strategies to practice. Discussed feelings regarding current cognitive-communication deficits.      Home program established: Patient instructed to continue prior program     See Electronic Medical Record under Patient Instructions for exercises provided throughout therapy.  Assessment:   Lydia participated well today in today's session which focused on extensive education regarding memory, sustained attention, alternating attention, problem solving, meta-cognitive strategy training, education, reading comprehension, and Fluency strategies. Difficulty with clock math using mental manipulation; not much improvement with a visual.  Good performance on mental math (simple addition and subtraction) while using a pen and paper for calculations. Patient motivated to incorporate strategies and activities into her daily routine to continue improving.  Cognitive, Physical, and Emotional fatigue was not believed to have been  a barrier to the session.  Lydia is progressing well towards her goals. Current goals remain appropriate. Goals to be updated as necessary. Patient feels like speech therapy is helping and wants to continue. Consider extension of Plan of Care since patient is making progress.    Patient prognosis is Fair. Patient will continue to benefit from skilled outpatient speech and language therapy to address the deficits listed in the problem list on initial evaluation, provide patient/family education and to maximize patient's level of independence in the home and community environment.   Medical necessity is demonstrated by the following IMPAIRMENTS:  Cognition: Deficits in executive functioning and memory prevent the pt from returning to work, and place her at risk of unsafe behavior and a decline in quality of life.    Cognition: Deficits in executive functioning, attention, and memory prevent the pt from relaying medically and safety relevant information in a timely manner in a state of emergency.   Barriers to Therapy: transportation   Patient's spiritual, cultural and educational needs considered and patient agreeable to plan of care and goals.  Plan:   Continue updated Plan of Care with focus on rehabilitation and compensation for cognitive communication deficits.    Rachel Regalado M.S., L-SLP,CCC-SLP   Speech Language Pathologist    7/23/2024

## 2024-07-23 NOTE — TELEPHONE ENCOUNTER
Referral placed for referral for Neurologist for Botox injections re: migraines as per patient's request.

## 2024-07-24 ENCOUNTER — OFFICE VISIT (OUTPATIENT)
Dept: SURGERY | Facility: CLINIC | Age: 44
End: 2024-07-24
Payer: OTHER GOVERNMENT

## 2024-07-24 DIAGNOSIS — Z80.41 FAMILY HISTORY OF OVARIAN CANCER: ICD-10-CM

## 2024-07-24 DIAGNOSIS — Z80.42 FAMILY HISTORY OF PROSTATE CANCER: ICD-10-CM

## 2024-07-24 DIAGNOSIS — Z80.3 FAMILY HISTORY OF BREAST CANCER IN FIRST DEGREE RELATIVE: ICD-10-CM

## 2024-07-24 DIAGNOSIS — Z80.0 FAMILY HISTORY OF PANCREATIC CANCER: ICD-10-CM

## 2024-07-24 DIAGNOSIS — Z80.9 FAMILY HISTORY OF CANCER: ICD-10-CM

## 2024-07-24 DIAGNOSIS — Z80.3 FAMILY HISTORY OF BREAST CANCER: Primary | ICD-10-CM

## 2024-07-24 DIAGNOSIS — Z80.8 FAMILY HISTORY OF MELANOMA: ICD-10-CM

## 2024-07-24 LAB
BACTERIA BLD CULT: NORMAL
BACTERIA BLD CULT: NORMAL

## 2024-07-25 ENCOUNTER — PATIENT MESSAGE (OUTPATIENT)
Dept: ORTHOPEDICS | Facility: CLINIC | Age: 44
End: 2024-07-25
Payer: OTHER GOVERNMENT

## 2024-07-25 ENCOUNTER — CLINICAL SUPPORT (OUTPATIENT)
Dept: REHABILITATION | Facility: HOSPITAL | Age: 44
End: 2024-07-25
Payer: COMMERCIAL

## 2024-07-25 DIAGNOSIS — R41.841 COGNITIVE COMMUNICATION DEFICIT: Primary | ICD-10-CM

## 2024-07-25 PROCEDURE — 97129 THER IVNTJ 1ST 15 MIN: CPT | Mod: 95,PO

## 2024-07-25 PROCEDURE — 97130 THER IVNTJ EA ADDL 15 MIN: CPT | Mod: 95,PO

## 2024-07-25 NOTE — PROGRESS NOTES
[facility]  Speech Therapy Progress Note- Neurological Rehabilitation  Date: 7/25/2024     Name: Lydia Thomas   MRN: [id number]   Therapy Diagnosis:   Encounter Diagnosis   Name Primary?    Cognitive communication deficit Yes     Physician: Francine Contreras,*  Physician Orders: Ambulatory Referral to Speech Therapy   Medical Diagnosis:  Impaired functional mobility, balance, gait, and endurance [Z74.09]     Visit #/ Visits Authorized: 14/ 12 (including evaluation)   Date of Evaluation:  4/15/24   Insurance Authorization Period: 1/18/2024 - 1/17/2025   Plan of Care Expiration Date:   See updated POC (7/26/2024) - NEW POC EXPIRATION DATE: 9/20/2024  Extended Plan of Care:  N/A  Progress Note: See updated POC    Time In:  10:35 am  Time Out: 11:15 am   Total Billable Time: 40 minutes      Precautions: Standard, Fall, Cognition, and Communication    Subjective:   Patient reports: pleasant; no complaints.  Virtual session today.    She was compliant to home exercise program.   Response to previous treatment: good  Pain Scale: no pain indicated throughout session  Objective:   TIMED  Procedure Min.   Cognitive Therapeutic Interventions, first 15 minutes CPT 72816  15   Cognitive Therapeutic Interventions, each additional 15 minutes CPT 44480  25       Short Term Goals: (4 weeks) Current Progress:   1.Patient will complete alternating attention tasks with 90% accuracy given minimum cues.    Met x 3/ ongoing   2. Patient will complete selective attention tasks with 90% accuracy given minimum cues.       Met x 3/ discontinue 7/11/24   3. Patient will complete immediate memory tasks using strategies with 90% accuracy given minimum cues.      Met x 2  Patient completed immediate recall of a paragraph of information read aloud with 53% accuracy independently. Using note taking strategy, she then completed this task with 78% accuracy.  Clinician and patient discussed WRAP memory strategies.    4.  Patient will complete delayed memory tasks using strategies with 90% accuracy given minimum cues.       Met x3 / discontinue 24   5. Patient will complete mental manipulation tasks with 90% accuracy given minimum cues.     Met x 3/ Goal Met 2024  Mental manipulation tasks completed with 90% accuracy independently. Patient reported the most difficult task was alphabetizing words read aloud. However, she was able to complete the tasks with high accuracy independently.      6. Patient will complete moderate to complex problem solving tasks with 90% accuracy given minimum cues.      Progressing/  2024   Not formally addressed.    7. Patient will use easy onset and diaphragmatic breathing during speech tasks on 4/5 trials to increase speech fluency.      Met x 3/ongoing   8.  Patient will functional reading comprehension tasks with 90% accuracy given minimum cues.    Progressing/ 2024  Not formally addressed.      Patient Education/Response:   Patient educated regarding the followin. Cognitive compensatory strategies   2. WRAP memory strategies   3. How to incorporate strategies into daily life    education/counseling today - discussed increased fatigue since recent seizure. She reports she is feeling closer to baseline. Discussed how to incorporate strategies into daily life.     Home program established: Patient instructed to continue prior program     See Electronic Medical Record under Patient Instructions for exercises provided throughout therapy.  Assessment:   PROGRESS:   Lydia participated well today in today's session which focused on extensive education regarding memory, sustained attention, meta-cognitive strategy training, mental manipulation, and education. Difficulty reading and answering simple questions about prescription bottles. She completed mental manipulation tasks with high accuracy. She reported the most difficult task was alphabetizing words read aloud. However, she  completed all tasks with high accuracy independently. WRAP strategies discussed today (write it down, repeat it, associate it, and picture it). Patient eager to utilize strategies during daily interactions and reported she will share these strategies with others so they can utilize these when giving her information.  Cognitive, Physical, and Emotional fatigue was not believed to have been a barrier to the session.  Lydia is progressing well towards her goals. Current goals remain appropriate. Goals to be updated as necessary. Patient feels like speech therapy is helping and wants to continue. Consider extension of Plan of Care since patient is making progress.    Patient prognosis is Fair. Patient will continue to benefit from skilled outpatient speech and language therapy to address the deficits listed in the problem list on initial evaluation, provide patient/family education and to maximize patient's level of independence in the home and community environment.   Medical necessity is demonstrated by the following IMPAIRMENTS:  Cognition: Deficits in executive functioning and memory prevent the pt from returning to work, and place her at risk of unsafe behavior and a decline in quality of life.    Cognition: Deficits in executive functioning, attention, and memory prevent the pt from relaying medically and safety relevant information in a timely manner in a state of emergency.   Barriers to Therapy: transportation   Patient's spiritual, cultural and educational needs considered and patient agreeable to plan of care and goals.  Plan:   Continue updated Plan of Care with focus on rehabilitation and compensation for cognitive communication deficits.    Lottie Davey, CCC-SLP, CBIS   Speech Language Pathologist   Certified Brain Injury Specialist   7/25/2024

## 2024-07-26 NOTE — PLAN OF CARE
[facility]  Speech Therapy Updated Plan of Care-Neurological Rehabilitation         Date: 7/25/2024   Name: Lydia Thomas  Clinic Number: [id number]    Therapy Diagnosis:   Encounter Diagnosis   Name Primary?    Cognitive communication deficit Yes     Physician: Francine Contreras,*    Physician Orders: Ambulatory Referral to Speech Therapy   Medical Diagnosis:  Impaired functional mobility, balance, gait, and endurance [Z74.09]     Visit #/ Visits Authorized:  14 /12   Evaluation Date: 4/15/2024  Insurance Authorization Period: 1/18/2024 - 1/17/2025  Plan of Care Expiration:    7/26/2024  New POC Certification Period:  7/26/2024 - 9/20/2024    Total Visits Received: 14    Precautions:Standard  Subjective     Update: Patient has progressed well towards her goals since the initiation of therapy. She has met three goals and is on track to meet additional goals. She is receptive to education provided and participates in all therapy tasks. She incorporates strategies when needed to improve accuracy on tasks. She continues to demonstrate deficits in attention, memory, and executive functions that impacts her ability to safely and independently complete activities of daily living. She will benefit from continued skilled speech therapy to address the above deficits.     Objective     Update: see follow up note dated 7/25/2024    Assessment     Update: Lydia Thomas presents to [facility] status post medical diagnosis of Impaired functional mobility, balance, gait, and endurance . Demonstrates impairments including limitations as described in the problem list. Positive prognostic factors include Patient motivation. Negative prognostic factors include: none. She presents with a moderate cognitive-communication disorder characterized by deficits in attention impacting her ability to encode, store and subsequently retrieve information. Deficits noted in executive functions  such as thought flexibility and thought organization. Additionally, patient presents with reading and writing deficits impacting her ability to complete activities of daily living and tasks such as filling out medical forms or reading important health notes from physicians. 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  No barriers to therapy identified.. Patient will benefit from skilled, outpatient rehabilitation speech therapy.    Rehab Potential: good   Pt's spiritual, cultural, and educational needs considered and patient agreeable to plan of care and goals.    Education: Plan of Care and role of SLP in care     Previous Short Term Goals Status:  Short Term Goals: (4 weeks)   1.Patient will complete alternating attention tasks with 90% accuracy given minimum cues.      2. Patient will complete selective attention tasks with 90% accuracy given minimum cues.      GOAL MET    3. Patient will complete immediate memory tasks using strategies with 90% accuracy given minimum cues.        4. Patient will complete delayed memory tasks using strategies with 90% accuracy given minimum cues.      GOAL MET    5. Patient will complete mental manipulation tasks with 90% accuracy given minimum cues.     GOAL MET    6. Patient will complete moderate to complex problem solving tasks with 90% accuracy given minimum cues.      Progressing/  7/25/2024     7. Patient will use easy onset and diaphragmatic breathing during speech tasks on 4/5 trials to increase speech fluency.         8.  Patient will functional reading comprehension tasks with 90% accuracy given minimum cues.     Progressing/ 7/25/2024           New Short Term Goals:   Short Term Goals: (4 weeks)   1.Patient will complete alternating attention tasks with 90% accuracy given minimum cues.      2. Patient will complete immediate memory tasks using strategies with 90% accuracy given minimum cues.        3. Patient will  complete moderate to complex problem solving tasks with 90% accuracy given minimum cues.       4. Patient will use easy onset and diaphragmatic breathing during speech tasks on 4/5 trials to increase speech fluency.         5.  Patient will functional reading comprehension tasks with 90% accuracy given minimum cues.      6. NEW GOAL: Patient will use Goal Plan Action Review strategy to complete moderate to complex reasoning, planning, or organization tasks with 90% accuracy independently to improve functional executive function skills.       7. NEW GOAL: Patient will prioritize daily activities (work or home) by making a list with 3-4 target tasks for the day in a prioritized list and demonstrate and understanding of the rationale to reduced cognitive load.        8. NEW GOAL: Patient will independently generate strategies to improve ability to complete tasks with enhanced accuracy and time, based on review of objective previous performance on trials of functional tasks with 80% accuracy.             Long Term Goal Status:      Long Term Goals: (8 weeks)   1. Patient will use appropriate memory strategies to schedule and recall weekly activities, perform daily tasks, and express needs and recall names to maintain safety and participate socially in functional living environment.        2. Patient will improve  attention skills to effectively attend to and communicate in complex daily living tasks in functional living environment.         3. Patient will demonstrate use of  goal setting, planning,  initiation, and  self-monitoring during daily living activities to improve safety and awareness in functional living environment.      4. Patient will apply problem-solving strategies with visual support in daily living functional activities at home and in the community.        5. Patient will develop functional cognitive-linguistic based skills and utilize compensatory strategies to communicate wants and needs effectively  to different conversational partners, maintain safety, and participate socially in functional living environment.                  Goals Previously Met:  2. Patient will complete selective attention tasks with 90% accuracy given minimum cues.      GOAL MET    4. Patient will complete delayed memory tasks using strategies with 90% accuracy given minimum cues.      GOAL MET    5. Patient will complete mental manipulation tasks with 90% accuracy given minimum cues.     GOAL MET         Reasons for Recertification of Therapy: Patient continues to present with a moderate cognitive-communication disorder characterized by deficits in attention, memory, executive functions, reading and writing that negatively impacts her quality of life and ability to safely and independently complete activities of daily living.   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     Plan     Updated Certification Period: 7/25/2024 to 9/20/2024    Recommended Treatment Plan: Patient will participate in the [facility] rehabilitation program for speech therapy 2 times per week to address her Cognition deficits, to educate patient and their family, and to participate in a home exercise program.        Therapist's Name:    Lottie Davey, CCC-SLP, CBIS   Speech Language Pathologist   Certified Brain Injury Specialist   7/26/2024      I CERTIFY THE NEED FOR THESE SERVICES FURNISHED UNDER THIS PLAN OF TREATMENT AND WHILE UNDER MY CARE      Physician Name: _______________________________    Physician Signature: ____________________________

## 2024-07-30 ENCOUNTER — CLINICAL SUPPORT (OUTPATIENT)
Dept: REHABILITATION | Facility: HOSPITAL | Age: 44
End: 2024-07-30
Payer: COMMERCIAL

## 2024-07-30 DIAGNOSIS — R41.841 COGNITIVE COMMUNICATION DEFICIT: Primary | ICD-10-CM

## 2024-07-30 PROCEDURE — 97130 THER IVNTJ EA ADDL 15 MIN: CPT | Mod: 95,PO

## 2024-07-30 PROCEDURE — 97129 THER IVNTJ 1ST 15 MIN: CPT | Mod: 95,PO

## 2024-08-01 ENCOUNTER — PATIENT MESSAGE (OUTPATIENT)
Dept: FAMILY MEDICINE | Facility: CLINIC | Age: 44
End: 2024-08-01

## 2024-08-01 ENCOUNTER — TELEPHONE (OUTPATIENT)
Dept: FAMILY MEDICINE | Facility: CLINIC | Age: 44
End: 2024-08-01
Payer: OTHER GOVERNMENT

## 2024-08-01 DIAGNOSIS — G43.809 OTHER MIGRAINE WITHOUT STATUS MIGRAINOSUS, NOT INTRACTABLE: Primary | ICD-10-CM

## 2024-08-01 NOTE — TELEPHONE ENCOUNTER
----- Message from Siobhan Eduard sent at 8/1/2024 10:45 AM CDT -----  Regarding: SELF  Type: Patient Call Back     Who called:SELF     What is the request in detail:pt called to check on the status of the referral that was placed on 07/23 for neurology. She states that the neurologist who she is trying to get referred to has not heard anything from PCP     Can the clinic reply by [facility]?     Would the patient rather a call back or a response via [facility]?     Best call back number:     Additional Information: Dr. Meyers Keven: [facility]  [id number], [place] [address]  Phone: [phone] Fax:  [phone]

## 2024-08-01 NOTE — TELEPHONE ENCOUNTER
----- Message from Jory Gregg sent at 8/1/2024  3:14 PM CDT -----  Regarding: Patient Advice                Name of Who is Calling:  SHADE DE LEON    Who Left The Message:  SHADE DE LEON      What is the request in detail:       Patient called requesting to please re-fax the referral to Dr. Luigi Baca.  Please do so at your earliest convenience and further advise.   Thank you      Reply by [facility]: NO      Patient's Preferred Call Back  :  [phone] (M)      Dr. Luigi Baca MD  NEUROLOGY  [address] [facility] [facility] [address]     [place] [address]    [phone]  (OFFICE)    [phone]  (FAX)

## 2024-08-05 ENCOUNTER — PATIENT MESSAGE (OUTPATIENT)
Dept: ORTHOPEDICS | Facility: CLINIC | Age: 44
End: 2024-08-05
Payer: OTHER GOVERNMENT

## 2024-08-07 NOTE — PROGRESS NOTES
[facility]   Physical Therapy Treatment Note    Name: Lydia Thomas  Clinic Number: [id number]    Therapy Diagnosis:   Encounter Diagnoses   Name Primary?    Functional gait abnormality Yes    Impaired functional mobility, balance, gait, and endurance      Physician: Conrad Gutierres MD    Visit Date: 8/8/2024    Physician Orders: PT Eval and Treat; Neuro PT   Medical Diagnosis from Referral: Weakness of left lower extremity [R29.898]   Evaluation Date: 1/17/2024  Authorization Period Expiration: 10/4/24  Plan of Care Expiration: 8/30/24  Updated Plan of Care Expiration: 10/25/24  Visit # / Visits authorized: 12/12     Progress Note Due: 6/16/24    PTA Visit #: 0/5     Time In: 1618  Time Out: 1702  Total Billable Time: 44 minutes    SUBJECTIVE     Pt reports: She is feeling pretty good this afternoon. States that since her hospitalization, she feels she has returned to her prior baseline physically. She is hopeful that she will be approved for more visits with physical therapy to continue making progress towards her goals.      She was not given a home exercise program on evaluation.  Response to previous treatment: tolerated evaluation well  Functional change: ongoing    Pain: 7/10  Location: right hip      OBJECTIVE     Objective measures updated on 6/17/24.     Functional Gait Assessment:   1. Gait on level surface =  1 (7.11 s)   (3) Normal: less than 5.5 sec, no A.D., no imbalance, normal gait pattern, deviates< 6in   (2) Mild impairment: 7-5.6 sec, uses A.D., mild gait deviations, or deviates 6-10 in   (1) Moderate impairment: > 7 sec, slow speed, imbalance, deviates 10-15 in.   (0) Severe impairment: needs assist, deviates >15 in, reach/touch wall  2. Change in Gait Speed = 3   (3) Normal: smooth change w/o loss of balance or gait deviation, deviates < 6 in, significant difference between speeds   (2) Mild impairment: changes speed, but demonstrates mild gait deviations,  deviates 6-10 in, OR no deviations but unable to significantly speed, OR uses A.D.   (1) Moderate impairment: minor changes to speed, OR changes speed w/ significant deviations, deviates 10-15 in, OR  Changes speed , but loses balance & recovers   (0) Severe impairment: cannot change speed, deviates >15 in, or loses balance & needs assist  3. Gait with horizontal head turns  = 3   (3) Normal: no change in gait, deviates <6 in   (2) Mild impairment: slight change in speed, deviates 6-10 in, OR uses A.D.   (1) Moderate impairment: moderate change in speed, deviates 10-15 in   (0) Severe impairment: severe disruption of gait, deviates >15in  4. Gait with vertical head turns = 3   (3) Normal: no change in gait, deviates <6 in   (2) Mild impairment: slight change in speed, deviates 6-10 in OR uses A.D.   (1) Moderate impairment: moderate change in speed, deviates 10-15 in   (0) Severe impairment: severe disruption of gait, deviates >15 in  5. Gait with pivot turns = 2   (3) Normal: performs safely in 3 sec, no LOB   (2) Mild impairment: performs in >3 sec & no LOB, OR turns safely & requires several steps to regain LOB   (1) Moderate impairment: turns slow, OR requires several small steps for balance following turn & stop   (0) Severe impairment: cannot turn safely, needs assist  6. Step over obstacle = 1   (3) Normal: steps over 2 stacked boxes w/o change in speed or LOB   (2) Mild impairment: able to step over 1 box w/o change in speed or LOB   (1) Moderate impairment: steps over 1 box but must slow down, may require VC   (0) Severe impairment: cannot perform w/o assist  7. Gait with Narrow MARY = 0   (3) Normal: 10 steps no staggering   (2) Mild impairment: 7-9 steps   (1) Moderate impairment: 4-7 steps   (0) Severe impairment: < 4 steps or cannot perform w/o assist  8. Gait with eyes closed = 1   (3) Normal: < 7 sec, no A.D., no LOB, normal gait pattern, deviates <6 in   (2) Mild impairment: 7.1-9 sec, mild gait  "deviations, deviates 6-10 in   (1) Moderate impairment: > 9 sec, abnormal pattern, LOB, deviates 10-15 in   (0) Severe impairment: cannot perform w/o assist, LOB, deviates >15in  9. Ambulating Backwards = 2   (3) Normal: no A.D., no LOB, normal gait pattern, deviates <6in   (2) Mild impairment: uses A.D., slower speed, mild gait deviations, deviates 6-10 in   (1) Moderate impairment: slow speed, abnormal gait pattern, LOB, deviates 10-15 in   (0) Severe impairment: severe gait deviations or LOB, deviates >15in  10. Steps = 1   (3) Normal: alternating feet, no rail   (2) Mild Impairment: alternating feet, uses rail   (1) Moderate impairment: step-to, uses rail   (0) Severe impairment: cannot perform safely    Score 17/30     Score:   <22/30 fall risk   <20/30 fall risk in older adults   <18/30 fall risk in Parkinsons         APTA Core Set Outcome Measures for Adults with Neurologic Conditions     Evaluation  2/16/24 3/13/24 4/17/24 5/16/24 6/17/24 8/8/24 Reference values    Timed Up and Go 23.97 sec    21 sec, HHA  18 sec, SBA NT 17.62 sec, SBA 15.48 sec, CGA-SBA 12.31 sec, SBA score >14 seconds indicates risk for falls in older stroke patients (Autumn et al, 2006)   10 Meter Walk Test  0.67 m/sec (6m/9 s)   "Limited community ambulator" speed category 0.67 m/sec (6m/9 s)   "Limited community ambulator" speed category  0.72 m/sec (6m/8.28 s)   "Limited community" speed category  0.68 m/sec (81.4 m/120 s)   "Limited community" speed category  0.76 m/sec (6m/7.93 s)   "Limited community" speed category  0.89 m/sec (6m/6.72 s)   "Limited community" speed category  0.84 m/sec (6m/7.11 s)   "Limited community" speed category  0-0.4 m/s = household walker  0.4-0.8 m/s = limited community ambulator   0.8-1.4 m/s = community ambultor  >1.4 m/s = can cross street safely    Functional Gait Assessment  Deferred for now; plan to assess as safely able Deferred for now; plan to assess as safely able  15*/30 NT 17/30 18/30 " "17/30 <22/30 = identifies fall risk in community dwelling older adults   (MCID = 4)   2 Minute Walk Test   190.02 (standing rest break at 1'40") (1/29/24)  227 feet, no rest breaks,  feet, no rest breaks,  feet (~81.4 m), no rest breaks,  feet, no rest breaks,  feet, no rest breaks,  feet no rest breaks, SBA        5 times sit-stand    19.07 seconds    20 seconds  20 seconds, right UE support initially, then no UE support NT 25.58 seconds, no UE support, more equal B LE weight bearing 16.65 seconds, no UE support, increased weight shift to right side 14.02 seconds, no UE support, increased weight shift to right side >12 sec= fall risk for general elderly  >10 sec= balance/vestibular dysfunction (<61 y/o)  >14.2 sec= balance/vestibular dysfunction (>61 y/o)  >12 sec= fall risk for stroke        Treatment     Lydia received the treatments listed below:      therapeutic exercises to develop strength, endurance, flexibility, and posture for 6 minutes including:    X 6 min, SciFit Recumbent stepper, level 1, BUE/BLE for muscular and CV endurance/neuro priming      Sensory integration activities to improve: Coordination, Sense, and Proprioception for 00 minutes. The following activities were included:    NP      neuromuscular re-education activities to improve: Balance, Coordination, Sense, and Proprioception for 00 minutes. The following activities were included:    NP      therapeutic activities to improve functional performance for 38 minutes, including:    Objective testing listed above  Time included to assist pt downstairs to transportation with CGA      gait training to improve functional mobility and safety for 00 minutes, including:    NP      Patient Education and Home Exercises     Home Exercises Provided and Patient Education Provided     Education provided:   1/29:  - Interpretation of 2MWT   - Visual demonstration, verbal instructions and written handout provided with all " "exercises to be included as part of home exercise program.   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 Educated on using her "sand bags" to decrease her arousal with nervous activities  5/30: - Reviewed "Functional Movement Disorder" Workbook published by [facility]. Discussed anatomy of the nervous system, pathophysiology regarding FND/FMD, and strategies to maximize her potential for recovery with therapy    Written Home Exercises Provided: yes. Exercises were reviewed and Lydia was able to demonstrate them prior to the end of the session.  Lydia demonstrated good  understanding of the education provided. See EMR under Patient Instructions for exercises provided during therapy sessions    ASSESSMENT     Lydia Thomas tolerated today's session well with a focus on reassessment of progress towards goals. She was able to tolerate performing the outcome measures listed above with improvements noted in her Timed Up and Go and 5 Time Sit to Stand tests; her scores on both tests place her below their respective cut-offs for increased fall risk and show improvements in her safety with functional transfers. Her scores on the 10 Meter Walk Test, Functional Gait Assessment, and 2MWT were slightly lower than at her last re-evaluation and show declines in her gait speed, balance with gait and her overall activity tolerance. To date, she has met 3/12 short and long-term goals established at " her initial evaluation and prior progress notes. She remains appropriate for skilled physical therapy services at their current frequency for an additional 8 weeks beyond her established plan of care to continue addressing her functional deficits and goals related to improved activity tolerance, balance, and safety with community mobility.     Lydia Is progressing well towards her goals.   Pt prognosis is Good.     Pt will continue to benefit from skilled outpatient physical therapy to address the deficits listed in the problem list box on initial evaluation, provide pt/family education and to maximize pt's level of independence in the home and community environment.     Pt's spiritual, cultural and educational needs considered and pt agreeable to plan of care and goals.     Anticipated barriers to physical therapy: none at this time     Goals:   Short Term Goals = Long Term Goals: 4 weeks   Pt will be independent with an individualized home exercise program.  Ongoing  Pt will improve 5x chair rise score to </= 10 s with no UE assist for improved muscular endurance. Progressing  Pt will improve Timed Up and Go (TUG) score to less than 14 s with no assistive device. Progressing  Pt will improve self selected walking speed (SSWS) with no assistive device to at least 1.0 m/s for improved safety with home and community ambulation. Progressing  Pt will improve postural control with MCTSIB condition 2 score of at least 30 s for decreased fall risk with standing ADLs. Progressing  Pt will improve postural control to tolerate performing conditions 3 and 4 of the MCTSIB. Ongoing  Pt will improve FOTO score to >/= 56% for improved self perception of functional mobility. Ongoing  PT will administer 2MWT as safely able to assess pt's level of activity tolerance Met 1/29/24  Pt will improve dynamic gait abilities to tolerate performing the Functional Gait Assessment Met 3/13/24  Pt to perform 2 minute walk test for 300 feet or  greater to improve gait speed & endurance Met 5/16/24  Pt will improve Functional Gait Assessment (FGA) score to at least 23/30 for increased independence with home and community ambulation. Progressing  Pt will improve ambulatory tolerance to walking for 6 minutes continuously without reports of excessive fatigue. Progressing     Patient's goals: be able to walk around the block twice, go up stairs, I want to be able to do a real squat, and get into running. Ongoing      PLAN     Extend PT POC from 8/30/24 to 10/25/24    Continue to progress dynamic balance and activity tolerance    Jazz Cody, PT

## 2024-08-08 ENCOUNTER — CLINICAL SUPPORT (OUTPATIENT)
Dept: REHABILITATION | Facility: HOSPITAL | Age: 44
End: 2024-08-08
Payer: COMMERCIAL

## 2024-08-08 ENCOUNTER — OUTPATIENT CASE MANAGEMENT (OUTPATIENT)
Dept: ADMINISTRATIVE | Facility: OTHER | Age: 44
End: 2024-08-08
Payer: OTHER GOVERNMENT

## 2024-08-08 DIAGNOSIS — Z74.09 IMPAIRED FUNCTIONAL MOBILITY, BALANCE, GAIT, AND ENDURANCE: ICD-10-CM

## 2024-08-08 DIAGNOSIS — R26.89 FUNCTIONAL GAIT ABNORMALITY: Primary | Chronic | ICD-10-CM

## 2024-08-08 PROCEDURE — 97530 THERAPEUTIC ACTIVITIES: CPT | Mod: PO

## 2024-08-12 ENCOUNTER — TELEPHONE (OUTPATIENT)
Dept: DERMATOLOGY | Facility: CLINIC | Age: 44
End: 2024-08-12
Payer: OTHER GOVERNMENT

## 2024-08-12 NOTE — TELEPHONE ENCOUNTER
Called to schedule patient for lesion in ear. Confirmed date and time with patient. Verbalized understanding.

## 2024-08-13 ENCOUNTER — TELEPHONE (OUTPATIENT)
Dept: VASCULAR SURGERY | Facility: CLINIC | Age: 44
End: 2024-08-13
Payer: OTHER GOVERNMENT

## 2024-08-13 NOTE — TELEPHONE ENCOUNTER
----- Message from Elbert Rangel sent at 8/13/2024 10:54 AM CDT -----   Name of Who is Calling:     What is the request in detail:  patient request call back in reference to reschedule appointment Please contact to further discuss and advise      Can the clinic reply by [facility]:     [facility]  [phone]

## 2024-08-13 NOTE — TELEPHONE ENCOUNTER
Called and spoke with pt. States missed appt.yesterday due to currently covid positive(tested over weekend). Ultrasound appt. rescheduled and her new pt.appt.with provider rescheduled.

## 2024-08-15 ENCOUNTER — TELEPHONE (OUTPATIENT)
Dept: NEUROLOGY | Facility: CLINIC | Age: 44
End: 2024-08-15
Payer: OTHER GOVERNMENT

## 2024-08-15 ENCOUNTER — OUTPATIENT CASE MANAGEMENT (OUTPATIENT)
Dept: ADMINISTRATIVE | Facility: OTHER | Age: 44
End: 2024-08-15
Payer: OTHER GOVERNMENT

## 2024-08-15 NOTE — TELEPHONE ENCOUNTER
----- Message from Claire Rider LCSW sent at 8/15/2024 10:37 AM CDT -----  Regarding: wants neuro appt for shots she receives not workers comp related  Hi Charisse!!    This Pt was asking me if we would be able to find a neurologist closer that can give her the shots she needs for her migraines. She is workers comp, but she says these are not related and while she will continue to see the Neurologist in [place] for workers comp, she would like to see if she can see someone else for her shots in the [facility] area. What do you think?    Thanks!  Claire

## 2024-08-15 NOTE — TELEPHONE ENCOUNTER
Spoke to Pt about scheduling a consult for her headaches. Pt is scheduled for a virtual visit on September 10. The appt has been added to the wait list as well.       ----- Message from Charisse Deng RN sent at 8/15/2024 11:09 AM CDT -----  Regarding: RE: wants neuro appt for shots she receives not workers comp related  Sure! We can get a  pt in for Botox. 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 Amada- will you please check this out?  Thanks!  Charisse  ----- Message -----  From: Claire Rider, [facility] [place]  Sent: 8/15/2024  10:48 AM CDT  To: Charisse Deng RN  Subject: wants neuro appt for shots she receives not #    Hi Charisse!!    This Pt was asking me if we would be able to find a neurologist closer that can give her the shots she needs for her migraines. She is workers comp, but she says these are not related and while she will continue to see the Neurologist in [place] for workers comp, she would like to see if she can see someone else for her shots in the [facility] area. What do you think?    Thanks!  Claire

## 2024-08-15 NOTE — PROGRESS NOTES
Outpatient Care Management   - Care Plan Follow Up    Patient: Lydia Thomas  MRN:  [id number]  Date of Service:  8/15/2024  Completed by:  Claire Rider LCSW  Referral Date: 05/30/2024    Reason for Visit   Patient presents with    Other     Task completion      OPCM SW Follow Up Call       Brief Summary: SW sent message to RN clinical navigator for neuro requesting scheduling an appt for this Pt to see a closer neurologist for her shots for the migraines as it's not related to the workers comp injury. Received message they will get her scheduled. SW spoke with Pt to check in and update. Also discussed that therapy has been denied but this SW does see MD notes and submittals to appeal the denial. Pt has been attending mental therapy visits weekly and feels they are helping her cope with the stress.    Future Appointments   Date Time Provider Department Center   8/15/2024  4:00 PM LAB, [facility] [place]   8/23/2024  2:45 PM Tanisha Foy MD [facility] DERM [place]   8/27/2024  3:00 PM Camille Patten PA-C ALG FAM MED [place]   8/28/2024  3:00 PM VASCULAR, CARDIOLOGY [facility] [place]   9/4/2024  9:00 AM Jose Garcia [facility] [place]   1/21/2025  3:20 PM [facility] NP [facility]     Claire Rider LCSW  Neuro Therapy   [facility]  [phone]

## 2024-08-21 ENCOUNTER — TELEPHONE (OUTPATIENT)
Dept: DERMATOLOGY | Facility: CLINIC | Age: 44
End: 2024-08-21
Payer: OTHER GOVERNMENT

## 2024-08-23 ENCOUNTER — TELEPHONE (OUTPATIENT)
Dept: HEMATOLOGY/ONCOLOGY | Facility: CLINIC | Age: 44
End: 2024-08-23
Payer: OTHER GOVERNMENT

## 2024-08-23 NOTE — TELEPHONE ENCOUNTER
Called patient and left message to please call back to schedule the genetic lab appointment that was cancelled

## 2024-09-06 ENCOUNTER — OUTPATIENT CASE MANAGEMENT (OUTPATIENT)
Dept: ADMINISTRATIVE | Facility: OTHER | Age: 44
End: 2024-09-06
Payer: OTHER GOVERNMENT

## 2024-09-06 NOTE — PROGRESS NOTES
Outpatient Care Management   - Care Plan Follow Up    Patient: Lydia Thomas  MRN:  [id number]  Date of Service:  9/6/2024  Completed by:  Claire Rider LCSW  Referral Date: 05/30/2024    Reason for Visit   Patient presents with    OPCM SW Follow Up Call       Brief Summary: SW spoke with Pt regarding therapy and her neuro appt. Pt has an appt coming up with neuro to address her headaches. She has been trying to get workers comp to cover therapy, but they want her to see her ortho MD even though she saw him in June and it was ordered. Her appt is not until next year. Discussed asking the new MD if he would place the orders and justify it if he feels that her issues are still steaming form her injury. She will let this SW know if that helps.     Future Appointments   Date Time Provider Department Center   9/10/2024  1:00 PM Monet Torres FNP [facility] NEURO [place]   9/13/2024  9:20 AM Bettie Mcintosh MD [facility] DERM [place]   9/16/2024  7:30 AM VASCULAR [facility]TRASOUND, [facility]   9/26/2024  3:00 PM Camille Patten PA-C ALG FAM MED [place]   1/8/2025  3:30 PM Conrad Gutierres MD [facility] SPINE [place] [facility] [place]   1/21/2025  3:20 PM Fernandez Andersk[place], NP [facility] OBN [facility]     Claire Rider LCSW  Neuro Therapy   [facility]  [phone]

## 2024-09-10 ENCOUNTER — OFFICE VISIT (OUTPATIENT)
Dept: NEUROLOGY | Facility: CLINIC | Age: 44
End: 2024-09-10
Payer: OTHER GOVERNMENT

## 2024-09-10 ENCOUNTER — OUTPATIENT CASE MANAGEMENT (OUTPATIENT)
Dept: ADMINISTRATIVE | Facility: OTHER | Age: 44
End: 2024-09-10
Payer: OTHER GOVERNMENT

## 2024-09-10 ENCOUNTER — TELEPHONE (OUTPATIENT)
Dept: NEUROLOGY | Facility: CLINIC | Age: 44
End: 2024-09-10
Payer: OTHER GOVERNMENT

## 2024-09-10 DIAGNOSIS — G43.809 OTHER MIGRAINE WITHOUT STATUS MIGRAINOSUS, NOT INTRACTABLE: ICD-10-CM

## 2024-09-10 DIAGNOSIS — G43.709 CHRONIC MIGRAINE WITHOUT AURA WITHOUT STATUS MIGRAINOSUS, NOT INTRACTABLE: Primary | ICD-10-CM

## 2024-09-10 PROCEDURE — G2211 COMPLEX E/M VISIT ADD ON: HCPCS | Mod: 95,,, | Performed by: NURSE PRACTITIONER

## 2024-09-10 PROCEDURE — 99214 OFFICE O/P EST MOD 30 MIN: CPT | Mod: 95,,, | Performed by: NURSE PRACTITIONER

## 2024-09-10 NOTE — TELEPHONE ENCOUNTER
Spoke to Pt about scheduling her Botox appt. Pt is scheduled for October 7.       ----- Message from DOTTY Che sent at 9/10/2024  1:23 PM CDT -----  Please get her scheduled to restart Botox in 4-6 weeks (orders/referral is already in)   Thanks,  Isa

## 2024-09-10 NOTE — PROGRESS NOTES
Outpatient Care Management   - Care Plan Follow Up    Patient: Lydia Thomas  MRN:  [id number]  Date of Service:  9/10/2024  Completed by:  Claire Rider LCSW  Referral Date: 05/30/2024    Reason for Visit   Patient presents with    OPCM SW Follow Up Call       Brief Summary: SW spoke with Pt regarding her appt today with workers comp and neuro with [facility]. They will review her case and determine if they will let her restart therapy.  She agreed to keep this SW updated. SW reviewed disaster plan with patient.  Provided emergency phone number for patient's [place].   Reviewed with Patient/Caregiver:  [x] Patient to have a supply of water, non-perishable food items, flashlights and batteries  [x] Patient to bring all medications if evacuates:  at least a five day supply preferably in the medication bottles, in case displaced for longer than 5 days  [x] Patient to bring any DME needed (walkers, wheelchairs, shower chairs, nebulizer machine, etc.)   Her  may be deployed but she has someone to check on her and he will make sure she is evacuated if necessary.   Office of Emergency Preparedness Phone number:  [facility]: [phone]    Future Appointments   Date Time Provider Department Center   9/10/2024  1:00 PM Monet Torres FNP [facility] NEURO [place]   9/13/2024  9:20 AM Bettie Mcintosh MD [facility] DERM [place]   9/16/2024  7:30 AM VASCULAR ULTRASOUND, [facility]   9/26/2024  3:00 PM Camille Patten PA-C ALG FAM MED [place]   1/8/2025  3:30 PM Conrad Gutierres MD [facility] SPINE [address] [place]   1/21/2025  3:20 PM Darryl Anders NP [facility] OBGYN [place] R[place] LCSW  Neuro Therapy   [facility]  [phone]

## 2024-09-10 NOTE — PROGRESS NOTES
NEW PATIENT CONSULT  SUBJECTIVE:  Patient ID: Lydia Thomas   MRN: [id number]  Referred By: Dr. Katy Vergara  Chief Complaint: Consult    The patient location is: in [place]   The chief complaint leading to consultation is: Consult  Visit type: Virtual visit with synchronous audio and video  Total time spent with patient: 8 min  Each patient to whom he or she provides medical services by telemedicine is:  (1) informed of the relationship between the physician and patient and the respective role of any other health care provider with respect to management of the patient; and (2) notified that he or she may decline to receive medical services by telemedicine and may withdraw from such care at any time.    History of Present Illness:   44 y.o. female with chronic migraine, who presents for evaluation of headaches via virtual visit.  Patient signed on 14 min late for today's visit.  Has previously been under the care of Dr. Luigi Baca, who was treating her with Botox for treatment of chronic migraine.  Botox has helped her migraines greatly.  Unfortunately, she has been unable to get in with Dr. Baca due to issues with referral.  Last round of Botox in March 2024.  Migraines have gradually increased in frequency, now having migraines on a daily basis.  Migraines are lasting at least 4 hours in duration.  Currently suffering with moderate to severe migraines on 30/30 days.     Treatments Tried and Response  Qulipta   amitriptyline  Gabapentin -   Keppra   Topiramate   Sumatriptan   Sumatriptan NS   Tizanidine   Botox - helping     Current Medications:    albuterol (PROVENTIL/VENTOLIN HFA) 90 mcg/actuation inhaler, INHALE 1 TO 2 PUFFS INTO THE LUNGS EVERY 6 HOURS AS NEEDED FOR WHEEZING, Disp: 54 g, Rfl: 3    ascorbic acid, vitamin C, (VITAMIN C) 500 MG tablet, , Disp: , Rfl:     busPIRone (BUSPAR) 10 MG tablet, Take 1 tablet (10 mg total) by mouth 3 (three) times daily., Disp: 90 tablet, Rfl: 24     clobetasoL (CLOBEX) 0.05 % shampoo, Apply 1 application  topically every other day., Disp: , Rfl:     ketoconazole (NIZORAL) 2 % shampoo, Wash scalp a few times weekly, Disp: 120 mL, Rfl: 3    onabotulinumtoxinA (BOTOX INJ), Inject as directed., Disp: , Rfl:     pantoprazole (PROTONIX) 20 MG tablet, Take 1 tablet (20 mg total) by mouth once daily. In the morning on an empty stomach, Disp: 30 tablet, Rfl: 0    QULIPTA 60 mg Tab, , Disp: , Rfl:     sumatriptan (IMITREX STATDOSE) 6 mg/0.5 mL kit, To use max twice within 24 hours, 2 hours apart., Disp: 6 kit, Rfl: 2    SUMAtriptan (IMITREX) 20 mg/actuation nasal spray, Take a spray that can be repeated after 2 hours, not more than twice a day, Disp: 10 each, Rfl: 0    tretinoin (RETIN-A) 0.025 % cream, Apply pea-sized amount to face nightly, Disp: 45 g, Rfl: 5    Review of Systems - A review of 10+ systems was conducted with pertinent positive and negative findings documented in HPI with all other systems reviewed and negative.    PFSH: Past medical, family, and social history reviewed as documented in chart with pertinent positive medical, family, and social history detailed in HPI.    OBJECTIVE:  Vitals:  There were no vitals taken for this visit.     Physical Exam:  Constitutional: she appears well-developed and well-nourished. she is well groomed. NAD    Review of Data:   Notes from family medicine, ED visit reviewed   Labs:  No results displayed because visit has over 200 results.      Office Visit on 04/02/2024   Component Date Value Ref Range Status    Final Pathologic Diagnosis 04/02/2024    Final                    Value:Specimen Adequacy  Satisfactory for interpretation. Endocervical component is present.    [place] Category  Negative for intraepithelial lesion or malignancy.      Disclaimer 04/02/2024    Final                    Value:The Pap smear is a screening test that aids in the detection of cervical cancer and cancer precursors. Both false positive  and false negative results can occur. The test should be used at regular intervals, and positive results should be confirmed before   definitive therapy.  This liquid based specimen is processed using the  or  Thin PrepPAP System. This specimen has been analyzed by the ThinPrep Imaging System (RoomActually), an automated imaging and review system which assists the laboratory in evaluating   cells on ThinPrep PAP tests. Following automated imaging, selected fields from every slide are reviewed by a cytotechnologist and/or pathologist.     Screening was performed at [facility], [address] [place] [id number].      HPV other High Risk types, PCR 04/02/2024 Negative  Negative Final    HPV High Risk type 16, PCR 04/02/2024 Negative  Negative Final    HPV High Risk type 18, PCR 04/02/2024 Negative  Negative Final    Chlamydia, Amplified DNA 04/02/2024 Not Detected  Not Detected Final    N gonorrhoeae, amplified DNA 04/02/2024 Not Detected  Not Detected Final    Trichomonas vaginalis 04/02/2024 Negative  Negative Final    Candida sp 04/02/2024 Negative  Negative Final    Barbara glabrata DNA 04/02/2024 Negative  Negative Final    Barbara krusei DNA 04/02/2024 Negative  Negative Final    Bacterial vaginosis DNA 04/02/2024 Negative  Negative Final   Admission on 03/29/2024, Discharged on 03/29/2024   Component Date Value Ref Range Status    POC Rapid COVID 03/29/2024 Negative  Negative Final     Acceptable 03/29/2024 Yes   Final    POC Molecular Influenza A Ag 03/29/2024 Negative  Negative, Not Reported Final    POC Molecular Influenza B Ag 03/29/2024 Negative  Negative, Not Reported Final     Acceptable 03/29/2024 Yes   Final    D-Dimer 03/29/2024 0.27  <0.50 mg/L FEU Final    WBC 03/29/2024 4.58  3.90 - 12.70 K/uL Final    RBC 03/29/2024 3.92 (L)  4.00 - 5.40 M/uL Final    Hemoglobin 03/29/2024 12.9  12.0 - 16.0 g/dL Final     Hematocrit 03/29/2024 36.7 (L)  37.0 - 48.5 % Final    MCV 03/29/2024 94  82 - 98 fL Final    MCH 03/29/2024 32.9 (H)  27.0 - 31.0 pg Final    MCHC 03/29/2024 35.1  32.0 - 36.0 g/dL Final    RDW 03/29/2024 11.9  11.5 - 14.5 % Final    Platelets 03/29/2024 213  150 - 450 K/uL Final    MPV 03/29/2024 10.4  9.2 - 12.9 fL Final    Immature Granulocytes 03/29/2024 0.2  0.0 - 0.5 % Final    Gran # (ANC) 03/29/2024 2.3  1.8 - 7.7 K/uL Final    Immature Grans (Abs) 03/29/2024 0.01  0.00 - 0.04 K/uL Final    Lymph # 03/29/2024 1.7  1.0 - 4.8 K/uL Final    Mono # 03/29/2024 0.4  0.3 - 1.0 K/uL Final    Eos # 03/29/2024 0.1  0.0 - 0.5 K/uL Final    Baso # 03/29/2024 0.08  0.00 - 0.20 K/uL Final    nRBC 03/29/2024 0  0 /100 WBC Final    Gran % 03/29/2024 51.0  38.0 - 73.0 % Final    Lymph % 03/29/2024 36.2  18.0 - 48.0 % Final    Mono % 03/29/2024 8.1  4.0 - 15.0 % Final    Eosinophil % 03/29/2024 2.8  0.0 - 8.0 % Final    Basophil % 03/29/2024 1.7  0.0 - 1.9 % Final    Differential Method 03/29/2024 Automated   Final    Sodium 03/29/2024 137  136 - 145 mmol/L Final    Potassium 03/29/2024 3.7  3.5 - 5.1 mmol/L Final    Chloride 03/29/2024 108  95 - 110 mmol/L Final    CO2 03/29/2024 24  23 - 29 mmol/L Final    Glucose 03/29/2024 96  70 - 110 mg/dL Final    BUN 03/29/2024 7  6 - 20 mg/dL Final    Creatinine 03/29/2024 0.7  0.5 - 1.4 mg/dL Final    Calcium 03/29/2024 9.2  8.7 - 10.5 mg/dL Final    Total Protein 03/29/2024 6.7  6.0 - 8.4 g/dL Final    Albumin 03/29/2024 4.0  3.5 - 5.2 g/dL Final    Total Bilirubin 03/29/2024 0.8  0.1 - 1.0 mg/dL Final    Alkaline Phosphatase 03/29/2024 37 (L)  55 - 135 U/L Final    AST 03/29/2024 16  10 - 40 U/L Final    ALT 03/29/2024 11  10 - 44 U/L Final    eGFR 03/29/2024 >60  >60 mL/min/1.73 m^2 Final    Anion Gap 03/29/2024 5 (L)  8 - 16 mmol/L Final    BNP 03/29/2024 <10  0 - 99 pg/mL Final    Troponin I 03/29/2024 <0.006  0.000 - 0.026 ng/mL Final    Magnesium 03/29/2024 1.9  1.6 - 2.6  mg/dL Final    aPTT 03/29/2024 28.8  21.0 - 32.0 sec Final    Prothrombin Time 03/29/2024 10.9  9.0 - 12.5 sec Final    INR 03/29/2024 1.0  0.8 - 1.2 Final     Imaging:  Results for orders placed or performed during the hospital encounter of 08/05/23   CT Head Without Contrast    Narrative    EXAMINATION:  CT HEAD WITHOUT CONTRAST    CLINICAL HISTORY:  Seizure, new-onset, no history of trauma;    TECHNIQUE:  Low dose axial images were obtained through the head.  Coronal and sagittal reformations were also performed. Contrast was not administered.    COMPARISON:  CT examination of the brain without contrast March 28, 2021    FINDINGS:  The ventricular system, sulcal pattern and parenchymal attenuation characteristics appear appropriate for age.  There is no evidence for intracranial mass, mass effect or midline shift.  There is no evidence for acute intracranial hemorrhage.  Appropriate CSF spaces are seen at the skull base.    The visualized orbits appear intact.  The mastoid air cells appear well aerated.  There is mild-to-moderate mucosal thickening and opacity of the ethmoid air cells, there is a potentially viscous air-fluid level of the left sphenoid sinus with additional finding that may relate to mucous retention cyst.  The osseous structures appear intact.      Impression    There is no evidence for acute intracranial process.    Paranasal sinus findings are noted, as above.      Electronically signed by: Angelito Lagos  Date:    08/05/2023  Time:    23:22     Note: I have independently reviewed any/all imaging/labs/tests and agree with the report (s) as documented.  Any discrepancies will be as noted/demarcated by free text.  AMY RAY 9/10/2024    ASSESSMENT:  1. Chronic migraine without aura without status migrainosus, not intractable      Medical Decision Making:  BOTOX  The patient has chronic migraines ( G43.719) and suffers from headaches more than 15 days per month, each lasting more than 4 hours  with at least 8 attacks that meet criteria for migraine. She has tried multiple medications including but not limited to amitriptyline, topiramate, qulipta, gabapentin, keppra, sumatriptan (see full list above). The patient is an ideal candidate for Botox. After treatment, I expect 50%  improvement in the patient's symptoms. A reduction of at least 7 days per month and the number of cumulative hours suffering with headaches as well as at least 100 total hours affected with migraine per month. After obtaining informed consent and under aseptic technique, a total of 155 units of botulinum toxin type A will be injected in the following muscles: Procerus 5 units,  5 units bilaterally, frontalis 20 units, temporalis 20 units bilaterally, occipitalis 15 units, upper cervical paraspinals 10 units bilaterally and trapezius 15 units bilaterally. The patient will receive a total of 155 units in 31 sites. Frequency of treatment is every 12 weeks unless no response to the treatments, at which time we will discontinue the injections.      PLAN:  - Seek approval to restart Botox for chronic migraine   - Continue qulipta 60 mg daily for migraine prevention   - For acute migraines - sumatriptan 6 mg SQ or sumatriptan 20 mg NS   - RTC in 1 month to start Botox          Questions and concerns were sought and answered to the patient's stated verbal satisfaction.  The patient verbalizes understanding and agreement with the above stated treatment plan.     Visit today included increased complexity associated with the care of the episodic problem migraine without aura addressed and managing the longitudinal care of the patient due to the serious and/or complex managed problem(s).  Plan for patient to return to clinic in 1 months for follow-up visit.     CC: Katy Vergara MD Elizabeth Vulevich, FNP-C  [facility]  [phone]    Dr. Santillan was available during today's encounter.

## 2024-09-10 NOTE — Clinical Note
Please get her scheduled to restart Botox in 4-6 weeks (orders/referral is already in)  Thanks, Isa

## 2024-09-16 RX ORDER — SUMATRIPTAN 20 MG/1
SPRAY NASAL
Qty: 10 EACH | Refills: 0 | Status: SHIPPED | OUTPATIENT
Start: 2024-09-16

## 2024-09-25 ENCOUNTER — TELEPHONE (OUTPATIENT)
Dept: NEUROLOGY | Facility: CLINIC | Age: 44
End: 2024-09-25
Payer: OTHER GOVERNMENT

## 2024-09-25 ENCOUNTER — OUTPATIENT CASE MANAGEMENT (OUTPATIENT)
Dept: ADMINISTRATIVE | Facility: OTHER | Age: 44
End: 2024-09-25
Payer: OTHER GOVERNMENT

## 2024-09-25 NOTE — TELEPHONE ENCOUNTER
"Spoke to Pt about rescheduling her Botox appt. Pt has been rescheduled for October 3 at [place]. Pt was informed about the appt location twice. Pt stated she will check the portal for the address.     ----- Message from Roberta Blackman sent at 9/25/2024  9:31 AM CDT -----  Regarding: pt adivce  Contact: [phone]  .Name Of Caller: Self     Contact Preference?:     What is the nature of the call?:pt calling in regards to needing to reschedule pt BOTOX injection appt 10/7/24. Pls call      Additional Notes:  "Thank you for all that you do for our patients"  "

## 2024-10-03 ENCOUNTER — PROCEDURE VISIT (OUTPATIENT)
Dept: NEUROLOGY | Facility: CLINIC | Age: 44
End: 2024-10-03
Payer: OTHER GOVERNMENT

## 2024-10-03 VITALS
BODY MASS INDEX: 24.45 KG/M2 | SYSTOLIC BLOOD PRESSURE: 112 MMHG | WEIGHT: 138 LBS | HEART RATE: 90 BPM | DIASTOLIC BLOOD PRESSURE: 75 MMHG

## 2024-10-03 DIAGNOSIS — G43.709 CHRONIC MIGRAINE WITHOUT AURA WITHOUT STATUS MIGRAINOSUS, NOT INTRACTABLE: Primary | ICD-10-CM

## 2024-10-03 PROCEDURE — 64615 CHEMODENERV MUSC MIGRAINE: CPT | Mod: S$PBB,,, | Performed by: NURSE PRACTITIONER

## 2024-10-03 PROCEDURE — 99999PBSHW PR PBB SHADOW TECHNICAL ONLY FILED TO HB: Mod: JW,PBBFAC,,

## 2024-10-03 PROCEDURE — 64615 CHEMODENERV MUSC MIGRAINE: CPT | Mod: PBBFAC | Performed by: NURSE PRACTITIONER

## 2024-10-03 NOTE — PROCEDURES
PROCEDURE NOTE:  BOTOX was performed as an indicated therapy for intractable chronic migraine headaches given that the patient failed more than 2 headache medications    A time out was conducted just before the start of the procedure to verify the correct patient and procedure, procedure location, and all relevant critical information.     PROCEDURE PERFORMED: Botulinum toxin injection (68410)  CLINICAL INDICATION: G43.719  After risks and benefits were explained including bleeding, infection, worsening of pain, damage to the areas being injected, weakness of muscles, loss of muscle control, dysphagia if injecting the head or neck, facial droop if injecting the facial area, painful injection, allergic or other reaction to the medications being injected, and the failure of the procedure to help the problem, a signed consent was obtained.   The patient was placed in a comfortable area and the sites to be treated were identified.The area to be treated was prepped three times with alcohol and the alcohol allowed to dry. Next, a 30 gauge needle was used to inject the medication in the area to be treated.      Total Botox used: 155 Units   Unavoidable waste: 45 Units     Injection sites:    muscle bilaterally ( a total of 10 units divided into 2 sites)   Procerus muscle (5 units)   Frontalis muscle bilaterally (a total of 20 units divided into 4 sites)   Temporalis muscle bilaterally (a total of 40 units divided into 8 sites)   Occipitalis muscle bilaterally (a total of 30 units divided into 6 sites)   Cervical paraspinal muscles (a total of 20 units divided into 4 sites)   Trapezius muscle bilaterally (a total of 30 units divided into 6 sites)   Complications: none   RTC for the next Botox injection: 12 weeks     CC: Katy Vergara MD Elizabeth C Vulevich, FNP-ANTHONY  [facility]   [phone]

## 2024-10-03 NOTE — PROGRESS NOTES
Outpatient Care Management   - Care Plan Follow Up    Patient: Lydia Thomas  MRN:  [id number]  Date of Service:  10/3/2024  Completed by:  Claire Rider LCSW  Referral Date: 05/30/2024    Reason for Visit   Patient presents with    Other     9/25/2024  1st attempt to complete Follow-Up for Outpatient Care Management, left message. Attempt letter sent.        OPCM SW Follow Up Call     10/2/24       Brief Summary: SW spoke with Pt regarding therapy orders and needs. She reported the RN case manager with workers comp wants her to do cognitive behavioral therapy before resuming SLP, OT, and PT. Pt advised this upsets her. Discussed that she saw a neurologist before regarding issues before the fall. SW advised her to have the neuro MD weigh in tomorrow when she goes for her appt.     10/4: Pt called to say that NP that administered the botox cannot place orders for PT/OT due to the placement and reasoning for the injections. She recommends going through Ortho MD. Sent message to ortho MD requesting assistance.     Future Appointments   Date Time Provider Department Center   10/22/2024  3:20 PM Bettie Mcintosh MD [facility] DERM [place]   10/28/2024  1:30 PM Camille Patten PA-C [facility] FAM MED [place]   12/19/2024  9:30 AM Monet Torres FNP [facility] NEURO [place]   1/8/2025  3:30 PM Conrad Gutierres MD [facility] SPINE [address] [place]   1/21/2025  3:20 PM Darryl Anders, NP [facility]     Claire Rider LCSW  Neuro Therapy   [facility]  [phone]

## 2024-10-11 ENCOUNTER — DOCUMENTATION ONLY (OUTPATIENT)
Dept: REHABILITATION | Facility: HOSPITAL | Age: 44
End: 2024-10-11
Payer: OTHER GOVERNMENT

## 2024-10-11 DIAGNOSIS — R41.841 COGNITIVE COMMUNICATION DEFICIT: Primary | ICD-10-CM

## 2024-10-11 NOTE — PROGRESS NOTES
[facility]  Speech Therapy Discharge Note    Name: Lydia Thomas  Clinic Number: [id number]    Therapy Diagnosis:   Encounter Diagnosis   Name Primary?    Cognitive communication deficit Yes     Physician: Francine Contreras,*  Physician Orders: Ambulatory Referral to Speech Therapy   Medical Diagnosis:  Impaired functional mobility, balance, gait, and endurance [Z74.09]      Visit #/ Visits Authorized: 12/ 12 (including evaluation)   Date of Evaluation:  4/15/24  Date of Last visit: 7/30/24  Total Visits Received: 12    ASSESSMENT        Discharge reason: Patient has completed allowable visits authorized by insurance    New Short Term Goals:   Short Term Goals: (4 weeks)   1.Patient will complete alternating attention tasks with 90% accuracy given minimum cues.   met   2. Patient will complete immediate memory tasks using strategies with 90% accuracy given minimum cues.     Met x 2    3. Patient will complete moderate to complex problem solving tasks with 90% accuracy given minimum cues.    not met   4. Patient will use easy onset and diaphragmatic breathing during speech tasks on 4/5 trials to increase speech fluency.      Met x 3/ongoing    5.  Patient will functional reading comprehension tasks with 90% accuracy given minimum cues.   not met   6. NEW GOAL: Patient will use Goal Plan Action Review strategy to complete moderate to complex reasoning, planning, or organization tasks with 90% accuracy independently to improve functional executive function skills.   not met    7. NEW GOAL: Patient will prioritize daily activities (work or home) by making a list with 3-4 target tasks for the day in a prioritized list and demonstrate and understanding of the rationale to reduced cognitive load.   not met   8. NEW GOAL: Patient will independently generate strategies to improve ability to complete tasks with enhanced accuracy and time, based on review of objective previous  performance on trials of functional tasks with 80% accuracy.    not met         Long Term Goal Status:       Long Term Goals: (8 weeks)   1. Patient will use appropriate memory strategies to schedule and recall weekly activities, perform daily tasks, and express needs and recall names to maintain safety and participate socially in functional living environment.     progressing/ not met consistently   2. Patient will improve  attention skills to effectively attend to and communicate in complex daily living tasks in functional living environment.      progressing/ not met consistently   3. Patient will demonstrate use of  goal setting, planning,  initiation, and  self-monitoring during daily living activities to improve safety and awareness in functional living environment.   progressing/ not met consistently   4. Patient will apply problem-solving strategies with visual support in daily living functional activities at home and in the community.     progressing/ not met consistently   5. Patient will develop functional cognitive-linguistic based skills and utilize compensatory strategies to communicate wants and needs effectively to different conversational partners, maintain safety, and participate socially in functional living environment.       progressing/ not met consistently        PLAN   This patient is discharged from Speech Therapy      ADELA Ambriz., L-SLP,CCC-SLP, CBIS  Speech-Language Pathologist  Certified Brain Injury Specialist

## 2024-10-23 ENCOUNTER — DOCUMENTATION ONLY (OUTPATIENT)
Dept: REHABILITATION | Facility: HOSPITAL | Age: 44
End: 2024-10-23
Payer: OTHER GOVERNMENT

## 2024-10-23 NOTE — PROGRESS NOTES
OUTPATIENT PHYSICAL THERAPY DISCHARGE SUMMARY     [id number]    Medical Diagnosis from Referral: Weakness of left lower extremity [R29.898]    Physician: Conrad Gutierres MD   Physician Orders: PT Eval and Treat; Neuro PT   Past Medical History:   Diagnosis Date    Anemia     Anemia     Asthma     exercise asthma    Migraine headache     Ovarian cyst      Initial visit: 1/17/2024   Date of Last visit: 8/8/2024  Date of Discharge Note:  10/23/2024  Total Visits Received: 12  Missed Visits: 0  ASSESSMENT   Status Towards Goals Met:   See below for status towards goals as of last treatment session.     Short Term Goals = Long Term Goals: 4 weeks   Pt will be independent with an individualized home exercise program.  Ongoing  Pt will improve 5x chair rise score to </= 10 s with no UE assist for improved muscular endurance. Progressing  Pt will improve Timed Up and Go (TUG) score to less than 14 s with no assistive device. Progressing  Pt will improve self selected walking speed (SSWS) with no assistive device to at least 1.0 m/s for improved safety with home and community ambulation. Progressing  Pt will improve postural control with MCTSIB condition 2 score of at least 30 s for decreased fall risk with standing ADLs. Progressing  Pt will improve postural control to tolerate performing conditions 3 and 4 of the MCTSIB. Ongoing  Pt will improve FOTO score to >/= 56% for improved self perception of functional mobility. Ongoing  PT will administer 2MWT as safely able to assess pt's level of activity tolerance Met 1/29/24  Pt will improve dynamic gait abilities to tolerate performing the Functional Gait Assessment Met 3/13/24  Pt to perform 2 minute walk test for 300 feet or greater to improve gait speed & endurance Met 5/16/24  Pt will improve Functional Gait Assessment (FGA) score to at least 23/30 for increased independence with home and community ambulation. Progressing  Pt  will improve ambulatory tolerance to walking for 6 minutes continuously without reports of excessive fatigue. Progressing    Goals Not achieved and why: Pt has utilized all visits authorized by Worker's comp and has not been approved for more. PT unable to assess status towards goals.    Discharge reason : Physician order  and POC has     PLAN   This patient is discharged from Outpatient Physical Therapy Services.     Jazz Cody, PT  10/23/2024

## 2024-11-09 ENCOUNTER — HOSPITAL ENCOUNTER (EMERGENCY)
Facility: HOSPITAL | Age: 44
Discharge: HOME | End: 2024-11-09
Attending: STUDENT IN AN ORGANIZED HEALTH CARE EDUCATION/TRAINING PROGRAM
Payer: OTHER GOVERNMENT

## 2024-11-09 VITALS
TEMPERATURE: 98 F | SYSTOLIC BLOOD PRESSURE: 102 MMHG | HEIGHT: 63 IN | HEART RATE: 77 BPM | DIASTOLIC BLOOD PRESSURE: 59 MMHG | OXYGEN SATURATION: 97 % | WEIGHT: 135 LBS | BODY MASS INDEX: 23.92 KG/M2 | RESPIRATION RATE: 16 BRPM

## 2024-11-09 DIAGNOSIS — R07.89 FEELING OF CHEST TIGHTNESS: Primary | ICD-10-CM

## 2024-11-09 DIAGNOSIS — R07.9 CHEST PAIN: ICD-10-CM

## 2024-11-09 DIAGNOSIS — R06.00 DYSPNEA: ICD-10-CM

## 2024-11-09 LAB
ALBUMIN SERPL BCP-MCNC: 4 G/DL (ref 3.5–5.2)
ALP SERPL-CCNC: 38 U/L (ref 40–150)
ALT SERPL W/O P-5'-P-CCNC: 9 U/L (ref 10–44)
ANION GAP SERPL CALC-SCNC: 12 MMOL/L (ref 8–16)
AST SERPL-CCNC: 12 U/L (ref 10–40)
B-HCG UR QL: NEGATIVE
BASOPHILS # BLD AUTO: 0.09 K/UL (ref 0–0.2)
BASOPHILS NFR BLD: 1.4 % (ref 0–1.9)
BILIRUB SERPL-MCNC: 0.7 MG/DL (ref 0.1–1)
BILIRUB UR QL STRIP: NEGATIVE
BNP SERPL-MCNC: 10 PG/ML (ref 0–99)
BUN SERPL-MCNC: 5 MG/DL (ref 6–20)
CALCIUM SERPL-MCNC: 9.2 MG/DL (ref 8.7–10.5)
CHLORIDE SERPL-SCNC: 107 MMOL/L (ref 95–110)
CLARITY UR: CLEAR
CO2 SERPL-SCNC: 21 MMOL/L (ref 23–29)
COLOR UR: YELLOW
CREAT SERPL-MCNC: 0.8 MG/DL (ref 0.5–1.4)
CTP QC/QA: YES
DIFFERENTIAL METHOD BLD: ABNORMAL
EOSINOPHIL # BLD AUTO: 0.1 K/UL (ref 0–0.5)
EOSINOPHIL NFR BLD: 1.7 % (ref 0–8)
ERYTHROCYTE [DISTWIDTH] IN BLOOD BY AUTOMATED COUNT: 13 % (ref 11.5–14.5)
EST. GFR  (NO RACE VARIABLE): >60 ML/MIN/1.73 M^2
GLUCOSE SERPL-MCNC: 101 MG/DL (ref 70–110)
GLUCOSE UR QL STRIP: NEGATIVE
HCT VFR BLD AUTO: 39.6 % (ref 37–48.5)
HGB BLD-MCNC: 13.9 G/DL (ref 12–16)
HGB UR QL STRIP: NEGATIVE
IMM GRANULOCYTES # BLD AUTO: 0.01 K/UL (ref 0–0.04)
IMM GRANULOCYTES NFR BLD AUTO: 0.2 % (ref 0–0.5)
KETONES UR QL STRIP: NEGATIVE
LEUKOCYTE ESTERASE UR QL STRIP: NEGATIVE
LYMPHOCYTES # BLD AUTO: 2.2 K/UL (ref 1–4.8)
LYMPHOCYTES NFR BLD: 34.9 % (ref 18–48)
MAGNESIUM SERPL-MCNC: 1.8 MG/DL (ref 1.6–2.6)
MCH RBC QN AUTO: 32.4 PG (ref 27–31)
MCHC RBC AUTO-ENTMCNC: 35.1 G/DL (ref 32–36)
MCV RBC AUTO: 92 FL (ref 82–98)
MOLECULAR STREP A: NEGATIVE
MONOCYTES # BLD AUTO: 0.4 K/UL (ref 0.3–1)
MONOCYTES NFR BLD: 6.6 % (ref 4–15)
NEUTROPHILS # BLD AUTO: 3.5 K/UL (ref 1.8–7.7)
NEUTROPHILS NFR BLD: 55.2 % (ref 38–73)
NITRITE UR QL STRIP: NEGATIVE
NRBC BLD-RTO: 0 /100 WBC
PH UR STRIP: 8 [PH] (ref 5–8)
PLATELET # BLD AUTO: 239 K/UL (ref 150–450)
PMV BLD AUTO: 10.7 FL (ref 9.2–12.9)
POC MOLECULAR INFLUENZA A AGN: NEGATIVE
POC MOLECULAR INFLUENZA B AGN: NEGATIVE
POTASSIUM SERPL-SCNC: 3.4 MMOL/L (ref 3.5–5.1)
PROT SERPL-MCNC: 7 G/DL (ref 6–8.4)
PROT UR QL STRIP: NEGATIVE
RBC # BLD AUTO: 4.29 M/UL (ref 4–5.4)
SARS-COV-2 RDRP RESP QL NAA+PROBE: NEGATIVE
SODIUM SERPL-SCNC: 140 MMOL/L (ref 136–145)
SP GR UR STRIP: 1.01 (ref 1–1.03)
TROPONIN I SERPL DL<=0.01 NG/ML-MCNC: <0.006 NG/ML (ref 0–0.03)
TSH SERPL DL<=0.005 MIU/L-ACNC: 1.15 UIU/ML (ref 0.4–4)
URN SPEC COLLECT METH UR: NORMAL
UROBILINOGEN UR STRIP-ACNC: NEGATIVE EU/DL
WBC # BLD AUTO: 6.34 K/UL (ref 3.9–12.7)

## 2024-11-09 PROCEDURE — 96374 THER/PROPH/DIAG INJ IV PUSH: CPT

## 2024-11-09 PROCEDURE — 85025 COMPLETE CBC W/AUTO DIFF WBC: CPT | Performed by: STUDENT IN AN ORGANIZED HEALTH CARE EDUCATION/TRAINING PROGRAM

## 2024-11-09 PROCEDURE — 84443 ASSAY THYROID STIM HORMONE: CPT | Performed by: STUDENT IN AN ORGANIZED HEALTH CARE EDUCATION/TRAINING PROGRAM

## 2024-11-09 PROCEDURE — 87502 INFLUENZA DNA AMP PROBE: CPT

## 2024-11-09 PROCEDURE — 83880 ASSAY OF NATRIURETIC PEPTIDE: CPT | Performed by: STUDENT IN AN ORGANIZED HEALTH CARE EDUCATION/TRAINING PROGRAM

## 2024-11-09 PROCEDURE — 99285 EMERGENCY DEPT VISIT HI MDM: CPT | Mod: 25

## 2024-11-09 PROCEDURE — 80053 COMPREHEN METABOLIC PANEL: CPT | Performed by: STUDENT IN AN ORGANIZED HEALTH CARE EDUCATION/TRAINING PROGRAM

## 2024-11-09 PROCEDURE — 81003 URINALYSIS AUTO W/O SCOPE: CPT | Performed by: STUDENT IN AN ORGANIZED HEALTH CARE EDUCATION/TRAINING PROGRAM

## 2024-11-09 PROCEDURE — 81025 URINE PREGNANCY TEST: CPT

## 2024-11-09 PROCEDURE — 87635 SARS-COV-2 COVID-19 AMP PRB: CPT | Performed by: STUDENT IN AN ORGANIZED HEALTH CARE EDUCATION/TRAINING PROGRAM

## 2024-11-09 PROCEDURE — 93010 ELECTROCARDIOGRAM REPORT: CPT | Mod: ,,, | Performed by: INTERNAL MEDICINE

## 2024-11-09 PROCEDURE — 87651 STREP A DNA AMP PROBE: CPT

## 2024-11-09 PROCEDURE — 83735 ASSAY OF MAGNESIUM: CPT | Performed by: STUDENT IN AN ORGANIZED HEALTH CARE EDUCATION/TRAINING PROGRAM

## 2024-11-09 PROCEDURE — 93005 ELECTROCARDIOGRAM TRACING: CPT

## 2024-11-09 PROCEDURE — 84484 ASSAY OF TROPONIN QUANT: CPT | Performed by: STUDENT IN AN ORGANIZED HEALTH CARE EDUCATION/TRAINING PROGRAM

## 2024-11-09 PROCEDURE — 63600175 PHARM REV CODE 636 W HCPCS: Performed by: STUDENT IN AN ORGANIZED HEALTH CARE EDUCATION/TRAINING PROGRAM

## 2024-11-09 RX ORDER — ALBUTEROL SULFATE 90 UG/1
1-2 INHALANT RESPIRATORY (INHALATION) EVERY 6 HOURS PRN
Qty: 6.7 G | Refills: 0 | Status: SHIPPED | OUTPATIENT
Start: 2024-11-09 | End: 2025-11-09

## 2024-11-09 RX ORDER — LORAZEPAM 2 MG/ML
1 INJECTION INTRAMUSCULAR
Status: COMPLETED | OUTPATIENT
Start: 2024-11-09 | End: 2024-11-09

## 2024-11-09 RX ADMIN — LORAZEPAM 1 MG: 2 INJECTION INTRAMUSCULAR; INTRAVENOUS at 04:11

## 2024-11-09 NOTE — DISCHARGE INSTRUCTIONS

## 2024-11-10 LAB
OHS QRS DURATION: 78 MS
OHS QTC CALCULATION: 441 MS

## 2024-11-18 ENCOUNTER — OUTPATIENT CASE MANAGEMENT (OUTPATIENT)
Dept: ADMINISTRATIVE | Facility: OTHER | Age: 44
End: 2024-11-18
Payer: OTHER GOVERNMENT

## 2024-11-25 ENCOUNTER — OFFICE VISIT (OUTPATIENT)
Dept: ORTHOPEDICS | Facility: CLINIC | Age: 44
End: 2024-11-25
Payer: OTHER GOVERNMENT

## 2024-11-25 VITALS — BODY MASS INDEX: 24.8 KG/M2 | HEIGHT: 63 IN | WEIGHT: 140 LBS

## 2024-11-25 DIAGNOSIS — R29.898 WEAKNESS OF LEFT LOWER EXTREMITY: Primary | ICD-10-CM

## 2024-11-25 PROCEDURE — 99214 OFFICE O/P EST MOD 30 MIN: CPT | Mod: PBBFAC | Performed by: ORTHOPAEDIC SURGERY

## 2024-11-25 PROCEDURE — 99999 PR PBB SHADOW E&M-EST. PATIENT-LVL IV: CPT | Mod: PBBFAC,,, | Performed by: ORTHOPAEDIC SURGERY

## 2024-11-25 PROCEDURE — 99214 OFFICE O/P EST MOD 30 MIN: CPT | Mod: S$PBB,,, | Performed by: ORTHOPAEDIC SURGERY

## 2024-11-25 NOTE — PROGRESS NOTES
Outpatient Care Management   - Care Plan Follow Up    Patient: Lydia Thomas  MRN:  [id number]  Date of Service:  11/25/2024  Completed by:  Claire Rider LCSW  Referral Date: 05/30/2024    Reason for Visit   Patient presents with    Other     11/18/2024  1st attempt to complete Follow-Up for Outpatient Care Management, left message.       [facility] SW Follow Up Call     11/25/24       Brief Summary: SW spoke with Pt to check in. She reported she feels like she has backtracked significantly not having therapy. She reports she is only getting though it due to her therapist but that workers comp wants her to get a second opinion with another behavioral therapist and wanted her to get in to see Dr Gutierres regarding need for further therapy. She is going to see Dr Gutierres today to request orders. Also, Pt reported her  is being transferred to [place] in January, so they will be moving then. She hopes to be in therapy for the month of December, if possible.     Future Appointments   Date Time Provider Department Center   11/25/2024  3:30 PM Conrad Gutierres MD [facility] SPINE [address]   12/2/2024  3:00 PM Carly Riley FNP [facility] PR[facility]   12/10/2024  3:00 PM Bettie Mcintosh MD [facility] DERM [place]   12/19/2024  9:30 AM Monet Torres FNP [facility] NEURO [place]   1/21/2025  3:20 PM [facility]ryl Anders, NP [facility]     Claire Rider LCSW  Neuro Therapy   [facility]  [phone]

## 2024-11-26 NOTE — PROGRESS NOTES
"DATE: 11/26/2024  PATIENT: Lydia Thomas    Attending Physician: Conrad Gutierres M.D.    HISTORY:  Lydia Thomas is a 44 y.o. female who returns to me today for FU. She complained of R lateral hip pain. Patient continues to have LLE n/t and weakness, but PT has been helping tremendously. She wants to continue neuro PT for her LLE.    The patient does not smoke, have DM or endorse IVDU. The patient is not on any blood thinners and does not take chronic narcotics. She works at [employer] (WORKER'S COMPENSATION).    PMH/PSH/FamHx/SocHx:  Unchanged from prior visit    ROS:  Positive for LUE and LLE weakness and n/t  Denies myelopathic symptoms, perineal paresthesias, bowel or bladder incontinence    EXAM:  Ht 5' 3" (1.6 m)   Wt 63.5 kg (140 lb)   BMI 24.80 kg/m²     My physical examination was notable for the following findings: diffuse weakness 3/5 in all muscle groups in LLE. Normal sensation to light touch in the C5-T1 and L2-S1 dermatomes bilaterally. 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    Through Care Everywhere, CT of entire spine (cervical, thoracic and lumbar) reports stated no fractures. CT head report stated no acute head bleed.     MRI C-T-L showed no significant central or foraminal stenosis.    EMG BLE was inconclusive.    ASSESSMENT/PLAN:  Patient has LLE weakness and pain following a concussion on 4/12/22. Her spine MRI was not very impressive; no ortho spine surgical intervention warranted. I discussed the natural history of their diagnoses as well as surgical and nonsurgical treatment options. I educated the patient on the importance of core/back strengthening, correct posture, bending/lifting ergonomics, and low-impact aerobic exercises (walking, elliptical, and aquatherapy). Continue medications. Continue neuro physical therapy for LLE " stregnthening. I referred her to Joint[facility] for R hip pain. Patient will follow up PRN.     Conrad Gutierres MD  Orthopaedic Spine Surgeon  Department of Orthopaedic Surgery  [phone]

## 2024-11-27 DIAGNOSIS — R52 PAIN: Primary | ICD-10-CM

## 2024-11-30 ENCOUNTER — HOSPITAL ENCOUNTER (EMERGENCY)
Facility: HOSPITAL | Age: 44
Discharge: HOME | End: 2024-11-30
Attending: EMERGENCY MEDICINE
Payer: OTHER GOVERNMENT

## 2024-11-30 VITALS
RESPIRATION RATE: 20 BRPM | BODY MASS INDEX: 24.27 KG/M2 | HEART RATE: 82 BPM | DIASTOLIC BLOOD PRESSURE: 76 MMHG | HEIGHT: 63 IN | SYSTOLIC BLOOD PRESSURE: 114 MMHG | OXYGEN SATURATION: 99 % | TEMPERATURE: 98 F | WEIGHT: 137 LBS

## 2024-11-30 DIAGNOSIS — N83.201 RIGHT OVARIAN CYST: Primary | ICD-10-CM

## 2024-11-30 DIAGNOSIS — N73.0 PID (ACUTE PELVIC INFLAMMATORY DISEASE): ICD-10-CM

## 2024-11-30 DIAGNOSIS — R10.2 PELVIC PAIN: ICD-10-CM

## 2024-11-30 LAB
B-HCG UR QL: NEGATIVE
BILIRUB SERPL-MCNC: NORMAL MG/DL
BLOOD URINE, POC: NORMAL
CLARITY, UA: NORMAL
COLOR, UA: NORMAL
CTP QC/QA: YES
GLUCOSE UR QL STRIP: NORMAL
KETONES UR QL STRIP: NORMAL
LEUKOCYTE ESTERASE URINE, POC: NORMAL
NITRITE, POC UA: NORMAL
PH, POC UA: NORMAL
PROTEIN, POC: NORMAL
SPECIFIC GRAVITY, POC UA: NORMAL
UROBILINOGEN, POC UA: NORMAL

## 2024-11-30 PROCEDURE — 0352U VAGINOSIS SCREEN BY DNA PROBE: CPT | Performed by: PHYSICIAN ASSISTANT

## 2024-11-30 PROCEDURE — 87591 N.GONORRHOEAE DNA AMP PROB: CPT | Performed by: PHYSICIAN ASSISTANT

## 2024-11-30 PROCEDURE — 81002 URINALYSIS NONAUTO W/O SCOPE: CPT | Mod: ER

## 2024-11-30 PROCEDURE — 81025 URINE PREGNANCY TEST: CPT | Mod: ER | Performed by: EMERGENCY MEDICINE

## 2024-11-30 PROCEDURE — 99285 EMERGENCY DEPT VISIT HI MDM: CPT | Mod: 25,ER

## 2024-11-30 PROCEDURE — 96372 THER/PROPH/DIAG INJ SC/IM: CPT | Performed by: PHYSICIAN ASSISTANT

## 2024-11-30 PROCEDURE — 25000003 PHARM REV CODE 250: Mod: ER | Performed by: PHYSICIAN ASSISTANT

## 2024-11-30 PROCEDURE — 63600175 PHARM REV CODE 636 W HCPCS: Mod: ER | Performed by: PHYSICIAN ASSISTANT

## 2024-11-30 RX ORDER — DOXYCYCLINE 100 MG/1
100 CAPSULE ORAL EVERY 12 HOURS
Qty: 28 CAPSULE | Refills: 0 | Status: SHIPPED | OUTPATIENT
Start: 2024-11-30 | End: 2024-12-14

## 2024-11-30 RX ORDER — DOXYCYCLINE HYCLATE 100 MG
100 TABLET ORAL
Status: COMPLETED | OUTPATIENT
Start: 2024-11-30 | End: 2024-11-30

## 2024-11-30 RX ORDER — METRONIDAZOLE 500 MG/1
500 TABLET ORAL
Status: COMPLETED | OUTPATIENT
Start: 2024-11-30 | End: 2024-11-30

## 2024-11-30 RX ORDER — IBUPROFEN 600 MG/1
600 TABLET ORAL EVERY 6 HOURS PRN
Qty: 20 TABLET | Refills: 0 | Status: SHIPPED | OUTPATIENT
Start: 2024-11-30 | End: 2024-12-05

## 2024-11-30 RX ORDER — ACETAMINOPHEN 500 MG
500 TABLET ORAL
Status: COMPLETED | OUTPATIENT
Start: 2024-11-30 | End: 2024-11-30

## 2024-11-30 RX ORDER — DICYCLOMINE HYDROCHLORIDE 10 MG/ML
20 INJECTION INTRAMUSCULAR
Status: DISCONTINUED | OUTPATIENT
Start: 2024-11-30 | End: 2024-11-30

## 2024-11-30 RX ORDER — DICYCLOMINE HYDROCHLORIDE 20 MG/1
20 TABLET ORAL 4 TIMES DAILY PRN
Qty: 28 TABLET | Refills: 0 | Status: SHIPPED | OUTPATIENT
Start: 2024-11-30 | End: 2024-12-07

## 2024-11-30 RX ORDER — KETOROLAC TROMETHAMINE 30 MG/ML
30 INJECTION, SOLUTION INTRAMUSCULAR; INTRAVENOUS
Status: DISCONTINUED | OUTPATIENT
Start: 2024-11-30 | End: 2024-11-30

## 2024-11-30 RX ORDER — DICYCLOMINE HYDROCHLORIDE 10 MG/ML
20 INJECTION INTRAMUSCULAR
Status: COMPLETED | OUTPATIENT
Start: 2024-11-30 | End: 2024-11-30

## 2024-11-30 RX ORDER — METRONIDAZOLE 500 MG/1
500 TABLET ORAL EVERY 12 HOURS
Qty: 14 TABLET | Refills: 0 | Status: SHIPPED | OUTPATIENT
Start: 2024-11-30 | End: 2024-12-07

## 2024-11-30 RX ADMIN — METRONIDAZOLE 500 MG: 500 TABLET ORAL at 03:11

## 2024-11-30 RX ADMIN — DOXYCYCLINE HYCLATE 100 MG: 100 TABLET, COATED ORAL at 03:11

## 2024-11-30 RX ADMIN — CEFTRIAXONE 500 MG: 1 INJECTION, POWDER, FOR SOLUTION INTRAMUSCULAR; INTRAVENOUS at 03:11

## 2024-11-30 RX ADMIN — ACETAMINOPHEN 500 MG: 500 TABLET ORAL at 03:11

## 2024-11-30 RX ADMIN — DICYCLOMINE HYDROCHLORIDE 20 MG: 10 INJECTION, SOLUTION INTRAMUSCULAR at 03:11

## 2024-11-30 NOTE — ED PROVIDER NOTES
Encounter Date: 11/30/2024    SCRIBE #1 NOTE: I, Constanza Moseley, am scribing for, and in the presence of,  Tami Pena PA-C. I have scribed the following portions of the note - Other sections scribed: HPI, ROS, PE.       History     Chief Complaint   Patient presents with    Abdominal Pain     Pt presents w/ a c/o of lower abdominal pain for approximately three days. Reports irregular periods, and urinary hesitancy. Denies any NVD, or fever. Negative pregnancy test at home.     CC: Abdominal Pain    HPI: 43 yo F, with a PMHx of anemia, asthma, ovarian cyst PSHx of appendectomy presents to the ED for evaluation of 8/10 lower abdominal pain, symptoms onset 3 days ago. Reports associated constant pelvic pain and sensation of decreased urine output with urinary urgency. States her pain feels similar to when she had appendicitis and worsens when laying down.    Pt was concerned about possible pregnancy due to abnormal menstrual cycles. Her menstrual periods are usually monthly, lasting 3-4 days and has light to medium flow. Her last menstrual cycle was abnormal in that she had severe abdominal cramping which she does not typically have and she had 2 menstrual cycles in October. Had negative home UPTs.     States she saw her OB-GYN in August, did not have her pain at the time and had a pelvic exam. Is with her  today during visit who is at bedside. She denies vaginal discharge, dysuria, urinary frequency.    States she has been having normal bowel movements and is still passing flatus. Has a family history and personal history of ovarian cysts. Has new life stressors as she's moving in January. No other alleviating or exacerbating factors. Denies recent use of birth control. Denies family history of endometriosis or fibroids. Denies fever, chills,   nausea, vomiting, diarrhea constipation CP SOB dizziness lightheadedness or other associated symptoms. Patient attempted Midol/Tylenol as treatment/medication in the  "last several days but none prior to arrival. This is the extent of the patient's complaints in the ED.     The history is provided by the patient. No  was used.     Review of patient's allergies indicates:   Allergen Reactions    Iodine and iodide containing products Anaphylaxis     PATIENT CAN RECEIVE IOHEXOL, has received multiple times with no pretreatment with no allergic reaction    Shellfish derived Anaphylaxis    Benadryl [diphenhydramine hcl] Itching     "with fast injection"    Fish containing products     Iodine      Past Medical History:   Diagnosis Date    Anemia     Anemia     Asthma     exercise asthma    Migraine headache     Ovarian cyst      Past Surgical History:   Procedure Laterality Date    APPENDECTOMY      4/2019    AUGMENTATION OF BREAST Bilateral 03/2022    BREAST BIOPSY Right     Excisional bx, benign    COLONOSCOPY N/A 05/18/2022    Procedure: COLONOSCOPY;  Surgeon: Sabino Mcintosh MD;  Location: [facility] ENDO;  Service: Endoscopy;  Laterality: N/A;  High risk for colon cancer (family)      fully vaccinated; instructions to portal-GT    ESOPHAGOGASTRODUODENOSCOPY N/A 1/18/2024    Procedure: EGD (ESOPHAGOGASTRODUODENOSCOPY);  Surgeon: Sabino Mcintosh MD;  Location: [facility] ENDO;  Service: Endoscopy;  Laterality: N/A;  1/10 ref by Feli Jc, PAC, instr. to portal-st  1/16- pc complete. DBM     Family History   Problem Relation Name Age of Onset    Left Breast Cancer Mother Monet         Stage IA, lumpect, chemo, XRT    Asthma Mother Monet     Miscarriages / Stillbirths Mother Monet     Tuberculosis Father Daryl     Muscular dystrophy Father Daryl         type uk    Arthritis Father Daryl     COPD Father Daryl     Asthma Brother [facility]     Colon polyps Brother [facility] 30 - 39        1 large polyp    Breast cancer Maternal Grandmother  61        metastasis    Hearing loss Paternal Grandmother      Hearing loss Paternal Grandfather      Diabetes Other Lobito     Hypertension " Other Lobito     Prostate cancer Other Lobito 60 - 69    Pancreatic cancer Other Lobito 60 - 69    Hearing loss Paternal Aunt      Hearing loss Paternal Uncle      Melanoma Maternal Cousin Fifi         multiple    Cancer Other          breast vs. ovarian    Stomach cancer Other      Cancer Other          breast vs. ovarian    Breast cancer Other      Ovarian cancer Other      Left Breast Cancer Other  60    Breast cancer Other      Amblyopia Neg Hx      Blindness Neg Hx      Cataracts Neg Hx      Glaucoma Neg Hx      Macular degeneration Neg Hx      Retinal detachment Neg Hx      Strabismus Neg Hx       Social History     Tobacco Use    Smoking status: Never    Smokeless tobacco: Never   Substance Use Topics    Alcohol use: Yes     Comment: occass    Drug use: No     Review of Systems   Constitutional:  Negative for chills and fever.   HENT:  Negative for congestion, ear pain, nosebleeds, rhinorrhea, sore throat and trouble swallowing.    Eyes:  Negative for redness.   Respiratory:  Negative for cough, shortness of breath and stridor.    Cardiovascular:  Negative for chest pain.   Gastrointestinal:  Positive for abdominal pain. Negative for constipation, diarrhea, nausea and vomiting.   Genitourinary:  Positive for decreased urine volume and pelvic pain. Negative for dysuria, frequency, hematuria, urgency and vaginal discharge.   Musculoskeletal:  Negative for back pain and neck pain.   Skin:  Negative for rash and wound.   Neurological:  Negative for dizziness, speech difficulty, weakness, light-headedness, numbness and headaches.   Hematological:  Does not bruise/bleed easily.   Psychiatric/Behavioral:  Negative for confusion.        Physical Exam     Initial Vitals [11/30/24 1351]   BP Pulse Resp Temp SpO2   114/76 82 20 98.3 °F (36.8 °C) 99 %      MAP       --         Physical Exam    Nursing note and vitals reviewed.  Constitutional: She appears well-developed and well-nourished.   HENT:   Head: Normocephalic.    Right Ear: External ear normal.   Left Ear: External ear normal.   Nose: Nose normal. Mouth/Throat: Oropharynx is clear and moist.   Eyes: Conjunctivae are normal.   Cardiovascular:  Normal rate and regular rhythm.     Exam reveals no gallop and no friction rub.       No murmur heard.  Pulmonary/Chest: Breath sounds normal. She has no wheezes. She has no rhonchi. She has no rales.   Abdominal: Abdomen is soft. Bowel sounds are normal. She exhibits no distension. There is abdominal tenderness in the suprapubic area and left lower quadrant.   No right CVA tenderness.  No left CVA tenderness. There is guarding. There is no rebound, no tenderness at McBurney's point and negative Abbott's sign.   Genitourinary:    Genitourinary Comments: Chaperoned by Bob Ramirez RN   No external vaginal lesions.   Pain with insertion of speculum (pt yelling). Moderate amount of thin white/yellow discharge. Cervical friability.  CMT and adnexal ttp with no palpable masses   No uterine TTP or masses.      Musculoskeletal:         General: Normal range of motion.     Lymphadenopathy:     She has no cervical adenopathy.   Neurological: She is alert. She has normal strength. No cranial nerve deficit or sensory deficit.   Skin: Skin is warm and dry.   Psychiatric: She has a normal mood and affect.         ED Course   Procedures  Labs Reviewed   VAGINOSIS SCREEN BY DNA PROBE   C. TRACHOMATIS/N. GONORRHOEAE BY AMP DNA   POCT URINE PREGNANCY       Result Value    POC Preg Test, Ur Negative       Acceptable Yes     POCT URINALYSIS W/O SCOPE    Color, UA POC        Clarity, UA, POC        Spec Grav UA        pH, UA        WBC, UA        Nitrite, UA        Protein, POC        Glucose, UA        Ketones, UA        Bilirubin, POC        Urobilinogen, UA        Blood, UA              Imaging Results              US Transvaginal Non OB (Final result)  Result time 11/30/24 15:45:25      Final result by Rey Tamez MD (11/30/24  15:45:25)                   Impression:      No acute sonographic abnormality.    Right ovarian 2.6 cm simple cyst.  Given the current history of pelvic pain, follow-up pelvic ultrasound in 6-8 weeks can be obtained to ensure stability/resolution.      Electronically signed by: Rey Tamez MD  Date:    11/30/2024  Time:    15:45               Narrative:    EXAMINATION:  US TRANSVAGINAL NON OB    CLINICAL HISTORY:  Pelvic and perineal pain    TECHNIQUE:  Transabdominal sonography of the pelvis was performed, followed by transvaginal sonography to better evaluate the uterus and ovaries.    COMPARISON:  Pelvic ultrasound 07/05/2024, CT abdomen and pelvis 07/18/2024    FINDINGS:  Uterus: 6.1 x 3.1 x 4.6 cm.  No discrete fibroid.  Endometrium measures up to 12 mm in thickness.  1 cm simple appearing nabothian cyst noted.    Right ovary: 3 x 1.5 x 3.5 cm with intact arterial and venous flow containing a 2.6 cm well-circumscribed nonvascular anechoic structure consistent with a simple cyst.    Left ovary: 3.2 x 1.8 x 2.5 cm with intact arterial and venous flow.    Miscellaneous: No Free Fluid.                                       Medications   acetaminophen tablet 500 mg (500 mg Oral Given 11/30/24 1514)   cefTRIAXone (Rocephin) 500 mg in LIDOcaine HCL 10 mg/ml (1%) 2 mL IM only syringe (500 mg Intramuscular Given 11/30/24 1517)   doxycycline tablet 100 mg (100 mg Oral Given 11/30/24 1515)   metroNIDAZOLE tablet 500 mg (500 mg Oral Given 11/30/24 1515)   dicyclomine injection 20 mg (20 mg Intramuscular Given 11/30/24 1528)     Medical Decision Making  44-year-old female presenting for evaluation of pelvic and lower abdominal pain associated sensation of decreased she urination/urinary urgency with little urine output.  UPT negative.  UA negative for UTI.  She reports she has been having irregular cycles and did not have a normal menstrual cycle is month and had to last month.  Pelvic exam white/yellow discharge.   Significant pain with insertion of speculum and with bimanual exam.  Rocephin IM, doxy p.o. and Flagyl p.o. given in the ED for empiric treatment of PID.  GC and vaginosis screen are pending.  Transvaginal ultrasound ordered and negative for evidence of ovarian torsion or tubo-ovarian abscess.  There is a 2 cm cyst of the right ovary.  No uterine fibroid.  Patient was given Bentyl IM in the ED is feeling better and no longer having abdominal pain.  Will have him patient follow up with OBGYN for further evaluation management.  She was not septic.  Considered but low suspicion for acute surgical abdomen at this time. May be the start her her next menstrual cycle. Will have patient return ER for worsening     Amount and/or Complexity of Data Reviewed  Labs: ordered. Decision-making details documented in ED Course.  Radiology: ordered.    Risk  OTC drugs.  Prescription drug management.            Scribe Attestation:   Scribe #1: I performed the above scribed service and the documentation accurately describes the services I performed. I attest to the accuracy of the note.                               Clinical Impression:  Final diagnoses:  [R10.2] Pelvic pain  [N83.201] Right ovarian cyst (Primary)  [N73.0] PID (acute pelvic inflammatory disease)          ED Disposition Condition    Discharge Stable          I, Tami monroy PA-C, personally performed the services described in this documentation. All medical record entries made by the scribe were at my direction and in my presence. I have reviewed the chart and agree that the record reflects my personal performance and is accurate and complete.      DISCLAIMER: This note was prepared with [employer] voice recognition transcription software. Garbled syntax, mangled pronouns, and other bizarre constructions may be attributed to that software system.  ED Prescriptions       Medication Sig Dispense Start Date End Date Auth. Provider    ibuprofen (ADVIL,MOTRIN) 600 MG tablet  Take 1 tablet (600 mg total) by mouth every 6 (six) hours as needed. 20 tablet 11/30/2024 12/5/2024 Tami Pena PA-C    dicyclomine (BENTYL) 20 mg tablet Take 1 tablet (20 mg total) by mouth 4 (four) times daily as needed. 28 tablet 11/30/2024 12/7/2024 Tami Pena PA-C    doxycycline (VIBRAMYCIN) 100 MG Cap Take 1 capsule (100 mg total) by mouth every 12 (twelve) hours. for 14 days 28 capsule 11/30/2024 12/14/2024 Tami Pena PA-C    metroNIDAZOLE (FLAGYL) 500 MG tablet Take 1 tablet (500 mg total) by mouth every 12 (twelve) hours. for 7 days 14 tablet 11/30/2024 12/7/2024 Tami Pena PA-C          Follow-up Information       Follow up With Specialties Details Why Contact Info    Your OBGYN  Schedule an appointment as soon as possible for a visit in 2 days for follow up     [facility] ED Emergency Medicine Go to  As needed, If symptoms worsen [address]  [facility] [address] [phone]             Tami Pena PA-C  11/30/24 1817

## 2024-11-30 NOTE — DISCHARGE INSTRUCTIONS

## 2024-12-02 ENCOUNTER — LAB VISIT (OUTPATIENT)
Dept: LAB | Facility: HOSPITAL | Age: 44
End: 2024-12-02
Attending: STUDENT IN AN ORGANIZED HEALTH CARE EDUCATION/TRAINING PROGRAM
Payer: OTHER GOVERNMENT

## 2024-12-02 ENCOUNTER — PATIENT OUTREACH (OUTPATIENT)
Dept: ADMINISTRATIVE | Facility: OTHER | Age: 44
End: 2024-12-02
Payer: OTHER GOVERNMENT

## 2024-12-02 ENCOUNTER — OFFICE VISIT (OUTPATIENT)
Facility: CLINIC | Age: 44
End: 2024-12-02
Payer: OTHER GOVERNMENT

## 2024-12-02 VITALS
DIASTOLIC BLOOD PRESSURE: 80 MMHG | HEART RATE: 89 BPM | RESPIRATION RATE: 18 BRPM | HEIGHT: 63 IN | WEIGHT: 144.63 LBS | OXYGEN SATURATION: 99 % | TEMPERATURE: 98 F | SYSTOLIC BLOOD PRESSURE: 122 MMHG | BODY MASS INDEX: 25.62 KG/M2

## 2024-12-02 DIAGNOSIS — R94.31 ABNORMAL EKG: ICD-10-CM

## 2024-12-02 DIAGNOSIS — H53.8 BLURRY VISION, BILATERAL: Primary | ICD-10-CM

## 2024-12-02 DIAGNOSIS — F50.20 BULIMIA NERVOSA, UNSPECIFIED SEVERITY: ICD-10-CM

## 2024-12-02 DIAGNOSIS — E87.6 HYPOKALEMIA: ICD-10-CM

## 2024-12-02 DIAGNOSIS — R42 DIZZINESS AND GIDDINESS: ICD-10-CM

## 2024-12-02 DIAGNOSIS — J45.909 ASTHMA, UNSPECIFIED ASTHMA SEVERITY, UNSPECIFIED WHETHER COMPLICATED, UNSPECIFIED WHETHER PERSISTENT: ICD-10-CM

## 2024-12-02 DIAGNOSIS — H53.8 BLURRY VISION, BILATERAL: ICD-10-CM

## 2024-12-02 DIAGNOSIS — Z00.00 ENCOUNTER FOR ANNUAL PHYSICAL EXAM: ICD-10-CM

## 2024-12-02 DIAGNOSIS — F41.9 ANXIETY: ICD-10-CM

## 2024-12-02 DIAGNOSIS — G43.909 MIGRAINE WITHOUT STATUS MIGRAINOSUS, NOT INTRACTABLE, UNSPECIFIED MIGRAINE TYPE: ICD-10-CM

## 2024-12-02 PROBLEM — F07.81 POSTCONCUSSION SYNDROME: Status: ACTIVE | Noted: 2024-12-02

## 2024-12-02 PROBLEM — H93.19 TINNITUS: Status: ACTIVE | Noted: 2024-12-02

## 2024-12-02 PROBLEM — Z80.9 FAMILY HISTORY OF MALIGNANT NEOPLASM, UNSPECIFIED: Status: ACTIVE | Noted: 2019-02-22

## 2024-12-02 PROBLEM — S39.012A LOW BACK STRAIN: Status: ACTIVE | Noted: 2023-03-01

## 2024-12-02 PROBLEM — N63.0 BREAST LUMP OR MASS: Status: ACTIVE | Noted: 2024-12-02

## 2024-12-02 PROBLEM — G47.9 SLEEP DISORDER: Status: ACTIVE | Noted: 2024-12-02

## 2024-12-02 PROBLEM — S13.4XXA WHIPLASH INJURY TO NECK: Status: ACTIVE | Noted: 2022-04-12

## 2024-12-02 PROBLEM — S06.0X9A CONCUSSION WITH LOSS OF CONSCIOUSNESS: Status: ACTIVE | Noted: 2022-04-12

## 2024-12-02 PROBLEM — D50.9 MICROCYTIC HYPOCHROMIC ANEMIA: Status: ACTIVE | Noted: 2024-12-02
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
## 2024-12-02 LAB
ALBUMIN SERPL BCP-MCNC: 3.9 G/DL (ref 3.5–5.2)
ALP SERPL-CCNC: 36 U/L (ref 40–150)
ALT SERPL W/O P-5'-P-CCNC: 12 U/L (ref 10–44)
ANION GAP SERPL CALC-SCNC: 7 MMOL/L (ref 8–16)
AST SERPL-CCNC: 15 U/L (ref 10–40)
BASOPHILS # BLD AUTO: 0.08 K/UL (ref 0–0.2)
BASOPHILS NFR BLD: 1.4 % (ref 0–1.9)
BILIRUB SERPL-MCNC: 0.5 MG/DL (ref 0.1–1)
BUN SERPL-MCNC: 5 MG/DL (ref 6–20)
CALCIUM SERPL-MCNC: 9 MG/DL (ref 8.7–10.5)
CHLORIDE SERPL-SCNC: 109 MMOL/L (ref 95–110)
CHOLEST SERPL-MCNC: 207 MG/DL (ref 120–199)
CHOLEST/HDLC SERPL: 3.3 {RATIO} (ref 2–5)
CO2 SERPL-SCNC: 23 MMOL/L (ref 23–29)
CREAT SERPL-MCNC: 0.7 MG/DL (ref 0.5–1.4)
DIFFERENTIAL METHOD BLD: ABNORMAL
EOSINOPHIL # BLD AUTO: 0.2 K/UL (ref 0–0.5)
EOSINOPHIL NFR BLD: 2.5 % (ref 0–8)
ERYTHROCYTE [DISTWIDTH] IN BLOOD BY AUTOMATED COUNT: 12.6 % (ref 11.5–14.5)
EST. GFR  (NO RACE VARIABLE): >60 ML/MIN/1.73 M^2
FERRITIN SERPL-MCNC: 11 NG/ML (ref 20–300)
GLUCOSE SERPL-MCNC: 93 MG/DL (ref 70–110)
HCT VFR BLD AUTO: 38.2 % (ref 37–48.5)
HDLC SERPL-MCNC: 63 MG/DL (ref 40–75)
HDLC SERPL: 30.4 % (ref 20–50)
HGB BLD-MCNC: 12.8 G/DL (ref 12–16)
IMM GRANULOCYTES # BLD AUTO: 0.01 K/UL (ref 0–0.04)
IMM GRANULOCYTES NFR BLD AUTO: 0.2 % (ref 0–0.5)
IRON SERPL-MCNC: 51 UG/DL (ref 30–160)
LDLC SERPL CALC-MCNC: 132 MG/DL (ref 63–159)
LYMPHOCYTES # BLD AUTO: 1.7 K/UL (ref 1–4.8)
LYMPHOCYTES NFR BLD: 29.5 % (ref 18–48)
MCH RBC QN AUTO: 31.7 PG (ref 27–31)
MCHC RBC AUTO-ENTMCNC: 33.5 G/DL (ref 32–36)
MCV RBC AUTO: 95 FL (ref 82–98)
MONOCYTES # BLD AUTO: 0.5 K/UL (ref 0.3–1)
MONOCYTES NFR BLD: 8.1 % (ref 4–15)
NEUTROPHILS # BLD AUTO: 3.4 K/UL (ref 1.8–7.7)
NEUTROPHILS NFR BLD: 58.3 % (ref 38–73)
NONHDLC SERPL-MCNC: 144 MG/DL
NRBC BLD-RTO: 0 /100 WBC
PLATELET # BLD AUTO: 243 K/UL (ref 150–450)
PMV BLD AUTO: 10.8 FL (ref 9.2–12.9)
POTASSIUM SERPL-SCNC: 4.1 MMOL/L (ref 3.5–5.1)
PROT SERPL-MCNC: 6.9 G/DL (ref 6–8.4)
RBC # BLD AUTO: 4.04 M/UL (ref 4–5.4)
SATURATED IRON: 13 % (ref 20–50)
SODIUM SERPL-SCNC: 139 MMOL/L (ref 136–145)
TOTAL IRON BINDING CAPACITY: 379 UG/DL (ref 250–450)
TRANSFERRIN SERPL-MCNC: 256 MG/DL (ref 200–375)
TRIGL SERPL-MCNC: 60 MG/DL (ref 30–150)
TSH SERPL DL<=0.005 MIU/L-ACNC: 1.16 UIU/ML (ref 0.4–4)
WBC # BLD AUTO: 5.89 K/UL (ref 3.9–12.7)

## 2024-12-02 PROCEDURE — 82607 VITAMIN B-12: CPT

## 2024-12-02 PROCEDURE — 99999 PR PBB SHADOW E&M-EST. PATIENT-LVL V: CPT | Mod: PBBFAC,,,

## 2024-12-02 PROCEDURE — 82728 ASSAY OF FERRITIN: CPT

## 2024-12-02 PROCEDURE — 82652 VIT D 1 25-DIHYDROXY: CPT

## 2024-12-02 PROCEDURE — 99215 OFFICE O/P EST HI 40 MIN: CPT | Mod: PBBFAC,PN

## 2024-12-02 PROCEDURE — 36415 COLL VENOUS BLD VENIPUNCTURE: CPT

## 2024-12-02 PROCEDURE — 80053 COMPREHEN METABOLIC PANEL: CPT

## 2024-12-02 PROCEDURE — 84443 ASSAY THYROID STIM HORMONE: CPT

## 2024-12-02 PROCEDURE — 99396 PREV VISIT EST AGE 40-64: CPT | Mod: S$PBB,,,

## 2024-12-02 PROCEDURE — 80061 LIPID PANEL: CPT

## 2024-12-02 PROCEDURE — 85025 COMPLETE CBC W/AUTO DIFF WBC: CPT

## 2024-12-02 PROCEDURE — 82746 ASSAY OF FOLIC ACID SERUM: CPT

## 2024-12-02 PROCEDURE — 84466 ASSAY OF TRANSFERRIN: CPT

## 2024-12-02 PROCEDURE — 83036 HEMOGLOBIN GLYCOSYLATED A1C: CPT

## 2024-12-02 RX ORDER — ALBUTEROL SULFATE 90 UG/1
1-2 INHALANT RESPIRATORY (INHALATION) EVERY 6 HOURS PRN
Qty: 6.7 G | Refills: 0 | Status: SHIPPED | OUTPATIENT
Start: 2024-12-02 | End: 2025-12-02

## 2024-12-02 NOTE — PROGRESS NOTES
CHW - Initial Contact    This Community Health Worker completed  the Social Determinant of Health questionnaire with  [id number] in clinic today.    Pt identified barriers of most importance are: NONE   Referrals to community agencies completed with patient/caregiver consent outside of [facility] include: NONE  Referrals were put through [facility] - : NO  Support and Services: No support & services have been documented.  Other information discussed the patient needs / wants help with: NONE   Follow up required: NO  No future outreach task assigned

## 2024-12-02 NOTE — PROGRESS NOTES
SUBJECTIVE     Chief Complaint   Patient presents with    [facility] Care     Pt states she is coming in for [facility] care and needs labs       HPI  Lydia Thomas is a 44 y.o. female with multiple medical diagnoses as listed in the medical history and problem list that presents for annual exam. Pt is new to me. She does not exercise. She sleeps for ~8+ hours nightly. Pt does not take OTC supplements. She does have any current stressors. Pt is UTD on age appropriate CA screening. Dental exam is UTD. Eye exam is not UTD.     HPI    History of Present Illness    CHIEF COMPLAINT:  Patient presents today with severe abdominal cramps and irregular periods.    GYNECOLOGICAL HISTORY:  She reports severe abdominal cramps and irregular periods, with two periods occurring in October and the last one ending on November 1st. An ultrasound revealed a large ovarian cyst. She has a history of uterine fibroid. She experiences a sensation of abdominal heaviness. She is taking vitamin C in an attempt to regulate her periods.    FAMILY HISTORY:  She has a family history of breast cancer.    SURGICAL HISTORY:  She has breast implants.    MEDICAL HISTORY:  She has a history of abnormal EKG and palpitations. She experiences migraines with blurry vision. She has asthma and uses an inhaler. She has a history of a fall in 2020 and is dealing with workers' compensation related to it. She has a history of bulimia and is currently undergoing therapy.    ALLERGIES:  She has allergies to iodine, shellfish, Benadryl, and fish products.    CURRENT SYMPTOMS:  She reports dizziness, fatigue, and weight gain. She is experiencing stress due to a potential move and her 's travel, which has contributed to poor sleep and anxiety.    RECENT LABS:  Her thyroid panel from November was normal. Her magnesium levels were also normal. Her potassium was slightly low. She is noted to be dehydrated and malnourished. No anemia is present at this  time.    RECENT MEDICATIONS:  She was given doxycycline for a rash and Flagyl for potential bacterial vaginosis.    IMAGING:  Her recent mammogram was normal.    Patient admits to making herself vomit after meals due to the fear of being overweight.     Patient denies any shortness of breath, dizziness, headaches, N/V, vision changes, chest pains, or palpitations at present time.  Patient denies any suicidal thoughts or plans.     ROS:  General: -fever, -chills, +fatigue, +weight gain, -weight loss  Eyes: +vision changes, -redness, -discharge  ENT: -ear pain, -nasal congestion, -sore throat  Cardiovascular: -chest pain, -palpitations, -lower extremity edema  Respiratory: -cough, -shortness of breath  Gastrointestinal: +abdominal pain, -nausea, -vomiting, -diarrhea, -constipation, -blood in stool  Genitourinary: -dysuria, -hematuria, -frequency, +pelvic pain, +pelvic pressure  Musculoskeletal: -joint pain, -muscle pain  Skin: +rash, -lesion  Neurological: +headache, +dizziness, -numbness, -tingling, -weakness  Psychiatric: +anxiety, -depression, -sleep difficulty  Allergic: -allergic reactions         PAST MEDICAL HISTORY:  Past Medical History:   Diagnosis Date    Anemia     Anemia     Asthma     exercise asthma    Migraine headache     Ovarian cyst        PAST SURGICAL HISTORY:  Past Surgical History:   Procedure Laterality Date    APPENDECTOMY      4/2019    AUGMENTATION OF BREAST Bilateral 03/2022    BREAST BIOPSY Right     Excisional bx, benign    COLONOSCOPY N/A 05/18/2022    Procedure: COLONOSCOPY;  Surgeon: Sabino Mcintosh MD;  Location: [facility];  Service: Endoscopy;  Laterality: N/A;  High risk for colon cancer (family)      fully vaccinated; instructions to portal-GT    ESOPHAGOGASTRODUODENOSCOPY N/A 01/18/2024    Procedure: EGD (ESOPHAGOGASTRODUODENOSCOPY);  Surgeon: Sabino Mcintosh MD;  Location: [facility] ENDO;  Service: Endoscopy;  Laterality: N/A;  1/10 ref by SHANEL Wright, instr. to portal-st  1/16- pc complete.  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     Social Drivers of Health     Financial Resource Strain: Low Risk  (12/2/2024)    Overall Financial Resource Strain (CARDIA)     Difficulty of Paying Living Expenses: Not hard at all   Food Insecurity: No Food Insecurity (12/2/2024)    Hunger Vital Sign     Worried About Running Out of Food in the Last Year: Never true     Ran Out of Food in the Last Year: Never true   Transportation Needs: No Transportation Needs (12/2/2024)    PRAPARE - Transportation     Lack of Transportation (Medical): No     Lack of Transportation (Non-Medical): No   Physical Activity: Sufficiently Active (12/2/2024)    Exercise Vital Sign     Days of Exercise per Week: 7 days     Minutes of Exercise per Session: 30 min   Stress: No Stress Concern Present (12/2/2024)    [employer] - Occupational Stress Questionnaire     Feeling of Stress : Only a little   Housing Stability: Low Risk  (12/2/2024)    Housing Stability Vital Sign     Unable to Pay for Housing in the Last Year: No     Homeless in the Last Year: No       FAMILY HISTORY:  Family History   Problem Relation Name Age of Onset    Left Breast Cancer Mother Monet         Stage IA, lumpect, chemo, XRT    Asthma Mother Monet     Miscarriages / Stillbirths Mother Monet     Tuberculosis Father Daryl     Muscular dystrophy Father Daryl         type uk    Arthritis Father Daryl     COPD Father Daryl     Asthma Brother Brad      "Colon polyps Brother Brad 30 - 39        1 large polyp    Breast cancer Maternal Grandmother  61        metastasis    Hearing loss Paternal Grandmother      Hearing loss Paternal Grandfather      Diabetes Other Lobito     Hypertension Other Lobito     Prostate cancer Other Lobito 60 - 69    Pancreatic cancer Other L[place] 60 - 69    Hearing loss Paternal Aunt      Hearing loss Paternal Uncle      Melanoma Maternal Cousin Fifi         multiple    Cancer Other          breast vs. ovarian    Stomach cancer Other      Cancer Other          breast vs. ovarian    Breast cancer Other      Ovarian cancer Other      Left Breast Cancer Other  60    Breast cancer Other      Amblyopia Neg Hx      Blindness Neg Hx      Cataracts Neg Hx      Glaucoma Neg Hx      Macular degeneration Neg Hx      Retinal detachment Neg Hx      Strabismus Neg Hx         ALLERGIES AND MEDICATIONS: updated and reviewed.  Review of patient's allergies indicates:   Allergen Reactions    Iodine and iodide containing products Anaphylaxis     PATIENT CAN RECEIVE IOHEXOL, has received multiple times with no pretreatment with no allergic reaction    Shellfish derived Anaphylaxis    Benadryl [diphenhydramine hcl] Itching     "with fast injection"    Fish containing products     Iodine      Current Outpatient Medications   Medication Sig Dispense Refill    ascorbic acid, vitamin C, (VITAMIN C) 500 MG tablet       busPIRone (BUSPAR) 10 MG tablet Take 1 tablet (10 mg total) by mouth 3 (three) times daily. 90 tablet 24    clobetasoL (CLOBEX) 0.05 % shampoo Apply 1 application  topically every other day.      dicyclomine (BENTYL) 20 mg tablet Take 1 tablet (20 mg total) by mouth 4 (four) times daily as needed. 28 tablet 0    doxycycline (VIBRAMYCIN) 100 MG Cap Take 1 capsule (100 mg total) by mouth every 12 (twelve) hours. for 14 days 28 capsule 0    ibuprofen (ADVIL,MOTRIN) 600 MG tablet Take 1 tablet (600 mg total) by mouth every 6 (six) hours as needed. 20 tablet " "0    ketoconazole (NIZORAL) 2 % shampoo Wash scalp a few times weekly 120 mL 3    metroNIDAZOLE (FLAGYL) 500 MG tablet Take 1 tablet (500 mg total) by mouth every 12 (twelve) hours. for 7 days 14 tablet 0    onabotulinumtoxinA (BOTOX INJ) Inject as directed.      pantoprazole (PROTONIX) 20 MG tablet Take 1 tablet (20 mg total) by mouth once daily. In the morning on an empty stomach 30 tablet 0    QULIPTA 60 mg Tab       sumatriptan (IMITREX STATDOSE) 6 mg/0.5 mL kit To use max twice within 24 hours, 2 hours apart. 6 kit 2    SUMAtriptan (IMITREX) 20 mg/actuation nasal spray Take a spray that can be repeated after 2 hours, not more than twice a day 10 each 0    tretinoin (RETIN-A) 0.025 % cream Apply pea-sized amount to face nightly 45 g 5    albuterol (PROVENTIL/VENTOLIN HFA) 90 mcg/actuation inhaler Inhale 1-2 puffs into the lungs every 6 (six) hours as needed for Wheezing. Rescue 6.7 g 0     Current Facility-Administered Medications   Medication Dose Route Frequency Provider Last Rate Last Admin    onabotulinumtoxina injection 200 Units  200 Units Intramuscular q12 weeks    200 Units at 10/03/24 0945       OBJECTIVE     Physical Exam  Vitals:    12/02/24 1526   BP: 122/80   Pulse: 89   Resp: 18   Temp: 98 °F (36.7 °C)    Body mass index is 25.62 kg/m².  Weight: 65.6 kg (144 lb 10 oz)   Height: 5' 3" (160 cm)     Physical Exam  Vitals reviewed.   Constitutional:       General: She is not in acute distress.  HENT:      Right Ear: External ear normal.      Left Ear: External ear normal.      Nose: Nose normal.      Mouth/Throat:      Mouth: Mucous membranes are moist.   Eyes:      Extraocular Movements: Extraocular movements intact.      Conjunctiva/sclera: Conjunctivae normal.      Pupils: Pupils are equal, round, and reactive to light.   Cardiovascular:      Rate and Rhythm: Normal rate and regular rhythm.      Pulses: Normal pulses.      Heart sounds: Normal heart sounds.   Pulmonary:      Effort: Pulmonary effort " is normal.      Breath sounds: Normal breath sounds.   Abdominal:      General: Bowel sounds are normal. There is no distension.      Palpations: Abdomen is soft. There is no mass.      Tenderness: There is no abdominal tenderness. There is no right CVA tenderness, left CVA tenderness, guarding or rebound.      Hernia: No hernia is present.   Musculoskeletal:         General: No swelling. Normal range of motion.      Cervical back: Normal range of motion.   Skin:     General: Skin is warm and dry.      Findings: No rash.   Neurological:      General: No focal deficit present.      Mental Status: She is alert and oriented to person, place, and time.   Psychiatric:         Mood and Affect: Mood normal.         Behavior: Behavior normal.         Thought Content: Thought content normal.         Judgment: Judgment normal.       Health Maintenance         Date Due Completion Date    Pneumococcal Vaccines (Age 0-64) (1 of 2 - PCV) Never done ---    HIV Screening Never done ---    Influenza Vaccine (1) Never done ---    COVID-19 Vaccine (3 - 2024-25 season) 09/01/2024 11/1/2021    Cervical Cancer Screening 04/02/2025 4/2/2024    Mammogram 05/14/2025 5/14/2024    Hemoglobin A1c (Diabetic Prevention Screening) 04/06/2026 4/6/2023    TETANUS VACCINE 05/27/2026 5/27/2016    RSV Vaccine (Age 60+ and Pregnant patients) (1 - 1-dose 75+ series) 02/03/2057 ---            ASSESSMENT     44 y.o. female with     1. Blurry vision, bilateral    2. Encounter for annual physical exam    3. Abnormal EKG    4. Migraine without status migrainosus, not intractable, unspecified migraine type    5. Anxiety    6. Bulimia nervosa, unspecified severity    7. Asthma, unspecified asthma severity, unspecified whether complicated, unspecified whether persistent    8. Hypokalemia    9. Dizziness and giddiness        PLAN:     1. Blurry vision, bilateral  Ordered labs to check levels. Will treat accordingly once labs are reviewed.  Counseled patient on  possible causes of blurry vision.   - Ambulatory referral/consult to Ophthalmology; Future  - Hemoglobin A1C; Future  - TSH; Future    2. Encounter for annual physical exam  - Discussed age and gender appropriate screenings at this visit and encouraged a healthy diet low in simple carbohydrates, and increased physical activity.  Counseled on medically appropriate vaccines based on age and current health condition.  Screening test reviewed and discussed with patient.      3. Abnormal EKG  - Lipid Panel; Future  - Ambulatory referral/consult to Cardiology; Future    4. Migraine without status migrainosus, not intractable, unspecified migraine type  Counseled on keeping headache log  Get rest and drink fluids. Get plenty of rest.  Watch for increase pain, fever, vomiting, neck pain or mental confusion.   You can also use tylenol or ibuprofen as directed as long as you don't have any allergies to these medications or medical conditions such as ulcers, liver or kidney disease or blood thinners etc that would prevent you from taking these meds.      Red Flag signs include; thunderclap headache, weakness, blurry vision or other sudden sensory deficit, new onset numbness or tingling, shortness of breath, chest pain, nausea, sweating or fatigue. Please go immediately to the Emergency Department with any of these signs.     5. Anxiety  Encouraged the patient to perform self-calming techniques, such as deep breathing/relaxation techniques and exercise.   - Ambulatory referral/consult to Psychiatry; Future    6. Bulimia nervosa, unspecified severity  Ordered labs to check levels. Will treat accordingly once labs are reviewed.    - CBC Auto Differential; Future  - Calcitriol; Future  - VITAMIN B12; Future  - Iron and TIBC; Future  - Ferritin; Future  - Folate; Future  - Ambulatory referral/consult to Psychiatry; Future  - Ambulatory referral/consult to Nutrition Services; Future    7. Asthma, unspecified asthma severity,  unspecified whether complicated, unspecified whether persistent  Stable. Refilled rescue inhaler.   - albuterol (PROVENTIL/VENTOLIN HFA) 90 mcg/actuation inhaler; Inhale 1-2 puffs into the lungs every 6 (six) hours as needed for Wheezing. Rescue  Dispense: 6.7 g; Refill: 0    8. Hypokalemia  Ordered labs to check levels. Will treat accordingly once labs are reviewed.    - Comprehensive Metabolic Panel; Future    9. Dizziness and giddiness  Counseled patient on possible causes of dizziness.   - Hemoglobin A1C; Future  - CBC Auto Differential; Future    Assessment & Plan    Assessed abdominal pain and irregular menstruation, considering potential causes including ovarian cyst, uterine fibroid, and polycystic ovary syndrome, and bulimia  Evaluated lab results showing low potassium and vitamin deficiencies, indicative of dehydration and malnutrition  Considered impact of bulimia on overall health, including nutritional status and menstrual irregularities  Reviewed prior EKG showing abnormal results, potentially related to anxiety and palpitations  Assessed migraines and blurry vision, considering need for further evaluation  Evaluated chronic pain and mobility issues stemming from workplace injury in 2022    IRREGULAR MENSTRUATION AND OVARIAN CYSTS:  - Explained potential causes of irregular menstruation, including stress, nutritional deficiencies, and ovarian cysts.  - Educated on importance of proper nutrition for maintaining regular menstrual cycles and overall health.  - Discussed definition and implications of polycystic ovary syndrome.    BULIMIA NERVOSA:  - Discussed impact of bulimia on overall health, including potential damage to teeth, throat, and nutritional status.  - Patient to attempt consuming small, frequent meals or protein shakes if unable to tolerate larger meals.  - Recommend focusing on consuming lean proteins and vegetables while limiting sugar and carbohydrate intake.  - Referred to psychiatry  for management of anxiety and eating disorder.  - Referred to behavioral health for therapy.    ANXIETY AND HEART PALPITATIONS:  - Explained connection between anxiety and palpitations.  - Patient to implement stress reduction techniques to manage anxiety.  - Referred to cardiology for evaluation of abnormal EKG and reported palpitations.    FAMILY HISTORY OF BREAST CANCER:  - Educated on importance of regular cancer screenings, including mammograms, given family history of breast cancer.    ASTHMA:  - Started albuterol inhaler for asthma management.    MIGRAINE AND HEADACHES:  - Referred to ophthalmology for evaluation of blurry vision and potential connection to headaches.    NUTRITIONAL DEFICIENCIES AND DEHYDRATION:  - Patient to increase fluid intake to address dehydration.  - Recommend incorporating potassium-rich foods into diet, such as bananas and avocados.  - Started vitamin D supplementation.  - Recommend OTC zinc supplementation.    LABS:  - Ordered CBC, CMP, Hemoglobin A1C, Lipid panel, Thyroid hormone panel, Iron panel, Vitamin B12 level, and Vitamin D level.    FOLLOW-UP:  - Follow up in 4 weeks for virtual appointment.        Carly Riley, MSN, APRN, FNP-C  [facility]  12/02/2024 3:41 PM    This note was generated with the assistance of ambient listening technology. Verbal consent was obtained by the patient and accompanying visitor(s) for the recording of patient appointment to facilitate this note. I attest to having reviewed and edited the generated note for accuracy, though some syntax or spelling errors may persist. Please contact the author of this note for any clarification.        Follow up in about 4 weeks (around 12/30/2024) for Virtual Visit.

## 2024-12-03 LAB
ESTIMATED AVG GLUCOSE: 91 MG/DL (ref 68–131)
FOLATE SERPL-MCNC: 7.6 NG/ML (ref 4–24)
HBA1C MFR BLD: 4.8 % (ref 4–5.6)
VIT B12 SERPL-MCNC: 266 PG/ML (ref 210–950)

## 2024-12-04 LAB
BACTERIAL VAGINOSIS DNA: NOT DETECTED
C TRACH DNA SPEC QL NAA+PROBE: NOT DETECTED
CANDIDA GLABRATA/KRUSEI: NOT DETECTED
CANDIDA RRNA VAG QL PROBE: NOT DETECTED
N GONORRHOEA DNA SPEC QL NAA+PROBE: NOT DETECTED
TRICHOMONAS VAGINALIS: NOT DETECTED

## 2024-12-05 ENCOUNTER — OFFICE VISIT (OUTPATIENT)
Dept: OBSTETRICS AND GYNECOLOGY | Facility: CLINIC | Age: 44
End: 2024-12-05
Payer: OTHER GOVERNMENT

## 2024-12-05 VITALS
WEIGHT: 142.19 LBS | DIASTOLIC BLOOD PRESSURE: 78 MMHG | SYSTOLIC BLOOD PRESSURE: 116 MMHG | BODY MASS INDEX: 25.19 KG/M2

## 2024-12-05 DIAGNOSIS — N83.201 CYSTS OF BOTH OVARIES: Primary | ICD-10-CM

## 2024-12-05 DIAGNOSIS — N83.202 CYSTS OF BOTH OVARIES: Primary | ICD-10-CM

## 2024-12-05 DIAGNOSIS — N92.6 IRREGULAR MENSTRUAL CYCLE: ICD-10-CM

## 2024-12-05 LAB
1,25(OH)2D3 SERPL-MCNC: 58 PG/ML (ref 20–79)
BILIRUB UR QL STRIP: NEGATIVE
CLARITY UR: CLEAR
COLOR UR: YELLOW
GLUCOSE UR QL STRIP: NEGATIVE
HGB UR QL STRIP: NEGATIVE
KETONES UR QL STRIP: NEGATIVE
LEUKOCYTE ESTERASE UR QL STRIP: NEGATIVE
NITRITE UR QL STRIP: NEGATIVE
PH UR STRIP: 8 [PH] (ref 5–8)
PROT UR QL STRIP: NEGATIVE
SP GR UR STRIP: 1.02 (ref 1–1.03)
URN SPEC COLLECT METH UR: NORMAL
UROBILINOGEN UR STRIP-ACNC: NEGATIVE EU/DL

## 2024-12-05 PROCEDURE — 99213 OFFICE O/P EST LOW 20 MIN: CPT | Mod: S$PBB,,,

## 2024-12-05 PROCEDURE — 87591 N.GONORRHOEAE DNA AMP PROB: CPT

## 2024-12-05 PROCEDURE — 81003 URINALYSIS AUTO W/O SCOPE: CPT

## 2024-12-05 PROCEDURE — 99214 OFFICE O/P EST MOD 30 MIN: CPT | Mod: PBBFAC

## 2024-12-05 PROCEDURE — 99999 PR PBB SHADOW E&M-EST. PATIENT-LVL IV: CPT | Mod: PBBFAC,,,

## 2024-12-05 NOTE — PROGRESS NOTES
HISTORY OF PRESENT ILLNESS:    Lydia Thomas is a 44 y.o. female  Patient's last menstrual period was 10/27/2024 (approximate). presents today complaining of missing her cycle in November.   She reports having 2 cycles in October (Oct 7- lasted 3-4 days and Oct 27- lasted 3-4 days). She did  not have a cycle in November. We discussed possible anovulatory cycles. We will follow up at the beginning of January to check on things.       Past Medical History:   Diagnosis Date    Anemia     Anemia     Asthma     exercise asthma    Migraine headache     Ovarian cyst        Past Surgical History:   Procedure Laterality Date    APPENDECTOMY      2019    AUGMENTATION OF BREAST Bilateral 2022    BREAST BIOPSY Right     Excisional bx, benign    COLONOSCOPY N/A 2022    Procedure: COLONOSCOPY;  Surgeon: Sabino Mcintosh MD;  Location: [facility];  Service: Endoscopy;  Laterality: N/A;  High risk for colon cancer (family)      fully vaccinated; instructions to portal-GT    ESOPHAGOGASTRODUODENOSCOPY N/A 2024    Procedure: EGD (ESOPHAGOGASTRODUODENOSCOPY);  Surgeon: Sabino Mcintosh MD;  Location: [facility];  Service: Endoscopy;  Laterality: N/A;  1/10 ref by Feli Jc, PAC, instr. to portal-st  - pc complete. DBM       MEDICATIONS AND ALLERGIES:      Current Outpatient Medications:     albuterol (PROVENTIL/VENTOLIN HFA) 90 mcg/actuation inhaler, Inhale 1-2 puffs into the lungs every 6 (six) hours as needed for Wheezing. Rescue, Disp: 6.7 g, Rfl: 0    ascorbic acid, vitamin C, (VITAMIN C) 500 MG tablet, , Disp: , Rfl:     busPIRone (BUSPAR) 10 MG tablet, Take 1 tablet (10 mg total) by mouth 3 (three) times daily., Disp: 90 tablet, Rfl: 24    clobetasoL (CLOBEX) 0.05 % shampoo, Apply 1 application  topically every other day., Disp: , Rfl:     dicyclomine (BENTYL) 20 mg tablet, Take 1 tablet (20 mg total) by mouth 4 (four) times daily as needed., Disp: 28 tablet, Rfl: 0    doxycycline (VIBRAMYCIN) 100 MG Cap,  "Take 1 capsule (100 mg total) by mouth every 12 (twelve) hours. for 14 days, Disp: 28 capsule, Rfl: 0    ibuprofen (ADVIL,MOTRIN) 600 MG tablet, Take 1 tablet (600 mg total) by mouth every 6 (six) hours as needed., Disp: 20 tablet, Rfl: 0    ketoconazole (NIZORAL) 2 % shampoo, Wash scalp a few times weekly, Disp: 120 mL, Rfl: 3    metroNIDAZOLE (FLAGYL) 500 MG tablet, Take 1 tablet (500 mg total) by mouth every 12 (twelve) hours. for 7 days, Disp: 14 tablet, Rfl: 0    onabotulinumtoxinA (BOTOX INJ), Inject as directed., Disp: , Rfl:     pantoprazole (PROTONIX) 20 MG tablet, Take 1 tablet (20 mg total) by mouth once daily. In the morning on an empty stomach, Disp: 30 tablet, Rfl: 0    QULIPTA 60 mg Tab, , Disp: , Rfl:     sumatriptan (IMITREX STATDOSE) 6 mg/0.5 mL kit, To use max twice within 24 hours, 2 hours apart., Disp: 6 kit, Rfl: 2    SUMAtriptan (IMITREX) 20 mg/actuation nasal spray, Take a spray that can be repeated after 2 hours, not more than twice a day, Disp: 10 each, Rfl: 0    tretinoin (RETIN-A) 0.025 % cream, Apply pea-sized amount to face nightly, Disp: 45 g, Rfl: 5    Current Facility-Administered Medications:     onabotulinumtoxina injection 200 Units, 200 Units, Intramuscular, q12 weeks, , 200 Units at 10/03/24 0945    Review of patient's allergies indicates:   Allergen Reactions    Iodine and iodide containing products Anaphylaxis     PATIENT CAN RECEIVE IOHEXOL, has received multiple times with no pretreatment with no allergic reaction    Shellfish derived Anaphylaxis    Benadryl [diphenhydramine hcl] Itching     "with fast injection"    Fish containing products     Iodine        COMPREHENSIVE GYN HISTORY:  PAP History: Denies abnormal Paps.  Infection History: Denies STDs. Denies PID.  Benign History: Denies uterine fibroids. Denies ovarian cysts. Denies endometriosis. Denies other conditions.  Cancer History: Denies cervical cancer. Denies uterine cancer or hyperplasia. Denies ovarian cancer. " Denies vulvar cancer or pre-cancer. Denies vaginal cancer or pre-cancer. Denies breast cancer. Denies colon cancer.  Sexual Activity History: Reports currently being sexually active  Menstrual History: Every 28 days, flows for 4 days. Light flow.  Dysmenorrhea History: Denies dysmenorrhea.  Contraception History:      ROS:  GENERAL: No fever or chills.  BREASTS: No pain. No lumps. No discharge.  ABDOMEN: No pain. No nausea. No vomiting. No diarrhea. No constipation.  REPRODUCTIVE: No abnormal bleeding.   VULVA: No pain. No lesions. No itching.  VAGINA: No relaxation. No itching. No odor. No discharge. No lesions.  URINARY: No incontinence. No nocturia. No frequency. No dysuria.    PE:  APPEARANCE: Well nourished, well developed, in no acute distress.  AFFECT: WNL, alert and oriented x 3.  ABDOMEN: Soft. No tenderness or masses. No hepatosplenomegaly. No hernias.      ASSESSMENT::    1. Cysts of both ovaries    2. Irregular menstrual cycle        PLAN:    Orders Placed This Encounter    US Pelvis Comp with Transvag NON-OB (xpd    C. trachomatis/N. gonorrhoeae by AMP DNA    Urinalysis, Reflex to Urine Culture Urine, Clean Catch       The patient was counseled today on:  Ovarian Cyst  Discussed simple ovarian cysts typically resolve spontaneously without treatment  Will get f/u pelvic US in 6 weeks to ensure resolution  Discussed OCPs may help shrink cyst an prevent new cysts from forming  ED precautions reviewed for pain worsening or fails to get better with home treatment, Fever of 100.4°F (38°C) or higher, Nausea and vomiting, Weakness, dizziness, or fainting, abnormal vaginal bleeding   DOTTY Herrera-BC

## 2024-12-06 DIAGNOSIS — F50.20 BULIMIA NERVOSA, UNSPECIFIED SEVERITY: Primary | ICD-10-CM

## 2024-12-19 ENCOUNTER — TELEPHONE (OUTPATIENT)
Dept: NEUROLOGY | Facility: CLINIC | Age: 44
End: 2024-12-19
Payer: OTHER GOVERNMENT

## 2024-12-19 ENCOUNTER — TELEPHONE (OUTPATIENT)
Dept: DERMATOLOGY | Facility: CLINIC | Age: 44
End: 2024-12-19
Payer: OTHER GOVERNMENT

## 2024-12-19 NOTE — TELEPHONE ENCOUNTER
----- Message from Cherelle sent at 12/19/2024  9:20 AM CST -----  Regarding: Appt  Contact: Pt [phone]  Pt is calling to state she has the flu and want to reschedule appt scheduled for today to a date next week please call

## 2024-12-19 NOTE — TELEPHONE ENCOUNTER
Spoke to Pt. Pt has the flu. Pt was offered an appt for Jan 16 as the next available appt. Pt stated she is moving away and need the Botox before then.     ----- Message from Med Assistant Sadia sent at 12/19/2024  8:16 AM CST -----  Type: Patient Call Back    Who called: Self    What is the request in detail: pt. Needs assistance with rescheduling her appt. She is still not feeling well to come in ..     Can the clinic reply by [facility]?NO    Would the patient rather a call back or a response via [facility]? Yes, call     Best call back number: [phone] (home)

## 2024-12-20 ENCOUNTER — PATIENT MESSAGE (OUTPATIENT)
Facility: CLINIC | Age: 44
End: 2024-12-20
Payer: OTHER GOVERNMENT

## 2024-12-27 ENCOUNTER — TELEPHONE (OUTPATIENT)
Dept: PRIMARY CARE CLINIC | Facility: CLINIC | Age: 44
End: 2024-12-27
Payer: OTHER GOVERNMENT

## 2025-01-09 ENCOUNTER — OUTPATIENT CASE MANAGEMENT (OUTPATIENT)
Dept: ADMINISTRATIVE | Facility: OTHER | Age: 45
End: 2025-01-09
Payer: OTHER GOVERNMENT

## 2025-01-09 ENCOUNTER — PROCEDURE VISIT (OUTPATIENT)
Dept: NEUROLOGY | Facility: CLINIC | Age: 45
End: 2025-01-09
Payer: OTHER GOVERNMENT

## 2025-01-09 VITALS
SYSTOLIC BLOOD PRESSURE: 108 MMHG | BODY MASS INDEX: 25.19 KG/M2 | DIASTOLIC BLOOD PRESSURE: 74 MMHG | HEART RATE: 71 BPM | WEIGHT: 142.19 LBS

## 2025-01-09 DIAGNOSIS — G43.809 OTHER MIGRAINE WITHOUT STATUS MIGRAINOSUS, NOT INTRACTABLE: ICD-10-CM

## 2025-01-09 DIAGNOSIS — G43.709 CHRONIC MIGRAINE WITHOUT AURA WITHOUT STATUS MIGRAINOSUS, NOT INTRACTABLE: Primary | ICD-10-CM

## 2025-01-09 PROCEDURE — 99999PBSHW PR PBB SHADOW TECHNICAL ONLY FILED TO HB: Mod: JW,PBBFAC,,

## 2025-01-09 PROCEDURE — 64615 CHEMODENERV MUSC MIGRAINE: CPT | Mod: PBBFAC | Performed by: NURSE PRACTITIONER

## 2025-01-09 RX ADMIN — ONABOTULINUMTOXINA 200 UNITS: 100 INJECTION, POWDER, LYOPHILIZED, FOR SOLUTION INTRADERMAL; INTRAMUSCULAR at 08:01

## 2025-01-09 NOTE — PROCEDURES
PROCEDURE NOTE:  BOTOX was performed as an indicated therapy for intractable chronic migraine headaches given that the patient failed more than 2 headache medications    A time out was conducted just before the start of the procedure to verify the correct patient and procedure, procedure location, and all relevant critical information.     PROCEDURE PERFORMED: Botulinum toxin injection (21502)  CLINICAL INDICATION: G43.719  After risks and benefits were explained including bleeding, infection, worsening of pain, damage to the areas being injected, weakness of muscles, loss of muscle control, dysphagia if injecting the head or neck, facial droop if injecting the facial area, painful injection, allergic or other reaction to the medications being injected, and the failure of the procedure to help the problem, a signed consent was obtained.   The patient was placed in a comfortable area and the sites to be treated were identified.The area to be treated was prepped three times with alcohol and the alcohol allowed to dry. Next, a 30 gauge needle was used to inject the medication in the area to be treated.      Total Botox used: 155 Units   Unavoidable waste: 45 Units     Injection sites:    muscle bilaterally ( a total of 10 units divided into 2 sites)   Procerus muscle (5 units)   Frontalis muscle bilaterally (a total of 20 units divided into 4 sites)   Temporalis muscle bilaterally (a total of 40 units divided into 8 sites)   Occipitalis muscle bilaterally (a total of 30 units divided into 6 sites)   Cervical paraspinal muscles (a total of 20 units divided into 4 sites)   Trapezius muscle bilaterally (a total of 30 units divided into 6 sites)   Complications: none   RTC for the next Botox injection: 12 weeks     CC: No primary care provider on file.         AMY Hernandez  [facility]   [phone]

## 2025-01-09 NOTE — PROGRESS NOTES
Outpatient Care Management   - Care Plan Follow Up    Patient: Lydia Thomas  MRN:  [id number]  Date of Service:  1/9/2025  Completed by:  Claire Rider LCSW  Referral Date: 05/30/2024    Reason for Visit   Patient presents with    [facility] SW Follow Up Call    Case Closure     1/9/25       Brief Summary: SW spoke with Pt to check in.  They will be moving at the end of the week to [place].  She reported having lots of issues with workers comp in that they are trying to end services and have never placed therapy orders back in.  Discussed resources and importance to push to establish with neurology in [place] as soon as possible.  Sent list of neurologists in the area for her to review.  She advised she will have the same  and workers comp company upon moving.  She will call if anything comes up in the future regarding [facility] records and services or needs before she moves.  She is aware of  contact information and that  to close due to the move.    No future appointments.    Claire Rider LCSW  Neuro Therapy   [facility]  [phone]

## 2025-05-14 NOTE — ED NOTES
I have reviewed discharge instructions with the patient. The patient verbalized understanding. Patient NAD, VSS. Patient armband removed and shredded. Todd Caicedo  Urology  [address] [place] [address], [facility] [address]  [place] [address]  Phone: [phone]  Fax: [phone]  Follow Up Time: Routine

## 2025-06-30 ENCOUNTER — PATIENT MESSAGE (OUTPATIENT)
Dept: ADMINISTRATIVE | Facility: OTHER | Age: 45
End: 2025-06-30
Payer: OTHER GOVERNMENT